# Patient Record
Sex: FEMALE | Race: WHITE | NOT HISPANIC OR LATINO | Employment: UNEMPLOYED | URBAN - METROPOLITAN AREA
[De-identification: names, ages, dates, MRNs, and addresses within clinical notes are randomized per-mention and may not be internally consistent; named-entity substitution may affect disease eponyms.]

---

## 2017-01-24 ENCOUNTER — LAB VISIT (OUTPATIENT)
Dept: LAB | Facility: HOSPITAL | Age: 62
End: 2017-01-24
Attending: FAMILY MEDICINE
Payer: COMMERCIAL

## 2017-01-24 DIAGNOSIS — E87.1 HYPONATREMIA: ICD-10-CM

## 2017-01-24 LAB
ANION GAP SERPL CALC-SCNC: 11 MMOL/L
BUN SERPL-MCNC: 10 MG/DL
CALCIUM SERPL-MCNC: 9.7 MG/DL
CHLORIDE SERPL-SCNC: 99 MMOL/L
CO2 SERPL-SCNC: 23 MMOL/L
CREAT SERPL-MCNC: 0.8 MG/DL
EST. GFR  (AFRICAN AMERICAN): >60 ML/MIN/1.73 M^2
EST. GFR  (NON AFRICAN AMERICAN): >60 ML/MIN/1.73 M^2
GLUCOSE SERPL-MCNC: 98 MG/DL
POTASSIUM SERPL-SCNC: 3.8 MMOL/L
SODIUM SERPL-SCNC: 133 MMOL/L

## 2017-01-24 PROCEDURE — 36415 COLL VENOUS BLD VENIPUNCTURE: CPT | Mod: PO

## 2017-01-24 PROCEDURE — 80048 BASIC METABOLIC PNL TOTAL CA: CPT

## 2017-01-25 ENCOUNTER — TELEPHONE (OUTPATIENT)
Dept: FAMILY MEDICINE | Facility: CLINIC | Age: 62
End: 2017-01-25

## 2017-01-25 NOTE — TELEPHONE ENCOUNTER
"Returned call. When transferred automatically to libra camacho, the message stated "a system error occurred. Try again later."  "

## 2017-01-25 NOTE — TELEPHONE ENCOUNTER
----- Message from Francisco Davis sent at 1/24/2017 10:36 AM CST -----  Contact: ramesh with [facility] [phone]  florias [facility] [phone] called stated physician's name is not on script. Please call back to advise. Thanks,

## 2017-02-13 ENCOUNTER — OFFICE VISIT (OUTPATIENT)
Dept: PULMONOLOGY | Facility: CLINIC | Age: 62
End: 2017-02-13
Payer: COMMERCIAL

## 2017-02-13 VITALS
SYSTOLIC BLOOD PRESSURE: 124 MMHG | OXYGEN SATURATION: 94 % | HEART RATE: 91 BPM | HEIGHT: 63 IN | WEIGHT: 123.69 LBS | DIASTOLIC BLOOD PRESSURE: 80 MMHG | BODY MASS INDEX: 21.91 KG/M2

## 2017-02-13 DIAGNOSIS — F41.9 ANXIETY: ICD-10-CM

## 2017-02-13 DIAGNOSIS — R93.89 ABNORMAL CT SCAN: Primary | ICD-10-CM

## 2017-02-13 DIAGNOSIS — J44.9 COPD, SEVERE: ICD-10-CM

## 2017-02-13 DIAGNOSIS — R91.1 LUNG NODULE: ICD-10-CM

## 2017-02-13 PROCEDURE — 3074F SYST BP LT 130 MM HG: CPT | Mod: S$GLB,,, | Performed by: INTERNAL MEDICINE

## 2017-02-13 PROCEDURE — 3079F DIAST BP 80-89 MM HG: CPT | Mod: S$GLB,,, | Performed by: INTERNAL MEDICINE

## 2017-02-13 PROCEDURE — 99999 PR PBB SHADOW E&M-EST. PATIENT-LVL III: CPT | Mod: PBBFAC,,, | Performed by: INTERNAL MEDICINE

## 2017-02-13 PROCEDURE — 99213 OFFICE O/P EST LOW 20 MIN: CPT | Mod: S$GLB,,, | Performed by: INTERNAL MEDICINE

## 2017-02-13 RX ORDER — ALPRAZOLAM 0.25 MG/1
0.25 TABLET ORAL 3 TIMES DAILY PRN
Qty: 90 TABLET | Refills: 1 | Status: SHIPPED | OUTPATIENT
Start: 2017-02-13 | End: 2017-09-20 | Stop reason: SDUPTHER

## 2017-02-13 RX ORDER — AZITHROMYCIN 500 MG/1
500 TABLET, FILM COATED ORAL DAILY
COMMUNITY
End: 2017-09-27

## 2017-02-13 NOTE — MR AVS SNAPSHOT
[facility]  [address]  [place] [address]  Phone: [phone]                  Fabiana Morel   2017 8:40 AM   Office Visit    Description:  Female : 1955   Provider:  Bhupendra Mackey MD   Department:  [facility]           Reason for Visit     Cough     Shortness of Breath           Diagnoses this Visit        Comments    Abnormal CT scan    -  Primary     Anxiety         Lung nodule         COPD, severe                To Do List           Future Appointments        Provider Department Dept Phone    2017 1:00 PM MD [facility] [phone]    2017 10:00 AM [facility] CT1 LIMIT [address] L[facility] [phone]    8/15/2017 9:00 AM MD [facility] [phone]      Goals (5 Years of Data)     None      Follow-Up and Disposition     Return in about 6 months (around 2017).    Follow-up and Disposition History       These Medications        Disp Refills Start End    alprazolam (XANAX) 0.25 MG tablet 90 tablet 1 2017     Take 1 tablet (0.25 mg total) by mouth 3 (three) times daily as needed. - Oral    Pharmacy: [id number] [place] - [address]  #: [phone]         [facility]     [facility] -  Assistance  Registered nurses in the [facility] provide clinical advisement, health education, appointment booking, and other advisory services.  Call for this free service at [phone].             Medications           Message regarding Medications     Verify the changes and/or additions to your medication regime listed below are the same as discussed with your clinician today.  If any of these changes or additions are incorrect, please notify your healthcare provider.        STOP taking these medications     FOLIC ACID/MULTIVIT-MIN/LUTEIN (CENTRUM SILVER ORAL) Take by mouth.    nicotine (NICODERM CQ) 14  "mg/24 hr Place 1 patch onto the skin once daily.           Verify that the below list of medications is an accurate representation of the medications you are currently taking.  If none reported, the list may be blank. If incorrect, please contact your healthcare provider. Carry this list with you in case of emergency.           Current Medications     albuterol (PROVENTIL) 4 MG Tab Take 1 tablet (4 mg total) by mouth 3 (three) times daily.    alprazolam (XANAX) 0.25 MG tablet Take 1 tablet (0.25 mg total) by mouth 3 (three) times daily as needed.    atorvastatin (LIPITOR) 40 MG tablet Take 1 tablet (40 mg total) by mouth once daily.    BREO ELLIPTA 100-25 mcg/dose diskus inhaler Inhale 1 puff into the lungs once daily.    estradiol (ESTRACE) 2 MG tablet Take 2 mg by mouth once daily.    INCRUSE ELLIPTA 62.5 mcg/actuation DsDv Inhale 1 puff into the lungs once daily.    lisinopril 10 MG tablet Take 1 tablet (10 mg total) by mouth once daily.    metoprolol succinate (TOPROL XL) 50 MG 24 hr tablet Take 1 tablet (50 mg total) by mouth once daily.    albuterol (VENTOLIN HFA) 90 mcg/actuation inhaler INHALE TWO PUFFS BY MOUTH EVERY 6 HOURS AS NEEDED FOR WHEEZING    azithromycin (ZITHROMAX) 500 MG tablet Take 500 mg by mouth once daily.    B-complex with vitamin C (Z-BEC OR EQUIV) tablet Take 1 tablet by mouth once daily.    cholecalciferol, vitamin D3, (VITAMIN D3) 1,000 unit capsule Take 1,000 Units by mouth once daily.    predniSONE (DELTASONE) 20 MG tablet One daily for 3 days and repeat for flare of lung symptoms as intructed           Clinical Reference Information           Your Vitals Were     BP Pulse Height Weight SpO2 BMI    124/80 (BP Location: Right arm, Patient Position: Sitting, BP Method: Automatic) 91 5' 3" (1.6 m) 56.1 kg (123 lb 10.9 oz) 94% 21.91 kg/m2      Blood Pressure          Most Recent Value    BP  124/80      Allergies as of 2/13/2017     Erythromycin      Immunizations Administered on Date of " Encounter - 2/13/2017     None      Orders Placed During Today's Visit     Future Labs/Procedures Expected by Expires    CT Chest Without Contrast  8/13/2017 2/13/2018      Instructions    Must stop smoking.      Ct chest follow up in august,    Take antibiotic and prednisone if needed.    Continue with activity/rehab.  Try to do regular activity.       Smoking Cessation     If you would like to quit smoking:   You may be eligible for free services if you are a [place] resident and started smoking cigarettes before September 1, 1988.  Call the Smoking Cessation [employer] ([facility]) toll free at [phone] or (188) [phone].   Call 0-750-QUIT-NOW if you do not meet the above criteria.            Language Assistance Services     ATTENTION: Language assistance services are available, free of charge. Please call [phone].      ATENCIÓN: Si habla español, tiene a lynne disposición servicios gratuitos de asistencia lingüística. Llame al [phone].     CHÚ Ý: N?u b?n nói Ti?ng Vi?t, có các d?ch v? h? tr? ngôn ng? mi?n phí dành cho b?n. G?i s? [phone].         [facility] complies with applicable Federal civil rights laws and does not discriminate on the basis of race, color, national origin, age, disability, or sex.

## 2017-02-13 NOTE — PATIENT INSTRUCTIONS
Must stop smoking.      Ct chest follow up in august,    Take antibiotic and prednisone if needed.    Continue with activity/rehab.  Try to do regular activity.

## 2017-02-13 NOTE — PROGRESS NOTES
"2/13/2017    Fabiana Morel  Office Note    Chief Complaint   Patient presents with    Cough    Shortness of Breath     Feb 13, 2017, hpi doing rehab, no action plan, still smoking.  Using all meds, nerves better with xanax and toprol.        Aug 12, 2016HPI:initial encounter in [facility], copd and bronchiectasis and lung nodule.  No prednisone use recent.  No recent azithromycin.    breo and incruse regular use, xxstcethy7h/d, not using nebulizer.  Last ct 12/2015.  fev1 27% in 2014 with dramatic bd response.  Working as filing and typing.  Feels resp comfortable.  Has had lung dz age 56.        Scant mucous with no color chr, no acapella.        The chief compliant  problem is stable  PFSH:  Past Medical History   Diagnosis Date    Fracture of left clavicle     Hypertension     Scoliosis     SOB (shortness of breath)          Past Surgical History   Procedure Laterality Date    Appendectomy      Breast biopsy Right      cyst    Hysterectomy       HASMUKH/BSO, DUB    Tubal ligation       Social History   Substance Use Topics    Smoking status: Current Every Day Smoker     Packs/day: 0.50     Years: 30.00     Types: Cigarettes    Smokeless tobacco: Never Used    Alcohol use 7.2 oz/week     12 Cans of beer per week      Comment: 2-3 beers daily     Family History   Problem Relation Age of Onset    Cancer Mother 49     "lymph nodes"    Cancer Sister      lung?     Review of patient's allergies indicates:   Allergen Reactions    Erythromycin Nausea Only       Performance Status:The patient's activity level is functions out of house.      Review of Systems:  a review of eleven systems covering constitutional, Eye, HEENT, Psych, Respiratory, Cardiac, GI, , Musculoskeletal, Endocrine, Dermatologic was negative except for pertinent findings as listed ABOVE and below: night sweats chr due to menopause,      Exam:Comprehensive exam done.   Visit Vitals    /80 (BP Location: Right arm, Patient Position: " "Sitting, BP Method: Automatic)    Pulse 91    Ht 5' 3" (1.6 m)    Wt 56.1 kg (123 lb 10.9 oz)    SpO2 (!) 94%    BMI 21.91 kg/m2     Exam included Vitals as listed, and patient's appearance and affect and alertness and mood, oral exam for yeast and hygiene and pharynx lesions and Mallapatti (M) score, neck with inspection for jvd and masses and thyroid abnormalities and lymph nodes (supraclavicular and infraclavicular nodes and axillary also examined and noted if abn), chest exam included symmetry and effort and fremitus and percussion and auscultation, cardiac exam included rhythm and gallops and murmur and rubs and jvd and edema, abdominal exam for mass and hepatosplenomegaly and tenderness and hernias and bowel sounds, Musculoskeletal exam with muscle tone and posture and mobility/gait and  strength, and skin for rashes and cyanosis and pallor and turgor, extremity for clubbing.  Findings were normal except for pertinent findings listed below:  Mild kyphosis scoliosis, M2, no wheezes no edema,       Radiographs reviewed: was not done     Labs reviewed from last 6 months on EPIC result review       PFT results [id number]    Plan:  Clinical impression is apparently straight forward and impression with management as below.    Fabiana was seen today for cough and shortness of breath.    Diagnoses and all orders for this visit:    Abnormal CT scan  -     CT Chest Without Contrast; Future    Anxiety  -     alprazolam (XANAX) 0.25 MG tablet; Take 1 tablet (0.25 mg total) by mouth 3 (three) times daily as needed.    Lung nodule  -     CT Chest Without Contrast; Future    COPD, severe        Return in about 6 months (around 8/13/2017).    Discussed with patient above for education the following:       Must stop smoking.      Ct chest follow up in august,    Take antibiotic and prednisone if needed.    Continue with activity/rehab.  Try to do regular activity.  "

## 2017-02-23 ENCOUNTER — CLINICAL SUPPORT (OUTPATIENT)
Dept: SMOKING CESSATION | Facility: CLINIC | Age: 62
End: 2017-02-23
Payer: COMMERCIAL

## 2017-02-23 ENCOUNTER — TELEPHONE (OUTPATIENT)
Dept: SMOKING CESSATION | Facility: CLINIC | Age: 62
End: 2017-02-23

## 2017-02-23 DIAGNOSIS — F17.200 NICOTINE DEPENDENCE: Primary | ICD-10-CM

## 2017-02-23 PROCEDURE — 99407 BEHAV CHNG SMOKING > 10 MIN: CPT | Mod: S$GLB,,, | Performed by: GENERAL PRACTICE

## 2017-05-12 ENCOUNTER — DOCUMENTATION ONLY (OUTPATIENT)
Dept: FAMILY MEDICINE | Facility: CLINIC | Age: 62
End: 2017-05-12

## 2017-05-12 NOTE — PROGRESS NOTES
Pre-Visit Chart Review  For Appointment Scheduled on 5/19/17.    Health Maintenance Due   Topic Date Due    TETANUS VACCINE  11/09/1974    Zoster Vaccine  11/09/2016

## 2017-05-19 ENCOUNTER — OFFICE VISIT (OUTPATIENT)
Dept: FAMILY MEDICINE | Facility: CLINIC | Age: 62
End: 2017-05-19
Payer: COMMERCIAL

## 2017-05-19 VITALS
TEMPERATURE: 98 F | SYSTOLIC BLOOD PRESSURE: 99 MMHG | HEART RATE: 91 BPM | BODY MASS INDEX: 22.15 KG/M2 | WEIGHT: 125 LBS | HEIGHT: 63 IN | OXYGEN SATURATION: 96 % | DIASTOLIC BLOOD PRESSURE: 70 MMHG

## 2017-05-19 DIAGNOSIS — E87.1 HYPONATREMIA: ICD-10-CM

## 2017-05-19 DIAGNOSIS — I10 ESSENTIAL HYPERTENSION: Primary | ICD-10-CM

## 2017-05-19 DIAGNOSIS — R63.6 UNDERWEIGHT: ICD-10-CM

## 2017-05-19 DIAGNOSIS — E78.5 HYPERLIPIDEMIA, UNSPECIFIED HYPERLIPIDEMIA TYPE: ICD-10-CM

## 2017-05-19 PROCEDURE — 99999 PR PBB SHADOW E&M-EST. PATIENT-LVL III: CPT | Mod: PBBFAC,,, | Performed by: FAMILY MEDICINE

## 2017-05-19 PROCEDURE — 99214 OFFICE O/P EST MOD 30 MIN: CPT | Mod: S$GLB,,, | Performed by: FAMILY MEDICINE

## 2017-05-19 PROCEDURE — 3078F DIAST BP <80 MM HG: CPT | Mod: S$GLB,,, | Performed by: FAMILY MEDICINE

## 2017-05-19 PROCEDURE — 3074F SYST BP LT 130 MM HG: CPT | Mod: S$GLB,,, | Performed by: FAMILY MEDICINE

## 2017-05-19 PROCEDURE — 1160F RVW MEDS BY RX/DR IN RCRD: CPT | Mod: S$GLB,,, | Performed by: FAMILY MEDICINE

## 2017-05-19 NOTE — PATIENT INSTRUCTIONS
Stop lisinopril.  Continue with metoprolol or toprol xl.          Established High Blood Pressure    High blood pressure (hypertension) is a chronic disease. Often health care providers dont know what causes it. But it can be caused by certain health conditions and medicines.  If you have high blood pressure, you may not have any symptoms. If you do have symptoms, they may include headache, dizziness, changes in your vision, chest pain, and shortness of breath. But even without symptoms, high blood pressure thats not treated raises your risk for heart attack and stroke. High blood pressure is a serious health risk and shouldnt be ignored.  A blood pressure reading is made up of two numbers: a higher number over a lower number. The top number is the systolic pressure. The bottom number is the diastolic pressure. A normal blood pressure is less than 120 over less than 80.  High blood pressure is when either the top number is 140 or higher, or the bottom number is 90 or higher. This must be the result when taking your blood pressure a number of times. The blood pressures between normal and high are called prehypertension.  Home care  If you have high blood pressure, you should do what is listed below to lower your blood pressure. If you are taking medicines for high blood pressure, these methods may reduce or end your need for medicines in the future.  · Begin a weight-loss program if you are overweight.  · Cut back on how much salt you get in your diet. Heres how to do this:  ¨ Dont eat foods that have a lot of salt. These include olives, pickles, smoked meats, and salted potato chips.  ¨ Dont add salt to your food at the table.  ¨ Use only small amounts of salt when cooking.  · Begin an exercise program. Talk with your health care provider about the type of exercise program that would be best for you. It doesn't have to be hard. Even brisk walking for 20 minutes 3 times a week is a good form of  exercise.  · Dont take medicines that have heart stimulants. This includes many cold and sinus decongestant pills and sprays, as well as diet pills. Check the warnings about hypertension on the label. Stimulants such as amphetamine or cocaine could be lethal for someone with high blood pressure. Never take these.  · Limit how much caffeine you get in your diet. Switch to caffeine-free products.  · Stop smoking. If you are a long-time smoker, this can be hard. Enroll in a stop-smoking program to make it more likely that you will quit for good.  · Learn how to handle stress. This is an important part of any program to lower blood pressure. Learn about relaxation methods like meditation, yoga, or biofeedback.  · If your provider prescribed medicines, take them exactly as directed. Missing doses may cause your blood pressure get out of control.  · Consider buying an automatic blood pressure machine. You can get one of these at most pharmacies. Use this to watch your blood pressure at home. Give the results to your provider.  Follow-up care  You will need to make regular visits to your health care provider. This is to check your blood pressure and to make changes to your medicines. Make a follow-up appointment as directed.  When to seek medical advice  Call your health care provider right away if any of these occur:  · Chest pain or shortness of breath  · Severe headache  · Throbbing or rushing sound in the ears  · Nosebleed  · Sudden severe pain in your belly (abdomen)  · Extreme drowsiness, confusion, or fainting  · Dizziness or dizziness with a spinning sensation (vertigo)  · Weakness of an arm or leg or one side of the face  · You have problems speaking or seeing   Date Last Reviewed: 11/25/2014  © 6824-3033 [employer]. [address] [place] [address], [place] [address]. All rights reserved. This information is not intended as a substitute for professional medical care. Always follow your healthcare  professional's instructions.

## 2017-05-19 NOTE — PROGRESS NOTES
"CHIEF COMPLAINT:  Follow up      HISTORY OF PRESENT ILLNESS:  Fabiana Morel is a 61 y.o. female who presents to clinic for follow up.    1. HTN: she is on lisinopril and toprol xl. She denies any CP, SOB, cough, edema. She does not monitor her blood pressures    2. Hyperlipidemia: she is on lipitor. She denies any myalgia, dark colored urine.        REVIEW OF SYSTEMS:  The patient denies any fever, chills, night sweats, headaches, vision changes, difficulty speaking or swallowing, decreased hearing, weight loss, weight gain, chest pain, palpitations, shortness of breath, cough, nausea, vomiting, abdominal pain, dysuria, diarrhea, constipation, hematuria, hematochezia, melena, changes in her hair, skin, nails, numbness or weakness in her extremities, erythema, swelling over any of her joints, myalgia, swollen glands, easy bruising, fatigue, edema, symptoms of anxiety or depression.     MEDICATIONS:   Reviewed and/or reconciled in EPIC    ALLERGIES:  Reviewed and/or reconciled in [facility]    PAST MEDICAL/SURGICAL HISTORY:   Past Medical History:   Diagnosis Date    Fracture of left clavicle     Hypertension     Scoliosis     SOB (shortness of breath)       Past Surgical History:   Procedure Laterality Date    APPENDECTOMY      BREAST BIOPSY Right     cyst    HYSTERECTOMY      HASMUKH/BSO, DUB    TUBAL LIGATION         FAMILY HISTORY:    Family History   Problem Relation Age of Onset    Cancer Mother 49     "lymph nodes"    Cancer Sister      lung?       SOCIAL HISTORY:    Social History     Social History    Marital status:      Spouse name: N/A    Number of children: N/A    Years of education: N/A     Occupational History    Not on file.     Social History Main Topics    Smoking status: Current Every Day Smoker     Packs/day: 0.50     Years: 30.00     Types: Cigarettes    Smokeless tobacco: Never Used    Alcohol use 7.2 oz/week     12 Cans of beer per week      Comment: 2-3 beers daily    Drug use: " "No    Sexual activity: Yes     Partners: Male     Other Topics Concern    Not on file     Social History Narrative       PHYSICAL EXAM:  VITAL SIGNS:   Vitals:    05/19/17 1312   BP: 99/70   BP Location: Left arm   Patient Position: Sitting   BP Method: Automatic   Pulse: 91   Temp: 98.3 °F (36.8 °C)   TempSrc: Oral   SpO2: 96%   Weight: 56.7 kg (125 lb)   Height: 5' 3" (1.6 m)     GENERAL:  Patient appears well nourished, sitting on exam table, in no acute distress.  HEENT:  Atraumatic, normocephalic, PERRLA, EOMI, no conjunctival injection, sclerae are anicteric, normal external auditory canals,TMs clear b/l, gross hearing intact to whisper, MMM, no oropharygneal erythema or exudate.  NECK:  Supple, normal ROM, trachea is midline , no supraclavicular or cervical LAD or masses palpated.  Thyroid gland not palpable.  CARDIOVASCULAR:  RRR, normal S1 and S2, no m/r/g.  RESPIRATORY:  CTA b/l, no wheezes, rhonchi, rales.  No increased work of breathing, no  use of accessory muscles.  ABDOMEN:  Soft, nontender, nondistended, normoactive bowel sounds in all four quadrants, no rebound or guarding, no HSM or masses palpated.  Normal percussion.  EXTREMITIES:  2+ DP pulses b/l, no edema.  SKIN:  Warm, no lesions on exposed skin.  NEUROMUSCULAR:  Cranial nerves II-XII grossly intact. No clubbing or cyanosis of digits/nails.  Steady gait.  PSYCH:  Patient is alert and oriented to person, time, place. They are appropriately dressed and groomed. There is normal eye contact. Rate and tone of speech is normal. Normal insight, judgement. Normal thought content and process.     LABORATORY/IMAGING STUDIES:    ASSESSMENT/PLAN: This is a 61 y.o. female who presents to clinic for evaluation of the following concerns  1. HTN: see below  2. Hyperlipidemia: obtain CMP, lipid panel in 3 months  3. Underweight: see below  4. Hyponatremia: discussed adding some salt to her diet as she does not add any salt to her cooking and does not eat " processed foods.  will recheck CMP in 3 months.     Patient readiness: acceptance and barriers:none    During the course of the visit the patient was educated and counseled about the following:     Hypertension:   BP low, will stop lisinopril. Continue with toprol xl. Will follow up next week for BP recheck   Underweight:  Follow up and re-weight in: 6  months and as needed.     Goals: Hypertension: Reduce Blood Pressure and Underweight: Increase calorie intake and BMI      Did patient meet goals/outcomes: No    The following self management tools provided: declined    Patient Instructions (the written plan) was given to the patient/family.     Time spent with patient: 30 minutes      Светлана Frazier MD

## 2017-05-19 NOTE — MR AVS SNAPSHOT
[facility]  [address]  [place] [address]  Phone: [phone]  Fax: [phone]                  Fabiana Morel   2017 1:00 PM   Office Visit    Description:  Female : 1955   Provider:  Светлана Frazier MD   Department:  [facility]           Reason for Visit     hypertension follow up           Diagnoses this Visit        Comments    Essential hypertension    -  Primary     Hyperlipidemia, unspecified hyperlipidemia type         Hyponatremia         Underweight         Body mass index (BMI) 22.0-22.9, adult                To Do List           Future Appointments        Provider Department Dept Phone    2017 11:00 AM Светлана Frazier MD [facility] [phone]    2017 10:00 AM [facility] CT1 LIMIT 400 LBS [facility] [phone]    8/15/2017 9:00 AM MD [facility] - Pulmonary [phone]    2017 8:00 AM LABSEBASTIAN SAT [facility] - Lab [phone]    2017 1:40 PM Светлана Frazier MD [facility] [phone]      Goals (5 Years of Data)     None      Follow-Up and Disposition     Return in about 6 months (around 2017) for htn.    Follow-up and Disposition History      [facility]     [facility] Nurse Care [facility] -  Assistance  Unless otherwise directed by your provider, please contact [facility], our nurse care line that is available for  assistance.     Registered nurses in the [facility] provide: appointment scheduling, clinical advisement, health education, and other advisory services.  Call: [phone] (toll free)               Medications           Message regarding Medications     Verify the changes and/or additions to your medication regime listed below are the same as discussed with your clinician today.  If any of these changes or additions are incorrect, please notify your healthcare provider.        STOP taking these  "medications     B-complex with vitamin C (Z-BEC OR EQUIV) tablet Take 1 tablet by mouth once daily.    cholecalciferol, vitamin D3, (VITAMIN D3) 1,000 unit capsule Take 1,000 Units by mouth once daily.    lisinopril 10 MG tablet Take 1 tablet (10 mg total) by mouth once daily.           Verify that the below list of medications is an accurate representation of the medications you are currently taking.  If none reported, the list may be blank. If incorrect, please contact your healthcare provider. Carry this list with you in case of emergency.           Current Medications     albuterol (PROVENTIL) 4 MG Tab Take 1 tablet (4 mg total) by mouth 3 (three) times daily.    albuterol (VENTOLIN HFA) 90 mcg/actuation inhaler INHALE TWO PUFFS BY MOUTH EVERY 6 HOURS AS NEEDED FOR WHEEZING    alprazolam (XANAX) 0.25 MG tablet Take 1 tablet (0.25 mg total) by mouth 3 (three) times daily as needed.    atorvastatin (LIPITOR) 40 MG tablet Take 1 tablet (40 mg total) by mouth once daily.    BREO ELLIPTA 100-25 mcg/dose diskus inhaler Inhale 1 puff into the lungs once daily.    estradiol (ESTRACE) 2 MG tablet Take 2 mg by mouth once daily.    INCRUSE ELLIPTA 62.5 mcg/actuation DsDv Inhale 1 puff into the lungs once daily.    metoprolol succinate (TOPROL XL) 50 MG 24 hr tablet Take 1 tablet (50 mg total) by mouth once daily.    azithromycin (ZITHROMAX) 500 MG tablet Take 500 mg by mouth once daily.    predniSONE (DELTASONE) 20 MG tablet One daily for 3 days and repeat for flare of lung symptoms as intructed           Clinical Reference Information           Your Vitals Were     BP Pulse Temp Height Weight SpO2    99/70 (BP Location: Left arm, Patient Position: Sitting, BP Method: Automatic) 91 98.3 °F (36.8 °C) (Oral) 5' 3" (1.6 m) 56.7 kg (125 lb) 96%    BMI                22.14 kg/m2          Blood Pressure          Most Recent Value    BP  99/70      Allergies as of 5/19/2017     Erythromycin      Immunizations Administered on Date " of Encounter - 5/19/2017     None      Orders Placed During Today's Visit     Future Labs/Procedures Expected by Expires    Comprehensive metabolic panel  5/19/2017 5/19/2018    Lipid panel  5/19/2017 7/18/2018      [facility] Sign-Up     Activating your [facility] account is as easy as 1-2-3!     1) Visit [email or web address], select Sign Up Now, enter this activation code and your date of birth, then select Next.  [email or web address]  Expires: 7/3/2017  1:04 PM      2) Create a username and password to use when you visit [facility] in the future and select a security question in case you lose your password and select Next.    3) Enter your e-mail address and click Sign Up!    Additional Information  If you have questions, please e-mail [email or web address] or call [phone] to talk to our [facility] staff. Remember, [facility] is NOT to be used for urgent needs. For medical emergencies, dial 911.         Instructions      Stop lisinopril.  Continue with metoprolol or toprol xl.          Established High Blood Pressure    High blood pressure (hypertension) is a chronic disease. Often health care providers dont know what causes it. But it can be caused by certain health conditions and medicines.  If you have high blood pressure, you may not have any symptoms. If you do have symptoms, they may include headache, dizziness, changes in your vision, chest pain, and shortness of breath. But even without symptoms, high blood pressure thats not treated raises your risk for heart attack and stroke. High blood pressure is a serious health risk and shouldnt be ignored.  A blood pressure reading is made up of two numbers: a higher number over a lower number. The top number is the systolic pressure. The bottom number is the diastolic pressure. A normal blood pressure is less than 120 over less than 80.  High blood pressure is when either the top number is 140 or higher, or the bottom number is 90 or higher. This must be the result  when taking your blood pressure a number of times. The blood pressures between normal and high are called prehypertension.  Home care  If you have high blood pressure, you should do what is listed below to lower your blood pressure. If you are taking medicines for high blood pressure, these methods may reduce or end your need for medicines in the future.  · Begin a weight-loss program if you are overweight.  · Cut back on how much salt you get in your diet. Heres how to do this:  ¨ Dont eat foods that have a lot of salt. These include olives, pickles, smoked meats, and salted potato chips.  ¨ Dont add salt to your food at the table.  ¨ Use only small amounts of salt when cooking.  · Begin an exercise program. Talk with your health care provider about the type of exercise program that would be best for you. It doesn't have to be hard. Even brisk walking for 20 minutes 3 times a week is a good form of exercise.  · Dont take medicines that have heart stimulants. This includes many cold and sinus decongestant pills and sprays, as well as diet pills. Check the warnings about hypertension on the label. Stimulants such as amphetamine or cocaine could be lethal for someone with high blood pressure. Never take these.  · Limit how much caffeine you get in your diet. Switch to caffeine-free products.  · Stop smoking. If you are a long-time smoker, this can be hard. Enroll in a stop-smoking program to make it more likely that you will quit for good.  · Learn how to handle stress. This is an important part of any program to lower blood pressure. Learn about relaxation methods like meditation, yoga, or biofeedback.  · If your provider prescribed medicines, take them exactly as directed. Missing doses may cause your blood pressure get out of control.  · Consider buying an automatic blood pressure machine. You can get one of these at most pharmacies. Use this to watch your blood pressure at home. Give the results to your  provider.  Follow-up care  You will need to make regular visits to your health care provider. This is to check your blood pressure and to make changes to your medicines. Make a follow-up appointment as directed.  When to seek medical advice  Call your health care provider right away if any of these occur:  · Chest pain or shortness of breath  · Severe headache  · Throbbing or rushing sound in the ears  · Nosebleed  · Sudden severe pain in your belly (abdomen)  · Extreme drowsiness, confusion, or fainting  · Dizziness or dizziness with a spinning sensation (vertigo)  · Weakness of an arm or leg or one side of the face  · You have problems speaking or seeing   Date Last Reviewed: 11/25/2014  © 9437-3905 [employer]. [address] [place] [address], [place] [address]. All rights reserved. This information is not intended as a substitute for professional medical care. Always follow your healthcare professional's instructions.             Smoking Cessation     If you would like to quit smoking:   You may be eligible for free services if you are a [place] resident and started smoking cigarettes before September 1, 1988.  Call the Smoking Cessation Trust (SCT) toll free at (421) 345-6400 or (293) 558-4626.   Call 1-800-QUIT-NOW if you do not meet the above criteria.   Contact us via email: [email or web address]   View our website for more information: [email or web address]        Language Assistance Services     ATTENTION: Language assistance services are available, free of charge. Please call [phone].      ATENCIÓN: Si habla español, tiene a lynne disposición servicios gratuitos de asistencia lingüística. Llara al [phone].     CHÚ Ý: N?u b?n nói Ti?ng Vi?t, có các d?ch v? h? tr? ngôn ng? mi?n phí dành cho b?n. G?i s? [phone].         [facility] complies with applicable Federal civil rights laws and does not discriminate on the basis of race, color, national origin, age,  disability, or sex.

## 2017-05-25 ENCOUNTER — CLINICAL SUPPORT (OUTPATIENT)
Dept: FAMILY MEDICINE | Facility: CLINIC | Age: 62
End: 2017-05-25
Payer: COMMERCIAL

## 2017-05-25 VITALS — HEART RATE: 83 BPM | DIASTOLIC BLOOD PRESSURE: 83 MMHG | SYSTOLIC BLOOD PRESSURE: 118 MMHG

## 2017-05-25 NOTE — PROGRESS NOTES
Came to clinic for bp check.no bp readings from home. Takes medication as prescribed. 118/88 pulse 83 no complaints noted

## 2017-08-12 ENCOUNTER — HOSPITAL ENCOUNTER (OUTPATIENT)
Dept: RADIOLOGY | Facility: HOSPITAL | Age: 62
Discharge: HOME | End: 2017-08-12
Attending: INTERNAL MEDICINE
Payer: COMMERCIAL

## 2017-08-12 DIAGNOSIS — R91.1 LUNG NODULE: ICD-10-CM

## 2017-08-12 DIAGNOSIS — R93.89 ABNORMAL CT SCAN: ICD-10-CM

## 2017-08-12 PROCEDURE — 71250 CT THORAX DX C-: CPT | Mod: 26,,, | Performed by: RADIOLOGY

## 2017-08-12 PROCEDURE — 71250 CT THORAX DX C-: CPT | Mod: TC

## 2017-08-14 ENCOUNTER — LAB VISIT (OUTPATIENT)
Dept: LAB | Facility: HOSPITAL | Age: 62
End: 2017-08-14
Attending: INTERNAL MEDICINE
Payer: COMMERCIAL

## 2017-08-14 ENCOUNTER — TELEPHONE (OUTPATIENT)
Dept: PULMONOLOGY | Facility: CLINIC | Age: 62
End: 2017-08-14

## 2017-08-14 DIAGNOSIS — R93.89 ABNORMAL CT OF THE CHEST: ICD-10-CM

## 2017-08-14 DIAGNOSIS — R93.89 ABNORMAL CT OF THE CHEST: Primary | ICD-10-CM

## 2017-08-14 LAB
BASOPHILS # BLD AUTO: 0 K/UL
BASOPHILS NFR BLD: 0.4 %
DIFFERENTIAL METHOD: ABNORMAL
EOSINOPHIL # BLD AUTO: 0.2 K/UL
EOSINOPHIL NFR BLD: 2 %
ERYTHROCYTE [DISTWIDTH] IN BLOOD BY AUTOMATED COUNT: 13.1 %
HCT VFR BLD AUTO: 42 %
HGB BLD-MCNC: 14.3 G/DL
LYMPHOCYTES # BLD AUTO: 2.4 K/UL
LYMPHOCYTES NFR BLD: 24.6 %
MCH RBC QN AUTO: 32.2 PG
MCHC RBC AUTO-ENTMCNC: 34.1 G/DL
MCV RBC AUTO: 94 FL
MONOCYTES # BLD AUTO: 0.7 K/UL
MONOCYTES NFR BLD: 6.7 %
NEUTROPHILS # BLD AUTO: 6.6 K/UL
NEUTROPHILS NFR BLD: 66.3 %
PLATELET # BLD AUTO: 393 K/UL
PMV BLD AUTO: 7 FL
RBC # BLD AUTO: 4.45 M/UL
WBC # BLD AUTO: 9.9 K/UL

## 2017-08-14 PROCEDURE — 36415 COLL VENOUS BLD VENIPUNCTURE: CPT

## 2017-08-14 PROCEDURE — 84145 PROCALCITONIN (PCT): CPT

## 2017-08-14 PROCEDURE — 85025 COMPLETE CBC W/AUTO DIFF WBC: CPT

## 2017-08-14 NOTE — TELEPHONE ENCOUNTER
Pt notified. Has appt scheduled for tomorrow.     ----- Message from Bhupendra Mackey MD sent at 8/14/2017  1:22 PM CDT -----  Need to take antibiotic, get sputum and blood work , office asap.

## 2017-08-15 ENCOUNTER — LAB VISIT (OUTPATIENT)
Dept: LAB | Facility: HOSPITAL | Age: 62
End: 2017-08-15
Attending: INTERNAL MEDICINE
Payer: COMMERCIAL

## 2017-08-15 ENCOUNTER — OFFICE VISIT (OUTPATIENT)
Dept: PULMONOLOGY | Facility: CLINIC | Age: 62
End: 2017-08-15
Payer: COMMERCIAL

## 2017-08-15 VITALS
SYSTOLIC BLOOD PRESSURE: 119 MMHG | DIASTOLIC BLOOD PRESSURE: 85 MMHG | WEIGHT: 122.56 LBS | HEIGHT: 63 IN | OXYGEN SATURATION: 96 % | HEART RATE: 105 BPM | BODY MASS INDEX: 21.71 KG/M2

## 2017-08-15 DIAGNOSIS — J18.9 PNEUMONIA OF RIGHT UPPER LOBE DUE TO INFECTIOUS ORGANISM: Primary | ICD-10-CM

## 2017-08-15 DIAGNOSIS — J18.9 PNEUMONIA OF RIGHT UPPER LOBE DUE TO INFECTIOUS ORGANISM: ICD-10-CM

## 2017-08-15 DIAGNOSIS — J44.9 COPD, SEVERE: ICD-10-CM

## 2017-08-15 DIAGNOSIS — F41.9 ANXIETY: ICD-10-CM

## 2017-08-15 DIAGNOSIS — R91.1 LUNG NODULE: ICD-10-CM

## 2017-08-15 DIAGNOSIS — I10 ESSENTIAL HYPERTENSION: ICD-10-CM

## 2017-08-15 LAB — PROCALCITONIN SERPL IA-MCNC: <0.02 NG/ML

## 2017-08-15 PROCEDURE — 87015 SPECIMEN INFECT AGNT CONCNTJ: CPT

## 2017-08-15 PROCEDURE — 99214 OFFICE O/P EST MOD 30 MIN: CPT | Mod: S$GLB,,, | Performed by: INTERNAL MEDICINE

## 2017-08-15 PROCEDURE — 3074F SYST BP LT 130 MM HG: CPT | Mod: S$GLB,,, | Performed by: INTERNAL MEDICINE

## 2017-08-15 PROCEDURE — 3008F BODY MASS INDEX DOCD: CPT | Mod: S$GLB,,, | Performed by: INTERNAL MEDICINE

## 2017-08-15 PROCEDURE — 3079F DIAST BP 80-89 MM HG: CPT | Mod: S$GLB,,, | Performed by: INTERNAL MEDICINE

## 2017-08-15 PROCEDURE — 99999 PR PBB SHADOW E&M-EST. PATIENT-LVL III: CPT | Mod: PBBFAC,,, | Performed by: INTERNAL MEDICINE

## 2017-08-15 PROCEDURE — 87116 MYCOBACTERIA CULTURE: CPT | Mod: 59

## 2017-08-15 RX ORDER — METOPROLOL SUCCINATE 50 MG/1
50 TABLET, EXTENDED RELEASE ORAL DAILY
Qty: 30 TABLET | Refills: 11 | Status: SHIPPED | OUTPATIENT
Start: 2017-08-15 | End: 2018-08-28 | Stop reason: SDUPTHER

## 2017-08-15 RX ORDER — AMOXICILLIN AND CLAVULANATE POTASSIUM 875; 125 MG/1; MG/1
1 TABLET, FILM COATED ORAL EVERY 12 HOURS
Qty: 42 TABLET | Refills: 1 | Status: SHIPPED | OUTPATIENT
Start: 2017-08-15 | End: 2017-11-16 | Stop reason: SDUPTHER

## 2017-08-15 RX ORDER — ALBUTEROL SULFATE 4 MG/1
4 TABLET ORAL 3 TIMES DAILY
Qty: 90 TABLET | Refills: 5 | Status: SHIPPED | OUTPATIENT
Start: 2017-08-15 | End: 2018-02-28 | Stop reason: SDUPTHER

## 2017-08-15 RX ORDER — ALBUTEROL SULFATE 4 MG/1
4 TABLET ORAL 3 TIMES DAILY
COMMUNITY
End: 2017-08-15 | Stop reason: SDUPTHER

## 2017-08-15 NOTE — PROGRESS NOTES
"8/15/2017    Fabiana Morel  Office Note    Chief Complaint   Patient presents with    Follow-up     6 month    Abnormal Ct Scan    Pulmonary Nodules    COPD       AUg 15, having right shoulder pain last 3 weeks at 10/10 intially  And subsequent 3-4/10,  No fever but night sweats chr, no n/v/d or headache.  No tb exposure.   Had tb eval past negative.  Still smokes.  Nerves ok.  Breathing seems  Better.      Feb 13, 2017, hpi doing rehab, no action plan, still smoking.  Using all meds, nerves better with xanax and toprol.        Aug 12, 2016HPI:initial encounter in sbp, copd and bronchiectasis and lung nodule.  No prednisone use recent.  No recent azithromycin.    breo and incruse regular use, [id number], not using nebulizer.  Last ct 12/2015.  fev1 27% in 2014 with dramatic bd response.  Working as filing and typing.  Feels resp comfortable.  Has had lung dz age 56.        Scant mucous with no color chr, no acapella.        The chief compliant  problem is stable  PFSH:  Past Medical History:   Diagnosis Date    Fracture of left clavicle     Hypertension     Scoliosis     SOB (shortness of breath)          Past Surgical History:   Procedure Laterality Date    APPENDECTOMY      BREAST BIOPSY Right     cyst    HYSTERECTOMY      HASMUKH/BSO, DUB    TUBAL LIGATION       Social History   Substance Use Topics    Smoking status: Current Every Day Smoker     Packs/day: 0.50     Years: 30.00     Types: Cigarettes    Smokeless tobacco: Never Used    Alcohol use 7.2 oz/week     12 Cans of beer per week      Comment: 2-3 beers daily     Family History   Problem Relation Age of Onset    Cancer Mother 49     "lymph nodes"    Cancer Sister      lung?     Review of patient's allergies indicates:   Allergen Reactions    Erythromycin Nausea Only       Performance Status:The patient's activity level is functions out of house.      Review of Systems:  a review of eleven systems covering constitutional, Eye, HEENT, " "Psych, Respiratory, Cardiac, GI, , Musculoskeletal, Endocrine, Dermatologic was negative except for pertinent findings as listed ABOVE and below: night sweats chr due to menopause,      Exam:Comprehensive exam done.   /85 (BP Location: Left arm, Patient Position: Sitting)   Pulse 105   Ht 5' 3" (1.6 m)   Wt 55.6 kg (122 lb 9.2 oz)   SpO2 96%   BMI 21.71 kg/m²   Exam included Vitals as listed, and patient's appearance and affect and alertness and mood, oral exam for yeast and hygiene and pharynx lesions and Mallapatti (M) score, neck with inspection for jvd and masses and thyroid abnormalities and lymph nodes (supraclavicular and infraclavicular nodes and axillary also examined and noted if abn), chest exam included symmetry and effort and fremitus and percussion and auscultation, cardiac exam included rhythm and gallops and murmur and rubs and jvd and edema, abdominal exam for mass and hepatosplenomegaly and tenderness and hernias and bowel sounds, Musculoskeletal exam with muscle tone and posture and mobility/gait and  strength, and skin for rashes and cyanosis and pallor and turgor, extremity for clubbing.  Findings were normal except for pertinent findings listed below:  Mild kyphosis scoliosis, M2, no wheezes no edema,       Radiographs reviewed: ct 2016  With left lung abn better now, right lung was clear and now with infiltrate with vol loss of emphysema??      Labs reviewed from last 6 months on EPIC result review       PFT results [id number]    Plan:  Clinical impression is apparently straight forward and impression with management as below.    Fabiana was seen today for follow-up, abnormal ct scan, pulmonary nodules and copd.    Diagnoses and all orders for this visit:    Pneumonia of right upper lobe due to infectious organism  -     AFB Culture & Smear; Standing  -     amoxicillin-clavulanate 875-125mg (AUGMENTIN) 875-125 mg per tablet; Take 1 tablet by mouth every 12 (twelve) hours.  - "     X-Ray Chest PA And Lateral; Future  -     X-Ray Chest PA And Lateral; Future    Lung nodule    COPD, severe  -     albuterol (PROVENTIL) 4 MG Tab; Take 1 tablet (4 mg total) by mouth 3 (three) times daily.    Anxiety  -     metoprolol succinate (TOPROL XL) 50 MG 24 hr tablet; Take 1 tablet (50 mg total) by mouth once daily.    Essential hypertension  -     metoprolol succinate (TOPROL XL) 50 MG 24 hr tablet; Take 1 tablet (50 mg total) by mouth once daily.        Return in about 8 weeks (around 10/10/2017).    Discussed with patient above for education the following:      Left lung spot better, but now area in right lung looks like pneumonia, may be lung infection of emphysema area which may improve lung.  Right shoulder pain likely from infection.    augmentin may clear?  May have more chronic lung infection (DAVIS?)- collect sputums and wait 2 months.    With augmentin follow cxr in a month and 2 months.

## 2017-08-15 NOTE — PATIENT INSTRUCTIONS
Left lung spot better, but now area in right lung looks like pneumonia, may be lung infection of emphysema area which may improve lung.  Right shoulder pain likely from infection.    augmentin may clear?  May have more chronic lung infection (DAVIS?)- collect sputums and wait 2 months.    With augmentin follow cxr in a month and 2 months.

## 2017-08-18 ENCOUNTER — LAB VISIT (OUTPATIENT)
Dept: LAB | Facility: HOSPITAL | Age: 62
End: 2017-08-18
Attending: FAMILY MEDICINE
Payer: COMMERCIAL

## 2017-08-18 DIAGNOSIS — E78.5 HYPERLIPIDEMIA, UNSPECIFIED HYPERLIPIDEMIA TYPE: ICD-10-CM

## 2017-08-18 LAB
ALBUMIN SERPL BCP-MCNC: 3.1 G/DL
ALP SERPL-CCNC: 94 U/L
ALT SERPL W/O P-5'-P-CCNC: 7 U/L
ANION GAP SERPL CALC-SCNC: 12 MMOL/L
AST SERPL-CCNC: 17 U/L
BILIRUB SERPL-MCNC: 0.3 MG/DL
BUN SERPL-MCNC: 10 MG/DL
CALCIUM SERPL-MCNC: 10 MG/DL
CHLORIDE SERPL-SCNC: 98 MMOL/L
CHOLEST/HDLC SERPL: 2.4 {RATIO}
CO2 SERPL-SCNC: 25 MMOL/L
CREAT SERPL-MCNC: 0.8 MG/DL
EST. GFR  (AFRICAN AMERICAN): >60 ML/MIN/1.73 M^2
EST. GFR  (NON AFRICAN AMERICAN): >60 ML/MIN/1.73 M^2
GLUCOSE SERPL-MCNC: 115 MG/DL
HDL/CHOLESTEROL RATIO: 41.3 %
HDLC SERPL-MCNC: 201 MG/DL
HDLC SERPL-MCNC: 83 MG/DL
LDLC SERPL CALC-MCNC: 92 MG/DL
NONHDLC SERPL-MCNC: 118 MG/DL
POTASSIUM SERPL-SCNC: 4.5 MMOL/L
PROT SERPL-MCNC: 7.8 G/DL
SODIUM SERPL-SCNC: 135 MMOL/L
TRIGL SERPL-MCNC: 130 MG/DL

## 2017-08-18 PROCEDURE — 80053 COMPREHEN METABOLIC PANEL: CPT

## 2017-08-18 PROCEDURE — 36415 COLL VENOUS BLD VENIPUNCTURE: CPT | Mod: PO

## 2017-08-18 PROCEDURE — 80061 LIPID PANEL: CPT

## 2017-08-25 ENCOUNTER — LAB VISIT (OUTPATIENT)
Dept: LAB | Facility: HOSPITAL | Age: 62
End: 2017-08-25
Attending: INTERNAL MEDICINE
Payer: COMMERCIAL

## 2017-08-25 DIAGNOSIS — R93.89 ABNORMAL CT OF THE CHEST: ICD-10-CM

## 2017-08-25 DIAGNOSIS — J18.9 PNEUMONIA OF RIGHT UPPER LOBE DUE TO INFECTIOUS ORGANISM: ICD-10-CM

## 2017-08-25 DIAGNOSIS — B37.9 YEAST INFECTION: Primary | ICD-10-CM

## 2017-08-25 PROCEDURE — 87015 SPECIMEN INFECT AGNT CONCNTJ: CPT

## 2017-08-25 PROCEDURE — 87070 CULTURE OTHR SPECIMN AEROBIC: CPT

## 2017-08-25 PROCEDURE — 87205 SMEAR GRAM STAIN: CPT

## 2017-08-25 PROCEDURE — 87116 MYCOBACTERIA CULTURE: CPT

## 2017-08-25 RX ORDER — FLUCONAZOLE 200 MG/1
200 TABLET ORAL DAILY
Qty: 1 TABLET | Refills: 2 | Status: SHIPPED | OUTPATIENT
Start: 2017-08-25 | End: 2017-08-26

## 2017-08-28 ENCOUNTER — CLINICAL SUPPORT (OUTPATIENT)
Dept: SMOKING CESSATION | Facility: CLINIC | Age: 62
End: 2017-08-28
Payer: COMMERCIAL

## 2017-08-28 ENCOUNTER — TELEPHONE (OUTPATIENT)
Dept: SMOKING CESSATION | Facility: CLINIC | Age: 62
End: 2017-08-28

## 2017-08-28 DIAGNOSIS — F17.200 NICOTINE DEPENDENCE: Primary | ICD-10-CM

## 2017-08-28 LAB
BACTERIA SPEC AEROBE CULT: NORMAL
GRAM STN SPEC: NORMAL

## 2017-08-28 PROCEDURE — 99407 BEHAV CHNG SMOKING > 10 MIN: CPT | Mod: S$GLB,,,

## 2017-08-28 NOTE — PROGRESS NOTES
Pt returned call regarding tobacco cessation quit episode #2. Pt states she's not interested in scheduling an appointment, because she's currently in another tobacco cessation program. Will resolve this episode.

## 2017-09-07 ENCOUNTER — LAB VISIT (OUTPATIENT)
Dept: LAB | Facility: HOSPITAL | Age: 62
End: 2017-09-07
Attending: INTERNAL MEDICINE
Payer: COMMERCIAL

## 2017-09-07 DIAGNOSIS — J18.9 PNEUMONIA OF RIGHT UPPER LOBE DUE TO INFECTIOUS ORGANISM: ICD-10-CM

## 2017-09-07 PROCEDURE — 87116 MYCOBACTERIA CULTURE: CPT

## 2017-09-07 PROCEDURE — 87015 SPECIMEN INFECT AGNT CONCNTJ: CPT

## 2017-09-15 ENCOUNTER — HOSPITAL ENCOUNTER (OUTPATIENT)
Dept: RADIOLOGY | Facility: HOSPITAL | Age: 62
Discharge: HOME | End: 2017-09-15
Attending: INTERNAL MEDICINE
Payer: COMMERCIAL

## 2017-09-15 DIAGNOSIS — J18.9 PNEUMONIA OF RIGHT UPPER LOBE DUE TO INFECTIOUS ORGANISM: ICD-10-CM

## 2017-09-15 PROCEDURE — 71020 XR CHEST PA AND LATERAL: CPT | Mod: TC

## 2017-09-15 PROCEDURE — 71020 XR CHEST PA AND LATERAL: CPT | Mod: 26,,, | Performed by: RADIOLOGY

## 2017-09-19 ENCOUNTER — TELEPHONE (OUTPATIENT)
Dept: PULMONOLOGY | Facility: CLINIC | Age: 62
End: 2017-09-19

## 2017-09-19 NOTE — TELEPHONE ENCOUNTER
Pt notified, made appt for tomorrow morning.       ----- Message from Bhupendra Mackey MD sent at 9/15/2017  3:38 PM CDT -----  cxr still abn, needs f/u to discuss

## 2017-09-20 ENCOUNTER — OFFICE VISIT (OUTPATIENT)
Dept: PULMONOLOGY | Facility: CLINIC | Age: 62
End: 2017-09-20
Payer: COMMERCIAL

## 2017-09-20 VITALS
WEIGHT: 123.25 LBS | HEIGHT: 63 IN | OXYGEN SATURATION: 86 % | BODY MASS INDEX: 21.84 KG/M2 | DIASTOLIC BLOOD PRESSURE: 82 MMHG | SYSTOLIC BLOOD PRESSURE: 124 MMHG | HEART RATE: 108 BPM

## 2017-09-20 DIAGNOSIS — J44.9 COPD, SEVERE: ICD-10-CM

## 2017-09-20 DIAGNOSIS — R91.1 LUNG NODULE: Primary | ICD-10-CM

## 2017-09-20 DIAGNOSIS — J18.9 PNEUMONIA OF RIGHT UPPER LOBE DUE TO INFECTIOUS ORGANISM: ICD-10-CM

## 2017-09-20 DIAGNOSIS — F41.9 ANXIETY: ICD-10-CM

## 2017-09-20 PROCEDURE — 99214 OFFICE O/P EST MOD 30 MIN: CPT | Mod: S$GLB,,, | Performed by: INTERNAL MEDICINE

## 2017-09-20 PROCEDURE — 3079F DIAST BP 80-89 MM HG: CPT | Mod: S$GLB,,, | Performed by: INTERNAL MEDICINE

## 2017-09-20 PROCEDURE — 3008F BODY MASS INDEX DOCD: CPT | Mod: S$GLB,,, | Performed by: INTERNAL MEDICINE

## 2017-09-20 PROCEDURE — 3074F SYST BP LT 130 MM HG: CPT | Mod: S$GLB,,, | Performed by: INTERNAL MEDICINE

## 2017-09-20 PROCEDURE — 99999 PR PBB SHADOW E&M-EST. PATIENT-LVL III: CPT | Mod: PBBFAC,,, | Performed by: INTERNAL MEDICINE

## 2017-09-20 RX ORDER — ALPRAZOLAM 0.25 MG/1
0.25 TABLET ORAL 3 TIMES DAILY PRN
Qty: 90 TABLET | Refills: 1 | Status: SHIPPED | OUTPATIENT
Start: 2017-09-20 | End: 2018-02-28 | Stop reason: SDUPTHER

## 2017-09-20 RX ORDER — ALBUTEROL SULFATE 90 UG/1
AEROSOL, METERED RESPIRATORY (INHALATION)
Qty: 18 G | Refills: 7 | Status: SHIPPED | OUTPATIENT
Start: 2017-09-20 | End: 2018-06-05

## 2017-09-20 RX ORDER — LEVOFLOXACIN 500 MG/1
500 TABLET, FILM COATED ORAL DAILY
Qty: 14 TABLET | Refills: 0 | Status: SHIPPED | OUTPATIENT
Start: 2017-09-20 | End: 2017-11-21

## 2017-09-20 RX ORDER — PREDNISONE 20 MG/1
TABLET ORAL
Qty: 12 TABLET | Refills: 0 | Status: SHIPPED | OUTPATIENT
Start: 2017-09-20 | End: 2017-11-16 | Stop reason: SDUPTHER

## 2017-09-20 RX ORDER — UMECLIDINIUM 62.5 UG/1
1 AEROSOL, POWDER ORAL DAILY
Qty: 1 EACH | Refills: 11 | Status: SHIPPED | OUTPATIENT
Start: 2017-09-20 | End: 2018-02-28

## 2017-09-20 RX ORDER — FLUTICASONE FUROATE AND VILANTEROL TRIFENATATE 100; 25 UG/1; UG/1
1 POWDER RESPIRATORY (INHALATION) DAILY
Qty: 1 EACH | Refills: 11 | Status: SHIPPED | OUTPATIENT
Start: 2017-09-20 | End: 2018-02-28

## 2017-09-20 NOTE — PROGRESS NOTES
"9/20/2017    Fabiana Morel  Office Note    Chief Complaint   Patient presents with    Abnormal Chest X-ray   sept 20, still smokes same, feels sl better than 6  Months ago, mucous clear, night sweats still with no improvement with abx.      AUg 15, having right shoulder pain last 3 weeks at 10/10 intially  And subsequent 3-4/10,  No fever but night sweats chr, no n/v/d or headache.  No tb exposure.   Had tb eval past negative.  Still smokes.  Nerves ok.  Breathing seems  Better.      Feb 13, 2017, hpi doing rehab, no action plan, still smoking.  Using all meds, nerves better with xanax and toprol.        Aug 12, 2016HPI:initial encounter in sbp, copd and bronchiectasis and lung nodule.  No prednisone use recent.  No recent azithromycin.    breo and incruse regular use, [id number]/d, not using nebulizer.  Last ct 12/2015.  fev1 27% in 2014 with dramatic bd response.  Working as filing and typing.  Feels resp comfortable.  Has had lung dz age 56.        Scant mucous with no color chr, no acapella.        The chief compliant  problem is stable  PFSH:  Past Medical History:   Diagnosis Date    Fracture of left clavicle     Hypertension     Scoliosis     SOB (shortness of breath)          Past Surgical History:   Procedure Laterality Date    APPENDECTOMY      BREAST BIOPSY Right     cyst    HYSTERECTOMY      HASMUKH/BSO, DUB    TUBAL LIGATION       Social History   Substance Use Topics    Smoking status: Current Every Day Smoker     Packs/day: 0.50     Years: 30.00     Types: Cigarettes    Smokeless tobacco: Never Used    Alcohol use 7.2 oz/week     12 Cans of beer per week      Comment: 2-3 beers daily     Family History   Problem Relation Age of Onset    Cancer Mother 49     "lymph nodes"    Cancer Sister      lung?     Review of patient's allergies indicates:   Allergen Reactions    Erythromycin Nausea Only       Performance Status:The patient's activity level is functions out of house.      Review of " "Systems:  a review of eleven systems covering constitutional, Eye, HEENT, Psych, Respiratory, Cardiac, GI, , Musculoskeletal, Endocrine, Dermatologic was negative except for pertinent findings as listed ABOVE and below: night sweats chr due to menopause,      Exam:Comprehensive exam done.   /82 (BP Location: Left arm, Patient Position: Sitting)   Pulse 108   Ht 5' 3" (1.6 m)   Wt 55.9 kg (123 lb 3.8 oz)   SpO2 (!) 86%   BMI 21.83 kg/m²   Exam included Vitals as listed, and patient's appearance and affect and alertness and mood, oral exam for yeast and hygiene and pharynx lesions and Mallapatti (M) score, neck with inspection for jvd and masses and thyroid abnormalities and lymph nodes (supraclavicular and infraclavicular nodes and axillary also examined and noted if abn), chest exam included symmetry and effort and fremitus and percussion and auscultation, cardiac exam included rhythm and gallops and murmur and rubs and jvd and edema, abdominal exam for mass and hepatosplenomegaly and tenderness and hernias and bowel sounds, Musculoskeletal exam with muscle tone and posture and mobility/gait and  strength, and skin for rashes and cyanosis and pallor and turgor, extremity for clubbing.  Findings were normal except for pertinent findings listed below:  Mild kyphosis scoliosis, M2, no wheezes no edema,       Radiographs reviewed: ct 2016  With left lung abn better now, right lung was clear and now with infiltrate with vol loss of emphysema??  cxr may have smaller cavity rul cavity than ct c/w aug ct with sept cxr.      Labs reviewed  No pos cultures  PFT results [id number]  fev 27%    Plan:  Clinical impression is apparently straight forward and impression with management as below.    Fabiana was seen today for abnormal chest x-ray.    Diagnoses and all orders for this visit:    Lung nodule    COPD, severe  -     levoFLOXacin (LEVAQUIN) 500 MG tablet; Take 1 tablet (500 mg total) by mouth once " daily.  -     predniSONE (DELTASONE) 20 MG tablet; One daily for 3 days and repeat for flare of lung symptoms as intructed  -     INCRUSE ELLIPTA 62.5 mcg/actuation DsDv; Inhale 1 puff into the lungs once daily.  -     BREO ELLIPTA 100-25 mcg/dose diskus inhaler; Inhale 1 puff into the lungs once daily.  -     albuterol (VENTOLIN HFA) 90 mcg/actuation inhaler; INHALE TWO PUFFS BY MOUTH EVERY 6 HOURS AS NEEDED FOR WHEEZING    Pneumonia of right upper lobe due to infectious organism  -     levoFLOXacin (LEVAQUIN) 500 MG tablet; Take 1 tablet (500 mg total) by mouth once daily.    Anxiety  -     alprazolam (XANAX) 0.25 MG tablet; Take 1 tablet (0.25 mg total) by mouth 3 (three) times daily as needed.        Return in about 4 weeks (around 10/18/2017), or if symptoms worsen or fail to improve.    Discussed with patient above for education the following:      Diagnosis not clear, would bronch and biopsy if worsens or not better.    Add levaquin daily - resume augmentin in a wk, note if any improvement on levaquin.    cxr in a month, bronch if not clearing and no dx.    Stop smoking.

## 2017-09-20 NOTE — PATIENT INSTRUCTIONS
Diagnosis not clear, would bronch and biopsy if worsens or not better.    Add levaquin daily - resume augmentin in a wk, note if any improvement on levaquin.    cxr in a month, bronch if not clearing and no dx.    Stop smoking.

## 2017-09-26 ENCOUNTER — TELEPHONE (OUTPATIENT)
Dept: PULMONOLOGY | Facility: CLINIC | Age: 62
End: 2017-09-26

## 2017-09-26 NOTE — TELEPHONE ENCOUNTER
Spoke to pt and she reported 8/10 pain in lower right back more off to side. She stated it is about one had width from her hip. From description, it sounds like flank area but cant be sure. Advised ED if pain is severe or Dr Mackey suggested if she feels it is more in her spine area we can do 2pane CXR and lumbar film to check for compression fractures. Pt stated she will call her PCP or go to urgent care, unless pain becomes worse, then will  Go to ED.

## 2017-09-27 ENCOUNTER — OFFICE VISIT (OUTPATIENT)
Dept: FAMILY MEDICINE | Facility: CLINIC | Age: 62
End: 2017-09-27
Payer: COMMERCIAL

## 2017-09-27 VITALS
TEMPERATURE: 98 F | HEIGHT: 64 IN | HEART RATE: 99 BPM | RESPIRATION RATE: 20 BRPM | SYSTOLIC BLOOD PRESSURE: 136 MMHG | WEIGHT: 121.69 LBS | BODY MASS INDEX: 20.78 KG/M2 | OXYGEN SATURATION: 97 % | DIASTOLIC BLOOD PRESSURE: 85 MMHG

## 2017-09-27 DIAGNOSIS — S39.012A LUMBAR STRAIN, INITIAL ENCOUNTER: Primary | ICD-10-CM

## 2017-09-27 DIAGNOSIS — M54.31 SCIATICA OF RIGHT SIDE: ICD-10-CM

## 2017-09-27 PROCEDURE — 3075F SYST BP GE 130 - 139MM HG: CPT | Mod: S$GLB,,, | Performed by: FAMILY MEDICINE

## 2017-09-27 PROCEDURE — 99214 OFFICE O/P EST MOD 30 MIN: CPT | Mod: 25,S$GLB,, | Performed by: FAMILY MEDICINE

## 2017-09-27 PROCEDURE — 96372 THER/PROPH/DIAG INJ SC/IM: CPT | Mod: S$GLB,,, | Performed by: FAMILY MEDICINE

## 2017-09-27 PROCEDURE — 3008F BODY MASS INDEX DOCD: CPT | Mod: S$GLB,,, | Performed by: FAMILY MEDICINE

## 2017-09-27 PROCEDURE — 3079F DIAST BP 80-89 MM HG: CPT | Mod: S$GLB,,, | Performed by: FAMILY MEDICINE

## 2017-09-27 PROCEDURE — 99999 PR PBB SHADOW E&M-EST. PATIENT-LVL III: CPT | Mod: PBBFAC,,, | Performed by: FAMILY MEDICINE

## 2017-09-27 RX ORDER — BETAMETHASONE SODIUM PHOSPHATE AND BETAMETHASONE ACETATE 3; 3 MG/ML; MG/ML
9 INJECTION, SUSPENSION INTRA-ARTICULAR; INTRALESIONAL; INTRAMUSCULAR; SOFT TISSUE
Status: COMPLETED | OUTPATIENT
Start: 2017-09-27 | End: 2017-09-27

## 2017-09-27 RX ORDER — METHYLPREDNISOLONE 4 MG/1
TABLET ORAL
Qty: 1 PACKAGE | Refills: 0 | Status: SHIPPED | OUTPATIENT
Start: 2017-09-27 | End: 2017-11-21 | Stop reason: ALTCHOICE

## 2017-09-27 RX ORDER — BACLOFEN 10 MG/1
10 TABLET ORAL 3 TIMES DAILY
Qty: 60 TABLET | Refills: 1 | Status: SHIPPED | OUTPATIENT
Start: 2017-09-27 | End: 2017-11-21

## 2017-09-27 RX ADMIN — BETAMETHASONE SODIUM PHOSPHATE AND BETAMETHASONE ACETATE 9 MG: 3; 3 INJECTION, SUSPENSION INTRA-ARTICULAR; INTRALESIONAL; INTRAMUSCULAR; SOFT TISSUE at 08:09

## 2017-09-27 NOTE — PROGRESS NOTES
Subjective:       Patient ID: Fabiana Morel is a 61 y.o. female.    Chief Complaint: Back Pain (right sided low back pain)    Back Pain   This is a new problem. Episode onset: 10 days. The problem occurs constantly. The problem has been waxing and waning since onset. Pain location: right of upper lumbar area and right SI area. The quality of the pain is described as aching and stabbing. The pain radiates to the right foot. The pain is severe. The pain is the same all the time. The symptoms are aggravated by twisting, sitting, lying down and bending. Associated symptoms include leg pain. Pertinent negatives include no abdominal pain, bladder incontinence, bowel incontinence, chest pain, dysuria, fever, paresis, pelvic pain or weakness. Risk factors include sedentary lifestyle (Kyphoscoliosis). She has tried NSAIDs for the symptoms. The treatment provided mild relief.     Review of Systems   Constitutional: Negative for fever.   Respiratory: Negative for shortness of breath.    Cardiovascular: Negative for chest pain.   Gastrointestinal: Negative for abdominal pain, bowel incontinence and nausea.   Genitourinary: Negative for bladder incontinence, dysuria and pelvic pain.   Musculoskeletal: Positive for back pain.   Skin: Negative for rash.   Neurological: Negative for weakness.   All other systems reviewed and are negative.      Objective:      Physical Exam   Constitutional: She appears well-developed and well-nourished.   Eyes: Pupils are equal, round, and reactive to light. No scleral icterus.   Neck: Neck supple.   Cardiovascular: Normal rate and regular rhythm.    No murmur heard.  Pulmonary/Chest: Effort normal. She has decreased breath sounds. She has no rales.   Musculoskeletal: She exhibits no edema or tenderness.        Arms:  EHL fully intact bl.  SLR is positive on right.   Lymphadenopathy:     She has no cervical adenopathy.   Neurological:   Reflex Scores:       Patellar reflexes are 2+ on the right  side and 2+ on the left side.  Skin: Skin is warm and dry.       Assessment:       1. Lumbar strain, initial encounter    2. Sciatica of right side        Plan:         Fabiana was seen today for back pain.    Diagnoses and all orders for this visit:    Lumbar strain, initial encounter  -     methylPREDNISolone (MEDROL DOSEPACK) 4 mg tablet; use as directed  -     baclofen (LIORESAL) 10 MG tablet; Take 1 tablet (10 mg total) by mouth 3 (three) times daily.    Sciatica of right side  -     baclofen (LIORESAL) 10 MG tablet; Take 1 tablet (10 mg total) by mouth 3 (three) times daily.  -     betamethasone acetate-betamethasone sodium phosphate injection 9 mg; Inject 1.5 mLs (9 mg total) into the muscle one time.    Heat to area TID

## 2017-09-27 NOTE — PROGRESS NOTES
2 Patent identifiers used (name and ). Administered 9mg Celestone IM. Patient tolerated well. No bleeding at insertion site noted. Pain scale 1/10. Aseptic technique maintained.

## 2017-10-16 ENCOUNTER — HOSPITAL ENCOUNTER (OUTPATIENT)
Dept: RADIOLOGY | Facility: HOSPITAL | Age: 62
Discharge: HOME | End: 2017-10-16
Attending: INTERNAL MEDICINE
Payer: COMMERCIAL

## 2017-10-16 DIAGNOSIS — J18.9 PNEUMONIA OF RIGHT UPPER LOBE DUE TO INFECTIOUS ORGANISM: ICD-10-CM

## 2017-10-16 PROCEDURE — 71020 XR CHEST PA AND LATERAL: CPT | Mod: 26,,, | Performed by: RADIOLOGY

## 2017-10-16 PROCEDURE — 71020 XR CHEST PA AND LATERAL: CPT | Mod: TC

## 2017-10-17 ENCOUNTER — OFFICE VISIT (OUTPATIENT)
Dept: PULMONOLOGY | Facility: CLINIC | Age: 62
End: 2017-10-17
Payer: COMMERCIAL

## 2017-10-17 VITALS
BODY MASS INDEX: 21.33 KG/M2 | DIASTOLIC BLOOD PRESSURE: 76 MMHG | WEIGHT: 120.38 LBS | OXYGEN SATURATION: 98 % | HEART RATE: 86 BPM | SYSTOLIC BLOOD PRESSURE: 133 MMHG | HEIGHT: 63 IN

## 2017-10-17 DIAGNOSIS — J18.9 PNEUMONIA OF RIGHT UPPER LOBE DUE TO INFECTIOUS ORGANISM: ICD-10-CM

## 2017-10-17 DIAGNOSIS — R91.1 LUNG NODULE: Primary | ICD-10-CM

## 2017-10-17 DIAGNOSIS — J44.9 COPD, SEVERE: ICD-10-CM

## 2017-10-17 LAB
ACID FAST MOD KINY STN SPEC: NORMAL
MYCOBACTERIUM SPEC QL CULT: NORMAL

## 2017-10-17 PROCEDURE — 99999 PR PBB SHADOW E&M-EST. PATIENT-LVL III: CPT | Mod: PBBFAC,,, | Performed by: INTERNAL MEDICINE

## 2017-10-17 PROCEDURE — 99214 OFFICE O/P EST MOD 30 MIN: CPT | Mod: S$GLB,,, | Performed by: INTERNAL MEDICINE

## 2017-10-17 NOTE — PROGRESS NOTES
"10/17/2017    Fabiana Morel  Office Note    Chief Complaint   Patient presents with    Follow-up   oct 17, 2017- no fever or night sweats or cough. No new c/o - still smokes but trying chantrix.      sept 20, still smokes same, feels sl better than 6  Months ago, mucous clear, night sweats still with no improvement with abx.      AUg 15, having right shoulder pain last 3 weeks at 10/10 intially  And subsequent 3-4/10,  No fever but night sweats chr, no n/v/d or headache.  No tb exposure.   Had tb eval past negative.  Still smokes.  Nerves ok.  Breathing seems  Better.      Feb 13, 2017, hpi doing rehab, no action plan, still smoking.  Using all meds, nerves better with xanax and toprol.        Aug 12, 2016HPI:initial encounter in sbp, copd and bronchiectasis and lung nodule.  No prednisone use recent.  No recent azithromycin.    breo and incruse regular use, [id number]/d, not using nebulizer.  Last ct 12/2015.  fev1 27% in 2014 with dramatic bd response.  Working as filing and typing.  Feels resp comfortable.  Has had lung dz age 56.        Scant mucous with no color chr, no acapella.        The chief compliant  problem is stable  PFSH:  Past Medical History:   Diagnosis Date    Fracture of left clavicle     Hypertension     Scoliosis     SOB (shortness of breath)          Past Surgical History:   Procedure Laterality Date    APPENDECTOMY      BREAST BIOPSY Right     cyst    HYSTERECTOMY      HASMUKH/BSO, DUB    TUBAL LIGATION       Social History   Substance Use Topics    Smoking status: Current Every Day Smoker     Packs/day: 0.50     Years: 30.00     Types: Cigarettes    Smokeless tobacco: Never Used    Alcohol use 7.2 oz/week     12 Cans of beer per week      Comment: 2-3 beers daily     Family History   Problem Relation Age of Onset    Cancer Mother 49     "lymph nodes"    Cancer Sister      lung?     Review of patient's allergies indicates:   Allergen Reactions    Erythromycin Nausea Only " "      Performance Status:The patient's activity level is functions out of house.      Review of Systems:  a review of eleven systems covering constitutional, Eye, HEENT, Psych, Respiratory, Cardiac, GI, , Musculoskeletal, Endocrine, Dermatologic was negative except for pertinent findings as listed ABOVE and below: night sweats chr due to menopause,      Exam:Comprehensive exam done.   /76 (BP Location: Right arm)   Pulse 86   Ht 5' 3" (1.6 m)   Wt 54.6 kg (120 lb 5.9 oz)   SpO2 98%   BMI 21.32 kg/m²   Exam included Vitals as listed, and patient's appearance and affect and alertness and mood, oral exam for yeast and hygiene and pharynx lesions and Mallapatti (M) score, neck with inspection for jvd and masses and thyroid abnormalities and lymph nodes (supraclavicular and infraclavicular nodes and axillary also examined and noted if abn), chest exam included symmetry and effort and fremitus and percussion and auscultation, cardiac exam included rhythm and gallops and murmur and rubs and jvd and edema, abdominal exam for mass and hepatosplenomegaly and tenderness and hernias and bowel sounds, Musculoskeletal exam with muscle tone and posture and mobility/gait and  strength, and skin for rashes and cyanosis and pallor and turgor, extremity for clubbing.  Findings were normal except for pertinent findings listed below:  Mild kyphosis scoliosis, M2, no wheezes no edema,       Radiographs reviewed: ct 2016  With left lung abn better now, right lung was clear and now with infiltrate with vol loss of emphysema??  cxr may have smaller cavity rul cavity than ct c/w aug ct with sept cxr.    cxr 10/16 same as sept 2017,       Labs reviewed  No pos cultures  PFT results [id number]  fev 27%    Plan:  Clinical impression is apparently straight forward and impression with management as below.    There are no diagnoses linked to this encounter.    Return in about 4 weeks (around 11/14/2017), or if symptoms worsen or " fail to improve.    Discussed with patient above for education the following:      Scope test to obtain wash and biopsy right upper lung posterior segment needed.     Work on stop smoking.      Picture looks more like infection than cancer to my view??  Left shadow seems better from last yr.

## 2017-10-17 NOTE — PATIENT INSTRUCTIONS
Scope test to obtain wash and biopsy right upper lung posterior segment needed.     Work on stop smoking.      Picture looks more like infection than cancer to my view??  Left shadow seems better from last yr.

## 2017-10-20 ENCOUNTER — SURGERY (OUTPATIENT)
Age: 62
End: 2017-10-20

## 2017-10-20 ENCOUNTER — HOSPITAL ENCOUNTER (OUTPATIENT)
Facility: HOSPITAL | Age: 62
Discharge: HOME | End: 2017-10-20
Attending: INTERNAL MEDICINE | Admitting: INTERNAL MEDICINE
Payer: COMMERCIAL

## 2017-10-20 ENCOUNTER — ANESTHESIA EVENT (OUTPATIENT)
Dept: ENDOSCOPY | Facility: HOSPITAL | Age: 62
End: 2017-10-20
Payer: COMMERCIAL

## 2017-10-20 ENCOUNTER — ANESTHESIA (OUTPATIENT)
Dept: ENDOSCOPY | Facility: HOSPITAL | Age: 62
End: 2017-10-20
Payer: COMMERCIAL

## 2017-10-20 DIAGNOSIS — R91.1 LUNG NODULE: ICD-10-CM

## 2017-10-20 PROCEDURE — 87102 FUNGUS ISOLATION CULTURE: CPT

## 2017-10-20 PROCEDURE — 31625 BRONCHOSCOPY W/BIOPSY(S): CPT | Performed by: INTERNAL MEDICINE

## 2017-10-20 PROCEDURE — 27200944 HC BRONCH FORCEPS DISPOSABLE: Performed by: INTERNAL MEDICINE

## 2017-10-20 PROCEDURE — 88305 TISSUE EXAM BY PATHOLOGIST: CPT | Performed by: PATHOLOGY

## 2017-10-20 PROCEDURE — D9220A PRA ANESTHESIA: Mod: CRNA,,, | Performed by: NURSE ANESTHETIST, CERTIFIED REGISTERED

## 2017-10-20 PROCEDURE — 37000009 HC ANESTHESIA EA ADD 15 MINS: Performed by: INTERNAL MEDICINE

## 2017-10-20 PROCEDURE — 31624 DX BRONCHOSCOPE/LAVAGE: CPT | Performed by: INTERNAL MEDICINE

## 2017-10-20 PROCEDURE — 87107 FUNGI IDENTIFICATION MOLD: CPT

## 2017-10-20 PROCEDURE — 87205 SMEAR GRAM STAIN: CPT

## 2017-10-20 PROCEDURE — 25000003 PHARM REV CODE 250: Performed by: INTERNAL MEDICINE

## 2017-10-20 PROCEDURE — 87116 MYCOBACTERIA CULTURE: CPT

## 2017-10-20 PROCEDURE — 31628 BRONCHOSCOPY/LUNG BX EACH: CPT | Performed by: INTERNAL MEDICINE

## 2017-10-20 PROCEDURE — 87070 CULTURE OTHR SPECIMN AEROBIC: CPT

## 2017-10-20 PROCEDURE — 87015 SPECIMEN INFECT AGNT CONCNTJ: CPT

## 2017-10-20 PROCEDURE — 94640 AIRWAY INHALATION TREATMENT: CPT

## 2017-10-20 PROCEDURE — 37000008 HC ANESTHESIA 1ST 15 MINUTES: Performed by: INTERNAL MEDICINE

## 2017-10-20 PROCEDURE — 25000003 PHARM REV CODE 250: Performed by: NURSE ANESTHETIST, CERTIFIED REGISTERED

## 2017-10-20 PROCEDURE — 31624 DX BRONCHOSCOPE/LAVAGE: CPT | Mod: 51,RT,, | Performed by: INTERNAL MEDICINE

## 2017-10-20 PROCEDURE — 63600175 PHARM REV CODE 636 W HCPCS: Performed by: NURSE ANESTHETIST, CERTIFIED REGISTERED

## 2017-10-20 PROCEDURE — 31622 DX BRONCHOSCOPE/WASH: CPT | Performed by: INTERNAL MEDICINE

## 2017-10-20 PROCEDURE — 88112 CYTOPATH CELL ENHANCE TECH: CPT | Mod: 26,,, | Performed by: PATHOLOGY

## 2017-10-20 PROCEDURE — D9220A PRA ANESTHESIA: Mod: ANES,,, | Performed by: ANESTHESIOLOGY

## 2017-10-20 PROCEDURE — 31628 BRONCHOSCOPY/LUNG BX EACH: CPT | Mod: RT,,, | Performed by: INTERNAL MEDICINE

## 2017-10-20 RX ORDER — LIDOCAINE HYDROCHLORIDE 40 MG/ML
4 INJECTION, SOLUTION RETROBULBAR ONCE
Status: COMPLETED | OUTPATIENT
Start: 2017-10-20 | End: 2017-10-20

## 2017-10-20 RX ORDER — LIDOCAINE HYDROCHLORIDE 10 MG/ML
INJECTION, SOLUTION INTRAVENOUS
Status: DISCONTINUED | OUTPATIENT
Start: 2017-10-20 | End: 2017-10-20

## 2017-10-20 RX ORDER — LIDOCAINE HYDROCHLORIDE 40 MG/ML
INJECTION, SOLUTION RETROBULBAR
Status: DISCONTINUED
Start: 2017-10-20 | End: 2017-10-20 | Stop reason: HOSPADM

## 2017-10-20 RX ORDER — SODIUM CHLORIDE 9 MG/ML
INJECTION, SOLUTION INTRAVENOUS CONTINUOUS
Status: DISCONTINUED | OUTPATIENT
Start: 2017-10-20 | End: 2017-10-20 | Stop reason: HOSPADM

## 2017-10-20 RX ORDER — PROPOFOL 10 MG/ML
VIAL (ML) INTRAVENOUS
Status: DISCONTINUED | OUTPATIENT
Start: 2017-10-20 | End: 2017-10-20

## 2017-10-20 RX ORDER — LIDOCAINE HYDROCHLORIDE 20 MG/ML
JELLY TOPICAL
Status: DISCONTINUED
Start: 2017-10-20 | End: 2017-10-20 | Stop reason: HOSPADM

## 2017-10-20 RX ORDER — LIDOCAINE HYDROCHLORIDE 10 MG/ML
INJECTION, SOLUTION EPIDURAL; INFILTRATION; INTRACAUDAL; PERINEURAL
Status: DISCONTINUED
Start: 2017-10-20 | End: 2017-10-20 | Stop reason: HOSPADM

## 2017-10-20 RX ADMIN — LIDOCAINE HYDROCHLORIDE 4 ML: 40 INJECTION, SOLUTION RETROBULBAR; TOPICAL at 07:10

## 2017-10-20 RX ADMIN — PROPOFOL 20 MG: 10 INJECTION, EMULSION INTRAVENOUS at 07:10

## 2017-10-20 RX ADMIN — SODIUM CHLORIDE: 0.9 INJECTION, SOLUTION INTRAVENOUS at 07:10

## 2017-10-20 RX ADMIN — LIDOCAINE HYDROCHLORIDE 50 MG: 10 INJECTION, SOLUTION INTRAVENOUS at 07:10

## 2017-10-20 RX ADMIN — PROPOFOL 40 MG: 10 INJECTION, EMULSION INTRAVENOUS at 07:10

## 2017-10-20 RX ADMIN — PROPOFOL 100 MG: 10 INJECTION, EMULSION INTRAVENOUS at 07:10

## 2017-10-20 NOTE — OR NURSING
Pt awake, alert, color pink, skin warm and dry, resp even/unlabored--Denies SOB, nausea or other complaints.  Dressing self with  at bedside.  No pneumo on s/p bronchscopy chest xray.  Pt to walk and recheck pulse ox and s/s of SOB then ok to discharge.

## 2017-10-20 NOTE — TRANSFER OF CARE
"Anesthesia Transfer of Care Note    Patient: Fabiana Morel    Procedure(s) Performed: Procedure(s) (LRB):  BRONCHOSCOPY- need floro to to transbronchial bx. (Right)    Patient location: PACU    Anesthesia Type: general    Transport from OR: Transported from OR on 2-3 L/min O2 by NC with adequate spontaneous ventilation    Post pain: adequate analgesia    Post assessment: no apparent anesthetic complications and tolerated procedure well    Post vital signs: stable    Level of consciousness: awake, alert and oriented    Nausea/Vomiting: no nausea/vomiting    Complications: none    Transfer of care protocol was followed      Last vitals:   Visit Vitals  BP (!) 143/90   Pulse 104   Temp 36.4 °C (97.6 °F)   Resp 18   Ht 5' 3" (1.6 m)   Wt 54.4 kg (120 lb)   SpO2 96%   BMI 21.26 kg/m²     "

## 2017-10-20 NOTE — ANESTHESIA PREPROCEDURE EVALUATION
10/20/2017  Fabiana Morel is a 61 y.o., female.    Anesthesia Evaluation    I have reviewed the Patient Summary Reports.    I have reviewed the Nursing Notes.      Review of Systems  Anesthesia Hx:  No problems with previous Anesthesia    Cardiovascular:   Hypertension, well controlled    Pulmonary:   Pneumonia COPD, mild    Hepatic/GI:   PUD,        Physical Exam  General:  Well nourished                 Anesthesia Plan  Type of Anesthesia, risks & benefits discussed:  Anesthesia Type:  general  Patient's Preference:   Intra-op Monitoring Plan:   Intra-op Monitoring Plan Comments:   Post Op Pain Control Plan:   Post Op Pain Control Plan Comments:   Induction:   IV  Beta Blocker:  Patient is not currently on a Beta-Blocker (No further documentation required).       Informed Consent: Patient understands risks and agrees with Anesthesia plan.  Questions answered. Anesthesia consent signed with patient.  ASA Score: 3     Day of Surgery Review of History & Physical:    H&P update referred to the surgeon.         Ready For Surgery From Anesthesia Perspective.

## 2017-10-20 NOTE — DISCHARGE SUMMARY
10/20/2017  Pt presents for bronch which was done- see procedure note.  Pt to f/u routine appt, and call next 7 days for results, resume meds and acitivity.

## 2017-10-20 NOTE — H&P
10/20/2017    The patient's h and p is done again today, and is the same as 10/17/2017 with no changes.

## 2017-10-20 NOTE — DISCHARGE INSTRUCTIONS
Flexible Bronchoscopy  A flexible bronchoscopy is an exam of the airways of your lungs. A thin, flexible tube called a bronchoscope is used. It has a light and small camera that allow the healthcare provider to view your airways.    Before your test  · Follow your healthcare provider's instructions carefully. If you dont, the exam may be canceled. Or you may need to take it again.  · If you are taking blood-thinning medicine, ask your healthcare provider if you should stop taking the medicine before this test.  · Have no food or drink for at least 8 hours before the test. Also, avoid smoking for 24 hours before the test.  · You will need to remove any dentures or removable devices from your mouth.  · Right before the test, you will be given sedating medicines to help you relax. The medicine may be given by an IV (intravenously) into one of your veins. In addition, your nose and throat may be numbed with a special spray to help prevent gagging and coughing.  · If you are having this test as an outpatient, make sure you have an adult friend or family member to drive you home.  During your test  Bronchoscopy takes 45 to 60 minutes and includes the following steps:  · You may be given medicine (anesthesia) so that you are unconscious or asleep during the procedure.  · The healthcare provider inserts the tube into your nose or mouth.  · If you have not been given anesthesia, you might feel a gagging sensation. To help ease this feeling, you will be told to swallow or take deep breaths. Your airway will remain open even with the tube in place. But you wont be able to talk.  · The provider checks your breathing passage. He or she may also remove tiny tissue samples for biopsy.  After your test  · You may have a mild sore throat or cough. Your voice may also be hoarse.  · Don't eat or drink until the anesthesia wears off.  · If you had a biopsy, you might see traces of blood being coughed up.  When to call your  healthcare provider  Call your healthcare provider right away if you have any of the following:  · Shortness of breath  · Chest pain  · Bleeding from your nose or throat  · Coughing up a large amount of blood  · A fever above 100.4°F (38°C) for more than 24 hours  Call 911  Call 911 if you have:  · Chest pain  · Severe shortness of breath   Date Last Reviewed: 12/1/2016  © 4691-8254 [employer]. [address] [place] [address], [place] [address]. All rights reserved. This information is not intended as a substitute for professional medical care. Always follow your healthcare professional's instructions.

## 2017-10-20 NOTE — OR NURSING
Pt ambulated approx 500 feet with no discomfort.  Denies pain or worsening SOB, no audible wheezing or other complaints.  Pulse ox rechecked and 92 then increased to 95% on room air after sitting for apporx 1-2 minutes.  States normal pulse ox 94-96%.  Verbalized understanding of discharge instructions after reviewing with pt and wife.  Given copy of AVS and post bronchoscopy discharge instructions with phone numbers to contact for any emergencies.  Discharged via wheelchair in NAD

## 2017-10-20 NOTE — ANESTHESIA POSTPROCEDURE EVALUATION
"Anesthesia Post Evaluation    Patient: Fabiana Morel    Procedure(s) Performed: Procedure(s) (LRB):  BRONCHOSCOPY- need floro to to transbronchial bx. (Right)    Final Anesthesia Type: general  Patient location during evaluation: PACU  Patient participation: Yes- Able to Participate  Level of consciousness: awake and alert  Post-procedure vital signs: reviewed and stable  Pain management: adequate  Airway patency: patent  PONV status at discharge: No PONV  Anesthetic complications: no      Cardiovascular status: blood pressure returned to baseline and hemodynamically stable  Respiratory status: unassisted  Hydration status: euvolemic  Follow-up not needed.        Visit Vitals  BP (!) 143/90   Pulse 104   Temp 36.4 °C (97.6 °F)   Resp 18   Ht 5' 3" (1.6 m)   Wt 54.4 kg (120 lb)   SpO2 96%   BMI 21.26 kg/m²       Pain/Randy Score: Pain Assessment Performed: Yes (10/20/2017  7:25 AM)  Presence of Pain: complains of pain/discomfort (10/20/2017  7:25 AM)      "

## 2017-10-20 NOTE — PROCEDURES
10/20/2017    After review of ct, informed consent, updated history and physical, time out, and anesthesia sedation- pt underwent left nares bronchoscopy (right nares couldn't be cannulated).    Thick secretions encountered and washed free.  Airways were wnl.  Lavage from rul 90 cc in and 20 cc out.  Transbronchial bx x 2 for cultures done (afb, fungal, routine).  Transbronchial bx x 6 for path.  Lavage and wash pooled and submitted for culture, cytology, afb, and fungal evals..    ebl 15 cc post transbronchial bx. Airway suctioned til bleed ceased.  Post floro with no pneumo/complication and post cxr pending.  No complications.

## 2017-10-23 VITALS
BODY MASS INDEX: 21.26 KG/M2 | WEIGHT: 120 LBS | HEIGHT: 63 IN | SYSTOLIC BLOOD PRESSURE: 143 MMHG | RESPIRATION RATE: 21 BRPM | HEART RATE: 102 BPM | TEMPERATURE: 98 F | OXYGEN SATURATION: 94 % | DIASTOLIC BLOOD PRESSURE: 91 MMHG

## 2017-10-23 LAB
BACTERIA SPEC AEROBE CULT: NO GROWTH
BACTERIA SPEC AEROBE CULT: NORMAL
GRAM STN SPEC: NORMAL

## 2017-10-25 ENCOUNTER — DOCUMENTATION ONLY (OUTPATIENT)
Dept: PULMONOLOGY | Facility: CLINIC | Age: 62
End: 2017-10-25

## 2017-10-25 ENCOUNTER — TELEPHONE (OUTPATIENT)
Dept: PULMONOLOGY | Facility: CLINIC | Age: 62
End: 2017-10-25

## 2017-10-25 NOTE — TELEPHONE ENCOUNTER
Pt notified. Verbalized understanding.     ----- Message from Bhupendra Mackey MD sent at 10/23/2017  3:46 PM CDT -----  Notify all ok so far, bx and cytology not out yet

## 2017-10-25 NOTE — TELEPHONE ENCOUNTER
Pt notified. Pt verbalized understanding.     ----- Message from Bhupendra Mackey MD sent at 10/24/2017  4:10 PM CDT -----  Notify biopsy  And cytlogy was no cancer, no clear dx.  As we discussed - likely pneumonia with slow clearing??  Discuss at routine f/u.  All looks good

## 2017-10-27 LAB
ACID FAST MOD KINY STN SPEC: NORMAL
MYCOBACTERIUM SPEC QL CULT: NORMAL

## 2017-11-10 LAB
ACID FAST MOD KINY STN SPEC: NORMAL
MYCOBACTERIUM SPEC QL CULT: NORMAL

## 2017-11-16 ENCOUNTER — OFFICE VISIT (OUTPATIENT)
Dept: PULMONOLOGY | Facility: CLINIC | Age: 62
End: 2017-11-16
Payer: COMMERCIAL

## 2017-11-16 VITALS
BODY MASS INDEX: 21.48 KG/M2 | DIASTOLIC BLOOD PRESSURE: 75 MMHG | HEART RATE: 83 BPM | HEIGHT: 63 IN | WEIGHT: 121.25 LBS | SYSTOLIC BLOOD PRESSURE: 141 MMHG | OXYGEN SATURATION: 99 %

## 2017-11-16 DIAGNOSIS — J18.9 PNEUMONIA OF RIGHT UPPER LOBE DUE TO INFECTIOUS ORGANISM: ICD-10-CM

## 2017-11-16 DIAGNOSIS — J44.9 COPD, SEVERE: ICD-10-CM

## 2017-11-16 DIAGNOSIS — Z72.0 TOBACCO ABUSE: Primary | ICD-10-CM

## 2017-11-16 PROCEDURE — 99214 OFFICE O/P EST MOD 30 MIN: CPT | Mod: S$GLB,,, | Performed by: INTERNAL MEDICINE

## 2017-11-16 PROCEDURE — 99999 PR PBB SHADOW E&M-EST. PATIENT-LVL IV: CPT | Mod: PBBFAC,,, | Performed by: INTERNAL MEDICINE

## 2017-11-16 RX ORDER — AMOXICILLIN AND CLAVULANATE POTASSIUM 875; 125 MG/1; MG/1
1 TABLET, FILM COATED ORAL EVERY 12 HOURS
Qty: 42 TABLET | Refills: 1 | Status: SHIPPED | OUTPATIENT
Start: 2017-11-16 | End: 2018-02-28 | Stop reason: SDUPTHER

## 2017-11-16 RX ORDER — PREDNISONE 20 MG/1
TABLET ORAL
Qty: 12 TABLET | Refills: 0 | Status: SHIPPED | OUTPATIENT
Start: 2017-11-16 | End: 2019-08-27 | Stop reason: SDUPTHER

## 2017-11-16 NOTE — PROGRESS NOTES
11/16/2017    Fabiana Morel  Office Note    Chief Complaint   Patient presents with    Follow-up     4 week    Pulmonary Nodules    COPD   nov 16, 2017 - off smokes transiently - on chantix with no problems.  No severe stress.        oct 17, 2017- no fever or night sweats or cough. No new c/o - still smokes but trying chantrix.      sept 20, still smokes same, feels sl better than 6  Months ago, mucous clear, night sweats still with no improvement with abx.      AUg 15, having right shoulder pain last 3 weeks at 10/10 intially  And subsequent 3-4/10,  No fever but night sweats chr, no n/v/d or headache.  No tb exposure.   Had tb eval past negative.  Still smokes.  Nerves ok.  Breathing seems  Better.      Feb 13, 2017, hpi doing rehab, no action plan, still smoking.  Using all meds, nerves better with xanax and toprol.        Aug 12, 2016HPI:initial encounter in sbp, copd and bronchiectasis and lung nodule.  No prednisone use recent.  No recent azithromycin.    breo and incruse regular use, [id number], not using nebulizer.  Last ct 12/2015.  fev1 27% in 2014 with dramatic bd response.  Working as filing and typing.  Feels resp comfortable.  Has had lung dz age 56.        Scant mucous with no color chr, no acapella.        The chief compliant  problem is stable  PFSH:  Past Medical History:   Diagnosis Date    Fracture of left clavicle     Hypertension     Scoliosis     SOB (shortness of breath)          Past Surgical History:   Procedure Laterality Date    APPENDECTOMY      BREAST BIOPSY Right     cyst    HYSTERECTOMY      HASMUKH/BSO, DUB    TUBAL LIGATION       Social History   Substance Use Topics    Smoking status: Current Every Day Smoker     Packs/day: 0.50     Years: 30.00     Types: Cigarettes    Smokeless tobacco: Never Used      Comment: Trying to quit now still cheating    Alcohol use 7.2 oz/week     12 Cans of beer per week      Comment: 2-3 beers daily     Family History   Problem  "Relation Age of Onset    Cancer Mother 49     "lymph nodes"    Cancer Sister      lung?     Review of patient's allergies indicates:   Allergen Reactions    Erythromycin Nausea Only       Performance Status:The patient's activity level is functions out of house.      Review of Systems:  a review of eleven systems covering constitutional, Eye, HEENT, Psych, Respiratory, Cardiac, GI, , Musculoskeletal, Endocrine, Dermatologic was negative except for pertinent findings as listed ABOVE and below: night sweats chr due to menopause,      Exam:Comprehensive exam done.   BP (!) 141/75 (BP Location: Right arm, Patient Position: Sitting)   Pulse 83   Ht 5' 3" (1.6 m)   Wt 55 kg (121 lb 4.1 oz)   SpO2 99%   BMI 21.48 kg/m²   Exam included Vitals as listed, and patient's appearance and affect and alertness and mood, oral exam for yeast and hygiene and pharynx lesions and Mallapatti (M) score, neck with inspection for jvd and masses and thyroid abnormalities and lymph nodes (supraclavicular and infraclavicular nodes and axillary also examined and noted if abn), chest exam included symmetry and effort and fremitus and percussion and auscultation, cardiac exam included rhythm and gallops and murmur and rubs and jvd and edema, abdominal exam for mass and hepatosplenomegaly and tenderness and hernias and bowel sounds, Musculoskeletal exam with muscle tone and posture and mobility/gait and  strength, and skin for rashes and cyanosis and pallor and turgor, extremity for clubbing.  Findings were normal except for pertinent findings listed below:  Mild kyphosis scoliosis, M2, no wheezes no edema, no clubbing    Radiographs reviewed: ct 2016  With left lung abn better now, right lung was clear and now with infiltrate with vol loss of emphysema??  cxr may have smaller cavity rul cavity than ct c/w aug ct with sept cxr.    cxr 10/16 same as sept 2017,       Labs reviewed  No pos cultures  PFT results [id number]  fev " 27%    Plan:  Clinical impression is apparently straight forward and impression with management as below.    Fabiana was seen today for follow-up, pulmonary nodules and copd.    Diagnoses and all orders for this visit:    Tobacco abuse    Pneumonia of right upper lobe due to infectious organism  -     amoxicillin-clavulanate 875-125mg (AUGMENTIN) 875-125 mg per tablet; Take 1 tablet by mouth every 12 (twelve) hours.  -     X-Ray Chest PA And Lateral; Future    COPD, severe  -     predniSONE (DELTASONE) 20 MG tablet; One daily for 3 days and repeat for flare of lung symptoms as intructed  -     X-Ray Chest PA And Lateral; Future        Return in about 3 months (around 2018), or if symptoms worsen or fail to improve.    Discussed with patient above for education the followinnd pneumonia vaccine, prevnar 13 vaccine-recommended    Bronch looked good, suspect resolving pneumonia but not certain til radiographs optimized?    Stop smoking - you can and will stop .

## 2017-11-16 NOTE — PATIENT INSTRUCTIONS
2nd pneumonia vaccine, prevnar 13 vaccine-recommended    Bronch looked good, suspect resolving pneumonia but not certain til radiographs optimized?    Stop smoking - you can and will stop .

## 2017-11-20 ENCOUNTER — DOCUMENTATION ONLY (OUTPATIENT)
Dept: FAMILY MEDICINE | Facility: CLINIC | Age: 62
End: 2017-11-20

## 2017-11-20 LAB
FUNGUS SPEC CULT: NORMAL
FUNGUS SPEC CULT: NORMAL

## 2017-11-20 NOTE — PROGRESS NOTES
Pre-Visit Chart Review  For Appointment Scheduled on 11/21/17.    Health Maintenance Due   Topic Date Due    TETANUS VACCINE  11/09/1973    Zoster Vaccine  11/09/2016    Influenza Vaccine  08/01/2017

## 2017-11-21 ENCOUNTER — OFFICE VISIT (OUTPATIENT)
Dept: FAMILY MEDICINE | Facility: CLINIC | Age: 62
End: 2017-11-21
Payer: COMMERCIAL

## 2017-11-21 VITALS
WEIGHT: 123 LBS | DIASTOLIC BLOOD PRESSURE: 88 MMHG | TEMPERATURE: 98 F | BODY MASS INDEX: 21.79 KG/M2 | HEIGHT: 63 IN | HEART RATE: 78 BPM | SYSTOLIC BLOOD PRESSURE: 134 MMHG

## 2017-11-21 DIAGNOSIS — Z23 IMMUNIZATION DUE: ICD-10-CM

## 2017-11-21 DIAGNOSIS — R73.9 HYPERGLYCEMIA: ICD-10-CM

## 2017-11-21 DIAGNOSIS — R63.6 UNDERWEIGHT: ICD-10-CM

## 2017-11-21 DIAGNOSIS — I10 ESSENTIAL HYPERTENSION: Primary | ICD-10-CM

## 2017-11-21 DIAGNOSIS — E78.5 HYPERLIPIDEMIA, UNSPECIFIED HYPERLIPIDEMIA TYPE: ICD-10-CM

## 2017-11-21 PROCEDURE — 90472 IMMUNIZATION ADMIN EACH ADD: CPT | Mod: S$GLB,,, | Performed by: FAMILY MEDICINE

## 2017-11-21 PROCEDURE — 90670 PCV13 VACCINE IM: CPT | Mod: S$GLB,,, | Performed by: FAMILY MEDICINE

## 2017-11-21 PROCEDURE — 90736 HZV VACCINE LIVE SUBQ: CPT | Mod: S$GLB,,, | Performed by: FAMILY MEDICINE

## 2017-11-21 PROCEDURE — 99214 OFFICE O/P EST MOD 30 MIN: CPT | Mod: 25,S$GLB,, | Performed by: FAMILY MEDICINE

## 2017-11-21 PROCEDURE — 90471 IMMUNIZATION ADMIN: CPT | Mod: S$GLB,,, | Performed by: FAMILY MEDICINE

## 2017-11-21 PROCEDURE — 99999 PR PBB SHADOW E&M-EST. PATIENT-LVL III: CPT | Mod: PBBFAC,,, | Performed by: FAMILY MEDICINE

## 2017-11-21 RX ORDER — ATORVASTATIN CALCIUM 40 MG/1
40 TABLET, FILM COATED ORAL DAILY
Qty: 90 TABLET | Refills: 3 | Status: SHIPPED | OUTPATIENT
Start: 2017-11-21 | End: 2018-11-20 | Stop reason: SDUPTHER

## 2017-11-21 NOTE — PROGRESS NOTES
"CHIEF COMPLAINT:  Follow up      HISTORY OF PRESENT ILLNESS:  Fabiana Morel is a 62 y.o. female who presents to clinic for follow up.    1. HTN: She is on  toprol xl. She denies any CP, SOB, cough, edema. She does not monitor her blood pressures    2. Hyperlipidemia: She is on lipitor. She denies any myalgia, dark colored urine.    3. She is due for zostavax and pulmonology requests that she receive a prevnar 13.         REVIEW OF SYSTEMS:  The patient denies any fever, chills, night sweats, headaches, vision changes, difficulty speaking or swallowing, decreased hearing, weight loss, weight gain, chest pain, palpitations, shortness of breath, cough, nausea, vomiting, abdominal pain, dysuria, diarrhea, constipation, hematuria, hematochezia, melena, changes in her hair, skin, nails, numbness or weakness in her extremities, erythema, swelling over any of her joints, myalgia, swollen glands, easy bruising, fatigue, edema, symptoms of anxiety or depression.     MEDICATIONS:   Reviewed and/or reconciled in EPIC    ALLERGIES:  Reviewed and/or reconciled in [facility]    PAST MEDICAL/SURGICAL HISTORY:   Past Medical History:   Diagnosis Date    Fracture of left clavicle     Hypertension     Scoliosis     SOB (shortness of breath)       Past Surgical History:   Procedure Laterality Date    APPENDECTOMY      BREAST BIOPSY Right     cyst    HYSTERECTOMY      HASMUKH/BSO, DUB    TUBAL LIGATION         FAMILY HISTORY:    Family History   Problem Relation Age of Onset    Cancer Mother 49     "lymph nodes"    Cancer Sister      lung?       SOCIAL HISTORY:    Social History     Social History    Marital status:      Spouse name: N/A    Number of children: N/A    Years of education: N/A     Occupational History    Not on file.     Social History Main Topics    Smoking status: Current Every Day Smoker     Packs/day: 0.50     Years: 30.00     Types: Cigarettes    Smokeless tobacco: Never Used      Comment: Trying to " "quit now still cheating    Alcohol use 7.2 oz/week     12 Cans of beer per week      Comment: 2-3 beers daily    Drug use: No    Sexual activity: Yes     Partners: Male     Other Topics Concern    Not on file     Social History Narrative    No narrative on file       PHYSICAL EXAM:  VITAL SIGNS:   Vitals:    11/21/17 1355   BP: 134/88   BP Location: Right arm   Patient Position: Sitting   BP Method: Small (Automatic)   Pulse: 78   Temp: 97.9 °F (36.6 °C)   TempSrc: Oral   Weight: 55.8 kg (123 lb 0.3 oz)   Height: 5' 3" (1.6 m)     GENERAL:  Patient appears well nourished, sitting on exam table, in no acute distress.  HEENT:  Atraumatic, normocephalic, PERRLA, EOMI, no conjunctival injection, sclerae are anicteric, normal external auditory canals,TMs clear b/l, gross hearing intact to whisper, MMM, no oropharygneal erythema or exudate.  NECK:  Supple, normal ROM, trachea is midline , no supraclavicular or cervical LAD or masses palpated.  Thyroid gland not palpable.  CARDIOVASCULAR:  RRR, normal S1 and S2, no m/r/g.  RESPIRATORY:  CTA b/l, no wheezes, rhonchi, rales.  No increased work of breathing, no  use of accessory muscles.  ABDOMEN:  Soft, nontender, nondistended, normoactive bowel sounds in all four quadrants, no rebound or guarding, no HSM or masses palpated.  Normal percussion.  EXTREMITIES:  2+ DP pulses b/l, no edema.  SKIN:  Warm, no lesions on exposed skin.  NEUROMUSCULAR:  Cranial nerves II-XII grossly intact. No clubbing or cyanosis of digits/nails.  Steady gait.  PSYCH:  Patient is alert and oriented to person, time, place. They are appropriately dressed and groomed. There is normal eye contact. Rate and tone of speech is normal. Normal insight, judgement. Normal thought content and process.         ASSESSMENT/PLAN: This is a 62 y.o. female who presents to clinic for evaluation of the following concerns  1. HTN: see below  2. Hyperlipidemia: at goal, continue with current medications  3. " Underweight: see below  4. Immunization due: will receive prevnar 13 and zostavax in clinic today    Patient readiness: acceptance and barriers:none    During the course of the visit the patient was educated and counseled about the following:     Hypertension:   Control with toprol xl.   Underweight:  Follow up and re-weight in: 6  months and as needed.     Goals: Hypertension: Reduce Blood Pressure and Underweight: Increase calorie intake and BMI      Did patient meet goals/outcomes: No    The following self management tools provided: declined    Patient Instructions (the written plan) was given to the patient/family.     Time spent with patient: 30 minutes      Светлана Frazier MD

## 2017-12-13 ENCOUNTER — TELEPHONE (OUTPATIENT)
Dept: PULMONOLOGY | Facility: CLINIC | Age: 62
End: 2017-12-13

## 2017-12-13 NOTE — TELEPHONE ENCOUNTER
Pt called in reporting was sick last week, took azithromycin and prednisone as written, but is still wheezing and coughing. Per Dr Mackey, advised pt to start prednisone, 60mg for 3days, 40mg 3days, 20mg 3days and report back to us with how she is doing. Called in rx to [facility] for more prednisone. Pt verbalized understanding.

## 2017-12-22 LAB
ACID FAST MOD KINY STN SPEC: NORMAL
ACID FAST MOD KINY STN SPEC: NORMAL
MYCOBACTERIUM SPEC QL CULT: NORMAL
MYCOBACTERIUM SPEC QL CULT: NORMAL

## 2018-02-26 ENCOUNTER — TELEPHONE (OUTPATIENT)
Dept: PULMONOLOGY | Facility: CLINIC | Age: 63
End: 2018-02-26

## 2018-02-26 ENCOUNTER — HOSPITAL ENCOUNTER (OUTPATIENT)
Dept: RADIOLOGY | Facility: HOSPITAL | Age: 63
Discharge: HOME | End: 2018-02-26
Attending: INTERNAL MEDICINE
Payer: COMMERCIAL

## 2018-02-26 DIAGNOSIS — J44.9 COPD, SEVERE: ICD-10-CM

## 2018-02-26 DIAGNOSIS — J18.9 PNEUMONIA OF RIGHT UPPER LOBE DUE TO INFECTIOUS ORGANISM: ICD-10-CM

## 2018-02-26 PROCEDURE — 71046 X-RAY EXAM CHEST 2 VIEWS: CPT | Mod: 26,,, | Performed by: RADIOLOGY

## 2018-02-26 PROCEDURE — 71046 X-RAY EXAM CHEST 2 VIEWS: CPT | Mod: TC,FY

## 2018-02-26 NOTE — TELEPHONE ENCOUNTER
Left voice mail  ----- Message from Bhupendra Mackey MD sent at 2/26/2018  3:21 PM CST -----  Notify cxr shows better right lung density,  Right lung seems to be improved with less emphysema.  Need to discuss at follow up

## 2018-02-28 ENCOUNTER — OFFICE VISIT (OUTPATIENT)
Dept: PULMONOLOGY | Facility: CLINIC | Age: 63
End: 2018-02-28
Payer: COMMERCIAL

## 2018-02-28 VITALS
HEART RATE: 90 BPM | SYSTOLIC BLOOD PRESSURE: 119 MMHG | WEIGHT: 126.31 LBS | OXYGEN SATURATION: 96 % | DIASTOLIC BLOOD PRESSURE: 66 MMHG | HEIGHT: 63 IN | BODY MASS INDEX: 22.38 KG/M2

## 2018-02-28 DIAGNOSIS — R91.1 LUNG NODULE: ICD-10-CM

## 2018-02-28 DIAGNOSIS — J18.9 PNEUMONIA OF RIGHT UPPER LOBE DUE TO INFECTIOUS ORGANISM: ICD-10-CM

## 2018-02-28 DIAGNOSIS — R93.89 ABNORMAL CT SCAN: Primary | ICD-10-CM

## 2018-02-28 DIAGNOSIS — N95.1 MENOPAUSAL SYMPTOMS: ICD-10-CM

## 2018-02-28 DIAGNOSIS — J44.9 COPD, SEVERE: ICD-10-CM

## 2018-02-28 DIAGNOSIS — F41.9 ANXIETY: ICD-10-CM

## 2018-02-28 PROCEDURE — 99214 OFFICE O/P EST MOD 30 MIN: CPT | Mod: S$GLB,,, | Performed by: INTERNAL MEDICINE

## 2018-02-28 PROCEDURE — 99999 PR PBB SHADOW E&M-EST. PATIENT-LVL III: CPT | Mod: PBBFAC,,, | Performed by: INTERNAL MEDICINE

## 2018-02-28 RX ORDER — ESTRADIOL 2 MG/1
2 TABLET ORAL DAILY
Qty: 30 TABLET | Refills: 1 | Status: SHIPPED | OUTPATIENT
Start: 2018-02-28 | End: 2018-02-28

## 2018-02-28 RX ORDER — ESTRADIOL 2 MG/1
2 TABLET ORAL DAILY
Qty: 30 TABLET | Refills: 1 | Status: SHIPPED | OUTPATIENT
Start: 2018-02-28 | End: 2018-06-05

## 2018-02-28 RX ORDER — ALBUTEROL SULFATE 4 MG/1
4 TABLET ORAL 3 TIMES DAILY
Qty: 90 TABLET | Refills: 5 | Status: SHIPPED | OUTPATIENT
Start: 2018-02-28 | End: 2018-08-28 | Stop reason: SDUPTHER

## 2018-02-28 RX ORDER — AMOXICILLIN AND CLAVULANATE POTASSIUM 875; 125 MG/1; MG/1
1 TABLET, FILM COATED ORAL EVERY 12 HOURS
Qty: 42 TABLET | Refills: 1 | Status: SHIPPED | OUTPATIENT
Start: 2018-02-28 | End: 2019-02-26 | Stop reason: SDUPTHER

## 2018-02-28 RX ORDER — ALPRAZOLAM 0.25 MG/1
0.25 TABLET ORAL 3 TIMES DAILY PRN
Qty: 90 TABLET | Refills: 1 | Status: SHIPPED | OUTPATIENT
Start: 2018-02-28 | End: 2018-08-28 | Stop reason: SDUPTHER

## 2018-02-28 RX ORDER — PREDNISONE 20 MG/1
TABLET ORAL
Qty: 36 TABLET | Refills: 0 | Status: SHIPPED | OUTPATIENT
Start: 2018-02-28 | End: 2018-06-05

## 2018-02-28 RX ORDER — OSELTAMIVIR PHOSPHATE 75 MG/1
75 CAPSULE ORAL 2 TIMES DAILY
Qty: 10 CAPSULE | Refills: 0 | Status: SHIPPED | OUTPATIENT
Start: 2018-02-28 | End: 2018-03-05

## 2018-02-28 NOTE — LETTER
[facility] [facility]  [address]  [place] [address]  Phone: [phone]  Fax: [phone]       2018      To Whom It May Concern:    Re: Fabiana Morel,  1955      Fabiana Morel has very severe lung disease with FEV1 27%, and is prone to infections/exacerbations. She is symptomatic with breathless and coughing easily.   Patient also has chronic anxiety requiring treatment which would worsen with exposure in public.  Please excuse from jury duty.      Thank You,       Bhupendra Mackey MD  Pulmonary Diseases

## 2018-02-28 NOTE — PROGRESS NOTES
2/28/2018    Fabiana Morel  Office Note    Chief Complaint   Patient presents with    Follow-up    Lung Nodule    COPD    Shortness of Breath     Feb 28, 2018 breathing better, still cough but slight.  Nearly off smokes.  Had exacerbation needing steriods.  Lung transplant discussed if pt stops smokes but pt declines.      nov 16, 2017 - off smokes transiently - on chantix with no problems.  No severe stress.        oct 17, 2017- no fever or night sweats or cough. No new c/o - still smokes but trying chantrix.      sept 20, still smokes same, feels sl better than 6  Months ago, mucous clear, night sweats still with no improvement with abx.      AUg 15, having right shoulder pain last 3 weeks at 10/10 intially  And subsequent 3-4/10,  No fever but night sweats chr, no n/v/d or headache.  No tb exposure.   Had tb eval past negative.  Still smokes.  Nerves ok.  Breathing seems  Better.      Feb 13, 2017, hpi doing rehab, no action plan, still smoking.  Using all meds, nerves better with xanax and toprol.        Aug 12, 2016HPI:initial encounter in sbp, copd and bronchiectasis and lung nodule.  No prednisone use recent.  No recent azithromycin.    breo and incruse regular use, [id number], not using nebulizer.  Last ct 12/2015.  fev1 27% in 2014 with dramatic bd response.  Working as filing and typing.  Feels resp comfortable.  Has had lung dz age 56.        Scant mucous with no color chr, no acapella.        The chief compliant  problem is stable  PFSH:  Past Medical History:   Diagnosis Date    Fracture of left clavicle     Hypertension     Scoliosis     SOB (shortness of breath)          Past Surgical History:   Procedure Laterality Date    APPENDECTOMY      BREAST BIOPSY Right     cyst    HYSTERECTOMY      HASMUKH/BSO, DUB    TUBAL LIGATION       Social History   Substance Use Topics    Smoking status: Current Every Day Smoker     Packs/day: 0.50     Years: 30.00     Types: Cigarettes    Smokeless  "tobacco: Never Used      Comment: Trying to quit now still cheating    Alcohol use 7.2 oz/week     12 Cans of beer per week      Comment: 2-3 beers daily     Family History   Problem Relation Age of Onset    Cancer Mother 49     "lymph nodes"    Cancer Sister      lung?     Review of patient's allergies indicates:   Allergen Reactions    Erythromycin Nausea Only       Performance Status:The patient's activity level is functions out of house.      Review of Systems:  a review of eleven systems covering constitutional, Eye, HEENT, Psych, Respiratory, Cardiac, GI, , Musculoskeletal, Endocrine, Dermatologic was negative except for pertinent findings as listed ABOVE and below: night sweats chr due to menopause,      Exam:Comprehensive exam done.   /66 (BP Location: Left arm, Patient Position: Sitting)   Pulse 90   Ht 5' 3" (1.6 m)   Wt 57.3 kg (126 lb 5.2 oz)   SpO2 96%   BMI 22.38 kg/m²   Exam included Vitals as listed, and patient's appearance and affect and alertness and mood, oral exam for yeast and hygiene and pharynx lesions and Mallapatti (M) score, neck with inspection for jvd and masses and thyroid abnormalities and lymph nodes (supraclavicular and infraclavicular nodes and axillary also examined and noted if abn), chest exam included symmetry and effort and fremitus and percussion and auscultation, cardiac exam included rhythm and gallops and murmur and rubs and jvd and edema, abdominal exam for mass and hepatosplenomegaly and tenderness and hernias and bowel sounds, Musculoskeletal exam with muscle tone and posture and mobility/gait and  strength, and skin for rashes and cyanosis and pallor and turgor, extremity for clubbing.  Findings were normal except for pertinent findings listed below:  Mild kyphosis scoliosis, M2, no wheezes no edema, no clubbing    Radiographs reviewed: ct 2016  With left lung abn better now, right lung was clear and now with infiltrate with vol loss of emphysema??  " cxr may have smaller cavity rul cavity than ct c/w aug ct with sept cxr.    cxr 10/16 same as sept 2017,  cxr  Feb 26, 2018 improved rul density with smaller hyper inflated lungs.      Labs reviewed  No pos cultures  PFT results [id number]  fev 27%    Plan:  Clinical impression is apparently straight forward and impression with management as below.    Fabiana was seen today for follow-up, lung nodule, copd and shortness of breath.    Diagnoses and all orders for this visit:    Abnormal CT scan  -     X-Ray Chest PA And Lateral; Future    Pneumonia of right upper lobe due to infectious organism    Anxiety  -     ALPRAZolam (XANAX) 0.25 MG tablet; Take 1 tablet (0.25 mg total) by mouth 3 (three) times daily as needed.    COPD, severe  -     fluticasone-umeclidin-vilanter (TRELEGY ELLIPTA) 100-62.5-25 mcg DsDv; Inhale 1 puff into the lungs once daily.  -     predniSONE (DELTASONE) 20 MG tablet; Take 3 daily for 3 days, then 2 daily for 3 days, then 1 daily for 3days  -     amoxicillin-clavulanate 875-125mg (AUGMENTIN) 875-125 mg per tablet; Take 1 tablet by mouth every 12 (twelve) hours.  -     albuterol (PROVENTIL) 4 MG Tab; Take 1 tablet (4 mg total) by mouth 3 (three) times daily.  -     X-Ray Chest PA And Lateral; Future  -     oseltamivir (TAMIFLU) 75 MG capsule; Take 1 capsule (75 mg total) by mouth 2 (two) times daily.    Lung nodule    Menopausal symptoms  -     Discontinue: estradiol (ESTRACE) 2 MG tablet; Take 1 tablet (2 mg total) by mouth once daily.  -     estradiol (ESTRACE) 2 MG tablet; Take 1 tablet (2 mg total) by mouth once daily.        Follow-up in about 6 months (around 8/28/2018), or if symptoms worsen or fail to improve.    Discussed with patient above for education the following:        Estrace may be needed- will give script but should have follow up GYN.    Use trelegy to replace incruse and breo    May need augmentin for yellow and prednisone for cough /wheezes.    Work on stop  smoking    cxr looks better - cxr 6 months and consider ct next yr?     Use xanax as needed.    Use tamiflu if flu - fever, headache,cough, muscle ache, sore throat- call

## 2018-02-28 NOTE — PATIENT INSTRUCTIONS
Estrace may be needed- will give script but should have follow up GYN.    Use trelegy to replace incruse and breo    May need augmentin for yellow and prednisone for cough /wheezes.    Work on stop smoking    cxr looks better - cxr 6 months and consider ct next yr?     Use xanax as needed.    Use tamiflu if flu - fever, headache,cough, muscle ache, sore throat- call

## 2018-05-30 ENCOUNTER — DOCUMENTATION ONLY (OUTPATIENT)
Dept: FAMILY MEDICINE | Facility: CLINIC | Age: 63
End: 2018-05-30

## 2018-05-30 NOTE — PROGRESS NOTES
Pre-Visit Chart Review  For Appointment Scheduled on 6/5/18.    Health Maintenance Due   Topic Date Due    TETANUS VACCINE  11/09/1974    Colonoscopy  01/08/2018

## 2018-06-05 ENCOUNTER — OFFICE VISIT (OUTPATIENT)
Dept: FAMILY MEDICINE | Facility: CLINIC | Age: 63
End: 2018-06-05
Payer: COMMERCIAL

## 2018-06-05 VITALS
HEIGHT: 63 IN | HEART RATE: 84 BPM | WEIGHT: 128.06 LBS | DIASTOLIC BLOOD PRESSURE: 82 MMHG | TEMPERATURE: 98 F | SYSTOLIC BLOOD PRESSURE: 130 MMHG | BODY MASS INDEX: 22.69 KG/M2

## 2018-06-05 DIAGNOSIS — E78.5 HYPERLIPIDEMIA, UNSPECIFIED HYPERLIPIDEMIA TYPE: ICD-10-CM

## 2018-06-05 DIAGNOSIS — R73.9 HYPERGLYCEMIA: ICD-10-CM

## 2018-06-05 DIAGNOSIS — I10 ESSENTIAL HYPERTENSION: Primary | ICD-10-CM

## 2018-06-05 PROCEDURE — 3075F SYST BP GE 130 - 139MM HG: CPT | Mod: CPTII,S$GLB,, | Performed by: FAMILY MEDICINE

## 2018-06-05 PROCEDURE — 99214 OFFICE O/P EST MOD 30 MIN: CPT | Mod: S$GLB,,, | Performed by: FAMILY MEDICINE

## 2018-06-05 PROCEDURE — 3008F BODY MASS INDEX DOCD: CPT | Mod: CPTII,S$GLB,, | Performed by: FAMILY MEDICINE

## 2018-06-05 PROCEDURE — 99999 PR PBB SHADOW E&M-EST. PATIENT-LVL III: CPT | Mod: PBBFAC,,, | Performed by: FAMILY MEDICINE

## 2018-06-05 PROCEDURE — 3079F DIAST BP 80-89 MM HG: CPT | Mod: CPTII,S$GLB,, | Performed by: FAMILY MEDICINE

## 2018-06-05 NOTE — PROGRESS NOTES
"CHIEF COMPLAINT:  Follow up HTN, hyperlipidemia      HISTORY OF PRESENT ILLNESS:  Fabiana Morel is a 62 y.o. female who presents to clinic for follow up on her chronic medical conditions.     1. HTN: She is on  toprol xl. She denies any CP, SOB, cough, edema. She does not monitor her blood pressures    2. Hyperlipidemia: She is on lipitor. She denies any myalgia, dark colored urine. She is due for lab work.     3. She has had evidence of hyperglycemia on previous labs and is due for FBG, HgA1c.       REVIEW OF SYSTEMS:  The patient denies any fever, chills, night sweats, headaches, vision changes, difficulty speaking or swallowing, decreased hearing, weight loss, weight gain, chest pain, palpitations, shortness of breath, cough, nausea, vomiting, abdominal pain, dysuria, diarrhea, constipation, hematuria, hematochezia, melena, changes in her hair, skin, nails, numbness or weakness in her extremities, erythema, swelling over any of her joints, myalgia, swollen glands, easy bruising, fatigue, edema, symptoms of anxiety or depression.     MEDICATIONS:   Reviewed and/or reconciled in EPIC    ALLERGIES:  Reviewed and/or reconciled in EPIC    PAST MEDICAL/SURGICAL HISTORY:   Past Medical History:   Diagnosis Date    Fracture of left clavicle     Hypertension     Scoliosis     SOB (shortness of breath)       Past Surgical History:   Procedure Laterality Date    APPENDECTOMY      BREAST BIOPSY Right     cyst    HYSTERECTOMY      HASMUKH/BSO, DUB    TUBAL LIGATION         FAMILY HISTORY:    Family History   Problem Relation Age of Onset    Cancer Mother 49        "lymph nodes"    Cancer Sister         lung?       SOCIAL HISTORY:    Social History     Social History    Marital status:      Spouse name: N/A    Number of children: N/A    Years of education: N/A     Occupational History    Not on file.     Social History Main Topics    Smoking status: Current Every Day Smoker     Packs/day: 0.50     Years: " 30.00     Types: Cigarettes    Smokeless tobacco: Never Used      Comment: Trying to quit now still cheating    Alcohol use 7.2 oz/week     12 Cans of beer per week      Comment: 2-3 beers daily    Drug use: No    Sexual activity: Yes     Partners: Male     Other Topics Concern    Not on file     Social History Narrative    No narrative on file       PHYSICAL EXAM:  VITAL SIGNS:   There were no vitals filed for this visit.  GENERAL:  Patient appears well nourished, sitting on exam table, in no acute distress.  HEENT:  Atraumatic, normocephalic, PERRLA, EOMI, no conjunctival injection, sclerae are anicteric, normal external auditory canals,TMs clear b/l, gross hearing intact to whisper, MMM, no oropharygneal erythema or exudate.  NECK:  Supple, normal ROM, trachea is midline , no supraclavicular or cervical LAD or masses palpated.  Thyroid gland not palpable.  CARDIOVASCULAR:  RRR, normal S1 and S2, no m/r/g.  RESPIRATORY:  CTA b/l, no wheezes, rhonchi, rales.  No increased work of breathing, no  use of accessory muscles.  ABDOMEN:  Soft, nontender, nondistended, normoactive bowel sounds in all four quadrants, no rebound or guarding, no HSM or masses palpated.  Normal percussion.  EXTREMITIES:  2+ DP pulses b/l, no edema.  SKIN:  Warm, no lesions on exposed skin.  NEUROMUSCULAR:  Cranial nerves II-XII grossly intact. No clubbing or cyanosis of digits/nails.  Steady gait.  PSYCH:  Patient is alert and oriented to person, time, place. They are appropriately dressed and groomed. There is normal eye contact. Rate and tone of speech is normal. Normal insight, judgement. Normal thought content and process.         ASSESSMENT/PLAN: This is a 62 y.o. female who presents to clinic for evaluation of the following concerns  1. HTN: see below  2. Hyperlipidemia: CMP, lipid panel  3. Hyperglycemia: FBG, HgA1c    Patient readiness: acceptance and barriers:none    During the course of the visit the patient was educated and  counseled about the following:     Hypertension:   Control with toprol xl.   Underweight:  Follow up and re-weight in: 6  months and as needed.     Goals: Hypertension: Reduce Blood Pressure and Underweight: Increase calorie intake and BMI      Did patient meet goals/outcomes: No    The following self management tools provided: declined    Patient Instructions (the written plan) was given to the patient/family.     Time spent with patient: 30 minutes      Светлана Frazier MD

## 2018-06-12 ENCOUNTER — LAB VISIT (OUTPATIENT)
Dept: LAB | Facility: HOSPITAL | Age: 63
End: 2018-06-12
Attending: FAMILY MEDICINE
Payer: COMMERCIAL

## 2018-06-12 DIAGNOSIS — I10 ESSENTIAL HYPERTENSION: ICD-10-CM

## 2018-06-12 DIAGNOSIS — R73.9 HYPERGLYCEMIA: ICD-10-CM

## 2018-06-12 DIAGNOSIS — E78.5 HYPERLIPIDEMIA, UNSPECIFIED HYPERLIPIDEMIA TYPE: ICD-10-CM

## 2018-06-12 LAB
ALBUMIN SERPL BCP-MCNC: 3.8 G/DL
ALP SERPL-CCNC: 101 U/L
ALT SERPL W/O P-5'-P-CCNC: 9 U/L
ANION GAP SERPL CALC-SCNC: 11 MMOL/L
AST SERPL-CCNC: 17 U/L
BASOPHILS # BLD AUTO: 0.03 K/UL
BASOPHILS NFR BLD: 0.5 %
BILIRUB SERPL-MCNC: 0.5 MG/DL
BUN SERPL-MCNC: 17 MG/DL
CALCIUM SERPL-MCNC: 10.2 MG/DL
CHLORIDE SERPL-SCNC: 102 MMOL/L
CHOLEST SERPL-MCNC: 250 MG/DL
CHOLEST/HDLC SERPL: 3 {RATIO}
CO2 SERPL-SCNC: 25 MMOL/L
CREAT SERPL-MCNC: 0.7 MG/DL
DIFFERENTIAL METHOD: ABNORMAL
EOSINOPHIL # BLD AUTO: 0.1 K/UL
EOSINOPHIL NFR BLD: 1.8 %
ERYTHROCYTE [DISTWIDTH] IN BLOOD BY AUTOMATED COUNT: 15.9 %
EST. GFR  (AFRICAN AMERICAN): >60 ML/MIN/1.73 M^2
EST. GFR  (NON AFRICAN AMERICAN): >60 ML/MIN/1.73 M^2
ESTIMATED AVG GLUCOSE: 103 MG/DL
GLUCOSE SERPL-MCNC: 118 MG/DL
HBA1C MFR BLD HPLC: 5.2 %
HCT VFR BLD AUTO: 48.4 %
HDLC SERPL-MCNC: 82 MG/DL
HDLC SERPL: 32.8 %
HGB BLD-MCNC: 15.9 G/DL
IMM GRANULOCYTES # BLD AUTO: 0.01 K/UL
IMM GRANULOCYTES NFR BLD AUTO: 0.2 %
LDLC SERPL CALC-MCNC: 148.4 MG/DL
LYMPHOCYTES # BLD AUTO: 1.8 K/UL
LYMPHOCYTES NFR BLD: 32.2 %
MCH RBC QN AUTO: 31.4 PG
MCHC RBC AUTO-ENTMCNC: 32.9 G/DL
MCV RBC AUTO: 96 FL
MONOCYTES # BLD AUTO: 0.5 K/UL
MONOCYTES NFR BLD: 9 %
NEUTROPHILS # BLD AUTO: 3.2 K/UL
NEUTROPHILS NFR BLD: 56.3 %
NONHDLC SERPL-MCNC: 168 MG/DL
NRBC BLD-RTO: 0 /100 WBC
PLATELET # BLD AUTO: 282 K/UL
PMV BLD AUTO: 9.4 FL
POTASSIUM SERPL-SCNC: 4.4 MMOL/L
PROT SERPL-MCNC: 7.8 G/DL
RBC # BLD AUTO: 5.06 M/UL
SODIUM SERPL-SCNC: 138 MMOL/L
TRIGL SERPL-MCNC: 98 MG/DL
TSH SERPL DL<=0.005 MIU/L-ACNC: 1.08 UIU/ML
WBC # BLD AUTO: 5.69 K/UL

## 2018-06-12 PROCEDURE — 84443 ASSAY THYROID STIM HORMONE: CPT

## 2018-06-12 PROCEDURE — 83036 HEMOGLOBIN GLYCOSYLATED A1C: CPT

## 2018-06-12 PROCEDURE — 80061 LIPID PANEL: CPT

## 2018-06-12 PROCEDURE — 80053 COMPREHEN METABOLIC PANEL: CPT

## 2018-06-12 PROCEDURE — 85025 COMPLETE CBC W/AUTO DIFF WBC: CPT

## 2018-06-12 PROCEDURE — 36415 COLL VENOUS BLD VENIPUNCTURE: CPT | Mod: PO

## 2018-08-16 ENCOUNTER — HOSPITAL ENCOUNTER (OUTPATIENT)
Dept: RADIOLOGY | Facility: HOSPITAL | Age: 63
Discharge: HOME | End: 2018-08-16
Attending: INTERNAL MEDICINE
Payer: COMMERCIAL

## 2018-08-16 DIAGNOSIS — J44.9 COPD, SEVERE: ICD-10-CM

## 2018-08-16 DIAGNOSIS — R93.89 ABNORMAL CT SCAN: ICD-10-CM

## 2018-08-16 PROCEDURE — 71046 X-RAY EXAM CHEST 2 VIEWS: CPT | Mod: TC,FY

## 2018-08-16 PROCEDURE — 71046 X-RAY EXAM CHEST 2 VIEWS: CPT | Mod: 26,,, | Performed by: RADIOLOGY

## 2018-08-28 ENCOUNTER — OFFICE VISIT (OUTPATIENT)
Dept: PULMONOLOGY | Facility: CLINIC | Age: 63
End: 2018-08-28
Payer: COMMERCIAL

## 2018-08-28 VITALS
OXYGEN SATURATION: 97 % | WEIGHT: 129.63 LBS | SYSTOLIC BLOOD PRESSURE: 146 MMHG | DIASTOLIC BLOOD PRESSURE: 94 MMHG | HEART RATE: 89 BPM | HEIGHT: 63 IN | BODY MASS INDEX: 22.97 KG/M2

## 2018-08-28 DIAGNOSIS — R93.89 ABNORMAL CT SCAN: Primary | ICD-10-CM

## 2018-08-28 DIAGNOSIS — Z72.0 TOBACCO ABUSE: ICD-10-CM

## 2018-08-28 DIAGNOSIS — J44.9 COPD, SEVERE: ICD-10-CM

## 2018-08-28 DIAGNOSIS — F41.9 ANXIETY: ICD-10-CM

## 2018-08-28 DIAGNOSIS — I10 ESSENTIAL HYPERTENSION: ICD-10-CM

## 2018-08-28 PROCEDURE — 99999 PR PBB SHADOW E&M-EST. PATIENT-LVL III: CPT | Mod: PBBFAC,,, | Performed by: INTERNAL MEDICINE

## 2018-08-28 PROCEDURE — 3008F BODY MASS INDEX DOCD: CPT | Mod: CPTII,S$GLB,, | Performed by: INTERNAL MEDICINE

## 2018-08-28 PROCEDURE — 3080F DIAST BP >= 90 MM HG: CPT | Mod: CPTII,S$GLB,, | Performed by: INTERNAL MEDICINE

## 2018-08-28 PROCEDURE — 99213 OFFICE O/P EST LOW 20 MIN: CPT | Mod: S$GLB,,, | Performed by: INTERNAL MEDICINE

## 2018-08-28 PROCEDURE — 3077F SYST BP >= 140 MM HG: CPT | Mod: CPTII,S$GLB,, | Performed by: INTERNAL MEDICINE

## 2018-08-28 RX ORDER — ALBUTEROL SULFATE 4 MG/1
4 TABLET ORAL 3 TIMES DAILY
Qty: 90 TABLET | Refills: 5 | Status: SHIPPED | OUTPATIENT
Start: 2018-08-28 | End: 2018-12-10

## 2018-08-28 RX ORDER — ALPRAZOLAM 0.25 MG/1
0.25 TABLET ORAL 3 TIMES DAILY PRN
Qty: 90 TABLET | Refills: 1 | Status: SHIPPED | OUTPATIENT
Start: 2018-08-28 | End: 2019-02-26 | Stop reason: SDUPTHER

## 2018-08-28 RX ORDER — METOPROLOL SUCCINATE 50 MG/1
50 TABLET, EXTENDED RELEASE ORAL DAILY
Qty: 30 TABLET | Refills: 11 | Status: SHIPPED | OUTPATIENT
Start: 2018-08-28 | End: 2019-09-09 | Stop reason: SDUPTHER

## 2018-08-28 NOTE — PROGRESS NOTES
8/28/2018    Fabiana Morel  Office Note    Chief Complaint   Patient presents with    Shortness of Breath    Sputum Production     occassionaly; clear color   aug 28, 2018- still smokes, some celestin, uses trelegy. cxr stable.    Feb 28, 2018 breathing better, still cough but slight.  Nearly off smokes.  Had exacerbation needing steriods.  Lung transplant discussed if pt stops smokes but pt declines.  nov 16, 2017 - off smokes transiently - on chantix with no problems.  No severe stress.    oct 17, 2017- no fever or night sweats or cough. No new c/o - still smokes but trying chantrix.  sept 20, still smokes same, feels sl better than 6  Months ago, mucous clear, night sweats still with no improvement with abx.    AUg 15, having right shoulder pain last 3 weeks at 10/10 intially  And subsequent 3-4/10,  No fever but night sweats chr, no n/v/d or headache.  No tb exposure.   Had tb eval past negative.  Still smokes.  Nerves ok.  Breathing seems  Better.  Feb 13, 2017, hpi doing rehab, no action plan, still smoking.  Using all meds, nerves better with xanax and toprol.  Aug 12, 2016HPI:initial encounter in sbp, copd and bronchiectasis and lung nodule.  No prednisone use recent.  No recent azithromycin.  breo and incruse regular use, [id number], not using nebulizer.  Last ct 12/2015.  fev1 27% in 2014 with dramatic bd response.  Working as filing and typing.  Feels resp comfortable.  Has had lung dz age 56.    Scant mucous with no color chr, no acapella.    The chief compliant  problem is stable  PFSH:  Past Medical History:   Diagnosis Date    Fracture of left clavicle     Hypertension     Scoliosis     SOB (shortness of breath)          Past Surgical History:   Procedure Laterality Date    APPENDECTOMY      BREAST BIOPSY Right     cyst    HYSTERECTOMY      HASMUKH/BSO, DUB    TUBAL LIGATION       Social History     Tobacco Use    Smoking status: Current Every Day Smoker     Packs/day: 0.50     Years: 30.00  "    Pack years: 15.00     Types: Cigarettes    Smokeless tobacco: Never Used    Tobacco comment: Trying to quit now still cheating   Substance Use Topics    Alcohol use: Yes     Alcohol/week: 7.2 oz     Types: 12 Cans of beer per week     Comment: 2-3 beers daily    Drug use: No     Family History   Problem Relation Age of Onset    Cancer Mother 49        "lymph nodes"    Cancer Sister         lung?     Review of patient's allergies indicates:   Allergen Reactions    Erythromycin Nausea Only       Performance Status:The patient's activity level is functions out of house.      Review of Systems:  a review of eleven systems covering constitutional, Eye, HEENT, Psych, Respiratory, Cardiac, GI, , Musculoskeletal, Endocrine, Dermatologic was negative except for pertinent findings as listed ABOVE and below: night sweats chr due to menopause,      Exam:Comprehensive exam done.   BP (!) 146/94 (BP Location: Left arm, Patient Position: Sitting)   Pulse 89   Ht 5' 3" (1.6 m)   Wt 58.8 kg (129 lb 10.1 oz)   SpO2 97%   BMI 22.96 kg/m²   Exam included Vitals as listed, and patient's appearance and affect and alertness and mood, oral exam for yeast and hygiene and pharynx lesions and Mallapatti (M) score, neck with inspection for jvd and masses and thyroid abnormalities and lymph nodes (supraclavicular and infraclavicular nodes and axillary also examined and noted if abn), chest exam included symmetry and effort and fremitus and percussion and auscultation, cardiac exam included rhythm and gallops and murmur and rubs and jvd and edema, abdominal exam for mass and hepatosplenomegaly and tenderness and hernias and bowel sounds, Musculoskeletal exam with muscle tone and posture and mobility/gait and  strength, and skin for rashes and cyanosis and pallor and turgor, extremity for clubbing.  Findings were normal except for pertinent findings listed below:  Mild kyphosis scoliosis, M2, no wheezes no edema, no " clubbing    Radiographs reviewed: ct 2016  With left lung abn better now, right lung was clear and now with infiltrate with vol loss of emphysema??  cxr may have smaller cavity rul cavity than ct c/w aug ct with sept cxr.    cxr 10/16 same as sept 2017,  cxr  Feb 26, 2018 improved rul density with smaller hyper inflated lungs.      Labs reviewed  No pos cultures  PFT results [id number]  fev 27%    Plan:  Clinical impression is apparently straight forward and impression with management as below.    Fabiana was seen today for shortness of breath and sputum production.    Diagnoses and all orders for this visit:    Abnormal CT scan  -     X-Ray Chest PA And Lateral; Future    Tobacco abuse    Anxiety  -     metoprolol succinate (TOPROL XL) 50 MG 24 hr tablet; Take 1 tablet (50 mg total) by mouth once daily.  -     ALPRAZolam (XANAX) 0.25 MG tablet; Take 1 tablet (0.25 mg total) by mouth 3 (three) times daily as needed.    COPD, severe  -     albuterol (PROVENTIL) 4 MG Tab; Take 1 tablet (4 mg total) by mouth 3 (three) times daily.  -     X-Ray Chest PA And Lateral; Future    Essential hypertension  -     metoprolol succinate (TOPROL XL) 50 MG 24 hr tablet; Take 1 tablet (50 mg total) by mouth once daily.  -     X-Ray Chest PA And Lateral; Future        Follow-up in about 6 months (around 2/28/2019).    Discussed with patient above for education the following:       Use prednisone or antibiotic If bronchitis    Check chest xray if ill    Chest xray 6 months.  Likely do ct in a yr?    Stop smokes.

## 2018-08-28 NOTE — PATIENT INSTRUCTIONS
Use prednisone or antibiotic If bronchitis    Check chest xray if ill    Chest xray 6 months.  Likely do ct in a yr?    Stop smokes.

## 2018-10-22 DIAGNOSIS — J44.9 COPD, SEVERE: Primary | ICD-10-CM

## 2018-10-22 RX ORDER — ALBUTEROL SULFATE 90 UG/1
2 AEROSOL, METERED RESPIRATORY (INHALATION) EVERY 4 HOURS PRN
Qty: 18 G | Refills: 11 | Status: SHIPPED | OUTPATIENT
Start: 2018-10-22 | End: 2018-12-10

## 2018-11-20 RX ORDER — ATORVASTATIN CALCIUM 40 MG/1
TABLET, FILM COATED ORAL
Qty: 90 TABLET | Refills: 2 | Status: SHIPPED | OUTPATIENT
Start: 2018-11-20 | End: 2020-02-10 | Stop reason: SDUPTHER

## 2018-11-26 ENCOUNTER — PATIENT OUTREACH (OUTPATIENT)
Dept: ADMINISTRATIVE | Facility: HOSPITAL | Age: 63
End: 2018-11-26

## 2018-11-26 DIAGNOSIS — Z12.39 SCREENING FOR BREAST CANCER: Primary | ICD-10-CM

## 2018-11-26 NOTE — LETTER
November 26, 2018    Fabiana Morel  [address]  [place] [address]             [facility]  [address]  [place] [address]  Phone: [phone] Dear [facility] is committed to your overall health and would like to ensure that you are up to date on your recommended test and/or procedures.   Светлана Frazier MD  has found that your chart shows you may be due for the following:    MAMMOGRAM  COLORECTAL CANCER SCREENING    If you have had any of the above done at another facility, please let us know so that we may obtain copies from that facility.  If you have a copy of these records, please provide a copy for us to scan into your chart.  You are welcome to request that the report be faxed to us at  ([phone]).     Otherwise, please schedule these appointments at your earliest convenience by calling [phone] or going to [email or web address].        Sincerely,  Your [facility]  MD Cassidy Roberson LPN Clinical Care Coordinator  [facility] [address] [place] [address]  Phone [phone]  Fax [phone]

## 2018-12-05 ENCOUNTER — DOCUMENTATION ONLY (OUTPATIENT)
Dept: FAMILY MEDICINE | Facility: CLINIC | Age: 63
End: 2018-12-05

## 2018-12-05 NOTE — PROGRESS NOTES
Pre-Visit Chart Review  For Appointment Scheduled on 12/10/2018    Health Maintenance Due   Topic Date Due    TETANUS VACCINE  11/09/1974    Colonoscopy  01/08/2018    Influenza Vaccine  08/01/2018    Mammogram  11/29/2018

## 2018-12-10 ENCOUNTER — OFFICE VISIT (OUTPATIENT)
Dept: FAMILY MEDICINE | Facility: CLINIC | Age: 63
End: 2018-12-10
Payer: COMMERCIAL

## 2018-12-10 VITALS
WEIGHT: 135.81 LBS | DIASTOLIC BLOOD PRESSURE: 85 MMHG | HEART RATE: 76 BPM | HEIGHT: 63 IN | BODY MASS INDEX: 24.06 KG/M2 | TEMPERATURE: 98 F | SYSTOLIC BLOOD PRESSURE: 142 MMHG

## 2018-12-10 DIAGNOSIS — E78.5 HYPERLIPIDEMIA, UNSPECIFIED HYPERLIPIDEMIA TYPE: ICD-10-CM

## 2018-12-10 DIAGNOSIS — R73.9 HYPERGLYCEMIA: ICD-10-CM

## 2018-12-10 DIAGNOSIS — I10 ESSENTIAL HYPERTENSION: Primary | ICD-10-CM

## 2018-12-10 PROBLEM — J18.9 PNEUMONIA OF RIGHT UPPER LOBE DUE TO INFECTIOUS ORGANISM: Status: RESOLVED | Noted: 2017-08-15 | Resolved: 2018-12-10

## 2018-12-10 PROBLEM — B37.9 YEAST INFECTION: Status: RESOLVED | Noted: 2017-08-25 | Resolved: 2018-12-10

## 2018-12-10 PROCEDURE — 3077F SYST BP >= 140 MM HG: CPT | Mod: CPTII,S$GLB,, | Performed by: FAMILY MEDICINE

## 2018-12-10 PROCEDURE — 99214 OFFICE O/P EST MOD 30 MIN: CPT | Mod: S$GLB,,, | Performed by: FAMILY MEDICINE

## 2018-12-10 PROCEDURE — 3008F BODY MASS INDEX DOCD: CPT | Mod: CPTII,S$GLB,, | Performed by: FAMILY MEDICINE

## 2018-12-10 PROCEDURE — 99999 PR PBB SHADOW E&M-EST. PATIENT-LVL IV: CPT | Mod: PBBFAC,,, | Performed by: FAMILY MEDICINE

## 2018-12-10 PROCEDURE — 3079F DIAST BP 80-89 MM HG: CPT | Mod: CPTII,S$GLB,, | Performed by: FAMILY MEDICINE

## 2018-12-10 RX ORDER — ESTRADIOL 2 MG/1
TABLET ORAL
COMMUNITY
Start: 2018-11-20 | End: 2019-06-25

## 2018-12-10 NOTE — PROGRESS NOTES
CHIEF COMPLAINT:  Follow up HTN, hyperlipidemia      HISTORY OF PRESENT ILLNESS:  Fabiana Morel is a 63 y.o. female who presents to clinic for follow up on her chronic medical conditions.     1. HTN: She is on  toprol xl. She denies any CP, SOB, cough, edema. She does not monitor her blood pressures    2. Hyperlipidemia: She is on lipitor. She denies any myalgia, dark colored urine. She is due for lab work.     3. She has had evidence of hyperglycemia on previous labs and is due for FBG, HgA1c.     4. She declines influenza vaccine. She will be due for a second pneumovax 23 at age 66.    5. She will be scheduling an appointment with Dr. KATE Phillips who orders her mammograms. She has these done every 2 years.    6. She declines any further colonoscopies.     REVIEW OF SYSTEMS:  The patient denies any fever, chills, night sweats, headaches, vision changes, difficulty speaking or swallowing, decreased hearing, weight loss, weight gain, chest pain, palpitations, shortness of breath, cough, nausea, vomiting, abdominal pain, dysuria, diarrhea, constipation, hematuria, hematochezia, melena, changes in her hair, skin, nails, numbness or weakness in her extremities, erythema, swelling over any of her joints, myalgia, swollen glands, easy bruising, fatigue, edema, symptoms of anxiety or depression.     MEDICATIONS:   Reviewed and/or reconciled in EPIC    ALLERGIES:  Reviewed and/or reconciled in [facility]    PAST MEDICAL/SURGICAL HISTORY:   Past Medical History:   Diagnosis Date    Abnormal CT scan 1/8/2015    Colitis     Diverticulitis of sigmoid colon 11/4/2014    Erosive gastritis 7/17/2013    Fracture of left clavicle     H pylori ulcer 7/17/2013    Hypertension     Peptic ulcer disease 7/17/2013    Pneumonia of right upper lobe due to infectious organism 8/15/2017    Rectal bleed 11/3/2014    Scoliosis     SOB (shortness of breath)     Yeast infection 8/25/2017      Past Surgical History:   Procedure  "Laterality Date    APPENDECTOMY      BREAST BIOPSY Right     cyst    BRONCHOSCOPY- need floro to to transbronchial bx. Right 10/20/2017    [facility]    COLONOSCOPY N/A 1/8/2015    [facility] at [facility]    COLONOSCOPY N/A 7/31/2013    [facility] at [facility]    EGD (ESOPHAGOGASTRODUODENOSCOPY) N/A 9/11/2013    [facility]    EGD (ESOPHAGOGASTRODUODENOSCOPY) N/A 4/24/2013    [facility] at [facility]    HYSTERECTOMY      HASMUKH/BSO, DUB    TUBAL LIGATION         FAMILY HISTORY:    Family History   Problem Relation Age of Onset    Cancer Mother 49        "lymph nodes"    Cancer Sister         lung?       SOCIAL HISTORY:    Social History     Socioeconomic History    Marital status:      Spouse name: Not on file    Number of children: Not on file    Years of education: Not on file    Highest education level: Not on file   Social Needs    Financial resource strain: Not on file    Food insecurity - worry: Not on file    Food insecurity - inability: Not on file    Transportation needs - medical: Not on file    Transportation needs - non-medical: Not on file   Occupational History    Not on file   Tobacco Use    Smoking status: Current Every Day Smoker     Packs/day: 0.50     Years: 30.00     Pack years: 15.00     Types: Cigarettes    Smokeless tobacco: Never Used    Tobacco comment: Trying to quit now still cheating   Substance and Sexual Activity    Alcohol use: Yes     Alcohol/week: 7.2 oz     Types: 12 Cans of beer per week     Comment: 2-3 beers daily    Drug use: No    Sexual activity: Yes     Partners: Male   Other Topics Concern    Not on file   Social History Narrative    Not on file       PHYSICAL EXAM:  VITAL SIGNS:   Vitals:    12/10/18 1300   BP: (!) 142/85   Pulse: 76   Temp: 97.6 °F (36.4 °C)   Weight: 61.6 kg (135 lb 12.9 oz)   Height: 5' " "3" (1.6 m)     GENERAL:  Patient appears well nourished, sitting on exam table, in no acute distress.  HEENT:  Atraumatic, normocephalic, PERRLA, EOMI, no conjunctival injection, sclerae are anicteric, normal external auditory canals,TMs clear b/l, gross hearing intact to whisper, MMM, no oropharygneal erythema or exudate.  NECK:  Supple, normal ROM, trachea is midline , no supraclavicular or cervical LAD or masses palpated.  Thyroid gland not palpable.  CARDIOVASCULAR:  RRR, normal S1 and S2, no m/r/g.  RESPIRATORY:  CTA b/l, no wheezes, rhonchi, rales.  No increased work of breathing, no  use of accessory muscles.  ABDOMEN:  Soft, nontender, nondistended, normoactive bowel sounds in all four quadrants, no rebound or guarding, no HSM or masses palpated.  Normal percussion.  EXTREMITIES:  2+ DP pulses b/l, no edema.  SKIN:  Warm, no lesions on exposed skin.  NEUROMUSCULAR:  Cranial nerves II-XII grossly intact. No clubbing or cyanosis of digits/nails.  Steady gait.  PSYCH:  Patient is alert and oriented to person, time, place. They are appropriately dressed and groomed. There is normal eye contact. Rate and tone of speech is normal. Normal insight, judgement. Normal thought content and process.         ASSESSMENT/PLAN: This is a 63 y.o. female who presents to clinic for evaluation of the following concerns  1. HTN: see below  2. Hyperlipidemia: CMP, lipid panel  3. Hyperglycemia: FBG, HgA1c    Patient readiness: acceptance and barriers:none    During the course of the visit the patient was educated and counseled about the following:     Hypertension:   Medication: no change.    Goals: Hypertension: Reduce Blood Pressure    Did patient meet goals/outcomes: Yes    The following self management tools provided: declined    Patient Instructions (the written plan) was given to the patient/family.     Time spent with patient: 30 minutes        Светлана Frazier MD  "

## 2019-01-08 ENCOUNTER — LAB VISIT (OUTPATIENT)
Dept: LAB | Facility: HOSPITAL | Age: 64
End: 2019-01-08
Attending: FAMILY MEDICINE
Payer: COMMERCIAL

## 2019-01-08 DIAGNOSIS — R73.9 HYPERGLYCEMIA: ICD-10-CM

## 2019-01-08 DIAGNOSIS — E78.5 HYPERLIPIDEMIA, UNSPECIFIED HYPERLIPIDEMIA TYPE: ICD-10-CM

## 2019-01-08 LAB
ALBUMIN SERPL BCP-MCNC: 3.4 G/DL
ALP SERPL-CCNC: 98 U/L
ALT SERPL W/O P-5'-P-CCNC: 11 U/L
ANION GAP SERPL CALC-SCNC: 10 MMOL/L
AST SERPL-CCNC: 18 U/L
BILIRUB SERPL-MCNC: 0.4 MG/DL
BUN SERPL-MCNC: 11 MG/DL
CALCIUM SERPL-MCNC: 10.1 MG/DL
CHLORIDE SERPL-SCNC: 100 MMOL/L
CHOLEST SERPL-MCNC: 241 MG/DL
CHOLEST/HDLC SERPL: 2.9 {RATIO}
CO2 SERPL-SCNC: 25 MMOL/L
CREAT SERPL-MCNC: 0.7 MG/DL
EST. GFR  (AFRICAN AMERICAN): >60 ML/MIN/1.73 M^2
EST. GFR  (NON AFRICAN AMERICAN): >60 ML/MIN/1.73 M^2
ESTIMATED AVG GLUCOSE: 105 MG/DL
GLUCOSE SERPL-MCNC: 99 MG/DL
HBA1C MFR BLD HPLC: 5.3 %
HDLC SERPL-MCNC: 83 MG/DL
HDLC SERPL: 34.4 %
LDLC SERPL CALC-MCNC: 134.6 MG/DL
NONHDLC SERPL-MCNC: 158 MG/DL
POTASSIUM SERPL-SCNC: 4.4 MMOL/L
PROT SERPL-MCNC: 7.5 G/DL
SODIUM SERPL-SCNC: 135 MMOL/L
TRIGL SERPL-MCNC: 117 MG/DL

## 2019-01-08 PROCEDURE — 36415 COLL VENOUS BLD VENIPUNCTURE: CPT | Mod: PO

## 2019-01-08 PROCEDURE — 80061 LIPID PANEL: CPT

## 2019-01-08 PROCEDURE — 80053 COMPREHEN METABOLIC PANEL: CPT

## 2019-01-08 PROCEDURE — 83036 HEMOGLOBIN GLYCOSYLATED A1C: CPT

## 2019-01-24 ENCOUNTER — TELEPHONE (OUTPATIENT)
Dept: FAMILY MEDICINE | Facility: CLINIC | Age: 64
End: 2019-01-24

## 2019-01-24 DIAGNOSIS — E78.5 HYPERLIPIDEMIA, UNSPECIFIED HYPERLIPIDEMIA TYPE: Primary | ICD-10-CM

## 2019-01-24 NOTE — TELEPHONE ENCOUNTER
----- Message from Fabiana Torres sent at 1/24/2019  1:36 PM CST -----  Contact: self  Patient [phone] is returning call from this afternoon and said if this is concerning having some more blood work - she just wants to wait as she is taking her medication every day now

## 2019-02-15 ENCOUNTER — TELEPHONE (OUTPATIENT)
Dept: PULMONOLOGY | Facility: CLINIC | Age: 64
End: 2019-02-15

## 2019-02-25 ENCOUNTER — HOSPITAL ENCOUNTER (OUTPATIENT)
Dept: RADIOLOGY | Facility: HOSPITAL | Age: 64
Discharge: HOME | End: 2019-02-25
Attending: INTERNAL MEDICINE
Payer: COMMERCIAL

## 2019-02-25 DIAGNOSIS — J44.9 COPD, SEVERE: ICD-10-CM

## 2019-02-25 DIAGNOSIS — I10 ESSENTIAL HYPERTENSION: ICD-10-CM

## 2019-02-25 DIAGNOSIS — R93.89 ABNORMAL CT SCAN: ICD-10-CM

## 2019-02-25 PROCEDURE — 71046 XR CHEST PA AND LATERAL: ICD-10-PCS | Mod: 26,,, | Performed by: RADIOLOGY

## 2019-02-25 PROCEDURE — 71046 X-RAY EXAM CHEST 2 VIEWS: CPT | Mod: 26,,, | Performed by: RADIOLOGY

## 2019-02-25 PROCEDURE — 71046 X-RAY EXAM CHEST 2 VIEWS: CPT | Mod: TC,FY

## 2019-02-26 ENCOUNTER — OFFICE VISIT (OUTPATIENT)
Dept: PULMONOLOGY | Facility: CLINIC | Age: 64
End: 2019-02-26
Payer: COMMERCIAL

## 2019-02-26 VITALS
BODY MASS INDEX: 23.79 KG/M2 | HEIGHT: 63 IN | OXYGEN SATURATION: 96 % | HEART RATE: 84 BPM | WEIGHT: 134.25 LBS | DIASTOLIC BLOOD PRESSURE: 72 MMHG | SYSTOLIC BLOOD PRESSURE: 115 MMHG

## 2019-02-26 DIAGNOSIS — F41.9 ANXIETY: ICD-10-CM

## 2019-02-26 DIAGNOSIS — Z72.0 TOBACCO ABUSE: Primary | ICD-10-CM

## 2019-02-26 DIAGNOSIS — M81.8 OTHER OSTEOPOROSIS, UNSPECIFIED PATHOLOGICAL FRACTURE PRESENCE: ICD-10-CM

## 2019-02-26 DIAGNOSIS — J44.9 COPD, SEVERE: ICD-10-CM

## 2019-02-26 DIAGNOSIS — R91.1 LUNG NODULE: ICD-10-CM

## 2019-02-26 DIAGNOSIS — I70.0 CALCIFICATION OF AORTA: ICD-10-CM

## 2019-02-26 PROCEDURE — 3078F DIAST BP <80 MM HG: CPT | Mod: CPTII,S$GLB,, | Performed by: INTERNAL MEDICINE

## 2019-02-26 PROCEDURE — 99214 OFFICE O/P EST MOD 30 MIN: CPT | Mod: S$GLB,,, | Performed by: INTERNAL MEDICINE

## 2019-02-26 PROCEDURE — 3074F PR MOST RECENT SYSTOLIC BLOOD PRESSURE < 130 MM HG: ICD-10-PCS | Mod: CPTII,S$GLB,, | Performed by: INTERNAL MEDICINE

## 2019-02-26 PROCEDURE — 99999 PR PBB SHADOW E&M-EST. PATIENT-LVL III: CPT | Mod: PBBFAC,,, | Performed by: INTERNAL MEDICINE

## 2019-02-26 PROCEDURE — 3008F PR BODY MASS INDEX (BMI) DOCUMENTED: ICD-10-PCS | Mod: CPTII,S$GLB,, | Performed by: INTERNAL MEDICINE

## 2019-02-26 PROCEDURE — 3008F BODY MASS INDEX DOCD: CPT | Mod: CPTII,S$GLB,, | Performed by: INTERNAL MEDICINE

## 2019-02-26 PROCEDURE — 99214 PR OFFICE/OUTPT VISIT, EST, LEVL IV, 30-39 MIN: ICD-10-PCS | Mod: S$GLB,,, | Performed by: INTERNAL MEDICINE

## 2019-02-26 PROCEDURE — 3074F SYST BP LT 130 MM HG: CPT | Mod: CPTII,S$GLB,, | Performed by: INTERNAL MEDICINE

## 2019-02-26 PROCEDURE — 99999 PR PBB SHADOW E&M-EST. PATIENT-LVL III: ICD-10-PCS | Mod: PBBFAC,,, | Performed by: INTERNAL MEDICINE

## 2019-02-26 PROCEDURE — 3078F PR MOST RECENT DIASTOLIC BLOOD PRESSURE < 80 MM HG: ICD-10-PCS | Mod: CPTII,S$GLB,, | Performed by: INTERNAL MEDICINE

## 2019-02-26 RX ORDER — AMOXICILLIN AND CLAVULANATE POTASSIUM 875; 125 MG/1; MG/1
1 TABLET, FILM COATED ORAL EVERY 12 HOURS
Qty: 42 TABLET | Refills: 1 | Status: SHIPPED | OUTPATIENT
Start: 2019-02-26 | End: 2019-06-25 | Stop reason: ALTCHOICE

## 2019-02-26 RX ORDER — ALBUTEROL SULFATE 4 MG/1
2 TABLET ORAL DAILY
Status: ON HOLD | COMMUNITY
End: 2019-12-20 | Stop reason: HOSPADM

## 2019-02-26 RX ORDER — ALPRAZOLAM 0.25 MG/1
0.25 TABLET ORAL 3 TIMES DAILY PRN
Qty: 90 TABLET | Refills: 1 | Status: SHIPPED | OUTPATIENT
Start: 2019-02-26 | End: 2019-08-27 | Stop reason: SDUPTHER

## 2019-02-26 RX ORDER — ALBUTEROL SULFATE 90 UG/1
AEROSOL, METERED RESPIRATORY (INHALATION)
COMMUNITY
Start: 2018-12-10 | End: 2019-11-13 | Stop reason: SDUPTHER

## 2019-02-26 NOTE — PROGRESS NOTES
2/26/2019    Fabiana Morel  Office Note    Chief Complaint   Patient presents with    Follow-up     6 month    Cough     Feb 26, 2019- took abx with  recent developing resp problems. Still smokes.  resp same.   Has old compression fx seen on  Lateral cxr.      aug 28, 2018- still smokes, some celestin, uses trelegy. cxr stable.  Follow-up in about 6 months (around 2/28/2019).  Discussed with patient above for education the following:     Use prednisone or antibiotic If bronchitis  Check chest xray if ill  Chest xray 6 months.  Likely do ct in a yr?  Stop smokes.  Feb 28, 2018 breathing better, still cough but slight.  Nearly off smokes.  Had exacerbation needing steriods.  Lung transplant discussed if pt stops smokes but pt declines.  nov 16, 2017 - off smokes transiently - on chantix with no problems.  No severe stress.    oct 17, 2017- no fever or night sweats or cough. No new c/o - still smokes but trying chantrix.  sept 20, still smokes same, feels sl better than 6  Months ago, mucous clear, night sweats still with no improvement with abx.    AUg 15, having right shoulder pain last 3 weeks at 10/10 intially  And subsequent 3-4/10,  No fever but night sweats chr, no n/v/d or headache.  No tb exposure.   Had tb eval past negative.  Still smokes.  Nerves ok.  Breathing seems  Better.  Feb 13, 2017, hpi doing rehab, no action plan, still smoking.  Using all meds, nerves better with xanax and toprol.  Aug 12, 2016HPI:initial encounter in [facility], copd and bronchiectasis and lung nodule.  No prednisone use recent.  No recent azithromycin.  breo and incruse regular use, bbolthazl7a/d, not using nebulizer.  Last ct 12/2015.  fev1 27% in 2014 with dramatic bd response.  Working as filing and typing.  Feels resp comfortable.  Has had lung dz age 56.    Scant mucous with no color chr, no acapella.    The chief compliant  problem is stable  PFSH:  Past Medical History:   Diagnosis Date    Abnormal CT scan 1/8/2015     "Colitis     Diverticulitis of sigmoid colon 11/4/2014    Erosive gastritis 7/17/2013    Fracture of left clavicle     H pylori ulcer 7/17/2013    Hypertension     Peptic ulcer disease 7/17/2013    Pneumonia of right upper lobe due to infectious organism 8/15/2017    Rectal bleed 11/3/2014    Scoliosis     SOB (shortness of breath)     Yeast infection 8/25/2017         Past Surgical History:   Procedure Laterality Date    APPENDECTOMY      BREAST BIOPSY Right     cyst    BRONCHOSCOPY- need floro to to transbronchial bx. Right 10/20/2017    Performed [facility] at [facility] [place] [facility]    COLONOSCOPY N/A 1/8/2015    Performed by Sharif [facility]un [facility] at [facility] [place] [facility]    COLONOSCOPY N/A 7/31/2013    Performed by [facility] at [facility] [place] [facility]    EGD (ESOPHAGOGASTRODUODENOSCOPY) N/A 9/11/2013    Performed by [facility] at [facility] [place] [facility]    EGD (ESOPHAGOGASTRODUODENOSCOPY) N/A 4/24/2013    Performed by [facility] at [facility] [place] [facility]    HYSTERECTOMY      HASMUKH/BSO, DUB    TUBAL LIGATION       Social History     Tobacco Use    Smoking status: Current Every Day Smoker     Packs/day: 0.50     Years: 30.00     Pack years: 15.00     Types: Cigarettes    Smokeless tobacco: Never Used    Tobacco comment: Trying to quit now still cheating   Substance Use Topics    Alcohol use: Yes     Alcohol/week: 7.2 oz     Types: 12 Cans of beer per week     Comment: 2-3 beers daily    Drug use: No     Family History   Problem Relation Age of Onset    Cancer Mother 49        "lymph nodes"    Cancer Sister         lung?     Review of patient's allergies indicates:   Allergen Reactions    Erythromycin Nausea Only       Performance Status:The patient's activity level is functions out of house.      Review of Systems:  a review of eleven systems covering constitutional, Eye, HEENT, Psych, Respiratory, Cardiac, GI, , Musculoskeletal, Endocrine, Dermatologic was negative except for pertinent findings as listed ABOVE " "and below: night sweats chr due to menopause,      Exam:Comprehensive exam done.   /72 (BP Location: Right arm, Patient Position: Sitting)   Pulse 84   Ht 5' 3" (1.6 m)   Wt 60.9 kg (134 lb 4.2 oz)   SpO2 96% Comment: on room air  BMI 23.78 kg/m²   Exam included Vitals as listed, and patient's appearance and affect and alertness and mood, oral exam for yeast and hygiene and pharynx lesions and Mallapatti (M) score, neck with inspection for jvd and masses and thyroid abnormalities and lymph nodes (supraclavicular and infraclavicular nodes and axillary also examined and noted if abn), chest exam included symmetry and effort and fremitus and percussion and auscultation, cardiac exam included rhythm and gallops and murmur and rubs and jvd and edema, abdominal exam for mass and hepatosplenomegaly and tenderness and hernias and bowel sounds, Musculoskeletal exam with muscle tone and posture and mobility/gait and  strength, and skin for rashes and cyanosis and pallor and turgor, extremity for clubbing.  Findings were normal except for pertinent findings listed below:  Mild kyphosis scoliosis, M2, no wheezes no edema, no clubbing    Radiographs reviewed:   cxr 2/25/2019 same as aug 2018.  Scars both upper lungs.  ct 2016  With left lung abn better now, right lung was clear and now with infiltrate with vol loss of emphysema??  cxr may have smaller cavity rul cavity than ct c/w aug ct with sept cxr.    cxr 10/16 same as sept 2017,  cxr  Feb 26, 2018 improved rul density with smaller hyper inflated lungs.      Labs reviewed  No pos cultures  PFT results [id number]  fev 27%    Plan:  Clinical impression is apparently straight forward and impression with management as below.    Fabiana was seen today for follow-up and cough.    Diagnoses and all orders for this visit:    Tobacco abuse    COPD, severe  -     fluticasone-umeclidin-vilanter (TRELEGY ELLIPTA) 100-62.5-25 mcg DsDv; Inhale 1 puff into the lungs once " daily.  -     amoxicillin-clavulanate 875-125mg (AUGMENTIN) 875-125 mg per tablet; Take 1 tablet by mouth every 12 (twelve) hours.  -     Six Minute Walk Test to qualify for Home Oxygen; Future  -     DXA Bone Density Spine And Hip; Future    Lung nodule  -     X-Ray Chest PA And Lateral; Future    Calcification of aorta  Comments:  defer bp and cholesterol therapy to pcp.    Anxiety  -     ALPRAZolam (XANAX) 0.25 MG tablet; Take 1 tablet (0.25 mg total) by mouth 3 (three) times daily as needed.    Other osteoporosis, unspecified pathological fracture presence  -     DXA Bone Density Spine And Hip; Future        Follow-up in about 6 months (around 8/26/2019), or if symptoms worsen or fail to improve.    Discussed with patient above for education the following:    Could do f/u ct chest - but chest xray looks stable.     Lung capacity was only 27% in 2014- hard to see lungs going much more into future.  Chronic lung disease usually causes chronic debility/weakness.  Acute illnesses will be hard to recover strength.      Prompt antibiotic and prednisone may be wise.    Chest xray for illness may be wise.     Use xanax as needed for anxiety.    Stop smokes please.

## 2019-02-26 NOTE — PATIENT INSTRUCTIONS
Could do f/u ct chest - but chest xray looks stable.     Lung capacity was only 27% in 2014- hard to see lungs going much more into future.  Chronic lung disease usually causes chronic debility/weakness.  Acute illnesses will be hard to recover strength.      Prompt antibiotic and prednisone may be wise.    Chest xray for illness may be wise.     Use xanax as needed for anxiety.    Stop smokes please.

## 2019-03-07 ENCOUNTER — HOSPITAL ENCOUNTER (OUTPATIENT)
Dept: RADIOLOGY | Facility: HOSPITAL | Age: 64
Discharge: HOME | End: 2019-03-07
Attending: INTERNAL MEDICINE
Payer: COMMERCIAL

## 2019-03-07 DIAGNOSIS — J44.9 COPD, SEVERE: ICD-10-CM

## 2019-03-07 DIAGNOSIS — M81.8 OTHER OSTEOPOROSIS, UNSPECIFIED PATHOLOGICAL FRACTURE PRESENCE: ICD-10-CM

## 2019-03-07 PROCEDURE — 77080 DXA BONE DENSITY AXIAL: CPT | Mod: TC

## 2019-03-07 PROCEDURE — 77080 DXA BONE DENSITY AXIAL: CPT | Mod: 26,,, | Performed by: RADIOLOGY

## 2019-03-07 PROCEDURE — 77080 DEXA BONE DENSITY SPINE HIP: ICD-10-PCS | Mod: 26,,, | Performed by: RADIOLOGY

## 2019-03-08 ENCOUNTER — TELEPHONE (OUTPATIENT)
Dept: PULMONOLOGY | Facility: CLINIC | Age: 64
End: 2019-03-08

## 2019-03-08 NOTE — TELEPHONE ENCOUNTER
Pt notified..  ----- Message from Bhupendra Mackey MD sent at 3/7/2019  5:39 PM CST -----  Notify a bone density showed osteopenia, therapy may be useful to consider.  Discuss with primary or at follow-up

## 2019-06-11 ENCOUNTER — PATIENT OUTREACH (OUTPATIENT)
Dept: ADMINISTRATIVE | Facility: HOSPITAL | Age: 64
End: 2019-06-11

## 2019-06-25 ENCOUNTER — LAB VISIT (OUTPATIENT)
Dept: LAB | Facility: HOSPITAL | Age: 64
End: 2019-06-25
Attending: FAMILY MEDICINE
Payer: COMMERCIAL

## 2019-06-25 ENCOUNTER — OFFICE VISIT (OUTPATIENT)
Dept: FAMILY MEDICINE | Facility: CLINIC | Age: 64
End: 2019-06-25
Payer: COMMERCIAL

## 2019-06-25 VITALS
SYSTOLIC BLOOD PRESSURE: 110 MMHG | BODY MASS INDEX: 21.99 KG/M2 | TEMPERATURE: 98 F | HEIGHT: 63 IN | RESPIRATION RATE: 14 BRPM | DIASTOLIC BLOOD PRESSURE: 65 MMHG | OXYGEN SATURATION: 95 % | HEART RATE: 100 BPM | WEIGHT: 124.13 LBS

## 2019-06-25 DIAGNOSIS — Z12.11 COLON CANCER SCREENING: ICD-10-CM

## 2019-06-25 DIAGNOSIS — Z12.31 ENCOUNTER FOR SCREENING MAMMOGRAM FOR MALIGNANT NEOPLASM OF BREAST: ICD-10-CM

## 2019-06-25 DIAGNOSIS — Z12.12 SCREENING FOR COLORECTAL CANCER: ICD-10-CM

## 2019-06-25 DIAGNOSIS — E78.5 HYPERLIPIDEMIA, UNSPECIFIED HYPERLIPIDEMIA TYPE: ICD-10-CM

## 2019-06-25 DIAGNOSIS — Z23 NEED FOR DIPHTHERIA-TETANUS-PERTUSSIS (TDAP) VACCINE: ICD-10-CM

## 2019-06-25 DIAGNOSIS — I10 ESSENTIAL HYPERTENSION: Primary | ICD-10-CM

## 2019-06-25 DIAGNOSIS — Z12.11 SCREENING FOR COLORECTAL CANCER: ICD-10-CM

## 2019-06-25 DIAGNOSIS — Z86.010 HISTORY OF COLON POLYPS: ICD-10-CM

## 2019-06-25 DIAGNOSIS — R73.9 HYPERGLYCEMIA: ICD-10-CM

## 2019-06-25 DIAGNOSIS — J44.9 COPD, SEVERE: ICD-10-CM

## 2019-06-25 DIAGNOSIS — Z23 NEED FOR SHINGLES VACCINE: ICD-10-CM

## 2019-06-25 LAB
ALBUMIN SERPL BCP-MCNC: 3.2 G/DL (ref 3.5–5.2)
ALP SERPL-CCNC: 131 U/L (ref 55–135)
ALT SERPL W/O P-5'-P-CCNC: 9 U/L (ref 10–44)
ANION GAP SERPL CALC-SCNC: 13 MMOL/L (ref 8–16)
AST SERPL-CCNC: 16 U/L (ref 10–40)
BASOPHILS # BLD AUTO: 0.04 K/UL (ref 0–0.2)
BASOPHILS NFR BLD: 0.4 % (ref 0–1.9)
BILIRUB SERPL-MCNC: 0.4 MG/DL (ref 0.1–1)
BUN SERPL-MCNC: 7 MG/DL (ref 8–23)
CALCIUM SERPL-MCNC: 10.3 MG/DL (ref 8.7–10.5)
CHLORIDE SERPL-SCNC: 98 MMOL/L (ref 95–110)
CHOLEST SERPL-MCNC: 182 MG/DL (ref 120–199)
CHOLEST/HDLC SERPL: 2.8 {RATIO} (ref 2–5)
CO2 SERPL-SCNC: 26 MMOL/L (ref 23–29)
CREAT SERPL-MCNC: 0.7 MG/DL (ref 0.5–1.4)
DIFFERENTIAL METHOD: ABNORMAL
EOSINOPHIL # BLD AUTO: 0.1 K/UL (ref 0–0.5)
EOSINOPHIL NFR BLD: 1.1 % (ref 0–8)
ERYTHROCYTE [DISTWIDTH] IN BLOOD BY AUTOMATED COUNT: 14 % (ref 11.5–14.5)
EST. GFR  (AFRICAN AMERICAN): >60 ML/MIN/1.73 M^2
EST. GFR  (NON AFRICAN AMERICAN): >60 ML/MIN/1.73 M^2
GLUCOSE SERPL-MCNC: 92 MG/DL (ref 70–110)
HCT VFR BLD AUTO: 46.5 % (ref 37–48.5)
HDLC SERPL-MCNC: 65 MG/DL (ref 40–75)
HDLC SERPL: 35.7 % (ref 20–50)
HGB BLD-MCNC: 14.9 G/DL (ref 12–16)
IMM GRANULOCYTES # BLD AUTO: 0.03 K/UL (ref 0–0.04)
IMM GRANULOCYTES NFR BLD AUTO: 0.3 % (ref 0–0.5)
LDLC SERPL CALC-MCNC: 97.6 MG/DL (ref 63–159)
LYMPHOCYTES # BLD AUTO: 1.9 K/UL (ref 1–4.8)
LYMPHOCYTES NFR BLD: 20.4 % (ref 18–48)
MCH RBC QN AUTO: 31.2 PG (ref 27–31)
MCHC RBC AUTO-ENTMCNC: 32 G/DL (ref 32–36)
MCV RBC AUTO: 97 FL (ref 82–98)
MONOCYTES # BLD AUTO: 0.7 K/UL (ref 0.3–1)
MONOCYTES NFR BLD: 7.8 % (ref 4–15)
NEUTROPHILS # BLD AUTO: 6.6 K/UL (ref 1.8–7.7)
NEUTROPHILS NFR BLD: 70 % (ref 38–73)
NONHDLC SERPL-MCNC: 117 MG/DL
NRBC BLD-RTO: 0 /100 WBC
PLATELET # BLD AUTO: 361 K/UL (ref 150–350)
PMV BLD AUTO: 8.9 FL (ref 9.2–12.9)
POTASSIUM SERPL-SCNC: 3.6 MMOL/L (ref 3.5–5.1)
PROT SERPL-MCNC: 7.8 G/DL (ref 6–8.4)
RBC # BLD AUTO: 4.78 M/UL (ref 4–5.4)
SODIUM SERPL-SCNC: 137 MMOL/L (ref 136–145)
TRIGL SERPL-MCNC: 97 MG/DL (ref 30–150)
WBC # BLD AUTO: 9.46 K/UL (ref 3.9–12.7)

## 2019-06-25 PROCEDURE — 99214 OFFICE O/P EST MOD 30 MIN: CPT | Mod: 25,S$GLB,, | Performed by: FAMILY MEDICINE

## 2019-06-25 PROCEDURE — 90471 IMMUNIZATION ADMIN: CPT | Mod: S$GLB,,, | Performed by: FAMILY MEDICINE

## 2019-06-25 PROCEDURE — 85025 COMPLETE CBC W/AUTO DIFF WBC: CPT

## 2019-06-25 PROCEDURE — 36415 COLL VENOUS BLD VENIPUNCTURE: CPT | Mod: PO

## 2019-06-25 PROCEDURE — 99214 PR OFFICE/OUTPT VISIT, EST, LEVL IV, 30-39 MIN: ICD-10-PCS | Mod: 25,S$GLB,, | Performed by: FAMILY MEDICINE

## 2019-06-25 PROCEDURE — 83036 HEMOGLOBIN GLYCOSYLATED A1C: CPT

## 2019-06-25 PROCEDURE — 3078F DIAST BP <80 MM HG: CPT | Mod: CPTII,S$GLB,, | Performed by: FAMILY MEDICINE

## 2019-06-25 PROCEDURE — 99999 PR PBB SHADOW E&M-EST. PATIENT-LVL IV: ICD-10-PCS | Mod: PBBFAC,,, | Performed by: FAMILY MEDICINE

## 2019-06-25 PROCEDURE — 3074F SYST BP LT 130 MM HG: CPT | Mod: CPTII,S$GLB,, | Performed by: FAMILY MEDICINE

## 2019-06-25 PROCEDURE — 90715 TDAP VACCINE GREATER THAN OR EQUAL TO 7YO IM: ICD-10-PCS | Mod: S$GLB,,, | Performed by: FAMILY MEDICINE

## 2019-06-25 PROCEDURE — 80061 LIPID PANEL: CPT

## 2019-06-25 PROCEDURE — 3008F BODY MASS INDEX DOCD: CPT | Mod: CPTII,S$GLB,, | Performed by: FAMILY MEDICINE

## 2019-06-25 PROCEDURE — 90471 TDAP VACCINE GREATER THAN OR EQUAL TO 7YO IM: ICD-10-PCS | Mod: S$GLB,,, | Performed by: FAMILY MEDICINE

## 2019-06-25 PROCEDURE — 3078F PR MOST RECENT DIASTOLIC BLOOD PRESSURE < 80 MM HG: ICD-10-PCS | Mod: CPTII,S$GLB,, | Performed by: FAMILY MEDICINE

## 2019-06-25 PROCEDURE — 3008F PR BODY MASS INDEX (BMI) DOCUMENTED: ICD-10-PCS | Mod: CPTII,S$GLB,, | Performed by: FAMILY MEDICINE

## 2019-06-25 PROCEDURE — 80053 COMPREHEN METABOLIC PANEL: CPT

## 2019-06-25 PROCEDURE — 3074F PR MOST RECENT SYSTOLIC BLOOD PRESSURE < 130 MM HG: ICD-10-PCS | Mod: CPTII,S$GLB,, | Performed by: FAMILY MEDICINE

## 2019-06-25 PROCEDURE — 99999 PR PBB SHADOW E&M-EST. PATIENT-LVL IV: CPT | Mod: PBBFAC,,, | Performed by: FAMILY MEDICINE

## 2019-06-25 PROCEDURE — 90715 TDAP VACCINE 7 YRS/> IM: CPT | Mod: S$GLB,,, | Performed by: FAMILY MEDICINE

## 2019-06-25 NOTE — PROGRESS NOTES
Patient verified by name and . Patient received tdap in right deltoid. Patient tolerated injection well. Patient advised to wait in clinic for 15 minutes in case of adverse reactions. Patient demonstrated understanding.

## 2019-06-25 NOTE — PROGRESS NOTES
Subjective:       Patient ID: Fabiana Morel is a 63 y.o. female.    Chief Complaint: Establish Care    HPI  Review of Systems   Constitutional: Negative for fatigue and unexpected weight change.   Respiratory: Negative for chest tightness and shortness of breath.    Cardiovascular: Negative for chest pain, palpitations and leg swelling.   Gastrointestinal: Negative for abdominal pain.   Musculoskeletal: Negative for arthralgias.   Neurological: Negative for dizziness, syncope, light-headedness and headaches.       Patient Active Problem List   Diagnosis    Tobacco abuse    Hypertension    Hyperlipidemia    Lung nodule    COPD, severe    Anxiety    Hyperglycemia    History of colon polyps     Patient is here to establish care  Pulm Dr. Mackey treating COPD    Has had hyst DUB - no h/o abnl paps or cancer .      + history colon polyps.  Last colonoscopy GI Dr. Lay wong 2/19 osteopenia-does not meet criteria for tx. H/o scoliosis.    Objective:      Physical Exam   Constitutional: She is oriented to person, place, and time. She appears well-developed and well-nourished.   Cardiovascular: Normal rate, regular rhythm and normal heart sounds.   Pulmonary/Chest: Effort normal and breath sounds normal.   Musculoskeletal: She exhibits no edema.   Neurological: She is alert and oriented to person, place, and time.   Skin: Skin is warm and dry.   Psychiatric: She has a normal mood and affect.   Nursing note and vitals reviewed.      Assessment:       1. Essential hypertension    2. Screening for colorectal cancer    3. Hyperlipidemia, unspecified hyperlipidemia type    4. Hyperglycemia    5. COPD, severe    6. Need for shingles vaccine    7. Need for diphtheria-tetanus-pertussis (Tdap) vaccine    8. Colon cancer screening    9. History of colon polyps    10. Encounter for screening mammogram for malignant neoplasm of breast        Plan:       1. Essential hypertension  Controlled on current medications.   Continue current medications.      2. Screening for colorectal cancer  Needs surveillance colonoscopy    3. Hyperlipidemia, unspecified hyperlipidemia type  Stable condition.  Continue current medications.  Will adjust based on lab findings or if condition changes.    - CBC auto differential; Future  - Comprehensive metabolic panel; Future  - Lipid panel; Future    4. Hyperglycemia  Controlled with meds.  Your blood sugar is borderline high.  This means you are at risk for developing type 2 diabetes mellitus.  To lessen your risk you should exercise regularly, avoid excess carbohydrates and work toward a body mass index of less than 25.      - Hemoglobin A1c; Future    5. COPD, severe  Cont pulm care and regimen    6. Need for shingles vaccine  declined    7. Need for diphtheria-tetanus-pertussis (Tdap) vaccine  Immunize today.  Counseled patient on risks, benefits and side effects.  Patient elected to proceed with vaccination.    - (In Office Administered) Tdap Vaccine    8. Colon cancer screening  See above    9. History of colon polyps  Due for surveillance colonoscopy  - Ambulatory referral to Gastroenterology    10. Encounter for screening mammogram for malignant neoplasm of breast  Screen and treat as indicated:    - Mammo Digital Screening Bilateral With CAD; Future        Time spent with patient: 20 minutes    Patient with be reevaluated in 6 months or sooner prn    Greater than 50% of this visit was spent counseling as described in above documentation:Yes

## 2019-06-26 LAB
ESTIMATED AVG GLUCOSE: 105 MG/DL (ref 68–131)
HBA1C MFR BLD HPLC: 5.3 % (ref 4–5.6)

## 2019-06-27 ENCOUNTER — TELEPHONE (OUTPATIENT)
Dept: GASTROENTEROLOGY | Facility: CLINIC | Age: 64
End: 2019-06-27

## 2019-06-27 ENCOUNTER — HOSPITAL ENCOUNTER (OUTPATIENT)
Dept: RADIOLOGY | Facility: CLINIC | Age: 64
Discharge: HOME | End: 2019-06-27
Attending: FAMILY MEDICINE
Payer: COMMERCIAL

## 2019-06-27 DIAGNOSIS — Z86.010 HISTORY OF COLON POLYPS: Primary | ICD-10-CM

## 2019-06-27 DIAGNOSIS — Z12.31 ENCOUNTER FOR SCREENING MAMMOGRAM FOR MALIGNANT NEOPLASM OF BREAST: ICD-10-CM

## 2019-06-27 DIAGNOSIS — Z12.11 SCREENING FOR COLON CANCER: ICD-10-CM

## 2019-06-27 PROCEDURE — 77067 SCR MAMMO BI INCL CAD: CPT | Mod: TC,PO

## 2019-06-27 PROCEDURE — 77063 MAMMO DIGITAL SCREENING BILAT WITH TOMOSYNTHESIS_CAD: ICD-10-PCS | Mod: 26,,, | Performed by: RADIOLOGY

## 2019-06-27 PROCEDURE — 77063 BREAST TOMOSYNTHESIS BI: CPT | Mod: 26,,, | Performed by: RADIOLOGY

## 2019-06-27 PROCEDURE — 77067 MAMMO DIGITAL SCREENING BILAT WITH TOMOSYNTHESIS_CAD: ICD-10-PCS | Mod: 26,,, | Performed by: RADIOLOGY

## 2019-06-27 PROCEDURE — 77067 SCR MAMMO BI INCL CAD: CPT | Mod: 26,,, | Performed by: RADIOLOGY

## 2019-06-27 NOTE — TELEPHONE ENCOUNTER
----- Message from Angie Noguera sent at 6/27/2019 12:12 PM CDT -----  Contact: Patient  Type: Needs Medical Advice    Who Called:  Patient  Symptoms (please be specific):  na  How long has patient had these symptoms:  kathy  Pharmacy name and phone #:  kathy  Best Call Back Number: [phone] (home)    Additional Information: Patient is scheduled for 7/11, colonoscopy, will need to reschedule. thank you!

## 2019-07-15 ENCOUNTER — ANESTHESIA (OUTPATIENT)
Dept: ENDOSCOPY | Facility: HOSPITAL | Age: 64
End: 2019-07-15
Payer: COMMERCIAL

## 2019-07-15 ENCOUNTER — ANESTHESIA EVENT (OUTPATIENT)
Dept: ENDOSCOPY | Facility: HOSPITAL | Age: 64
End: 2019-07-15
Payer: COMMERCIAL

## 2019-07-15 ENCOUNTER — HOSPITAL ENCOUNTER (OUTPATIENT)
Facility: HOSPITAL | Age: 64
Discharge: HOME | End: 2019-07-15
Attending: INTERNAL MEDICINE | Admitting: INTERNAL MEDICINE
Payer: COMMERCIAL

## 2019-07-15 DIAGNOSIS — Z86.010 HISTORY OF COLON POLYPS: ICD-10-CM

## 2019-07-15 DIAGNOSIS — K64.8 INTERNAL HEMORRHOIDS: ICD-10-CM

## 2019-07-15 DIAGNOSIS — K63.5 POLYP OF COLON, UNSPECIFIED PART OF COLON, UNSPECIFIED TYPE: Primary | ICD-10-CM

## 2019-07-15 PROCEDURE — 37000009 HC ANESTHESIA EA ADD 15 MINS: Performed by: INTERNAL MEDICINE

## 2019-07-15 PROCEDURE — 25000003 PHARM REV CODE 250: Performed by: INTERNAL MEDICINE

## 2019-07-15 PROCEDURE — 45380 COLONOSCOPY AND BIOPSY: CPT | Performed by: INTERNAL MEDICINE

## 2019-07-15 PROCEDURE — 25000003 PHARM REV CODE 250: Performed by: NURSE ANESTHETIST, CERTIFIED REGISTERED

## 2019-07-15 PROCEDURE — D9220A PRA ANESTHESIA: ICD-10-PCS | Mod: 33,ANES,, | Performed by: ANESTHESIOLOGY

## 2019-07-15 PROCEDURE — D9220A PRA ANESTHESIA: Mod: 33,CRNA,, | Performed by: NURSE ANESTHETIST, CERTIFIED REGISTERED

## 2019-07-15 PROCEDURE — 88305 TISSUE EXAM BY PATHOLOGIST: CPT | Performed by: PATHOLOGY

## 2019-07-15 PROCEDURE — D9220A PRA ANESTHESIA: ICD-10-PCS | Mod: 33,CRNA,, | Performed by: NURSE ANESTHETIST, CERTIFIED REGISTERED

## 2019-07-15 PROCEDURE — 88305 TISSUE SPECIMEN TO PATHOLOGY - SURGERY: ICD-10-PCS | Mod: 26,,, | Performed by: PATHOLOGY

## 2019-07-15 PROCEDURE — 63600175 PHARM REV CODE 636 W HCPCS: Performed by: NURSE ANESTHETIST, CERTIFIED REGISTERED

## 2019-07-15 PROCEDURE — D9220A PRA ANESTHESIA: Mod: 33,ANES,, | Performed by: ANESTHESIOLOGY

## 2019-07-15 PROCEDURE — 37000008 HC ANESTHESIA 1ST 15 MINUTES: Performed by: INTERNAL MEDICINE

## 2019-07-15 PROCEDURE — 45380 COLONOSCOPY AND BIOPSY: CPT | Mod: 33,,, | Performed by: INTERNAL MEDICINE

## 2019-07-15 PROCEDURE — 45380 PR COLONOSCOPY,BIOPSY: ICD-10-PCS | Mod: 33,,, | Performed by: INTERNAL MEDICINE

## 2019-07-15 PROCEDURE — 88305 TISSUE EXAM BY PATHOLOGIST: CPT | Mod: 26,,, | Performed by: PATHOLOGY

## 2019-07-15 PROCEDURE — 27201012 HC FORCEPS, HOT/COLD, DISP: Performed by: INTERNAL MEDICINE

## 2019-07-15 RX ORDER — LIDOCAINE HYDROCHLORIDE 10 MG/ML
INJECTION, SOLUTION INTRAVENOUS
Status: DISCONTINUED | OUTPATIENT
Start: 2019-07-15 | End: 2019-07-15

## 2019-07-15 RX ORDER — SODIUM CHLORIDE 9 MG/ML
INJECTION, SOLUTION INTRAVENOUS CONTINUOUS
Status: DISCONTINUED | OUTPATIENT
Start: 2019-07-15 | End: 2019-07-15 | Stop reason: HOSPADM

## 2019-07-15 RX ORDER — PROPOFOL 10 MG/ML
VIAL (ML) INTRAVENOUS
Status: DISCONTINUED | OUTPATIENT
Start: 2019-07-15 | End: 2019-07-15

## 2019-07-15 RX ADMIN — PROPOFOL 150 MG: 10 INJECTION, EMULSION INTRAVENOUS at 12:07

## 2019-07-15 RX ADMIN — PROPOFOL 40 MG: 10 INJECTION, EMULSION INTRAVENOUS at 12:07

## 2019-07-15 RX ADMIN — SODIUM CHLORIDE: 0.9 INJECTION, SOLUTION INTRAVENOUS at 11:07

## 2019-07-15 RX ADMIN — LIDOCAINE HYDROCHLORIDE 100 MG: 10 INJECTION, SOLUTION INTRAVENOUS at 12:07

## 2019-07-15 NOTE — TRANSFER OF CARE
"Anesthesia Transfer of Care Note    Patient: Fabiana Morel    Procedure(s) Performed: Procedure(s) (LRB):  COLONOSCOPY (N/A)    Patient location: PACU    Anesthesia Type: general    Transport from OR: Transported from OR on 2-3 L/min O2 by NC with adequate spontaneous ventilation    Post pain: adequate analgesia    Post assessment: no apparent anesthetic complications and tolerated procedure well    Post vital signs: stable    Level of consciousness: awake and alert    Nausea/Vomiting: no nausea/vomiting    Complications: none    Transfer of care protocol was followed      Last vitals:   Visit Vitals  /83 (BP Location: Left arm, Patient Position: Sitting)   Pulse 98   Temp 36.6 °C (97.9 °F) (Skin)   Resp 18   Ht 5' 4" (1.626 m)   Wt 57.6 kg (127 lb)   SpO2 95%   Breastfeeding? No   BMI 21.80 kg/m²     "

## 2019-07-15 NOTE — [employer] PATIENT INSTRUCTIONS
Discharge Summary/Instructions after an Endoscopic Procedure  Patient Name: Fabiana Morel  Patient MRN: [id number]  Patient YOB: 1955  Monday, July 15, 2019  Sharif Hurley MD  RESTRICTIONS:  During your procedure today, you received medications for sedation.  These   medications may affect your judgment, balance and coordination.  Therefore,   for 24 hours, you have the following restrictions:   - DO NOT drive a car, operate machinery, make legal/financial decisions,   sign important papers or drink alcohol.    ACTIVITY:  Today: no heavy lifting, straining or running due to procedural   sedation/anesthesia.  The following day: return to full activity including work.  DIET:  Eat and drink normally unless instructed otherwise.     TREATMENT FOR COMMON SIDE EFFECTS:  - Mild abdominal pain, nausea, belching, bloating or excessive gas:  rest,   eat lightly and use a heating pad.  - Sore Throat: treat with throat lozenges and/or gargle with warm salt   water.  - Because air was used during the procedure, expelling large amounts of air   from your rectum or belching is normal.  - If a bowel prep was taken, you may not have a bowel movement for 1-3 days.    This is normal.  SYMPTOMS TO WATCH FOR AND REPORT TO YOUR PHYSICIAN:  1. Abdominal pain or bloating, other than gas cramps.  2. Chest pain.  3. Back pain.  4. Signs of infection such as: chills or fever occurring within 24 hours   after the procedure.  5. Rectal bleeding, which would show as bright red, maroon, or black stools.   (A tablespoon of blood from the rectum is not serious, especially if   hemorrhoids are present.)  6. Vomiting.  7. Weakness or dizziness.  GO DIRECTLY TO THE NEAREST EMERGENCY ROOM IF YOU HAVE ANY OF THE FOLLOWING:      Difficulty breathing              Chills and/or fever over 101 F   Persistent vomiting and/or vomiting blood   Severe abdominal pain   Severe chest pain   Black, tarry stools   Bleeding- more than one  tablespoon   Any other symptom or condition that you feel may need urgent attention  Your doctor recommends these additional instructions:  If any biopsies were taken, your doctors clinic will contact you in 1 to 2   weeks with any results.  - Patient has a contact number available for emergencies.  The signs and   symptoms of potential delayed complications were discussed with the   patient.  Return to normal activities tomorrow.  Written discharge   instructions were provided to the patient.   - High fiber diet.   - Continue present medications.   - Await pathology results.   - Repeat colonoscopy in 5 years for surveillance.   - Discharge patient to home (ambulatory).   - Return to my office PRN.  For questions, problems or results please call your physician - Sharif Hurley MD at Work:  [phone].  [facility], EMERGENCY ROOM PHONE NUMBER: [phone]  IF A COMPLICATION OR EMERGENCY SITUATION ARISES AND YOU ARE UNABLE TO REACH   YOUR PHYSICIAN - GO DIRECTLY TO THE EMERGENCY ROOM.  Sharif Hurley MD  7/15/2019 1:03:23 PM  This report has been verified and signed electronically.  [employer]

## 2019-07-15 NOTE — ANESTHESIA POSTPROCEDURE EVALUATION
Anesthesia Post Evaluation    Patient: Fabiana Morel    Procedure(s) Performed: Procedure(s) (LRB):  COLONOSCOPY (N/A)    Final Anesthesia Type: general  Patient location during evaluation: PACU  Patient participation: Yes- Able to Participate  Level of consciousness: awake and alert  Post-procedure vital signs: reviewed and stable  Pain management: adequate  Airway patency: patent  PONV status at discharge: No PONV  Anesthetic complications: no      Cardiovascular status: hemodynamically stable  Respiratory status: unassisted and room air  Hydration status: euvolemic  Follow-up not needed.          Vitals Value Taken Time   /80 7/15/2019  1:32 PM   Temp 36.6 °C (97.9 °F) 7/15/2019  1:18 PM   Pulse 77 7/15/2019  1:32 PM   Resp 18 7/15/2019  1:32 PM   SpO2 98 % 7/15/2019  1:32 PM         Event Time     Out of Recovery 13:52:54          Pain/Randy Score: Randy Score: 10 (7/15/2019  1:32 PM)

## 2019-07-15 NOTE — H&P
CC: History of colon polyps - last scope 2015    63 year old female with above. States that symptoms are absent, no alleviating/exacerbating factors. No family history of CA. Positive personal history of polyps. No bleeding or weight loss.     ROS:  No headache, no fever/chills, no chest pain/SOB, no nausea/vomiting/diarrhea/constipation/GI bleeding/abdominal pain, no dysuria/hematuria.    VSSAF   Exam:   Alert and oriented x 3; no apparent distress   PERRLA, sclera anicteric  CV: Regular rate/rhythm, normal PMI   Lungs: Clear bilaterally with no wheeze/rales   Abdomen: Soft, NT/ND, normal bowel sounds   Ext: No cyanosis, clubbing     Impression:   As above    Plan:   Proceed with endoscopy. Further recs to follow.

## 2019-07-15 NOTE — ANESTHESIA PREPROCEDURE EVALUATION
07/15/2019  Fabiana Morel is a 63 y.o., female.    Anesthesia Evaluation    I have reviewed the Patient Summary Reports.    I have reviewed the Nursing Notes.   I have reviewed the Medications.     Review of Systems  Anesthesia Hx:  No problems with previous Anesthesia    Social:  Smoker    Cardiovascular:   Hypertension, well controlled    Pulmonary:   Pneumonia COPD, mild Asthma asymptomatic and mild Shortness of breath    Renal/:  Renal/ Normal     Hepatic/GI:   PUD,    Neurological:  Neurology Normal    Endocrine:  Endocrine Normal        Physical Exam  General:  Well nourished    Airway/Jaw/Neck:  Airway Findings: Mouth Opening: Normal Tongue: Normal  General Airway Assessment: Adult  Oropharynx Findings:  Mallampati: II  Jaw/Neck Findings:  Neck ROM: Normal ROM     Eyes/Ears/Nose:  Eyes/Ears/Nose Findings:    Dental:  Dental Findings:   Chest/Lungs:  Chest/Lungs Findings: Normal Respiratory Rate     Heart/Vascular:  Heart Findings: Rate: Normal  Rhythm: Regular Rhythm        Mental Status:  Mental Status Findings:  Cooperative, Alert and Oriented         Anesthesia Plan  Type of Anesthesia, risks & benefits discussed:  Anesthesia Type:  general  Patient's Preference:   Intra-op Monitoring Plan: standard ASA monitors  Intra-op Monitoring Plan Comments:   Post Op Pain Control Plan: multimodal analgesia  Post Op Pain Control Plan Comments:   Induction:   IV  Beta Blocker:  Patient is on a Beta-Blocker and has received one dose within the past 24 hours (No further documentation required).       Informed Consent: Patient understands risks and agrees with Anesthesia plan.  Questions answered. Anesthesia consent signed with patient.  ASA Score: 3     Day of Surgery Review of History & Physical:  There are no significant changes.   H&P completed by Anesthesiologist.       Ready For Surgery From  Anesthesia Perspective.

## 2019-07-15 NOTE — PLAN OF CARE
Have you had a colonoscopy LESS THAN 3 years ago?   * If YES, answer these questions*:  no  1. Did patient have a prior colonic polyp in a previous surveillance/diagnostic colonoscopy and is 18 years or older on date of encounter?    2. Documentation of < 3 year interval since the patients last colonoscopy due to medical reasons (eg., last colonoscopy incomplete, last colonoscopy had inadequate prep, piecemeal removal of adenomas, or last colonoscopy found > 10 adenomas) ?

## 2019-07-15 NOTE — DISCHARGE INSTRUCTIONS
"Discharge Instructions: After Your Surgery/Procedure  Youve just had surgery. During surgery you were given medicine called anesthesia to keep you relaxed and free of pain. After surgery you may have some pain or nausea. This is common. Here are some tips for feeling better and getting well after surgery.     Stay on schedule with your medication.   Going home  Your doctor or nurse will show you how to take care of yourself when you go home. He or she will also answer your questions. Have an adult family member or friend drive you home.      For your safety we recommend these precaution for the first 24 hours after your procedure:  · Do not drive or use heavy equipment.  · Do not make important decisions or sign legal papers.  · Do not drink alcohol.  · Have someone stay with you, if needed. He or she can watch for problems and help keep you safe.  · Your concentration, balance, coordination, and judgement may be impaired for many hours after anesthesia.  Use caution when ambulating or standing up.     · You may feel weak and "washed out" after anesthesia and surgery.      Subtle residual effects of general anesthesia or sedation with regional / local anesthesia can last more than 24 hours.  Rest for the remainder of the day or longer if your Doctor/Surgeon has advised you to do so.  Although you may feel normal within the first 24 hours, your reflexes and mental ability may be impaired without you realizing it.  You may feel dizzy, lightheaded or sleepy for 24 hours or longer.      Be sure to go to all follow-up visits with your doctor. And rest after your surgery for as long as your doctor tells you to.  Coping with pain  If you have pain after surgery, pain medicine will help you feel better. Take it as told, before pain becomes severe. Also, ask your doctor or pharmacist about other ways to control pain. This might be with heat, ice, or relaxation. And follow any other instructions your surgeon or nurse gives " you.  Tips for taking pain medicine  To get the best relief possible, remember these points:  · Pain medicines can upset your stomach. Taking them with a little food may help.  · Most pain relievers taken by mouth need at least 20 to 30 minutes to start to work.  · Taking medicine on a schedule can help you remember to take it. Try to time your medicine so that you can take it before starting an activity. This might be before you get dressed, go for a walk, or sit down for dinner.  · Constipation is a common side effect of pain medicines. Call your doctor before taking any medicines such as laxatives or stool softeners to help ease constipation. Also ask if you should skip any foods. Drinking lots of fluids and eating foods such as fruits and vegetables that are high in fiber can also help. Remember, do not take laxatives unless your surgeon has prescribed them.  · Drinking alcohol and taking pain medicine can cause dizziness and slow your breathing. It can even be deadly. Do not drink alcohol while taking pain medicine.  · Pain medicine can make you react more slowly to things. Do not drive or run machinery while taking pain medicine.  Your health care provider may tell you to take acetaminophen to help ease your pain. Ask him or her how much you are supposed to take each day. Acetaminophen or other pain relievers may interact with your prescription medicines or other over-the-counter (OTC) drugs. Some prescription medicines have acetaminophen and other ingredients. Using both prescription and OTC acetaminophen for pain can cause you to overdose. Read the labels on your OTC medicines with care. This will help you to clearly know the list of ingredients, how much to take, and any warnings. It may also help you not take too much acetaminophen. If you have questions or do not understand the information, ask your pharmacist or health care provider to explain it to you before you take the OTC medicine.  Managing  nausea  Some people have an upset stomach after surgery. This is often because of anesthesia, pain, or pain medicine, or the stress of surgery. These tips will help you handle nausea and eat healthy foods as you get better. If you were on a special food plan before surgery, ask your doctor if you should follow it while you get better. These tips may help:  · Do not push yourself to eat. Your body will tell you when to eat and how much.  · Start off with clear liquids and soup. They are easier to digest.  · Next try semi-solid foods, such as mashed potatoes, applesauce, and gelatin, as you feel ready.  · Slowly move to solid foods. Dont eat fatty, rich, or spicy foods at first.  · Do not force yourself to have 3 large meals a day. Instead eat smaller amounts more often.  · Take pain medicines with a small amount of solid food, such as crackers or toast, to avoid nausea.     Call your surgeon if  · You still have pain an hour after taking medicine. The medicine may not be strong enough.  · You feel too sleepy, dizzy, or groggy. The medicine may be too strong.  · You have side effects like nausea, vomiting, or skin changes, such as rash, itching, or hives.       If you have obstructive sleep apnea  You were given anesthesia medicine during surgery to keep you comfortable and free of pain. After surgery, you may have more apnea spells because of this medicine and other medicines you were given. The spells may last longer than usual.   At home:  · Keep using the continuous positive airway pressure (CPAP) device when you sleep. Unless your health care provider tells you not to, use it when you sleep, day or night. CPAP is a common device used to treat obstructive sleep apnea.  · Talk with your provider before taking any pain medicine, muscle relaxants, or sedatives. Your provider will tell you about the possible dangers of taking these medicines.  © 6168-5964 [employer]. [address] [place] [address]  [place] [address]. All rights reserved. This information is not intended as a substitute for professional medical care. Always follow your healthcare professional's instructions.  General Post Operative Instructions:    · Do not drink alcoholic beverages including beer for 24 hours or as long as you are on pain medicine.  · Do not drive a motor vehicle, operate machinery or power tools, or sign legal papers for 24 hours or as long as you are on pain medication.  · You may experience light-headedness, dizziness and sleepiness following surgery.  Please do not stay alone.  A responsible adult should be with you for this 24 hour period.  · Go home and rest.  · Progress slowly to a normal diet unless instructed.  Otherwise, begin with liquids, soup and crackers, advance to solid foods.  · Certain anesthetics and pain medications may produce nausea and vomiting.  If nausea becomes a problem, call your doctor.  · A nurse will be calling you sometime after surgery. Do not be alarmed. This is our way of finding out how you are doing.  · Several times every hour while you are awake, take 2-3 deep breaths and cough.  If you have had abdominal surgery, support your stomach with a pillow firmly. This will prevent pneumonia.  · Several times every hour while you are awake, pump and flex your feet 5-6 times and do foot circles. This will help prevent blood clots.  · Call your doctor for severe pain, bleeding, fever, or signs of infection (pain, swelling, redness, foul odor, drainage).    Discharge Instructions: Eating a High-Fiber Diet  Your health care provider has prescribed a high-fiber diet for you. Fiber is what gives strength and structure to plants. Most grains, beans, vegetables, and fruits contain fiber. Foods rich in fiber are often low in calories and fat, but they fill you up more. These foods may also reduce the risk of certain health problems.  There are two types of fiber:  · Insoluble fiber. This is found in whole  grains, cereals, and certain fruits and vegetables (such as apple skins, corn, and beans). Insoluble fiber is made up mainly of plant cell walls. It may prevent constipation and reduce the risk of certain types of cancer.  · Soluble fiber. This type of fiber is found in oats, beans, nuts, and certain fruits and vegetables (such as strawberries and peas). Soluble fiber turns to gel in the digestive system, slowing the movement of the digestive tract. It helps control blood sugar levels and can reduce cholesterol, which may help lower the risk of heart disease. Soluble fiber can also help control appetite.     Home care  · Know how much fiber you need a day. The recommended daily amount of fiber is 25 grams for women and 38 grams for men. After age 50, daily fiber needs drop to 21 grams for women and 30 grams for men.  · Ask your doctor about a fiber supplement. (Always take fiber supplements with a large glass of water.)  · Keep track of how much fiber you eat.  · Eat a variety of foods high in fiber.  · Learn to read and understand food labels.  · Ask your healthcare provider how much water you should be drinking.  · Look for these high-fiber foods:  ¨ Whole-grain breads and cereals  § 6 ounces a day give you about 18 grams of fiber (1 ounce is equal to 1 slice of bread, 1 cup of dry cereal, or 1/2 cup of cooked rice).  § Include wheat and oat bran cereals, whole-wheat muffins or toast, and corn tortillas in your meals.  ¨ Fruits   § 2 cups a day give you about 8 grams of fiber.  § Apples, oranges, strawberries, pears, and bananas are good sources.  § Fruit juice does not contain as much fiber as the fruit it was made from.  ¨ Vegetables  § 2½ cups a day give you about 11 grams of fiber. Add asparagus, carrots, broccoli, peas, and corn to your meals.  ¨ Legumes  § 1/4 cup a day (in place of meat) gives you about 4 grams of fiber. Try navy beans, lentils, chickpeas, and soybeans.  ¨ Seeds   § A small handful of seeds  gives you about 3 grams of fiber. Try sunflower seeds.  Follow-up  Make a follow-up appointment with a nutritionist as directed by our staff.  Date Last Reviewed: 6/1/2015 © 2000-2017 [employer]. [address] [place] [address], [place] [address]. All rights reserved. This information is not intended as a substitute for professional medical care. Always follow your healthcare professional's instructions.        Understanding Colon and Rectal Polyps    The colon (also called the large intestine) is a muscular tube that forms the last part of the digestive tract. It absorbs water and stores food waste. The colon is about 4 to 6 feet long. The rectum is the last 6 inches of the colon. The colon and rectum have a smooth lining composed of millions of cells. Changes in these cells can lead to growths in the colon that can become cancerous and should be removed. Multiple tests are available to screen for colon cancer, but the colonoscopy is the most recommended test. During colonoscopy, these polyps can be removed. How often you need this test depends on many things including your condition, your family history, symptoms, and what the findings were at the previous colonoscopy.   When the colon lining changes  Changes that happen in the cells that line the colon or rectum can lead to growths called polyps. Over a period of years, polyps can turn cancerous. Removing polyps early may prevent cancer from ever forming.  Polyps  Polyps are fleshy clumps of tissue that form on the lining of the colon or rectum. Small polyps are usually benign (not cancerous). However, over time, cells in a polyp can change and become cancerous. Certain types of polyps known as adenomatous polyps are premalignant. The risk for invasive cancer increases with the size of the polyp and certain cell and gene features. This means that they can become cancerous if they're not removed. Hyperplastic polyps are benign. They can grow quite large and  not turn cancerous.   Cancer  Almost all colorectal cancers start when polyp cells begin growing abnormally. As a cancerous tumor grows, it may involve more and more of the colon or rectum. In time, cancer can also grow beyond the colon or rectum and spread to nearby organs or to glands called lymph nodes. The cells can also travel to other parts of the body. This is known as metastasis. The earlier a cancerous tumor is removed, the better the chance of preventing its spread.    Date Last Reviewed: 8/1/2016  © 7902-3335 [employer]. [address] [place] [address], [place] [address]. All rights reserved. This information is not intended as a substitute for professional medical care. Always follow your healthcare professional's instructions.

## 2019-07-16 VITALS
OXYGEN SATURATION: 98 % | SYSTOLIC BLOOD PRESSURE: 144 MMHG | BODY MASS INDEX: 21.68 KG/M2 | TEMPERATURE: 98 F | WEIGHT: 127 LBS | RESPIRATION RATE: 18 BRPM | HEIGHT: 64 IN | DIASTOLIC BLOOD PRESSURE: 80 MMHG | HEART RATE: 77 BPM

## 2019-08-26 ENCOUNTER — HOSPITAL ENCOUNTER (OUTPATIENT)
Dept: RADIOLOGY | Facility: HOSPITAL | Age: 64
Discharge: HOME | End: 2019-08-26
Attending: INTERNAL MEDICINE
Payer: COMMERCIAL

## 2019-08-26 DIAGNOSIS — R91.1 LUNG NODULE: ICD-10-CM

## 2019-08-26 DIAGNOSIS — J44.9 COPD, SEVERE: ICD-10-CM

## 2019-08-26 PROCEDURE — 71046 XR CHEST PA AND LATERAL: ICD-10-PCS | Mod: 26,,, | Performed by: RADIOLOGY

## 2019-08-26 PROCEDURE — 71046 X-RAY EXAM CHEST 2 VIEWS: CPT | Mod: 26,,, | Performed by: RADIOLOGY

## 2019-08-26 PROCEDURE — 71046 X-RAY EXAM CHEST 2 VIEWS: CPT | Mod: TC,FY

## 2019-08-26 RX ORDER — AMOXICILLIN AND CLAVULANATE POTASSIUM 875; 125 MG/1; MG/1
1 TABLET, FILM COATED ORAL EVERY 12 HOURS
Qty: 42 TABLET | Refills: 1 | Status: ON HOLD | OUTPATIENT
Start: 2019-08-26 | End: 2019-12-20 | Stop reason: SDUPTHER

## 2019-08-27 ENCOUNTER — OFFICE VISIT (OUTPATIENT)
Dept: PULMONOLOGY | Facility: CLINIC | Age: 64
End: 2019-08-27
Payer: COMMERCIAL

## 2019-08-27 ENCOUNTER — TELEPHONE (OUTPATIENT)
Dept: PULMONOLOGY | Facility: CLINIC | Age: 64
End: 2019-08-27

## 2019-08-27 ENCOUNTER — HOSPITAL ENCOUNTER (OUTPATIENT)
Dept: RESPIRATORY THERAPY | Facility: HOSPITAL | Age: 64
Discharge: HOME | End: 2019-08-27
Attending: INTERNAL MEDICINE
Payer: COMMERCIAL

## 2019-08-27 VITALS
BODY MASS INDEX: 19.72 KG/M2 | WEIGHT: 115.5 LBS | SYSTOLIC BLOOD PRESSURE: 114 MMHG | OXYGEN SATURATION: 96 % | HEART RATE: 99 BPM | DIASTOLIC BLOOD PRESSURE: 74 MMHG | HEIGHT: 64 IN

## 2019-08-27 DIAGNOSIS — F41.9 ANXIETY: ICD-10-CM

## 2019-08-27 DIAGNOSIS — J44.9 COPD, SEVERE: Primary | ICD-10-CM

## 2019-08-27 DIAGNOSIS — J44.9 COPD, SEVERE: ICD-10-CM

## 2019-08-27 DIAGNOSIS — Z72.0 TOBACCO ABUSE: Primary | ICD-10-CM

## 2019-08-27 DIAGNOSIS — J18.9 PNEUMONIA OF LEFT UPPER LOBE DUE TO INFECTIOUS ORGANISM: ICD-10-CM

## 2019-08-27 LAB — BR6MWT: NORMAL

## 2019-08-27 PROCEDURE — 94618 PULMONARY STRESS TESTING: CPT | Mod: 26,,, | Performed by: INTERNAL MEDICINE

## 2019-08-27 PROCEDURE — 3008F BODY MASS INDEX DOCD: CPT | Mod: CPTII,S$GLB,, | Performed by: INTERNAL MEDICINE

## 2019-08-27 PROCEDURE — 99214 PR OFFICE/OUTPT VISIT, EST, LEVL IV, 30-39 MIN: ICD-10-PCS | Mod: 25,S$GLB,, | Performed by: INTERNAL MEDICINE

## 2019-08-27 PROCEDURE — 3078F PR MOST RECENT DIASTOLIC BLOOD PRESSURE < 80 MM HG: ICD-10-PCS | Mod: CPTII,S$GLB,, | Performed by: INTERNAL MEDICINE

## 2019-08-27 PROCEDURE — 3074F PR MOST RECENT SYSTOLIC BLOOD PRESSURE < 130 MM HG: ICD-10-PCS | Mod: CPTII,S$GLB,, | Performed by: INTERNAL MEDICINE

## 2019-08-27 PROCEDURE — 94618 PULMONARY STRESS TESTING: ICD-10-PCS | Mod: 26,,, | Performed by: INTERNAL MEDICINE

## 2019-08-27 PROCEDURE — 99999 PR PBB SHADOW E&M-EST. PATIENT-LVL III: CPT | Mod: PBBFAC,,, | Performed by: INTERNAL MEDICINE

## 2019-08-27 PROCEDURE — 99999 PR PBB SHADOW E&M-EST. PATIENT-LVL III: ICD-10-PCS | Mod: PBBFAC,,, | Performed by: INTERNAL MEDICINE

## 2019-08-27 PROCEDURE — 3074F SYST BP LT 130 MM HG: CPT | Mod: CPTII,S$GLB,, | Performed by: INTERNAL MEDICINE

## 2019-08-27 PROCEDURE — 94618 PULMONARY STRESS TESTING: CPT

## 2019-08-27 PROCEDURE — 3008F PR BODY MASS INDEX (BMI) DOCUMENTED: ICD-10-PCS | Mod: CPTII,S$GLB,, | Performed by: INTERNAL MEDICINE

## 2019-08-27 PROCEDURE — 3078F DIAST BP <80 MM HG: CPT | Mod: CPTII,S$GLB,, | Performed by: INTERNAL MEDICINE

## 2019-08-27 PROCEDURE — 99214 OFFICE O/P EST MOD 30 MIN: CPT | Mod: 25,S$GLB,, | Performed by: INTERNAL MEDICINE

## 2019-08-27 RX ORDER — ALPRAZOLAM 0.25 MG/1
0.25 TABLET ORAL 3 TIMES DAILY PRN
Qty: 90 TABLET | Refills: 0 | Status: ON HOLD | OUTPATIENT
Start: 2019-08-27 | End: 2020-11-29

## 2019-08-27 RX ORDER — PREDNISONE 20 MG/1
TABLET ORAL
Qty: 21 TABLET | Refills: 2 | Status: ON HOLD | OUTPATIENT
Start: 2019-08-27 | End: 2019-12-20 | Stop reason: SDUPTHER

## 2019-08-27 NOTE — PATIENT INSTRUCTIONS
"Likely has had left upper lung pnumonia last 2 months.  Bronch in 2017 did not show much.    Chest xray recheck in a month- do monthly til this pneumonia cleared-you have a standing order for cxr  Monthly - get routinely every 3 months once this pneumonia cleared.      Check blood work weekly for "pneumonia" up to next month to assure normalizes.    Sputum culture- resistant  Germ possible.    Check immune response to vaccine.      Do prednisone 20mgdaily for  3 days monthly.    Use augmentin if pain,weakness, short breath, any concern regarding pneumonia.      Use trelegy.    "

## 2019-08-27 NOTE — PROGRESS NOTES
8/27/2019    Fabiana Morel  Office Note    Chief Complaint   Patient presents with    Follow-up     6 month    Shortness of Breath    Chest Pain     when she breathes in deep   aur  28, developed left chest pain and short of breath,   occ yellow mucous,  Still smokes. noox testing.  Uses trelegy, no falls.    Feb 26, 2019- took abx with  recent developing resp problems. Still smokes.  resp same.   Has old compression fx seen on  Lateral cxr.  Discussed with patient above for education the following:    Could do f/u ct chest - but chest xray looks stable.     Lung capacity was only 27% in 2014- hard to see lungs going much more into future.  Chronic lung disease usually causes chronic debility/weakness.  Acute illnesses will be hard to recover strength.      Prompt antibiotic and prednisone may be wise.    Chest xray for illness may be wise.     Use xanax as needed for anxiety.    Stop smokes please.    aug 28, 2018- still smokes, some celestin, uses trelegy. cxr stable.  Follow-up in about 6 months (around 2/28/2019).  Discussed with patient above for education the following:     Use prednisone or antibiotic If bronchitis  Check chest xray if ill  Chest xray 6 months.  Likely do ct in a yr?  Stop smokes.  Feb 28, 2018 breathing better, still cough but slight.  Nearly off smokes.  Had exacerbation needing steriods.  Lung transplant discussed if pt stops smokes but pt declines.  nov 16, 2017 - off smokes transiently - on chantix with no problems.  No severe stress.    oct 17, 2017- no fever or night sweats or cough. No new c/o - still smokes but trying chantrix.  sept 20, still smokes same, feels sl better than 6  Months ago, mucous clear, night sweats still with no improvement with abx.    AUg 15, having right shoulder pain last 3 weeks at 10/10 intially  And subsequent 3-4/10,  No fever but night sweats chr, no n/v/d or headache.  No tb exposure.   Had tb eval past negative.  Still smokes.  Nerves ok.   Breathing seems  Better.  Feb 13, 2017, hpi doing rehab, no action plan, still smoking.  Using all meds, nerves better with xanax and toprol.  Aug 12, 2016HPI:initial encounter in [facility], copd and bronchiectasis and lung nodule.  No prednisone use recent.  No recent azithromycin.  breo and incruse regular use, [id number], not using nebulizer.  Last ct 12/2015.  fev1 27% in 2014 with dramatic bd response.  Working as filing and typing.  Feels resp comfortable.  Has had lung dz age 56.    Scant mucous with no color chr, no acapella.    The chief compliant  problem is stable  PFSH:  Past Medical History:   Diagnosis Date    Abnormal CT scan 1/8/2015    Asthma     Colitis     COPD (chronic obstructive pulmonary disease)     Diverticulitis of sigmoid colon 11/4/2014    Erosive gastritis 7/17/2013    Fracture of left clavicle     H pylori ulcer 7/17/2013    Hypertension     Peptic ulcer disease 7/17/2013    Pneumonia of right upper lobe due to infectious organism 8/15/2017    Rectal bleed 11/3/2014    Scoliosis     SOB (shortness of breath)     Yeast infection 8/25/2017         Past Surgical History:   Procedure Laterality Date    APPENDECTOMY      BREAST BIOPSY Right     cyst    BRONCHOSCOPY- need floro to to transbronchial bx. Right 10/20/2017    [facility]    COLONOSCOPY N/A 7/15/2019    [facility] Sharif [facility]un [facility] at [facility]    COLONOSCOPY N/A 1/8/2015    [facility]    COLONOSCOPY N/A 7/31/2013    [facility] at [facility]    EGD (ESOPHAGOGASTRODUODENOSCOPY) N/A 9/11/2013    [facility] Cuhn [facility] at [facility]    EGD (ESOPHAGOGASTRODUODENOSCOPY) N/A 4/24/2013    [facility] at [facility]    HYSTERECTOMY      HASMUKH/BSO, DUB    OOPHORECTOMY      TUBAL LIGATION       Social History     Tobacco Use    Smoking status: Current Every Day Smoker     Packs/day: 0.50      "Years: 30.00     Pack years: 15.00     Types: Cigarettes    Smokeless tobacco: Never Used    Tobacco comment: Trying to quit now still cheating   Substance Use Topics    Alcohol use: Yes     Alcohol/week: 7.2 oz     Types: 12 Cans of beer per week     Comment: 2-3 beers daily    Drug use: No     Family History   Problem Relation Age of Onset    Cancer Mother 49        "lymph nodes"    Cancer Sister         lung?     Review of patient's allergies indicates:   Allergen Reactions    Erythromycin Nausea Only       Performance Status:The patient's activity level is functions out of house.      Review of Systems:  a review of eleven systems covering constitutional, Eye, HEENT, Psych, Respiratory, Cardiac, GI, , Musculoskeletal, Endocrine, Dermatologic was negative except for pertinent findings as listed ABOVE and below: night sweats chr due to menopause,      Exam:Comprehensive exam done.   /74 (BP Location: Right arm, Patient Position: Sitting)   Pulse 99   Ht 5' 4" (1.626 m)   Wt 52.4 kg (115 lb 8.3 oz)   SpO2 96% Comment: on room air  BMI 19.83 kg/m²   Exam included Vitals as listed, and patient's appearance and affect and alertness and mood, oral exam for yeast and hygiene and pharynx lesions and Mallapatti (M) score, neck with inspection for jvd and masses and thyroid abnormalities and lymph nodes (supraclavicular and infraclavicular nodes and axillary also examined and noted if abn), chest exam included symmetry and effort and fremitus and percussion and auscultation, cardiac exam included rhythm and gallops and murmur and rubs and jvd and edema, abdominal exam for mass and hepatosplenomegaly and tenderness and hernias and bowel sounds, Musculoskeletal exam with muscle tone and posture and mobility/gait and  strength, and skin for rashes and cyanosis and pallor and turgor, extremity for clubbing.  Findings were normal except for pertinent findings listed below:  Mild kyphosis scoliosis, M2, " no wheezes no edema, no clubbing    Radiographs reviewed:   cxr 2/25/2019 same as aug 2018.  Scars both upper lungs.  ct 2016  With left lung abn better now, right lung was clear and now with infiltrate with vol loss of emphysema??  cxr may have smaller cavity rul cavity than ct c/w aug ct with sept cxr.    cxr 10/16 same as sept 2017,  cxr  Feb 26, 2018 improved rul density with smaller hyper inflated lungs.      Labs reviewed  No pos cultures  PFT results [id number]  fev 27%    [place] BRONCHOSCOPY,TRANSBRONCH BIOPSY [64894 (CPT®)]   BRONCHOSCOPY - BRONCHOALVEOLAR LAVAGE [PFT43 (Custom)]           []Hide copied text    []Hover for details  10/20/2017     After review of ct, informed consent, updated history and physical, time out, and anesthesia sedation- pt underwent left nares bronchoscopy (right nares couldn't be cannulated).     Thick secretions encountered and washed free.  Airways were wnl.  Lavage from rul 90 cc in and 20 cc out.  Transbronchial bx x 2 for cultures done (afb, fungal, routine).  Transbronchial bx x 6 for path.  Lavage and wash pooled and submitted for culture, cytology, afb, and fungal evals..     ebl 15 cc post transbronchial bx. Airway suctioned til bleed ceased.  Post floro with no pneumo/complication and post cxr pending.  No complications.      Electronically signed by Bhupendra Mackey MD at 10/20/2017  8:10 AM         Plan:  Clinical impression is apparently straight forward and impression with management as below.    Fabiana was seen today for follow-up, shortness of breath and chest pain.    Diagnoses and all orders for this visit:    Tobacco abuse    COPD, severe  -     Culture, Respiratory with Gram Stain; Future  -     X-Ray Chest PA And Lateral; Standing  -     predniSONE (DELTASONE) 20 MG tablet; One daily for 3 days and repeat for flare of lung symptoms as intructed  -     Six Minute Walk Test to qualify for Home Oxygen; Future  -     PULSE OXIMETRY OVERNIGHT    Anxiety  -      "ALPRAZolam (XANAX) 0.25 MG tablet; Take 1 tablet (0.25 mg total) by mouth 3 (three) times daily as needed.    Pneumonia of left upper lobe due to infectious organism  -     Culture, Respiratory with Gram Stain; Future  -     X-Ray Chest PA And Lateral; Standing  -     Procalcitonin; Standing  -     Humoral Immune Eval (Pneumo Serotypes) With H. Flu; Future        Follow up in about 6 weeks (around 10/8/2019).    Discussed with patient above for education the following:      Discussed with patient above for education the following:      Patient Instructions   Likely has had left upper lung pnumonia last 2 months.  Bronch in 2017 did not show much.    Chest xray recheck in a month- do monthly til this pneumonia cleared-you have a standing order for cxr  Monthly - get routinely every 3 months once this pneumonia cleared.      Check blood work weekly for "pneumonia" up to next month to assure normalizes.    Sputum culture- resistant  Germ possible.    Check immune response to vaccine.      Do prednisone 20mgdaily for  3 days monthly.    Use augmentin if pain,weakness, short breath, any concern regarding pneumonia.      Use trelegy.      "

## 2019-09-04 ENCOUNTER — LAB VISIT (OUTPATIENT)
Dept: LAB | Facility: HOSPITAL | Age: 64
End: 2019-09-04
Attending: INTERNAL MEDICINE
Payer: COMMERCIAL

## 2019-09-04 DIAGNOSIS — J18.9 PNEUMONIA OF LEFT UPPER LOBE DUE TO INFECTIOUS ORGANISM: ICD-10-CM

## 2019-09-04 DIAGNOSIS — J44.9 COPD, SEVERE: ICD-10-CM

## 2019-09-04 PROCEDURE — 87070 CULTURE OTHR SPECIMN AEROBIC: CPT

## 2019-09-04 PROCEDURE — 87205 SMEAR GRAM STAIN: CPT

## 2019-09-07 LAB
BACTERIA SPEC AEROBE CULT: NORMAL
BACTERIA SPEC AEROBE CULT: NORMAL
GRAM STN SPEC: NORMAL

## 2019-09-09 DIAGNOSIS — I10 ESSENTIAL HYPERTENSION: ICD-10-CM

## 2019-09-09 DIAGNOSIS — F41.9 ANXIETY: ICD-10-CM

## 2019-09-09 RX ORDER — METOPROLOL SUCCINATE 50 MG/1
50 TABLET, EXTENDED RELEASE ORAL DAILY
Qty: 30 TABLET | Refills: 11 | Status: SHIPPED | OUTPATIENT
Start: 2019-09-09 | End: 2020-01-06

## 2019-10-02 ENCOUNTER — HOSPITAL ENCOUNTER (OUTPATIENT)
Dept: RADIOLOGY | Facility: HOSPITAL | Age: 64
Discharge: HOME | End: 2019-10-02
Attending: INTERNAL MEDICINE
Payer: COMMERCIAL

## 2019-10-02 DIAGNOSIS — J18.9 PNEUMONIA OF LEFT UPPER LOBE DUE TO INFECTIOUS ORGANISM: ICD-10-CM

## 2019-10-02 DIAGNOSIS — R91.1 LUNG NODULE: ICD-10-CM

## 2019-10-02 DIAGNOSIS — J44.9 COPD, SEVERE: Primary | ICD-10-CM

## 2019-10-02 DIAGNOSIS — R91.8 ABNORMAL X-RAY OF LUNG: ICD-10-CM

## 2019-10-02 DIAGNOSIS — J44.9 COPD, SEVERE: ICD-10-CM

## 2019-10-02 PROCEDURE — 71046 X-RAY EXAM CHEST 2 VIEWS: CPT | Mod: 26,,, | Performed by: RADIOLOGY

## 2019-10-02 PROCEDURE — 71046 X-RAY EXAM CHEST 2 VIEWS: CPT | Mod: TC,FY

## 2019-10-02 PROCEDURE — 71046 XR CHEST PA AND LATERAL: ICD-10-PCS | Mod: 26,,, | Performed by: RADIOLOGY

## 2019-10-03 ENCOUNTER — OFFICE VISIT (OUTPATIENT)
Dept: PULMONOLOGY | Facility: CLINIC | Age: 64
End: 2019-10-03
Payer: COMMERCIAL

## 2019-10-03 VITALS
SYSTOLIC BLOOD PRESSURE: 134 MMHG | DIASTOLIC BLOOD PRESSURE: 89 MMHG | WEIGHT: 110 LBS | OXYGEN SATURATION: 97 % | HEART RATE: 78 BPM | BODY MASS INDEX: 18.88 KG/M2

## 2019-10-03 DIAGNOSIS — Z72.0 TOBACCO ABUSE: Primary | ICD-10-CM

## 2019-10-03 DIAGNOSIS — R64 CACHEXIA: ICD-10-CM

## 2019-10-03 DIAGNOSIS — J85.1 ABSCESS OF LEFT LUNG WITH PNEUMONIA, UNSPECIFIED PART OF LUNG: ICD-10-CM

## 2019-10-03 DIAGNOSIS — R91.8 ABNORMAL CT SCAN OF LUNG: ICD-10-CM

## 2019-10-03 DIAGNOSIS — J44.9 COPD, SEVERE: ICD-10-CM

## 2019-10-03 DIAGNOSIS — D84.9 IMMUNE DEFICIENCY DISORDER: ICD-10-CM

## 2019-10-03 PROCEDURE — 99214 OFFICE O/P EST MOD 30 MIN: CPT | Mod: S$GLB,,, | Performed by: INTERNAL MEDICINE

## 2019-10-03 PROCEDURE — 3075F SYST BP GE 130 - 139MM HG: CPT | Mod: CPTII,S$GLB,, | Performed by: INTERNAL MEDICINE

## 2019-10-03 PROCEDURE — 99214 PR OFFICE/OUTPT VISIT, EST, LEVL IV, 30-39 MIN: ICD-10-PCS | Mod: S$GLB,,, | Performed by: INTERNAL MEDICINE

## 2019-10-03 PROCEDURE — 99999 PR PBB SHADOW E&M-EST. PATIENT-LVL III: ICD-10-PCS | Mod: PBBFAC,,, | Performed by: INTERNAL MEDICINE

## 2019-10-03 PROCEDURE — 3008F PR BODY MASS INDEX (BMI) DOCUMENTED: ICD-10-PCS | Mod: CPTII,S$GLB,, | Performed by: INTERNAL MEDICINE

## 2019-10-03 PROCEDURE — 99999 PR PBB SHADOW E&M-EST. PATIENT-LVL III: CPT | Mod: PBBFAC,,, | Performed by: INTERNAL MEDICINE

## 2019-10-03 PROCEDURE — 3079F DIAST BP 80-89 MM HG: CPT | Mod: CPTII,S$GLB,, | Performed by: INTERNAL MEDICINE

## 2019-10-03 PROCEDURE — 3075F PR MOST RECENT SYSTOLIC BLOOD PRESS GE 130-139MM HG: ICD-10-PCS | Mod: CPTII,S$GLB,, | Performed by: INTERNAL MEDICINE

## 2019-10-03 PROCEDURE — 3079F PR MOST RECENT DIASTOLIC BLOOD PRESSURE 80-89 MM HG: ICD-10-PCS | Mod: CPTII,S$GLB,, | Performed by: INTERNAL MEDICINE

## 2019-10-03 PROCEDURE — 3008F BODY MASS INDEX DOCD: CPT | Mod: CPTII,S$GLB,, | Performed by: INTERNAL MEDICINE

## 2019-10-03 RX ORDER — MIRTAZAPINE 7.5 MG/1
7.5 TABLET, FILM COATED ORAL NIGHTLY
Qty: 30 TABLET | Refills: 2 | Status: SHIPPED | OUTPATIENT
Start: 2019-10-03 | End: 2020-01-06

## 2019-10-03 RX ORDER — CYPROHEPTADINE HYDROCHLORIDE 4 MG/1
4 TABLET ORAL 3 TIMES DAILY PRN
Qty: 90 TABLET | Refills: 2 | Status: SHIPPED | OUTPATIENT
Start: 2019-10-03 | End: 2020-01-17

## 2019-10-03 RX ORDER — AMOXICILLIN AND CLAVULANATE POTASSIUM 875; 125 MG/1; MG/1
1 TABLET, FILM COATED ORAL EVERY 12 HOURS
Qty: 60 TABLET | Refills: 2 | Status: SHIPPED | OUTPATIENT
Start: 2019-10-03 | End: 2020-01-17 | Stop reason: ALTCHOICE

## 2019-10-03 NOTE — PATIENT INSTRUCTIONS
Pneumonia germ was 8 of 14 protected.      Get vaccine next wk    Stay on augmentin til lung is cleared - optimal.    Do crp, cbc, cxr next week- would follow what is abnormal.  If ongoing improvement found- follow til baseline reasonable - ct and scope test if not clearing or worsens.    Try mirtazapine 7.5mg bedtime, go to 15 mg bedtime if not too sleepy. Will make sleep and should stimulate appetite.    Periactin- 4 mg - try 2nd if mirtazapine not effective, not both, start one bedtime, going to twice daily (could break 1/2), antihisatmine.    Try megace next time?

## 2019-10-03 NOTE — PROGRESS NOTES
"10/3/2019    Fabiana Morel  Office Note    Chief Complaint   Patient presents with    Cough    Shortness of Breath       Oct 3, 2019 lost 5lbs since aug, cxr with apparent bullae left lung posterior lung. No mucous production.  Still on abx - augmentin.  Has had damp night sweats for years with no change.    aug  28, developed left chest pain and short of breath,   occ yellow mucous,  Still smokes. noox testing.  Uses trelegy, no falls.     Patient Instructions   Likely has had left upper lung pnumonia last 2 months.  Bronch in 2017 did not show much.     Chest xray recheck in a month- do monthly til this pneumonia cleared-you have a standing order for cxr  Monthly - get routinely every 3 months once this pneumonia cleared.       Check blood work weekly for "pneumonia" up to next month to assure normalizes.     Sputum culture- resistant  Germ possible.     Check immune response to vaccine.       Do prednisone 20mgdaily for  3 days monthly.     Use augmentin if pain,weakness, short breath, any concern regarding pneumonia.       Use trelegy.       Feb 26, 2019- took abx with  recent developing resp problems. Still smokes.  resp same.   Has old compression fx seen on  Lateral cxr.  Discussed with patient above for education the following:    Could do f/u ct chest - but chest xray looks stable.     Lung capacity was only 27% in 2014- hard to see lungs going much more into future.  Chronic lung disease usually causes chronic debility/weakness.  Acute illnesses will be hard to recover strength.      Prompt antibiotic and prednisone may be wise.    Chest xray for illness may be wise.     Use xanax as needed for anxiety.    Stop smokes please.    aug 28, 2018- still smokes, some celestin, uses trelegy. cxr stable.  Follow-up in about 6 months (around 2/28/2019).  Discussed with patient above for education the following:     Use prednisone or antibiotic If bronchitis  Check chest xray if ill  Chest xray 6 months.  " Likely do ct in a yr?  Stop smokes.  Feb 28, 2018 breathing better, still cough but slight.  Nearly off smokes.  Had exacerbation needing steriods.  Lung transplant discussed if pt stops smokes but pt declines.  nov 16, 2017 - off smokes transiently - on chantix with no problems.  No severe stress.    oct 17, 2017- no fever or night sweats or cough. No new c/o - still smokes but trying chantrix.  sept 20, still smokes same, feels sl better than 6  Months ago, mucous clear, night sweats still with no improvement with abx.    AUg 15, having right shoulder pain last 3 weeks at 10/10 intially  And subsequent 3-4/10,  No fever but night sweats chr, no n/v/d or headache.  No tb exposure.   Had tb eval past negative.  Still smokes.  Nerves ok.  Breathing seems  Better.  Feb 13, 2017, hpi doing rehab, no action plan, still smoking.  Using all meds, nerves better with xanax and toprol.  Aug 12, 2016HPI:initial encounter in sbp, copd and bronchiectasis and lung nodule.  No prednisone use recent.  No recent azithromycin.  breo and incruse regular use, [id number]/d, not using nebulizer.  Last ct 12/2015.  fev1 27% in 2014 with dramatic bd response.  Working as filing and typing.  Feels resp comfortable.  Has had lung dz age 56.    Scant mucous with no color chr, no acapella.    The chief compliant  problem is stable  PFSH:  Past Medical History:   Diagnosis Date    Abnormal CT scan 1/8/2015    Asthma     Colitis     COPD (chronic obstructive pulmonary disease)     Diverticulitis of sigmoid colon 11/4/2014    Erosive gastritis 7/17/2013    Fracture of left clavicle     H pylori ulcer 7/17/2013    Hypertension     Peptic ulcer disease 7/17/2013    Pneumonia of right upper lobe due to infectious organism 8/15/2017    Rectal bleed 11/3/2014    Scoliosis     SOB (shortness of breath)     Yeast infection 8/25/2017         Past Surgical History:   Procedure Laterality Date    APPENDECTOMY      BREAST BIOPSY Right      "cyst    COLONOSCOPY N/A 7/15/2019    Procedure: COLONOSCOPY;  Surgeon: Sharif Chun MD;  Location: [facility];  Service: Endoscopy;  Laterality: N/A;    HYSTERECTOMY      HASMUKH/BSO, DUB    OOPHORECTOMY      TUBAL LIGATION       Social History     Tobacco Use    Smoking status: Current Every Day Smoker     Packs/day: 0.50     Years: 30.00     Pack years: 15.00     Types: Cigarettes    Smokeless tobacco: Never Used    Tobacco comment: Trying to quit now still cheating   Substance Use Topics    Alcohol use: Yes     Alcohol/week: 12.0 standard drinks     Types: 12 Cans of beer per week     Comment: 2-3 beers daily    Drug use: No     Family History   Problem Relation Age of Onset    Cancer Mother 49        "lymph nodes"    Cancer Sister         lung?     Review of patient's allergies indicates:   Allergen Reactions    Erythromycin Nausea Only       Performance Status:The patient's activity level is functions out of house.      Review of Systems:  a review of eleven systems covering constitutional, Eye, HEENT, Psych, Respiratory, Cardiac, GI, , Musculoskeletal, Endocrine, Dermatologic was negative except for pertinent findings as listed ABOVE and below: night sweats chr due to menopause,      Exam:Comprehensive exam done.   /89   Pulse 78   Wt 49.9 kg (110 lb 0.2 oz)   SpO2 97%   BMI 18.88 kg/m²   Exam included Vitals as listed, and patient's appearance and affect and alertness and mood, oral exam for yeast and hygiene and pharynx lesions and Mallapatti (M) score, neck with inspection for jvd and masses and thyroid abnormalities and lymph nodes (supraclavicular and infraclavicular nodes and axillary also examined and noted if abn), chest exam included symmetry and effort and fremitus and percussion and auscultation, cardiac exam included rhythm and gallops and murmur and rubs and jvd and edema, abdominal exam for mass and hepatosplenomegaly and tenderness and hernias and bowel sounds, " Musculoskeletal exam with muscle tone and posture and mobility/gait and  strength, and skin for rashes and cyanosis and pallor and turgor, extremity for clubbing.  Findings were normal except for pertinent findings listed below:  Mild kyphosis scoliosis, M2, no wheezes no edema, no clubbing    Radiographs reviewed:   cxr 2/25/2019 same as aug 2018.  Scars both upper lungs.  ct 2016  With left lung abn better now, right lung was clear and now with infiltrate with vol loss of emphysema??  cxr may have smaller cavity rul cavity than ct c/w aug ct with sept cxr.    cxr 10/16 same as sept 2017,  cxr  Feb 26, 2018 improved rul density with smaller hyper inflated lungs.      Labs reviewed  No pos cultures  PFT results [id number]  fev 27%    [place] BRONCHOSCOPY,TRANSBRONCH BIOPSY [92015 (CPT®)]   BRONCHOSCOPY - BRONCHOALVEOLAR LAVAGE [PFT43 (Custom)]           []Hide copied text    []Hover for details  10/20/2017     After review of ct, informed consent, updated history and physical, time out, and anesthesia sedation- pt underwent left nares bronchoscopy (right nares couldn't be cannulated).     Thick secretions encountered and washed free.  Airways were wnl.  Lavage from rul 90 cc in and 20 cc out.  Transbronchial bx x 2 for cultures done (afb, fungal, routine).  Transbronchial bx x 6 for path.  Lavage and wash pooled and submitted for culture, cytology, afb, and fungal evals..     ebl 15 cc post transbronchial bx. Airway suctioned til bleed ceased.  Post floro with no pneumo/complication and post cxr pending.  No complications.      Electronically signed by Bhupendra Mackey MD at 10/20/2017  8:10 AM         Plan:  Clinical impression is apparently straight forward and impression with management as below.    Fabiana was seen today for cough and shortness of breath.    Diagnoses and all orders for this visit:    Tobacco abuse    COPD, severe    Abscess of left lung with pneumonia, unspecified part of lung  -     CBC auto  differential; Standing  -     C-REACTIVE PROTEIN; Standing  -     X-Ray Chest PA And Lateral; Standing  -     CT Chest Without Contrast; Future  -     amoxicillin-clavulanate 875-125mg (AUGMENTIN) 875-125 mg per tablet; Take 1 tablet by mouth every 12 (twelve) hours.    Abnormal CT scan of lung  -     CT Chest Without Contrast; Future    Immune deficiency disorder    Cachexia  -     mirtazapine (REMERON) 7.5 MG Tab; Take 1 tablet (7.5 mg total) by mouth every evening.  -     cyproheptadine (PERIACTIN) 4 mg tablet; Take 1 tablet (4 mg total) by mouth 3 (three) times daily as needed.        Follow up in about 6 weeks (around 11/14/2019).    Discussed with patient above for education the following:      Discussed with patient above for education the following:      Patient Instructions   Pneumonia germ was 8 of 14 protected.      Get vaccine next wk    Stay on augmentin til lung is cleared - optimal.    Do crp, cbc, cxr next week- would follow what is abnormal.  If ongoing improvement found- follow til baseline reasonable - ct and scope test if not clearing or worsens.    Try mirtazapine 7.5mg bedtime, go to 15 mg bedtime if not too sleepy. Will make sleep and should stimulate appetite.    Periactin- 4 mg - try 2nd if mirtazapine not effective, not both, start one bedtime, going to twice daily (could break 1/2), antihisatmine.    Try megace next time?

## 2019-10-15 ENCOUNTER — LAB VISIT (OUTPATIENT)
Dept: LAB | Facility: HOSPITAL | Age: 64
End: 2019-10-15
Attending: INTERNAL MEDICINE
Payer: COMMERCIAL

## 2019-10-15 DIAGNOSIS — J85.1 ABSCESS OF LEFT LUNG WITH PNEUMONIA, UNSPECIFIED PART OF LUNG: ICD-10-CM

## 2019-10-15 LAB
BASOPHILS # BLD AUTO: 0.03 K/UL (ref 0–0.2)
BASOPHILS NFR BLD: 0.3 % (ref 0–1.9)
CRP SERPL-MCNC: 178.5 MG/L (ref 0–8.2)
DIFFERENTIAL METHOD: ABNORMAL
EOSINOPHIL # BLD AUTO: 0 K/UL (ref 0–0.5)
EOSINOPHIL NFR BLD: 0.3 % (ref 0–8)
ERYTHROCYTE [DISTWIDTH] IN BLOOD BY AUTOMATED COUNT: 17.4 % (ref 11.5–14.5)
HCT VFR BLD AUTO: 35.1 % (ref 37–48.5)
HGB BLD-MCNC: 11.1 G/DL (ref 12–16)
IMM GRANULOCYTES # BLD AUTO: 0.08 K/UL (ref 0–0.04)
LYMPHOCYTES # BLD AUTO: 2.8 K/UL (ref 1–4.8)
LYMPHOCYTES NFR BLD: 24.2 % (ref 18–48)
MCH RBC QN AUTO: 28.8 PG (ref 27–31)
MCHC RBC AUTO-ENTMCNC: 31.6 G/DL (ref 32–36)
MCV RBC AUTO: 91 FL (ref 82–98)
MONOCYTES # BLD AUTO: 0.8 K/UL (ref 0.3–1)
MONOCYTES NFR BLD: 7.3 % (ref 4–15)
NEUTROPHILS # BLD AUTO: 7.7 K/UL (ref 1.8–7.7)
NEUTROPHILS NFR BLD: 67.2 % (ref 38–73)
NRBC BLD-RTO: 0 /100 WBC
PLATELET # BLD AUTO: 419 K/UL (ref 150–350)
PMV BLD AUTO: 8.6 FL (ref 9.2–12.9)
RBC # BLD AUTO: 3.85 M/UL (ref 4–5.4)
WBC # BLD AUTO: 11.5 K/UL (ref 3.9–12.7)

## 2019-10-15 PROCEDURE — 86140 C-REACTIVE PROTEIN: CPT

## 2019-10-15 PROCEDURE — 85025 COMPLETE CBC W/AUTO DIFF WBC: CPT

## 2019-10-15 PROCEDURE — 36415 COLL VENOUS BLD VENIPUNCTURE: CPT

## 2019-10-16 DIAGNOSIS — J85.1 ABSCESS OF LEFT LUNG WITH PNEUMONIA, UNSPECIFIED PART OF LUNG: Primary | ICD-10-CM

## 2019-10-18 ENCOUNTER — HOSPITAL ENCOUNTER (OUTPATIENT)
Dept: RADIOLOGY | Facility: HOSPITAL | Age: 64
Discharge: HOME | End: 2019-10-18
Attending: INTERNAL MEDICINE
Payer: COMMERCIAL

## 2019-10-18 DIAGNOSIS — J85.1 ABSCESS OF LEFT LUNG WITH PNEUMONIA, UNSPECIFIED PART OF LUNG: ICD-10-CM

## 2019-10-18 DIAGNOSIS — R91.8 ABNORMAL CT SCAN OF LUNG: ICD-10-CM

## 2019-10-18 PROCEDURE — 71250 CT THORAX DX C-: CPT | Mod: 26,,, | Performed by: RADIOLOGY

## 2019-10-18 PROCEDURE — 71250 CT CHEST WITHOUT CONTRAST: ICD-10-PCS | Mod: 26,,, | Performed by: RADIOLOGY

## 2019-10-18 PROCEDURE — 71250 CT THORAX DX C-: CPT | Mod: TC

## 2019-11-13 DIAGNOSIS — J44.9 COPD, SEVERE: Primary | ICD-10-CM

## 2019-11-13 RX ORDER — ALBUTEROL SULFATE 90 UG/1
2 AEROSOL, METERED RESPIRATORY (INHALATION) EVERY 4 HOURS PRN
Qty: 18 G | Refills: 11 | Status: ON HOLD | OUTPATIENT
Start: 2019-11-13 | End: 2019-12-20 | Stop reason: SDUPTHER

## 2019-11-23 ENCOUNTER — HOSPITAL ENCOUNTER (OUTPATIENT)
Dept: RADIOLOGY | Facility: HOSPITAL | Age: 64
Discharge: HOME | End: 2019-11-23
Attending: INTERNAL MEDICINE
Payer: COMMERCIAL

## 2019-11-23 DIAGNOSIS — J85.1 ABSCESS OF LEFT LUNG WITH PNEUMONIA, UNSPECIFIED PART OF LUNG: ICD-10-CM

## 2019-11-23 PROCEDURE — 71046 X-RAY EXAM CHEST 2 VIEWS: CPT | Mod: 26,,, | Performed by: RADIOLOGY

## 2019-11-23 PROCEDURE — 71046 XR CHEST PA AND LATERAL: ICD-10-PCS | Mod: 26,,, | Performed by: RADIOLOGY

## 2019-11-23 PROCEDURE — 71046 X-RAY EXAM CHEST 2 VIEWS: CPT | Mod: TC,FY

## 2019-12-15 ENCOUNTER — HOSPITAL ENCOUNTER (INPATIENT)
Facility: HOSPITAL | Age: 64
LOS: 5 days | Discharge: HOME HEALTH | DRG: 469 | End: 2019-12-20
Attending: EMERGENCY MEDICINE | Admitting: INTERNAL MEDICINE
Payer: COMMERCIAL

## 2019-12-15 DIAGNOSIS — I70.0 CALCIFICATION OF AORTA: ICD-10-CM

## 2019-12-15 DIAGNOSIS — J98.4 PNEUMATOCELE OF LUNG: ICD-10-CM

## 2019-12-15 DIAGNOSIS — J96.11 CHRONIC RESPIRATORY FAILURE WITH HYPOXIA: ICD-10-CM

## 2019-12-15 DIAGNOSIS — J96.21 ACUTE ON CHRONIC RESPIRATORY FAILURE WITH HYPOXIA AND HYPERCAPNIA: ICD-10-CM

## 2019-12-15 DIAGNOSIS — S72.001A CLOSED FRACTURE OF NECK OF RIGHT FEMUR: ICD-10-CM

## 2019-12-15 DIAGNOSIS — Z72.0 TOBACCO ABUSE: ICD-10-CM

## 2019-12-15 DIAGNOSIS — S72.001A CLOSED FRACTURE OF NECK OF RIGHT FEMUR, INITIAL ENCOUNTER: Primary | ICD-10-CM

## 2019-12-15 DIAGNOSIS — M25.551 RIGHT HIP PAIN: ICD-10-CM

## 2019-12-15 DIAGNOSIS — J85.1 ABSCESS OF LEFT LUNG WITH PNEUMONIA, UNSPECIFIED PART OF LUNG: ICD-10-CM

## 2019-12-15 DIAGNOSIS — E44.0 MODERATE MALNUTRITION: ICD-10-CM

## 2019-12-15 DIAGNOSIS — D84.9 IMMUNE DEFICIENCY DISORDER: ICD-10-CM

## 2019-12-15 DIAGNOSIS — J47.0 BRONCHIECTASIS WITH ACUTE LOWER RESPIRATORY INFECTION: ICD-10-CM

## 2019-12-15 DIAGNOSIS — J44.9 COPD, SEVERE: ICD-10-CM

## 2019-12-15 DIAGNOSIS — S72.009A HIP FRACTURE: ICD-10-CM

## 2019-12-15 DIAGNOSIS — J96.22 ACUTE ON CHRONIC RESPIRATORY FAILURE WITH HYPOXIA AND HYPERCAPNIA: ICD-10-CM

## 2019-12-15 LAB
ALBUMIN SERPL BCP-MCNC: 2.4 G/DL (ref 3.5–5.2)
ALP SERPL-CCNC: 109 U/L (ref 55–135)
ALT SERPL W/O P-5'-P-CCNC: 13 U/L (ref 10–44)
ANION GAP SERPL CALC-SCNC: 11 MMOL/L (ref 8–16)
AST SERPL-CCNC: 17 U/L (ref 10–40)
BASOPHILS # BLD AUTO: 0.02 K/UL (ref 0–0.2)
BASOPHILS NFR BLD: 0.2 % (ref 0–1.9)
BILIRUB SERPL-MCNC: 0.3 MG/DL (ref 0.1–1)
BILIRUB UR QL STRIP: NEGATIVE
BUN SERPL-MCNC: 11 MG/DL (ref 8–23)
CALCIUM SERPL-MCNC: 9.9 MG/DL (ref 8.7–10.5)
CHLORIDE SERPL-SCNC: 97 MMOL/L (ref 95–110)
CLARITY UR: CLEAR
CO2 SERPL-SCNC: 24 MMOL/L (ref 23–29)
COLOR UR: YELLOW
CREAT SERPL-MCNC: 0.7 MG/DL (ref 0.5–1.4)
DIFFERENTIAL METHOD: ABNORMAL
EOSINOPHIL # BLD AUTO: 0 K/UL (ref 0–0.5)
EOSINOPHIL NFR BLD: 0.2 % (ref 0–8)
ERYTHROCYTE [DISTWIDTH] IN BLOOD BY AUTOMATED COUNT: 15.1 % (ref 11.5–14.5)
EST. GFR  (AFRICAN AMERICAN): >60 ML/MIN/1.73 M^2
EST. GFR  (NON AFRICAN AMERICAN): >60 ML/MIN/1.73 M^2
GLUCOSE SERPL-MCNC: 131 MG/DL (ref 70–110)
GLUCOSE UR QL STRIP: NEGATIVE
HCT VFR BLD AUTO: 37.6 % (ref 37–48.5)
HGB BLD-MCNC: 11.7 G/DL (ref 12–16)
HGB UR QL STRIP: NEGATIVE
IMM GRANULOCYTES # BLD AUTO: 0.09 K/UL (ref 0–0.04)
KETONES UR QL STRIP: NEGATIVE
LEUKOCYTE ESTERASE UR QL STRIP: NEGATIVE
LYMPHOCYTES # BLD AUTO: 1.2 K/UL (ref 1–4.8)
LYMPHOCYTES NFR BLD: 9.7 % (ref 18–48)
MCH RBC QN AUTO: 28 PG (ref 27–31)
MCHC RBC AUTO-ENTMCNC: 31.1 G/DL (ref 32–36)
MCV RBC AUTO: 90 FL (ref 82–98)
MONOCYTES # BLD AUTO: 0.7 K/UL (ref 0.3–1)
MONOCYTES NFR BLD: 5.5 % (ref 4–15)
NEUTROPHILS # BLD AUTO: 10.6 K/UL (ref 1.8–7.7)
NEUTROPHILS NFR BLD: 83.7 % (ref 38–73)
NITRITE UR QL STRIP: NEGATIVE
NRBC BLD-RTO: 0 /100 WBC
PH UR STRIP: 6 [PH] (ref 5–8)
PLATELET # BLD AUTO: 453 K/UL (ref 150–350)
PMV BLD AUTO: 8.6 FL (ref 9.2–12.9)
POTASSIUM SERPL-SCNC: 3.7 MMOL/L (ref 3.5–5.1)
PROT SERPL-MCNC: 7.1 G/DL (ref 6–8.4)
PROT UR QL STRIP: NEGATIVE
RBC # BLD AUTO: 4.18 M/UL (ref 4–5.4)
SODIUM SERPL-SCNC: 132 MMOL/L (ref 136–145)
SP GR UR STRIP: <=1.005 (ref 1–1.03)
URN SPEC COLLECT METH UR: ABNORMAL
UROBILINOGEN UR STRIP-ACNC: NEGATIVE EU/DL
WBC # BLD AUTO: 12.66 K/UL (ref 3.9–12.7)

## 2019-12-15 PROCEDURE — 85025 COMPLETE CBC W/AUTO DIFF WBC: CPT

## 2019-12-15 PROCEDURE — 63600175 PHARM REV CODE 636 W HCPCS: Performed by: EMERGENCY MEDICINE

## 2019-12-15 PROCEDURE — 36415 COLL VENOUS BLD VENIPUNCTURE: CPT

## 2019-12-15 PROCEDURE — 99285 EMERGENCY DEPT VISIT HI MDM: CPT | Mod: 25

## 2019-12-15 PROCEDURE — 12000002 HC ACUTE/MED SURGE SEMI-PRIVATE ROOM

## 2019-12-15 PROCEDURE — 96374 THER/PROPH/DIAG INJ IV PUSH: CPT

## 2019-12-15 PROCEDURE — 81003 URINALYSIS AUTO W/O SCOPE: CPT

## 2019-12-15 PROCEDURE — 93005 ELECTROCARDIOGRAM TRACING: CPT

## 2019-12-15 PROCEDURE — 80053 COMPREHEN METABOLIC PANEL: CPT

## 2019-12-15 RX ORDER — ALPRAZOLAM 0.25 MG/1
0.25 TABLET ORAL 3 TIMES DAILY PRN
Status: DISCONTINUED | OUTPATIENT
Start: 2019-12-15 | End: 2019-12-20 | Stop reason: HOSPADM

## 2019-12-15 RX ORDER — IBUPROFEN 200 MG
24 TABLET ORAL
Status: DISCONTINUED | OUTPATIENT
Start: 2019-12-15 | End: 2019-12-20 | Stop reason: HOSPADM

## 2019-12-15 RX ORDER — ATORVASTATIN CALCIUM 40 MG/1
40 TABLET, FILM COATED ORAL DAILY
Status: DISCONTINUED | OUTPATIENT
Start: 2019-12-16 | End: 2019-12-20 | Stop reason: HOSPADM

## 2019-12-15 RX ORDER — MORPHINE SULFATE 4 MG/ML
4 INJECTION, SOLUTION INTRAMUSCULAR; INTRAVENOUS EVERY 4 HOURS PRN
Status: DISCONTINUED | OUTPATIENT
Start: 2019-12-15 | End: 2019-12-17

## 2019-12-15 RX ORDER — ACETAMINOPHEN 500 MG
1000 TABLET ORAL EVERY 6 HOURS PRN
Status: DISCONTINUED | OUTPATIENT
Start: 2019-12-15 | End: 2019-12-20 | Stop reason: HOSPADM

## 2019-12-15 RX ORDER — LANOLIN ALCOHOL/MO/W.PET/CERES
800 CREAM (GRAM) TOPICAL
Status: DISCONTINUED | OUTPATIENT
Start: 2019-12-15 | End: 2019-12-20 | Stop reason: HOSPADM

## 2019-12-15 RX ORDER — IBUPROFEN 200 MG
16 TABLET ORAL
Status: DISCONTINUED | OUTPATIENT
Start: 2019-12-15 | End: 2019-12-20 | Stop reason: HOSPADM

## 2019-12-15 RX ORDER — POTASSIUM CHLORIDE 20 MEQ/15ML
40 SOLUTION ORAL
Status: DISCONTINUED | OUTPATIENT
Start: 2019-12-15 | End: 2019-12-20 | Stop reason: HOSPADM

## 2019-12-15 RX ORDER — ONDANSETRON 2 MG/ML
8 INJECTION INTRAMUSCULAR; INTRAVENOUS EVERY 8 HOURS PRN
Status: DISCONTINUED | OUTPATIENT
Start: 2019-12-15 | End: 2019-12-20 | Stop reason: HOSPADM

## 2019-12-15 RX ORDER — SODIUM CHLORIDE 0.9 % (FLUSH) 0.9 %
10 SYRINGE (ML) INJECTION
Status: DISCONTINUED | OUTPATIENT
Start: 2019-12-15 | End: 2019-12-20 | Stop reason: HOSPADM

## 2019-12-15 RX ORDER — AMOXICILLIN 250 MG
1 CAPSULE ORAL 2 TIMES DAILY
Status: DISCONTINUED | OUTPATIENT
Start: 2019-12-16 | End: 2019-12-20 | Stop reason: HOSPADM

## 2019-12-15 RX ORDER — ALBUTEROL 2 MG/1
2 TABLET ORAL DAILY
Status: DISCONTINUED | OUTPATIENT
Start: 2019-12-16 | End: 2019-12-16

## 2019-12-15 RX ORDER — TALC
9 POWDER (GRAM) TOPICAL NIGHTLY PRN
Status: DISCONTINUED | OUTPATIENT
Start: 2019-12-15 | End: 2019-12-20 | Stop reason: HOSPADM

## 2019-12-15 RX ORDER — GLUCAGON 1 MG
1 KIT INJECTION
Status: DISCONTINUED | OUTPATIENT
Start: 2019-12-15 | End: 2019-12-20 | Stop reason: HOSPADM

## 2019-12-15 RX ORDER — METOPROLOL SUCCINATE 25 MG/1
50 TABLET, EXTENDED RELEASE ORAL DAILY
Status: CANCELLED | OUTPATIENT
Start: 2019-12-16

## 2019-12-15 RX ORDER — MORPHINE SULFATE 2 MG/ML
6 INJECTION, SOLUTION INTRAMUSCULAR; INTRAVENOUS
Status: COMPLETED | OUTPATIENT
Start: 2019-12-15 | End: 2019-12-15

## 2019-12-15 RX ADMIN — MORPHINE SULFATE 6 MG: 2 INJECTION, SOLUTION INTRAMUSCULAR; INTRAVENOUS at 10:12

## 2019-12-16 PROBLEM — J85.1 ABSCESS OF LEFT LUNG WITH PNEUMONIA: Status: RESOLVED | Noted: 2019-10-03 | Resolved: 2019-12-16

## 2019-12-16 PROBLEM — I70.0 CALCIFICATION OF AORTA: Status: ACTIVE | Noted: 2019-12-16

## 2019-12-16 PROBLEM — J98.4 PNEUMATOCELE OF LUNG: Status: ACTIVE | Noted: 2019-12-16

## 2019-12-16 PROBLEM — E44.0 MODERATE MALNUTRITION: Status: ACTIVE | Noted: 2019-12-16

## 2019-12-16 PROBLEM — J47.9 BRONCHIECTASIS: Status: ACTIVE | Noted: 2019-12-16

## 2019-12-16 PROBLEM — J96.11 CHRONIC RESPIRATORY FAILURE WITH HYPOXIA: Status: ACTIVE | Noted: 2019-12-16

## 2019-12-16 LAB
ABO + RH BLD: NORMAL
ALBUMIN SERPL BCP-MCNC: 2.2 G/DL (ref 3.5–5.2)
ALP SERPL-CCNC: 104 U/L (ref 55–135)
ALT SERPL W/O P-5'-P-CCNC: 9 U/L (ref 10–44)
ANION GAP SERPL CALC-SCNC: 8 MMOL/L (ref 8–16)
APTT BLDCRRT: 28.9 SEC (ref 21–32)
AST SERPL-CCNC: 16 U/L (ref 10–40)
BASOPHILS # BLD AUTO: 0.01 K/UL (ref 0–0.2)
BASOPHILS NFR BLD: 0.1 % (ref 0–1.9)
BILIRUB SERPL-MCNC: 0.1 MG/DL (ref 0.1–1)
BLD GP AB SCN CELLS X3 SERPL QL: NORMAL
BUN SERPL-MCNC: 9 MG/DL (ref 8–23)
CALCIUM SERPL-MCNC: 9.7 MG/DL (ref 8.7–10.5)
CHLORIDE SERPL-SCNC: 101 MMOL/L (ref 95–110)
CO2 SERPL-SCNC: 27 MMOL/L (ref 23–29)
CREAT SERPL-MCNC: 0.6 MG/DL (ref 0.5–1.4)
DIFFERENTIAL METHOD: ABNORMAL
EOSINOPHIL # BLD AUTO: 0.1 K/UL (ref 0–0.5)
EOSINOPHIL NFR BLD: 0.8 % (ref 0–8)
ERYTHROCYTE [DISTWIDTH] IN BLOOD BY AUTOMATED COUNT: 14.9 % (ref 11.5–14.5)
EST. GFR  (AFRICAN AMERICAN): >60 ML/MIN/1.73 M^2
EST. GFR  (NON AFRICAN AMERICAN): >60 ML/MIN/1.73 M^2
GLUCOSE SERPL-MCNC: 104 MG/DL (ref 70–110)
HCT VFR BLD AUTO: 34.6 % (ref 37–48.5)
HGB BLD-MCNC: 10.6 G/DL (ref 12–16)
IMM GRANULOCYTES # BLD AUTO: 0.05 K/UL (ref 0–0.04)
INR PPP: 1 (ref 0.8–1.2)
LYMPHOCYTES # BLD AUTO: 1.4 K/UL (ref 1–4.8)
LYMPHOCYTES NFR BLD: 19.4 % (ref 18–48)
MAGNESIUM SERPL-MCNC: 2 MG/DL (ref 1.6–2.6)
MCH RBC QN AUTO: 27.6 PG (ref 27–31)
MCHC RBC AUTO-ENTMCNC: 30.6 G/DL (ref 32–36)
MCV RBC AUTO: 90 FL (ref 82–98)
MONOCYTES # BLD AUTO: 0.6 K/UL (ref 0.3–1)
MONOCYTES NFR BLD: 8.3 % (ref 4–15)
NEUTROPHILS # BLD AUTO: 5.1 K/UL (ref 1.8–7.7)
NEUTROPHILS NFR BLD: 70.7 % (ref 38–73)
NRBC BLD-RTO: 0 /100 WBC
PHOSPHATE SERPL-MCNC: 4.1 MG/DL (ref 2.7–4.5)
PLATELET # BLD AUTO: 434 K/UL (ref 150–350)
PMV BLD AUTO: 8.4 FL (ref 9.2–12.9)
POCT GLUCOSE: 101 MG/DL (ref 70–110)
POCT GLUCOSE: 91 MG/DL (ref 70–110)
POCT GLUCOSE: 92 MG/DL (ref 70–110)
POTASSIUM SERPL-SCNC: 3.6 MMOL/L (ref 3.5–5.1)
PROT SERPL-MCNC: 6.4 G/DL (ref 6–8.4)
PROTHROMBIN TIME: 10.3 SEC (ref 9–12.5)
RBC # BLD AUTO: 3.84 M/UL (ref 4–5.4)
SODIUM SERPL-SCNC: 136 MMOL/L (ref 136–145)
WBC # BLD AUTO: 7.27 K/UL (ref 3.9–12.7)

## 2019-12-16 PROCEDURE — 99254 IP/OBS CNSLTJ NEW/EST MOD 60: CPT | Mod: ,,, | Performed by: INTERNAL MEDICINE

## 2019-12-16 PROCEDURE — 85025 COMPLETE CBC W/AUTO DIFF WBC: CPT

## 2019-12-16 PROCEDURE — 99223 PR INITIAL HOSPITAL CARE,LEVL III: ICD-10-PCS | Mod: ,,, | Performed by: ORTHOPAEDIC SURGERY

## 2019-12-16 PROCEDURE — 25000003 PHARM REV CODE 250: Performed by: NURSE PRACTITIONER

## 2019-12-16 PROCEDURE — 94640 AIRWAY INHALATION TREATMENT: CPT

## 2019-12-16 PROCEDURE — 12000002 HC ACUTE/MED SURGE SEMI-PRIVATE ROOM

## 2019-12-16 PROCEDURE — 85730 THROMBOPLASTIN TIME PARTIAL: CPT

## 2019-12-16 PROCEDURE — 99254 PR INITIAL INPATIENT CONSULT,LEVL IV: ICD-10-PCS | Mod: ,,, | Performed by: INTERNAL MEDICINE

## 2019-12-16 PROCEDURE — 99222 PR INITIAL HOSPITAL CARE,LEVL II: ICD-10-PCS | Mod: ,,, | Performed by: ORTHOPAEDIC SURGERY

## 2019-12-16 PROCEDURE — 94761 N-INVAS EAR/PLS OXIMETRY MLT: CPT

## 2019-12-16 PROCEDURE — 85610 PROTHROMBIN TIME: CPT

## 2019-12-16 PROCEDURE — 80053 COMPREHEN METABOLIC PANEL: CPT

## 2019-12-16 PROCEDURE — 97802 MEDICAL NUTRITION INDIV IN: CPT

## 2019-12-16 PROCEDURE — 84100 ASSAY OF PHOSPHORUS: CPT

## 2019-12-16 PROCEDURE — 86850 RBC ANTIBODY SCREEN: CPT

## 2019-12-16 PROCEDURE — 99223 1ST HOSP IP/OBS HIGH 75: CPT | Mod: ,,, | Performed by: ORTHOPAEDIC SURGERY

## 2019-12-16 PROCEDURE — 83735 ASSAY OF MAGNESIUM: CPT

## 2019-12-16 PROCEDURE — 99222 1ST HOSP IP/OBS MODERATE 55: CPT | Mod: ,,, | Performed by: ORTHOPAEDIC SURGERY

## 2019-12-16 PROCEDURE — 63600175 PHARM REV CODE 636 W HCPCS: Performed by: NURSE PRACTITIONER

## 2019-12-16 RX ORDER — ALBUTEROL SULFATE 2 MG/5ML
2 SYRUP ORAL DAILY
Status: DISCONTINUED | OUTPATIENT
Start: 2019-12-16 | End: 2019-12-19

## 2019-12-16 RX ADMIN — MORPHINE SULFATE 4 MG: 4 INJECTION, SOLUTION INTRAMUSCULAR; INTRAVENOUS at 03:12

## 2019-12-16 RX ADMIN — CEFTRIAXONE 1 G: 1 INJECTION, SOLUTION INTRAVENOUS at 12:12

## 2019-12-16 RX ADMIN — MORPHINE SULFATE 4 MG: 4 INJECTION, SOLUTION INTRAMUSCULAR; INTRAVENOUS at 08:12

## 2019-12-16 RX ADMIN — ATORVASTATIN CALCIUM 40 MG: 40 TABLET, FILM COATED ORAL at 08:12

## 2019-12-16 RX ADMIN — SENNOSIDES AND DOCUSATE SODIUM 1 TABLET: 8.6; 5 TABLET ORAL at 12:12

## 2019-12-16 RX ADMIN — MORPHINE SULFATE 4 MG: 4 INJECTION, SOLUTION INTRAMUSCULAR; INTRAVENOUS at 05:12

## 2019-12-16 RX ADMIN — SENNOSIDES AND DOCUSATE SODIUM 1 TABLET: 8.6; 5 TABLET ORAL at 08:12

## 2019-12-16 RX ADMIN — CEFTRIAXONE 1 G: 1 INJECTION, SOLUTION INTRAVENOUS at 01:12

## 2019-12-16 RX ADMIN — MORPHINE SULFATE 4 MG: 4 INJECTION, SOLUTION INTRAMUSCULAR; INTRAVENOUS at 01:12

## 2019-12-16 NOTE — PLAN OF CARE
12/16/19 0853   Patient Assessment/Suction   Level of Consciousness (AVPU) alert   Respiratory Effort Unlabored   Expansion/Accessory Muscles/Retractions expansion symmetric   All Lung Fields Breath Sounds diminished   PRE-TX-O2   O2 Device (Oxygen Therapy) room air   SpO2 (!) 94 %   Pulse 92   Resp 18   Inhaler   $ Inhaler Charges MDI (Metered Dose Inahler) Treatment   Respiratory Treatment Status (Inhaler) given;mouth rinsed post treatment   Treatment Route (Inhaler) mouthpiece   Patient Position (Inhaler) HOB elevated   Post Treatment Assessment (Inhaler) breath sounds unchanged   Signs of Intolerance (Inhaler) none

## 2019-12-16 NOTE — ASSESSMENT & PLAN NOTE
Contributing Nutrition Diagnosis  Moderate malnutrition in chronic illness    Related to (etiology):   Increased energy needs    Signs and Symptoms (as evidenced by):   1) Moderate fat loss  2) Mild-moderate muscle loss    Nutrition Diagnosis Status:   New

## 2019-12-16 NOTE — PLAN OF CARE
12/16/19 1504   Post-Acute Status   Post-Acute Authorization Home Health/Hospice   Patient choice form signed by patient/caregiver List from [employer]  (Form not signed patient reviewing list)   CM provided patient with list of  agencies from medicare.gov website. Patient will review list and CM will return post op to find out patient preference and to get patient choice disclosure signed.CM will follow.

## 2019-12-16 NOTE — ED PROVIDER NOTES
"Encounter Date: 12/15/2019    SCRIBE #1 NOTE: I, Kendall Mcdowell am scribing for, and in the presence of, Adelso Taylor MD.       History     Chief Complaint   Patient presents with    Fall     Rt upper leg/Hip pain       Time seen by provider: 9:24 PM on 12/15/2019    Fabiana Morel is a 64 y.o. female who presents to the ED with an onset of worsening pain to the left pain following a fall occurring PTA. Pt reports falling on her left side after tripping on the side walk outside her home. She endorses hitting her head during the fall but denies any head pain or loss of consciousness. Per her , she was found propped up laying in the street and unable to ambulate. He was able to get her into a transport chair and she was given Ibuprofen without relief of sx. PMHx of COPD and HTN. No cardiopulmonary PSHx. Known adverse reaction to Erythromycin.    The history is provided by the patient.     Review of patient's allergies indicates:   Allergen Reactions    Erythromycin Nausea Only     Past Medical History:   Diagnosis Date    Abnormal CT scan 1/8/2015    Asthma     Colitis     COPD (chronic obstructive pulmonary disease)     Diverticulitis of sigmoid colon 11/4/2014    Erosive gastritis 7/17/2013    Fracture of left clavicle     H pylori ulcer 7/17/2013    Hypertension     Peptic ulcer disease 7/17/2013    Pneumonia of right upper lobe due to infectious organism 8/15/2017    Rectal bleed 11/3/2014    Scoliosis     SOB (shortness of breath)     Yeast infection 8/25/2017     Past Surgical History:   Procedure Laterality Date    APPENDECTOMY      BREAST BIOPSY Right     cyst    COLONOSCOPY N/A 7/15/2019    Procedure: COLONOSCOPY;  Surgeon: Sharif Chun MD;  Location: [facility];  Service: Endoscopy;  Laterality: N/A;    HYSTERECTOMY      HASMUKH/BSO, DUB    OOPHORECTOMY      TUBAL LIGATION       Family History   Problem Relation Age of Onset    Cancer Mother 49        "lymph nodes"    " Cancer Sister         lung?     Social History     Tobacco Use    Smoking status: Current Every Day Smoker     Packs/day: 0.50     Years: 30.00     Pack years: 15.00     Types: Cigarettes    Smokeless tobacco: Never Used    Tobacco comment: Trying to quit now still cheating   Substance Use Topics    Alcohol use: Yes     Alcohol/week: 12.0 standard drinks     Types: 12 Cans of beer per week     Comment: 2-3 beers daily    Drug use: No     Review of Systems   Constitutional: Negative for fever.   HENT: Negative for sore throat.    Respiratory: Negative for shortness of breath.    Cardiovascular: Negative for chest pain.   Gastrointestinal: Negative for nausea.   Genitourinary: Negative for dysuria.   Musculoskeletal: Positive for arthralgias. Negative for back pain.   Skin: Negative for rash.   Neurological: Negative for syncope, weakness and headaches.   Hematological: Does not bruise/bleed easily.       Physical Exam     Initial Vitals [12/15/19 2055]   BP Pulse Resp Temp SpO2   110/65 94 16 98.7 °F (37.1 °C) 96 %      MAP       --         Physical Exam    Nursing note and vitals reviewed.  Constitutional: She appears well-developed and well-nourished.  Non-toxic appearance. No distress.   HENT:   Head: Normocephalic and atraumatic.   Eyes: EOM are normal. Pupils are equal, round, and reactive to light.   Neck: Normal range of motion. Neck supple. No neck rigidity. No JVD present.   Cardiovascular: Normal rate, regular rhythm, normal heart sounds and intact distal pulses. Exam reveals no gallop and no friction rub.    No murmur heard.  Pulses:       Dorsalis pedis pulses are 2+ on the right side, and 2+ on the left side.        Posterior tibial pulses are 2+ on the right side, and 2+ on the left side.   Pulmonary/Chest: Breath sounds normal. She has no wheezes. She has no rhonchi. She has no rales.   Abdominal: Soft. Bowel sounds are normal. She exhibits no distension. There is no tenderness. There is no  rebound and no guarding.   Musculoskeletal: Normal range of motion. She exhibits tenderness.        Right hip: She exhibits tenderness.   External rotation without shortening of the right leg.  Tenderness over the posterior right hip.    Neurological: She is alert and oriented to person, place, and time. She has normal strength and normal reflexes. No cranial nerve deficit or sensory deficit. She exhibits normal muscle tone. Coordination normal. GCS eye subscore is 4. GCS verbal subscore is 5. GCS motor subscore is 6.   Skin: Skin is warm and dry.   Psychiatric: She has a normal mood and affect. Her speech is normal and behavior is normal. She is not actively hallucinating.         ED Course   Procedures  Labs Reviewed   CBC W/ AUTO DIFFERENTIAL - Abnormal; Notable for the following components:       Result Value    Hemoglobin 11.7 (*)     Mean Corpuscular Hemoglobin Conc 31.1 (*)     RDW 15.1 (*)     Platelets 453 (*)     MPV 8.6 (*)     Gran # (ANC) 10.6 (*)     Immature Grans (Abs) 0.09 (*)     Gran% 83.7 (*)     Lymph% 9.7 (*)     All other components within normal limits   COMPREHENSIVE METABOLIC PANEL - Abnormal; Notable for the following components:    Sodium 132 (*)     Glucose 131 (*)     Albumin 2.4 (*)     All other components within normal limits   URINALYSIS, REFLEX TO URINE CULTURE          Imaging Results          X-Ray Chest AP Portable (Final result)  Result time 12/15/19 22:13:12    Final result by Jay Lewis MD (12/15/19 22:13:12)                 Impression:      Evolving consolidation left upper lobe.    Stable abnormal parenchymal opacities of the paramediastinal regions.      Electronically signed by: Jay Lewis MD  Date:    12/15/2019  Time:    22:13             Narrative:    EXAMINATION:  XR CHEST AP PORTABLE    CLINICAL HISTORY:  hip fracture;    TECHNIQUE:  Single frontal view of the chest was performed.    COMPARISON:  11/23/2019.    FINDINGS:  The trachea is unremarkable.  The  cardiomediastinal silhouette is stable in appearance.  The hemidiaphragms are unremarkable.  There is no evidence of free air beneath the hemidiaphragms there are no pleural effusions.  There is no evidence of a pneumothorax.  There is no evidence of pneumomediastinum.  There are unchanged parenchymal abnormalities involving the upper paramediastinal regions.  There is an evolving consolidation left upper lobe.  The osseous structures demonstrate degenerative changes.                               X-Ray Hip 2 View Right (Final result)  Result time 12/15/19 22:04:59    Final result by Jay Lewis MD (12/15/19 22:04:59)                 Impression:      Acute displaced transcervical right femoral neck fracture.      Electronically signed by: Jay Lewis MD  Date:    12/15/2019  Time:    22:04             Narrative:    EXAMINATION:  XR HIP 2 VIEW RIGHT    CLINICAL HISTORY:  Pain in right hip    TECHNIQUE:  AP view of the pelvis and frog leg lateral view of the right hip were performed.    COMPARISON:  11/18/2016.    FINDINGS:  Pelvis:    There is demineralization of the osseous structures.  No cortical step-off is identified.  The pelvic ring is intact.  The joint spaces are maintained.  The soft tissues are unremarkable.  There is no evidence of fracture or dislocation involving the pelvic bones.    Right hip:    There is an acute displaced transcervical femoral neck fracture.  There is cephalic displacement of the distal fracture component.  There is no evidence of a dislocation.  There is joint space narrowing involving both hips.  The soft tissues are unremarkable.                                 Medical Decision Making:   History:   Old Medical Records: I decided to obtain old medical records.  Initial Assessment:   Patient is a 64-year-old woman who presents emergency department for evaluation of trip and fall complaining of right hip pain. Patient is found to have a closed right femoral neck fracture.  Patient  is neurovascularly intact. Discussed with Orthopedic surgery, Dr. Godwin.  Patient will be admitted to Hospital Medicine for Medical clearance.  Independently Interpreted Test(s):   I have ordered and independently interpreted EKG Reading(s) - see prior notes  Clinical Tests:   Lab Tests: Ordered and Reviewed  Radiological Study: Ordered and Reviewed  Medical Tests: Ordered and Reviewed            Scribe Attestation:   Scribe #1: I performed the above scribed service and the documentation accurately describes the services I performed. I attest to the accuracy of the note.    I, Brandon Dorado, personally performed the services described in this documentation. All medical record entries made by the scribe were at my direction and in my presence.  I have reviewed the chart and agree that the record reflects my personal performance and is accurate and complete. Adelso Taylor MD.  10:47 PM 12/15/2019       DISCLAIMER: This note was prepared with [employer] Direct voice recognition transcription software. Garbled syntax, mangled pronouns, and other bizarre constructions may be attributed to that software system.                        Clinical Impression:       ICD-10-CM ICD-9-CM   1. Closed fracture of neck of right femur, initial encounter S72.001A 820.8   2. Right hip pain M25.551 719.45   3. Hip fracture S72.009A 820.8         Disposition:   Disposition: Admitted                     Adelso Taylor MD  12/15/19 4911

## 2019-12-16 NOTE — SUBJECTIVE & OBJECTIVE
Interval History: patient currently states that her pain is under control with the IV morphine.  Patient denies any other symptoms at this point.  Current NPO for possible hip surgery    Review of Systems   Constitutional: Negative for activity change, appetite change, fatigue and fever.   HENT: Negative for congestion, sinus pressure, sinus pain, sneezing and sore throat.    Eyes: Negative for photophobia, pain, discharge and redness.   Respiratory: Negative for cough, choking, chest tightness, shortness of breath and wheezing.    Cardiovascular: Negative for chest pain, palpitations and leg swelling.   Gastrointestinal: Negative for abdominal distention, abdominal pain, nausea and vomiting.   Genitourinary: Negative for difficulty urinating, dysuria, flank pain, frequency and urgency.   Musculoskeletal: Positive for arthralgias and gait problem. Negative for back pain, joint swelling, myalgias and neck pain.   Skin: Negative for color change, pallor, rash and wound.   Hematological: Negative.    Psychiatric/Behavioral: Negative.      Objective:     Vital Signs (Most Recent):  Temp: 97.5 °F (36.4 °C) (12/16/19 1129)  Pulse: 79 (12/16/19 1129)  Resp: 16 (12/16/19 1129)  BP: 105/61 (12/16/19 1129)  SpO2: 95 % (12/16/19 1129) Vital Signs (24h Range):  Temp:  [97.5 °F (36.4 °C)-98.7 °F (37.1 °C)] 97.5 °F (36.4 °C)  Pulse:  [64-94] 79  Resp:  [16-20] 16  SpO2:  [93 %-96 %] 95 %  BP: ()/(54-65) 105/61     Weight: 51.8 kg (114 lb 3.2 oz)  Body mass index is 19.6 kg/m².    Intake/Output Summary (Last 24 hours) at 12/16/2019 1450  Last data filed at 12/16/2019 0500  Gross per 24 hour   Intake 290 ml   Output 1200 ml   Net -910 ml      Physical Exam   Constitutional: She appears well-developed and well-nourished. No distress.   HENT:   Head: Normocephalic.   Right Ear: External ear normal.   Left Ear: External ear normal.   Mouth/Throat: Oropharynx is clear and moist.   Eyes: Pupils are equal, round, and reactive to  light. Conjunctivae and EOM are normal. Right eye exhibits no discharge. Left eye exhibits no discharge. No scleral icterus.   Neck: Normal range of motion. Neck supple.   Cardiovascular: Normal rate, regular rhythm, normal heart sounds and intact distal pulses. Exam reveals no gallop and no friction rub.   No murmur heard.  Pulmonary/Chest: Effort normal and breath sounds normal. No stridor. No respiratory distress. She has no wheezes. She has no rales.   Abdominal: Soft. Bowel sounds are normal. She exhibits no distension and no mass. There is no tenderness. There is no guarding.   Musculoskeletal: She exhibits tenderness and deformity.        Right hip: She exhibits decreased range of motion, decreased strength, tenderness, bony tenderness and deformity.        Legs:  Lymphadenopathy:     She has no cervical adenopathy.   Neurological: She is alert.   Skin: Skin is warm and dry. Capillary refill takes less than 2 seconds. No rash noted. She is not diaphoretic. No erythema. No pallor.   Psychiatric: She has a normal mood and affect. Her behavior is normal. Judgment and thought content normal.   Nursing note and vitals reviewed.      Significant Labs:   BMP:   Recent Labs   Lab 12/16/19  0435         K 3.6      CO2 27   BUN 9   CREATININE 0.6   CALCIUM 9.7   MG 2.0     CBC:   Recent Labs   Lab 12/15/19  2200 12/16/19  0435   WBC 12.66 7.27   HGB 11.7* 10.6*   HCT 37.6 34.6*   * 434*       Significant Imaging: I have reviewed all pertinent imaging results/findings within the past 24 hours.

## 2019-12-16 NOTE — CONSULTS
12/16/2019      Admit Date: 12/15/2019  Fabiana Morel  New Patient Consult    Chief Complaint   Patient presents with    Fall     Rt upper leg/Hip pain       History of Present Illness:  Pt is a 65 yo female admitted 12/15 after a mechanical fall resulting in R hip fracture. She has a AUTUMN infiltrate and has been seen in clinic by Dr. Mackey and treated with augmentin and had undergone a bronchoscopy in the past.  Last visit was 10/3/19. Pt has been taking augmentin for about 2mos now. She endorses stable dyspnea on minimal exertion in the house and cough, mostly dry. Her symptoms are improving slightly since she stopped smoking 1 month ago. She smoked 1-1.5 PPD x 50+ years. Her functional status is limited to in house activities and uses wheelchair for going outside the house. She likes to go to music festivals in the wheelchair. Pt had been losing weight when she saw Dr. Mackey but says she has gained maybe 2-3 lb which she attributes to eating lots of sweets since she quit smoking. She denies fevers/chills. Appetite is poor. Her hip pain is controlled with morphine but hurts if she sits up or moves in the bed. Per pt she may have surgery tomorrow but not sure.  Home O2- only wears occasionally with activity 2L  Inhalers: Trelegy, albuterol- uses BID    PFSH:  Past Medical History:   Diagnosis Date    Abnormal CT scan 1/8/2015    Asthma     Colitis     COPD (chronic obstructive pulmonary disease)     Diverticulitis of sigmoid colon 11/4/2014    Erosive gastritis 7/17/2013    Fracture of left clavicle     H pylori ulcer 7/17/2013    Hypertension     Peptic ulcer disease 7/17/2013    Pneumonia of right upper lobe due to infectious organism 8/15/2017    Rectal bleed 11/3/2014    Scoliosis     SOB (shortness of breath)     Yeast infection 8/25/2017     Past Surgical History:   Procedure Laterality Date    APPENDECTOMY      BREAST BIOPSY Right     cyst    COLONOSCOPY N/A 7/15/2019    Procedure:  "COLONOSCOPY;  Surgeon: Sharif Chnu MD;  [facility]: [facility];  Service: Endoscopy;  Laterality: N/A;    HYSTERECTOMY      HASMUKH/BSO, DUB    OOPHORECTOMY      TUBAL LIGATION       Social History     Tobacco Use    Smoking status: Current Every Day Smoker     Packs/day: 0.50     Years: 30.00     Pack years: 15.00     Types: Cigarettes    Smokeless tobacco: Never Used    Tobacco comment: quit 1 month ago.   Substance Use Topics    Alcohol use: Yes     Alcohol/week: 12.0 standard drinks     Types: 12 Cans of beer per week     Comment: 2-3 beers daily    Drug use: No     Family History   Problem Relation Age of Onset    Cancer Mother 49        "lymph nodes"    Cancer Sister         lung?     Review of patient's allergies indicates:   Allergen Reactions    Erythromycin Nausea Only       Performance Status:Performance Status:The patient's activity level is housebound activities, uses wheelchair for out of house activities.    Review of Systems:  a review of eleven systems covering constitutional, Psych, Eye, HEENT, Respiratory, Cardiac, GI, , Musculoskeletal, Endocrine, Dermatologicwas negative except the above mentioned abnormalities and for any pertinent findings as listed below:  All negative with pertinent positives as above          Exam:Comprehensive exam done. /62 (BP Location: Right arm, Patient Position: Lying)   Pulse 92   Temp 98.5 °F (36.9 °C)   Resp 18   Ht 5' 4" (1.626 m)   Wt 51.8 kg (114 lb 3.2 oz)   SpO2 (!) 94%   Breastfeeding? No   BMI 19.60 kg/m²   Exam included Vitals as listed, and patient's appearance and affect and alertness and mood, oral exam for yeast and hygiene and pharynx lesions and Mallapatti (M) score, neck with inspection for jvd and masses and thyroid abnormalities and lymph nodes (supraclavicular and infraclavicular nodes also examined and noted if abn), chest exam included symmetry and effort and fremitus and percussion and auscultation, cardiac exam " included rhythm and gallops and murmur and rubs and jvd and edema, abdominal exam for mass and hepatosplenomegaly and tenderness and hernias and bowel sounds, Musculoskeletal exam with muscle tone and posture and mobility/gait and  strenght, and skin for rashes and cyanosis and pallor and turgor, extremity for clubbing.  Findings were normal except as listed below:  Thin, MP 1  Healed fracture L clavicle  Faint crackles L lung anteriorly  Bronchial breath sounds on right    Radiographs reviewed: view by direct vision   CXR 12/15- air trapping, AUTUMN infiltrate. Linear marking RUL  11/23/19- same  CT chest 10/2019- L>R bullae upper lobe predominant severe emphysematous changes, giant bullae with pleural thickening and air fluid level in L mid lung posterior bulla. Severe bronchiectasis in the AUTUMN. Aortic calcification    Results for orders placed during the hospital encounter of 10/20/17   X-Ray Chest 1 View    Narrative Single portable view of the chest is provided. The cardiac size is within normal limits. There is increased density and consolidation in the right upper lobe infiltrate and scarring. Left upper lobe lesion is unchanged. No pneumothorax is noted.    Impression  Increased density and consolidation in the right upper lobe infiltrate. Unchanged left upper lobe nodule. No pneumothorax seen      Electronically signed by: Sheldon Colmenares MD  Date:     10/20/17  Time:    08:57    ]    Labs     Recent Labs   Lab 12/16/19  0435   WBC 7.27   HGB 10.6*   HCT 34.6*   *     Recent Labs   Lab 12/16/19  0435      K 3.6      CO2 27   BUN 9   CREATININE 0.6      CALCIUM 9.7   MG 2.0   PHOS 4.1   AST 16   ALT 9*   ALKPHOS 104   BILITOT 0.1   PROT 6.4   ALBUMIN 2.2*   INR 1.0   No results for input(s): PH, PCO2, PO2, HCO3 in the last 24 hours.  Microbiology Results (last 7 days)     ** No results found for the last 168 hours. **        9/2019- sputum normal resp [place]sea  10/2017 BAL no growth.  No fungus, no AFB. Path bronchial epithelium, benign     6 min walk study was done August 27, 2019.  Baseline room air saturation was 97%.  With ambulation O2 sat fell by 4 min to 87%.  Patient needed 2 L of oxygen maintain sat in the low 90s.  Patient walked about 48% of the reference distance at 255 m.    PFTs 3/2014- severe obstruction with positive BD response FEV1 0.72->0.92    Impression:  Active Hospital Problems    Diagnosis  POA    *Closed fracture of neck of right femur [S72.001A]  Yes    Abscess of left lung with pneumonia [J85.1]  Yes    Anxiety [F41.9]  Yes    COPD, severe [J44.9]  Yes    Hyperlipidemia [E78.5]  Yes    Hypertension [I10]  Yes    Tobacco abuse [Z72.0]  Yes      Resolved Hospital Problems   No resolved problems to display.               Plan:   - will need f/u with Dr. Mackey and repeat imaging to determine progression/stability of AUTUMN abnormalities  - consider repeat bronchoscopy as outpatient to evaluate for NTM or fungal infection  - continue LABA/LAMA/ICS (home Trelegy inhaler)  - pulmonary symptoms currently at baseline without evidence of exacerbation  - given her severe COPD with ongoing pulmonary infection pt is intermediate to high risk for perioperative pulmonary complications such as exacerbation or prolonged ventilator dependence  - recommend proceeding with surgery if benefits are felt to outweigh the risks with the following precautions:   - check baseline ABG prior to surgery   - around the clock nebulizer treatments perioperatively   - frequent suctioning and airway clearance regimen with chest PT/nebulized saline if she develops thick secretions   - early ambulation post-operatively  - will discuss w/ Dr. Mackey and let him know that she is admitted; next visit was scheduled to be 12/18 but she will likely miss due to this admission       Rosa Cagle MD  Pulmonary & Critical Care Medicine

## 2019-12-16 NOTE — ASSESSMENT & PLAN NOTE
Chronic Problem  BP decreased after pain meds given in ER  Will hold Metoprolol for now.  Monitor.

## 2019-12-16 NOTE — ASSESSMENT & PLAN NOTE
Acute Problem  Mechanical Fall. Xray demonstrates right femoral neck fracture  Right leg is neurovascularly intact  Orthopedics on case. NPO after midnight for surgery pending pulmonology clearance.  IV Morphine.

## 2019-12-16 NOTE — SUBJECTIVE & OBJECTIVE
Past Medical History:   Diagnosis Date    Abnormal CT scan 1/8/2015    Asthma     Colitis     COPD (chronic obstructive pulmonary disease)     Diverticulitis of sigmoid colon 11/4/2014    Erosive gastritis 7/17/2013    Fracture of left clavicle     H pylori ulcer 7/17/2013    Hypertension     Peptic ulcer disease 7/17/2013    Pneumonia of right upper lobe due to infectious organism 8/15/2017    Rectal bleed 11/3/2014    Scoliosis     SOB (shortness of breath)     Yeast infection 8/25/2017       Past Surgical History:   Procedure Laterality Date    APPENDECTOMY      BREAST BIOPSY Right     cyst    COLONOSCOPY N/A 7/15/2019    Procedure: COLONOSCOPY;  Surgeon: Sharfi Chun MD;  Location: [facility];  Service: Endoscopy;  Laterality: N/A;    HYSTERECTOMY      HASMUKH/BSO, DUB    OOPHORECTOMY      TUBAL LIGATION         Review of patient's allergies indicates:   Allergen Reactions    Erythromycin Nausea Only       Current Facility-Administered Medications   Medication    acetaminophen tablet 1,000 mg    albuterol syrup 2 mg    ALPRAZolam tablet 0.25 mg    atorvastatin tablet 40 mg    cefTRIAXone (ROCEPHIN) 1 g in dextrose 5 % 50 mL IVPB    dextrose 50% injection 12.5 g    dextrose 50% injection 25 g    fluticasone-umeclidin-vilanter 100-62.5-25 mcg DsDv 1 puff    glucagon (human recombinant) injection 1 mg    glucose chewable tablet 16 g    glucose chewable tablet 24 g    magnesium oxide tablet 800 mg    magnesium oxide tablet 800 mg    melatonin tablet 9 mg    morphine injection 4 mg    ondansetron injection 8 mg    potassium chloride 10% oral solution 40 mEq    potassium chloride 10% oral solution 40 mEq    senna-docusate 8.6-50 mg per tablet 1 tablet    sodium chloride 0.9% flush 10 mL     Family History     Problem Relation (Age of Onset)    Cancer Mother (49), Sister        Tobacco Use    Smoking status: Current Every Day Smoker     Packs/day: 0.50     Years: 30.00      "Pack years: 15.00     Types: Cigarettes    Smokeless tobacco: Never Used    Tobacco comment: quit 1 month ago.   Substance and Sexual Activity    Alcohol use: Yes     Alcohol/week: 12.0 standard drinks     Types: 12 Cans of beer per week     Comment: 2-3 beers daily    Drug use: No    Sexual activity: Yes     Partners: Male     Review of Systems   Constitution: Negative for chills and fever.   HENT: Negative for congestion.    Eyes: Negative for blurred vision.   Cardiovascular: Positive for dyspnea on exertion. Negative for chest pain and syncope.   Respiratory: Positive for shortness of breath. Negative for cough.    Endocrine: Negative for polyuria.   Hematologic/Lymphatic: Negative for bleeding problem. Does not bruise/bleed easily.   Skin: Negative for rash.   Musculoskeletal: Positive for falls, joint pain and joint swelling. Negative for muscle cramps, muscle weakness and myalgias.   Gastrointestinal: Negative for abdominal pain, nausea and vomiting.   Genitourinary: Negative for flank pain.   Neurological: Negative for numbness and seizures.   Psychiatric/Behavioral: Negative for altered mental status.   Allergic/Immunologic: Negative for persistent infections.     Objective:     Vital Signs (Most Recent):  Temp: 97.5 °F (36.4 °C) (12/16/19 1129)  Pulse: 79 (12/16/19 1129)  Resp: 16 (12/16/19 1129)  BP: 105/61 (12/16/19 1129)  SpO2: 95 % (12/16/19 1129) Vital Signs (24h Range):  Temp:  [97.5 °F (36.4 °C)-98.7 °F (37.1 °C)] 97.5 °F (36.4 °C)  Pulse:  [64-94] 79  Resp:  [16-20] 16  SpO2:  [93 %-96 %] 95 %  BP: ()/(54-65) 105/61     Weight: 51.8 kg (114 lb 3.2 oz)  Height: 5' 4" (162.6 cm)  Body mass index is 19.6 kg/m².      Intake/Output Summary (Last 24 hours) at 12/16/2019 1228  Last data filed at 12/16/2019 0500  Gross per 24 hour   Intake 290 ml   Output 1200 ml   Net -910 ml       General    Nursing note and vitals reviewed.  Constitutional: She is oriented to person, place, and time. She " appears well-developed and well-nourished. No distress.   HENT:   Head: Atraumatic.   Eyes: EOM are normal.   Cardiovascular: Normal rate.    Pulmonary/Chest: Effort normal.   Abdominal: Soft.   Neurological: She is alert and oriented to person, place, and time.   Psychiatric: Her behavior is normal.             Right Hip Exam     Inspection   Swelling: present  Erythema: absent    Tenderness   The patient tender to palpation of the trochanteric bursa.    Range of Motion   Abduction: abnormal   Adduction: abnormal   Extension: abnormal   Flexion: abnormal   External rotation: abnormal   Internal rotation: abnormal     Tests   Log Roll: positive    Other   Sensation: normal  Left Hip Exam   Left hip exam is normal.          Vascular Exam     Right Pulses  Dorsalis Pedis:      2+              Significant Labs:   CBC:   Recent Labs   Lab 12/15/19  2200 12/16/19  0435   WBC 12.66 7.27   HGB 11.7* 10.6*   HCT 37.6 34.6*   * 434*     CMP:   Recent Labs   Lab 12/15/19  2200 12/16/19  0435   * 136   K 3.7 3.6   CL 97 101   CO2 24 27   * 104   BUN 11 9   CREATININE 0.7 0.6   CALCIUM 9.9 9.7   PROT 7.1 6.4   ALBUMIN 2.4* 2.2*   BILITOT 0.3 0.1   ALKPHOS 109 104   AST 17 16   ALT 13 9*   ANIONGAP 11 8   EGFRNONAA >60 >60     Coagulation:   Recent Labs   Lab 12/16/19  0435   LABPROT 10.3   INR 1.0   APTT 28.9     All pertinent labs within the past 24 hours have been reviewed.    Significant Imaging: X-Ray: I have reviewed all pertinent results/findings and my personal findings are:  R hip:  Displaced right femoral neck fracture

## 2019-12-16 NOTE — H&P
[facility] Hospital Medicine  History & Physical    Patient Name: Fabiana Morel  MRN: [id number]  Admission Date: 12/15/2019  Attending Physician: Dr. Matute  Primary Care Provider: Dominique Bartlett MD         Patient information was obtained from patient and ER records.     Subjective:     Principal Problem:Closed fracture of neck of right femur    Chief Complaint:   Chief Complaint   Patient presents with    Fall     Rt upper leg/Hip pain        HPI: Fabiana Morel is a 64 year old female who presented to the ER for evaluation of right hip after a mechanical fall. She states that she tripped on her slippers while walking outside to check her mail. Injury occurred several hours prior to arrival. Describes pain as intense at worst. 9/10 pain at worst. Pain is worse with any movement of right leg. She was unable to bear weight on right leg. She denies striking her head, loss of consciousness or cervical pain. Her work up in the ER demonstrated a right femoral neck fracture as well as a AUTUMN infiltrate. Labs were stable. She has been treated over the past several months by Dr. Mackey for the AUTUMN infiltrate with Augmentin. She drinks several beers daily. She quite smoking 1 month ago. PMH includes, COPD, HTN and hyperlipidemia. Surgical hx includes hysterectomy and appendectomy. [facility] for admission and management. Dr. Santos was consulted for orthopedics by ER physician. Will admit to Hospital, NPO after midnight, continue with preop work up. Dr. Mackey will be consulted for pulmonary clearance.     Past Medical History:   Diagnosis Date    Abnormal CT scan 1/8/2015    Asthma     Colitis     COPD (chronic obstructive pulmonary disease)     Diverticulitis of sigmoid colon 11/4/2014    Erosive gastritis 7/17/2013    Fracture of left clavicle     H pylori ulcer 7/17/2013    Hypertension     Peptic ulcer disease 7/17/2013    Pneumonia of right upper lobe due to  infectious organism 8/15/2017    Rectal bleed 11/3/2014    Scoliosis     SOB (shortness of breath)     Yeast infection 8/25/2017       Past Surgical History:   Procedure Laterality Date    APPENDECTOMY      BREAST BIOPSY Right     cyst    COLONOSCOPY N/A 7/15/2019    Procedure: COLONOSCOPY;  Surgeon: Sharif Chun MD;  Location: [facility];  Service: Endoscopy;  Laterality: N/A;    HYSTERECTOMY      HASMUKH/BSO, DUB    OOPHORECTOMY      TUBAL LIGATION         Review of patient's allergies indicates:   Allergen Reactions    Erythromycin Nausea Only       No current facility-administered medications on file prior to encounter.      Current Outpatient Medications on File Prior to Encounter   Medication Sig    albuterol (PROVENTIL) 4 MG Tab Take 2 mg by mouth once daily.    ALPRAZolam (XANAX) 0.25 MG tablet Take 1 tablet (0.25 mg total) by mouth 3 (three) times daily as needed.    amoxicillin-clavulanate 875-125mg (AUGMENTIN) 875-125 mg per tablet Take 1 tablet by mouth every 12 (twelve) hours.    amoxicillin-clavulanate 875-125mg (AUGMENTIN) 875-125 mg per tablet Take 1 tablet by mouth every 12 (twelve) hours.    atorvastatin (LIPITOR) 40 MG tablet TAKE ONE TABLET BY MOUTH ONCE DAILY    cyproheptadine (PERIACTIN) 4 mg tablet Take 1 tablet (4 mg total) by mouth 3 (three) times daily as needed.    fluticasone-umeclidin-vilanter (TRELEGY ELLIPTA) 100-62.5-25 mcg DsDv Inhale 1 puff into the lungs once daily.    metoprolol succinate (TOPROL XL) 50 MG 24 hr tablet Take 1 tablet (50 mg total) by mouth once daily.    mirtazapine (REMERON) 7.5 MG Tab Take 1 tablet (7.5 mg total) by mouth every evening.    predniSONE (DELTASONE) 20 MG tablet One daily for 3 days and repeat for flare of lung symptoms as intructed    VENTOLIN HFA 90 mcg/actuation inhaler Inhale 2 puffs into the lungs every 4 (four) hours as needed for Wheezing.     Family History     Problem Relation (Age of Onset)    Cancer Mother (49),  Sister        Tobacco Use    Smoking status: Current Every Day Smoker     Packs/day: 0.50     Years: 30.00     Pack years: 15.00     Types: Cigarettes    Smokeless tobacco: Never Used    Tobacco comment: quit 1 month ago.   Substance and Sexual Activity    Alcohol use: Yes     Alcohol/week: 12.0 standard drinks     Types: 12 Cans of beer per week     Comment: 2-3 beers daily    Drug use: No    Sexual activity: Yes     Partners: Male     Review of Systems   Constitutional: Negative for activity change, appetite change, fatigue and fever.   HENT: Negative for congestion, sinus pressure, sinus pain, sneezing and sore throat.    Eyes: Negative for photophobia, pain, discharge and redness.   Respiratory: Negative for cough, choking, chest tightness, shortness of breath and wheezing.    Cardiovascular: Negative for chest pain, palpitations and leg swelling.   Gastrointestinal: Negative for abdominal distention, abdominal pain, nausea and vomiting.   Genitourinary: Negative for difficulty urinating, dysuria, flank pain, frequency and urgency.   Musculoskeletal: Positive for arthralgias and gait problem. Negative for back pain, joint swelling, myalgias and neck pain.   Skin: Negative for color change, pallor, rash and wound.   Hematological: Negative.    Psychiatric/Behavioral: Negative.      Objective:     Vital Signs (Most Recent):  Temp: 98.7 °F (37.1 °C) (12/15/19 2055)  Pulse: 94 (12/15/19 2055)  Resp: 16 (12/15/19 2055)  BP: 110/65 (12/15/19 2055)  SpO2: 96 % (12/15/19 2055) Vital Signs (24h Range):  Temp:  [98.7 °F (37.1 °C)] 98.7 °F (37.1 °C)  Pulse:  [94] 94  Resp:  [16] 16  SpO2:  [96 %] 96 %  BP: (110)/(65) 110/65     Weight: 54.4 kg (120 lb)  Body mass index is 20.6 kg/m².    Physical Exam   Constitutional: She appears well-developed and well-nourished. No distress.   HENT:   Head: Normocephalic.   Right Ear: External ear normal.   Left Ear: External ear normal.   Mouth/Throat: Oropharynx is clear and  moist.   Eyes: Pupils are equal, round, and reactive to light. Conjunctivae and EOM are normal. Right eye exhibits no discharge. Left eye exhibits no discharge. No scleral icterus.   Neck: Normal range of motion. Neck supple.   Cardiovascular: Normal rate, regular rhythm, normal heart sounds and intact distal pulses. Exam reveals no gallop and no friction rub.   No murmur heard.  Pulmonary/Chest: Effort normal and breath sounds normal. No stridor. No respiratory distress. She has no wheezes. She has no rales.   Abdominal: Soft. Bowel sounds are normal. She exhibits no distension and no mass. There is no tenderness. There is no guarding.   Musculoskeletal: She exhibits tenderness and deformity.        Right hip: She exhibits decreased range of motion, decreased strength, tenderness, bony tenderness and deformity.        Legs:  Lymphadenopathy:     She has no cervical adenopathy.   Neurological: She is alert.   Skin: Skin is warm and dry. Capillary refill takes less than 2 seconds. No rash noted. She is not diaphoretic. No erythema. No pallor.   Psychiatric: She has a normal mood and affect. Her behavior is normal. Judgment and thought content normal.   Nursing note and vitals reviewed.        CRANIAL NERVES     CN III, IV, VI   Pupils are equal, round, and reactive to light.  Extraocular motions are normal.        Significant Labs:   CBC:   Recent Labs   Lab 12/15/19  2200   WBC 12.66   HGB 11.7*   HCT 37.6   *     CMP:   Recent Labs   Lab 12/15/19  2200   *   K 3.7   CL 97   CO2 24   *   BUN 11   CREATININE 0.7   CALCIUM 9.9   PROT 7.1   ALBUMIN 2.4*   BILITOT 0.3   ALKPHOS 109   AST 17   ALT 13   ANIONGAP 11   EGFRNONAA >60     Coagulation: No results for input(s): PT, INR, APTT in the last 48 hours.  Urine Studies: No results for input(s): COLORU, APPEARANCEUA, PHUR, SPECGRAV, PROTEINUA, GLUCUA, KETONESU, BILIRUBINUA, OCCULTUA, NITRITE, UROBILINOGEN, LEUKOCYTESUR, RBCUA, WBCUA, BACTERIA,  SQUAMEPITHEL, HYALINECASTS in the last 48 hours.    Invalid input(s): NUBIA    Significant Imaging: I have reviewed all pertinent imaging results/findings within the past 24 hours.    Assessment/Plan:     * Closed fracture of neck of right femur  Acute Problem  Mechanical Fall. Xray demonstrates right femoral neck fracture  Right leg is neurovascularly intact  Orthopedics on case. NPO after midnight for surgery pending pulmonology clearance.  IV Morphine.      Abscess of left lung with pneumonia  Acute on Chronic Problem  Established problem and following with Dr. Mackey  CXR demonstrates evolving AUTUMN infiltrate  No hypoxia or respiratory distress  Start on Rocephin  Dr. Mackey consulted for pulmonary clearance.      Anxiety  Acute on chronic problem  Handling situation well currently  Xanax ordered prn anxiety.      COPD, severe  Chronic Problem  Respiratory status stable   Will continue albuterol nebs, and inhalers  Will closely monitor.      Hyperlipidemia  Chronic Problem  Continue statin therapy        Hypertension  Chronic Problem  BP decreased after pain meds given in ER  Will hold Metoprolol for now.  Monitor.      Tobacco abuse  Acute on Chronic Problem  Long term smoker  Quit 1 month ago  Dangers of cigarette smoking were reviewed with patient in detail for 10 minutes and patient was encouraged to quit. Nicotine replacement options were discussed.      VTE Risk Mitigation (From admission, onward)    STEPHANIE Hose. Anticipating possible surgery tomorrow.             Omar Serrano NP  Department of Hospital Medicine   [facility]

## 2019-12-16 NOTE — HPI
Fabiana Morel is a 64 year old female who presented to the ER for evaluation of right hip after a mechanical fall. She states that she tripped on her slippers while walking outside to check her mail. Injury occurred several hours prior to arrival. Describes pain as intense at worst. 9/10 pain at worst. Pain is worse with any movement of right leg. She was unable to bear weight on right leg. She denies striking her head, loss of consciousness or cervical pain. Her work up in the ER demonstrated a right femoral neck fracture as well as a AUTUMN infiltrate. Labs were stable. She has been treated over the past several months by Dr. Mackey for the AUTUMN infiltrate with Augmentin. She drinks several beers daily. She quite smoking 1 month ago. PMH includes, COPD, HTN and hyperlipidemia. Surgical hx includes hysterectomy and appendectomy. [facility] was consulted for admission and management. Dr. Santos was consulted for orthopedics by ER physician. Will admit to Hospital, NPO after midnight, continue with preop work up. Dr. Mackey.    Dr. Santos has deferred to Dr. Wilson for evaluation for right total hip arthroplasty. Based on the patient's age and the displaced femoral neck fracture this may be the best surgery for her to give her the best long-term outcome.

## 2019-12-16 NOTE — ASSESSMENT & PLAN NOTE
Acute on Chronic Problem  Long term smoker  Quit 1 month ago  Dangers of cigarette smoking were reviewed with patient in detail for 10 minutes and patient was encouraged to quit. Nicotine replacement options were discussed.

## 2019-12-16 NOTE — PLAN OF CARE
VSs pain medication required x 2 this shift, bed low bed alarm activated call light within easy reach

## 2019-12-16 NOTE — PROGRESS NOTES
[facility] Hospital Medicine  Progress Note    Patient Name: Fabiana Morel  MRN: [id number]  Patient Class: IP- Inpatient   Admission Date: 12/15/2019  Length of Stay: 1 days  Attending Physician: Aniket Olguin MD  Primary Care Provider: Dominique Bartlett MD        Subjective:     Principal Problem:Closed fracture of neck of right femur        HPI:  Fabiana Morel is a 64 year old female who presented to the ER for evaluation of right hip after a mechanical fall. She states that she tripped on her slippers while walking outside to check her mail. Injury occurred several hours prior to arrival. Describes pain as intense at worst. 9/10 pain at worst. Pain is worse with any movement of right leg. She was unable to bear weight on right leg. She denies striking her head, loss of consciousness or cervical pain. Her work up in the ER demonstrated a right femoral neck fracture as well as a AUTUMN infiltrate. Labs were stable. She has been treated over the past several months by Dr. Mackey for the AUTUMN infiltrate with Augmentin. She drinks several beers daily. She quite smoking 1 month ago. PMH includes, COPD, HTN and hyperlipidemia. Surgical hx includes hysterectomy and appendectomy. [facility] was consulted for admission and management. Dr. Santos was consulted for orthopedics by ER physician. Will admit to Hospital, NPO after midnight, continue with preop work up. Dr. Mackey will be consulted for pulmonary clearance.     Overview/Hospital Course:  No notes on file    Interval History: patient currently states that her pain is under control with the IV morphine.  Patient denies any other symptoms at this point.  Current NPO for possible hip surgery    Review of Systems   Constitutional: Negative for activity change, appetite change, fatigue and fever.   HENT: Negative for congestion, sinus pressure, sinus pain, sneezing and sore throat.    Eyes: Negative for photophobia, pain, discharge and  redness.   Respiratory: Negative for cough, choking, chest tightness, shortness of breath and wheezing.    Cardiovascular: Negative for chest pain, palpitations and leg swelling.   Gastrointestinal: Negative for abdominal distention, abdominal pain, nausea and vomiting.   Genitourinary: Negative for difficulty urinating, dysuria, flank pain, frequency and urgency.   Musculoskeletal: Positive for arthralgias and gait problem. Negative for back pain, joint swelling, myalgias and neck pain.   Skin: Negative for color change, pallor, rash and wound.   Hematological: Negative.    Psychiatric/Behavioral: Negative.      Objective:     Vital Signs (Most Recent):  Temp: 97.5 °F (36.4 °C) (12/16/19 1129)  Pulse: 79 (12/16/19 1129)  Resp: 16 (12/16/19 1129)  BP: 105/61 (12/16/19 1129)  SpO2: 95 % (12/16/19 1129) Vital Signs (24h Range):  Temp:  [97.5 °F (36.4 °C)-98.7 °F (37.1 °C)] 97.5 °F (36.4 °C)  Pulse:  [64-94] 79  Resp:  [16-20] 16  SpO2:  [93 %-96 %] 95 %  BP: ()/(54-65) 105/61     Weight: 51.8 kg (114 lb 3.2 oz)  Body mass index is 19.6 kg/m².    Intake/Output Summary (Last 24 hours) at 12/16/2019 1450  Last data filed at 12/16/2019 0500  Gross per 24 hour   Intake 290 ml   Output 1200 ml   Net -910 ml      Physical Exam   Constitutional: She appears well-developed and well-nourished. No distress.   HENT:   Head: Normocephalic.   Right Ear: External ear normal.   Left Ear: External ear normal.   Mouth/Throat: Oropharynx is clear and moist.   Eyes: Pupils are equal, round, and reactive to light. Conjunctivae and EOM are normal. Right eye exhibits no discharge. Left eye exhibits no discharge. No scleral icterus.   Neck: Normal range of motion. Neck supple.   Cardiovascular: Normal rate, regular rhythm, normal heart sounds and intact distal pulses. Exam reveals no gallop and no friction rub.   No murmur heard.  Pulmonary/Chest: Effort normal and breath sounds normal. No stridor. No respiratory distress. She has no  wheezes. She has no rales.   Abdominal: Soft. Bowel sounds are normal. She exhibits no distension and no mass. There is no tenderness. There is no guarding.   Musculoskeletal: She exhibits tenderness and deformity.        Right hip: She exhibits decreased range of motion, decreased strength, tenderness, bony tenderness and deformity.        Legs:  Lymphadenopathy:     She has no cervical adenopathy.   Neurological: She is alert.   Skin: Skin is warm and dry. Capillary refill takes less than 2 seconds. No rash noted. She is not diaphoretic. No erythema. No pallor.   Psychiatric: She has a normal mood and affect. Her behavior is normal. Judgment and thought content normal.   Nursing note and vitals reviewed.      Significant Labs:   BMP:   Recent Labs   Lab 12/16/19  0435         K 3.6      CO2 27   BUN 9   CREATININE 0.6   CALCIUM 9.7   MG 2.0     CBC:   Recent Labs   Lab 12/15/19  2200 12/16/19  0435   WBC 12.66 7.27   HGB 11.7* 10.6*   HCT 37.6 34.6*   * 434*       Significant Imaging: I have reviewed all pertinent imaging results/findings within the past 24 hours.      Assessment/Plan:      * Closed fracture of neck of right femur  Acute Problem  Mechanical Fall. Xray demonstrates right femoral neck fracture  Right leg is neurovascularly intact  Orthopedics on case.   Has been NPO since midnight for surgery awaiting ortho evaluation and recommendations  IV Morphine.      Calcification of aorta        Abscess of left lung with pneumonia  Acute on Chronic Problem  Established problem and following with Dr. Mackey  CXR demonstrates evolving AUTUMN infiltrate  No hypoxia or respiratory distress  Start on Rocephin  Dr. Mackey consulted for pulmonary clearance.      Anxiety  Acute on chronic problem  Handling situation well currently  Xanax ordered prn anxiety.      COPD, severe  Chronic Problem  Respiratory status stable   Will continue albuterol nebs, and inhalers  Will closely  monitor.      Hyperlipidemia  Chronic Problem  Continue statin therapy        Hypertension  Chronic Problem  BP decreased after pain meds given in ER  Will hold Metoprolol for now.  Monitor.      Tobacco abuse  Acute on Chronic Problem  Long term smoker  Quit 1 month ago  Dangers of cigarette smoking were reviewed with patient in detail for 10 minutes and patient was encouraged to quit. Nicotine replacement options were discussed.        VTE Risk Mitigation (From admission, onward)         Ordered     IP VTE LOW RISK PATIENT  Once      12/15/19 2352     Place STEPHANIE hose  Until discontinued      12/15/19 2352                      Aniket Olguin MD  Department of Hospital Medicine   [facility]

## 2019-12-16 NOTE — NURSING
Called lab about order for U/A since one had been done in ER. Spoke with Kt.  Asked if they still had urine left to run U/A with reflex to Urine culture. Stated he would take care of it.

## 2019-12-16 NOTE — PLAN OF CARE
Interventions:  To be determined    Recommendations    1) Regular diet + Boost Plus, BID (chocolate)  when medically able. 2) Wt checks wkly 3) Consider MVI  Goals: 1) New 2) Pt will not lose weight x 1 week  Nutrition Goal Status: 1) new 2) new  Communication of RD Recs: (POC, sticky note)

## 2019-12-16 NOTE — ASSESSMENT & PLAN NOTE
Acute Problem  Mechanical Fall. Xray demonstrates right femoral neck fracture  Right leg is neurovascularly intact  Orthopedics on case.   Has been NPO since midnight for surgery awaiting ortho evaluation and recommendations  IV Morphine.

## 2019-12-16 NOTE — ASSESSMENT & PLAN NOTE
Acute on Chronic Problem  Established problem and following with Dr. Mackey  CXR demonstrates evolving AUTUMN infiltrate  No hypoxia or respiratory distress  Start on Rocephin  Dr. Mackey consulted for pulmonary clearance.

## 2019-12-16 NOTE — PT/OT/SLP PROGRESS
Physical Therapy      Patient Name:  Fabiana Morel   MRN:  [id number]    PT order acknowledged- pt with RH fx- awaiting ortho consult. Please re order as appropriate.    Kellie Ivey, PT

## 2019-12-16 NOTE — PROGRESS NOTES
"[facility]  Adult Nutrition  Progress Note    SUMMARY   Interventions:  To be determined    Recommendations    1) Regular diet + Boost Plus, BID (chocolate)  when medically able. 2) Wt checks wkly 3) Consider MVI  Goals: 1) New 2) Pt will not lose weight x 1 week  Nutrition Goal Status: 1) new 2) new  Communication of RD Recs: (POC, sticky note)    Reason for Assessment    Reason For Assessment: identified at risk by screening criteria  Diagnosis: pulmonary disease(Closed fracture rt femur s/p fall)  Relevant Medical History: Colitis, COPD, diverticulities, erosive gastritis, pneumonia, ETOH 12 beers/week. Just quit smoking x 1 month  Interdisciplinary Rounds: did not attend  General Information Comments: 63 yo F admitted. On NPO pending possible surgery. Has been on regular diet. Pt alert and reports moderate appetite. Wt loss noted. NFPE completed. Wasting noted.      Nutrition Discharge Planning: to be determined    Nutrition Risk Screen    Nutrition Risk Screen: no indicators present    Nutrition/Diet History    Patient Reported Diet/Restrictions/Preferences: general  Typical Food/Fluid Intake: Generally eats 2 meals daily + snacks a lot since quitting smoking. Mostly chips, cookies, cereal.  Eats out 4-5 x wkly  Spiritual, Cultural Beliefs, Adventism Practices, Values that Affect Care: no  Food Allergies: NKFA  Factors Affecting Nutritional Intake: NPO    Anthropometrics    Temp: 96.6 °F (35.9 °C)  Height Method: Stated  Height: 5' 4"  Height (inches): 64 in  Weight Method: Bed Scale  Weight: 51.8 kg (114 lb 3.2 oz)  Weight (lb): 114.2 lb  Ideal Body Weight (IBW), Female: 120 lb  % Ideal Body Weight, Female (lb): 95.17 lb  BMI (Calculated): 19.6  BMI Grade: 18.5-24.9 - normal  Weight Loss: unintentional(19 (~6 months ago))  Usual Body Weight (UBW), k.3 kg  % Usual Body Weight: 92.2  % Weight Change From Usual Weight: -7.99 %       Lab/Procedures/Meds    Pertinent Labs Reviewed: " reviewed  Pertinent Labs Comments: Albumin 2.2.   Lab Results   Component Value Date    WBC 7.27 12/16/2019       Lab Results   Component Value Date    ALT 9 (L) 12/16/2019    AST 16 12/16/2019    ALKPHOS 104 12/16/2019    BILITOT 0.1 12/16/2019       Pertinent Medications Reviewed: reviewed  Pertinent Medications Comments: statin, rocephin, morphine, senna, zofran, KCl      Physical Findings/Assessment         Estimated/Assessed Needs    Weight Used For Calorie Calculations: 51.8 kg (114 lb 3.2 oz)  Energy Calorie Requirements (kcal): 1096-5787 (1.3-1.5 AF, wasting)  Energy Need Method: Hyde-St Jeor  Protein Requirements: 62-78 (1.2-1.5 g/kg/day wasting)  Weight Used For Protein Calculations: 51.8 kg (114 lb 3.2 oz)     Estimated Fluid Requirement Method: RDA Method(or per MD)  RDA Method (mL): 1375  CHO Requirement: n/a      Nutrition Prescription Ordered    Current Diet Order: NPO    Evaluation of Received Nutrient/Fluid Intake    I/O: 290/1200  Energy Calories Required: not meeting needs  Protein Required: not meeting needs  Fluid Required: not meeting needs  % Intake of Estimated Energy Needs: 0 - 25 %  % Meal Intake: NPO    Nutrition Risk    Level of Risk/Frequency of Follow-up: (2 x wkly)     Assessment and Plan    Moderate malnutrition  Contributing Nutrition Diagnosis  Moderate malnutrition in chronic illness    Related to (etiology):   Increased energy needs    Signs and Symptoms (as evidenced by):   1) Moderate fat loss  2) Mild-moderate muscle loss    Nutrition Diagnosis Status:   New           Monitor and Evaluation    Food and Nutrient Intake: energy intake  Knowledge/Beliefs/Attitudes: food and nutrition knowledge/skill  Anthropometric Measurements: weight  Biochemical Data, Medical Tests and Procedures: inflammatory profile, electrolyte and renal panel  Nutrition-Focused Physical Findings: overall appearance     Malnutrition Assessment     Skin (Micronutrient): (Galdino score 19)  Teeth  (Micronutrient): (WDL)   Micronutrient Evaluation: suspected deficiency  Micronutrient Evaluation Comments: 2/2 high intake empty calories per diet hx       Orbital Region (Subcutaneous Fat Loss): moderate depletion  Upper Arm Region (Subcutaneous Fat Loss): severe depletion   Hindu Region (Muscle Loss): mild depletion  Clavicle Bone Region (Muscle Loss): mild depletion  Clavicle and Acromion Bone Region (Muscle Loss): mild depletion  Dorsal Hand (Muscle Loss): moderate depletion  Patellar Region (Muscle Loss): moderate depletion  Anterior Thigh Region (Muscle Loss): moderate depletion       Subcutaneous Fat Loss (Final Summary): moderate protein-calorie malnutrition  Muscle Loss Evaluation (Final Summary): mild protein-calorie malnutrition         Nutrition Follow-Up  yes

## 2019-12-16 NOTE — PLAN OF CARE
Cm completed the assessment at pt's bedside.  Pt lives with spouse and he provides assistance to patient.  PCP is Dr. Bartlett.  Insurance verified as [employer]. Pt has a Living Will on file. Pt is on Oxygen at 2L per N/C with [employer].  Pt denies diabetes, dialysis and coumadin.  Disposition:  Pt will discharge to home with spouse.  Pt could Benefit with HH on discharge after PT/OT Evaluation.       12/16/19 1410   Discharge Assessment   Assessment Type Discharge Planning Assessment   Confirmed/corrected address and phone number on facesheet? Yes   Assessment information obtained from? Patient   Prior to hospitilization cognitive status: Alert/Oriented   Prior to hospitalization functional status: Assistive Equipment;Independent   Current cognitive status: Alert/Oriented   Current Functional Status: Assistive Equipment;Independent   Facility Arrived From: home   Lives With spouse   Able to Return to Prior Arrangements yes   Is patient able to care for self after discharge? Yes  (with spouse assistance)   Who are your caregiver(s) and their phone number(s)? spouse- H[place] - [phone]   Patient's perception of discharge disposition home or selfcare   Readmission Within the Last 30 Days no previous admission in last 30 days   Patient currently being followed by outpatient case management? Yes   If yes, name of outpatient case management following: insurance company assigned oupatient case management   Patient currently receives any other outside agency services? No   Equipment Currently Used at Home oxygen;walker, rolling;nebulizer  (Oxygen at 2L per N/C with [employer]/Transport chair)   Do you have any problems affording any of your prescribed medications? No   Is the patient taking medications as prescribed? yes   Does the patient have transportation home? Yes   Transportation Anticipated family or friend will provide   Does the patient receive services at the Coumadin Clinic? No   Discharge Plan A Home Health;Home with  family   DME Needed Upon Discharge  none   Patient/Family in Agreement with Plan yes

## 2019-12-16 NOTE — HOSPITAL COURSE
Right displaced femoral neck fracture. The patient is being evaluated by the hospitalist and Pulmonary service.

## 2019-12-16 NOTE — PLAN OF CARE
Patient AAO, VSS. Duran cath patent and draining without difficulty. Cardiac monitoring on going. Denies pain above 2. Npo for possible surgical procedure. Pt verbalized understanding of POC. Purposeful hourly/q2hr rounding done during shift to promote patient safety. Patient free from falls and injury during shift.  Bed in lowest position, brakes locked, and call light within reach.  Will continue to monitor.

## 2019-12-16 NOTE — HPI
Fabiana Morel is a 64 year old female who presented to the ER for evaluation of right hip after a mechanical fall. She states that she tripped on her slippers while walking outside to check her mail. Injury occurred several hours prior to arrival. Describes pain as intense at worst. 9/10 pain at worst. Pain is worse with any movement of right leg. She was unable to bear weight on right leg. She denies striking her head, loss of consciousness or cervical pain. Her work up in the ER demonstrated a right femoral neck fracture as well as a AUTUMN infiltrate. Labs were stable. She has been treated over the past several months by Dr. Mackey for the AUTUMN infiltrate with Augmentin. She drinks several beers daily. She quite smoking 1 month ago. PMH includes, COPD, HTN and hyperlipidemia. Surgical hx includes hysterectomy and appendectomy. [facility]. Dr. Santos was consulted for orthopedics by ER physician. Will admit to Hospital, NPO after midnight, continue with preop work up. Dr. Mackey will be consulted for pulmonary clearance.

## 2019-12-16 NOTE — NURSING
Received report from Audrey. Pt arrived on floor in bed with  at bedside. Transferred to bed in room , assistance X 3.  Denies much pain at present. States pain is about a 2. Has PIV to left AC. Flushes well. Duran catheter patent and draining. Alert and oriented X 4. Bed in lowest position. Wheels on bed locked. Call light in reach. Will continue to monitor.

## 2019-12-16 NOTE — SUBJECTIVE & OBJECTIVE
Past Medical History:   Diagnosis Date    Abnormal CT scan 1/8/2015    Asthma     Colitis     COPD (chronic obstructive pulmonary disease)     Diverticulitis of sigmoid colon 11/4/2014    Erosive gastritis 7/17/2013    Fracture of left clavicle     H pylori ulcer 7/17/2013    Hypertension     Peptic ulcer disease 7/17/2013    Pneumonia of right upper lobe due to infectious organism 8/15/2017    Rectal bleed 11/3/2014    Scoliosis     SOB (shortness of breath)     Yeast infection 8/25/2017       Past Surgical History:   Procedure Laterality Date    APPENDECTOMY      BREAST BIOPSY Right     cyst    COLONOSCOPY N/A 7/15/2019    Procedure: COLONOSCOPY;  Surgeon: Sharif Chun MD;  Location: [facility];  Service: Endoscopy;  Laterality: N/A;    HYSTERECTOMY      HASMUKH/BSO, DUB    OOPHORECTOMY      TUBAL LIGATION         Review of patient's allergies indicates:   Allergen Reactions    Erythromycin Nausea Only       No current facility-administered medications on file prior to encounter.      Current Outpatient Medications on File Prior to Encounter   Medication Sig    albuterol (PROVENTIL) 4 MG Tab Take 2 mg by mouth once daily.    ALPRAZolam (XANAX) 0.25 MG tablet Take 1 tablet (0.25 mg total) by mouth 3 (three) times daily as needed.    amoxicillin-clavulanate 875-125mg (AUGMENTIN) 875-125 mg per tablet Take 1 tablet by mouth every 12 (twelve) hours.    amoxicillin-clavulanate 875-125mg (AUGMENTIN) 875-125 mg per tablet Take 1 tablet by mouth every 12 (twelve) hours.    atorvastatin (LIPITOR) 40 MG tablet TAKE ONE TABLET BY MOUTH ONCE DAILY    cyproheptadine (PERIACTIN) 4 mg tablet Take 1 tablet (4 mg total) by mouth 3 (three) times daily as needed.    fluticasone-umeclidin-vilanter (TRELEGY ELLIPTA) 100-62.5-25 mcg DsDv Inhale 1 puff into the lungs once daily.    metoprolol succinate (TOPROL XL) 50 MG 24 hr tablet Take 1 tablet (50 mg total) by mouth once daily.    mirtazapine (REMERON)  7.5 MG Tab Take 1 tablet (7.5 mg total) by mouth every evening.    predniSONE (DELTASONE) 20 MG tablet One daily for 3 days and repeat for flare of lung symptoms as intructed    VENTOLIN HFA 90 mcg/actuation inhaler Inhale 2 puffs into the lungs every 4 (four) hours as needed for Wheezing.     Family History     Problem Relation (Age of Onset)    Cancer Mother (49), Sister        Tobacco Use    Smoking status: Current Every Day Smoker     Packs/day: 0.50     Years: 30.00     Pack years: 15.00     Types: Cigarettes    Smokeless tobacco: Never Used    Tobacco comment: quit 1 month ago.   Substance and Sexual Activity    Alcohol use: Yes     Alcohol/week: 12.0 standard drinks     Types: 12 Cans of beer per week     Comment: 2-3 beers daily    Drug use: No    Sexual activity: Yes     Partners: Male     Review of Systems   Constitutional: Negative for activity change, appetite change, fatigue and fever.   HENT: Negative for congestion, sinus pressure, sinus pain, sneezing and sore throat.    Eyes: Negative for photophobia, pain, discharge and redness.   Respiratory: Negative for cough, choking, chest tightness, shortness of breath and wheezing.    Cardiovascular: Negative for chest pain, palpitations and leg swelling.   Gastrointestinal: Negative for abdominal distention, abdominal pain, nausea and vomiting.   Genitourinary: Negative for difficulty urinating, dysuria, flank pain, frequency and urgency.   Musculoskeletal: Positive for arthralgias and gait problem. Negative for back pain, joint swelling, myalgias and neck pain.   Skin: Negative for color change, pallor, rash and wound.   Hematological: Negative.    Psychiatric/Behavioral: Negative.      Objective:     Vital Signs (Most Recent):  Temp: 98.7 °F (37.1 °C) (12/15/19 2055)  Pulse: 94 (12/15/19 2055)  Resp: 16 (12/15/19 2055)  BP: 110/65 (12/15/19 2055)  SpO2: 96 % (12/15/19 2055) Vital Signs (24h Range):  Temp:  [98.7 °F (37.1 °C)] 98.7 °F (37.1  °C)  Pulse:  [94] 94  Resp:  [16] 16  SpO2:  [96 %] 96 %  BP: (110)/(65) 110/65     Weight: 54.4 kg (120 lb)  Body mass index is 20.6 kg/m².    Physical Exam   Constitutional: She appears well-developed and well-nourished. No distress.   HENT:   Head: Normocephalic.   Right Ear: External ear normal.   Left Ear: External ear normal.   Mouth/Throat: Oropharynx is clear and moist.   Eyes: Pupils are equal, round, and reactive to light. Conjunctivae and EOM are normal. Right eye exhibits no discharge. Left eye exhibits no discharge. No scleral icterus.   Neck: Normal range of motion. Neck supple.   Cardiovascular: Normal rate, regular rhythm, normal heart sounds and intact distal pulses. Exam reveals no gallop and no friction rub.   No murmur heard.  Pulmonary/Chest: Effort normal and breath sounds normal. No stridor. No respiratory distress. She has no wheezes. She has no rales.   Abdominal: Soft. Bowel sounds are normal. She exhibits no distension and no mass. There is no tenderness. There is no guarding.   Musculoskeletal: She exhibits tenderness and deformity.        Right hip: She exhibits decreased range of motion, decreased strength, tenderness, bony tenderness and deformity.        Legs:  Lymphadenopathy:     She has no cervical adenopathy.   Neurological: She is alert.   Skin: Skin is warm and dry. Capillary refill takes less than 2 seconds. No rash noted. She is not diaphoretic. No erythema. No pallor.   Psychiatric: She has a normal mood and affect. Her behavior is normal. Judgment and thought content normal.   Nursing note and vitals reviewed.        CRANIAL NERVES     CN III, IV, VI   Pupils are equal, round, and reactive to light.  Extraocular motions are normal.        Significant Labs:   CBC:   Recent Labs   Lab 12/15/19  2200   WBC 12.66   HGB 11.7*   HCT 37.6   *     CMP:   Recent Labs   Lab 12/15/19  2200   *   K 3.7   CL 97   CO2 24   *   BUN 11   CREATININE 0.7   CALCIUM 9.9    PROT 7.1   ALBUMIN 2.4*   BILITOT 0.3   ALKPHOS 109   AST 17   ALT 13   ANIONGAP 11   EGFRNONAA >60     Coagulation: No results for input(s): PT, INR, APTT in the last 48 hours.  Urine Studies: No results for input(s): COLORU, APPEARANCEUA, PHUR, SPECGRAV, PROTEINUA, GLUCUA, KETONESU, BILIRUBINUA, OCCULTUA, NITRITE, UROBILINOGEN, LEUKOCYTESUR, RBCUA, WBCUA, BACTERIA, SQUAMEPITHEL, HYALINECASTS in the last 48 hours.    Invalid input(s): NUBIA    Significant Imaging: I have reviewed all pertinent imaging results/findings within the past 24 hours.

## 2019-12-16 NOTE — H&P
[facility]  Orthopedics  H&P    Patient Name: Fabiana Morel  MRN: [id number]  Admission Date: 12/15/2019  Primary Care Provider: Dominique Bartlett MD    Patient information was obtained from patient, spouse/SO and ER records.     Subjective:     Principal Problem:Closed fracture of neck of right femur    Chief Complaint:   Chief Complaint   Patient presents with    Fall     Rt upper leg/Hip pain        HPI: Fabiana Morel is a 64 year old female who presented to the ER for evaluation of right hip after a mechanical fall. She states that she tripped on her slippers while walking outside to check her mail. Injury occurred several hours prior to arrival. Describes pain as intense at worst. 9/10 pain at worst. Pain is worse with any movement of right leg. She was unable to bear weight on right leg. She denies striking her head, loss of consciousness or cervical pain. Her work up in the ER demonstrated a right femoral neck fracture as well as a AUTUMN infiltrate. Labs were stable. She has been treated over the past several months by Dr. Mackey for the AUTUMN infiltrate with Augmentin. She drinks several beers daily. She quite smoking 1 month ago. PMH includes, COPD, HTN and hyperlipidemia. Surgical hx includes hysterectomy and appendectomy. [facility] was consulted for admission and management. Dr. Santos was consulted for orthopedics by ER physician. Will admit to Hospital, NPO after midnight, continue with preop work up. Dr. Mackey     Past Medical History:   Diagnosis Date    Abnormal CT scan 1/8/2015    Asthma     Colitis     COPD (chronic obstructive pulmonary disease)     Diverticulitis of sigmoid colon 11/4/2014    Erosive gastritis 7/17/2013    Fracture of left clavicle     H pylori ulcer 7/17/2013    Hypertension     Peptic ulcer disease 7/17/2013    Pneumonia of right upper lobe due to infectious organism 8/15/2017    Rectal bleed 11/3/2014    Scoliosis     SOB (shortness of  breath)     Yeast infection 8/25/2017       Past Surgical History:   Procedure Laterality Date    APPENDECTOMY      BREAST BIOPSY Right     cyst    COLONOSCOPY N/A 7/15/2019    Procedure: COLONOSCOPY;  Surgeon: Sharif Chun MD;  Location: [facility];  Service: Endoscopy;  Laterality: N/A;    HYSTERECTOMY      HASMUKH/BSO, DUB    OOPHORECTOMY      TUBAL LIGATION         Review of patient's allergies indicates:   Allergen Reactions    Erythromycin Nausea Only       Current Facility-Administered Medications   Medication    acetaminophen tablet 1,000 mg    albuterol syrup 2 mg    ALPRAZolam tablet 0.25 mg    atorvastatin tablet 40 mg    cefTRIAXone (ROCEPHIN) 1 g in dextrose 5 % 50 mL IVPB    dextrose 50% injection 12.5 g    dextrose 50% injection 25 g    fluticasone-umeclidin-vilanter 100-62.5-25 mcg DsDv 1 puff    glucagon (human recombinant) injection 1 mg    glucose chewable tablet 16 g    glucose chewable tablet 24 g    magnesium oxide tablet 800 mg    magnesium oxide tablet 800 mg    melatonin tablet 9 mg    morphine injection 4 mg    ondansetron injection 8 mg    potassium chloride 10% oral solution 40 mEq    potassium chloride 10% oral solution 40 mEq    senna-docusate 8.6-50 mg per tablet 1 tablet    sodium chloride 0.9% flush 10 mL     Family History     Problem Relation (Age of Onset)    Cancer Mother (49), Sister        Tobacco Use    Smoking status: Current Every Day Smoker     Packs/day: 0.50     Years: 30.00     Pack years: 15.00     Types: Cigarettes    Smokeless tobacco: Never Used    Tobacco comment: quit 1 month ago.   Substance and Sexual Activity    Alcohol use: Yes     Alcohol/week: 12.0 standard drinks     Types: 12 Cans of beer per week     Comment: 2-3 beers daily    Drug use: No    Sexual activity: Yes     Partners: Male     Review of Systems   Constitution: Negative for chills and fever.   HENT: Negative for congestion.    Eyes: Negative for blurred vision.  "  Cardiovascular: Positive for dyspnea on exertion. Negative for chest pain and syncope.   Respiratory: Positive for shortness of breath. Negative for cough.    Endocrine: Negative for polyuria.   Hematologic/Lymphatic: Negative for bleeding problem. Does not bruise/bleed easily.   Skin: Negative for rash.   Musculoskeletal: Positive for falls, joint pain and joint swelling. Negative for muscle cramps, muscle weakness and myalgias.   Gastrointestinal: Negative for abdominal pain, nausea and vomiting.   Genitourinary: Negative for flank pain.   Neurological: Negative for numbness and seizures.   Psychiatric/Behavioral: Negative for altered mental status.   Allergic/Immunologic: Negative for persistent infections.     Objective:     Vital Signs (Most Recent):  Temp: 97.5 °F (36.4 °C) (12/16/19 1129)  Pulse: 79 (12/16/19 1129)  Resp: 16 (12/16/19 1129)  BP: 105/61 (12/16/19 1129)  SpO2: 95 % (12/16/19 1129) Vital Signs (24h Range):  Temp:  [97.5 °F (36.4 °C)-98.7 °F (37.1 °C)] 97.5 °F (36.4 °C)  Pulse:  [64-94] 79  Resp:  [16-20] 16  SpO2:  [93 %-96 %] 95 %  BP: ()/(54-65) 105/61     Weight: 51.8 kg (114 lb 3.2 oz)  Height: 5' 4" (162.6 cm)  Body mass index is 19.6 kg/m².      Intake/Output Summary (Last 24 hours) at 12/16/2019 1228  Last data filed at 12/16/2019 0500  Gross per 24 hour   Intake 290 ml   Output 1200 ml   Net -910 ml       General    Nursing note and vitals reviewed.  Constitutional: She is oriented to person, place, and time. She appears well-developed and well-nourished. No distress.   HENT:   Head: Atraumatic.   Eyes: EOM are normal.   Cardiovascular: Normal rate.    Pulmonary/Chest: Effort normal.   Abdominal: Soft.   Neurological: She is alert and oriented to person, place, and time.   Psychiatric: Her behavior is normal.             Right Hip Exam     Inspection   Swelling: present  Erythema: absent    Tenderness   The patient tender to palpation of the trochanteric bursa.    Range of Motion "   Abduction: abnormal   Adduction: abnormal   Extension: abnormal   Flexion: abnormal   External rotation: abnormal   Internal rotation: abnormal     Tests   Log Roll: positive    Other   Sensation: normal  Left Hip Exam   Left hip exam is normal.          Vascular Exam     Right Pulses  Dorsalis Pedis:      2+              Significant Labs:   CBC:   Recent Labs   Lab 12/15/19  2200 12/16/19  0435   WBC 12.66 7.27   HGB 11.7* 10.6*   HCT 37.6 34.6*   * 434*     CMP:   Recent Labs   Lab 12/15/19  2200 12/16/19  0435   * 136   K 3.7 3.6   CL 97 101   CO2 24 27   * 104   BUN 11 9   CREATININE 0.7 0.6   CALCIUM 9.9 9.7   PROT 7.1 6.4   ALBUMIN 2.4* 2.2*   BILITOT 0.3 0.1   ALKPHOS 109 104   AST 17 16   ALT 13 9*   ANIONGAP 11 8   EGFRNONAA >60 >60     Coagulation:   Recent Labs   Lab 12/16/19 0435   LABPROT 10.3   INR 1.0   APTT 28.9     All pertinent labs within the past 24 hours have been reviewed.    Significant Imaging: X-Ray: I have reviewed all pertinent results/findings and my personal findings are:  R hip:  Displaced right femoral neck fracture    Assessment/Plan:     * Closed fracture of neck of right femur  To OR for right total hip arthroplasty. Plan is for Dr. Wilson to perform.        Marc Santos MD  Orthopedics  [facility]

## 2019-12-16 NOTE — H&P (VIEW-ONLY)
[facility]  Orthopedics  Consult Note    Patient Name: Fabiana Morel  MRN: [id number]  Admission Date: 12/15/2019  Hospital Length of Stay: 1 days  Attending Provider: Aniket Olguin MD  Primary Care Provider: Dominique Bartlett MD    Patient information was obtained from patient and ER records.     Consults  Subjective:     Principal Problem:Closed fracture of neck of right femur    Chief Complaint:   Chief Complaint   Patient presents with    Fall     Rt upper leg/Hip pain        HPI: Fabiana Morel is a 64 year old female who presented to the ER for evaluation of right hip after a mechanical fall. She states that she tripped on her slippers while walking outside to check her mail. Injury occurred several hours prior to arrival. Describes pain as intense at worst. 9/10 pain at worst. Pain is worse with any movement of right leg. She was unable to bear weight on right leg. She denies striking her head, loss of consciousness or cervical pain. Her work up in the ER demonstrated a right femoral neck fracture as well as a AUTUMN infiltrate. Labs were stable. She has been treated over the past several months by Dr. Mackey for the AUTUMN infiltrate with Augmentin. She drinks several beers daily. She quite smoking 1 month ago. PMH includes, COPD, HTN and hyperlipidemia. Surgical hx includes hysterectomy and appendectomy. [facility]. Dr. Santos was consulted for orthopedics by ER physician. Will admit to Hospital, NPO after midnight, continue with preop work up. Dr. Mackey.    Dr. Santos has deferred to Dr. Wilson for evaluation for right total hip arthroplasty. Based on the patient's age and the displaced femoral neck fracture this may be the best surgery for her to give her the best long-term outcome.      Past Medical History:   Diagnosis Date    Abnormal CT scan 1/8/2015    Asthma     Colitis     COPD (chronic obstructive pulmonary disease)      Diverticulitis of sigmoid colon 11/4/2014    Erosive gastritis 7/17/2013    Fracture of left clavicle     H pylori ulcer 7/17/2013    Hypertension     Peptic ulcer disease 7/17/2013    Pneumonia of right upper lobe due to infectious organism 8/15/2017    Rectal bleed 11/3/2014    Scoliosis     SOB (shortness of breath)     Yeast infection 8/25/2017       Past Surgical History:   Procedure Laterality Date    APPENDECTOMY      BREAST BIOPSY Right     cyst    COLONOSCOPY N/A 7/15/2019    Procedure: COLONOSCOPY;  Surgeon: Sharif Chun MD;  Location: [facility];  Service: Endoscopy;  Laterality: N/A;    HYSTERECTOMY      HASMUKH/BSO, DUB    OOPHORECTOMY      TUBAL LIGATION         Review of patient's allergies indicates:   Allergen Reactions    Erythromycin Nausea Only       Current Facility-Administered Medications   Medication    acetaminophen tablet 1,000 mg    albuterol syrup 2 mg    ALPRAZolam tablet 0.25 mg    atorvastatin tablet 40 mg    cefTRIAXone (ROCEPHIN) 1 g in dextrose 5 % 50 mL IVPB    dextrose 50% injection 12.5 g    dextrose 50% injection 25 g    fluticasone-umeclidin-vilanter 100-62.5-25 mcg DsDv 1 puff    glucagon (human recombinant) injection 1 mg    glucose chewable tablet 16 g    glucose chewable tablet 24 g    magnesium oxide tablet 800 mg    magnesium oxide tablet 800 mg    melatonin tablet 9 mg    morphine injection 4 mg    ondansetron injection 8 mg    potassium chloride 10% oral solution 40 mEq    potassium chloride 10% oral solution 40 mEq    senna-docusate 8.6-50 mg per tablet 1 tablet    sodium chloride 0.9% flush 10 mL     Family History     Problem Relation (Age of Onset)    Cancer Mother (49), Sister        Tobacco Use    Smoking status: Current Every Day Smoker     Packs/day: 0.50     Years: 30.00     Pack years: 15.00     Types: Cigarettes    Smokeless tobacco: Never Used    Tobacco comment: quit 1 month ago.   Substance and Sexual Activity     "Alcohol use: Yes     Alcohol/week: 12.0 standard drinks     Types: 12 Cans of beer per week     Comment: 2-3 beers daily    Drug use: No    Sexual activity: Yes     Partners: Male     Review of Systems   Constitution: Negative.   HENT: Negative.    Eyes: Negative.    Cardiovascular: Negative.    Respiratory: Positive for cough, shortness of breath and sputum production.    Endocrine: Negative.    Hematologic/Lymphatic: Negative.    Skin: Negative.    Musculoskeletal: Positive for falls, joint pain and joint swelling.   Gastrointestinal: Negative.    Genitourinary: Negative.    Neurological: Negative.    Psychiatric/Behavioral: Negative.    Allergic/Immunologic: Negative.      Objective:     Vital Signs (Most Recent):  Temp: 96.6 °F (35.9 °C) (12/16/19 1459)  Pulse: 93 (12/16/19 1459)  Resp: 16 (12/16/19 1459)  BP: 115/70 (12/16/19 1459)  SpO2: (!) 93 % (12/16/19 1459) Vital Signs (24h Range):  Temp:  [96.6 °F (35.9 °C)-98.7 °F (37.1 °C)] 96.6 °F (35.9 °C)  Pulse:  [64-94] 93  Resp:  [16-20] 16  SpO2:  [93 %-96 %] 93 %  BP: ()/(54-70) 115/70     Weight: 51.8 kg (114 lb 3.2 oz)  Height: 5' 4" (162.6 cm)  Body mass index is 19.6 kg/m².      Intake/Output Summary (Last 24 hours) at 12/16/2019 1635  Last data filed at 12/16/2019 0500  Gross per 24 hour   Intake 290 ml   Output 1200 ml   Net -910 ml       General    Constitutional: She is oriented to person, place, and time. She appears well-developed and well-nourished.   HENT:   Head: Normocephalic and atraumatic.   Eyes: EOM are normal. Pupils are equal, round, and reactive to light.   Neck: Neck supple.   Cardiovascular: Normal rate, regular rhythm and normal heart sounds.    Pulmonary/Chest: Effort normal and breath sounds normal.   Abdominal: Soft. Bowel sounds are normal.   Neurological: She is alert and oriented to person, place, and time.   Psychiatric: She has a normal mood and affect. Her behavior is normal. Thought content normal.             Right Hip " Exam     Comments:  The right lower extremity is shortened and externally rotated.    Skin is intact throughout.  There is pain with any attempted motion of the right hip that localizes to the right groin.  Distally motor functions intact EHL, FHL, TA, gastroc  Plus two dorsalis pedis and posterior tibial pulses  Normal sensation light touch dorsal, plantar, 1st webspace          Significant Labs:   CBC:   Recent Labs   Lab 12/15/19  2200 12/16/19  0435   WBC 12.66 7.27   HGB 11.7* 10.6*   HCT 37.6 34.6*   * 434*     All pertinent labs within the past 24 hours have been reviewed.    Significant Imaging: X-Ray: I have reviewed all pertinent results/findings and my personal findings are:  There is a displaced right femoral neck fracture    Assessment/Plan:     * Closed fracture of neck of right femur  To OR for right total hip arthroplasty. Plan is for Dr. Wilson to perform.      Thank you for your consult. Plan is for right total hip arthroplasty.  We will plan for this on Wednesday.  The patient needs to be NPO after midnight Tuesday night.    Bernardino Wilson II, MD  Orthopedics  [facility]

## 2019-12-16 NOTE — SUBJECTIVE & OBJECTIVE
Past Medical History:   Diagnosis Date    Abnormal CT scan 1/8/2015    Asthma     Colitis     COPD (chronic obstructive pulmonary disease)     Diverticulitis of sigmoid colon 11/4/2014    Erosive gastritis 7/17/2013    Fracture of left clavicle     H pylori ulcer 7/17/2013    Hypertension     Peptic ulcer disease 7/17/2013    Pneumonia of right upper lobe due to infectious organism 8/15/2017    Rectal bleed 11/3/2014    Scoliosis     SOB (shortness of breath)     Yeast infection 8/25/2017       Past Surgical History:   Procedure Laterality Date    APPENDECTOMY      BREAST BIOPSY Right     cyst    COLONOSCOPY N/A 7/15/2019    Procedure: COLONOSCOPY;  Surgeon: Sharif Chun MD;  Location[facility];  Service: Endoscopy;  Laterality: N/A;    HYSTERECTOMY      HASMUKH/BSO, DUB    OOPHORECTOMY      TUBAL LIGATION         Review of patient's allergies indicates:   Allergen Reactions    Erythromycin Nausea Only       Current Facility-Administered Medications   Medication    acetaminophen tablet 1,000 mg    albuterol syrup 2 mg    ALPRAZolam tablet 0.25 mg    atorvastatin tablet 40 mg    cefTRIAXone (ROCEPHIN) 1 g in dextrose 5 % 50 mL IVPB    dextrose 50% injection 12.5 g    dextrose 50% injection 25 g    fluticasone-umeclidin-vilanter 100-62.5-25 mcg DsDv 1 puff    glucagon (human recombinant) injection 1 mg    glucose chewable tablet 16 g    glucose chewable tablet 24 g    magnesium oxide tablet 800 mg    magnesium oxide tablet 800 mg    melatonin tablet 9 mg    morphine injection 4 mg    ondansetron injection 8 mg    potassium chloride 10% oral solution 40 mEq    potassium chloride 10% oral solution 40 mEq    senna-docusate 8.6-50 mg per tablet 1 tablet    sodium chloride 0.9% flush 10 mL     Family History     Problem Relation (Age of Onset)    Cancer Mother (49), Sister        Tobacco Use    Smoking status: Current Every Day Smoker     Packs/day: 0.50     Years: 30.00      "Pack years: 15.00     Types: Cigarettes    Smokeless tobacco: Never Used    Tobacco comment: quit 1 month ago.   Substance and Sexual Activity    Alcohol use: Yes     Alcohol/week: 12.0 standard drinks     Types: 12 Cans of beer per week     Comment: 2-3 beers daily    Drug use: No    Sexual activity: Yes     Partners: Male     Review of Systems   Constitution: Negative.   HENT: Negative.    Eyes: Negative.    Cardiovascular: Negative.    Respiratory: Positive for cough, shortness of breath and sputum production.    Endocrine: Negative.    Hematologic/Lymphatic: Negative.    Skin: Negative.    Musculoskeletal: Positive for falls, joint pain and joint swelling.   Gastrointestinal: Negative.    Genitourinary: Negative.    Neurological: Negative.    Psychiatric/Behavioral: Negative.    Allergic/Immunologic: Negative.      Objective:     Vital Signs (Most Recent):  Temp: 96.6 °F (35.9 °C) (12/16/19 1459)  Pulse: 93 (12/16/19 1459)  Resp: 16 (12/16/19 1459)  BP: 115/70 (12/16/19 1459)  SpO2: (!) 93 % (12/16/19 1459) Vital Signs (24h Range):  Temp:  [96.6 °F (35.9 °C)-98.7 °F (37.1 °C)] 96.6 °F (35.9 °C)  Pulse:  [64-94] 93  Resp:  [16-20] 16  SpO2:  [93 %-96 %] 93 %  BP: ()/(54-70) 115/70     Weight: 51.8 kg (114 lb 3.2 oz)  Height: 5' 4" (162.6 cm)  Body mass index is 19.6 kg/m².      Intake/Output Summary (Last 24 hours) at 12/16/2019 1635  Last data filed at 12/16/2019 0500  Gross per 24 hour   Intake 290 ml   Output 1200 ml   Net -910 ml       General    Constitutional: She is oriented to person, place, and time. She appears well-developed and well-nourished.   HENT:   Head: Normocephalic and atraumatic.   Eyes: EOM are normal. Pupils are equal, round, and reactive to light.   Neck: Neck supple.   Cardiovascular: Normal rate, regular rhythm and normal heart sounds.    Pulmonary/Chest: Effort normal and breath sounds normal.   Abdominal: Soft. Bowel sounds are normal.   Neurological: She is alert and " oriented to person, place, and time.   Psychiatric: She has a normal mood and affect. Her behavior is normal. Thought content normal.             Right Hip Exam     Comments:  The right lower extremity is shortened and externally rotated.    Skin is intact throughout.  There is pain with any attempted motion of the right hip that localizes to the right groin.  Distally motor functions intact EHL, FHL, TA, gastroc  Plus two dorsalis pedis and posterior tibial pulses  Normal sensation light touch dorsal, plantar, 1st webspace          Significant Labs:   CBC:   Recent Labs   Lab 12/15/19  2200 12/16/19  0435   WBC 12.66 7.27   HGB 11.7* 10.6*   HCT 37.6 34.6*   * 434*     All pertinent labs within the past 24 hours have been reviewed.    Significant Imaging: X-Ray: I have reviewed all pertinent results/findings and my personal findings are:  There is a displaced right femoral neck fracture

## 2019-12-16 NOTE — ASSESSMENT & PLAN NOTE
Chronic Problem  Respiratory status stable   Will continue albuterol nebs, and inhalers  Will closely monitor.

## 2019-12-16 NOTE — ED NOTES
Pt presents with fall at mailbox this evening. Pt not sure but thinks she may have tripped and fallen. Pain is to backside of right hip. Movement is painful with pain level at 10/10. At rest pain is 2 or 3. Pt alert and oriented and in no noted distress at this time.

## 2019-12-16 NOTE — CONSULTS
[facility]  Orthopedics  Consult Note    Patient Name: Fabiana Morel  MRN: [id number]  Admission Date: 12/15/2019  Hospital Length of Stay: 1 days  Attending Provider: Aniket Olguin MD  Primary Care Provider: Dominique Bartlett MD    Patient information was obtained from patient and ER records.     Consults  Subjective:     Principal Problem:Closed fracture of neck of right femur    Chief Complaint:   Chief Complaint   Patient presents with    Fall     Rt upper leg/Hip pain        HPI: Fabiana Morel is a 64 year old female who presented to the ER for evaluation of right hip after a mechanical fall. She states that she tripped on her slippers while walking outside to check her mail. Injury occurred several hours prior to arrival. Describes pain as intense at worst. 9/10 pain at worst. Pain is worse with any movement of right leg. She was unable to bear weight on right leg. She denies striking her head, loss of consciousness or cervical pain. Her work up in the ER demonstrated a right femoral neck fracture as well as a AUTUMN infiltrate. Labs were stable. She has been treated over the past several months by Dr. Mackey for the AUTUMN infiltrate with Augmentin. She drinks several beers daily. She quite smoking 1 month ago. PMH includes, COPD, HTN and hyperlipidemia. Surgical hx includes hysterectomy and appendectomy. [facility] and management. Dr. Santos was consulted for orthopedics by ER physician. Will admit to Hospital, NPO after midnight, continue with preop work up. Dr. Mackey.    Dr. Santos has deferred to Dr. Wilson for evaluation for right total hip arthroplasty. Based on the patient's age and the displaced femoral neck fracture this may be the best surgery for her to give her the best long-term outcome.      Past Medical History:   Diagnosis Date    Abnormal CT scan 1/8/2015    Asthma     Colitis     COPD (chronic obstructive pulmonary disease)      Diverticulitis of sigmoid colon 11/4/2014    Erosive gastritis 7/17/2013    Fracture of left clavicle     H pylori ulcer 7/17/2013    Hypertension     Peptic ulcer disease 7/17/2013    Pneumonia of right upper lobe due to infectious organism 8/15/2017    Rectal bleed 11/3/2014    Scoliosis     SOB (shortness of breath)     Yeast infection 8/25/2017       Past Surgical History:   Procedure Laterality Date    APPENDECTOMY      BREAST BIOPSY Right     cyst    COLONOSCOPY N/A 7/15/2019    Procedure: COLONOSCOPY;  Surgeon: Sharif Chun MD;  Location: [facility];  Service: Endoscopy;  Laterality: N/A;    HYSTERECTOMY      HASMUKH/BSO, DUB    OOPHORECTOMY      TUBAL LIGATION         Review of patient's allergies indicates:   Allergen Reactions    Erythromycin Nausea Only       Current Facility-Administered Medications   Medication    acetaminophen tablet 1,000 mg    albuterol syrup 2 mg    ALPRAZolam tablet 0.25 mg    atorvastatin tablet 40 mg    cefTRIAXone (ROCEPHIN) 1 g in dextrose 5 % 50 mL IVPB    dextrose 50% injection 12.5 g    dextrose 50% injection 25 g    fluticasone-umeclidin-vilanter 100-62.5-25 mcg DsDv 1 puff    glucagon (human recombinant) injection 1 mg    glucose chewable tablet 16 g    glucose chewable tablet 24 g    magnesium oxide tablet 800 mg    magnesium oxide tablet 800 mg    melatonin tablet 9 mg    morphine injection 4 mg    ondansetron injection 8 mg    potassium chloride 10% oral solution 40 mEq    potassium chloride 10% oral solution 40 mEq    senna-docusate 8.6-50 mg per tablet 1 tablet    sodium chloride 0.9% flush 10 mL     Family History     Problem Relation (Age of Onset)    Cancer Mother (49), Sister        Tobacco Use    Smoking status: Current Every Day Smoker     Packs/day: 0.50     Years: 30.00     Pack years: 15.00     Types: Cigarettes    Smokeless tobacco: Never Used    Tobacco comment: quit 1 month ago.   Substance and Sexual Activity     "Alcohol use: Yes     Alcohol/week: 12.0 standard drinks     Types: 12 Cans of beer per week     Comment: 2-3 beers daily    Drug use: No    Sexual activity: Yes     Partners: Male     Review of Systems   Constitution: Negative.   HENT: Negative.    Eyes: Negative.    Cardiovascular: Negative.    Respiratory: Positive for cough, shortness of breath and sputum production.    Endocrine: Negative.    Hematologic/Lymphatic: Negative.    Skin: Negative.    Musculoskeletal: Positive for falls, joint pain and joint swelling.   Gastrointestinal: Negative.    Genitourinary: Negative.    Neurological: Negative.    Psychiatric/Behavioral: Negative.    Allergic/Immunologic: Negative.      Objective:     Vital Signs (Most Recent):  Temp: 96.6 °F (35.9 °C) (12/16/19 1459)  Pulse: 93 (12/16/19 1459)  Resp: 16 (12/16/19 1459)  BP: 115/70 (12/16/19 1459)  SpO2: (!) 93 % (12/16/19 1459) Vital Signs (24h Range):  Temp:  [96.6 °F (35.9 °C)-98.7 °F (37.1 °C)] 96.6 °F (35.9 °C)  Pulse:  [64-94] 93  Resp:  [16-20] 16  SpO2:  [93 %-96 %] 93 %  BP: ()/(54-70) 115/70     Weight: 51.8 kg (114 lb 3.2 oz)  Height: 5' 4" (162.6 cm)  Body mass index is 19.6 kg/m².      Intake/Output Summary (Last 24 hours) at 12/16/2019 1635  Last data filed at 12/16/2019 0500  Gross per 24 hour   Intake 290 ml   Output 1200 ml   Net -910 ml       General    Constitutional: She is oriented to person, place, and time. She appears well-developed and well-nourished.   HENT:   Head: Normocephalic and atraumatic.   Eyes: EOM are normal. Pupils are equal, round, and reactive to light.   Neck: Neck supple.   Cardiovascular: Normal rate, regular rhythm and normal heart sounds.    Pulmonary/Chest: Effort normal and breath sounds normal.   Abdominal: Soft. Bowel sounds are normal.   Neurological: She is alert and oriented to person, place, and time.   Psychiatric: She has a normal mood and affect. Her behavior is normal. Thought content normal.             Right Hip " Exam     Comments:  The right lower extremity is shortened and externally rotated.    Skin is intact throughout.  There is pain with any attempted motion of the right hip that localizes to the right groin.  Distally motor functions intact EHL, FHL, TA, gastroc  Plus two dorsalis pedis and posterior tibial pulses  Normal sensation light touch dorsal, plantar, 1st webspace          Significant Labs:   CBC:   Recent Labs   Lab 12/15/19  2200 12/16/19  0435   WBC 12.66 7.27   HGB 11.7* 10.6*   HCT 37.6 34.6*   * 434*     All pertinent labs within the past 24 hours have been reviewed.    Significant Imaging: X-Ray: I have reviewed all pertinent results/findings and my personal findings are:  There is a displaced right femoral neck fracture    Assessment/Plan:     * Closed fracture of neck of right femur  To OR for right total hip arthroplasty. Plan is for Dr. Wilson to perform.      Thank you for your consult. Plan is for right total hip arthroplasty.  We will plan for this on Wednesday.  The patient needs to be NPO after midnight Tuesday night.    Bernardino Wilson II, MD  Orthopedics  [facility]

## 2019-12-17 ENCOUNTER — ANESTHESIA EVENT (OUTPATIENT)
Dept: SURGERY | Facility: HOSPITAL | Age: 64
DRG: 469 | End: 2019-12-17
Payer: COMMERCIAL

## 2019-12-17 LAB
ALBUMIN SERPL BCP-MCNC: 2.1 G/DL (ref 3.5–5.2)
ALP SERPL-CCNC: 115 U/L (ref 55–135)
ALT SERPL W/O P-5'-P-CCNC: 9 U/L (ref 10–44)
ANION GAP SERPL CALC-SCNC: 10 MMOL/L (ref 8–16)
AST SERPL-CCNC: 17 U/L (ref 10–40)
BASOPHILS # BLD AUTO: 0.02 K/UL (ref 0–0.2)
BASOPHILS NFR BLD: 0.3 % (ref 0–1.9)
BILIRUB SERPL-MCNC: 0.2 MG/DL (ref 0.1–1)
BUN SERPL-MCNC: 9 MG/DL (ref 8–23)
CALCIUM SERPL-MCNC: 9.5 MG/DL (ref 8.7–10.5)
CHLORIDE SERPL-SCNC: 100 MMOL/L (ref 95–110)
CO2 SERPL-SCNC: 23 MMOL/L (ref 23–29)
CREAT SERPL-MCNC: 0.7 MG/DL (ref 0.5–1.4)
DIFFERENTIAL METHOD: ABNORMAL
EOSINOPHIL # BLD AUTO: 0.1 K/UL (ref 0–0.5)
EOSINOPHIL NFR BLD: 0.7 % (ref 0–8)
ERYTHROCYTE [DISTWIDTH] IN BLOOD BY AUTOMATED COUNT: 15.2 % (ref 11.5–14.5)
EST. GFR  (AFRICAN AMERICAN): >60 ML/MIN/1.73 M^2
EST. GFR  (NON AFRICAN AMERICAN): >60 ML/MIN/1.73 M^2
GLUCOSE SERPL-MCNC: 118 MG/DL (ref 70–110)
HCT VFR BLD AUTO: 36.1 % (ref 37–48.5)
HGB BLD-MCNC: 11.2 G/DL (ref 12–16)
IMM GRANULOCYTES # BLD AUTO: 0.04 K/UL (ref 0–0.04)
LYMPHOCYTES # BLD AUTO: 1.5 K/UL (ref 1–4.8)
LYMPHOCYTES NFR BLD: 19.4 % (ref 18–48)
MAGNESIUM SERPL-MCNC: 1.9 MG/DL (ref 1.6–2.6)
MCH RBC QN AUTO: 27.8 PG (ref 27–31)
MCHC RBC AUTO-ENTMCNC: 31 G/DL (ref 32–36)
MCV RBC AUTO: 90 FL (ref 82–98)
MONOCYTES # BLD AUTO: 0.7 K/UL (ref 0.3–1)
MONOCYTES NFR BLD: 8.7 % (ref 4–15)
NEUTROPHILS # BLD AUTO: 5.3 K/UL (ref 1.8–7.7)
NEUTROPHILS NFR BLD: 70.4 % (ref 38–73)
NRBC BLD-RTO: 0 /100 WBC
PHOSPHATE SERPL-MCNC: 5 MG/DL (ref 2.7–4.5)
PLATELET # BLD AUTO: 442 K/UL (ref 150–350)
PMV BLD AUTO: 8.5 FL (ref 9.2–12.9)
POTASSIUM SERPL-SCNC: 3.8 MMOL/L (ref 3.5–5.1)
PROT SERPL-MCNC: 6.3 G/DL (ref 6–8.4)
RBC # BLD AUTO: 4.03 M/UL (ref 4–5.4)
SODIUM SERPL-SCNC: 133 MMOL/L (ref 136–145)
WBC # BLD AUTO: 7.46 K/UL (ref 3.9–12.7)

## 2019-12-17 PROCEDURE — 85025 COMPLETE CBC W/AUTO DIFF WBC: CPT

## 2019-12-17 PROCEDURE — 84100 ASSAY OF PHOSPHORUS: CPT

## 2019-12-17 PROCEDURE — 36415 COLL VENOUS BLD VENIPUNCTURE: CPT

## 2019-12-17 PROCEDURE — 12000002 HC ACUTE/MED SURGE SEMI-PRIVATE ROOM

## 2019-12-17 PROCEDURE — 25000003 PHARM REV CODE 250: Performed by: INTERNAL MEDICINE

## 2019-12-17 PROCEDURE — 63600175 PHARM REV CODE 636 W HCPCS: Performed by: NURSE PRACTITIONER

## 2019-12-17 PROCEDURE — 94761 N-INVAS EAR/PLS OXIMETRY MLT: CPT

## 2019-12-17 PROCEDURE — 63600175 PHARM REV CODE 636 W HCPCS: Performed by: INTERNAL MEDICINE

## 2019-12-17 PROCEDURE — 25000003 PHARM REV CODE 250: Performed by: NURSE PRACTITIONER

## 2019-12-17 PROCEDURE — 80053 COMPREHEN METABOLIC PANEL: CPT

## 2019-12-17 PROCEDURE — 94640 AIRWAY INHALATION TREATMENT: CPT

## 2019-12-17 PROCEDURE — 99900035 HC TECH TIME PER 15 MIN (STAT)

## 2019-12-17 PROCEDURE — 83735 ASSAY OF MAGNESIUM: CPT

## 2019-12-17 RX ORDER — MORPHINE SULFATE 2 MG/ML
2 INJECTION, SOLUTION INTRAMUSCULAR; INTRAVENOUS EVERY 4 HOURS PRN
Status: DISCONTINUED | OUTPATIENT
Start: 2019-12-17 | End: 2019-12-20 | Stop reason: HOSPADM

## 2019-12-17 RX ORDER — OXYCODONE HCL 10 MG/1
10 TABLET, FILM COATED, EXTENDED RELEASE ORAL EVERY 12 HOURS
Status: DISCONTINUED | OUTPATIENT
Start: 2019-12-17 | End: 2019-12-19

## 2019-12-17 RX ADMIN — MORPHINE SULFATE 4 MG: 4 INJECTION, SOLUTION INTRAMUSCULAR; INTRAVENOUS at 06:12

## 2019-12-17 RX ADMIN — ALBUTEROL SULFATE 2 MG: 2 SYRUP ORAL at 08:12

## 2019-12-17 RX ADMIN — CEFTRIAXONE 1 G: 1 INJECTION, SOLUTION INTRAVENOUS at 12:12

## 2019-12-17 RX ADMIN — MORPHINE SULFATE 2 MG: 2 INJECTION, SOLUTION INTRAMUSCULAR; INTRAVENOUS at 05:12

## 2019-12-17 RX ADMIN — OXYCODONE HYDROCHLORIDE 10 MG: 10 TABLET, FILM COATED, EXTENDED RELEASE ORAL at 09:12

## 2019-12-17 RX ADMIN — MORPHINE SULFATE 4 MG: 4 INJECTION, SOLUTION INTRAMUSCULAR; INTRAVENOUS at 02:12

## 2019-12-17 RX ADMIN — ATORVASTATIN CALCIUM 40 MG: 40 TABLET, FILM COATED ORAL at 08:12

## 2019-12-17 RX ADMIN — ACETAMINOPHEN 1000 MG: 500 TABLET ORAL at 01:12

## 2019-12-17 RX ADMIN — OXYCODONE HYDROCHLORIDE 10 MG: 10 TABLET, FILM COATED, EXTENDED RELEASE ORAL at 02:12

## 2019-12-17 RX ADMIN — MORPHINE SULFATE 4 MG: 4 INJECTION, SOLUTION INTRAMUSCULAR; INTRAVENOUS at 10:12

## 2019-12-17 NOTE — PLAN OF CARE
Requested something longer lasting for pain, RX Oxy 12hr. Pt. Stated she had marked relief. Will be nPO after midnight for sx in AM. Family remains at bedside. Duran draining clear, yellow urine. Remains free from injury.  Call light in reach. Bed low and licked. SRx2.

## 2019-12-17 NOTE — PLAN OF CARE
12/17/19 1140   Patient Assessment/Suction   Level of Consciousness (AVPU) alert   Respiratory Effort Unlabored   Expansion/Accessory Muscles/Retractions expansion symmetric   All Lung Fields Breath Sounds diminished   PRE-TX-O2   O2 Device (Oxygen Therapy) nasal cannula w/ humidification   Flow (L/min) 2   SpO2 (!) 94 %   Pulse Oximetry Type Intermittent   $ Pulse Oximetry - Multiple Charge Pulse Oximetry - Multiple   Pulse 101   Resp 18   Inhaler   $ Inhaler Charges MDI (Metered Dose Inahler) Treatment;Mouth rinsed post treatment   Respiratory Treatment Status (Inhaler) given   Treatment Route (Inhaler) mouthpiece   Patient Position (Inhaler) HOB elevated   Post Treatment Assessment (Inhaler) breath sounds unchanged   Signs of Intolerance (Inhaler) none   Breath Sounds Post-Respiratory Treatment   Throughout All Fields Post-Treatment All Fields   Throughout All Fields Post-Treatment no change   Post-treatment Heart Rate (beats/min) 98   Post-treatment Resp Rate (breaths/min) 18

## 2019-12-17 NOTE — NURSING
Care assumed, patient AA&O x4. No complaints of pain at this time. No distress noted. Bed alarm in use.

## 2019-12-17 NOTE — PLAN OF CARE
Plan of care reviewed with patient. Patient AA&O . ST on telemetry. Duran catheter intact & patent with clear yellow urine. Instructed patient to be NPO after MN for surgery in am, verbalized understanding. Remains free from falls/injury. Instructed patient to call for assistance as needed during night, verbalized understanding. Call light in reach, bed in low & locked position. Bed alarm in use.

## 2019-12-17 NOTE — PLAN OF CARE
12/16/19 1940   Patient Assessment/Suction   Level of Consciousness (AVPU) alert   Respiratory Effort Unlabored   PRE-TX-O2   O2 Device (Oxygen Therapy) room air   SpO2 (!) 94 %   Pulse Oximetry Type Intermittent   $ Pulse Oximetry - Multiple Charge Pulse Oximetry - Multiple

## 2019-12-18 ENCOUNTER — ANESTHESIA (OUTPATIENT)
Dept: SURGERY | Facility: HOSPITAL | Age: 64
DRG: 469 | End: 2019-12-18
Payer: COMMERCIAL

## 2019-12-18 PROBLEM — D84.9 IMMUNE DEFICIENCY DISORDER: Status: ACTIVE | Noted: 2019-12-18

## 2019-12-18 PROBLEM — J96.22 ACUTE ON CHRONIC RESPIRATORY FAILURE WITH HYPOXIA AND HYPERCAPNIA: Status: ACTIVE | Noted: 2019-12-18

## 2019-12-18 PROBLEM — J96.21 ACUTE ON CHRONIC RESPIRATORY FAILURE WITH HYPOXIA AND HYPERCAPNIA: Status: ACTIVE | Noted: 2019-12-18

## 2019-12-18 LAB
ALBUMIN SERPL BCP-MCNC: 2.1 G/DL (ref 3.5–5.2)
ALLENS TEST: ABNORMAL
ALP SERPL-CCNC: 98 U/L (ref 55–135)
ALT SERPL W/O P-5'-P-CCNC: 8 U/L (ref 10–44)
ANION GAP SERPL CALC-SCNC: 10 MMOL/L (ref 8–16)
AST SERPL-CCNC: 15 U/L (ref 10–40)
BASOPHILS # BLD AUTO: 0.02 K/UL (ref 0–0.2)
BASOPHILS NFR BLD: 0.2 % (ref 0–1.9)
BILIRUB SERPL-MCNC: 0.3 MG/DL (ref 0.1–1)
BUN SERPL-MCNC: 7 MG/DL (ref 8–23)
CALCIUM SERPL-MCNC: 9.3 MG/DL (ref 8.7–10.5)
CHLORIDE SERPL-SCNC: 96 MMOL/L (ref 95–110)
CO2 SERPL-SCNC: 25 MMOL/L (ref 23–29)
CREAT SERPL-MCNC: 0.6 MG/DL (ref 0.5–1.4)
DELSYS: ABNORMAL
DIFFERENTIAL METHOD: ABNORMAL
EOSINOPHIL # BLD AUTO: 0.1 K/UL (ref 0–0.5)
EOSINOPHIL NFR BLD: 0.8 % (ref 0–8)
ERYTHROCYTE [DISTWIDTH] IN BLOOD BY AUTOMATED COUNT: 15.1 % (ref 11.5–14.5)
EST. GFR  (AFRICAN AMERICAN): >60 ML/MIN/1.73 M^2
EST. GFR  (NON AFRICAN AMERICAN): >60 ML/MIN/1.73 M^2
GLUCOSE SERPL-MCNC: 100 MG/DL (ref 70–110)
HCO3 UR-SCNC: 26.6 MMOL/L (ref 24–28)
HCT VFR BLD AUTO: 33.9 % (ref 37–48.5)
HGB BLD-MCNC: 10.6 G/DL (ref 12–16)
IMM GRANULOCYTES # BLD AUTO: 0.04 K/UL (ref 0–0.04)
LYMPHOCYTES # BLD AUTO: 1.7 K/UL (ref 1–4.8)
LYMPHOCYTES NFR BLD: 18.6 % (ref 18–48)
MAGNESIUM SERPL-MCNC: 2 MG/DL (ref 1.6–2.6)
MCH RBC QN AUTO: 28 PG (ref 27–31)
MCHC RBC AUTO-ENTMCNC: 31.3 G/DL (ref 32–36)
MCV RBC AUTO: 90 FL (ref 82–98)
MONOCYTES # BLD AUTO: 0.8 K/UL (ref 0.3–1)
MONOCYTES NFR BLD: 8.7 % (ref 4–15)
NEUTROPHILS # BLD AUTO: 6.4 K/UL (ref 1.8–7.7)
NEUTROPHILS NFR BLD: 71.3 % (ref 38–73)
NRBC BLD-RTO: 0 /100 WBC
PCO2 BLDA: 36.8 MMHG (ref 35–45)
PH SMN: 7.47 [PH] (ref 7.35–7.45)
PHOSPHATE SERPL-MCNC: 4 MG/DL (ref 2.7–4.5)
PLATELET # BLD AUTO: 402 K/UL (ref 150–350)
PMV BLD AUTO: 8.5 FL (ref 9.2–12.9)
PO2 BLDA: 59 MMHG (ref 80–100)
POC BE: 3 MMOL/L
POC SATURATED O2: 92 % (ref 95–100)
POC TCO2: 28 MMOL/L (ref 23–27)
POTASSIUM SERPL-SCNC: 4.2 MMOL/L (ref 3.5–5.1)
PROT SERPL-MCNC: 6.2 G/DL (ref 6–8.4)
RBC # BLD AUTO: 3.78 M/UL (ref 4–5.4)
SAMPLE: ABNORMAL
SITE: ABNORMAL
SODIUM SERPL-SCNC: 131 MMOL/L (ref 136–145)
WBC # BLD AUTO: 8.99 K/UL (ref 3.9–12.7)

## 2019-12-18 PROCEDURE — 25000003 PHARM REV CODE 250: Performed by: NURSE PRACTITIONER

## 2019-12-18 PROCEDURE — 71000033 HC RECOVERY, INTIAL HOUR: Performed by: ORTHOPAEDIC SURGERY

## 2019-12-18 PROCEDURE — 99900104 DSU ONLY-NO CHARGE-EA ADD'L HR (STAT): Performed by: ORTHOPAEDIC SURGERY

## 2019-12-18 PROCEDURE — 85025 COMPLETE CBC W/AUTO DIFF WBC: CPT

## 2019-12-18 PROCEDURE — 25000003 PHARM REV CODE 250: Performed by: INTERNAL MEDICINE

## 2019-12-18 PROCEDURE — 25000003 PHARM REV CODE 250: Performed by: NURSE ANESTHETIST, CERTIFIED REGISTERED

## 2019-12-18 PROCEDURE — 36415 COLL VENOUS BLD VENIPUNCTURE: CPT

## 2019-12-18 PROCEDURE — C1713 ANCHOR/SCREW BN/BN,TIS/BN: HCPCS | Performed by: ORTHOPAEDIC SURGERY

## 2019-12-18 PROCEDURE — 99900103 DSU ONLY-NO CHARGE-INITIAL HR (STAT): Performed by: ORTHOPAEDIC SURGERY

## 2019-12-18 PROCEDURE — 25000003 PHARM REV CODE 250: Performed by: ORTHOPAEDIC SURGERY

## 2019-12-18 PROCEDURE — 63600175 PHARM REV CODE 636 W HCPCS: Performed by: INTERNAL MEDICINE

## 2019-12-18 PROCEDURE — D9220A PRA ANESTHESIA: ICD-10-PCS | Mod: ANES,,, | Performed by: ANESTHESIOLOGY

## 2019-12-18 PROCEDURE — 99900035 HC TECH TIME PER 15 MIN (STAT)

## 2019-12-18 PROCEDURE — C1776 JOINT DEVICE (IMPLANTABLE): HCPCS | Performed by: ORTHOPAEDIC SURGERY

## 2019-12-18 PROCEDURE — 27000221 HC OXYGEN, UP TO 24 HOURS

## 2019-12-18 PROCEDURE — 25000242 PHARM REV CODE 250 ALT 637 W/ HCPCS: Performed by: ANESTHESIOLOGY

## 2019-12-18 PROCEDURE — 25000003 PHARM REV CODE 250: Performed by: ANESTHESIOLOGY

## 2019-12-18 PROCEDURE — 12000002 HC ACUTE/MED SURGE SEMI-PRIVATE ROOM

## 2019-12-18 PROCEDURE — 84100 ASSAY OF PHOSPHORUS: CPT

## 2019-12-18 PROCEDURE — D9220A PRA ANESTHESIA: ICD-10-PCS | Mod: CRNA,,, | Performed by: NURSE ANESTHETIST, CERTIFIED REGISTERED

## 2019-12-18 PROCEDURE — 63600175 PHARM REV CODE 636 W HCPCS: Performed by: ORTHOPAEDIC SURGERY

## 2019-12-18 PROCEDURE — D9220A PRA ANESTHESIA: Mod: ANES,,, | Performed by: ANESTHESIOLOGY

## 2019-12-18 PROCEDURE — 37000008 HC ANESTHESIA 1ST 15 MINUTES: Performed by: ORTHOPAEDIC SURGERY

## 2019-12-18 PROCEDURE — 27130 PR TOTAL HIP ARTHROPLASTY: ICD-10-PCS | Mod: RT,,, | Performed by: ORTHOPAEDIC SURGERY

## 2019-12-18 PROCEDURE — 99233 PR SUBSEQUENT HOSPITAL CARE,LEVL III: ICD-10-PCS | Mod: ,,, | Performed by: INTERNAL MEDICINE

## 2019-12-18 PROCEDURE — 27201423 OPTIME MED/SURG SUP & DEVICES STERILE SUPPLY: Performed by: ORTHOPAEDIC SURGERY

## 2019-12-18 PROCEDURE — 25000242 PHARM REV CODE 250 ALT 637 W/ HCPCS: Performed by: ORTHOPAEDIC SURGERY

## 2019-12-18 PROCEDURE — 37000009 HC ANESTHESIA EA ADD 15 MINS: Performed by: ORTHOPAEDIC SURGERY

## 2019-12-18 PROCEDURE — 99233 SBSQ HOSP IP/OBS HIGH 50: CPT | Mod: ,,, | Performed by: INTERNAL MEDICINE

## 2019-12-18 PROCEDURE — 94761 N-INVAS EAR/PLS OXIMETRY MLT: CPT

## 2019-12-18 PROCEDURE — 94799 UNLISTED PULMONARY SVC/PX: CPT

## 2019-12-18 PROCEDURE — 80053 COMPREHEN METABOLIC PANEL: CPT

## 2019-12-18 PROCEDURE — D9220A PRA ANESTHESIA: Mod: CRNA,,, | Performed by: NURSE ANESTHETIST, CERTIFIED REGISTERED

## 2019-12-18 PROCEDURE — 36000711: Performed by: ORTHOPAEDIC SURGERY

## 2019-12-18 PROCEDURE — 36600 WITHDRAWAL OF ARTERIAL BLOOD: CPT

## 2019-12-18 PROCEDURE — 94640 AIRWAY INHALATION TREATMENT: CPT

## 2019-12-18 PROCEDURE — 71000039 HC RECOVERY, EACH ADD'L HOUR: Performed by: ORTHOPAEDIC SURGERY

## 2019-12-18 PROCEDURE — 94664 DEMO&/EVAL PT USE INHALER: CPT

## 2019-12-18 PROCEDURE — 82803 BLOOD GASES ANY COMBINATION: CPT

## 2019-12-18 PROCEDURE — 83735 ASSAY OF MAGNESIUM: CPT

## 2019-12-18 PROCEDURE — 36000710: Performed by: ORTHOPAEDIC SURGERY

## 2019-12-18 PROCEDURE — 63600175 PHARM REV CODE 636 W HCPCS: Performed by: ANESTHESIOLOGY

## 2019-12-18 PROCEDURE — 27130 TOTAL HIP ARTHROPLASTY: CPT | Mod: RT,,, | Performed by: ORTHOPAEDIC SURGERY

## 2019-12-18 PROCEDURE — 27000646 HC AEROBIKA DEVICE

## 2019-12-18 PROCEDURE — 63600175 PHARM REV CODE 636 W HCPCS: Performed by: NURSE ANESTHETIST, CERTIFIED REGISTERED

## 2019-12-18 DEVICE — HEAD FEM 12/14 TAPE +0MM 32MM: Type: IMPLANTABLE DEVICE | Site: HIP | Status: FUNCTIONAL

## 2019-12-18 DEVICE — COVER REFLECTION I APEX THRD H: Type: IMPLANTABLE DEVICE | Site: HIP | Status: FUNCTIONAL

## 2019-12-18 RX ORDER — PREGABALIN 75 MG/1
75 CAPSULE ORAL 2 TIMES DAILY
Status: DISCONTINUED | OUTPATIENT
Start: 2019-12-18 | End: 2019-12-20 | Stop reason: HOSPADM

## 2019-12-18 RX ORDER — BISACODYL 10 MG
10 SUPPOSITORY, RECTAL RECTAL DAILY PRN
Status: DISCONTINUED | OUTPATIENT
Start: 2019-12-18 | End: 2019-12-20 | Stop reason: HOSPADM

## 2019-12-18 RX ORDER — NEOSTIGMINE METHYLSULFATE 1 MG/ML
INJECTION, SOLUTION INTRAVENOUS
Status: DISCONTINUED | OUTPATIENT
Start: 2019-12-18 | End: 2019-12-18

## 2019-12-18 RX ORDER — ONDANSETRON 4 MG/1
8 TABLET, ORALLY DISINTEGRATING ORAL EVERY 8 HOURS PRN
Status: DISCONTINUED | OUTPATIENT
Start: 2019-12-18 | End: 2019-12-20 | Stop reason: HOSPADM

## 2019-12-18 RX ORDER — ASPIRIN 325 MG
325 TABLET ORAL 2 TIMES DAILY
Status: DISCONTINUED | OUTPATIENT
Start: 2019-12-18 | End: 2019-12-20 | Stop reason: HOSPADM

## 2019-12-18 RX ORDER — TRAMADOL HYDROCHLORIDE 50 MG/1
50 TABLET ORAL EVERY 4 HOURS PRN
Status: DISCONTINUED | OUTPATIENT
Start: 2019-12-18 | End: 2019-12-20 | Stop reason: HOSPADM

## 2019-12-18 RX ORDER — SODIUM CHLORIDE 0.9 % (FLUSH) 0.9 %
3 SYRINGE (ML) INJECTION EVERY 8 HOURS
Status: DISCONTINUED | OUTPATIENT
Start: 2019-12-18 | End: 2019-12-18 | Stop reason: HOSPADM

## 2019-12-18 RX ORDER — FAMOTIDINE 20 MG/1
20 TABLET, FILM COATED ORAL 2 TIMES DAILY
Status: DISCONTINUED | OUTPATIENT
Start: 2019-12-18 | End: 2019-12-20 | Stop reason: HOSPADM

## 2019-12-18 RX ORDER — LOPERAMIDE HYDROCHLORIDE 2 MG/1
2 CAPSULE ORAL CONTINUOUS PRN
Status: DISCONTINUED | OUTPATIENT
Start: 2019-12-18 | End: 2019-12-20 | Stop reason: HOSPADM

## 2019-12-18 RX ORDER — ACETAMINOPHEN 10 MG/ML
INJECTION, SOLUTION INTRAVENOUS
Status: DISCONTINUED | OUTPATIENT
Start: 2019-12-18 | End: 2019-12-18

## 2019-12-18 RX ORDER — PROMETHAZINE HYDROCHLORIDE 25 MG/1
25 TABLET ORAL EVERY 6 HOURS PRN
Status: DISCONTINUED | OUTPATIENT
Start: 2019-12-18 | End: 2019-12-20 | Stop reason: HOSPADM

## 2019-12-18 RX ORDER — LIDOCAINE HCL/PF 100 MG/5ML
SYRINGE (ML) INTRAVENOUS
Status: DISCONTINUED | OUTPATIENT
Start: 2019-12-18 | End: 2019-12-18

## 2019-12-18 RX ORDER — PHENYLEPHRINE HYDROCHLORIDE 10 MG/ML
INJECTION INTRAVENOUS
Status: DISCONTINUED | OUTPATIENT
Start: 2019-12-18 | End: 2019-12-18

## 2019-12-18 RX ORDER — METOPROLOL SUCCINATE 50 MG/1
50 TABLET, EXTENDED RELEASE ORAL DAILY
Status: DISCONTINUED | OUTPATIENT
Start: 2019-12-19 | End: 2019-12-20 | Stop reason: HOSPADM

## 2019-12-18 RX ORDER — LIDOCAINE HYDROCHLORIDE 10 MG/ML
1 INJECTION, SOLUTION EPIDURAL; INFILTRATION; INTRACAUDAL; PERINEURAL ONCE
Status: DISCONTINUED | OUTPATIENT
Start: 2019-12-18 | End: 2019-12-18 | Stop reason: HOSPADM

## 2019-12-18 RX ORDER — OXYCODONE HYDROCHLORIDE 5 MG/1
5 TABLET ORAL
Status: DISCONTINUED | OUTPATIENT
Start: 2019-12-18 | End: 2019-12-18 | Stop reason: HOSPADM

## 2019-12-18 RX ORDER — IPRATROPIUM BROMIDE AND ALBUTEROL SULFATE 2.5; .5 MG/3ML; MG/3ML
3 SOLUTION RESPIRATORY (INHALATION) EVERY 4 HOURS
Status: DISCONTINUED | OUTPATIENT
Start: 2019-12-18 | End: 2019-12-20 | Stop reason: HOSPADM

## 2019-12-18 RX ORDER — ACETAMINOPHEN 10 MG/ML
1000 INJECTION, SOLUTION INTRAVENOUS ONCE
Status: COMPLETED | OUTPATIENT
Start: 2019-12-18 | End: 2019-12-18

## 2019-12-18 RX ORDER — HYDROMORPHONE HYDROCHLORIDE 2 MG/ML
1 INJECTION, SOLUTION INTRAMUSCULAR; INTRAVENOUS; SUBCUTANEOUS EVERY 4 HOURS PRN
Status: DISCONTINUED | OUTPATIENT
Start: 2019-12-18 | End: 2019-12-19

## 2019-12-18 RX ORDER — PROPOFOL 10 MG/ML
VIAL (ML) INTRAVENOUS
Status: DISCONTINUED | OUTPATIENT
Start: 2019-12-18 | End: 2019-12-18

## 2019-12-18 RX ORDER — MUPIROCIN 20 MG/G
1 OINTMENT TOPICAL 2 TIMES DAILY
Status: DISCONTINUED | OUTPATIENT
Start: 2019-12-18 | End: 2019-12-20 | Stop reason: HOSPADM

## 2019-12-18 RX ORDER — TRANEXAMIC ACID 100 MG/ML
INJECTION, SOLUTION INTRAVENOUS
Status: DISCONTINUED | OUTPATIENT
Start: 2019-12-18 | End: 2019-12-18

## 2019-12-18 RX ORDER — FENTANYL CITRATE 50 UG/ML
INJECTION, SOLUTION INTRAMUSCULAR; INTRAVENOUS
Status: DISCONTINUED | OUTPATIENT
Start: 2019-12-18 | End: 2019-12-18

## 2019-12-18 RX ORDER — CEFAZOLIN SODIUM 2 G/50ML
2 SOLUTION INTRAVENOUS ONCE
Status: COMPLETED | OUTPATIENT
Start: 2019-12-18 | End: 2019-12-18

## 2019-12-18 RX ORDER — SUCCINYLCHOLINE CHLORIDE 20 MG/ML
INJECTION INTRAMUSCULAR; INTRAVENOUS
Status: DISCONTINUED | OUTPATIENT
Start: 2019-12-18 | End: 2019-12-18

## 2019-12-18 RX ORDER — LEVALBUTEROL 1.25 MG/.5ML
1.25 SOLUTION, CONCENTRATE RESPIRATORY (INHALATION) ONCE
Status: COMPLETED | OUTPATIENT
Start: 2019-12-18 | End: 2019-12-18

## 2019-12-18 RX ORDER — GLYCOPYRROLATE 0.2 MG/ML
INJECTION INTRAMUSCULAR; INTRAVENOUS
Status: DISCONTINUED | OUTPATIENT
Start: 2019-12-18 | End: 2019-12-18

## 2019-12-18 RX ORDER — FENTANYL CITRATE 50 UG/ML
25 INJECTION, SOLUTION INTRAMUSCULAR; INTRAVENOUS EVERY 5 MIN PRN
Status: COMPLETED | OUTPATIENT
Start: 2019-12-18 | End: 2019-12-18

## 2019-12-18 RX ORDER — SODIUM CHLORIDE, SODIUM LACTATE, POTASSIUM CHLORIDE, CALCIUM CHLORIDE 600; 310; 30; 20 MG/100ML; MG/100ML; MG/100ML; MG/100ML
10 INJECTION, SOLUTION INTRAVENOUS CONTINUOUS
Status: DISCONTINUED | OUTPATIENT
Start: 2019-12-18 | End: 2019-12-18

## 2019-12-18 RX ORDER — LACTULOSE 10 G/15ML
20 SOLUTION ORAL EVERY 6 HOURS PRN
Status: DISCONTINUED | OUTPATIENT
Start: 2019-12-18 | End: 2019-12-20 | Stop reason: HOSPADM

## 2019-12-18 RX ORDER — ROCURONIUM BROMIDE 10 MG/ML
INJECTION, SOLUTION INTRAVENOUS
Status: DISCONTINUED | OUTPATIENT
Start: 2019-12-18 | End: 2019-12-18

## 2019-12-18 RX ORDER — KETAMINE HYDROCHLORIDE 100 MG/ML
INJECTION, SOLUTION INTRAMUSCULAR; INTRAVENOUS
Status: DISCONTINUED | OUTPATIENT
Start: 2019-12-18 | End: 2019-12-18

## 2019-12-18 RX ORDER — DEXAMETHASONE SODIUM PHOSPHATE 4 MG/ML
INJECTION, SOLUTION INTRA-ARTICULAR; INTRALESIONAL; INTRAMUSCULAR; INTRAVENOUS; SOFT TISSUE
Status: DISCONTINUED | OUTPATIENT
Start: 2019-12-18 | End: 2019-12-18

## 2019-12-18 RX ORDER — ONDANSETRON 2 MG/ML
INJECTION INTRAMUSCULAR; INTRAVENOUS
Status: DISCONTINUED | OUTPATIENT
Start: 2019-12-18 | End: 2019-12-18

## 2019-12-18 RX ORDER — HYDROMORPHONE HYDROCHLORIDE 2 MG/ML
0.2 INJECTION, SOLUTION INTRAMUSCULAR; INTRAVENOUS; SUBCUTANEOUS EVERY 5 MIN PRN
Status: DISCONTINUED | OUTPATIENT
Start: 2019-12-18 | End: 2019-12-18 | Stop reason: HOSPADM

## 2019-12-18 RX ORDER — MIDAZOLAM HYDROCHLORIDE 1 MG/ML
INJECTION, SOLUTION INTRAMUSCULAR; INTRAVENOUS
Status: DISCONTINUED | OUTPATIENT
Start: 2019-12-18 | End: 2019-12-18

## 2019-12-18 RX ADMIN — MIDAZOLAM 2 MG: 1 INJECTION INTRAMUSCULAR; INTRAVENOUS at 03:12

## 2019-12-18 RX ADMIN — GLYCOPYRROLATE 0.4 MG: 0.2 INJECTION, SOLUTION INTRAMUSCULAR; INTRAVENOUS at 05:12

## 2019-12-18 RX ADMIN — SUCCINYLCHOLINE CHLORIDE 140 MG: 20 INJECTION, SOLUTION INTRAMUSCULAR; INTRAVENOUS at 04:12

## 2019-12-18 RX ADMIN — FENTANYL CITRATE 25 MCG: 0.05 INJECTION, SOLUTION INTRAMUSCULAR; INTRAVENOUS at 06:12

## 2019-12-18 RX ADMIN — SODIUM CHLORIDE, SODIUM GLUCONATE, SODIUM ACETATE, POTASSIUM CHLORIDE, MAGNESIUM CHLORIDE, SODIUM PHOSPHATE, DIBASIC, AND POTASSIUM PHOSPHATE: .53; .5; .37; .037; .03; .012; .00082 INJECTION, SOLUTION INTRAVENOUS at 03:12

## 2019-12-18 RX ADMIN — PROPOFOL 100 MG: 10 INJECTION, EMULSION INTRAVENOUS at 04:12

## 2019-12-18 RX ADMIN — LEVALBUTEROL 1.25 MG: 1.25 SOLUTION, CONCENTRATE RESPIRATORY (INHALATION) at 02:12

## 2019-12-18 RX ADMIN — IPRATROPIUM BROMIDE AND ALBUTEROL SULFATE 3 ML: .5; 3 SOLUTION RESPIRATORY (INHALATION) at 07:12

## 2019-12-18 RX ADMIN — ONDANSETRON 4 MG: 2 INJECTION, SOLUTION INTRAMUSCULAR; INTRAVENOUS at 04:12

## 2019-12-18 RX ADMIN — DEXAMETHASONE SODIUM PHOSPHATE 4 MG: 4 INJECTION, SOLUTION INTRAMUSCULAR; INTRAVENOUS at 04:12

## 2019-12-18 RX ADMIN — LIDOCAINE HYDROCHLORIDE 100 MG: 20 INJECTION, SOLUTION INTRAVENOUS at 04:12

## 2019-12-18 RX ADMIN — OXYCODONE HYDROCHLORIDE 5 MG: 5 TABLET ORAL at 06:12

## 2019-12-18 RX ADMIN — CEFTRIAXONE 2 G: 2 INJECTION, SOLUTION INTRAVENOUS at 12:12

## 2019-12-18 RX ADMIN — FAMOTIDINE 20 MG: 20 TABLET ORAL at 09:12

## 2019-12-18 RX ADMIN — FENTANYL CITRATE 50 MCG: 50 INJECTION, SOLUTION INTRAMUSCULAR; INTRAVENOUS at 04:12

## 2019-12-18 RX ADMIN — PHENYLEPHRINE HYDROCHLORIDE 200 MCG: 10 INJECTION INTRAVENOUS at 04:12

## 2019-12-18 RX ADMIN — CEFAZOLIN SODIUM 2 G: 2 SOLUTION INTRAVENOUS at 04:12

## 2019-12-18 RX ADMIN — MUPIROCIN 1 G: 20 OINTMENT TOPICAL at 10:12

## 2019-12-18 RX ADMIN — ROCURONIUM BROMIDE 5 MG: 10 INJECTION, SOLUTION INTRAVENOUS at 04:12

## 2019-12-18 RX ADMIN — OXYCODONE HYDROCHLORIDE 10 MG: 10 TABLET, FILM COATED, EXTENDED RELEASE ORAL at 08:12

## 2019-12-18 RX ADMIN — TRANEXAMIC ACID 500 MG: 100 INJECTION, SOLUTION INTRAVENOUS at 04:12

## 2019-12-18 RX ADMIN — ACETAMINOPHEN 1000 MG: 10 INJECTION, SOLUTION INTRAVENOUS at 09:12

## 2019-12-18 RX ADMIN — ALBUTEROL SULFATE 2 MG: 2 SYRUP ORAL at 08:12

## 2019-12-18 RX ADMIN — NEOSTIGMINE METHYLSULFATE 3 MG: 1 INJECTION INTRAVENOUS at 05:12

## 2019-12-18 RX ADMIN — ATORVASTATIN CALCIUM 40 MG: 40 TABLET, FILM COATED ORAL at 08:12

## 2019-12-18 RX ADMIN — SODIUM CHLORIDE, SODIUM GLUCONATE, SODIUM ACETATE, POTASSIUM CHLORIDE, MAGNESIUM CHLORIDE, SODIUM PHOSPHATE, DIBASIC, AND POTASSIUM PHOSPHATE: .53; .5; .37; .037; .03; .012; .00082 INJECTION, SOLUTION INTRAVENOUS at 05:12

## 2019-12-18 RX ADMIN — KETAMINE HYDROCHLORIDE 15 MG: 100 INJECTION, SOLUTION, CONCENTRATE INTRAMUSCULAR; INTRAVENOUS at 04:12

## 2019-12-18 RX ADMIN — SENNOSIDES AND DOCUSATE SODIUM 1 TABLET: 8.6; 5 TABLET ORAL at 09:12

## 2019-12-18 RX ADMIN — ASPIRIN 325 MG ORAL TABLET 325 MG: 325 PILL ORAL at 09:12

## 2019-12-18 RX ADMIN — SENNOSIDES AND DOCUSATE SODIUM 1 TABLET: 8.6; 5 TABLET ORAL at 08:12

## 2019-12-18 RX ADMIN — ACETAMINOPHEN 1000 MG: 10 INJECTION, SOLUTION INTRAVENOUS at 04:12

## 2019-12-18 NOTE — ASSESSMENT & PLAN NOTE
History noted current on IV antibiotics.  Has been on chronic oral antibiotics as per Dr. Mackey's instructions

## 2019-12-18 NOTE — NURSING
Notified Dr. Olguin of low sat. 89% on 2.5Liters. Pt. Not really moving much in bed due to pain. Refusing to be repositioned, as she is leaning on her right elbow, slumped to right. Daughter states pt. Had pneumonia prior to admission. Nurse ordered IS to start now. Awaiting futher orders.

## 2019-12-18 NOTE — ANESTHESIA PREPROCEDURE EVALUATION
12/18/2019  Fabiana Morel is a 64 y.o., female.    Anesthesia Evaluation    I have reviewed the Patient Summary Reports.    I have reviewed the Nursing Notes.   I have reviewed the Medications.     Review of Systems  Cardiovascular:   Hypertension    Pulmonary:   Pneumonia COPD Shortness of breath Active left upper lobe pneumonia   Hepatic/GI:   PUD,        Physical Exam  General:  Cachexia       Chest/Lungs:  Chest/Lungs Findings: Rales, Basilar, Rales, Extensive              Anesthesia Plan  Type of Anesthesia, risks & benefits discussed:  Anesthesia Type:  general  Patient's Preference:   Intra-op Monitoring Plan: standard ASA monitors  Intra-op Monitoring Plan Comments:   Post Op Pain Control Plan:   Post Op Pain Control Plan Comments:   Induction:   Inhalation and IV  Beta Blocker:  Patient is on a Beta-Blocker and has received one dose within the past 24 hours (No further documentation required).       Informed Consent:  Anesthesia consent signed with patient.  ASA Score: 4     Day of Surgery Review of History & Physical:            Ready For Surgery From Anesthesia Perspective.

## 2019-12-18 NOTE — TRANSFER OF CARE
"Anesthesia Transfer of Care Note    Patient: Fabiana Morel    Procedure(s) Performed: Procedure(s) (LRB):  ARTHROPLASTY, HIP (Right)    Patient location: PACU    Anesthesia Type: general    Transport from OR: Transported from OR on 6-10 L/min O2 by face mask with adequate spontaneous ventilation    Post pain: adequate analgesia    Post assessment: no apparent anesthetic complications and tolerated procedure well    Post vital signs: stable    Level of consciousness: sedated    Nausea/Vomiting: no nausea/vomiting    Complications: none    Transfer of care protocol was followed      Last vitals:   Visit Vitals  /63   Pulse 107   Temp 36.1 °C (96.9 °F) (Oral)   Resp 20   Ht 5' 4" (1.626 m)   Wt 51.7 kg (114 lb)   SpO2 (!) 84%   Breastfeeding? No   BMI 19.57 kg/m²     "

## 2019-12-18 NOTE — ASSESSMENT & PLAN NOTE
Has history of hypoxemia but intermittently uses home oxygen.  Patient has a history of COPD and patient needs oxygen when hypoxemic on exertion

## 2019-12-18 NOTE — INTERVAL H&P NOTE
The patient has been examined and the H&P has been reviewed:    I concur with the findings and no changes have occurred since H&P was written.    Anesthesia/Surgery risks, benefits and alternative options discussed and understood by patient/family.          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

## 2019-12-18 NOTE — NURSING
Per Dr. Olguin request this nurse contacted Dr. Mackey to see if any further orders are necessary. Dr. Mackey stated he would order ABG and would monitor and go from there. Awaiting further orders.

## 2019-12-18 NOTE — PLAN OF CARE
0930  Went to patient room to administer MDI and found patient's 02 saturation 84% on nc at 2.5lpm.  ABG performed and P02 was 59.  Dr Olguin notified and 02 increased to 5lpm.  Patient started on IS averaging 1250ml.

## 2019-12-18 NOTE — ASSESSMENT & PLAN NOTE
Acute Problem  Mechanical Fall. Xray demonstrates right femoral neck fracture  Right leg is neurovascularly intact  Orthopedics on case.    NPO after midnight  for surgery tomorrow   Will follow Ortho for further recommendation  Started on OxyContin q.12h and IV morphine for breakthrough pain

## 2019-12-18 NOTE — PROGRESS NOTES
[facility] Hospital Medicine  Progress Note    Patient Name: Fabiana Morel  MRN: [id number]  Patient Class: IP- Inpatient   Admission Date: 12/15/2019  Length of Stay: 2 days  Attending Physician: Aniket Olguin MD  Primary Care Provider: Dominique Bartlett MD        Subjective:     Principal Problem:Closed fracture of neck of right femur        HPI:  Fabiana Morel is a 64 year old female who presented to the ER for evaluation of right hip after a mechanical fall. She states that she tripped on her slippers while walking outside to check her mail. Injury occurred several hours prior to arrival. Describes pain as intense at worst. 9/10 pain at worst. Pain is worse with any movement of right leg. She was unable to bear weight on right leg. She denies striking her head, loss of consciousness or cervical pain. Her work up in the ER demonstrated a right femoral neck fracture as well as a AUTUMN infiltrate. Labs were stable. She has been treated over the past several months by Dr. Mackey for the AUTUMN infiltrate with Augmentin. She drinks several beers daily. She quite smoking 1 month ago. PMH includes, COPD, HTN and hyperlipidemia. Surgical hx includes hysterectomy and appendectomy. [facility] was consulted for admission and management. Dr. Santos was consulted for orthopedics by ER physician. Will admit to Hospital, NPO after midnight, continue with preop work up. Dr. Mackey will be consulted for pulmonary clearance.     Overview/Hospital Course:  No notes on file    Interval History:  Patient was hypoxemic last night and as per the family was breathing shallow.  Patient has been in severe pain and has been receiving morphine every 4 hr.  Patient currently denies any shortness of breath chest pain and pain is mild-to-moderate at this point    Review of Systems   Constitutional: Negative for activity change, appetite change, fatigue and fever.   HENT: Negative for congestion, sinus pressure, sinus  pain, sneezing and sore throat.    Eyes: Negative for photophobia, pain, discharge and redness.   Respiratory: Negative for cough, choking, chest tightness, shortness of breath and wheezing.    Cardiovascular: Negative for chest pain, palpitations and leg swelling.   Gastrointestinal: Negative for abdominal distention, abdominal pain, nausea and vomiting.   Genitourinary: Negative for difficulty urinating, dysuria, flank pain, frequency and urgency.   Musculoskeletal: Positive for arthralgias and gait problem. Negative for back pain, joint swelling, myalgias and neck pain.   Skin: Negative for color change, pallor, rash and wound.   Hematological: Negative.    Psychiatric/Behavioral: Negative.      Objective:     Vital Signs (Most Recent):  Temp: 96.6 °F (35.9 °C) (12/17/19 1928)  Pulse: 95 (12/17/19 1928)  Resp: 18 (12/17/19 1928)  BP: (!) 99/57 (12/17/19 1928)  SpO2: 96 % (12/17/19 1928) Vital Signs (24h Range):  Temp:  [96.6 °F (35.9 °C)-101.4 °F (38.6 °C)] 96.6 °F (35.9 °C)  Pulse:  [] 95  Resp:  [16-18] 18  SpO2:  [84 %-97 %] 96 %  BP: ()/(57-73) 99/57     Weight: 51.8 kg (114 lb 3.2 oz)  Body mass index is 19.6 kg/m².    Intake/Output Summary (Last 24 hours) at 12/17/2019 2000  Last data filed at 12/17/2019 1800  Gross per 24 hour   Intake 480 ml   Output 950 ml   Net -470 ml      Physical Exam   Constitutional: She appears well-developed and well-nourished. No distress.   HENT:   Head: Normocephalic.   Right Ear: External ear normal.   Left Ear: External ear normal.   Mouth/Throat: Oropharynx is clear and moist.   Eyes: Pupils are equal, round, and reactive to light. Conjunctivae and EOM are normal. Right eye exhibits no discharge. Left eye exhibits no discharge. No scleral icterus.   Neck: Normal range of motion. Neck supple.   Cardiovascular: Normal rate, regular rhythm, normal heart sounds and intact distal pulses. Exam reveals no gallop and no friction rub.   No murmur heard.  Pulmonary/Chest:  Effort normal and breath sounds normal. No stridor. No respiratory distress. She has no wheezes. She has no rales.   Abdominal: Soft. Bowel sounds are normal. She exhibits no distension and no mass. There is no tenderness. There is no guarding.   Musculoskeletal: She exhibits tenderness and deformity.        Right hip: She exhibits decreased range of motion, decreased strength, tenderness, bony tenderness and deformity.        Legs:  Lymphadenopathy:     She has no cervical adenopathy.   Neurological: She is alert.   Skin: Skin is warm and dry. Capillary refill takes less than 2 seconds. No rash noted. She is not diaphoretic. No erythema. No pallor.   Psychiatric: She has a normal mood and affect. Her behavior is normal. Judgment and thought content normal.   Nursing note and vitals reviewed.      Significant Labs:   BMP:   Recent Labs   Lab 12/17/19  0526   *   *   K 3.8      CO2 23   BUN 9   CREATININE 0.7   CALCIUM 9.5   MG 1.9     CBC:   Recent Labs   Lab 12/15/19  2200 12/16/19  0435 12/17/19  0526   WBC 12.66 7.27 7.46   HGB 11.7* 10.6* 11.2*   HCT 37.6 34.6* 36.1*   * 434* 442*       Significant Imaging: I have reviewed all pertinent imaging results/findings within the past 24 hours.      Assessment/Plan:      * Closed fracture of neck of right femur  Acute Problem  Mechanical Fall. Xray demonstrates right femoral neck fracture  Right leg is neurovascularly intact  Orthopedics on case.    NPO after midnight  for surgery tomorrow   Will follow Ortho for further recommendation  Started on OxyContin q.12h and IV morphine for breakthrough pain      Chronic respiratory failure with hypoxia  Has history of hypoxemia but intermittently uses home oxygen.  Patient has a history of COPD and patient needs oxygen when hypoxemic on exertion      Pneumatocele of lung  History noted current on IV antibiotics.  Has been on chronic oral antibiotics as per Dr. Mackey's  instructions      Bronchiectasis  History noted      Moderate malnutrition  History noted we get Nutrition on board      Calcification of aorta  History noted      Anxiety  Acute on chronic problem  Handling situation well currently  Xanax ordered prn anxiety.      COPD, severe  Chronic Problem  Respiratory status stable   Will continue albuterol nebs, and inhalers  Will closely monitor.      Hyperlipidemia  Chronic Problem  Continue statin therapy        Hypertension  Chronic Problem  BP decreased after pain meds given in ER  Will hold Metoprolol for now.  Monitor.      Tobacco abuse  Acute on Chronic Problem  Long term smoker  Quit 1 month ago  Dangers of cigarette smoking were reviewed with patient in detail for 10 minutes and patient was encouraged to quit. Nicotine replacement options were discussed.        VTE Risk Mitigation (From admission, onward)         Ordered     IP VTE LOW RISK PATIENT  Once      12/15/19 2352     Place STEPHANIE hose  Until discontinued      12/15/19 2352                      Aniket Olguin MD  Department of Hospital Medicine   [facility]

## 2019-12-18 NOTE — PROGRESS NOTES
Progress Note  PULMONARY    Admit Date: 12/15/2019   12/18/2019      SUBJECTIVE:     Dec 18- needs more ox than usual, cannot mobilize, spells cough np, not getting resp rx.  Fell walking to mail box. Stopped smoking a month ago.           PFSH and Allergies reviewed.    OBJECTIVE:     Vitals (Most recent):  Vitals:    12/18/19 1214   BP: (!) 82/51   Pulse: 96   Resp: 18   Temp: 96.9 °F (36.1 °C)       Vitals (24 hour range):  Temp:  [96.6 °F (35.9 °C)-98.7 °F (37.1 °C)]   Pulse:  []   Resp:  [16-18]   BP: ()/(51-68)   SpO2:  [85 %-97 %]       Intake/Output Summary (Last 24 hours) at 12/18/2019 1306  Last data filed at 12/18/2019 0500  Gross per 24 hour   Intake 480 ml   Output 2150 ml   Net -1670 ml          Physical Exam:  The patient's neuro status (alertness,orientation,cognitive function,motor skills,), pharyngeal exam (oral lesions, hygiene, abn dentition,), Neck (jvd,mass,thyroid,nodes in neck and above/below clavicle),RESPIRATORY(symmetry,effort,fremitus,percussion,auscultation),  Cor(rhythm,heart tones including gallops,perfusion,edema)ABD(distention,hepatic&splenomegaly,tenderness,masses), Skin(rash,cyanosis),Psyc(affect,judgement,).  Exam negative except for these pertinent findings:    Alert, grimmaces with coughing spells, faint wheezes, decreased bs No edema    Radiographs reviewed: results reviewed  11/23 cxr aida infiltrate, stable with recent film.   Results for orders placed during the hospital encounter of 10/20/17   X-Ray Chest 1 View    Narrative Single portable view of the chest is provided. The cardiac size is within normal limits. There is increased density and consolidation in the right upper lobe infiltrate and scarring. Left upper lobe lesion is unchanged. No pneumothorax is noted.    Impression  Increased density and consolidation in the right upper lobe infiltrate. Unchanged left upper lobe nodule. No pneumothorax seen      Electronically signed by: Sheldon Colmenares MD  Date:      10/20/17  Time:    08:57    ]    Labs     Recent Labs   Lab 12/18/19  0534   WBC 8.99   HGB 10.6*   HCT 33.9*   *     Recent Labs   Lab 12/18/19  0534   *   K 4.2   CL 96   CO2 25   BUN 7*   CREATININE 0.6      CALCIUM 9.3   MG 2.0   PHOS 4.0   AST 15   ALT 8*   ALKPHOS 98   BILITOT 0.3   PROT 6.2   ALBUMIN 2.1*     Recent Labs   Lab 12/18/19  0930   PH 7.467*   PCO2 36.8   PO2 59*   HCO3 26.6     Microbiology Results (last 7 days)     Procedure Component Value Units Date/Time    Culture, Respiratory with Gram Stain [[id number]]     Order Status:  No result Specimen:  Respiratory from Sputum, Expectorated           Impression:  Active Hospital Problems    Diagnosis  POA    *Closed fracture of neck of right femur [S72.001A]  Yes    Acute on chronic respiratory failure with hypoxia and hypercapnia [J96.21, J96.22]  Yes    Immune deficiency disorder [D84.9]  Yes    Calcification of aorta [I70.0]  Yes    Moderate malnutrition [E44.0]  Yes    Bronchiectasis [J47.9]  Yes    Pneumatocele of lung [J98.4]  Yes    Chronic respiratory failure with hypoxia [J96.11]  Yes    Anxiety [F41.9]  Yes    COPD, severe [J44.9]  Yes    Hyperlipidemia [E78.5]  Yes    Hypertension [I10]  Yes    Tobacco abuse [Z72.0]  Yes      Resolved Hospital Problems    Diagnosis Date Resolved POA    Abscess of left lung with pneumonia [J85.1] 12/16/2019 Yes               Plan:   Dec 18- pt needs surg to improve mobility.  Lungs will worsen with immobilization.  Pt is in stable and acutely optimal shape for needed surg- she has very severe lung dz but would do better with hip repair.  Pt cleared.  Needs reg resp rx.  May need niv later?                                    .

## 2019-12-18 NOTE — PLAN OF CARE
Plan of care reviewed with patient & family. Patient instructed to be NPO after MN for surgery in am, verbalized understanding. Duran catheter intact & patent. Medicated for pain once this shift, full relief obtained. Remains free from falls/injury. Instructed to call for assistance as needed during night. Verbalized understanding. Call light in reach, bed alarm on .

## 2019-12-18 NOTE — SUBJECTIVE & OBJECTIVE
Interval History:  Patient was hypoxemic last night and as per the family was breathing shallow.  Patient has been in severe pain and has been receiving morphine every 4 hr.  Patient currently denies any shortness of breath chest pain and pain is mild-to-moderate at this point    Review of Systems   Constitutional: Negative for activity change, appetite change, fatigue and fever.   HENT: Negative for congestion, sinus pressure, sinus pain, sneezing and sore throat.    Eyes: Negative for photophobia, pain, discharge and redness.   Respiratory: Negative for cough, choking, chest tightness, shortness of breath and wheezing.    Cardiovascular: Negative for chest pain, palpitations and leg swelling.   Gastrointestinal: Negative for abdominal distention, abdominal pain, nausea and vomiting.   Genitourinary: Negative for difficulty urinating, dysuria, flank pain, frequency and urgency.   Musculoskeletal: Positive for arthralgias and gait problem. Negative for back pain, joint swelling, myalgias and neck pain.   Skin: Negative for color change, pallor, rash and wound.   Hematological: Negative.    Psychiatric/Behavioral: Negative.      Objective:     Vital Signs (Most Recent):  Temp: 96.6 °F (35.9 °C) (12/17/19 1928)  Pulse: 95 (12/17/19 1928)  Resp: 18 (12/17/19 1928)  BP: (!) 99/57 (12/17/19 1928)  SpO2: 96 % (12/17/19 1928) Vital Signs (24h Range):  Temp:  [96.6 °F (35.9 °C)-101.4 °F (38.6 °C)] 96.6 °F (35.9 °C)  Pulse:  [] 95  Resp:  [16-18] 18  SpO2:  [84 %-97 %] 96 %  BP: ()/(57-73) 99/57     Weight: 51.8 kg (114 lb 3.2 oz)  Body mass index is 19.6 kg/m².    Intake/Output Summary (Last 24 hours) at 12/17/2019 2000  Last data filed at 12/17/2019 1800  Gross per 24 hour   Intake 480 ml   Output 950 ml   Net -470 ml      Physical Exam   Constitutional: She appears well-developed and well-nourished. No distress.   HENT:   Head: Normocephalic.   Right Ear: External ear normal.   Left Ear: External ear normal.    Mouth/Throat: Oropharynx is clear and moist.   Eyes: Pupils are equal, round, and reactive to light. Conjunctivae and EOM are normal. Right eye exhibits no discharge. Left eye exhibits no discharge. No scleral icterus.   Neck: Normal range of motion. Neck supple.   Cardiovascular: Normal rate, regular rhythm, normal heart sounds and intact distal pulses. Exam reveals no gallop and no friction rub.   No murmur heard.  Pulmonary/Chest: Effort normal and breath sounds normal. No stridor. No respiratory distress. She has no wheezes. She has no rales.   Abdominal: Soft. Bowel sounds are normal. She exhibits no distension and no mass. There is no tenderness. There is no guarding.   Musculoskeletal: She exhibits tenderness and deformity.        Right hip: She exhibits decreased range of motion, decreased strength, tenderness, bony tenderness and deformity.        Legs:  Lymphadenopathy:     She has no cervical adenopathy.   Neurological: She is alert.   Skin: Skin is warm and dry. Capillary refill takes less than 2 seconds. No rash noted. She is not diaphoretic. No erythema. No pallor.   Psychiatric: She has a normal mood and affect. Her behavior is normal. Judgment and thought content normal.   Nursing note and vitals reviewed.      Significant Labs:   BMP:   Recent Labs   Lab 12/17/19  0526   *   *   K 3.8      CO2 23   BUN 9   CREATININE 0.7   CALCIUM 9.5   MG 1.9     CBC:   Recent Labs   Lab 12/15/19  2200 12/16/19  0435 12/17/19  0526   WBC 12.66 7.27 7.46   HGB 11.7* 10.6* 11.2*   HCT 37.6 34.6* 36.1*   * 434* 442*       Significant Imaging: I have reviewed all pertinent imaging results/findings within the past 24 hours.

## 2019-12-18 NOTE — PLAN OF CARE
Pt transferred to [facility] op from room 321A via bed. in pre op assessment complete. Pt sitting up in bed refuses to be moved. Pt has history of COPD and recent pnewmonia  O2 sats in the mid 80's, even though pt is on 5 liters O2.  dr branham reviewed and ordered xopenex treatment in pre op  Pt has teds on has bandage on right arm where she broke the skin in the fall on Sunday nighjt  Both pedal pulses                                                                              +1 weak, marked with X  Family at bedside  Has iv fluids infusing wihtout diff  1445- resp teck in room giving breathing treatment  During breathing treatment sats up to 95    Will contimue to monitor

## 2019-12-18 NOTE — OP NOTE
[facility]  Orthopedic Surgery Department  Operative Note    SUMMARY     Date of Procedure: 12/18/2019     Procedure: Procedure(s) (LRB):  ARTHROPLASTY, HIP (Right)     Surgeon(s) and Role:     * Bernardino Wilson II, MD - Primary    Assisting Surgeon: None    First Assist:  CHADWICK Vasquez    Pre-Operative Diagnosis: Hip fracture [S72.009A]    Post-Operative Diagnosis: Post-Op Diagnosis Codes:     * Hip fracture [S72.009A]    Anesthesia: General    Technical Procedures Used:  Right total hip arthroplasty    Description of the Findings of the Procedure:  Dictated    Significant Surgical Tasks Conducted by the Assistant(s), if Applicable:  Retraction during surgery, superficial closure and bandage application    Complications: No    Estimated Blood Loss (EBL): 100 mL           Implants:   Implant Name Type Inv. Item Serial No.  Lot No. LRB No. Used   COVER REFLECTION I APEX THRD H - [id number]  COVER REFLECTION I APEX THRD H  [employer] [id number] Right 1   r3 three hold hemispherical stiktite coated shell      [id number] Right 1   size 12 standard offset femoral component     [id number] Right 1   HEAD FEM 12/14 TAPE +0MM 32MM - [id number]  HEAD FEM 12/14 TAPE +0MM 32MM  [employer] [id number] Right 1   acetabular liner     [id number] Right 1       Specimens:   Specimen (12h ago, onward)    None                  Condition: Good    Disposition: PACU - hemodynamically stable.    Attestation: I was present for the entire procedure.    Procedure In Detail:  The patient was brought to the operating room and intubated on the transport gurney.  She was then transferred to the operative table rolled in the left lateral decubitus position with the right hip up and secured on the table with 2 post.  The right lower extremity was prepped and draped in the normal sterile fashion. A posterior approach to the hip was created.  Dissection was taken through skin and subcutaneous tissue  down to the tensor fascia payam and fascia over the gluteus shaun.  The fascia was sharply incised.  Blunt finger dissection was taken through the shaun musculature.  The bursa was swept posteriorly in the sciatic nerve was palpated and protected throughout the remainder of the case.  A Charnley retractor was placed.  Piriformis was identified and the interval between the piriformis and gluteus minimus was developed and the piriformis and other short external rotators were taken as 1 cuff of tissue along with the hip capsule and tagged with Ethibond suture for later repair. This led to excellent exposure of the fracture. An acetabular knife was used to remove the fractured femoral head.  A fresh neck cut was made.  Hohmann retractors were placed anteriorly and posterior to the acetabulum and the femur was ladder translated anteriorly.  A capsulotomy was performed, the labrum was resected, and the cotyloid notch was cleared of soft tissue.  Sequential reaming was then undertaken to 48 mm.  A 48 mm trial noted to fit and fill the acetabulum appropriately.  A 48 mm R3 cup was impacted in the acetabulum and when fully seated the entire pelvis with rock when trying to move the cup.  A hole cover was placed along with a trial liner and then attention was turned to the femur.    A box osteotome was used open the proximal femur followed by canal finding Reamer and then a lateralizing Reamer.  Sequential reaming was then undertaken to size 12.  Sequential broaching was undertaken to size 12.  With a size 12 broach in place we began trialing head neck combinations and a standard neck with a +0 32 mm head gave us clinically equal leg lengths.  There was no excessive Shuck.  The hip could be flexed to 90° and internally rotated to about 80° before subluxation.  We felt this to be a good stable construct.  The trials were removed. A 0 degree liner was impacted into the acetabular shell.  A size 12 synergy stem was impacted  into the femur.  A 32 mm +0 Oxinium head was impacted onto the neck of the stem.  The hip was again reduced with the final components in place the hip had the same stability and range of motion is did with the trials.  Hip was then placed in a neutral position irrigated with 3 L of normal saline in a pulsatile lavage and then closure was begun.    The short external rotators and hip capsule repaired as 1 cuff of tissue to the medius tendon. The fascia was closed with a strata fix suture. The subdermal layer was also closed with the strata fix suture and a 3 0 Monocryl was used to close the subcuticular layer.  A Dermabond device was placed over the skin.  A sterile Bioclusive intraoperative dressing was placed.  The patient was then rolled supine on the transport gurney.  X-rays taken in the operative room showed a well-fixed implant in good alignment.  The patient was then taken to recovery where she was noted be stable postoperatively.  Needle and lap counts were correct at the end the case.

## 2019-12-18 NOTE — ANESTHESIA PROCEDURE NOTES
Intubation  Performed by: Carol Salmon CRNA  Authorized by: Luigi Winkler MD     Intubation:     Induction:  Intravenous    Intubated:  Postinduction    Mask Ventilation:  Easy mask    Attempts:  1    Attempted By:  CRNA    Method of Intubation:  Direct    Blade:  [place] 2    Laryngeal View Grade: Grade I - full view of chords      Difficult Airway Encountered?: No      Complications:  None    Airway Device:  Oral endotracheal tube    Airway Device Size:  7.0    Style/Cuff Inflation:  Cuffed (inflated to minimal occlusive pressure)    Tube secured:  21    Secured at:  The lips    Placement Verified By:  Capnometry    Complicating Factors:  None    Findings Post-Intubation:  BS equal bilateral and atraumatic/condition of teeth unchanged

## 2019-12-18 NOTE — ANESTHESIA POSTPROCEDURE EVALUATION
Anesthesia Post Evaluation    Patient: Fabiana Morel    Procedure(s) Performed: Procedure(s) (LRB):  ARTHROPLASTY, HIP (Right)    Final Anesthesia Type: general    Patient location during evaluation: PACU  Patient participation: Yes- Able to Participate  Level of consciousness: sedated and awake  Post-procedure vital signs: reviewed and stable  Pain management: adequate  Airway patency: patent    PONV status at discharge: No PONV  Anesthetic complications: no      Cardiovascular status: blood pressure returned to baseline  Respiratory status: spontaneous ventilation  Hydration status: euvolemic  Follow-up not needed.          Vitals Value Taken Time   BP 93/50 12/18/2019  5:40 PM   Temp 36.5 °C (97.7 °F) 12/18/2019  5:30 PM   Pulse 88 12/18/2019  5:43 PM   Resp 17 12/18/2019  5:43 PM   SpO2 92 % 12/18/2019  5:43 PM   Vitals shown include unvalidated device data.      No case tracking events are documented in the log.      Pain/Randy Score: Pain Rating Prior to Med Admin: 0 (12/18/2019  5:30 PM)  Pain Rating Post Med Admin: 0 (12/18/2019  5:30 PM)  Randy Score: 8 (12/18/2019  5:30 PM)

## 2019-12-19 DIAGNOSIS — J96.11 CHRONIC RESPIRATORY FAILURE WITH HYPOXIA: Primary | ICD-10-CM

## 2019-12-19 DIAGNOSIS — S72.001A CLOSED FRACTURE OF NECK OF RIGHT FEMUR, INITIAL ENCOUNTER: Primary | ICD-10-CM

## 2019-12-19 DIAGNOSIS — J44.9 COPD, SEVERE: ICD-10-CM

## 2019-12-19 LAB
ALBUMIN SERPL BCP-MCNC: 2.1 G/DL (ref 3.5–5.2)
ALP SERPL-CCNC: 98 U/L (ref 55–135)
ALT SERPL W/O P-5'-P-CCNC: 12 U/L (ref 10–44)
ANION GAP SERPL CALC-SCNC: 13 MMOL/L (ref 8–16)
AST SERPL-CCNC: 17 U/L (ref 10–40)
BASOPHILS # BLD AUTO: 0.02 K/UL (ref 0–0.2)
BASOPHILS NFR BLD: 0.2 % (ref 0–1.9)
BILIRUB SERPL-MCNC: 0.2 MG/DL (ref 0.1–1)
BUN SERPL-MCNC: 6 MG/DL (ref 8–23)
CALCIUM SERPL-MCNC: 9.6 MG/DL (ref 8.7–10.5)
CHLORIDE SERPL-SCNC: 98 MMOL/L (ref 95–110)
CO2 SERPL-SCNC: 25 MMOL/L (ref 23–29)
CREAT SERPL-MCNC: 0.6 MG/DL (ref 0.5–1.4)
DIFFERENTIAL METHOD: ABNORMAL
EOSINOPHIL # BLD AUTO: 0 K/UL (ref 0–0.5)
EOSINOPHIL NFR BLD: 0 % (ref 0–8)
ERYTHROCYTE [DISTWIDTH] IN BLOOD BY AUTOMATED COUNT: 14.8 % (ref 11.5–14.5)
EST. GFR  (AFRICAN AMERICAN): >60 ML/MIN/1.73 M^2
EST. GFR  (NON AFRICAN AMERICAN): >60 ML/MIN/1.73 M^2
GLUCOSE SERPL-MCNC: 178 MG/DL (ref 70–110)
HCT VFR BLD AUTO: 34.8 % (ref 37–48.5)
HGB BLD-MCNC: 10.6 G/DL (ref 12–16)
IMM GRANULOCYTES # BLD AUTO: 0.05 K/UL (ref 0–0.04)
LYMPHOCYTES # BLD AUTO: 0.7 K/UL (ref 1–4.8)
LYMPHOCYTES NFR BLD: 7.6 % (ref 18–48)
MAGNESIUM SERPL-MCNC: 2.2 MG/DL (ref 1.6–2.6)
MCH RBC QN AUTO: 27.7 PG (ref 27–31)
MCHC RBC AUTO-ENTMCNC: 30.5 G/DL (ref 32–36)
MCV RBC AUTO: 91 FL (ref 82–98)
MONOCYTES # BLD AUTO: 0.2 K/UL (ref 0.3–1)
MONOCYTES NFR BLD: 1.9 % (ref 4–15)
NEUTROPHILS # BLD AUTO: 8.1 K/UL (ref 1.8–7.7)
NEUTROPHILS NFR BLD: 89.7 % (ref 38–73)
NRBC BLD-RTO: 0 /100 WBC
PHOSPHATE SERPL-MCNC: 3.7 MG/DL (ref 2.7–4.5)
PLATELET # BLD AUTO: 382 K/UL (ref 150–350)
PMV BLD AUTO: 8.6 FL (ref 9.2–12.9)
POTASSIUM SERPL-SCNC: 4.4 MMOL/L (ref 3.5–5.1)
PROT SERPL-MCNC: 6.6 G/DL (ref 6–8.4)
RBC # BLD AUTO: 3.83 M/UL (ref 4–5.4)
SODIUM SERPL-SCNC: 136 MMOL/L (ref 136–145)
WBC # BLD AUTO: 9 K/UL (ref 3.9–12.7)

## 2019-12-19 PROCEDURE — 12000002 HC ACUTE/MED SURGE SEMI-PRIVATE ROOM

## 2019-12-19 PROCEDURE — 97162 PT EVAL MOD COMPLEX 30 MIN: CPT

## 2019-12-19 PROCEDURE — 63600175 PHARM REV CODE 636 W HCPCS: Performed by: ORTHOPAEDIC SURGERY

## 2019-12-19 PROCEDURE — 80053 COMPREHEN METABOLIC PANEL: CPT

## 2019-12-19 PROCEDURE — 25000003 PHARM REV CODE 250: Performed by: ORTHOPAEDIC SURGERY

## 2019-12-19 PROCEDURE — 85025 COMPLETE CBC W/AUTO DIFF WBC: CPT

## 2019-12-19 PROCEDURE — 97116 GAIT TRAINING THERAPY: CPT

## 2019-12-19 PROCEDURE — 97530 THERAPEUTIC ACTIVITIES: CPT

## 2019-12-19 PROCEDURE — 99232 PR SUBSEQUENT HOSPITAL CARE,LEVL II: ICD-10-PCS | Mod: ,,, | Performed by: INTERNAL MEDICINE

## 2019-12-19 PROCEDURE — 94664 DEMO&/EVAL PT USE INHALER: CPT

## 2019-12-19 PROCEDURE — 36415 COLL VENOUS BLD VENIPUNCTURE: CPT

## 2019-12-19 PROCEDURE — 97535 SELF CARE MNGMENT TRAINING: CPT

## 2019-12-19 PROCEDURE — 94640 AIRWAY INHALATION TREATMENT: CPT

## 2019-12-19 PROCEDURE — 94761 N-INVAS EAR/PLS OXIMETRY MLT: CPT

## 2019-12-19 PROCEDURE — 97803 MED NUTRITION INDIV SUBSEQ: CPT

## 2019-12-19 PROCEDURE — 25000242 PHARM REV CODE 250 ALT 637 W/ HCPCS: Performed by: ORTHOPAEDIC SURGERY

## 2019-12-19 PROCEDURE — 84100 ASSAY OF PHOSPHORUS: CPT

## 2019-12-19 PROCEDURE — 97165 OT EVAL LOW COMPLEX 30 MIN: CPT

## 2019-12-19 PROCEDURE — 99232 SBSQ HOSP IP/OBS MODERATE 35: CPT | Mod: ,,, | Performed by: INTERNAL MEDICINE

## 2019-12-19 PROCEDURE — 83735 ASSAY OF MAGNESIUM: CPT

## 2019-12-19 PROCEDURE — 94799 UNLISTED PULMONARY SVC/PX: CPT

## 2019-12-19 PROCEDURE — 25000003 PHARM REV CODE 250: Performed by: INTERNAL MEDICINE

## 2019-12-19 PROCEDURE — 27000221 HC OXYGEN, UP TO 24 HOURS

## 2019-12-19 RX ORDER — HYDROCODONE BITARTRATE AND ACETAMINOPHEN 7.5; 325 MG/1; MG/1
1 TABLET ORAL EVERY 6 HOURS PRN
Qty: 28 TABLET | Refills: 0 | Status: SHIPPED | OUTPATIENT
Start: 2019-12-19 | End: 2019-12-26 | Stop reason: SDUPTHER

## 2019-12-19 RX ORDER — HYDROCODONE BITARTRATE AND ACETAMINOPHEN 5; 325 MG/1; MG/1
1 TABLET ORAL EVERY 4 HOURS PRN
Status: DISCONTINUED | OUTPATIENT
Start: 2019-12-19 | End: 2019-12-20 | Stop reason: HOSPADM

## 2019-12-19 RX ADMIN — ALBUTEROL SULFATE 2 MG: 2 SYRUP ORAL at 01:12

## 2019-12-19 RX ADMIN — PREGABALIN 75 MG: 75 CAPSULE ORAL at 10:12

## 2019-12-19 RX ADMIN — MUPIROCIN 1 G: 20 OINTMENT TOPICAL at 08:12

## 2019-12-19 RX ADMIN — IPRATROPIUM BROMIDE AND ALBUTEROL SULFATE 3 ML: .5; 3 SOLUTION RESPIRATORY (INHALATION) at 03:12

## 2019-12-19 RX ADMIN — ASPIRIN 325 MG ORAL TABLET 325 MG: 325 PILL ORAL at 10:12

## 2019-12-19 RX ADMIN — IPRATROPIUM BROMIDE AND ALBUTEROL SULFATE 3 ML: .5; 3 SOLUTION RESPIRATORY (INHALATION) at 12:12

## 2019-12-19 RX ADMIN — ASPIRIN 325 MG ORAL TABLET 325 MG: 325 PILL ORAL at 08:12

## 2019-12-19 RX ADMIN — HYDROCODONE BITARTRATE AND ACETAMINOPHEN 1 TABLET: 5; 325 TABLET ORAL at 05:12

## 2019-12-19 RX ADMIN — SENNOSIDES AND DOCUSATE SODIUM 1 TABLET: 8.6; 5 TABLET ORAL at 08:12

## 2019-12-19 RX ADMIN — SENNOSIDES AND DOCUSATE SODIUM 1 TABLET: 8.6; 5 TABLET ORAL at 10:12

## 2019-12-19 RX ADMIN — OXYCODONE HYDROCHLORIDE 10 MG: 10 TABLET, FILM COATED, EXTENDED RELEASE ORAL at 10:12

## 2019-12-19 RX ADMIN — CEFTRIAXONE 2 G: 2 INJECTION, SOLUTION INTRAVENOUS at 01:12

## 2019-12-19 RX ADMIN — FAMOTIDINE 20 MG: 20 TABLET ORAL at 08:12

## 2019-12-19 RX ADMIN — PREGABALIN 75 MG: 75 CAPSULE ORAL at 08:12

## 2019-12-19 RX ADMIN — IPRATROPIUM BROMIDE AND ALBUTEROL SULFATE 3 ML: .5; 3 SOLUTION RESPIRATORY (INHALATION) at 06:12

## 2019-12-19 RX ADMIN — METOPROLOL SUCCINATE 50 MG: 50 TABLET, EXTENDED RELEASE ORAL at 10:12

## 2019-12-19 RX ADMIN — IPRATROPIUM BROMIDE AND ALBUTEROL SULFATE 3 ML: .5; 3 SOLUTION RESPIRATORY (INHALATION) at 11:12

## 2019-12-19 RX ADMIN — ATORVASTATIN CALCIUM 40 MG: 40 TABLET, FILM COATED ORAL at 10:12

## 2019-12-19 RX ADMIN — MUPIROCIN 1 G: 20 OINTMENT TOPICAL at 10:12

## 2019-12-19 RX ADMIN — TRAMADOL HYDROCHLORIDE 50 MG: 50 TABLET ORAL at 05:12

## 2019-12-19 RX ADMIN — FAMOTIDINE 20 MG: 20 TABLET ORAL at 10:12

## 2019-12-19 RX ADMIN — IPRATROPIUM BROMIDE AND ALBUTEROL SULFATE 3 ML: .5; 3 SOLUTION RESPIRATORY (INHALATION) at 07:12

## 2019-12-19 NOTE — PLAN OF CARE
Jared spoke with Alejandra about talking to pt about Rehab at the request by the family.  Alejandra will be by later to speak with pt and family.  Cm notified pt, daughter and spouse was gone.  Spouse will return later.  Cm frances continue to follow-up.       12/19/19 1310   Discharge Reassessment   Assessment Type Discharge Planning Reassessment   Provided patient/caregiver education on the expected discharge date and the discharge plan Yes

## 2019-12-19 NOTE — PT/OT/SLP EVAL
Physical Therapy Evaluation    Patient Name:  Fabiana Morel   MRN:  [id number]    Recommendations:     Discharge Recommendations:  rehabilitation facility   Discharge Equipment Recommendations: none   Barriers to discharge: Decreased caregiver support    Assessment:     Fabiana Morel is a 64 y.o. female admitted with a medical diagnosis of Closed fracture of neck of right femur.  She presents with the following impairments/functional limitations:  weakness, impaired endurance, impaired self care skills, impaired functional mobilty, gait instability, impaired balance, pain, orthopedic precautions, impaired cardiopulmonary response to activity, decreased lower extremity function, decreased ROM, decreased safety awareness . Pt with giggles this am- daughter stated had Oxycodone earlier. Pt laughing at small things. Pt tolerated thera ex, brief gait training and OOB to chair with 2 people assist for safety. Pt to benefit from inpatient rehab consult.    Rehab Prognosis: Good; patient would benefit from acute skilled PT services to address these deficits and reach maximum level of function.    Recent Surgery: Procedure(s) (LRB):  ARTHROPLASTY, HIP (Right) 1 Day Post-Op    Plan:     During this hospitalization, patient to be seen BID to address the identified rehab impairments via gait training, therapeutic activities, therapeutic exercises and progress toward the following goals:    · Plan of Care Expires:  01/10/20    Subjective   Spouse and daughter at bedside and supportive- stated pt is O2 dependent and ambulates household distance- has wheelchair for longer distances  Pt reviewed on posterior hip- precautions but requiring re education  Abductor pillow on and PT explained reasoning and purpose  Chief Complaint: none  Patient/Family Comments/goals: get home for [place] as daughter from [place] is coming home  Pain/Comfort:  · Pain Rating 1: (did not rate)  · Location - Side 1: Right  · Location 1: hip  · Pain  Addressed 1: Pre-medicate for activity, Reposition, Distraction    Patients cultural, spiritual, [place] conflicts given the current situation:      Living Environment:  Home with spouse with one threshold step  Prior to admission, patients level of function was ambulatory limited distance.  Equipment used at home: oxygen.  DME owned (not currently used): rolling walker.  Upon discharge, patient will have assistance from spouse.    Objective:     Communicated with nurse Fox prior to session.  Patient found HOB elevated with telemetry, oxygen, hip abduction pillow, SCD, peripheral IV  upon PT entry to room.    General Precautions: Standard, fall   Orthopedic Precautions:RLE weight bearing as tolerated, RLE posterior precautions   Braces: N/A     Exams:  · RLE ROM: Deficits: within hip precautions  · RLE Strength: Deficits: 3-/5  · LLE ROM: WFL  · LLE Strength: WFL    Functional Mobility:  · Bed Mobility:     · Scooting: moderate assistance  · Supine to Sit: moderate assistance and of 2 persons  · Transfers:     · Sit to Stand:  moderate assistance and of 2 persons with rolling walker  · Bed to Chair: minimum assistance and of 2 persons with  rolling walker  using  Stand Pivot  · Gait: 30ft with RW WBAT RLE with VC for walker and step sequencing. O2 at 3 liters and chair following      Therapeutic Activities and Exercises:   pt reviewed on posterior hip precautions   completed Ap,QS/GS and gentle HS x 10-20 reps  EOB sitting and standing with RW assist x2 for safety  Gait to hallways with chair following assist x2  OOB to chair, reclined with pillow in between thighs    AM-PAC 6 CLICK MOBILITY  Total Score:11     Patient left up in chair with all lines intact, call button in reach, chair alarm on, nurse Fox notified and spouse and daughter present.    GOALS:   Multidisciplinary Problems     Physical Therapy Goals        Problem: Physical Therapy Goal    Goal Priority Disciplines Outcome Goal Variances  Interventions   Physical Therapy Goal     PT, PT/OT Ongoing, Progressing     Description:  Goals to be met by: 01-     Patient will increase functional independence with mobility by performin. Supine to sit with MInimal Assistance  2. Sit to stand transfer with Minimal Assistance  3. Bed to chair transfer with Minimal Assistance using Rolling Walker  4. Gait  x 100 feet with Minimal Assistance using Rolling Walker.   5. Lower extremity exercise program x20 reps                    History:     Past Medical History:   Diagnosis Date    Abnormal CT scan 2015    Asthma     Colitis     COPD (chronic obstructive pulmonary disease)     Diverticulitis of sigmoid colon 2014    Erosive gastritis 2013    Fracture of left clavicle     H pylori ulcer 2013    Hypertension     Peptic ulcer disease 2013    Pneumonia of right upper lobe due to infectious organism 8/15/2017    Rectal bleed 11/3/2014    Scoliosis     SOB (shortness of breath)     Yeast infection 2017       Past Surgical History:   Procedure Laterality Date    APPENDECTOMY      BREAST BIOPSY Right     cyst    COLONOSCOPY N/A 7/15/2019    Procedure: COLONOSCOPY;  Surgeon: Sharif Chun MD;  Location: [facility] ENDO;  Service: Endoscopy;  Laterality: N/A;    HIP ARTHROPLASTY Right 2019    Procedure: ARTHROPLASTY, HIP;  Surgeon: Bernardino Wilson II, MD;  Location: [facility] OR;  Service: Orthopedics;  Laterality: Right;  Jose - Morel and Nephew    HYSTERECTOMY      HASMUKH/BSO, DUB    OOPHORECTOMY      TUBAL LIGATION         Time Tracking:     PT Received On: 19  PT Start Time: 1027     PT Stop Time: 1111  PT Total Time (min): 44 min     Billable Minutes: Evaluation 10, Gait Training 20 and Therapeutic Activity 14      Kellie Ivey, PT  2019

## 2019-12-19 NOTE — PLAN OF CARE
Report to Jelly. Patient states pain level 3, no nausea present, dressing to right hip dry, intact, no drainage, malone to gravity, abduction pillow,familyl at bedside.

## 2019-12-19 NOTE — PROGRESS NOTES
Vital signs stable, afebrile  Patient resting comfortably in bed. Her pain is drastically improved since surgery.    Right lower extremity:  Polar Care is in place and working  Bandage is clean, dry, intact with minimal spotting  Motor is intact distally EHL, FHL, TA, gastroc  Plus two dorsalis pedis and posterior tibial pulses  Normal sensation light touch dorsal, plantar, 1st webspace    Patient is postop day 1.  Status post right total hip arthroplasty for a displaced right femoral neck fracture. Stable and doing well  Up with PT this morning  If patient does well with physical therapy she is okay to DC home from an orthopedic standpoint later today  Pain medications sent to patient's pharmacy

## 2019-12-19 NOTE — PLAN OF CARE
Patient aerosol Q4 given via msk tolerated well sats 94% on 5lpm/ I S done 750ml x 10bpm. Aerobika Q4 done 10bpm

## 2019-12-19 NOTE — PLAN OF CARE
Problem: Occupational Therapy Goal  Goal: Occupational Therapy Goal  Description  Goals to be met by: 12/29/19     Patient will increase functional independence with ADLs by performing:    LE Dressing with Set-up Assistance, Minimal Assistance and Assistive Devices as needed.  Grooming while standing at sink with Contact Guard Assistance and Assistive Devices as needed.  Toileting from bedside commode with Set-up Assistance, Minimal Assistance and Assistive Devices as needed for hygiene and clothing management.   Bathing from  shower chair/bench with Set-up Assistance, Minimal Assistance and Assistive Devices as needed.  Toilet transfer to bedside commode with Contact Guard Assistance.  Pt to utilize energy conservation techniques during ADL task performance with occasional cues.     Outcome: Ongoing, Progressing   OT evaluation completed today. Goals & care plan established.  CHAZ Bose  12/19/2019

## 2019-12-19 NOTE — PROGRESS NOTES
[facility] Hospital Medicine  Progress Note    Patient Name: Fabiana Morel  MRN: [id number]  Patient Class: IP- Inpatient   Admission Date: 12/15/2019  Length of Stay: 3 days  Attending Physician: Aniket Olguin MD  Primary Care Provider: Dominique Bartlett MD        Subjective:     Principal Problem:Closed fracture of neck of right femur        HPI:  Fabiana Morel is a 64 year old female who presented to the ER for evaluation of right hip after a mechanical fall. She states that she tripped on her slippers while walking outside to check her mail. Injury occurred several hours prior to arrival. Describes pain as intense at worst. 9/10 pain at worst. Pain is worse with any movement of right leg. She was unable to bear weight on right leg. She denies striking her head, loss of consciousness or cervical pain. Her work up in the ER demonstrated a right femoral neck fracture as well as a AUTUMN infiltrate. Labs were stable. She has been treated over the past several months by Dr. Mackey for the AUTUMN infiltrate with Augmentin. She drinks several beers daily. She quite smoking 1 month ago. PMH includes, COPD, HTN and hyperlipidemia. Surgical hx includes hysterectomy and appendectomy. [facility] was consulted for admission and management. Dr. Santos was consulted for orthopedics by ER physician. Will admit to Hospital, NPO after midnight, continue with preop work up. Dr. Mackey will be consulted for pulmonary clearance.     Overview/Hospital Course:  No notes on file    Interval History:  Patient had episode of hypoxemia earlier today and oxygen demand was increased to 5 L. patient has had difficulty moving because of the pain of the right hip.  Review of Systems   Constitutional: Negative for activity change, appetite change, fatigue and fever.   HENT: Negative for congestion, sinus pressure, sinus pain, sneezing and sore throat.    Eyes: Negative for photophobia, pain, discharge and redness.    Respiratory: Positive for shortness of breath. Negative for cough, choking, chest tightness and wheezing.    Cardiovascular: Negative for chest pain, palpitations and leg swelling.   Gastrointestinal: Negative for abdominal distention, abdominal pain, nausea and vomiting.   Genitourinary: Negative for difficulty urinating, dysuria, flank pain, frequency and urgency.   Musculoskeletal: Positive for arthralgias and gait problem. Negative for back pain, joint swelling, myalgias and neck pain.   Skin: Negative for color change, pallor, rash and wound.   Hematological: Negative.    Psychiatric/Behavioral: Negative.      Objective:     Vital Signs (Most Recent):  Temp: 96.2 °F (35.7 °C) (12/18/19 1905)  Pulse: 83 (12/18/19 1939)  Resp: 18 (12/18/19 1939)  BP: (!) 97/59 (12/18/19 1905)  SpO2: (!) 94 % (12/18/19 1939) Vital Signs (24h Range):  Temp:  [96.2 °F (35.7 °C)-98.7 °F (37.1 °C)] 96.2 °F (35.7 °C)  Pulse:  [] 83  Resp:  [16-20] 18  SpO2:  [84 %-95 %] 94 %  BP: ()/(51-63) 97/59     Weight: 51.7 kg (114 lb)  Body mass index is 19.57 kg/m².    Intake/Output Summary (Last 24 hours) at 12/18/2019 1953  Last data filed at 12/18/2019 1714  Gross per 24 hour   Intake 1000 ml   Output 1600 ml   Net -600 ml      Physical Exam   Constitutional: She appears well-developed and well-nourished. No distress.   HENT:   Head: Normocephalic.   Right Ear: External ear normal.   Left Ear: External ear normal.   Mouth/Throat: Oropharynx is clear and moist.   Eyes: Pupils are equal, round, and reactive to light. Conjunctivae and EOM are normal. Right eye exhibits no discharge. Left eye exhibits no discharge. No scleral icterus.   Neck: Normal range of motion. Neck supple.   Cardiovascular: Normal rate, regular rhythm, normal heart sounds and intact distal pulses. Exam reveals no gallop and no friction rub.   No murmur heard.  Pulmonary/Chest: Breath sounds normal. No stridor. She is in respiratory distress. She has no  wheezes. She has no rales.   Abdominal: Soft. Bowel sounds are normal. She exhibits no distension and no mass. There is no tenderness. There is no guarding.   Musculoskeletal: She exhibits tenderness and deformity.        Right hip: She exhibits decreased range of motion, decreased strength, tenderness, bony tenderness and deformity.        Legs:  Lymphadenopathy:     She has no cervical adenopathy.   Neurological: She is alert.   Skin: Skin is warm and dry. Capillary refill takes less than 2 seconds. No rash noted. She is not diaphoretic. No erythema. No pallor.   Psychiatric: She has a normal mood and affect. Her behavior is normal. Judgment and thought content normal.   Nursing note and vitals reviewed.      Significant Labs:   BMP:   Recent Labs   Lab 12/18/19  0534      *   K 4.2   CL 96   CO2 25   BUN 7*   CREATININE 0.6   CALCIUM 9.3   MG 2.0     CBC:   Recent Labs   Lab 12/17/19  0526 12/18/19  0534   WBC 7.46 8.99   HGB 11.2* 10.6*   HCT 36.1* 33.9*   * 402*       Significant Imaging: I have reviewed all pertinent imaging results/findings within the past 24 hours.      Assessment/Plan:      * Closed fracture of neck of right femur  Acute Problem  Scheduled for surgery this afternoon.  Cleared by pulmonology for the procedure  Mechanical Fall. Xray demonstrates right femoral neck fracture  Right leg is neurovascularly intact  Orthopedics on case.    NPO after midnight  for surgery tomorrow   Will follow Ortho for further recommendation  Started on OxyContin q.12h and IV morphine for breakthrough pain      Pneumatocele of lung  History noted current on IV antibiotics.  Has been on chronic oral antibiotics as per Dr. Mackey's instructions      Immune deficiency disorder  History noted      Chronic respiratory failure with hypoxia  Has history of hypoxemia but intermittently uses home oxygen.  Patient has a history of COPD and patient needs oxygen when hypoxemic on exertion  Dr. Mackey  onboard  Will continue on 5 L oxygen nasal cannula      Bronchiectasis  History noted      Moderate malnutrition  History noted we get Nutrition on board      Calcification of aorta  History noted      Anxiety  Acute on chronic problem  Handling situation well currently  Xanax ordered prn anxiety.      COPD, severe  Chronic Problem  5 L oxygen on nasal cannula saturating at 94%  Respiratory status stable currently not wheezing  Will continue albuterol nebs, and inhalers  Appreciate Respiratory therapist input  Will closely monitor.      Hyperlipidemia  Chronic Problem  Continue statin therapy        Hypertension  Chronic Problem  BP decreased after pain meds  earlier today  Will continue Monitor.      Tobacco abuse  Acute on Chronic Problem  Long term smoker  Quit 1 month ago  Dangers of cigarette smoking were reviewed with patient in detail for 10 minutes and patient was encouraged to quit. Nicotine replacement options were discussed.        VTE Risk Mitigation (From admission, onward)         Ordered     IP VTE HIGH RISK PATIENT  Once      12/18/19 1859     Place STEPHANIE hose  Until discontinued      12/18/19 1859     Place sequential compression device  Until discontinued      12/18/19 1859     Place STEPHANIE hose  Until discontinued      12/15/19 7776                      Aniket Olguin MD  Department of Hospital Medicine   [facility]

## 2019-12-19 NOTE — PT/OT/SLP PROGRESS
Physical Therapy Treatment    Patient Name:  Fabiana Morel   MRN:  [id number]    Recommendations:     Discharge Recommendations:  rehabilitation facility   Discharge Equipment Recommendations: none   Barriers to discharge: Decreased caregiver support    Assessment:     Fabiana Morel is a 64 y.o. female admitted with a medical diagnosis of Closed fracture of neck of right femur.  She presents with the following impairments/functional limitations:  weakness, impaired endurance, impaired self care skills, impaired functional mobilty, gait instability, impaired balance, pain, orthopedic precautions, impaired cardiopulmonary response to activity, decreased lower extremity function, decreased ROM, decreased safety awareness . Pt seen BID, tolerated OOB x 2.5 hrs in recliner. Ambulated at hallways with RW min assist x2 with IV/O2, back to bed transfers min assist 2. Rehab appropriate.    Rehab Prognosis: Fair; patient would benefit from acute skilled PT services to address these deficits and reach maximum level of function.    Recent Surgery: Procedure(s) (LRB):  ARTHROPLASTY, HIP (Right) 1 Day Post-Op    Plan:     During this hospitalization, patient to be seen BID to address the identified rehab impairments via gait training, therapeutic activities, therapeutic exercises and progress toward the following goals:    · Plan of Care Expires:  01/10/20    Subjective     Chief Complaint: wanting to go home for [place]  Patient/Family Comments/goals: get home  Pain/Comfort:  · Pain Rating 1: (did not rate)  · Location - Side 1: Right  · Location 1: hip  · Pain Addressed 1: Pre-medicate for activity, Reposition, Distraction      Objective:     Communicated with nurse Fox prior to session.  Patient found up in chair with telemetry, oxygen, hip abduction pillow, SCD, peripheral IV upon PT entry to room.     General Precautions: Standard, fall   Orthopedic Precautions:RLE weight bearing as tolerated, RLE posterior  precautions   Braces: N/A     Functional Mobility:  · Bed Mobility:     · Scooting: moderate assistance  · Sit to Supine: moderate assistance and of 2 persons  · Transfers:     · Sit to Stand:  moderate assistance with rolling walker  · Toilet Transfer: moderate assistance with  rolling walker  using  Stand Pivot  · Gait: 25ft x2 with RW with chair following and seated rest with O2 at 3 liters      AM-PAC 6 CLICK MOBILITY  Turning over in bed (including adjusting bedclothes, sheets and blankets)?: 2  Sitting down on and standing up from a chair with arms (e.g., wheelchair, bedside commode, etc.): 2  Moving from lying on back to sitting on the side of the bed?: 2  Moving to and from a bed to a chair (including a wheelchair)?: 2  Need to walk in hospital room?: 2  Climbing 3-5 steps with a railing?: 1  Basic Mobility Total Score: 11       Therapeutic Activities and Exercises:   commode use to void/max assist for hygiene care and total assist for diaper change    Patient left HOB elevated with all lines intact, call button in reach and spouse and Rehab navigator Alejandra present..    GOALS:   Multidisciplinary Problems     Physical Therapy Goals        Problem: Physical Therapy Goal    Goal Priority Disciplines Outcome Goal Variances Interventions   Physical Therapy Goal     PT, PT/OT Ongoing, Progressing     Description:  Goals to be met by: 01-     Patient will increase functional independence with mobility by performin. Supine to sit with MInimal Assistance  2. Sit to stand transfer with Minimal Assistance  3. Bed to chair transfer with Minimal Assistance using Rolling Walker  4. Gait  x 100 feet with Minimal Assistance using Rolling Walker.   5. Lower extremity exercise program x20 reps                    Time Tracking:     PT Received On: 19  PT Start Time: 1432     PT Stop Time: 1507  PT Total Time (min): 35    Billable Minutes: Gait Training 25 and Therapeutic Activity 10       PT/PTA: PT            Kellie Ivey, PT  12/19/2019

## 2019-12-19 NOTE — CARE UPDATE
Interventions:  general healthful diet and nutrition supplement therapy-commercial beverage     Recommendations     1) Regular diet Add Boost plus if PO intakes < 50% of meals   2) Wt checks wkly   3) Consider MVI    Goals: 1) Pt will not lose weight x 1 week 2) PO intakes > 75% of meals at f/u  Nutrition Goal Status: 1) Met  Communication of RD Recs: (POC, sticky note)

## 2019-12-19 NOTE — PLAN OF CARE
Problem: Physical Therapy Goal  Goal: Physical Therapy Goal  Description  Goals to be met by: 01-     Patient will increase functional independence with mobility by performin. Supine to sit with MInimal Assistance  2. Sit to stand transfer with Minimal Assistance  3. Bed to chair transfer with Minimal Assistance using Rolling Walker  4. Gait  x 100 feet with Minimal Assistance using Rolling Walker.   5. Lower extremity exercise program x20 reps   Outcome: Ongoing, Progressing   PT eval and treat. Gait with RW 30ft with min assist x2 with seated rest. O2 at 3 liters. OOB to chair. Pt reviewed on posterior hip precautions. Pt to benefit from in patient rehab

## 2019-12-19 NOTE — PROGRESS NOTES
"[facility]  Adult Nutrition  Progress Note    SUMMARY   Interventions:  general healthful diet and nutrition supplement therapy-commercial beverage     Recommendations     1) Regular diet Add Boost plus if PO intakes < 50% of meals   2) Wt checks wkly   3) Consider MVI    Goals: 1) Pt will not lose weight x 1 week 2) PO intakes > 75% of meals at f/u  Nutrition Goal Status: 1) Met  Communication of RD Recs: (POC, sticky note)     Reason for Assessment     Reason For Assessment: Follow up  Diagnosis: pulmonary disease(Closed fracture rt femur s/p fall)  Relevant Medical History: Colitis, COPD, diverticulities, erosive gastritis, pneumonia, ETOH 12 beers/week. Just quit smoking x 1 month  Interdisciplinary Rounds: Attended  General Information Comments: 63 yo F admitted. On NPO pending possible surgery. Has been on regular diet. Pt alert and reports moderate appetite. Wt loss noted. NFPE completed. Wasting noted.      12/19/19 Patient reports good appetite, eating most of her plates. Boost not ordered, no longer recommend at this time. Wt stable. No nutrition related questions.    Nutrition Discharge Planning: to be determined     Nutrition Risk Screen     Nutrition Risk Screen: no indicators present     Nutrition/Diet History     Patient Reported Diet/Restrictions/Preferences: general  Typical Food/Fluid Intake: Generally eats 2 meals daily + snacks a lot since quitting smoking. Mostly chips, cookies, cereal.  Eats out 4-5 x wkly  Spiritual, Cultural Beliefs, Shinto Practices, Values that Affect Care: no  Food Allergies: NKFA  Factors Affecting Nutritional Intake: None at this time     Anthropometrics    Height Method: Stated  Height: 5' 4"  Height (inches): 64 in  Weight Method: Bed Scale  Weight: 51.7 kg (12/18/19)  Weight (lb): 114.2 lb  Ideal Body Weight (IBW), Female: 120 lb  % Ideal Body Weight, Female (lb): 95.17 lb  BMI (Calculated): 19.6 Admission  BMI Grade: 18.5-24.9 - normal  Weight " Loss: unintentional(19 (~6 months ago))  Usual Body Weight (UBW), k.3 kg     Lab/Procedures/Meds     Pertinent Labs Reviewed: reviewed  BMP  Lab Results   Component Value Date     2019    K 4.4 2019    CL 98 2019    CO2 25 2019    BUN 6 (L) 2019    CREATININE 0.6 2019    CALCIUM 9.6 2019    ANIONGAP 13 2019    ESTGFRAFRICA >60 2019    EGFRNONAA >60 2019     Lab Results   Component Value Date    ALBUMIN 2.1 (L) 2019        Pertinent Medications Reviewed: reviewed  Pertinent Medications Comments: statin, senna, zofran, KCl, promethazine     Estimated/Assessed Needs   Admission  Weight Used For Calorie Calculations: 51.8 kg (114 lb 3.2 oz)  Energy Calorie Requirements (kcal): 3591-4512 (1.3-1.5 AF, wasting)  Energy Need Method: [place]  Protein Requirements: 62-78 (1.2-1.5 g/kg/day wasting)  Weight Used For Protein Calculations: 51.8 kg (114 lb 3.2 oz)  Estimated Fluid Requirement Method: RDA Method(or per MD)  RDA Method (mL): 1375  CHO Requirement: n/a        Nutrition Prescription Ordered     Current Diet Order: Regular     Evaluation of Received Nutrient/Fluid Intake     Energy Calories Required: meeting needs  Protein Required: meeting needs  Fluid Required: meeting needs  % Intake of Estimated Energy Needs: 100%  % Meal Intake: most 75% per pt   Nutrition Risk     Level of Risk/Frequency of Follow-up: moderate (2 x wkly)      Assessment and Plan   Moderate malnutrition  Contributing Nutrition Diagnosis  Moderate malnutrition in chronic illness    Related to (etiology):   Increased energy needs    Signs and Symptoms (as evidenced by):   1) Moderate fat loss  2) Mild-moderate muscle loss    Nutrition Diagnosis Status:   Improving    Monitor and Evaluation     Food and Nutrient Intake: energy intake  Knowledge/Beliefs/Attitudes: food and nutrition knowledge/skill  Anthropometric Measurements: weight  Biochemical Data, Medical  Tests and Procedures: inflammatory profile, electrolyte and renal panel  Nutrition-Focused Physical Findings: overall appearance      Malnutrition Assessment  Skin (Micronutrient): (Galdino score 16)  Teeth (Micronutrient): (WDL)   Micronutrient Evaluation: suspected deficiency  Micronutrient Evaluation Comments: 2/2 high intake empty calories per diet hx       Orbital Region (Subcutaneous Fat Loss): moderate depletion  Upper Arm Region (Subcutaneous Fat Loss): severe depletion   [place] Region (Muscle Loss): mild depletion  Clavicle Bone Region (Muscle Loss): mild depletion  Clavicle and Acromion Bone Region (Muscle Loss): mild depletion  Dorsal Hand (Muscle Loss): moderate depletion  Patellar Region (Muscle Loss): moderate depletion  Anterior Thigh Region (Muscle Loss): moderate depletion       Subcutaneous Fat Loss (Final Summary): moderate protein-calorie malnutrition  Muscle Loss Evaluation (Final Summary): mild protein-calorie malnutrition     Edema: 2+  Nutrition Follow-Up  yes

## 2019-12-19 NOTE — ASSESSMENT & PLAN NOTE
Chronic Problem  5 L oxygen on nasal cannula saturating at 94%  Respiratory status stable currently not wheezing  Will continue albuterol nebs, and inhalers  Appreciate Respiratory therapist input  Will closely monitor.

## 2019-12-19 NOTE — PLAN OF CARE
Pt up in chair and states pain is minimal at 3. Pt ambulated in flores two different times today with physical therapy.Dr Mackey wants to observe patient one more day and possible discharge tomorrow. VS stable , call light in reach, will continue to monitor.

## 2019-12-19 NOTE — PLAN OF CARE
Plan of care reviewed with patient. SR on telemetry. O2-3L per  nc in use. R hip with surgical dressing intact. Foot pump & scds in use. Cryotherapy in use. Pedal pulses WNL. . Medicated for pain once this shift, moderate relief obtained. Remains free from falls/injury. Instructed to call for assistance as needed during night, verbalized understanding. Call light in reach, bed alarm on.

## 2019-12-19 NOTE — ASSESSMENT & PLAN NOTE
Has history of hypoxemia but intermittently uses home oxygen.  Patient has a history of COPD and patient needs oxygen when hypoxemic on exertion  Dr. Mackey onboard  Will continue on 5 L oxygen nasal cannula

## 2019-12-19 NOTE — PLAN OF CARE
12/19/19 0700   Patient Assessment/Suction   Level of Consciousness (AVPU) alert   Respiratory Effort Normal;Unlabored   All Lung Fields Breath Sounds diminished   Cough Type good;loose;congested;nonproductive   PRE-TX-O2   O2 Device (Oxygen Therapy) nasal cannula w/ humidification   $ Is the patient on Low Flow Oxygen? Yes   Flow (L/min) 3   SpO2 (!) 92 %   Pulse Oximetry Type Intermittent   $ Pulse Oximetry - Multiple Charge Pulse Oximetry - Multiple   Inhaler   $ Inhaler Charges MDI (Metered Dose Inahler) Treatment;Mouth rinsed post treatment   Treatment Route (Inhaler) mouthpiece   Patient Position (Inhaler) HOB elevated   Signs of Intolerance (Inhaler) none   Breath Sounds Post-Respiratory Treatment   Throughout All Fields Post-Treatment All Fields   Throughout All Fields Post-Treatment no change   Post-treatment Heart Rate (beats/min) 71   Post-treatment Resp Rate (breaths/min) 12   Incentive Spirometer   $ Incentive Spirometer Charges done with encouragement   Incentive Spirometer Predicted Level (mL) 2050   Administration (IS) mouthpiece;proper technique demonstrated   Number of Repetitions (IS) 10   Level Incentive Spirometer (mL) 750   Patient Tolerance (IS) good   Vibratory PEP Therapy   $ Vibratory PEP Charges Aerobika Therapy   Type (PEP Therapy) vibratory/oscillatory   Device (PEP Therapy) flutter   Route (PEP Therapy) mouthpiece   Breaths per Cycle (PEP Therapy) 12   Patient Position (PEP Therapy) HOB elevated   Signs of Intolerance (PEP Therapy) none   Pt rec Duoneb treatment with Trelegy MDI. Pt achieved 750cc with IS and tolerates aerobika therapy well.

## 2019-12-19 NOTE — ASSESSMENT & PLAN NOTE
Contributing Nutrition Diagnosis  Moderate malnutrition in chronic illness    Related to (etiology):   Increased energy needs    Signs and Symptoms (as evidenced by):   1) Moderate fat loss  2) Mild-moderate muscle loss    Nutrition Diagnosis Status:   Improving

## 2019-12-19 NOTE — PROGRESS NOTES
Progress Note  PULMONARY    Admit Date: 12/15/2019   12/19/2019      SUBJECTIVE:     Dec 18- needs more ox than usual, cannot mobilize, spells cough np, not getting resp rx.  Fell walking to mail box. Stopped smoking a month ago.     Dec 19,pain better as is breathing, at 2.5 lpm.  No c/o.      PFSH and Allergies reviewed.    OBJECTIVE:     Vitals (Most recent):  Vitals:    12/19/19 0716   BP: 122/62   Pulse: 85   Resp: 16   Temp: 98.6 °F (37 °C)       Vitals (24 hour range):  Temp:  [96.2 °F (35.7 °C)-98.7 °F (37.1 °C)]   Pulse:  []   Resp:  [12-20]   BP: ()/(51-63)   SpO2:  [81 %-100 %]       Intake/Output Summary (Last 24 hours) at 12/19/2019 0818  Last data filed at 12/19/2019 0537  Gross per 24 hour   Intake 1000 ml   Output 1575 ml   Net -575 ml          Physical Exam:  The patient's neuro status (alertness,orientation,cognitive function,motor skills,), pharyngeal exam (oral lesions, hygiene, abn dentition,), Neck (jvd,mass,thyroid,nodes in neck and above/below clavicle),RESPIRATORY(symmetry,effort,fremitus,percussion,auscultation),  Cor(rhythm,heart tones including gallops,perfusion,edema)ABD(distention,hepatic&splenomegaly,tenderness,masses), Skin(rash,cyanosis),Psyc(affect,judgement,).  Exam negative except for these pertinent findings:    Alert, no grimmaces with coughing spells, no wheezes, decreased bs, no distress, nl fremitus/percussion No edema    Radiographs reviewed: results reviewed  11/23 cxr aida infiltrate, stable with recent film.   Results for orders placed during the hospital encounter of 10/20/17   X-Ray Chest 1 View    Narrative Single portable view of the chest is provided. The cardiac size is within normal limits. There is increased density and consolidation in the right upper lobe infiltrate and scarring. Left upper lobe lesion is unchanged. No pneumothorax is noted.    Impression  Increased density and consolidation in the right upper lobe infiltrate. Unchanged left upper  lobe nodule. No pneumothorax seen      Electronically signed by: Sheldon Colmenares MD  Date:     10/20/17  Time:    08:57    ]    Labs     Recent Labs   Lab 12/19/19  0506   WBC 9.00   HGB 10.6*   HCT 34.8*   *     Recent Labs   Lab 12/19/19  0506      K 4.4   CL 98   CO2 25   BUN 6*   CREATININE 0.6   *   CALCIUM 9.6   MG 2.2   PHOS 3.7   AST 17   ALT 12   ALKPHOS 98   BILITOT 0.2   PROT 6.6   ALBUMIN 2.1*     Recent Labs   Lab 12/18/19  0930   PH 7.467*   PCO2 36.8   PO2 59*   HCO3 26.6     Microbiology Results (last 7 days)     Procedure Component Value Units Date/Time    Culture, Respiratory with Gram Stain [[id number]]     Order Status:  Canceled Specimen:  Respiratory from Sputum, Expectorated           Impression:  Active Hospital Problems    Diagnosis  POA    *Closed fracture of neck of right femur [S72.001A]  Yes    Immune deficiency disorder [D84.9]  Yes    Calcification of aorta [I70.0]  Yes    Moderate malnutrition [E44.0]  Yes    Bronchiectasis [J47.9]  Yes    Pneumatocele of lung [J98.4]  Yes    Chronic respiratory failure with hypoxia [J96.11]  Yes    Anxiety [F41.9]  Yes    COPD, severe [J44.9]  Yes    Hyperlipidemia [E78.5]  Yes    Hypertension [I10]  Yes    Tobacco abuse [Z72.0]  Yes      Resolved Hospital Problems    Diagnosis Date Resolved POA    Abscess of left lung with pneumonia [J85.1] 12/16/2019 Yes               Plan:   Dec 18- pt needs surg to improve mobility.  Lungs will worsen with immobilization.  Pt is in stable and acutely optimal shape for needed surg- she has very severe lung dz but would do better with hip repair.  Pt cleared.  Needs reg resp rx.  May need niv later?    Dec 19- much better- dc on 20th would be most reasonable due to lung dz.  High risk pt.                                  .

## 2019-12-19 NOTE — PLAN OF CARE
SW sent patient information to [facility] through [facility] system for review.         12/19/19 2146   Post-Acute Status   Post-Acute Authorization Placement   Post-Acute Placement Status Referrals Sent   Patient choice form signed by patient/caregiver List with quality metrics by geographic area provided

## 2019-12-19 NOTE — NURSING
Duran catheter discontinued, tolerated well. Instructed patient to notify nurse when she has to void , verbalized understanding. Call light in reach.

## 2019-12-19 NOTE — SUBJECTIVE & OBJECTIVE
Interval History:  Patient had episode of hypoxemia earlier today and oxygen demand was increased to 5 L. patient has had difficulty moving because of the pain of the right hip.  Review of Systems   Constitutional: Negative for activity change, appetite change, fatigue and fever.   HENT: Negative for congestion, sinus pressure, sinus pain, sneezing and sore throat.    Eyes: Negative for photophobia, pain, discharge and redness.   Respiratory: Positive for shortness of breath. Negative for cough, choking, chest tightness and wheezing.    Cardiovascular: Negative for chest pain, palpitations and leg swelling.   Gastrointestinal: Negative for abdominal distention, abdominal pain, nausea and vomiting.   Genitourinary: Negative for difficulty urinating, dysuria, flank pain, frequency and urgency.   Musculoskeletal: Positive for arthralgias and gait problem. Negative for back pain, joint swelling, myalgias and neck pain.   Skin: Negative for color change, pallor, rash and wound.   Hematological: Negative.    Psychiatric/Behavioral: Negative.      Objective:     Vital Signs (Most Recent):  Temp: 96.2 °F (35.7 °C) (12/18/19 1905)  Pulse: 83 (12/18/19 1939)  Resp: 18 (12/18/19 1939)  BP: (!) 97/59 (12/18/19 1905)  SpO2: (!) 94 % (12/18/19 1939) Vital Signs (24h Range):  Temp:  [96.2 °F (35.7 °C)-98.7 °F (37.1 °C)] 96.2 °F (35.7 °C)  Pulse:  [] 83  Resp:  [16-20] 18  SpO2:  [84 %-95 %] 94 %  BP: ()/(51-63) 97/59     Weight: 51.7 kg (114 lb)  Body mass index is 19.57 kg/m².    Intake/Output Summary (Last 24 hours) at 12/18/2019 1953  Last data filed at 12/18/2019 1714  Gross per 24 hour   Intake 1000 ml   Output 1600 ml   Net -600 ml      Physical Exam   Constitutional: She appears well-developed and well-nourished. No distress.   HENT:   Head: Normocephalic.   Right Ear: External ear normal.   Left Ear: External ear normal.   Mouth/Throat: Oropharynx is clear and moist.   Eyes: Pupils are equal, round, and  reactive to light. Conjunctivae and EOM are normal. Right eye exhibits no discharge. Left eye exhibits no discharge. No scleral icterus.   Neck: Normal range of motion. Neck supple.   Cardiovascular: Normal rate, regular rhythm, normal heart sounds and intact distal pulses. Exam reveals no gallop and no friction rub.   No murmur heard.  Pulmonary/Chest: Breath sounds normal. No stridor. She is in respiratory distress. She has no wheezes. She has no rales.   Abdominal: Soft. Bowel sounds are normal. She exhibits no distension and no mass. There is no tenderness. There is no guarding.   Musculoskeletal: She exhibits tenderness and deformity.        Right hip: She exhibits decreased range of motion, decreased strength, tenderness, bony tenderness and deformity.        Legs:  Lymphadenopathy:     She has no cervical adenopathy.   Neurological: She is alert.   Skin: Skin is warm and dry. Capillary refill takes less than 2 seconds. No rash noted. She is not diaphoretic. No erythema. No pallor.   Psychiatric: She has a normal mood and affect. Her behavior is normal. Judgment and thought content normal.   Nursing note and vitals reviewed.      Significant Labs:   BMP:   Recent Labs   Lab 12/18/19  0534      *   K 4.2   CL 96   CO2 25   BUN 7*   CREATININE 0.6   CALCIUM 9.3   MG 2.0     CBC:   Recent Labs   Lab 12/17/19  0526 12/18/19  0534   WBC 7.46 8.99   HGB 11.2* 10.6*   HCT 36.1* 33.9*   * 402*       Significant Imaging: I have reviewed all pertinent imaging results/findings within the past 24 hours.

## 2019-12-19 NOTE — ASSESSMENT & PLAN NOTE
Acute Problem  Scheduled for surgery this afternoon.  Cleared by pulmonology for the procedure  Mechanical Fall. Xray demonstrates right femoral neck fracture  Right leg is neurovascularly intact  Orthopedics on case.    NPO after midnight  for surgery tomorrow   Will follow Ortho for further recommendation  Started on OxyContin q.12h and IV morphine for breakthrough pain

## 2019-12-19 NOTE — PLAN OF CARE
Per Alejandra with [facility], she spoke with patient and family.  Patient and family decline inpatient rehab.  SW informed MD and ALMA Goncalves.         12/19/19 3151   Post-Acute Status   Post-Acute Authorization Placement   Post-Acute Placement Status Patient/Family Changed Mind

## 2019-12-19 NOTE — PT/OT/SLP EVAL
Occupational Therapy   Evaluation    Name: Fabiana Morel  MRN: [id number]  Admitting Diagnosis:  Closed fracture of neck of right femur 1 Day Post-Op    Recommendations:     Discharge Recommendations: rehabilitation facility  Discharge Equipment Recommendations:  walker, rolling, hip kit, 3-in-1 commode  Barriers to discharge:  Decreased caregiver support    Assessment:     Fabiana Morel is a 64 y.o. female with a medical diagnosis of Closed fracture of neck of right femur.  She presents with a decline in functional status due to the listed impairments, impacting ADLs and functional mobility.  Pt was alert and cooperative and able to follow commands but needed cues to remain focused on task and was laughing and joking, stating her pain meds were really working.  and daughter present, with daughter stating concerns about pt's skin integrity and ability to consistently follow her posterior hip precautions.   Pt was able to recall 1 of 3 precautions but was receptive to all education provided. She displayed decreased B UE strength and impaired activity tolerance and safety awareness. She currently needs extensive assistance for LB ADLs and has only her  to help her at home.   Recommend OT treatment to maximize endurance, safety & independence with ADL's & functional mobility.  Performance deficits affecting function: weakness, impaired self care skills, impaired endurance, impaired balance, impaired functional mobilty, decreased coordination, decreased safety awareness, orthopedic precautions, decreased lower extremity function, gait instability, impaired skin.    Pt will benefit from inpatient Rehab due to high prior level of functioning in order to facilitate return to prior level of function before returning home with family.    Rehab Prognosis: Good; patient would benefit from acute skilled OT services to address these deficits and reach maximum level of function.       Plan:     Patient to be  "seen 3 x/week to address the above listed problems via self-care/home management, therapeutic activities, therapeutic exercises  · Plan of Care Expires: 12/29/19  · Plan of Care Reviewed with: patient, spouse, daughter    Subjective     Chief Complaint: " I get winded easily."  Patient/Family Comments/goals: To be home for Drew.    Occupational Profile:  Living Environment: Pt lives with her  in a [facility] with 1 LINDA and has a walk-in shower with a seat and grab bars.  Previous level of function: Patient was Independent/Mod I with  I/ADL's at home and in the community and driving. She used a transport wheelchair for long distances due to COPD.  Roles and Routines: Pt enjoys going out to lunch.  Equipment Used at Home:  oxygen, grab bar, shower chair, nebulizer(transport wheelchair)  Assistance upon Discharge:  stated will assist pt at home. Daughter lives out of town. Patient will need 24 hour assistance and supervision for safety.    Pain/Comfort:  · Pain Rating 1: 0/10  · Pain Rating Post-Intervention 1: 0/10    Patients cultural, spiritual, Ep[employer] conflicts given the current situation:      Objective:     Communicated with: Dominique, nurse and Ana PT prior to session.  Patient found up in chair with telemetry, oxygen(chair alarm) upon OT entry to room.  and daughter present.    General Precautions: Standard, fall(skin)   Orthopedic Precautions:RLE weight bearing as tolerated, RLE posterior precautions   Braces: N/A     Occupational Performance:    Activities of Daily Living:  · Feeding:  independence    · Grooming: set-up seated in chair to brush hair and wash face    · Lower Body Dressing: dependence to don socks seated in chair    Cognitive/Visual Perceptual:  Cognitive/Psychosocial Skills:     -       Oriented to: Person, Place, Time and Situation   -       Follows Commands/attention:Follows two-step commands  -       Communication: clear/fluent  -       Safety awareness/insight to " disability: impaired   -       Mood/Affect/Coping skills/emotional control: Cooperative and Pleasant  Visual/Perceptual:      -Intact      Physical Exam:  Postural examination/scapula alignment:    -       Rounded shoulders  -       Forward head  Skin integrity: Large bandage on R elbow  Edema:  None noted  Dominant hand:    -       right  Upper Extremity Range of Motion:     -       Right Upper Extremity: WFL  -       Left Upper Extremity: WFL  Upper Extremity Strength:    -       Right Upper Extremity: 4/5  -       Left Upper Extremity: 4/5   Strength:    -       Right Upper Extremity: 4/5  -       Left Upper Extremity: 4/5  Fine Motor Coordination:    -       Intact    AMPAC 6 Click ADL:  AMPAC Total Score: 16    Treatment & Education:  OT ed pt on OT role & POC as well as discharge recommendations for inpatient rehabilitation and medical equipment. Family had many questions regarding intensity of therapy with home health vs inpatient rehabilitation which OT answered. Family voiced understanding.  OT educated pt on posterior hip precautions with instruction sheet and demonstration provided.  OT initiated education with pt on use of adaptive equipment for LB dressing, bathing & safe item retrieval with reacher.  OT ed pt on fall risk and strongly advised pt to call for help for all OOB mobility.  Education:    Patient left up in chair with all lines intact, call button in reach, chair alarm on and friends and family present    GOALS:   Multidisciplinary Problems     Occupational Therapy Goals        Problem: Occupational Therapy Goal    Goal Priority Disciplines Outcome Interventions   Occupational Therapy Goal     OT, PT/OT Ongoing, Progressing    Description:  Goals to be met by: 12/29/19     Patient will increase functional independence with ADLs by performing:    LE Dressing with Set-up Assistance, Minimal Assistance and Assistive Devices as needed.  Grooming while standing at sink with Contact Guard  Assistance and Assistive Devices as needed.  Toileting from bedside commode with Set-up Assistance, Minimal Assistance and Assistive Devices as needed for hygiene and clothing management.   Bathing from  shower chair/bench with Set-up Assistance, Minimal Assistance and Assistive Devices as needed.  Toilet transfer to bedside commode with Contact Guard Assistance.  Pt to utilize energy conservation techniques during ADL task performance with occasional cues.                      History:     Past Medical History:   Diagnosis Date    Abnormal CT scan 1/8/2015    Asthma     Colitis     COPD (chronic obstructive pulmonary disease)     Diverticulitis of sigmoid colon 11/4/2014    Erosive gastritis 7/17/2013    Fracture of left clavicle     H pylori ulcer 7/17/2013    Hypertension     Peptic ulcer disease 7/17/2013    Pneumonia of right upper lobe due to infectious organism 8/15/2017    Rectal bleed 11/3/2014    Scoliosis     SOB (shortness of breath)     Yeast infection 8/25/2017       Past Surgical History:   Procedure Laterality Date    APPENDECTOMY      BREAST BIOPSY Right     cyst    COLONOSCOPY N/A 7/15/2019    Procedure: COLONOSCOPY;  Surgeon: Sharif Chun MD;  Location: [facility];  Service: Endoscopy;  Laterality: N/A;    HYSTERECTOMY      HASMUKH/BSO, DUB    OOPHORECTOMY      TUBAL LIGATION         Time Tracking:     OT Date of Treatment: 12/19/19  OT Start Time: 1112  OT Stop Time: 1144  OT Total Time (min): 32 min    Billable Minutes:Evaluation 8  Self Care/Home Management 16  Therapeutic Activity 8    CHAZ King  12/19/2019

## 2019-12-20 VITALS
RESPIRATION RATE: 19 BRPM | HEART RATE: 86 BPM | BODY MASS INDEX: 21.64 KG/M2 | OXYGEN SATURATION: 97 % | SYSTOLIC BLOOD PRESSURE: 131 MMHG | DIASTOLIC BLOOD PRESSURE: 59 MMHG | TEMPERATURE: 97 F | WEIGHT: 126.75 LBS | HEIGHT: 64 IN

## 2019-12-20 PROBLEM — Z96.649 S/P TOTAL HIP ARTHROPLASTY: Status: ACTIVE | Noted: 2019-12-20

## 2019-12-20 LAB
ALBUMIN SERPL BCP-MCNC: 2.1 G/DL (ref 3.5–5.2)
ALP SERPL-CCNC: 90 U/L (ref 55–135)
ALT SERPL W/O P-5'-P-CCNC: 12 U/L (ref 10–44)
ANION GAP SERPL CALC-SCNC: 11 MMOL/L (ref 8–16)
AST SERPL-CCNC: 20 U/L (ref 10–40)
BASOPHILS # BLD AUTO: 0.01 K/UL (ref 0–0.2)
BASOPHILS NFR BLD: 0.1 % (ref 0–1.9)
BILIRUB SERPL-MCNC: 0.1 MG/DL (ref 0.1–1)
BUN SERPL-MCNC: 11 MG/DL (ref 8–23)
CALCIUM SERPL-MCNC: 9.2 MG/DL (ref 8.7–10.5)
CHLORIDE SERPL-SCNC: 99 MMOL/L (ref 95–110)
CO2 SERPL-SCNC: 29 MMOL/L (ref 23–29)
CREAT SERPL-MCNC: 0.5 MG/DL (ref 0.5–1.4)
DIFFERENTIAL METHOD: ABNORMAL
EOSINOPHIL # BLD AUTO: 0 K/UL (ref 0–0.5)
EOSINOPHIL NFR BLD: 0.3 % (ref 0–8)
ERYTHROCYTE [DISTWIDTH] IN BLOOD BY AUTOMATED COUNT: 15.1 % (ref 11.5–14.5)
EST. GFR  (AFRICAN AMERICAN): >60 ML/MIN/1.73 M^2
EST. GFR  (NON AFRICAN AMERICAN): >60 ML/MIN/1.73 M^2
GLUCOSE SERPL-MCNC: 101 MG/DL (ref 70–110)
HCT VFR BLD AUTO: 32.2 % (ref 37–48.5)
HGB BLD-MCNC: 9.8 G/DL (ref 12–16)
IMM GRANULOCYTES # BLD AUTO: 0.07 K/UL (ref 0–0.04)
LYMPHOCYTES # BLD AUTO: 1.6 K/UL (ref 1–4.8)
LYMPHOCYTES NFR BLD: 14.8 % (ref 18–48)
MAGNESIUM SERPL-MCNC: 2 MG/DL (ref 1.6–2.6)
MCH RBC QN AUTO: 27.9 PG (ref 27–31)
MCHC RBC AUTO-ENTMCNC: 30.4 G/DL (ref 32–36)
MCV RBC AUTO: 92 FL (ref 82–98)
MONOCYTES # BLD AUTO: 0.6 K/UL (ref 0.3–1)
MONOCYTES NFR BLD: 5.7 % (ref 4–15)
NEUTROPHILS # BLD AUTO: 8.3 K/UL (ref 1.8–7.7)
NEUTROPHILS NFR BLD: 78.4 % (ref 38–73)
NRBC BLD-RTO: 0 /100 WBC
PHOSPHATE SERPL-MCNC: 2.6 MG/DL (ref 2.7–4.5)
PLATELET # BLD AUTO: 404 K/UL (ref 150–350)
PMV BLD AUTO: 8.8 FL (ref 9.2–12.9)
POTASSIUM SERPL-SCNC: 4 MMOL/L (ref 3.5–5.1)
PROT SERPL-MCNC: 6.4 G/DL (ref 6–8.4)
RBC # BLD AUTO: 3.51 M/UL (ref 4–5.4)
SODIUM SERPL-SCNC: 139 MMOL/L (ref 136–145)
WBC # BLD AUTO: 10.6 K/UL (ref 3.9–12.7)

## 2019-12-20 PROCEDURE — 97530 THERAPEUTIC ACTIVITIES: CPT

## 2019-12-20 PROCEDURE — 94799 UNLISTED PULMONARY SVC/PX: CPT

## 2019-12-20 PROCEDURE — 36415 COLL VENOUS BLD VENIPUNCTURE: CPT

## 2019-12-20 PROCEDURE — 80053 COMPREHEN METABOLIC PANEL: CPT

## 2019-12-20 PROCEDURE — 94664 DEMO&/EVAL PT USE INHALER: CPT

## 2019-12-20 PROCEDURE — 99232 SBSQ HOSP IP/OBS MODERATE 35: CPT | Mod: ,,, | Performed by: INTERNAL MEDICINE

## 2019-12-20 PROCEDURE — 83735 ASSAY OF MAGNESIUM: CPT

## 2019-12-20 PROCEDURE — 85025 COMPLETE CBC W/AUTO DIFF WBC: CPT

## 2019-12-20 PROCEDURE — 25000003 PHARM REV CODE 250: Performed by: ORTHOPAEDIC SURGERY

## 2019-12-20 PROCEDURE — 94761 N-INVAS EAR/PLS OXIMETRY MLT: CPT

## 2019-12-20 PROCEDURE — 27000221 HC OXYGEN, UP TO 24 HOURS

## 2019-12-20 PROCEDURE — 97116 GAIT TRAINING THERAPY: CPT

## 2019-12-20 PROCEDURE — 99232 PR SUBSEQUENT HOSPITAL CARE,LEVL II: ICD-10-PCS | Mod: ,,, | Performed by: INTERNAL MEDICINE

## 2019-12-20 PROCEDURE — 94640 AIRWAY INHALATION TREATMENT: CPT

## 2019-12-20 PROCEDURE — 25000003 PHARM REV CODE 250: Performed by: INTERNAL MEDICINE

## 2019-12-20 PROCEDURE — 99900035 HC TECH TIME PER 15 MIN (STAT)

## 2019-12-20 PROCEDURE — 25000242 PHARM REV CODE 250 ALT 637 W/ HCPCS: Performed by: ORTHOPAEDIC SURGERY

## 2019-12-20 PROCEDURE — 63600175 PHARM REV CODE 636 W HCPCS: Performed by: ORTHOPAEDIC SURGERY

## 2019-12-20 PROCEDURE — 84100 ASSAY OF PHOSPHORUS: CPT

## 2019-12-20 RX ORDER — ALBUTEROL SULFATE 90 UG/1
2 AEROSOL, METERED RESPIRATORY (INHALATION) EVERY 4 HOURS PRN
Qty: 18 G | Refills: 11 | Status: SHIPPED | OUTPATIENT
Start: 2019-12-20 | End: 2020-01-27 | Stop reason: SDUPTHER

## 2019-12-20 RX ORDER — PREDNISONE 20 MG/1
TABLET ORAL
Qty: 21 TABLET | Refills: 2 | OUTPATIENT
Start: 2019-12-20 | End: 2020-11-01

## 2019-12-20 RX ORDER — AMOXICILLIN AND CLAVULANATE POTASSIUM 875; 125 MG/1; MG/1
1 TABLET, FILM COATED ORAL EVERY 12 HOURS
Qty: 42 TABLET | Refills: 1 | Status: SHIPPED | OUTPATIENT
Start: 2019-12-20 | End: 2020-05-28 | Stop reason: ALTCHOICE

## 2019-12-20 RX ADMIN — PREGABALIN 75 MG: 75 CAPSULE ORAL at 09:12

## 2019-12-20 RX ADMIN — IPRATROPIUM BROMIDE AND ALBUTEROL SULFATE 3 ML: .5; 3 SOLUTION RESPIRATORY (INHALATION) at 07:12

## 2019-12-20 RX ADMIN — IPRATROPIUM BROMIDE AND ALBUTEROL SULFATE 3 ML: .5; 3 SOLUTION RESPIRATORY (INHALATION) at 03:12

## 2019-12-20 RX ADMIN — ASPIRIN 325 MG ORAL TABLET 325 MG: 325 PILL ORAL at 09:12

## 2019-12-20 RX ADMIN — CEFTRIAXONE 2 G: 2 INJECTION, SOLUTION INTRAVENOUS at 11:12

## 2019-12-20 RX ADMIN — HYDROCODONE BITARTRATE AND ACETAMINOPHEN 1 TABLET: 5; 325 TABLET ORAL at 11:12

## 2019-12-20 RX ADMIN — IPRATROPIUM BROMIDE AND ALBUTEROL SULFATE 3 ML: .5; 3 SOLUTION RESPIRATORY (INHALATION) at 11:12

## 2019-12-20 RX ADMIN — ATORVASTATIN CALCIUM 40 MG: 40 TABLET, FILM COATED ORAL at 09:12

## 2019-12-20 RX ADMIN — METOPROLOL SUCCINATE 50 MG: 50 TABLET, EXTENDED RELEASE ORAL at 09:12

## 2019-12-20 RX ADMIN — IPRATROPIUM BROMIDE AND ALBUTEROL SULFATE 3 ML: .5; 3 SOLUTION RESPIRATORY (INHALATION) at 12:12

## 2019-12-20 RX ADMIN — SENNOSIDES AND DOCUSATE SODIUM 1 TABLET: 8.6; 5 TABLET ORAL at 09:12

## 2019-12-20 RX ADMIN — FAMOTIDINE 20 MG: 20 TABLET ORAL at 09:12

## 2019-12-20 RX ADMIN — HYDROCODONE BITARTRATE AND ACETAMINOPHEN 1 TABLET: 5; 325 TABLET ORAL at 06:12

## 2019-12-20 NOTE — PLAN OF CARE
Jemal Issa with [facility] [phone], ok to pull bedside commode from [facility] closet.  SKY delivery commode to patient's hospital room.  Patient signed delivery ticket.  SKY informed patient's nurse.       12/20/19 1441   Post-Acute Status   Post-Acute Authorization E   E Status Referrals Sent

## 2019-12-20 NOTE — NURSING
Spouse in room at bedside discharge instructions explained both patient and spouse verbalize understanding of all instructions.Afebrile no signs of drainage noted to right hip incision

## 2019-12-20 NOTE — DISCHARGE INSTRUCTIONS
No Tub bathing, no deep waist bending, Resume previous home medications and medication routine.Follow up with Dr Mackey,Dr Bernardino Wilson and primary physician.Monitor incision and report to Dr Wilson any redness,swelling,drainage unrelieved pain or fever.

## 2019-12-20 NOTE — ASSESSMENT & PLAN NOTE
Chronic Problem  2 L oxygen on nasal cannula saturating at 94%  Respiratory status stable currently not wheezing  Will continue albuterol nebs, and inhalers  Appreciate Respiratory therapist input  Will closely monitor.

## 2019-12-20 NOTE — PLAN OF CARE
Plan of care reviewed with patient. SR on telemetry. R. Hip with dressing intact, cryotherapy on. O2- 3L per NC in use. Neurovascular checks WNL. Scd/ foot pumps on . Remains free from falls/injury. Instructed to call for assistance as needed during night, verbalized understanding. Call light in reach, bed in low & locked position. Bed alarm on .

## 2019-12-20 NOTE — ASSESSMENT & PLAN NOTE
S/p Right hip arthroplasty day 1.    Right leg is neurovascularly intact  Cleared by ortho for discharge  Pulmonology recommended discharge tomorrow monitoring resp status  Will dc OxyContin q.12h and IV morphine and switch to Norco q6H

## 2019-12-20 NOTE — PLAN OF CARE
12/20/19 1442   Post-Acute Status   Post-Acute Authorization [facility] Status Set-up Complete

## 2019-12-20 NOTE — ASSESSMENT & PLAN NOTE
Has history of hypoxemia but intermittently uses home oxygen.  Patient has a history of COPD and patient needs oxygen when hypoxemic on exertion  Dr. Mackey onboard  Will continue on 2 L oxygen nasal cannula

## 2019-12-20 NOTE — PROGRESS NOTES
Progress Note  PULMONARY    Admit Date: 12/15/2019   12/20/2019      SUBJECTIVE:     Dec 18- needs more ox than usual, cannot mobilize, spells cough np, not getting resp rx.  Fell walking to mail box. Stopped smoking a month ago.     Dec 19,pain better as is breathing, at 2.5 lpm.  No c/o.  Dec 20- needs prednisone and augmentin togo home, no new c/o, better    PFSH and Allergies reviewed.    OBJECTIVE:     Vitals (Most recent):  Vitals:    12/20/19 0700   BP:    Pulse: 78   Resp: 18   Temp:        Vitals (24 hour range):  Temp:  [96 °F (35.6 °C)-97.6 °F (36.4 °C)]   Pulse:  [78-96]   Resp:  [16-18]   BP: (100-117)/(61-82)   SpO2:  [95 %-100 %]       Intake/Output Summary (Last 24 hours) at 12/20/2019 0745  Last data filed at 12/20/2019 0224  Gross per 24 hour   Intake 480 ml   Output 300 ml   Net 180 ml          Physical Exam:  The patient's neuro status (alertness,orientation,cognitive function,motor skills,), pharyngeal exam (oral lesions, hygiene, abn dentition,), Neck (jvd,mass,thyroid,nodes in neck and above/below clavicle),RESPIRATORY(symmetry,effort,fremitus,percussion,auscultation),  Cor(rhythm,heart tones including gallops,perfusion,edema)ABD(distention,hepatic&splenomegaly,tenderness,masses), Skin(rash,cyanosis),Psyc(affect,judgement,).  Exam negative except for these pertinent findings:    Alert, no grimmaces and no coughing spells, no wheezes, decreased bs, no distress, nl fremitus/percussion No edema    Radiographs reviewed: results reviewed  11/23 cxr aida infiltrate, stable with recent film.   Results for orders placed during the hospital encounter of 10/20/17   X-Ray Chest 1 View    Narrative Single portable view of the chest is provided. The cardiac size is within normal limits. There is increased density and consolidation in the right upper lobe infiltrate and scarring. Left upper lobe lesion is unchanged. No pneumothorax is noted.    Impression  Increased density and consolidation in the right  upper lobe infiltrate. Unchanged left upper lobe nodule. No pneumothorax seen      Electronically signed by: Sheldon Colmenares MD  Date:     10/20/17  Time:    08:57    ]    Labs     Recent Labs   Lab 12/20/19  0453   WBC 10.60   HGB 9.8*   HCT 32.2*   *     Recent Labs   Lab 12/20/19  0453      K 4.0   CL 99   CO2 29   BUN 11   CREATININE 0.5      CALCIUM 9.2   MG 2.0   PHOS 2.6*   AST 20   ALT 12   ALKPHOS 90   BILITOT 0.1   PROT 6.4   ALBUMIN 2.1*     No results for input(s): PH, PCO2, PO2, HCO3 in the last 24 hours.  Microbiology Results (last 7 days)     Procedure Component Value Units Date/Time    Culture, Respiratory with Gram Stain [[id number]]     Order Status:  Canceled Specimen:  Respiratory from Sputum, Expectorated           Impression:  Active Hospital Problems    Diagnosis  POA    *Closed fracture of neck of right femur [S72.001A]  Yes    Immune deficiency disorder [D84.9]  Yes    Calcification of aorta [I70.0]  Yes    Moderate malnutrition [E44.0]  Yes    Bronchiectasis [J47.9]  Yes    Pneumatocele of lung [J98.4]  Yes    Chronic respiratory failure with hypoxia [J96.11]  Yes    Anxiety [F41.9]  Yes    COPD, severe [J44.9]  Yes    Hyperlipidemia [E78.5]  Yes    Hypertension [I10]  Yes    Tobacco abuse [Z72.0]  Yes      Resolved Hospital Problems    Diagnosis Date Resolved POA    Abscess of left lung with pneumonia [J85.1] 12/16/2019 Yes               Plan:   Dec 18- pt needs surg to improve mobility.  Lungs will worsen with immobilization.  Pt is in stable and acutely optimal shape for needed surg- she has very severe lung dz but would do better with hip repair.  Pt cleared.  Needs reg resp rx.  May need niv later?    Dec 19- much better- dc on 20th would be most reasonable due to lung dz.  High risk pt.      Dec 20- down to 2lpm, pain controlled and breathing well - outpt today.  Dr Wilson sent in a wk of norco 7.5, will continue augmentin and check pt in a month with  cxr.   I did dc meds                             .

## 2019-12-20 NOTE — PROGRESS NOTES
[facility] Hospital Medicine  Progress Note    Patient Name: Fabiana Morel  MRN: [id number]  Patient Class: IP- Inpatient   Admission Date: 12/15/2019  Length of Stay: 4 days  Attending Physician: Aniket Olguin MD  Primary Care Provider: Dominique Bartlett MD        Subjective:     Principal Problem:Closed fracture of neck of right femur        HPI:  Fabiana Morel is a 64 year old female who presented to the ER for evaluation of right hip after a mechanical fall. She states that she tripped on her slippers while walking outside to check her mail. Injury occurred several hours prior to arrival. Describes pain as intense at worst. 9/10 pain at worst. Pain is worse with any movement of right leg. She was unable to bear weight on right leg. She denies striking her head, loss of consciousness or cervical pain. Her work up in the ER demonstrated a right femoral neck fracture as well as a AUTUMN infiltrate. Labs were stable. She has been treated over the past several months by Dr. Mackey for the AUTUMN infiltrate with Augmentin. She drinks several beers daily. She quite smoking 1 month ago. PMH includes, COPD, HTN and hyperlipidemia. Surgical hx includes hysterectomy and appendectomy. [facility] was consulted for admission and management. Dr. Santos was consulted for orthopedics by ER physician. Will admit to Hospital, NPO after midnight, continue with preop work up. Dr. Mackey will be consulted for pulmonary clearance.     Overview/Hospital Course:  No notes on file    Interval History: S/p right total arthroplasty. Patient saturating at 95 % on 2 L NC oxygen. Denies any chest pain. States that pain in the right hip is better    Review of Systems   Constitutional: Negative for activity change, appetite change, fatigue and fever.   HENT: Negative for congestion, sinus pressure, sinus pain, sneezing and sore throat.    Eyes: Negative for photophobia, pain, discharge and redness.   Respiratory:  Negative for cough, choking, chest tightness, shortness of breath and wheezing.    Cardiovascular: Negative for chest pain, palpitations and leg swelling.   Gastrointestinal: Negative for abdominal distention, abdominal pain, nausea and vomiting.   Genitourinary: Negative for difficulty urinating, dysuria, flank pain, frequency and urgency.   Musculoskeletal: Positive for arthralgias and gait problem. Negative for back pain, joint swelling, myalgias and neck pain.   Skin: Negative for color change, pallor, rash and wound.   Hematological: Negative.    Psychiatric/Behavioral: Negative.      Objective:     Vital Signs (Most Recent):  Temp: 96 °F (35.6 °C) (12/19/19 2028)  Pulse: 93 (12/19/19 2028)  Resp: 18 (12/19/19 2028)  BP: 116/61 (12/19/19 2028)  SpO2: 97 % (12/19/19 2028) Vital Signs (24h Range):  Temp:  [96 °F (35.6 °C)-98.6 °F (37 °C)] 96 °F (35.6 °C)  Pulse:  [68-96] 93  Resp:  [12-18] 18  SpO2:  [81 %-100 %] 97 %  BP: (102-122)/(60-82) 116/61     Weight: 51.7 kg (114 lb)  Body mass index is 19.57 kg/m².    Intake/Output Summary (Last 24 hours) at 12/19/2019 2235  Last data filed at 12/19/2019 1200  Gross per 24 hour   Intake 480 ml   Output 1175 ml   Net -695 ml      Physical Exam   Constitutional: She appears well-developed and well-nourished. No distress.   HENT:   Head: Normocephalic.   Right Ear: External ear normal.   Left Ear: External ear normal.   Mouth/Throat: Oropharynx is clear and moist.   Eyes: Pupils are equal, round, and reactive to light. Conjunctivae and EOM are normal. Right eye exhibits no discharge. Left eye exhibits no discharge. No scleral icterus.   Neck: Normal range of motion. Neck supple.   Cardiovascular: Normal rate, regular rhythm, normal heart sounds and intact distal pulses. Exam reveals no gallop and no friction rub.   No murmur heard.  Pulmonary/Chest: Breath sounds normal. No stridor. No respiratory distress. She has no wheezes. She has no rales.   Abdominal: Soft. Bowel  sounds are normal. She exhibits no distension and no mass. There is no tenderness. There is no guarding.   Musculoskeletal: She exhibits tenderness and deformity.        Right hip: She exhibits decreased range of motion, decreased strength, tenderness, bony tenderness and deformity.        Legs:  Lymphadenopathy:     She has no cervical adenopathy.   Neurological: She is alert.   Skin: Skin is warm and dry. Capillary refill takes less than 2 seconds. No rash noted. She is not diaphoretic. No erythema. No pallor.   Psychiatric: She has a normal mood and affect. Her behavior is normal. Judgment and thought content normal.   Nursing note and vitals reviewed.      Significant Labs:   BMP:   Recent Labs   Lab 12/19/19  0506   *      K 4.4   CL 98   CO2 25   BUN 6*   CREATININE 0.6   CALCIUM 9.6   MG 2.2     CBC:   Recent Labs   Lab 12/18/19  0534 12/19/19  0506   WBC 8.99 9.00   HGB 10.6* 10.6*   HCT 33.9* 34.8*   * 382*       Significant Imaging: I have reviewed all pertinent imaging results/findings within the past 24 hours.      Assessment/Plan:      * Closed fracture of neck of right femur  S/p Right hip arthroplasty day 1.    Right leg is neurovascularly intact  Cleared by ortho for discharge  Pulmonology recommended discharge tomorrow monitoring resp status  Will dc OxyContin q.12h and IV morphine and switch to Norco q6H      Pneumatocele of lung  History noted current on IV antibiotics.  Has been on chronic oral antibiotics as per Dr. Mackey's instructions      Immune deficiency disorder  History noted      Chronic respiratory failure with hypoxia  Has history of hypoxemia but intermittently uses home oxygen.  Patient has a history of COPD and patient needs oxygen when hypoxemic on exertion  Dr. Mackey onboard  Will continue on 2 L oxygen nasal cannula      Bronchiectasis  History noted      Moderate malnutrition  History noted we get Nutrition on board      Calcification of aorta  History  noted      Anxiety  Acute on chronic problem  Handling situation well currently  Xanax ordered prn anxiety.      COPD, severe  Chronic Problem  2 L oxygen on nasal cannula saturating at 94%  Respiratory status stable currently not wheezing  Will continue albuterol nebs, and inhalers  Appreciate Respiratory therapist input  Will closely monitor.      Hyperlipidemia  Chronic Problem  Continue statin therapy        Hypertension  Chronic Problem  BP decreased after pain meds  earlier today  Will continue Monitor.      Tobacco abuse  Acute on Chronic Problem  Long term smoker  Quit 1 month ago  Dangers of cigarette smoking were reviewed with patient in detail for 10 minutes and patient was encouraged to quit. Nicotine replacement options were discussed.        VTE Risk Mitigation (From admission, onward)         Ordered     IP VTE HIGH RISK PATIENT  Once      12/18/19 1859     Place STEPHANIE hose  Until discontinued      12/18/19 1859     Place sequential compression device  Until discontinued      12/18/19 1859     Place STEPHANIE hose  Until discontinued      12/15/19 8056                      Aniket Olguin MD  Department of Hospital Medicine   [facility]

## 2019-12-20 NOTE — PLAN OF CARE
Receiving aerosol tx Q4, mdi Qday. Acapella and IS therapy Q4.       12/20/19 0700 12/20/19 0702   Patient Assessment/Suction   Level of Consciousness (AVPU) alert  --    Respiratory Effort Unlabored  --    Expansion/Accessory Muscles/Retractions expansion symmetric  --    All Lung Fields Breath Sounds diminished  --    PRE-TX-O2   O2 Device (Oxygen Therapy) nasal cannula  --    $ Is the patient on Low Flow Oxygen? Yes  --    Flow (L/min) 3  --    Oxygen Concentration (%) 32  --    SpO2 99 %  --    Pulse Oximetry Type Intermittent  --    $ Pulse Oximetry - Multiple Charge Pulse Oximetry - Multiple  --    Pulse 78  --    Resp 18  --    Aerosol Therapy   $ Aerosol Therapy Charges Aerosol Treatment  --    Daily Review of Necessity (SVN) completed  --    Respiratory Treatment Status (SVN) given  --    Treatment Route (SVN) mask  --    Patient Position (SVN) HOB elevated  --    Post Treatment Assessment (SVN) increased aeration  --    Signs of Intolerance (SVN) none  --    Inhaler   $ Inhaler Charges  --  MDI (Metered Dose Inahler) Treatment;Mouth rinsed post treatment   Daily Review of Necessity (Inhaler)  --  completed   Respiratory Treatment Status (Inhaler)  --  given   Treatment Route (Inhaler)  --  mouthpiece   Patient Position (Inhaler)  --  HOB elevated   Post Treatment Assessment (Inhaler)  --  breath sounds unchanged   Signs of Intolerance (Inhaler)  --  none   Breath Sounds Post-Respiratory Treatment   Throughout All Fields Post-Treatment All Fields  --    Throughout All Fields Post-Treatment aeration increased  --    Post-treatment Heart Rate (beats/min) 79  --    Post-treatment Resp Rate (breaths/min) 16  --    Incentive Spirometer   $ Incentive Spirometer Charges  --  done with encouragement   Administration (IS)  --  mouthpiece   Number of Repetitions (IS)  --  10   Level Incentive Spirometer (mL)  --  750   Patient Tolerance (IS)  --  good   Vibratory PEP Therapy   $ Vibratory PEP Charges  --  Aerobika  Therapy   $ Vibratory PEP Tech Time Charges  --  15 min   Daily Review of Necessity (PEP Therapy)  --  completed   Type (PEP Therapy)  --  vibratory/oscillatory   Device (PEP Therapy)  --  flutter   Route (PEP Therapy)  --  mouthpiece   Breaths per Cycle (PEP Therapy)  --  10   Cycles (PEP Therapy)  --  1   Settings (PEP Therapy)  --  PEP 3   Patient Position (PEP Therapy)  --  HOB elevated   Post Treatment Assessment (PEP)  --  breath sounds unchanged   Signs of Intolerance (PEP Therapy)  --  none   Ready to Wean/Extubation Screen   FIO2<=50 (chart decimal) 0.32  --

## 2019-12-20 NOTE — PLAN OF CARE
12/19/19 1942   Patient Assessment/Suction   Level of Consciousness (AVPU) alert   Respiratory Effort Normal;Unlabored   Expansion/Accessory Muscles/Retractions expansion symmetric   All Lung Fields Breath Sounds diminished   Rhythm/Pattern, Respiratory pattern regular   Cough Frequency frequent   Cough Type loose;nonproductive   PRE-TX-O2   O2 Device (Oxygen Therapy) nasal cannula   Flow (L/min) 3   SpO2 100 %   Pulse Oximetry Type Intermittent   Pulse 90   Resp 16   Aerosol Therapy   $ Aerosol Therapy Charges Aerosol Treatment   Respiratory Treatment Status (SVN) given   Treatment Route (SVN) mask   Patient Position (SVN) HOB elevated   Post Treatment Assessment (SVN) breath sounds unchanged   Signs of Intolerance (SVN) none   Breath Sounds Post-Respiratory Treatment   Post-treatment Heart Rate (beats/min) 88   Post-treatment Resp Rate (breaths/min) 16   Incentive Spirometer   $ Incentive Spirometer Charges done with encouragement   Administration (IS) mouthpiece   Number of Repetitions (IS) 10   Level Incentive Spirometer (mL) 750   Patient Tolerance (IS) good   Vibratory PEP Therapy   $ Vibratory PEP Charges Acapella Therapy   Type (PEP Therapy) vibratory/oscillatory   Device (PEP Therapy) flutter   Route (PEP Therapy) mouthpiece   Breaths per Cycle (PEP Therapy) 10   Patient Position (PEP Therapy) HOB elevated   Post Treatment Assessment (PEP) breath sounds unchanged   Signs of Intolerance (PEP Therapy) none

## 2019-12-20 NOTE — PT/OT/SLP PROGRESS
Physical Therapy Treatment    Patient Name:  Fabiana Morel   MRN:  [id number]    Recommendations:     Discharge Recommendations:  rehabilitation facility   Discharge Equipment Recommendations: none   Barriers to discharge: None    Assessment:     Fabiana Morel is a 64 y.o. female admitted with a medical diagnosis of Closed fracture of neck of right femur.  She presents with the following impairments/functional limitations:  weakness, impaired endurance, impaired self care skills, impaired functional mobilty, gait instability, impaired balance, decreased lower extremity function, pain, impaired cardiopulmonary response to activity, orthopedic precautions . Tolerated treatment fair. Requires A for safety with mobility. Reviewed hip precautions with patient and spouse throughout session.     Rehab Prognosis: Fair; patient would benefit from acute skilled PT services to address these deficits and reach maximum level of function.    Recent Surgery: Procedure(s) (LRB):  ARTHROPLASTY, HIP (Right) 2 Days Post-Op    Plan:     During this hospitalization, patient to be seen BID to address the identified rehab impairments via gait training, therapeutic activities, therapeutic exercises and progress toward the following goals:    · Plan of Care Expires:  01/10/20    Subjective     Chief Complaint: pain with movement RLE  Patient/Family Comments/goals: to return home with spouse  Pain/Comfort:  · Pain Rating 1: 10/10  · Location - Side 1: Right  · Location 1: hip  · Pain Addressed 1: Reposition, Nurse notified(requested pain medicine following session.)      Objective:     Communicated with nurse Ortega prior to session.  Patient found supine with telemetry, oxygen, hip abduction pillow, SCD, bed alarm upon PT entry to room.     General Precautions: Standard, fall   Orthopedic Precautions:RLE weight bearing as tolerated, RLE posterior precautions   Braces: N/A     Functional Mobility:  · Bed Mobility:     · Rolling Left:   minimum assistance  · Supine to Sit: minimum assistance  · Transfers:     · Sit to Stand:  minimum assistance with rolling walker  · Gait: 80' with rw and CGA with O2 attached, WBAT RLE.      AM-PAC 6 CLICK MOBILITY          Therapeutic Activities and Exercises:   Transferred to sitting EOB with Min A.   Stood with Min A. SPT to BSC with rw and verbal cues for technique. Required assistance for pull down / up diaper. Performed hygiene task with set up.   Ambulated in hallway with rw and CGA, WBAT RLE.      Patient left up in chair with all lines intact, call button in reach, chair alarm on, nurse Jordan notified and spouse present..    GOALS:   Multidisciplinary Problems     Physical Therapy Goals        Problem: Physical Therapy Goal    Goal Priority Disciplines Outcome Goal Variances Interventions   Physical Therapy Goal     PT, PT/OT Ongoing, Progressing     Description:  Goals to be met by: 01-     Patient will increase functional independence with mobility by performin. Supine to sit with MInimal Assistance  2. Sit to stand transfer with Minimal Assistance  3. Bed to chair transfer with Minimal Assistance using Rolling Walker  4. Gait  x 100 feet with Minimal Assistance using Rolling Walker.   5. Lower extremity exercise program x20 reps                    Time Tracking:     PT Received On: 19  PT Start Time: 1125     PT Stop Time: 1145  PT Total Time (min): 20 min     Billable Minutes: Gait Training 12min and Therapeutic Activity 8min    Treatment Type: Treatment  PT/PTA: PTA     PTA Visit Number: 1     Gia Sutton, PTA  2019

## 2019-12-20 NOTE — PLAN OF CARE
Right hip incision dressing clean dry intact tolerated ambulation in flores way with PT well. AAOX3 VSS denies respiratory distress AAOx3 safety maintained this shift.

## 2019-12-20 NOTE — PLAN OF CARE
Cm informed Jordan, RN, pt is cleared for discharge.       12/20/19 1207   Final Note   Assessment Type Final Discharge Note   Anticipated Discharge Disposition Home-Health   Hospital Follow Up  Appt(s) scheduled? Yes

## 2019-12-20 NOTE — SUBJECTIVE & OBJECTIVE
Interval History: S/p right total arthroplasty. Patient saturating at 95 % on 2 L NC oxygen. Denies any chest pain. States that pain in the right hip is better    Review of Systems   Constitutional: Negative for activity change, appetite change, fatigue and fever.   HENT: Negative for congestion, sinus pressure, sinus pain, sneezing and sore throat.    Eyes: Negative for photophobia, pain, discharge and redness.   Respiratory: Negative for cough, choking, chest tightness, shortness of breath and wheezing.    Cardiovascular: Negative for chest pain, palpitations and leg swelling.   Gastrointestinal: Negative for abdominal distention, abdominal pain, nausea and vomiting.   Genitourinary: Negative for difficulty urinating, dysuria, flank pain, frequency and urgency.   Musculoskeletal: Positive for arthralgias and gait problem. Negative for back pain, joint swelling, myalgias and neck pain.   Skin: Negative for color change, pallor, rash and wound.   Hematological: Negative.    Psychiatric/Behavioral: Negative.      Objective:     Vital Signs (Most Recent):  Temp: 96 °F (35.6 °C) (12/19/19 2028)  Pulse: 93 (12/19/19 2028)  Resp: 18 (12/19/19 2028)  BP: 116/61 (12/19/19 2028)  SpO2: 97 % (12/19/19 2028) Vital Signs (24h Range):  Temp:  [96 °F (35.6 °C)-98.6 °F (37 °C)] 96 °F (35.6 °C)  Pulse:  [68-96] 93  Resp:  [12-18] 18  SpO2:  [81 %-100 %] 97 %  BP: (102-122)/(60-82) 116/61     Weight: 51.7 kg (114 lb)  Body mass index is 19.57 kg/m².    Intake/Output Summary (Last 24 hours) at 12/19/2019 7137  Last data filed at 12/19/2019 1200  Gross per 24 hour   Intake 480 ml   Output 1175 ml   Net -695 ml      Physical Exam   Constitutional: She appears well-developed and well-nourished. No distress.   HENT:   Head: Normocephalic.   Right Ear: External ear normal.   Left Ear: External ear normal.   Mouth/Throat: Oropharynx is clear and moist.   Eyes: Pupils are equal, round, and reactive to light. Conjunctivae and EOM are  normal. Right eye exhibits no discharge. Left eye exhibits no discharge. No scleral icterus.   Neck: Normal range of motion. Neck supple.   Cardiovascular: Normal rate, regular rhythm, normal heart sounds and intact distal pulses. Exam reveals no gallop and no friction rub.   No murmur heard.  Pulmonary/Chest: Breath sounds normal. No stridor. No respiratory distress. She has no wheezes. She has no rales.   Abdominal: Soft. Bowel sounds are normal. She exhibits no distension and no mass. There is no tenderness. There is no guarding.   Musculoskeletal: She exhibits tenderness and deformity.        Right hip: She exhibits decreased range of motion, decreased strength, tenderness, bony tenderness and deformity.        Legs:  Lymphadenopathy:     She has no cervical adenopathy.   Neurological: She is alert.   Skin: Skin is warm and dry. Capillary refill takes less than 2 seconds. No rash noted. She is not diaphoretic. No erythema. No pallor.   Psychiatric: She has a normal mood and affect. Her behavior is normal. Judgment and thought content normal.   Nursing note and vitals reviewed.      Significant Labs:   BMP:   Recent Labs   Lab 12/19/19  0506   *      K 4.4   CL 98   CO2 25   BUN 6*   CREATININE 0.6   CALCIUM 9.6   MG 2.2     CBC:   Recent Labs   Lab 12/18/19  0534 12/19/19  0506   WBC 8.99 9.00   HGB 10.6* 10.6*   HCT 33.9* 34.8*   * 382*       Significant Imaging: I have reviewed all pertinent imaging results/findings within the past 24 hours.

## 2019-12-20 NOTE — PLAN OF CARE
Problem: Physical Therapy Goal  Goal: Physical Therapy Goal  Description  Goals to be met by: 01-     Patient will increase functional independence with mobility by performin. Supine to sit with MInimal Assistance  2. Sit to stand transfer with Minimal Assistance  3. Bed to chair transfer with Minimal Assistance using Rolling Walker  4. Gait  x 100 feet with Minimal Assistance using Rolling Walker.   5. Lower extremity exercise program x20 reps   Outcome: Ongoing, Progressing   Therapeutic activity : bed mobility, transfers, gait with rw and CGA, WBAT RLE.

## 2019-12-20 NOTE — PLAN OF CARE
Per response from [facility] through [facility]    · 12/20/2019 10:21:48 AM Accepted  [email or web address]       12/20/19 1117   Post-Acute Status   Post-Acute Authorization Home Health/Hospice   Post-Acute Placement Status Set-up Complete

## 2019-12-20 NOTE — PLAN OF CARE
SW met with patient at bedside regarding home health services.  Patient signed patient choice disclosure choosing [facility].  [facility].  SKY sent patient information to [facility] through [facility] for authorization and acceptance.       12/20/19 1019   Post-Acute Status   Post-Acute Authorization Home Health/Hospice   Post-Acute Placement Status Referrals Sent

## 2019-12-21 PROCEDURE — G0180 MD CERTIFICATION HHA PATIENT: HCPCS | Mod: ,,, | Performed by: ORTHOPAEDIC SURGERY

## 2019-12-21 PROCEDURE — G0180 PR HOME HEALTH MD CERTIFICATION: ICD-10-PCS | Mod: ,,, | Performed by: ORTHOPAEDIC SURGERY

## 2019-12-21 NOTE — HOSPITAL COURSE
Patient was admitted to hospital medicine. Patient was hypoxemic on day 2. Pulmonary was consulted and as per their evaluation, pulmonary symptoms were currently at baseline without evidence of exacerbation  given her severe COPD with ongoing pulmonary infection pt is intermediate to high risk for perioperative pulmonary complications such as exacerbation or prolonged ventilator dependence  Pulm recommend proceeding with surgery .  baseline ABG was checked prior to surgery. Pulmonary cleared the patient for surgery. Patient underwent right total hip arthroplasty. Patient was closely monitored post-operatively. Blood sugar and blood pressure closely followed. Patient was given supportive care. Patient worked with Physical Therapist. Nausea and pain controlled. Patient tolerated liquid diet. Symptoms improved and patient was discharged with home health

## 2019-12-21 NOTE — DISCHARGE SUMMARY
[facility] Hospital Medicine  Discharge Summary      Patient Name: Fabiana Morel  MRN: [id number]  Admission Date: 12/15/2019  Hospital Length of Stay: 5 days  Discharge Date and Time:  12/21/2019 7:28 AM  Attending Physician: Annabel att. providers found   Discharging Provider: Aniket Olguin MD  Primary Care Provider: Dominique Bartlett MD      HPI:   Fabiana Morel is a 64 year old female who presented to the ER for evaluation of right hip after a mechanical fall. She states that she tripped on her slippers while walking outside to check her mail. Injury occurred several hours prior to arrival. Describes pain as intense at worst. 9/10 pain at worst. Pain is worse with any movement of right leg. She was unable to bear weight on right leg. She denies striking her head, loss of consciousness or cervical pain. Her work up in the ER demonstrated a right femoral neck fracture as well as a AUTUMN infiltrate. Labs were stable. She has been treated over the past several months by Dr. Mackey for the AUTUMN infiltrate with Augmentin. She drinks several beers daily. She quite smoking 1 month ago. PMH includes, COPD, HTN and hyperlipidemia. Surgical hx includes hysterectomy and appendectomy. [facility] was consulted for admission and management. Dr. Santos was consulted for orthopedics by ER physician. Will admit to Hospital, NPO after midnight, continue with preop work up. Dr. Mackey will be consulted for pulmonary clearance.     Procedure(s) (LRB):  ARTHROPLASTY, HIP (Right)      Hospital Course:   Patient was admitted to hospital medicine. Patient was hypoxemic on day 2. Pulmonary was consulted and as per their evaluation, pulmonary symptoms were currently at baseline without evidence of exacerbation  given her severe COPD with ongoing pulmonary infection pt is intermediate to high risk for perioperative pulmonary complications such as exacerbation or prolonged ventilator dependence  Pulm recommend  proceeding with surgery .  baseline ABG was checked prior to surgery. Pulmonary cleared the patient for surgery. Patient underwent right total hip arthroplasty. Patient was closely monitored post-operatively. Blood sugar and blood pressure closely followed. Patient was given supportive care. Patient worked with Physical Therapist. Nausea and pain controlled. Patient tolerated liquid diet. Symptoms improved and patient was discharged with home health     Consults:   Consults (From admission, onward)        Status Ordering Provider     Inpatient consult to Pulmonology  Once     Provider:  Rosa Cagle MD    Completed LOC WHITMORE     Inpatient consult to Social Work  Once     Provider:  (Not yet assigned)    Completed PATRICA COE          No new Assessment & Plan notes have been filed under this hospital service since the last note was generated.  Service: Hospital Medicine    Final Active Diagnoses:    Diagnosis Date Noted POA    PRINCIPAL PROBLEM:  Closed fracture of neck of right femur [S72.001A] 12/15/2019 Yes    Pneumatocele of lung [J98.4] 12/16/2019 Yes    S/P total hip arthroplasty [Z96.649] 12/20/2019 Not Applicable    Immune deficiency disorder [D84.9] 12/18/2019 Yes    Calcification of aorta [I70.0] 12/16/2019 Yes    Moderate malnutrition [E44.0] 12/16/2019 Yes    Bronchiectasis [J47.9] 12/16/2019 Yes    Chronic respiratory failure with hypoxia [J96.11] 12/16/2019 Yes    Anxiety [F41.9] 08/12/2016 Yes    COPD, severe [J44.9] 08/12/2016 Yes    Hyperlipidemia [E78.5] 04/29/2016 Yes    Hypertension [I10] 02/25/2014 Yes    Tobacco abuse [Z72.0] 04/16/2013 Yes      Problems Resolved During this Admission:    Diagnosis Date Noted Date Resolved POA    Abscess of left lung with pneumonia [J85.1] 10/03/2019 12/16/2019 Yes       Discharged Condition: stable    Disposition: Home or Self Care    Follow Up:  Follow-up Information     Bhupendra Mackey MD On 1/27/2020.    Specialty:   "Pulmonary Disease  Why:  hospital f/u in [facility]  Contact information:  [address] [facility]  [address]  [facility] [address]  [phone]             LAYA Woo On 1/6/2020.    Specialty:  Internal Medicine  Why:  APT IN [facility]  Contact information:  [address]  [place] [address]  [phone]             [facility].    Contact information:  [address] [facility], [address]  [facility] [id number] [phone]           Bernardino Wilson II, MD. Schedule an appointment as soon as possible for a visit in 1 week.    Specialty:  Orthopedic Surgery  Contact information:  [address] [place] [address] [place] [address]  [phone]             [facility]    Specialty:  DME Provider  Why:  DME-bedside commode  Contact information:  [address]  [address]  [facility] [address]  [phone]                 Patient Instructions:      COMMODE FOR HOME USE     Order Specific Question Answer Comments   Type: Standard    Height: 5' 4" (1.626 m)    Weight: 57.5 kg (126 lb 12.2 oz)    Does patient have medical equipment at home? oxygen    Length of need (1-99 months): 33      X-Ray Chest PA And Lateral   Standing Status: Future Standing Exp. Date: 12/20/20     Order Specific Question Answer Comments   May the Radiologist modify the order per protocol to meet the clinical needs of the patient? Yes      Referral to Home health   Referral Priority: Routine Referral Type: Home Health   Referral Reason: Specialty Services Required   Requested Specialty: Home Health Services   Number of Visits Requested: 1     Notify your health care provider if you experience any of the following:  temperature >100.4     Notify your health care provider if you experience any of the following:  severe uncontrolled pain     Activity as tolerated       Significant Diagnostic Studies: Labs:   BMP:   Recent Labs   Lab 12/20/19  0453         K 4.0   CL 99   CO2 29   BUN 11   CREATININE 0.5   CALCIUM 9.2   MG 2.0    and " CBC   Recent Labs   Lab 12/20/19  0453   WBC 10.60   HGB 9.8*   HCT 32.2*   *       Pending Diagnostic Studies:     None         Medications:  Reconciled Home Medications:      Medication List      START taking these medications    HYDROcodone-acetaminophen 7.5-325 mg per tablet  Commonly known as:  NORCO  Take 1 tablet by mouth every 6 (six) hours as needed.        CHANGE how you take these medications    Ventolin HFA 90 mcg/actuation inhaler  Generic drug:  albuterol  Inhale 2 puffs into the lungs every 4 (four) hours as needed for Wheezing.  What changed:  Another medication with the same name was removed. Continue taking this medication, and follow the directions you see here.        CONTINUE taking these medications    ALPRAZolam 0.25 MG tablet  Commonly known as:  XANAX  Take 1 tablet (0.25 mg total) by mouth 3 (three) times daily as needed.     * amoxicillin-clavulanate 875-125mg 875-125 mg per tablet  Commonly known as:  AUGMENTIN  Take 1 tablet by mouth every 12 (twelve) hours.     * amoxicillin-clavulanate 875-125mg 875-125 mg per tablet  Commonly known as:  AUGMENTIN  Take 1 tablet by mouth every 12 (twelve) hours.     atorvastatin 40 MG tablet  Commonly known as:  LIPITOR  TAKE ONE TABLET BY MOUTH ONCE DAILY     cyproheptadine 4 mg tablet  Commonly known as:  PERIACTIN  Take 1 tablet (4 mg total) by mouth 3 (three) times daily as needed.     fluticasone-umeclidin-vilanter 100-62.5-25 mcg Dsdv  Commonly known as:  Trelegy Ellipta  Inhale 1 puff into the lungs once daily.     metoprolol succinate 50 MG 24 hr tablet  Commonly known as:  Toprol XL  Take 1 tablet (50 mg total) by mouth once daily.     mirtazapine 7.5 MG Tab  Commonly known as:  REMERON  Take 1 tablet (7.5 mg total) by mouth every evening.     predniSONE 20 MG tablet  Commonly known as:  DELTASONE  One daily for 3 days and repeat for flare of lung symptoms as intructed         * This list has 2 medication(s) that are the same as other  medications prescribed for you. Read the directions carefully, and ask your doctor or other care provider to review them with you.                Indwelling Lines/Drains at time of discharge:   Lines/Drains/Airways     Drain            Female External Urinary Catheter 12/20/19 0130 1 day          Pressure Ulcer                 Pressure Injury 12/12/19 0100 Left Buttocks #1 Stage 1 9 days         Pressure Injury 12/19/19 0100 Right Buttocks #1 2 days                Time spent on the discharge of patient: 60 minutes  Patient was seen and examined on the date of discharge and determined to be suitable for discharge.         Aniket Olguin MD  Department of Hospital Medicine  [facility]

## 2019-12-23 ENCOUNTER — TELEPHONE (OUTPATIENT)
Dept: MEDSURG UNIT | Facility: HOSPITAL | Age: 64
End: 2019-12-23

## 2019-12-23 DIAGNOSIS — S72.001A CLOSED FRACTURE OF NECK OF RIGHT FEMUR, INITIAL ENCOUNTER: Primary | ICD-10-CM

## 2019-12-24 ENCOUNTER — PATIENT OUTREACH (OUTPATIENT)
Dept: ADMINISTRATIVE | Facility: CLINIC | Age: 64
End: 2019-12-24

## 2019-12-24 ENCOUNTER — TELEPHONE (OUTPATIENT)
Dept: FAMILY MEDICINE | Facility: CLINIC | Age: 64
End: 2019-12-24

## 2019-12-24 NOTE — PATIENT INSTRUCTIONS
After Hip Replacement: Continuing with Hospital Recovery  Once you have been shown how to protect your hip, you will learn the skills needed to return to normal life. Youll be taught how to walk, sit, and dress. To make moving easier, ask for pain medicines before each training session.  Sitting and dressing    To protect your new hip, an occupational therapist or physical therapist will teach you safer ways of doing daily tasks. Use the following tips when sitting, dressing, or using stairs.  · To sit, back up until the edge of the chair touches your leg. Then, using the armrests to support your weight, lower yourself into the seat. Always keep your operated leg out in front.  · To pull on socks and shoes, use a long-handled device, such as a grasper or hook. Try this with slip-on shoes first.  · To wash your feet and legs, use a long-handled sponge and a shower hose.  · To use stairs, step up first with your good leg. Then bring your operated leg up to meet it. When going down, step down first with your operated leg.  Going home  You can leave the hospital when your medical condition is stable and youre able to walk safely, including up and down stairs. Once home, its normal to have good and bad days. But if you continue exercising, there will be more good days and your general condition is likely to improve.     Your familys role  Your familys support is especially important while you recover and readjust. By reminding you of what you learned in the hospital, and assisting you with equipment until you can use it on your own, they can help you make the transition to your home environment. Ask your family to encourage you to do things for yourself. They can also cheer you on and celebrate when you walk a little farther, or accomplish a new task.   Planning your discharge  A discharge planner or other healthcare worker meets with you before youre discharged to arrange for care in another setting, or for  special equipment you may need at home. A visit with your surgeon in a few weeks will be arranged, as well as home therapy if its needed.     When to call your healthcare provider   Contact your healthcare provider right away if you have:   · Excessive pain  · Infection, excessive swelling, redness, warmth, or drainage from your incision  · A fever of 100.4°F (38°C) or higher, or as directed by your healthcare provider  · Calf pain or swelling  · Sudden onset of chest pain or shortness of breath   Date Last Reviewed: 9/29/2015 © 2000-2017 [employer]. [address] [place] [address]. All rights reserved. This information is not intended as a substitute for professional medical care. Always follow your healthcare professional's instructions.

## 2019-12-26 DIAGNOSIS — S72.001A CLOSED FRACTURE OF NECK OF RIGHT FEMUR, INITIAL ENCOUNTER: ICD-10-CM

## 2019-12-26 RX ORDER — HYDROCODONE BITARTRATE AND ACETAMINOPHEN 7.5; 325 MG/1; MG/1
1 TABLET ORAL EVERY 6 HOURS PRN
Qty: 28 TABLET | Refills: 0 | Status: SHIPPED | OUTPATIENT
Start: 2019-12-26 | End: 2020-01-02

## 2019-12-26 NOTE — TELEPHONE ENCOUNTER
----- Message from Chance Steele MA sent at 12/26/2019 11:20 AM CST -----  Contact: Patient   Patient needs a refill on pain medication, only have enough for 2 days and next appointment isn't until Monday  Call back

## 2019-12-30 ENCOUNTER — OFFICE VISIT (OUTPATIENT)
Dept: ORTHOPEDICS | Facility: CLINIC | Age: 64
End: 2019-12-30
Payer: COMMERCIAL

## 2019-12-30 ENCOUNTER — HOSPITAL ENCOUNTER (OUTPATIENT)
Dept: RADIOLOGY | Facility: HOSPITAL | Age: 64
Discharge: HOME | End: 2019-12-30
Attending: ORTHOPAEDIC SURGERY
Payer: COMMERCIAL

## 2019-12-30 VITALS
HEART RATE: 87 BPM | SYSTOLIC BLOOD PRESSURE: 104 MMHG | BODY MASS INDEX: 21.64 KG/M2 | DIASTOLIC BLOOD PRESSURE: 60 MMHG | HEIGHT: 64 IN | WEIGHT: 126.75 LBS

## 2019-12-30 DIAGNOSIS — S72.001D CLOSED FRACTURE OF NECK OF RIGHT FEMUR WITH ROUTINE HEALING, SUBSEQUENT ENCOUNTER: Primary | ICD-10-CM

## 2019-12-30 DIAGNOSIS — S72.001A CLOSED FRACTURE OF NECK OF RIGHT FEMUR, INITIAL ENCOUNTER: ICD-10-CM

## 2019-12-30 PROCEDURE — 99999 PR PBB SHADOW E&M-EST. PATIENT-LVL III: CPT | Mod: PBBFAC,,, | Performed by: ORTHOPAEDIC SURGERY

## 2019-12-30 PROCEDURE — 99024 POSTOP FOLLOW-UP VISIT: CPT | Mod: S$GLB,,, | Performed by: ORTHOPAEDIC SURGERY

## 2019-12-30 PROCEDURE — 99024 PR POST-OP FOLLOW-UP VISIT: ICD-10-PCS | Mod: S$GLB,,, | Performed by: ORTHOPAEDIC SURGERY

## 2019-12-30 PROCEDURE — 73502 X-RAY EXAM HIP UNI 2-3 VIEWS: CPT | Mod: TC,PN,RT

## 2019-12-30 PROCEDURE — 73502 XR HIP 2 VIEW RIGHT: ICD-10-PCS | Mod: 26,RT,, | Performed by: RADIOLOGY

## 2019-12-30 PROCEDURE — 99999 PR PBB SHADOW E&M-EST. PATIENT-LVL III: ICD-10-PCS | Mod: PBBFAC,,, | Performed by: ORTHOPAEDIC SURGERY

## 2019-12-30 PROCEDURE — 73502 X-RAY EXAM HIP UNI 2-3 VIEWS: CPT | Mod: 26,RT,, | Performed by: RADIOLOGY

## 2019-12-30 NOTE — PROGRESS NOTES
CC:  64-year-old female follows up status post right total hip arthroplasty. Date of surgery was 12/18/2019.  This is her 2 week follow-up.  She has no complaints today.  She is progressing well with therapy.    Examination of the Right Lower Extremity    Skin is intact throughout, incision is healing well with no signs of infection.  Motor in intact EHL,FHL,TA,jacinto  +2 DP/PT  Sensation LT intact D/P/1st    Examination of the Right Hip    C-Sign negative  Logroll negative  Stenchfield negative    Pain with ROM negative    ROM:    Flexion   110  Extension   10  Abduction   50  Adduction   10  External Rotation 60  Internal Rotation 10    Flexion contracture negative    FADIR negative  FADER negative    X-rays were examined and personally reviewed by me.  Two views of the right hip dated 12/30/2019 are available for review.  There is a right total hip arthroplasty that is well fixed and in good alignment.    Dx:  Status post right total hip arthroplasty, stable    Plan:  The patient can progress activity as tolerated.  We reviewed posterior hip precautions.  Follow-up in 4 weeks.

## 2019-12-31 ENCOUNTER — EXTERNAL HOME HEALTH (OUTPATIENT)
Dept: HOME HEALTH SERVICES | Facility: HOSPITAL | Age: 64
End: 2019-12-31
Payer: COMMERCIAL

## 2020-01-03 ENCOUNTER — DOCUMENTATION ONLY (OUTPATIENT)
Dept: FAMILY MEDICINE | Facility: CLINIC | Age: 65
End: 2020-01-03

## 2020-01-03 NOTE — PROGRESS NOTES
Pre-Visit Chart Review  For Appointment Scheduled on 01/06/20    Health Maintenance Due   Topic Date Due    Pneumococcal Vaccine (Highest Risk) (3 of 3 - PPSV23) 12/10/2019

## 2020-01-06 ENCOUNTER — LAB VISIT (OUTPATIENT)
Dept: LAB | Facility: HOSPITAL | Age: 65
End: 2020-01-06
Attending: PHYSICIAN ASSISTANT
Payer: COMMERCIAL

## 2020-01-06 ENCOUNTER — OFFICE VISIT (OUTPATIENT)
Dept: FAMILY MEDICINE | Facility: CLINIC | Age: 65
End: 2020-01-06
Payer: COMMERCIAL

## 2020-01-06 VITALS
HEIGHT: 64 IN | SYSTOLIC BLOOD PRESSURE: 92 MMHG | TEMPERATURE: 98 F | BODY MASS INDEX: 17.84 KG/M2 | OXYGEN SATURATION: 97 % | DIASTOLIC BLOOD PRESSURE: 60 MMHG | HEART RATE: 83 BPM | WEIGHT: 104.5 LBS

## 2020-01-06 DIAGNOSIS — E44.0 MODERATE MALNUTRITION: ICD-10-CM

## 2020-01-06 DIAGNOSIS — M85.80 OSTEOPENIA, UNSPECIFIED LOCATION: ICD-10-CM

## 2020-01-06 DIAGNOSIS — I70.0 CALCIFICATION OF AORTA: ICD-10-CM

## 2020-01-06 DIAGNOSIS — I10 ESSENTIAL HYPERTENSION: ICD-10-CM

## 2020-01-06 DIAGNOSIS — Z96.649 STATUS POST TOTAL REPLACEMENT OF HIP, UNSPECIFIED LATERALITY: ICD-10-CM

## 2020-01-06 DIAGNOSIS — J47.9 BRONCHIECTASIS WITHOUT COMPLICATION: ICD-10-CM

## 2020-01-06 DIAGNOSIS — L89.322 PRESSURE INJURY OF LEFT BUTTOCK, STAGE 2: ICD-10-CM

## 2020-01-06 DIAGNOSIS — J96.11 CHRONIC RESPIRATORY FAILURE WITH HYPOXIA: ICD-10-CM

## 2020-01-06 DIAGNOSIS — J44.9 COPD, SEVERE: ICD-10-CM

## 2020-01-06 DIAGNOSIS — Z09 HOSPITAL DISCHARGE FOLLOW-UP: ICD-10-CM

## 2020-01-06 DIAGNOSIS — Z09 HOSPITAL DISCHARGE FOLLOW-UP: Primary | ICD-10-CM

## 2020-01-06 DIAGNOSIS — D84.9 IMMUNE DEFICIENCY DISORDER: ICD-10-CM

## 2020-01-06 PROBLEM — S72.001A CLOSED FRACTURE OF NECK OF RIGHT FEMUR: Status: RESOLVED | Noted: 2019-12-15 | Resolved: 2020-01-06

## 2020-01-06 LAB
ALBUMIN SERPL BCP-MCNC: 2.4 G/DL (ref 3.5–5.2)
ALP SERPL-CCNC: 116 U/L (ref 55–135)
ALT SERPL W/O P-5'-P-CCNC: 12 U/L (ref 10–44)
ANION GAP SERPL CALC-SCNC: 12 MMOL/L (ref 8–16)
AST SERPL-CCNC: 22 U/L (ref 10–40)
BASOPHILS # BLD AUTO: 0.04 K/UL (ref 0–0.2)
BASOPHILS NFR BLD: 0.4 % (ref 0–1.9)
BILIRUB SERPL-MCNC: 0.3 MG/DL (ref 0.1–1)
BUN SERPL-MCNC: 9 MG/DL (ref 8–23)
CALCIUM SERPL-MCNC: 10.2 MG/DL (ref 8.7–10.5)
CHLORIDE SERPL-SCNC: 100 MMOL/L (ref 95–110)
CO2 SERPL-SCNC: 23 MMOL/L (ref 23–29)
CREAT SERPL-MCNC: 0.6 MG/DL (ref 0.5–1.4)
DIFFERENTIAL METHOD: ABNORMAL
EOSINOPHIL # BLD AUTO: 0.2 K/UL (ref 0–0.5)
EOSINOPHIL NFR BLD: 1.5 % (ref 0–8)
ERYTHROCYTE [DISTWIDTH] IN BLOOD BY AUTOMATED COUNT: 15.7 % (ref 11.5–14.5)
EST. GFR  (AFRICAN AMERICAN): >60 ML/MIN/1.73 M^2
EST. GFR  (NON AFRICAN AMERICAN): >60 ML/MIN/1.73 M^2
GLUCOSE SERPL-MCNC: 83 MG/DL (ref 70–110)
HCT VFR BLD AUTO: 38.4 % (ref 37–48.5)
HGB BLD-MCNC: 10.9 G/DL (ref 12–16)
IMM GRANULOCYTES # BLD AUTO: 0.05 K/UL (ref 0–0.04)
IMM GRANULOCYTES NFR BLD AUTO: 0.5 % (ref 0–0.5)
LYMPHOCYTES # BLD AUTO: 2.1 K/UL (ref 1–4.8)
LYMPHOCYTES NFR BLD: 19.5 % (ref 18–48)
MCH RBC QN AUTO: 26.2 PG (ref 27–31)
MCHC RBC AUTO-ENTMCNC: 28.4 G/DL (ref 32–36)
MCV RBC AUTO: 92 FL (ref 82–98)
MONOCYTES # BLD AUTO: 0.7 K/UL (ref 0.3–1)
MONOCYTES NFR BLD: 6 % (ref 4–15)
NEUTROPHILS # BLD AUTO: 7.9 K/UL (ref 1.8–7.7)
NEUTROPHILS NFR BLD: 72.1 % (ref 38–73)
NRBC BLD-RTO: 0 /100 WBC
PLATELET # BLD AUTO: 720 K/UL (ref 150–350)
PMV BLD AUTO: 8.7 FL (ref 9.2–12.9)
POTASSIUM SERPL-SCNC: 4.4 MMOL/L (ref 3.5–5.1)
PROT SERPL-MCNC: 7.4 G/DL (ref 6–8.4)
RBC # BLD AUTO: 4.16 M/UL (ref 4–5.4)
SODIUM SERPL-SCNC: 135 MMOL/L (ref 136–145)
WBC # BLD AUTO: 10.95 K/UL (ref 3.9–12.7)

## 2020-01-06 PROCEDURE — 99999 PR PBB SHADOW E&M-EST. PATIENT-LVL IV: CPT | Mod: PBBFAC,,, | Performed by: PHYSICIAN ASSISTANT

## 2020-01-06 PROCEDURE — 85025 COMPLETE CBC W/AUTO DIFF WBC: CPT

## 2020-01-06 PROCEDURE — 99214 OFFICE O/P EST MOD 30 MIN: CPT | Mod: S$GLB,,, | Performed by: PHYSICIAN ASSISTANT

## 2020-01-06 PROCEDURE — 99999 PR PBB SHADOW E&M-EST. PATIENT-LVL IV: ICD-10-PCS | Mod: PBBFAC,,, | Performed by: PHYSICIAN ASSISTANT

## 2020-01-06 PROCEDURE — 36415 COLL VENOUS BLD VENIPUNCTURE: CPT | Mod: PO

## 2020-01-06 PROCEDURE — 99214 PR OFFICE/OUTPT VISIT, EST, LEVL IV, 30-39 MIN: ICD-10-PCS | Mod: S$GLB,,, | Performed by: PHYSICIAN ASSISTANT

## 2020-01-06 PROCEDURE — 80053 COMPREHEN METABOLIC PANEL: CPT

## 2020-01-06 NOTE — PROGRESS NOTES
Subjective:       Patient ID: Fabiana Morel is a 64 y.o. female.    Chief Complaint: Hospital Follow Up    Transitional Care Note  Admit date: 12/15/2019  Discharge date: 12/21/2019  Date of interactive contact (2 business days post D/C): 12/24/2019(includes weekend)   Hospitalized at: [facility]   Discharge diagnoses: hip fracture    Family and/or Caretaker present at visit?  Yes.  Diagnostic tests reviewed/disposition: I have reviewed all completed as well as pending diagnostic tests at the time of discharge.  Disease/illness education: reviewed   Medication changes: reconciled   Home health/community services discussion/referrals: Patient has home health established at [facility].   Establishment or re-establishment of referral orders for community resources: No other necessary community resources.   Discussion with other health care providers: No discussion with other health care providers necessary  Patient is new to me.  She presents for hospital follow-up.  She was hospitalized after sustaining a right hip fracture from mechanical fall.  Patient was hypoxemic on day 2.  Pulmonology was consulted she was found to be at baseline for her pulmonary comorbidities.  She underwent right total hip arthroplasty.  She tolerated the surgery well and had an uneventful postoperative course.  Since hospital discharge she has been home with home health nursing and therapies.  She Is progressing as expected..  Her pulmonary symptoms have been at baseline.  Her blood pressures have been running lower than expected.  She has had further weight loss since her hip fracture.  She does complain of very poor appetite.  Patient has tried Remeron and Periactin in the past.  These medications did not help to stimulate her appetite.  Patient endorses drinking 2-3 beers daily.  She has quit smoking.  Since hospitalization she has had pressure ulcers on the buttocks.  Her  has been tending to the ulcers.  He is using  Reid Butt paste and gauze dressing.  He feels that the wound on the right buttock has healed but the wound on the left buttock is not healing.  Patients patient medical/surgical, social and family histories have been reviewed           Review of Systems   Constitutional: Positive for appetite change and unexpected weight change.   Respiratory: Positive for cough and shortness of breath. Negative for chest tightness and wheezing.    Cardiovascular: Negative for chest pain, palpitations and leg swelling.   Musculoskeletal: Positive for arthralgias and gait problem.   Skin: Positive for wound.       Objective:      Physical Exam   Constitutional: She appears cachectic. She is cooperative. No distress.   HENT:   Head: Normocephalic and atraumatic.   Cardiovascular: Normal rate, regular rhythm and normal heart sounds.   Pulmonary/Chest: Effort normal and breath sounds normal.   Musculoskeletal:        Right lower leg: She exhibits no edema.        Left lower leg: She exhibits no edema.   Neurological: She is alert.   Skin: Skin is warm and dry.            Assessment:       1. Hospital discharge follow-up    2. Status post total replacement of hip, unspecified laterality    3. Essential hypertension    4. COPD, severe    5. Chronic respiratory failure with hypoxia    6. Immune deficiency disorder    7. Bronchiectasis without complication    8. Calcification of aorta    9. Moderate malnutrition    10. Osteopenia, unspecified location    11. Pressure injury of left buttock, stage 2        Plan:       Fabiana was seen today for hospital follow up.    Diagnoses and all orders for this visit:    Hospital discharge follow-up    Status post total replacement of hip, unspecified laterality  Continue per ortho   Essential hypertension  BP running low since hospitalization  Hold metoprolol for now   Monitor BP   Check CBC.  Needs nutrition consult, Vs. Start appetite stimulant (already had remeron and periactin which did not  help)   COPD, severe  Chronic respiratory failure with hypoxia  Immune deficiency disorder  Bronchiectasis without complication  Continue per pulmonary   Calcification of aorta  Risk factor modification with statin, BP control, smoking cessation   Moderate malnutrition   orders to consult nutrition       Osteopenia      with recent hip fracture   Follow up with PCP to discuss options   Pressure injury of left buttock, stage 2  -     SUBSEQUENT HOME HEALTH ORDERS  Wound cleaned and mepilix dressing applied  applied         Follow up for follow up as scheduled .

## 2020-01-07 DIAGNOSIS — D75.839 THROMBOCYTOSIS: Primary | ICD-10-CM

## 2020-01-09 ENCOUNTER — EXTERNAL HOME HEALTH (OUTPATIENT)
Dept: HOME HEALTH SERVICES | Facility: HOSPITAL | Age: 65
End: 2020-01-09
Payer: COMMERCIAL

## 2020-01-15 ENCOUNTER — TELEPHONE (OUTPATIENT)
Dept: FAMILY MEDICINE | Facility: CLINIC | Age: 65
End: 2020-01-15

## 2020-01-15 ENCOUNTER — TELEPHONE (OUTPATIENT)
Dept: HEMATOLOGY/ONCOLOGY | Facility: CLINIC | Age: 65
End: 2020-01-15

## 2020-01-15 NOTE — TELEPHONE ENCOUNTER
Patient called to schedule referral from Dr Espinosa and patient stated that she would like to wait to make appointment until after she has another lab test. Dr Espinosa office notified.

## 2020-01-15 NOTE — TELEPHONE ENCOUNTER
----- Message from Sheryl Hackett RN sent at 1/15/2020  8:19 AM CST -----  Patient declined to schedule with Hematology until she has another blood test. Referral will be removed from work que. Thanks for the consult and let us know if it is needed again.

## 2020-01-17 ENCOUNTER — OFFICE VISIT (OUTPATIENT)
Dept: FAMILY MEDICINE | Facility: CLINIC | Age: 65
End: 2020-01-17
Payer: COMMERCIAL

## 2020-01-17 ENCOUNTER — LAB VISIT (OUTPATIENT)
Dept: LAB | Facility: HOSPITAL | Age: 65
End: 2020-01-17
Attending: PHYSICIAN ASSISTANT
Payer: COMMERCIAL

## 2020-01-17 VITALS
WEIGHT: 102.94 LBS | OXYGEN SATURATION: 97 % | TEMPERATURE: 98 F | SYSTOLIC BLOOD PRESSURE: 117 MMHG | HEART RATE: 90 BPM | BODY MASS INDEX: 17.57 KG/M2 | DIASTOLIC BLOOD PRESSURE: 60 MMHG | HEIGHT: 64 IN | RESPIRATION RATE: 12 BRPM

## 2020-01-17 DIAGNOSIS — D75.839 THROMBOCYTOSIS: ICD-10-CM

## 2020-01-17 DIAGNOSIS — Z23 NEED FOR PNEUMOCOCCAL VACCINATION: ICD-10-CM

## 2020-01-17 DIAGNOSIS — J96.11 CHRONIC RESPIRATORY FAILURE WITH HYPOXIA: ICD-10-CM

## 2020-01-17 DIAGNOSIS — E44.0 MODERATE MALNUTRITION: ICD-10-CM

## 2020-01-17 DIAGNOSIS — Z23 FLU VACCINE NEED: ICD-10-CM

## 2020-01-17 DIAGNOSIS — E78.5 HYPERLIPIDEMIA, UNSPECIFIED HYPERLIPIDEMIA TYPE: ICD-10-CM

## 2020-01-17 DIAGNOSIS — M80.80XA LOCALIZED OSTEOPOROSIS WITH CURRENT PATHOLOGICAL FRACTURE, INITIAL ENCOUNTER: ICD-10-CM

## 2020-01-17 DIAGNOSIS — I10 ESSENTIAL HYPERTENSION: Primary | ICD-10-CM

## 2020-01-17 DIAGNOSIS — L89.322 PRESSURE INJURY OF LEFT BUTTOCK, STAGE 2: ICD-10-CM

## 2020-01-17 DIAGNOSIS — R73.9 HYPERGLYCEMIA: ICD-10-CM

## 2020-01-17 DIAGNOSIS — D84.9 IMMUNE DEFICIENCY DISORDER: ICD-10-CM

## 2020-01-17 DIAGNOSIS — J44.9 COPD, SEVERE: ICD-10-CM

## 2020-01-17 DIAGNOSIS — Z96.649 STATUS POST TOTAL REPLACEMENT OF HIP, UNSPECIFIED LATERALITY: ICD-10-CM

## 2020-01-17 LAB
BASOPHILS # BLD AUTO: 0.03 K/UL (ref 0–0.2)
BASOPHILS NFR BLD: 0.4 % (ref 0–1.9)
DIFFERENTIAL METHOD: ABNORMAL
EOSINOPHIL # BLD AUTO: 0.2 K/UL (ref 0–0.5)
EOSINOPHIL NFR BLD: 2.5 % (ref 0–8)
ERYTHROCYTE [DISTWIDTH] IN BLOOD BY AUTOMATED COUNT: 15.9 % (ref 11.5–14.5)
HCT VFR BLD AUTO: 40.8 % (ref 37–48.5)
HGB BLD-MCNC: 12.3 G/DL (ref 12–16)
LYMPHOCYTES # BLD AUTO: 2 K/UL (ref 1–4.8)
LYMPHOCYTES NFR BLD: 28.3 % (ref 18–48)
MCH RBC QN AUTO: 26.4 PG (ref 27–31)
MCHC RBC AUTO-ENTMCNC: 30.1 G/DL (ref 32–36)
MCV RBC AUTO: 88 FL (ref 82–98)
MONOCYTES # BLD AUTO: 0.7 K/UL (ref 0.3–1)
MONOCYTES NFR BLD: 9.2 % (ref 4–15)
NEUTROPHILS # BLD AUTO: 4.2 K/UL (ref 1.8–7.7)
NEUTROPHILS NFR BLD: 59.6 % (ref 38–73)
PATH REV BLD -IMP: NORMAL
PATH REV BLD -IMP: NORMAL
PLATELET # BLD AUTO: 486 K/UL (ref 150–350)
PMV BLD AUTO: 8.5 FL (ref 9.2–12.9)
RBC # BLD AUTO: 4.66 M/UL (ref 4–5.4)
WBC # BLD AUTO: 7.09 K/UL (ref 3.9–12.7)

## 2020-01-17 PROCEDURE — 99999 PR PBB SHADOW E&M-EST. PATIENT-LVL III: ICD-10-PCS | Mod: PBBFAC,,, | Performed by: FAMILY MEDICINE

## 2020-01-17 PROCEDURE — 3074F SYST BP LT 130 MM HG: CPT | Mod: CPTII,S$GLB,, | Performed by: FAMILY MEDICINE

## 2020-01-17 PROCEDURE — 99214 PR OFFICE/OUTPT VISIT, EST, LEVL IV, 30-39 MIN: ICD-10-PCS | Mod: S$GLB,,, | Performed by: FAMILY MEDICINE

## 2020-01-17 PROCEDURE — 85060 BLOOD SMEAR INTERPRETATION: CPT | Mod: ,,, | Performed by: PATHOLOGY

## 2020-01-17 PROCEDURE — 83540 ASSAY OF IRON: CPT

## 2020-01-17 PROCEDURE — 82728 ASSAY OF FERRITIN: CPT

## 2020-01-17 PROCEDURE — 36415 COLL VENOUS BLD VENIPUNCTURE: CPT | Mod: PO

## 2020-01-17 PROCEDURE — 85060 PATHOLOGIST REVIEW: ICD-10-PCS | Mod: ,,, | Performed by: PATHOLOGY

## 2020-01-17 PROCEDURE — 3074F PR MOST RECENT SYSTOLIC BLOOD PRESSURE < 130 MM HG: ICD-10-PCS | Mod: CPTII,S$GLB,, | Performed by: FAMILY MEDICINE

## 2020-01-17 PROCEDURE — 3078F PR MOST RECENT DIASTOLIC BLOOD PRESSURE < 80 MM HG: ICD-10-PCS | Mod: CPTII,S$GLB,, | Performed by: FAMILY MEDICINE

## 2020-01-17 PROCEDURE — 3008F BODY MASS INDEX DOCD: CPT | Mod: CPTII,S$GLB,, | Performed by: FAMILY MEDICINE

## 2020-01-17 PROCEDURE — 80053 COMPREHEN METABOLIC PANEL: CPT

## 2020-01-17 PROCEDURE — 99214 OFFICE O/P EST MOD 30 MIN: CPT | Mod: S$GLB,,, | Performed by: FAMILY MEDICINE

## 2020-01-17 PROCEDURE — 85025 COMPLETE CBC W/AUTO DIFF WBC: CPT | Mod: PO

## 2020-01-17 PROCEDURE — 3078F DIAST BP <80 MM HG: CPT | Mod: CPTII,S$GLB,, | Performed by: FAMILY MEDICINE

## 2020-01-17 PROCEDURE — 99999 PR PBB SHADOW E&M-EST. PATIENT-LVL III: CPT | Mod: PBBFAC,,, | Performed by: FAMILY MEDICINE

## 2020-01-17 PROCEDURE — 3008F PR BODY MASS INDEX (BMI) DOCUMENTED: ICD-10-PCS | Mod: CPTII,S$GLB,, | Performed by: FAMILY MEDICINE

## 2020-01-17 RX ORDER — ALENDRONATE SODIUM 70 MG/1
70 TABLET ORAL
Qty: 12 TABLET | Refills: 3 | Status: SHIPPED | OUTPATIENT
Start: 2020-01-17 | End: 2020-08-28

## 2020-01-17 NOTE — PROGRESS NOTES
Subjective:       Patient ID: Fabiana Morel is a 64 y.o. female.    Chief Complaint: Follow-up (6mth f/u hypertension)    HPI  Review of Systems   Constitutional: Negative for fatigue and unexpected weight change.   Respiratory: Negative for chest tightness and shortness of breath.    Cardiovascular: Negative for chest pain, palpitations and leg swelling.   Gastrointestinal: Negative for abdominal pain.   Musculoskeletal: Negative for arthralgias.   Neurological: Negative for dizziness, syncope, light-headedness and headaches.       Patient Active Problem List   Diagnosis    Tobacco abuse    Hypertension    Hyperlipidemia    Lung nodule    COPD, severe    Anxiety    Hyperglycemia    History of colon polyps    Calcification of aorta    Moderate malnutrition    Bronchiectasis    Pneumatocele of lung    Chronic respiratory failure with hypoxia    Immune deficiency disorder    S/P total hip arthroplasty     Patient is here for a chronic conditions follow up.    Right femur fracture s/p arthroplasty 12/19 . Is healing slowly. Did not meet criteria for osteoporosis with dexa but does not due to fracture. Has had decubitus ulcer left buttock  and  nurse treating with boudreauxs butt paste and bandage.  Using cane for support    Pulm Dr. Mackey treating COPD with chronic hypoxia. CAP on augmentin for 2 months under care of Dr. Mackey. Quit smoking 2 months ago.  Oxygen at home as needed-using it less and less      Has had hyst DUB - no h/o abnl paps or cancer .       + history colon polyps.  Last colonoscopy GI Dr. Chun       Objective:      Physical Exam   Constitutional: She is oriented to person, place, and time. She appears well-developed and well-nourished.   Cardiovascular: Normal rate, regular rhythm and normal heart sounds.   Pulmonary/Chest: Effort normal and breath sounds normal.   Musculoskeletal: She exhibits no edema.   Neurological: She is alert and oriented to person, place, and  time.   Skin: Skin is warm and dry. Lesion noted.        Psychiatric: She has a normal mood and affect.   Nursing note and vitals reviewed.      Assessment:       1. Essential hypertension    2. Need for pneumococcal vaccination    3. Flu vaccine need    4. Chronic respiratory failure with hypoxia    5. COPD, severe    6. Hyperlipidemia, unspecified hyperlipidemia type    7. Immune deficiency disorder    8. Hyperglycemia    9. Moderate malnutrition    10. Status post total replacement of hip, unspecified laterality    11. Localized osteoporosis with current pathological fracture, initial encounter    12. Thrombocytosis    13. Pressure injury of left buttock, stage 2        Plan:         1. Essential hypertension  Controlled on current medications.  Continue current medications.      2. Need for pneumococcal vaccination  UTD    3. Flu vaccine need  declined    4. Chronic respiratory failure with hypoxia  Cont pulm consult and current care    5. COPD, severe  Cont current mgmt    6. Hyperlipidemia, unspecified hyperlipidemia type  Controlled on current medications.  Continue current medications.      7. Immune deficiency disorder  Cont monitoring and support    8. Hyperglycemia  Controlled.  Your blood sugar is borderline high.  This means you are at risk for developing type 2 diabetes mellitus.  To lessen your risk you should exercise regularly, avoid excess carbohydrates and work toward a body mass index of less than 25.        9. Moderate malnutrition  Counseled patient on increasing high protein high calorie shakes with each meal.      10. Status post total replacement of hip, unspecified laterality  Cont home exercises and Hh care    11. Localized osteoporosis with current pathological fracture, initial encounter  I counseled the patient on risks versus benefits of bisphosphonate therapy including the risk of erosive esophagitis, and jaw osteonecrosis.  We discussed the proper method of taking the drug with a  glass of water weekly with instructions to sit or stand for a minimum of an hour after ingestion.  I discussed the length of therapy to be 5 years and then stopping due to increased risk of femoral microfractures if extended.  I also recommended informing his/her dentist of the addition of this medication and monitoring for symptoms of jaw pain while taking.  All questions were answered and patient elects to proceed with therapy.  Plan to repeat BMD 1 year.    - alendronate (FOSAMAX) 70 MG tablet; Take 1 tablet (70 mg total) by mouth every 7 days.  Dispense: 12 tablet; Refill: 3    12. Thrombocytosis  Repeat labs and work up. Refer to heme if abnl persists  - CBC auto differential; Future  - Comprehensive metabolic panel; Future  - Iron and TIBC; Future  - Ferritin; Future  - Pathologist Interpretation Differential; Future    13. Pressure injury of left buttock, stage 2  Cleanse wound twice daily.  Apply antibiotic ointment and sterile bandage twice daily and as needed.  Monitor for increased pain, pus, redness and swelling.  If occurs then return to clinic or go to the emergency room if clinic is closed for a recheck.    Use zinc oxide 40% to affected area. Avoid pressure        Time spent with patient: 20 minutes    Patient with be reevaluated in 3 and 6 months or sooner prn    Greater than 50% of this visit was spent counseling as described in above documentation:Yes

## 2020-01-18 LAB
ALBUMIN SERPL BCP-MCNC: 3.1 G/DL (ref 3.5–5.2)
ALP SERPL-CCNC: 128 U/L (ref 55–135)
ALT SERPL W/O P-5'-P-CCNC: 10 U/L (ref 10–44)
ANION GAP SERPL CALC-SCNC: 16 MMOL/L (ref 8–16)
AST SERPL-CCNC: 17 U/L (ref 10–40)
BILIRUB SERPL-MCNC: 0.3 MG/DL (ref 0.1–1)
BUN SERPL-MCNC: 9 MG/DL (ref 8–23)
CALCIUM SERPL-MCNC: 10.4 MG/DL (ref 8.7–10.5)
CHLORIDE SERPL-SCNC: 103 MMOL/L (ref 95–110)
CO2 SERPL-SCNC: 20 MMOL/L (ref 23–29)
CREAT SERPL-MCNC: 0.6 MG/DL (ref 0.5–1.4)
EST. GFR  (AFRICAN AMERICAN): >60 ML/MIN/1.73 M^2
EST. GFR  (NON AFRICAN AMERICAN): >60 ML/MIN/1.73 M^2
FERRITIN SERPL-MCNC: 343 NG/ML (ref 20–300)
GLUCOSE SERPL-MCNC: 83 MG/DL (ref 70–110)
IRON SERPL-MCNC: 40 UG/DL (ref 30–160)
POTASSIUM SERPL-SCNC: 4.3 MMOL/L (ref 3.5–5.1)
PROT SERPL-MCNC: 7.9 G/DL (ref 6–8.4)
SATURATED IRON: 12 % (ref 20–50)
SODIUM SERPL-SCNC: 139 MMOL/L (ref 136–145)
TOTAL IRON BINDING CAPACITY: 327 UG/DL (ref 250–450)
TRANSFERRIN SERPL-MCNC: 221 MG/DL (ref 200–375)

## 2020-01-20 ENCOUNTER — TELEPHONE (OUTPATIENT)
Dept: HOME HEALTH SERVICES | Facility: HOSPITAL | Age: 65
End: 2020-01-20

## 2020-01-21 LAB — PATH REV BLD -IMP: NORMAL

## 2020-01-21 NOTE — TELEPHONE ENCOUNTER
----- Message from Lucille Bermudez sent at 1/21/2020 10:21 AM CST -----  Contact: patient  Type: Needs Medical Advice    Who Called:  patient  Best Call Back Number:   Additional Information: Per patient has another appointment at 1:45p and would like to schedule this appointment earlier-please advise-thank you

## 2020-01-21 NOTE — TELEPHONE ENCOUNTER
Returned patients call and rescheduled her appointment on 1/27/2020 for 11:15. Patient verbalized that day/time would work.

## 2020-01-23 DIAGNOSIS — S72.001D CLOSED FRACTURE OF NECK OF RIGHT FEMUR WITH ROUTINE HEALING, SUBSEQUENT ENCOUNTER: Primary | ICD-10-CM

## 2020-01-24 ENCOUNTER — TELEPHONE (OUTPATIENT)
Dept: HEMATOLOGY/ONCOLOGY | Facility: CLINIC | Age: 65
End: 2020-01-24

## 2020-01-24 ENCOUNTER — PATIENT OUTREACH (OUTPATIENT)
Dept: ADMINISTRATIVE | Facility: OTHER | Age: 65
End: 2020-01-24

## 2020-01-26 DIAGNOSIS — D75.839 THROMBOCYTOSIS: Primary | ICD-10-CM

## 2020-01-27 ENCOUNTER — OFFICE VISIT (OUTPATIENT)
Dept: PULMONOLOGY | Facility: CLINIC | Age: 65
End: 2020-01-27
Payer: COMMERCIAL

## 2020-01-27 ENCOUNTER — OFFICE VISIT (OUTPATIENT)
Dept: ORTHOPEDICS | Facility: CLINIC | Age: 65
End: 2020-01-27
Payer: COMMERCIAL

## 2020-01-27 ENCOUNTER — HOSPITAL ENCOUNTER (OUTPATIENT)
Dept: RADIOLOGY | Facility: HOSPITAL | Age: 65
Discharge: HOME | End: 2020-01-27
Attending: INTERNAL MEDICINE
Payer: COMMERCIAL

## 2020-01-27 ENCOUNTER — HOSPITAL ENCOUNTER (OUTPATIENT)
Dept: RADIOLOGY | Facility: HOSPITAL | Age: 65
Discharge: HOME | End: 2020-01-27
Attending: ORTHOPAEDIC SURGERY
Payer: COMMERCIAL

## 2020-01-27 VITALS
SYSTOLIC BLOOD PRESSURE: 113 MMHG | HEART RATE: 118 BPM | DIASTOLIC BLOOD PRESSURE: 73 MMHG | WEIGHT: 103.19 LBS | BODY MASS INDEX: 17.62 KG/M2 | HEIGHT: 64 IN | OXYGEN SATURATION: 98 %

## 2020-01-27 VITALS
HEIGHT: 64 IN | BODY MASS INDEX: 17.42 KG/M2 | DIASTOLIC BLOOD PRESSURE: 68 MMHG | SYSTOLIC BLOOD PRESSURE: 100 MMHG | WEIGHT: 102 LBS

## 2020-01-27 DIAGNOSIS — J98.4 PNEUMATOCELE OF LUNG: ICD-10-CM

## 2020-01-27 DIAGNOSIS — R91.1 LUNG NODULE: ICD-10-CM

## 2020-01-27 DIAGNOSIS — J47.9 BRONCHIECTASIS WITHOUT COMPLICATION: ICD-10-CM

## 2020-01-27 DIAGNOSIS — R93.89 ABNORMAL CXR: ICD-10-CM

## 2020-01-27 DIAGNOSIS — S72.001D CLOSED FRACTURE OF NECK OF RIGHT FEMUR WITH ROUTINE HEALING, SUBSEQUENT ENCOUNTER: ICD-10-CM

## 2020-01-27 DIAGNOSIS — J44.9 COPD, SEVERE: ICD-10-CM

## 2020-01-27 DIAGNOSIS — E61.1 IRON DEFICIENCY: ICD-10-CM

## 2020-01-27 DIAGNOSIS — Z96.641 HISTORY OF RIGHT HIP REPLACEMENT: Primary | ICD-10-CM

## 2020-01-27 DIAGNOSIS — D84.9 IMMUNE DEFICIENCY DISORDER: ICD-10-CM

## 2020-01-27 DIAGNOSIS — R93.89 ABNORMAL CXR: Primary | ICD-10-CM

## 2020-01-27 PROCEDURE — 99214 OFFICE O/P EST MOD 30 MIN: CPT | Mod: S$GLB,,, | Performed by: INTERNAL MEDICINE

## 2020-01-27 PROCEDURE — 99999 PR PBB SHADOW E&M-EST. PATIENT-LVL III: CPT | Mod: PBBFAC,,, | Performed by: INTERNAL MEDICINE

## 2020-01-27 PROCEDURE — 99999 PR PBB SHADOW E&M-EST. PATIENT-LVL III: ICD-10-PCS | Mod: PBBFAC,,, | Performed by: INTERNAL MEDICINE

## 2020-01-27 PROCEDURE — 99214 PR OFFICE/OUTPT VISIT, EST, LEVL IV, 30-39 MIN: ICD-10-PCS | Mod: S$GLB,,, | Performed by: INTERNAL MEDICINE

## 2020-01-27 PROCEDURE — 73502 XR HIP 2 VIEW RIGHT: ICD-10-PCS | Mod: 26,RT,, | Performed by: RADIOLOGY

## 2020-01-27 PROCEDURE — 3074F SYST BP LT 130 MM HG: CPT | Mod: CPTII,S$GLB,, | Performed by: INTERNAL MEDICINE

## 2020-01-27 PROCEDURE — 71046 X-RAY EXAM CHEST 2 VIEWS: CPT | Mod: 26,,, | Performed by: RADIOLOGY

## 2020-01-27 PROCEDURE — 3074F PR MOST RECENT SYSTOLIC BLOOD PRESSURE < 130 MM HG: ICD-10-PCS | Mod: CPTII,S$GLB,, | Performed by: INTERNAL MEDICINE

## 2020-01-27 PROCEDURE — 99024 PR POST-OP FOLLOW-UP VISIT: ICD-10-PCS | Mod: S$GLB,,, | Performed by: ORTHOPAEDIC SURGERY

## 2020-01-27 PROCEDURE — 99024 POSTOP FOLLOW-UP VISIT: CPT | Mod: S$GLB,,, | Performed by: ORTHOPAEDIC SURGERY

## 2020-01-27 PROCEDURE — 73502 X-RAY EXAM HIP UNI 2-3 VIEWS: CPT | Mod: 26,RT,, | Performed by: RADIOLOGY

## 2020-01-27 PROCEDURE — 3008F BODY MASS INDEX DOCD: CPT | Mod: CPTII,S$GLB,, | Performed by: INTERNAL MEDICINE

## 2020-01-27 PROCEDURE — 71046 XR CHEST PA AND LATERAL: ICD-10-PCS | Mod: 26,,, | Performed by: RADIOLOGY

## 2020-01-27 PROCEDURE — 73502 X-RAY EXAM HIP UNI 2-3 VIEWS: CPT | Mod: TC,PN,RT

## 2020-01-27 PROCEDURE — 99999 PR PBB SHADOW E&M-EST. PATIENT-LVL III: ICD-10-PCS | Mod: PBBFAC,,, | Performed by: ORTHOPAEDIC SURGERY

## 2020-01-27 PROCEDURE — 71046 X-RAY EXAM CHEST 2 VIEWS: CPT | Mod: TC,FY

## 2020-01-27 PROCEDURE — 3078F PR MOST RECENT DIASTOLIC BLOOD PRESSURE < 80 MM HG: ICD-10-PCS | Mod: CPTII,S$GLB,, | Performed by: INTERNAL MEDICINE

## 2020-01-27 PROCEDURE — 3078F DIAST BP <80 MM HG: CPT | Mod: CPTII,S$GLB,, | Performed by: INTERNAL MEDICINE

## 2020-01-27 PROCEDURE — 3008F PR BODY MASS INDEX (BMI) DOCUMENTED: ICD-10-PCS | Mod: CPTII,S$GLB,, | Performed by: INTERNAL MEDICINE

## 2020-01-27 PROCEDURE — 99999 PR PBB SHADOW E&M-EST. PATIENT-LVL III: CPT | Mod: PBBFAC,,, | Performed by: ORTHOPAEDIC SURGERY

## 2020-01-27 RX ORDER — ALBUTEROL SULFATE 90 UG/1
2 AEROSOL, METERED RESPIRATORY (INHALATION) EVERY 4 HOURS PRN
Qty: 18 G | Refills: 11 | Status: SHIPPED | OUTPATIENT
Start: 2020-01-27 | End: 2021-03-18 | Stop reason: SDUPTHER

## 2020-01-27 NOTE — PATIENT INSTRUCTIONS
trelegy has 3 24/7 breathing medications- daily good    Use prednisone if cough or wheezes or more short breath.    Use augmentin if cough, short breath.    Chest xray now and repeat in 3-4 months - sooner if any problems.    Could check immune in 3 months, you had vaccine in October 2019    Ferrous sulfate/gluconate once daily may be reasonable - iv iron infusion?

## 2020-01-27 NOTE — PROGRESS NOTES
CC:  64-year-old female follows up status post right total hip arthroplasty.  Date of surgery was 12/18/2019.  Overall she is doing well.  She reports no pain    Examination of the Right Lower Extremity    Skin is intact throughout  Motor in intact EHL,FHL,TA,jacinto  +2 DP/PT  Sensation LT intact D/P/1st    Examination of the Right Hip    C-Sign negative  Logroll negative  Stenchfield negative    Pain with ROM negative    ROM:    Flexion   120  Extension   10  Abduction   50  Adduction   10  External Rotation 50  Internal Rotation 10    Flexion contracture negative    FADIR negative  FADER negative    X-rays were examined and personally reviewed by me.  There is a right total hip arthroplasty that is well fixed and in good alignment    Dx:  Right total hip arthroplasty, stable    Plan:  We reviewed posterior hip precautions.  Activity as tolerated.  Follow-up in 6 weeks with an x-ray.

## 2020-01-27 NOTE — PROGRESS NOTES
"1/27/2020    Fabiana Morel  Office Note    Chief Complaint   Patient presents with    Hospital Follow Up       Jan 27, 2020- had hip fx in dec, had repair hip with good recovery.  Still on abx, had prevnar on 10/15/19-  Off smokes with better oxygen, doing well.  occ hip pain. No prednisone,     Oct 3, 2019 lost 5lbs since aug, cxr with apparent bullae left lung posterior lung. No mucous production.  Still on abx - augmentin.  Has had damp night sweats for years with no change.  Patient Instructions   Pneumonia germ was 8 of 14 protected.      Get vaccine next wk    Stay on augmentin til lung is cleared - optimal.    Do crp, cbc, cxr next week- would follow what is abnormal.  If ongoing improvement found- follow til baseline reasonable - ct and scope test if not clearing or worsens.    Try mirtazapine 7.5mg bedtime, go to 15 mg bedtime if not too sleepy. Will make sleep and should stimulate appetite.    Periactin- 4 mg - try 2nd if mirtazapine not effective, not both, start one bedtime, going to twice daily (could break 1/2), antihisatmine.    Try megace next time?  aug  27, developed left chest pain and short of breath,   occ yellow mucous,  Still smokes. noox testing.  Uses trelegy, no falls.     Patient Instructions   Likely has had left upper lung pnumonia last 2 months.  Bronch in 2017 did not show much.     Chest xray recheck in a month- do monthly til this pneumonia cleared-you have a standing order for cxr  Monthly - get routinely every 3 months once this pneumonia cleared.       Check blood work weekly for "pneumonia" up to next month to assure normalizes.     Sputum culture- resistant  Germ possible.     Check immune response to vaccine.       Do prednisone 20mgdaily for  3 days monthly.     Use augmentin if pain,weakness, short breath, any concern regarding pneumonia.       Use trelegy.       Feb 26, 2019- took abx with  recent developing resp problems. Still smokes.  resp same.   Has old " compression fx seen on  Lateral cxr.  Discussed with patient above for education the following:    Could do f/u ct chest - but chest xray looks stable.     Lung capacity was only 27% in 2014- hard to see lungs going much more into future.  Chronic lung disease usually causes chronic debility/weakness.  Acute illnesses will be hard to recover strength.      Prompt antibiotic and prednisone may be wise.    Chest xray for illness may be wise.     Use xanax as needed for anxiety.    Stop smokes please.    aug 28, 2018- still smokes, some celestin, uses trelegy. cxr stable.  Follow-up in about 6 months (around 2/28/2019).  Discussed with patient above for education the following:     Use prednisone or antibiotic If bronchitis  Check chest xray if ill  Chest xray 6 months.  Likely do ct in a yr?  Stop smokes.  Feb 28, 2018 breathing better, still cough but slight.  Nearly off smokes.  Had exacerbation needing steriods.  Lung transplant discussed if pt stops smokes but pt declines.  nov 16, 2017 - off smokes transiently - on chantix with no problems.  No severe stress.    oct 17, 2017- no fever or night sweats or cough. No new c/o - still smokes but trying chantrix.  sept 20, still smokes same, feels sl better than 6  Months ago, mucous clear, night sweats still with no improvement with abx.    AUg 15, having right shoulder pain last 3 weeks at 10/10 intially  And subsequent 3-4/10,  No fever but night sweats chr, no n/v/d or headache.  No tb exposure.   Had tb eval past negative.  Still smokes.  Nerves ok.  Breathing seems  Better.  Feb 13, 2017, hpi doing rehab, no action plan, still smoking.  Using all meds, nerves better with xanax and toprol.  Aug 12, 2016HPI:initial encounter in [facility], copd and bronchiectasis and lung nodule.  No prednisone use recent.  No recent azithromycin.  breo and incruse regular use, [id number], not using nebulizer.  Last ct 12/2015.  fev1 27% in 2014 with dramatic bd response.  Working as filing  "and typing.  Feels resp comfortable.  Has had lung dz age 56.    Scant mucous with no color chr, no acapella.    The chief compliant  problem is stable  PFSH:  Past Medical History:   Diagnosis Date    Abnormal CT scan 2015    Asthma     Colitis     COPD (chronic obstructive pulmonary disease)     Diverticulitis of sigmoid colon 2014    Erosive gastritis 2013    Fracture of left clavicle     H pylori ulcer 2013    Hypertension     Peptic ulcer disease 2013    Pneumonia of right upper lobe due to infectious organism 8/15/2017    Rectal bleed 11/3/2014    Scoliosis     SOB (shortness of breath)     Yeast infection 2017         Past Surgical History:   Procedure Laterality Date    APPENDECTOMY      BREAST BIOPSY Right     cyst    COLONOSCOPY N/A 7/15/2019    Procedure: COLONOSCOPY;  Surgeon: Sharif Chun MD;  Location: [facility] ENDO;  Service: Endoscopy;  Laterality: N/A;    HIP ARTHROPLASTY Right 2019    Procedure: ARTHROPLASTY, HIP;  Surgeon: Bernardino Wilson II, MD;  Location: [facility] OR;  Service: Orthopedics;  Laterality: Right;  Jose - Morel and Nephew    HYSTERECTOMY      HASMUKH/BSO, DUB    OOPHORECTOMY      TUBAL LIGATION       Social History     Tobacco Use    Smoking status: Former Smoker     Packs/day: 0.50     Years: 30.00     Pack years: 15.00     Types: Cigarettes     Last attempt to quit: 2019     Years since quittin.2    Smokeless tobacco: Never Used    Tobacco comment: quit 1 month ago.   Substance Use Topics    Alcohol use: Yes     Alcohol/week: 12.0 standard drinks     Types: 12 Cans of beer per week     Comment: 2-3 beers daily    Drug use: No     Family History   Problem Relation Age of Onset    Cancer Mother 49        "lymph nodes"    Cancer Sister         lung?     Review of patient's allergies indicates:   Allergen Reactions    Erythromycin Nausea Only       Performance Status:The patient's activity level is functions out of " "house.      Review of Systems:  a review of eleven systems covering constitutional, Eye, HEENT, Psych, Respiratory, Cardiac, GI, , Musculoskeletal, Endocrine, Dermatologic was negative except for pertinent findings as listed ABOVE and below: night sweats chr due to menopause,      Exam:Comprehensive exam done.   /73 (BP Location: Left arm, Patient Position: Sitting)   Pulse (!) 118   Ht 5' 4" (1.626 m)   Wt 46.8 kg (103 lb 2.8 oz)   SpO2 98% Comment: on room air  BMI 17.71 kg/m²   Exam included Vitals as listed, and patient's appearance and affect and alertness and mood, oral exam for yeast and hygiene and pharynx lesions and Mallapatti (M) score, neck with inspection for jvd and masses and thyroid abnormalities and lymph nodes (supraclavicular and infraclavicular nodes and axillary also examined and noted if abn), chest exam included symmetry and effort and fremitus and percussion and auscultation, cardiac exam included rhythm and gallops and murmur and rubs and jvd and edema, abdominal exam for mass and hepatosplenomegaly and tenderness and hernias and bowel sounds, Musculoskeletal exam with muscle tone and posture and mobility/gait and  strength, and skin for rashes and cyanosis and pallor and turgor, extremity for clubbing.  Findings were normal except for pertinent findings listed below:  Mild kyphosis scoliosis, M2, no wheezes no edema, no clubbing    Radiographs reviewed:     Ct chest 10/2019- infected emphysema left upper suggested - right lung just severe copd    cxr 2/25/2019 same as aug 2018.  Scars both upper lungs.  ct 2016  With left lung abn better now, right lung was clear and now with infiltrate with vol loss of emphysema??  cxr may have smaller cavity rul cavity than ct c/w aug ct with sept cxr.    cxr 10/16 same as sept 2017,  cxr  Feb 26, 2018 improved rul density with smaller hyper inflated lungs.      Labs reviewed  No pos cultures  PFT results [id number]  fev 27%    MI " BRONCHOSCOPY,TRANSBRONCH BIOPSY [82380 (CPT®)]   BRONCHOSCOPY - BRONCHOALVEOLAR LAVAGE [PFT43 (Custom)]           []Hide copied text    []Anup for details  10/20/2017     After review of ct, informed consent, updated history and physical, time out, and anesthesia sedation- pt underwent left nares bronchoscopy (right nares couldn't be cannulated).     Thick secretions encountered and washed free.  Airways were wnl.  Lavage from rul 90 cc in and 20 cc out.  Transbronchial bx x 2 for cultures done (afb, fungal, routine).  Transbronchial bx x 6 for path.  Lavage and wash pooled and submitted for culture, cytology, afb, and fungal evals..     ebl 15 cc post transbronchial bx. Airway suctioned til bleed ceased.  Post floro with no pneumo/complication and post cxr pending.  No complications.      Electronically signed by Bhupendra Mackey MD at 10/20/2017  8:10 AM         Plan:  Clinical impression is apparently straight forward and impression with management as below.    Fabiana was seen today for hospital follow up.    Diagnoses and all orders for this visit:    Abnormal CXR  -     X-Ray Chest PA And Lateral; Future  -     X-Ray Chest PA And Lateral; Future    COPD, severe  -     fluticasone-umeclidin-vilanter (TRELEGY ELLIPTA) 100-62.5-25 mcg DsDv; Inhale 1 puff into the lungs once daily.  -     VENTOLIN HFA 90 mcg/actuation inhaler; Inhale 2 puffs into the lungs every 4 (four) hours as needed for Wheezing.    Bronchiectasis without complication  -     X-Ray Chest PA And Lateral; Future  -     X-Ray Chest PA And Lateral; Future    Immune deficiency disorder  -     Humoral Immune Eval (Pneumo Serotypes) With H. Flu; Future    Pneumatocele of lung    Lung nodule    Iron deficiency        Follow up in about 4 months (around 5/27/2020).    Discussed with patient above for education the following:      Discussed with patient above for education the following:      Patient Instructions   jeremy has 3 24/7 breathing  medications- daily good    Use prednisone if cough or wheezes or more short breath.    Use augmentin if cough, short breath.    Chest xray now and repeat in 3-4 months - sooner if any problems.    Could check immune in 3 months, you had vaccine in October 2019    Ferrous sulfate/gluconate once daily may be reasonable - iv iron infusion?

## 2020-01-28 ENCOUNTER — TELEPHONE (OUTPATIENT)
Dept: PULMONOLOGY | Facility: CLINIC | Age: 65
End: 2020-01-28

## 2020-01-28 NOTE — TELEPHONE ENCOUNTER
Spoke with pt concerning labs. Resident aware of results from [facility]. No questions noted from pt.    ----- Message from Bhupendra Mackey MD sent at 1/27/2020  3:59 PM CST -----  Both mid lung regions look a little bit worse, CT scan at follow-up in May would be reasonable.  Will order CT scan, may follow-up

## 2020-01-28 NOTE — TELEPHONE ENCOUNTER
Spoke with pt concerning test results. She was not sure why we called to talk about them when she saw the results on [facility]    ----- Message from Yanet Whitley sent at 1/28/2020  9:31 AM CST -----  Type:  Patient Returning Call    Who Called:  Patient   Who Left Message for Patient:  Sugar Ngo  Does the patient know what this is regarding?:  ??  Best Call Back Number:  [phone]  Additional Information:

## 2020-01-29 NOTE — PROGRESS NOTES
Spoke with patient regarding her results and Dr. Bartlett suggestion, patient said she has been through this before and she thinks the abnormalities is coming from a surgery she had (didn't state what SX), plus Dr. Mackey looked at her results labs and she said he told her levels were stable. Patient said she was going to  hold out on seeing Hematology because she has so many doctors, she don't want to add anymore right now.

## 2020-02-10 ENCOUNTER — TELEPHONE (OUTPATIENT)
Dept: FAMILY MEDICINE | Facility: CLINIC | Age: 65
End: 2020-02-10

## 2020-02-10 NOTE — TELEPHONE ENCOUNTER
----- Message from Rene Morel sent at 2/10/2020  3:13 PM CST -----  Contact: pt   Type: Needs Medical Advice    Who Called:  Pt   Symptoms (please be specific):    How long has patient had these symptoms:    Pharmacy name and phone #:    [id number] - [place] - [address]  [address] [place] [address]  Phone: [phone] Fax: [phone]      Best Call Back Number: [phone]   Additional Information: pt called saying that the pharmacy cannot give pt refills, and they said they tried to get in touch but no answer

## 2020-02-10 NOTE — TELEPHONE ENCOUNTER
----- Message from Luigi Ponce sent at 2/10/2020  3:51 PM CST -----  Contact: pt  Type:  RX Refill Request    Who Called:  pt  Refill or New Rx:  refill  RX Name and Strength:  atorvastatin (LIPITOR) 40 MG tablet  How is the patient currently taking it? (ex. 1XDay):  Sig: TAKE ONE TABLET BY MOUTH ONCE DAILY   Is this a 30 day or 90 day RX:  90  Preferred Pharmacy with phone number:    [id number] - [place] - [address]  [address] [place] [address]  Phone: [phone] Fax: [phone]    Local or Mail Order:  local  Ordering Provider:  fahad Vieira Call Back Number:  [phone]  Additional Information:  Pt is out of meds at this time.

## 2020-02-12 RX ORDER — ATORVASTATIN CALCIUM 40 MG/1
40 TABLET, FILM COATED ORAL DAILY
Qty: 90 TABLET | Refills: 3 | Status: SHIPPED | OUTPATIENT
Start: 2020-02-12 | End: 2020-02-14

## 2020-02-14 RX ORDER — ATORVASTATIN CALCIUM 40 MG/1
40 TABLET, FILM COATED ORAL DAILY
Qty: 90 TABLET | Refills: 3 | Status: SHIPPED | OUTPATIENT
Start: 2020-02-14 | End: 2021-08-03

## 2020-02-28 ENCOUNTER — PATIENT MESSAGE (OUTPATIENT)
Dept: FAMILY MEDICINE | Facility: CLINIC | Age: 65
End: 2020-02-28

## 2020-02-28 ENCOUNTER — TELEPHONE (OUTPATIENT)
Dept: FAMILY MEDICINE | Facility: CLINIC | Age: 65
End: 2020-02-28

## 2020-02-28 NOTE — TELEPHONE ENCOUNTER
Try calling patient back regarding appointment on today 2/28/2020 with Dr. Bartlett, no answer left detail voice message for patient to call back to rescheduled appointment. Message also sent to patient portal.

## 2020-03-08 NOTE — PROGRESS NOTES
CC:  64-year-old female follows up status post right total hip arthroplasty.  Date of surgery was 12/18/2019.  Overall she is doing very well.  She only occasionally has a pain in the right hip and when she does have pain she rates as a 1/10.    Examination of the Right Lower Extremity    Skin is intact throughout  Motor in intact EHL,FHL,TA,jacinto  +2 DP/PT  Sensation LT intact D/P/1st    Examination of the Right Hip    C-Sign negative  Logroll negative  Stenchfield negative    Pain with ROM negative    ROM:    Flexion   130  Extension   10  Abduction   50  Adduction   10  External Rotation 60  Internal Rotation 10    Flexion contracture negative    FADIR negative  FADER negative    Tenderness to palpation over lateral and posterolateral greater tochanter negative    X-ray images were examined and personally interpreted by me.  Three views the right hip dated 03/10/2020 are available for interpretation.  There is a right total hip arthroplasty is well fixed and in good alignment with no evidence of loosening.    Dx:  Status post right total hip arthroplasty, stable    Plan:  Reviewed posterior hip precautions.  The patient verbalized understanding.  Activity as tolerated.  Follow-up in 3 months.

## 2020-03-09 DIAGNOSIS — Z96.641 HISTORY OF RIGHT HIP REPLACEMENT: Primary | ICD-10-CM

## 2020-03-10 ENCOUNTER — HOSPITAL ENCOUNTER (OUTPATIENT)
Dept: RADIOLOGY | Facility: HOSPITAL | Age: 65
Discharge: HOME | End: 2020-03-10
Attending: ORTHOPAEDIC SURGERY
Payer: COMMERCIAL

## 2020-03-10 ENCOUNTER — OFFICE VISIT (OUTPATIENT)
Dept: ORTHOPEDICS | Facility: CLINIC | Age: 65
End: 2020-03-10
Payer: COMMERCIAL

## 2020-03-10 VITALS
HEART RATE: 95 BPM | WEIGHT: 103.19 LBS | HEIGHT: 64 IN | DIASTOLIC BLOOD PRESSURE: 71 MMHG | BODY MASS INDEX: 17.62 KG/M2 | RESPIRATION RATE: 13 BRPM | SYSTOLIC BLOOD PRESSURE: 125 MMHG

## 2020-03-10 DIAGNOSIS — Z96.641 HISTORY OF RIGHT HIP REPLACEMENT: ICD-10-CM

## 2020-03-10 DIAGNOSIS — Z96.641 HISTORY OF RIGHT HIP REPLACEMENT: Primary | ICD-10-CM

## 2020-03-10 PROCEDURE — 99024 PR POST-OP FOLLOW-UP VISIT: ICD-10-PCS | Mod: S$GLB,,, | Performed by: ORTHOPAEDIC SURGERY

## 2020-03-10 PROCEDURE — 73502 X-RAY EXAM HIP UNI 2-3 VIEWS: CPT | Mod: 26,RT,, | Performed by: RADIOLOGY

## 2020-03-10 PROCEDURE — 99999 PR PBB SHADOW E&M-EST. PATIENT-LVL III: CPT | Mod: PBBFAC,,, | Performed by: ORTHOPAEDIC SURGERY

## 2020-03-10 PROCEDURE — 99024 POSTOP FOLLOW-UP VISIT: CPT | Mod: S$GLB,,, | Performed by: ORTHOPAEDIC SURGERY

## 2020-03-10 PROCEDURE — 99999 PR PBB SHADOW E&M-EST. PATIENT-LVL III: ICD-10-PCS | Mod: PBBFAC,,, | Performed by: ORTHOPAEDIC SURGERY

## 2020-03-10 PROCEDURE — 73502 XR HIP 2 VIEW RIGHT: ICD-10-PCS | Mod: 26,RT,, | Performed by: RADIOLOGY

## 2020-03-10 PROCEDURE — 73502 X-RAY EXAM HIP UNI 2-3 VIEWS: CPT | Mod: TC,PN,RT

## 2020-03-28 DIAGNOSIS — D75.839 THROMBOCYTOSIS: ICD-10-CM

## 2020-03-28 DIAGNOSIS — E78.5 HYPERLIPIDEMIA, UNSPECIFIED HYPERLIPIDEMIA TYPE: ICD-10-CM

## 2020-03-28 DIAGNOSIS — R73.9 HYPERGLYCEMIA: Primary | ICD-10-CM

## 2020-04-02 ENCOUNTER — TELEPHONE (OUTPATIENT)
Dept: HEMATOLOGY/ONCOLOGY | Facility: CLINIC | Age: 65
End: 2020-04-02

## 2020-04-02 NOTE — TELEPHONE ENCOUNTER
"Spoke with patient regarding setting up 3rd consult ordered by Dr. Bartlett to evaluate her thrombycytosis.  She reports "charlie had several office visits now and my numbers have come down.  I do not want to schedule at this time."  Instructed patient to call our office should she change her mind.  Patient verbalized understanding.  No further questions at this time.    "

## 2020-05-05 ENCOUNTER — PATIENT MESSAGE (OUTPATIENT)
Dept: ADMINISTRATIVE | Facility: HOSPITAL | Age: 65
End: 2020-05-05

## 2020-05-22 ENCOUNTER — HOSPITAL ENCOUNTER (OUTPATIENT)
Dept: RADIOLOGY | Facility: CLINIC | Age: 65
Discharge: HOME | End: 2020-05-22
Attending: INTERNAL MEDICINE
Payer: COMMERCIAL

## 2020-05-22 ENCOUNTER — LAB VISIT (OUTPATIENT)
Dept: LAB | Facility: HOSPITAL | Age: 65
End: 2020-05-22
Attending: FAMILY MEDICINE
Payer: COMMERCIAL

## 2020-05-22 ENCOUNTER — TELEPHONE (OUTPATIENT)
Dept: PULMONOLOGY | Facility: CLINIC | Age: 65
End: 2020-05-22

## 2020-05-22 DIAGNOSIS — J47.9 BRONCHIECTASIS WITHOUT COMPLICATION: ICD-10-CM

## 2020-05-22 DIAGNOSIS — R93.89 ABNORMAL CXR: ICD-10-CM

## 2020-05-22 DIAGNOSIS — D75.839 THROMBOCYTOSIS: ICD-10-CM

## 2020-05-22 DIAGNOSIS — R73.9 HYPERGLYCEMIA: ICD-10-CM

## 2020-05-22 DIAGNOSIS — E78.5 HYPERLIPIDEMIA, UNSPECIFIED HYPERLIPIDEMIA TYPE: ICD-10-CM

## 2020-05-22 LAB
BASOPHILS # BLD AUTO: 0.03 K/UL (ref 0–0.2)
BASOPHILS NFR BLD: 0.3 % (ref 0–1.9)
DIFFERENTIAL METHOD: ABNORMAL
EOSINOPHIL # BLD AUTO: 0.1 K/UL (ref 0–0.5)
EOSINOPHIL NFR BLD: 0.7 % (ref 0–8)
ERYTHROCYTE [DISTWIDTH] IN BLOOD BY AUTOMATED COUNT: 18.1 % (ref 11.5–14.5)
ESTIMATED AVG GLUCOSE: 117 MG/DL (ref 68–131)
HBA1C MFR BLD HPLC: 5.7 % (ref 4–5.6)
HCT VFR BLD AUTO: 46 % (ref 37–48.5)
HGB BLD-MCNC: 13.7 G/DL (ref 12–16)
IMM GRANULOCYTES # BLD AUTO: 0.04 K/UL (ref 0–0.04)
IMM GRANULOCYTES NFR BLD AUTO: 0.4 % (ref 0–0.5)
LYMPHOCYTES # BLD AUTO: 2.4 K/UL (ref 1–4.8)
LYMPHOCYTES NFR BLD: 23.6 % (ref 18–48)
MCH RBC QN AUTO: 27.6 PG (ref 27–31)
MCHC RBC AUTO-ENTMCNC: 29.8 G/DL (ref 32–36)
MCV RBC AUTO: 93 FL (ref 82–98)
MONOCYTES # BLD AUTO: 0.5 K/UL (ref 0.3–1)
MONOCYTES NFR BLD: 4.7 % (ref 4–15)
NEUTROPHILS # BLD AUTO: 7.2 K/UL (ref 1.8–7.7)
NEUTROPHILS NFR BLD: 70.3 % (ref 38–73)
NRBC BLD-RTO: 0 /100 WBC
PATH REV BLD -IMP: NORMAL
PLATELET # BLD AUTO: 452 K/UL (ref 150–350)
PMV BLD AUTO: 8.8 FL (ref 9.2–12.9)
RBC # BLD AUTO: 4.96 M/UL (ref 4–5.4)
WBC # BLD AUTO: 10.19 K/UL (ref 3.9–12.7)

## 2020-05-22 PROCEDURE — 85060 BLOOD SMEAR INTERPRETATION: CPT | Mod: ,,, | Performed by: PATHOLOGY

## 2020-05-22 PROCEDURE — 83036 HEMOGLOBIN GLYCOSYLATED A1C: CPT

## 2020-05-22 PROCEDURE — 71046 X-RAY EXAM CHEST 2 VIEWS: CPT | Mod: TC,FY,PO

## 2020-05-22 PROCEDURE — 80053 COMPREHEN METABOLIC PANEL: CPT

## 2020-05-22 PROCEDURE — 80061 LIPID PANEL: CPT

## 2020-05-22 PROCEDURE — 71046 XR CHEST PA AND LATERAL: ICD-10-PCS | Mod: 26,,, | Performed by: RADIOLOGY

## 2020-05-22 PROCEDURE — 85060 PATHOLOGIST REVIEW: ICD-10-PCS | Mod: ,,, | Performed by: PATHOLOGY

## 2020-05-22 PROCEDURE — 85025 COMPLETE CBC W/AUTO DIFF WBC: CPT

## 2020-05-22 PROCEDURE — 71046 X-RAY EXAM CHEST 2 VIEWS: CPT | Mod: 26,,, | Performed by: RADIOLOGY

## 2020-05-22 NOTE — TELEPHONE ENCOUNTER
Spoke to pt, pt verbalized understanding of results. No further action is needed  ----- Message from Bhupendra Mackey MD sent at 5/22/2020  3:28 PM CDT -----  Notify cxr was good. No change in plans

## 2020-05-23 LAB
ALBUMIN SERPL BCP-MCNC: 3.5 G/DL (ref 3.5–5.2)
ALP SERPL-CCNC: 106 U/L (ref 55–135)
ALT SERPL W/O P-5'-P-CCNC: 11 U/L (ref 10–44)
ANION GAP SERPL CALC-SCNC: 10 MMOL/L (ref 8–16)
AST SERPL-CCNC: 15 U/L (ref 10–40)
BILIRUB SERPL-MCNC: 0.2 MG/DL (ref 0.1–1)
BUN SERPL-MCNC: 17 MG/DL (ref 8–23)
CALCIUM SERPL-MCNC: 10.2 MG/DL (ref 8.7–10.5)
CHLORIDE SERPL-SCNC: 102 MMOL/L (ref 95–110)
CHOLEST SERPL-MCNC: 182 MG/DL (ref 120–199)
CHOLEST/HDLC SERPL: 3.2 {RATIO} (ref 2–5)
CO2 SERPL-SCNC: 25 MMOL/L (ref 23–29)
CREAT SERPL-MCNC: 0.8 MG/DL (ref 0.5–1.4)
EST. GFR  (AFRICAN AMERICAN): >60 ML/MIN/1.73 M^2
EST. GFR  (NON AFRICAN AMERICAN): >60 ML/MIN/1.73 M^2
GLUCOSE SERPL-MCNC: 115 MG/DL (ref 70–110)
HDLC SERPL-MCNC: 57 MG/DL (ref 40–75)
HDLC SERPL: 31.3 % (ref 20–50)
LDLC SERPL CALC-MCNC: 102.2 MG/DL (ref 63–159)
NONHDLC SERPL-MCNC: 125 MG/DL
POTASSIUM SERPL-SCNC: 4.2 MMOL/L (ref 3.5–5.1)
PROT SERPL-MCNC: 8.6 G/DL (ref 6–8.4)
SODIUM SERPL-SCNC: 137 MMOL/L (ref 136–145)
TRIGL SERPL-MCNC: 114 MG/DL (ref 30–150)

## 2020-05-25 LAB — PATH REV BLD -IMP: NORMAL

## 2020-05-26 ENCOUNTER — PATIENT OUTREACH (OUTPATIENT)
Dept: ADMINISTRATIVE | Facility: OTHER | Age: 65
End: 2020-05-26

## 2020-05-27 ENCOUNTER — OFFICE VISIT (OUTPATIENT)
Dept: PULMONOLOGY | Facility: CLINIC | Age: 65
End: 2020-05-27
Payer: COMMERCIAL

## 2020-05-27 VITALS
SYSTOLIC BLOOD PRESSURE: 130 MMHG | BODY MASS INDEX: 17.54 KG/M2 | HEART RATE: 90 BPM | HEIGHT: 64 IN | WEIGHT: 102.75 LBS | OXYGEN SATURATION: 99 % | DIASTOLIC BLOOD PRESSURE: 81 MMHG

## 2020-05-27 DIAGNOSIS — R93.89 ABNORMAL CXR: ICD-10-CM

## 2020-05-27 DIAGNOSIS — E61.1 IRON DEFICIENCY: ICD-10-CM

## 2020-05-27 DIAGNOSIS — D84.9 IMMUNE DEFICIENCY DISORDER: Primary | ICD-10-CM

## 2020-05-27 DIAGNOSIS — R91.1 LUNG NODULE: ICD-10-CM

## 2020-05-27 DIAGNOSIS — J44.9 COPD, SEVERE: ICD-10-CM

## 2020-05-27 DIAGNOSIS — J47.9 BRONCHIECTASIS WITHOUT COMPLICATION: ICD-10-CM

## 2020-05-27 PROCEDURE — 3079F PR MOST RECENT DIASTOLIC BLOOD PRESSURE 80-89 MM HG: ICD-10-PCS | Mod: CPTII,S$GLB,, | Performed by: INTERNAL MEDICINE

## 2020-05-27 PROCEDURE — 3075F SYST BP GE 130 - 139MM HG: CPT | Mod: CPTII,S$GLB,, | Performed by: INTERNAL MEDICINE

## 2020-05-27 PROCEDURE — 99213 PR OFFICE/OUTPT VISIT, EST, LEVL III, 20-29 MIN: ICD-10-PCS | Mod: S$GLB,,, | Performed by: INTERNAL MEDICINE

## 2020-05-27 PROCEDURE — 3008F BODY MASS INDEX DOCD: CPT | Mod: CPTII,S$GLB,, | Performed by: INTERNAL MEDICINE

## 2020-05-27 PROCEDURE — 3008F PR BODY MASS INDEX (BMI) DOCUMENTED: ICD-10-PCS | Mod: CPTII,S$GLB,, | Performed by: INTERNAL MEDICINE

## 2020-05-27 PROCEDURE — 3079F DIAST BP 80-89 MM HG: CPT | Mod: CPTII,S$GLB,, | Performed by: INTERNAL MEDICINE

## 2020-05-27 PROCEDURE — 99999 PR PBB SHADOW E&M-EST. PATIENT-LVL III: CPT | Mod: PBBFAC,,, | Performed by: INTERNAL MEDICINE

## 2020-05-27 PROCEDURE — 99999 PR PBB SHADOW E&M-EST. PATIENT-LVL III: ICD-10-PCS | Mod: PBBFAC,,, | Performed by: INTERNAL MEDICINE

## 2020-05-27 PROCEDURE — 99213 OFFICE O/P EST LOW 20 MIN: CPT | Mod: S$GLB,,, | Performed by: INTERNAL MEDICINE

## 2020-05-27 PROCEDURE — 3075F PR MOST RECENT SYSTOLIC BLOOD PRESS GE 130-139MM HG: ICD-10-PCS | Mod: CPTII,S$GLB,, | Performed by: INTERNAL MEDICINE

## 2020-05-27 RX ORDER — FERROUS SULFATE 325(65) MG
325 TABLET ORAL 2 TIMES DAILY
COMMUNITY

## 2020-05-27 NOTE — PATIENT INSTRUCTIONS
I added iron level to blood test for future- not urgent.  Would stay on iron til later in year.    Do chest xray if any problems.

## 2020-05-27 NOTE — PROGRESS NOTES
5/27/2020    Fabiana Morel  Office Note    Chief Complaint   Patient presents with    Follow-up     4 month    Results     cxr and labs   5/27/2020- 1/2ppd, no problem,  Hip recovered.  No falls/cane.  No abx nor prednisone  \    Jan 27, 2020- had hip fx in dec, had repair hip with good recovery.  Still on abx, had prevnar on 10/15/19-  Off smokes with better oxygen, doing well.  occ hip pain. No prednisone,   Patient Instructions   trelegy has 3 24/7 breathing medications- daily good    Use prednisone if cough or wheezes or more short breath.    Use augmentin if cough, short breath.    Chest xray now and repeat in 3-4 months - sooner if any problems.    Could check immune in 3 months, you had vaccine in October 2019    Ferrous sulfate/gluconate once daily may be reasonable - iv iron infusion?    Oct 3, 2019 lost 5lbs since aug, cxr with apparent bullae left lung posterior lung. No mucous production.  Still on abx - augmentin.  Has had damp night sweats for years with no change.  Patient Instructions   Pneumonia germ was 8 of 14 protected.      Get vaccine next wk    Stay on augmentin til lung is cleared - optimal.    Do crp, cbc, cxr next week- would follow what is abnormal.  If ongoing improvement found- follow til baseline reasonable - ct and scope test if not clearing or worsens.    Try mirtazapine 7.5mg bedtime, go to 15 mg bedtime if not too sleepy. Will make sleep and should stimulate appetite.    Periactin- 4 mg - try 2nd if mirtazapine not effective, not both, start one bedtime, going to twice daily (could break 1/2), antihisatmine.    Try megace next time?  aug  27, developed left chest pain and short of breath,   occ yellow mucous,  Still smokes. noox testing.  Uses trelegy, no falls.     Patient Instructions   Likely has had left upper lung pnumonia last 2 months.  Bronch in 2017 did not show much.     Chest xray recheck in a month- do monthly til this pneumonia cleared-you have a standing order  "for cxr  Monthly - get routinely every 3 months once this pneumonia cleared.       Check blood work weekly for "pneumonia" up to next month to assure normalizes.     Sputum culture- resistant  Germ possible.     Check immune response to vaccine.       Do prednisone 20mgdaily for  3 days monthly.     Use augmentin if pain,weakness, short breath, any concern regarding pneumonia.       Use trelegy.       Feb 26, 2019- took abx with  recent developing resp problems. Still smokes.  resp same.   Has old compression fx seen on  Lateral cxr.  Discussed with patient above for education the following:    Could do f/u ct chest - but chest xray looks stable.     Lung capacity was only 27% in 2014- hard to see lungs going much more into future.  Chronic lung disease usually causes chronic debility/weakness.  Acute illnesses will be hard to recover strength.      Prompt antibiotic and prednisone may be wise.    Chest xray for illness may be wise.     Use xanax as needed for anxiety.    Stop smokes please.    aug 28, 2018- still smokes, some celestin, uses trelegy. cxr stable.  Follow-up in about 6 months (around 2/28/2019).  Discussed with patient above for education the following:     Use prednisone or antibiotic If bronchitis  Check chest xray if ill  Chest xray 6 months.  Likely do ct in a yr?  Stop smokes.  Feb 28, 2018 breathing better, still cough but slight.  Nearly off smokes.  Had exacerbation needing steriods.  Lung transplant discussed if pt stops smokes but pt declines.  nov 16, 2017 - off smokes transiently - on chantix with no problems.  No severe stress.    oct 17, 2017- no fever or night sweats or cough. No new c/o - still smokes but trying chantrix.  sept 20, still smokes same, feels sl better than 6  Months ago, mucous clear, night sweats still with no improvement with abx.    AUg 15, having right shoulder pain last 3 weeks at 10/10 intially  And subsequent 3-4/10,  No fever but night sweats chr, no n/v/d or " headache.  No tb exposure.   Had tb eval past negative.  Still smokes.  Nerves ok.  Breathing seems  Better.  Feb 13, 2017, hpi doing rehab, no action plan, still smoking.  Using all meds, nerves better with xanax and toprol.  Aug 12, 2016HPI:initial encounter in [facility], copd and bronchiectasis and lung nodule.  No prednisone use recent.  No recent azithromycin.  breo and incruse regular use, [id number], not using nebulizer.  Last ct 12/2015.  fev1 27% in 2014 with dramatic bd response.  Working as filing and typing.  Feels resp comfortable.  Has had lung dz age 56.    Scant mucous with no color chr, no acapella.    The chief compliant  problem is stable  PFSH:  Past Medical History:   Diagnosis Date    Abnormal CT scan 1/8/2015    Asthma     Colitis     COPD (chronic obstructive pulmonary disease)     Diverticulitis of sigmoid colon 11/4/2014    Erosive gastritis 7/17/2013    Fracture of left clavicle     H pylori ulcer 7/17/2013    Hypertension     Peptic ulcer disease 7/17/2013    Pneumonia of right upper lobe due to infectious organism 8/15/2017    Rectal bleed 11/3/2014    Scoliosis     SOB (shortness of breath)     Yeast infection 8/25/2017         Past Surgical History:   Procedure Laterality Date    APPENDECTOMY      BREAST BIOPSY Right     cyst    COLONOSCOPY N/A 7/15/2019    Procedure: COLONOSCOPY;  Surgeon: Sharif Chun MD;  Location: [facility] ENDO;  Service: Endoscopy;  Laterality: N/A;    HIP ARTHROPLASTY Right 12/18/2019    Procedure: ARTHROPLASTY, HIP;  Surgeon: Bernardino Wilson II, MD;  Location: [facility] OR;  Service: Orthopedics;  Laterality: Right;  Jose - [employer]l [employer]    HYSTERECTOMY      HASMUKH/BSO, DUB    OOPHORECTOMY      TUBAL LIGATION       Social History     Tobacco Use    Smoking status: Light Tobacco Smoker     Packs/day: 0.50     Years: 30.00     Pack years: 15.00     Types: Cigarettes    Smokeless tobacco: Never Used    Tobacco comment: started back 2/2020  "  Substance Use Topics    Alcohol use: Yes     Alcohol/week: 12.0 standard drinks     Types: 12 Cans of beer per week     Comment: 2-3 beers daily    Drug use: No     Family History   Problem Relation Age of Onset    Cancer Mother 49        "lymph nodes"    Cancer Sister         lung?     Review of patient's allergies indicates:   Allergen Reactions    Erythromycin Nausea Only       Performance Status:The patient's activity level is functions out of house.      Review of Systems:  a review of eleven systems covering constitutional, Eye, HEENT, Psych, Respiratory, Cardiac, GI, , Musculoskeletal, Endocrine, Dermatologic was negative except for pertinent findings as listed ABOVE and below: night sweats chr due to menopause,      Exam:Comprehensive exam done.   /81 (BP Location: Left arm, Patient Position: Sitting)   Pulse 90   Ht 5' 4" (1.626 m)   Wt 46.6 kg (102 lb 11.8 oz)   SpO2 99% Comment: on room air  BMI 17.63 kg/m²   Exam included Vitals as listed, and patient's appearance and affect and alertness and mood, oral exam for yeast and hygiene and pharynx lesions and Mallapatti (M) score, neck with inspection for jvd and masses and thyroid abnormalities and lymph nodes (supraclavicular and infraclavicular nodes and axillary also examined and noted if abn), chest exam included symmetry and effort and fremitus and percussion and auscultation, cardiac exam included rhythm and gallops and murmur and rubs and jvd and edema, abdominal exam for mass and hepatosplenomegaly and tenderness and hernias and bowel sounds, Musculoskeletal exam with muscle tone and posture and mobility/gait and  strength, and skin for rashes and cyanosis and pallor and turgor, extremity for clubbing.  Findings were normal except for pertinent findings listed below:  Mild kyphosis scoliosis, M2, no wheezes no edema, no clubbing    Radiographs reviewed:     Ct chest 10/2019- infected emphysema left upper suggested - right lung " just severe copd    cxr 2/25/2019 same as aug 2018.  Scars both upper lungs.  ct 2016  With left lung abn better now, right lung was clear and now with infiltrate with vol loss of emphysema??  cxr may have smaller cavity rul cavity than ct c/w aug ct with sept cxr.    cxr 10/16 same as sept 2017,  cxr  Feb 26, 2018 improved rul density with smaller hyper inflated lungs.      Labs reviewed  No pos cultures  PFT results [id number]  fev 27%    DC BRONCHOSCOPY,TRANSBRONCH BIOPSY [76052 (CPT®)]   BRONCHOSCOPY - BRONCHOALVEOLAR LAVAGE [PFT43 (Custom)]           []Hide copied text    []Hover for details  10/20/2017     After review of ct, informed consent, updated history and physical, time out, and anesthesia sedation- pt underwent left nares bronchoscopy (right nares couldn't be cannulated).     Thick secretions encountered and washed free.  Airways were wnl.  Lavage from rul 90 cc in and 20 cc out.  Transbronchial bx x 2 for cultures done (afb, fungal, routine).  Transbronchial bx x 6 for path.  Lavage and wash pooled and submitted for culture, cytology, afb, and fungal evals..     ebl 15 cc post transbronchial bx. Airway suctioned til bleed ceased.  Post floro with no pneumo/complication and post cxr pending.  No complications.      Electronically signed by Bhupendra Mackey MD at 10/20/2017  8:10 AM         Plan:  Clinical impression is apparently straight forward and impression with management as below.    Fabiana was seen today for follow-up and results.    Diagnoses and all orders for this visit:    Immune deficiency disorder    COPD, severe    Bronchiectasis without complication    Abnormal CXR    Iron deficiency  -     IRON AND TIBC; Future    Lung nodule        Follow up in about 6 months (around 11/27/2020), or if symptoms worsen or fail to improve.    Discussed with patient above for education the following:      Discussed with patient above for education the following:      Patient Instructions   I added iron  level to blood test for future- not urgent.  Would stay on iron til later in year.    Do chest xray if any problems.

## 2020-05-28 ENCOUNTER — OFFICE VISIT (OUTPATIENT)
Dept: FAMILY MEDICINE | Facility: CLINIC | Age: 65
End: 2020-05-28
Payer: COMMERCIAL

## 2020-05-28 VITALS
BODY MASS INDEX: 17.57 KG/M2 | OXYGEN SATURATION: 97 % | WEIGHT: 102.94 LBS | DIASTOLIC BLOOD PRESSURE: 68 MMHG | SYSTOLIC BLOOD PRESSURE: 118 MMHG | TEMPERATURE: 99 F | HEIGHT: 64 IN | HEART RATE: 102 BPM

## 2020-05-28 DIAGNOSIS — Z96.649 STATUS POST TOTAL REPLACEMENT OF HIP, UNSPECIFIED LATERALITY: ICD-10-CM

## 2020-05-28 DIAGNOSIS — E78.5 HYPERLIPIDEMIA, UNSPECIFIED HYPERLIPIDEMIA TYPE: ICD-10-CM

## 2020-05-28 DIAGNOSIS — I10 ESSENTIAL HYPERTENSION: ICD-10-CM

## 2020-05-28 DIAGNOSIS — R73.9 HYPERGLYCEMIA: Primary | ICD-10-CM

## 2020-05-28 DIAGNOSIS — J44.9 COPD, SEVERE: ICD-10-CM

## 2020-05-28 PROCEDURE — 3074F SYST BP LT 130 MM HG: CPT | Mod: CPTII,S$GLB,, | Performed by: NURSE PRACTITIONER

## 2020-05-28 PROCEDURE — 99999 PR PBB SHADOW E&M-EST. PATIENT-LVL III: ICD-10-PCS | Mod: PBBFAC,,, | Performed by: NURSE PRACTITIONER

## 2020-05-28 PROCEDURE — 3008F PR BODY MASS INDEX (BMI) DOCUMENTED: ICD-10-PCS | Mod: CPTII,S$GLB,, | Performed by: NURSE PRACTITIONER

## 2020-05-28 PROCEDURE — 3078F PR MOST RECENT DIASTOLIC BLOOD PRESSURE < 80 MM HG: ICD-10-PCS | Mod: CPTII,S$GLB,, | Performed by: NURSE PRACTITIONER

## 2020-05-28 PROCEDURE — 3074F PR MOST RECENT SYSTOLIC BLOOD PRESSURE < 130 MM HG: ICD-10-PCS | Mod: CPTII,S$GLB,, | Performed by: NURSE PRACTITIONER

## 2020-05-28 PROCEDURE — 99214 OFFICE O/P EST MOD 30 MIN: CPT | Mod: S$GLB,,, | Performed by: NURSE PRACTITIONER

## 2020-05-28 PROCEDURE — 99999 PR PBB SHADOW E&M-EST. PATIENT-LVL III: CPT | Mod: PBBFAC,,, | Performed by: NURSE PRACTITIONER

## 2020-05-28 PROCEDURE — 3078F DIAST BP <80 MM HG: CPT | Mod: CPTII,S$GLB,, | Performed by: NURSE PRACTITIONER

## 2020-05-28 PROCEDURE — 99214 PR OFFICE/OUTPT VISIT, EST, LEVL IV, 30-39 MIN: ICD-10-PCS | Mod: S$GLB,,, | Performed by: NURSE PRACTITIONER

## 2020-05-28 PROCEDURE — 3008F BODY MASS INDEX DOCD: CPT | Mod: CPTII,S$GLB,, | Performed by: NURSE PRACTITIONER

## 2020-05-29 NOTE — PROGRESS NOTES
Subjective:       Patient ID: Fabiana Morel is a 64 y.o. female.    Chief Complaint: Hypertension    HPI   Ms. Morel is a 65 yo female who presents today for routine follow up.  She reports that she is doing well overall. She is without acute complaint.  She states that she has completely discontinued use of xanax and is doing well without it.  She is regularly followed by Dr. Mackey for COPD- most recently seen 5/27/2020.  Right femur fracture s/p arthroplasty 12/19 - still has complaint of occasional soreness, and is ambulating with cane.  Previously had decubitus ulcer, patient reports that this has completely healed.    Vitals:    05/28/20 1603   BP: 118/68   Pulse: 102   Temp: 98.8 °F (37.1 °C)     Past Medical History:   Diagnosis Date    Abnormal CT scan 1/8/2015    Asthma     Colitis     COPD (chronic obstructive pulmonary disease)     Diverticulitis of sigmoid colon 11/4/2014    Erosive gastritis 7/17/2013    Fracture of left clavicle     H pylori ulcer 7/17/2013    Hypertension     Peptic ulcer disease 7/17/2013    Pneumonia of right upper lobe due to infectious organism 8/15/2017    Rectal bleed 11/3/2014    Scoliosis     SOB (shortness of breath)     Yeast infection 8/25/2017     Review of Systems   Constitutional: Negative for chills, fatigue, fever and unexpected weight change.   HENT: Negative for congestion, hearing loss, nosebleeds, postnasal drip, sinus pressure, sinus pain and sore throat.    Eyes: Negative for pain, discharge, redness and visual disturbance.   Respiratory: Negative for cough, chest tightness and shortness of breath.    Cardiovascular: Negative for chest pain and palpitations.   Gastrointestinal: Negative for abdominal pain, constipation, diarrhea, nausea and vomiting.   Endocrine: Negative for cold intolerance and heat intolerance.   Musculoskeletal: Positive for gait problem (assistance with cane). Negative for back pain.   Neurological: Negative for dizziness,  syncope, light-headedness and headaches.   Psychiatric/Behavioral: Negative for agitation and dysphoric mood. The patient is not nervous/anxious.        Objective:      Physical Exam   Constitutional: She is oriented to person, place, and time. She appears well-developed and well-nourished.   HENT:   Head: Normocephalic and atraumatic.   Eyes: Pupils are equal, round, and reactive to light. Conjunctivae are normal. Right eye exhibits no discharge. Left eye exhibits no discharge.   Neck: Normal range of motion. Neck supple. No thyromegaly present.   Cardiovascular: Normal rate, regular rhythm, normal heart sounds and intact distal pulses.   Pulmonary/Chest: Breath sounds normal. No respiratory distress. She has no wheezes.   Abdominal: Soft. Bowel sounds are normal. She exhibits no distension. There is no tenderness. There is no rebound.   Musculoskeletal: Normal range of motion. She exhibits no edema or deformity.   Lymphadenopathy:     She has no cervical adenopathy.   Neurological: She is alert and oriented to person, place, and time. No cranial nerve deficit or sensory deficit.   Skin: Skin is warm and dry. No rash noted.   Psychiatric: She has a normal mood and affect.       Assessment & Plan:       Hyperglycemia  -     Hemoglobin A1C; Future; Expected date: 05/28/2020        -     Screen and treat as indicated   Hyperlipidemia, unspecified hyperlipidemia type  -     Lipid Panel; Future; Expected date: 05/28/2020        -     Controlled, continue current medication regimen   Essential hypertension  -     CBC auto differential; Future; Expected date: 05/28/2020  -     Comprehensive metabolic panel; Future; Expected date: 05/28/2020        -     Controlled, continue current medication regimen   Status post total replacement of hip, unspecified laterality  -Continue use of cane  -Continue care and regular follow up with orthopedics   COPD, severe  -Stable, continue care and regular follow up with pulmonology          Follow up in about 3 months (around 8/28/2020) for with Dr. Bartlett, labs prior.

## 2020-06-08 DIAGNOSIS — Z96.641 HISTORY OF RIGHT HIP REPLACEMENT: Primary | ICD-10-CM

## 2020-06-10 ENCOUNTER — PATIENT OUTREACH (OUTPATIENT)
Dept: ADMINISTRATIVE | Facility: OTHER | Age: 65
End: 2020-06-10

## 2020-06-10 NOTE — PROGRESS NOTES
CC:  64-year-old female follows up status post right total hip arthroplasty.  Date of surgery was 12/18/2019.  This is her 6 month follow-up.  She reports that she is doing well and is not really having any pain in the right hip.    ROS:    Constitution: Denies chills, fever, and sweats.  HENT: Denies headaches or blurry vision.  Cardiovascular: Denies chest pain or irregular heart beat.  Respiratory: Denies cough or shortness of breath.  Gastrointestinal: Denies abdominal pain, nausea, or vomiting.  Genitourinary:  Denies urinary incontinence, bladder and kidney issues  Musculoskeletal:  Denies muscle cramps.  Neurological: Denies dizziness or focal weakness.  Psychiatric/Behavioral: Normal mental status.  Hematologic/Lymphatic: Denies bleeding problem or easy bruising/bleeding.  Skin: Denies rash or suspicious lesions.    Physical examination     Gen - No acute distress, well nourished, well groomed   Eyes - Extraoccular motions intact, pupils equally round and reactive to light and accommodation   ENT - normocephalic, atruamtic, oropharynx clear   Neck - Supple, no abnormal masses   Cardiovascular - regular rate and rhythm   Pulmonary - clear to auscultation bilaterally, no wheezes, ronchi, or rales   Abdomen - soft, non-tender, non-distended, positive bowel sounds   Psych - The patient is alert and oriented x3 with normal mood and affect    Examination of the Right Lower Extremity    Skin is intact throughout  Motor in intact EHL,FHL,TA,jacinto  +2 DP/PT  Sensation LT intact D/P/1st    Examination of the Right Hip    C-Sign negative  Logroll negative  Stenchfield negative    Pain with ROM negative    ROM:    Flexion   130  Extension   10  Abduction   50  Adduction   10  External Rotation 60  Internal Rotation 10    Flexion contracture negative    FADIR negative  FADER negative    Tenderness to palpation over lateral and posterolateral greater tochanter negative    X-ray images were examined and personally  interpreted by me.  Three views the right hip dated 06/11/2020 show a right total hip arthroplasty that is well fixed and in good alignment.    Dx:  Status post right total hip arthroplasty, stable    Plan:  We reviewed posterior hip precautions.  Follow-up in 6 months with an x-ray.

## 2020-06-11 ENCOUNTER — OFFICE VISIT (OUTPATIENT)
Dept: ORTHOPEDICS | Facility: CLINIC | Age: 65
End: 2020-06-11
Payer: COMMERCIAL

## 2020-06-11 ENCOUNTER — HOSPITAL ENCOUNTER (OUTPATIENT)
Dept: RADIOLOGY | Facility: HOSPITAL | Age: 65
Discharge: HOME | End: 2020-06-11
Attending: ORTHOPAEDIC SURGERY
Payer: COMMERCIAL

## 2020-06-11 VITALS — RESPIRATION RATE: 16 BRPM | TEMPERATURE: 99 F | WEIGHT: 102 LBS | BODY MASS INDEX: 17.42 KG/M2 | HEIGHT: 64 IN

## 2020-06-11 DIAGNOSIS — Z96.641 HISTORY OF RIGHT HIP REPLACEMENT: Primary | ICD-10-CM

## 2020-06-11 DIAGNOSIS — Z96.641 HISTORY OF RIGHT HIP REPLACEMENT: ICD-10-CM

## 2020-06-11 PROCEDURE — 73502 X-RAY EXAM HIP UNI 2-3 VIEWS: CPT | Mod: TC,PN,RT

## 2020-06-11 PROCEDURE — 73502 X-RAY EXAM HIP UNI 2-3 VIEWS: CPT | Mod: 26,RT,, | Performed by: RADIOLOGY

## 2020-06-11 PROCEDURE — 99212 PR OFFICE/OUTPT VISIT, EST, LEVL II, 10-19 MIN: ICD-10-PCS | Mod: S$GLB,,, | Performed by: ORTHOPAEDIC SURGERY

## 2020-06-11 PROCEDURE — 99212 OFFICE O/P EST SF 10 MIN: CPT | Mod: S$GLB,,, | Performed by: ORTHOPAEDIC SURGERY

## 2020-06-11 PROCEDURE — 99999 PR PBB SHADOW E&M-EST. PATIENT-LVL III: ICD-10-PCS | Mod: PBBFAC,,, | Performed by: ORTHOPAEDIC SURGERY

## 2020-06-11 PROCEDURE — 73502 XR HIP 2 VIEW RIGHT: ICD-10-PCS | Mod: 26,RT,, | Performed by: RADIOLOGY

## 2020-06-11 PROCEDURE — 3008F PR BODY MASS INDEX (BMI) DOCUMENTED: ICD-10-PCS | Mod: CPTII,S$GLB,, | Performed by: ORTHOPAEDIC SURGERY

## 2020-06-11 PROCEDURE — 3008F BODY MASS INDEX DOCD: CPT | Mod: CPTII,S$GLB,, | Performed by: ORTHOPAEDIC SURGERY

## 2020-06-11 PROCEDURE — 99999 PR PBB SHADOW E&M-EST. PATIENT-LVL III: CPT | Mod: PBBFAC,,, | Performed by: ORTHOPAEDIC SURGERY

## 2020-08-21 ENCOUNTER — LAB VISIT (OUTPATIENT)
Dept: LAB | Facility: HOSPITAL | Age: 65
End: 2020-08-21
Attending: INTERNAL MEDICINE
Payer: COMMERCIAL

## 2020-08-21 DIAGNOSIS — I10 ESSENTIAL HYPERTENSION: ICD-10-CM

## 2020-08-21 DIAGNOSIS — E78.5 HYPERLIPIDEMIA, UNSPECIFIED HYPERLIPIDEMIA TYPE: ICD-10-CM

## 2020-08-21 DIAGNOSIS — E61.1 IRON DEFICIENCY: ICD-10-CM

## 2020-08-21 DIAGNOSIS — R73.9 HYPERGLYCEMIA: ICD-10-CM

## 2020-08-21 LAB
ALBUMIN SERPL BCP-MCNC: 3.2 G/DL (ref 3.5–5.2)
ALP SERPL-CCNC: 103 U/L (ref 55–135)
ALT SERPL W/O P-5'-P-CCNC: 7 U/L (ref 10–44)
ANION GAP SERPL CALC-SCNC: 11 MMOL/L (ref 8–16)
AST SERPL-CCNC: 16 U/L (ref 10–40)
BASOPHILS # BLD AUTO: 0.05 K/UL (ref 0–0.2)
BASOPHILS NFR BLD: 0.5 % (ref 0–1.9)
BILIRUB SERPL-MCNC: 0.2 MG/DL (ref 0.1–1)
BUN SERPL-MCNC: 12 MG/DL (ref 8–23)
CALCIUM SERPL-MCNC: 10.3 MG/DL (ref 8.7–10.5)
CHLORIDE SERPL-SCNC: 101 MMOL/L (ref 95–110)
CHOLEST SERPL-MCNC: 187 MG/DL (ref 120–199)
CHOLEST/HDLC SERPL: 3.4 {RATIO} (ref 2–5)
CO2 SERPL-SCNC: 25 MMOL/L (ref 23–29)
CREAT SERPL-MCNC: 0.7 MG/DL (ref 0.5–1.4)
DIFFERENTIAL METHOD: ABNORMAL
EOSINOPHIL # BLD AUTO: 0.2 K/UL (ref 0–0.5)
EOSINOPHIL NFR BLD: 2.2 % (ref 0–8)
ERYTHROCYTE [DISTWIDTH] IN BLOOD BY AUTOMATED COUNT: 15.6 % (ref 11.5–14.5)
EST. GFR  (AFRICAN AMERICAN): >60 ML/MIN/1.73 M^2
EST. GFR  (NON AFRICAN AMERICAN): >60 ML/MIN/1.73 M^2
ESTIMATED AVG GLUCOSE: 103 MG/DL (ref 68–131)
GLUCOSE SERPL-MCNC: 120 MG/DL (ref 70–110)
HBA1C MFR BLD HPLC: 5.2 % (ref 4–5.6)
HCT VFR BLD AUTO: 47 % (ref 37–48.5)
HDLC SERPL-MCNC: 55 MG/DL (ref 40–75)
HDLC SERPL: 29.4 % (ref 20–50)
HGB BLD-MCNC: 14 G/DL (ref 12–16)
IMM GRANULOCYTES # BLD AUTO: 0.04 K/UL (ref 0–0.04)
IMM GRANULOCYTES NFR BLD AUTO: 0.4 % (ref 0–0.5)
IRON SERPL-MCNC: 29 UG/DL (ref 30–160)
LDLC SERPL CALC-MCNC: 105.4 MG/DL (ref 63–159)
LYMPHOCYTES # BLD AUTO: 2.9 K/UL (ref 1–4.8)
LYMPHOCYTES NFR BLD: 26.4 % (ref 18–48)
MCH RBC QN AUTO: 29.3 PG (ref 27–31)
MCHC RBC AUTO-ENTMCNC: 29.8 G/DL (ref 32–36)
MCV RBC AUTO: 98 FL (ref 82–98)
MONOCYTES # BLD AUTO: 0.7 K/UL (ref 0.3–1)
MONOCYTES NFR BLD: 6 % (ref 4–15)
NEUTROPHILS # BLD AUTO: 7.1 K/UL (ref 1.8–7.7)
NEUTROPHILS NFR BLD: 64.5 % (ref 38–73)
NONHDLC SERPL-MCNC: 132 MG/DL
NRBC BLD-RTO: 0 /100 WBC
PLATELET # BLD AUTO: 474 K/UL (ref 150–350)
PMV BLD AUTO: 8.9 FL (ref 9.2–12.9)
POTASSIUM SERPL-SCNC: 4.4 MMOL/L (ref 3.5–5.1)
PROT SERPL-MCNC: 8.3 G/DL (ref 6–8.4)
RBC # BLD AUTO: 4.78 M/UL (ref 4–5.4)
SATURATED IRON: 9 % (ref 20–50)
SODIUM SERPL-SCNC: 137 MMOL/L (ref 136–145)
TOTAL IRON BINDING CAPACITY: 337 UG/DL (ref 250–450)
TRANSFERRIN SERPL-MCNC: 228 MG/DL (ref 200–375)
TRIGL SERPL-MCNC: 133 MG/DL (ref 30–150)
WBC # BLD AUTO: 11 K/UL (ref 3.9–12.7)

## 2020-08-21 PROCEDURE — 36415 COLL VENOUS BLD VENIPUNCTURE: CPT | Mod: PO

## 2020-08-21 PROCEDURE — 80053 COMPREHEN METABOLIC PANEL: CPT

## 2020-08-21 PROCEDURE — 83036 HEMOGLOBIN GLYCOSYLATED A1C: CPT

## 2020-08-21 PROCEDURE — 80061 LIPID PANEL: CPT

## 2020-08-21 PROCEDURE — 83540 ASSAY OF IRON: CPT

## 2020-08-21 PROCEDURE — 85025 COMPLETE CBC W/AUTO DIFF WBC: CPT

## 2020-08-24 ENCOUNTER — TELEPHONE (OUTPATIENT)
Dept: PULMONOLOGY | Facility: CLINIC | Age: 65
End: 2020-08-24

## 2020-08-24 NOTE — TELEPHONE ENCOUNTER
LVM   ----- Message from Bhupendra Mackey MD sent at 8/24/2020 11:28 AM CDT -----  Iron not building up, may need infusion?  Defer to pcp/discuss at follow up if needed.

## 2020-08-28 ENCOUNTER — OFFICE VISIT (OUTPATIENT)
Dept: FAMILY MEDICINE | Facility: CLINIC | Age: 65
End: 2020-08-28
Payer: COMMERCIAL

## 2020-08-28 VITALS
RESPIRATION RATE: 12 BRPM | WEIGHT: 101.19 LBS | HEIGHT: 64 IN | TEMPERATURE: 98 F | BODY MASS INDEX: 17.28 KG/M2 | HEART RATE: 90 BPM | SYSTOLIC BLOOD PRESSURE: 110 MMHG | DIASTOLIC BLOOD PRESSURE: 78 MMHG | OXYGEN SATURATION: 97 %

## 2020-08-28 DIAGNOSIS — E61.1 IRON DEFICIENCY: ICD-10-CM

## 2020-08-28 DIAGNOSIS — J44.9 COPD, SEVERE: ICD-10-CM

## 2020-08-28 DIAGNOSIS — E78.5 HYPERLIPIDEMIA, UNSPECIFIED HYPERLIPIDEMIA TYPE: ICD-10-CM

## 2020-08-28 DIAGNOSIS — R73.9 HYPERGLYCEMIA: ICD-10-CM

## 2020-08-28 DIAGNOSIS — Z96.649 STATUS POST TOTAL REPLACEMENT OF HIP, UNSPECIFIED LATERALITY: ICD-10-CM

## 2020-08-28 DIAGNOSIS — I10 ESSENTIAL HYPERTENSION: Primary | ICD-10-CM

## 2020-08-28 DIAGNOSIS — J96.11 CHRONIC RESPIRATORY FAILURE WITH HYPOXIA: ICD-10-CM

## 2020-08-28 DIAGNOSIS — E44.0 MODERATE MALNUTRITION: ICD-10-CM

## 2020-08-28 PROCEDURE — 3008F BODY MASS INDEX DOCD: CPT | Mod: CPTII,S$GLB,, | Performed by: FAMILY MEDICINE

## 2020-08-28 PROCEDURE — 3074F PR MOST RECENT SYSTOLIC BLOOD PRESSURE < 130 MM HG: ICD-10-PCS | Mod: CPTII,S$GLB,, | Performed by: FAMILY MEDICINE

## 2020-08-28 PROCEDURE — 99214 PR OFFICE/OUTPT VISIT, EST, LEVL IV, 30-39 MIN: ICD-10-PCS | Mod: S$GLB,,, | Performed by: FAMILY MEDICINE

## 2020-08-28 PROCEDURE — 3074F SYST BP LT 130 MM HG: CPT | Mod: CPTII,S$GLB,, | Performed by: FAMILY MEDICINE

## 2020-08-28 PROCEDURE — 3078F PR MOST RECENT DIASTOLIC BLOOD PRESSURE < 80 MM HG: ICD-10-PCS | Mod: CPTII,S$GLB,, | Performed by: FAMILY MEDICINE

## 2020-08-28 PROCEDURE — 99999 PR PBB SHADOW E&M-EST. PATIENT-LVL III: ICD-10-PCS | Mod: PBBFAC,,, | Performed by: FAMILY MEDICINE

## 2020-08-28 PROCEDURE — 3008F PR BODY MASS INDEX (BMI) DOCUMENTED: ICD-10-PCS | Mod: CPTII,S$GLB,, | Performed by: FAMILY MEDICINE

## 2020-08-28 PROCEDURE — 99999 PR PBB SHADOW E&M-EST. PATIENT-LVL III: CPT | Mod: PBBFAC,,, | Performed by: FAMILY MEDICINE

## 2020-08-28 PROCEDURE — 3078F DIAST BP <80 MM HG: CPT | Mod: CPTII,S$GLB,, | Performed by: FAMILY MEDICINE

## 2020-08-28 PROCEDURE — 99214 OFFICE O/P EST MOD 30 MIN: CPT | Mod: S$GLB,,, | Performed by: FAMILY MEDICINE

## 2020-08-28 RX ORDER — MEGESTROL ACETATE 40 MG/ML
200 SUSPENSION ORAL DAILY
Qty: 480 ML | Refills: 1 | Status: SHIPPED | OUTPATIENT
Start: 2020-08-28 | End: 2021-06-08

## 2020-08-28 RX ORDER — ALENDRONATE SODIUM 10 MG/1
10 TABLET ORAL DAILY
Qty: 90 TABLET | Refills: 3 | Status: SHIPPED | OUTPATIENT
Start: 2020-08-28 | End: 2021-04-08

## 2020-08-28 NOTE — PROGRESS NOTES
Subjective:       Patient ID: Fabiana Moerl is a 64 y.o. female.    Chief Complaint: Follow-up (3mth f/u hypertension)    HPI  Review of Systems   Constitutional: Negative for fatigue and unexpected weight change.   Respiratory: Negative for chest tightness and shortness of breath.    Cardiovascular: Negative for chest pain, palpitations and leg swelling.   Gastrointestinal: Negative for abdominal pain.   Musculoskeletal: Negative for arthralgias.   Neurological: Negative for dizziness, syncope, light-headedness and headaches.       Patient Active Problem List   Diagnosis    Hypertension    Hyperlipidemia    Lung nodule    COPD, severe    Anxiety    Hyperglycemia    History of colon polyps    Calcification of aorta    Moderate malnutrition    Bronchiectasis    Pneumatocele of lung    Chronic respiratory failure with hypoxia    Immune deficiency disorder    S/P total hip arthroplasty    Iron deficiency    Abnormal CXR     Patient is here for a chronic conditions   follow up.    Reviewed labs 8/2020    Iron def but not anemic on oral iron. Just increased bid    Weight dropped after surgery to 102 baseline which is fairly stable since 1/20, BMI 17. Dr. Mackey tried mirtazipine, periactin and is considering megace    Right femur fracture s/p arthroplasty 12/19 . Had complex course delayed healing. Did not meet criteria for osteoporosis with dexa but does not due to fracture. Now on fosomax.  Had decubitus ulcer left buttock. Using cane for support     Pulm Dr. Mackey treating COPD with chronic hypoxia. Uses augmentin and prednisone  prn flare. Has supplemental home oxygen. On trelegy     Has had hyst DUB - no h/o abnl paps or cancer .       + history colon polyps.  Last colonoscopy GI Dr. Chun  Objective:      Physical Exam  Vitals signs and nursing note reviewed.   Constitutional:       Appearance: She is well-developed.   Cardiovascular:      Rate and Rhythm: Normal rate and regular rhythm.       Heart sounds: Normal heart sounds.   Pulmonary:      Effort: Pulmonary effort is normal.      Breath sounds: Normal breath sounds.   Skin:     General: Skin is warm and dry.   Neurological:      Mental Status: She is alert and oriented to person, place, and time.         Assessment:       1. Essential hypertension    2. Hyperlipidemia, unspecified hyperlipidemia type    3. COPD, severe    4. Hyperglycemia    5. Moderate malnutrition    6. Chronic respiratory failure with hypoxia    7. Status post total replacement of hip, unspecified laterality    8. Iron deficiency        Plan:         1. Essential hypertension  Controlled on current medications.  Continue current medications.      2. Hyperlipidemia, unspecified hyperlipidemia type  Controlled on current medications.  Continue current medications.    - Comprehensive metabolic panel; Future  - Lipid Panel; Future    3. COPD, severe  Cont pulm consult and current regimen    4. Hyperglycemia   Your blood sugar is borderline high.  This means you are at risk for developing type 2 diabetes mellitus.  To lessen your risk you should exercise regularly, avoid excess carbohydrates and work toward a body mass index of less than 25.      - Hemoglobin A1C; Future    5. Moderate malnutrition  Add  - megestroL (MEGACE) 400 mg/10 mL (40 mg/mL) Susp; Take 5 mLs (200 mg total) by mouth once daily.  Dispense: 480 mL; Refill: 1    6. Chronic respiratory failure with hypoxia  Cont oxygen supplement prn    7. Status post total replacement of hip, unspecified laterality  Cont ortho care    8. Iron deficiency  Screen and treat as indicated:    - CBC auto differential; Future  - Ferritin; Future  - Iron and TIBC; Future        Time spent with patient: 20 minutes    Patient with be reevaluated in 3 months or sooner prn    Greater than 50% of this visit was spent counseling as described in above documentation:Yes

## 2020-10-14 DIAGNOSIS — J44.9 COPD, SEVERE: ICD-10-CM

## 2020-10-14 RX ORDER — AMOXICILLIN AND CLAVULANATE POTASSIUM 875; 125 MG/1; MG/1
1 TABLET, FILM COATED ORAL EVERY 12 HOURS
Qty: 42 TABLET | Refills: 1 | Status: ON HOLD | OUTPATIENT
Start: 2020-10-14 | End: 2020-12-09 | Stop reason: HOSPADM

## 2020-11-01 ENCOUNTER — HOSPITAL ENCOUNTER (EMERGENCY)
Facility: HOSPITAL | Age: 65
Discharge: LEFT BEFORE DISCHARGE | End: 2020-11-01
Attending: EMERGENCY MEDICINE
Payer: COMMERCIAL

## 2020-11-01 VITALS
BODY MASS INDEX: 17.42 KG/M2 | TEMPERATURE: 98 F | HEART RATE: 126 BPM | SYSTOLIC BLOOD PRESSURE: 162 MMHG | HEIGHT: 64 IN | DIASTOLIC BLOOD PRESSURE: 78 MMHG | OXYGEN SATURATION: 95 % | WEIGHT: 102 LBS | RESPIRATION RATE: 20 BRPM

## 2020-11-01 DIAGNOSIS — J85.1 ABSCESS OF RIGHT LUNG WITH PNEUMONIA, UNSPECIFIED PART OF LUNG: ICD-10-CM

## 2020-11-01 DIAGNOSIS — R07.9 CHEST PAIN: ICD-10-CM

## 2020-11-01 DIAGNOSIS — J44.1 COPD WITH EXACERBATION: Primary | ICD-10-CM

## 2020-11-01 DIAGNOSIS — J44.9 COPD, SEVERE: ICD-10-CM

## 2020-11-01 LAB
ALBUMIN SERPL BCP-MCNC: 3 G/DL (ref 3.5–5.2)
ALP SERPL-CCNC: 91 U/L (ref 55–135)
ALT SERPL W/O P-5'-P-CCNC: 7 U/L (ref 10–44)
ANION GAP SERPL CALC-SCNC: 12 MMOL/L (ref 8–16)
AST SERPL-CCNC: 13 U/L (ref 10–40)
BASOPHILS # BLD AUTO: 0.04 K/UL (ref 0–0.2)
BASOPHILS NFR BLD: 0.2 % (ref 0–1.9)
BILIRUB SERPL-MCNC: 0.2 MG/DL (ref 0.1–1)
BUN SERPL-MCNC: 12 MG/DL (ref 8–23)
CALCIUM SERPL-MCNC: 10.3 MG/DL (ref 8.7–10.5)
CHLORIDE SERPL-SCNC: 97 MMOL/L (ref 95–110)
CO2 SERPL-SCNC: 26 MMOL/L (ref 23–29)
CREAT SERPL-MCNC: 0.7 MG/DL (ref 0.5–1.4)
D DIMER PPP IA.FEU-MCNC: 0.83 MG/L FEU
DIFFERENTIAL METHOD: ABNORMAL
EOSINOPHIL # BLD AUTO: 0.1 K/UL (ref 0–0.5)
EOSINOPHIL NFR BLD: 0.5 % (ref 0–8)
ERYTHROCYTE [DISTWIDTH] IN BLOOD BY AUTOMATED COUNT: 15.4 % (ref 11.5–14.5)
EST. GFR  (AFRICAN AMERICAN): >60 ML/MIN/1.73 M^2
EST. GFR  (NON AFRICAN AMERICAN): >60 ML/MIN/1.73 M^2
GLUCOSE SERPL-MCNC: 128 MG/DL (ref 70–110)
HCT VFR BLD AUTO: 42.3 % (ref 37–48.5)
HGB BLD-MCNC: 13.5 G/DL (ref 12–16)
IMM GRANULOCYTES # BLD AUTO: 0.06 K/UL (ref 0–0.04)
IMM GRANULOCYTES NFR BLD AUTO: 0.4 % (ref 0–0.5)
LYMPHOCYTES # BLD AUTO: 1.8 K/UL (ref 1–4.8)
LYMPHOCYTES NFR BLD: 11 % (ref 18–48)
MCH RBC QN AUTO: 29.3 PG (ref 27–31)
MCHC RBC AUTO-ENTMCNC: 31.9 G/DL (ref 32–36)
MCV RBC AUTO: 92 FL (ref 82–98)
MONOCYTES # BLD AUTO: 0.8 K/UL (ref 0.3–1)
MONOCYTES NFR BLD: 5.1 % (ref 4–15)
NEUTROPHILS # BLD AUTO: 13.6 K/UL (ref 1.8–7.7)
NEUTROPHILS NFR BLD: 82.8 % (ref 38–73)
NRBC BLD-RTO: 0 /100 WBC
PLATELET # BLD AUTO: 447 K/UL (ref 150–350)
PMV BLD AUTO: 8.4 FL (ref 9.2–12.9)
POTASSIUM SERPL-SCNC: 3.9 MMOL/L (ref 3.5–5.1)
PROT SERPL-MCNC: 8 G/DL (ref 6–8.4)
RBC # BLD AUTO: 4.6 M/UL (ref 4–5.4)
SODIUM SERPL-SCNC: 135 MMOL/L (ref 136–145)
TROPONIN I SERPL DL<=0.01 NG/ML-MCNC: 0.01 NG/ML (ref 0–0.03)
WBC # BLD AUTO: 16.41 K/UL (ref 3.9–12.7)

## 2020-11-01 PROCEDURE — 99285 EMERGENCY DEPT VISIT HI MDM: CPT | Mod: 25

## 2020-11-01 PROCEDURE — 85379 FIBRIN DEGRADATION QUANT: CPT

## 2020-11-01 PROCEDURE — 94761 N-INVAS EAR/PLS OXIMETRY MLT: CPT

## 2020-11-01 PROCEDURE — 36415 COLL VENOUS BLD VENIPUNCTURE: CPT

## 2020-11-01 PROCEDURE — 93010 EKG 12-LEAD: ICD-10-PCS | Mod: ,,, | Performed by: SPECIALIST

## 2020-11-01 PROCEDURE — 25500020 PHARM REV CODE 255

## 2020-11-01 PROCEDURE — 85025 COMPLETE CBC W/AUTO DIFF WBC: CPT

## 2020-11-01 PROCEDURE — 93005 ELECTROCARDIOGRAM TRACING: CPT

## 2020-11-01 PROCEDURE — 25000003 PHARM REV CODE 250: Performed by: EMERGENCY MEDICINE

## 2020-11-01 PROCEDURE — 94644 CONT INHLJ TX 1ST HOUR: CPT

## 2020-11-01 PROCEDURE — 84484 ASSAY OF TROPONIN QUANT: CPT

## 2020-11-01 PROCEDURE — 25000242 PHARM REV CODE 250 ALT 637 W/ HCPCS: Performed by: EMERGENCY MEDICINE

## 2020-11-01 PROCEDURE — 63600175 PHARM REV CODE 636 W HCPCS: Performed by: EMERGENCY MEDICINE

## 2020-11-01 PROCEDURE — 80053 COMPREHEN METABOLIC PANEL: CPT

## 2020-11-01 PROCEDURE — 93010 ELECTROCARDIOGRAM REPORT: CPT | Mod: ,,, | Performed by: SPECIALIST

## 2020-11-01 RX ORDER — LEVOFLOXACIN 750 MG/1
750 TABLET ORAL DAILY
Qty: 7 TABLET | Refills: 0 | Status: SHIPPED | OUTPATIENT
Start: 2020-11-01 | End: 2020-11-08

## 2020-11-01 RX ORDER — PREDNISONE 20 MG/1
20 TABLET ORAL 2 TIMES DAILY
Qty: 8 TABLET | Refills: 0 | Status: SHIPPED | OUTPATIENT
Start: 2020-11-01 | End: 2020-11-05

## 2020-11-01 RX ORDER — PREDNISONE 20 MG/1
40 TABLET ORAL
Status: COMPLETED | OUTPATIENT
Start: 2020-11-01 | End: 2020-11-01

## 2020-11-01 RX ORDER — PREDNISONE 20 MG/1
20 TABLET ORAL 2 TIMES DAILY
Qty: 10 TABLET | Refills: 0 | Status: SHIPPED | OUTPATIENT
Start: 2020-11-01 | End: 2020-11-06

## 2020-11-01 RX ORDER — IPRATROPIUM BROMIDE AND ALBUTEROL SULFATE 2.5; .5 MG/3ML; MG/3ML
3 SOLUTION RESPIRATORY (INHALATION) EVERY 6 HOURS PRN
Qty: 25 VIAL | Refills: 2 | Status: SHIPPED | OUTPATIENT
Start: 2020-11-01 | End: 2022-01-01

## 2020-11-01 RX ORDER — IPRATROPIUM BROMIDE AND ALBUTEROL SULFATE 2.5; .5 MG/3ML; MG/3ML
9 SOLUTION RESPIRATORY (INHALATION) CONTINUOUS
Status: DISCONTINUED | OUTPATIENT
Start: 2020-11-01 | End: 2020-11-02 | Stop reason: HOSPADM

## 2020-11-01 RX ORDER — NAPROXEN SODIUM 220 MG/1
81 TABLET, FILM COATED ORAL
Status: COMPLETED | OUTPATIENT
Start: 2020-11-01 | End: 2020-11-01

## 2020-11-01 RX ADMIN — LEVOFLOXACIN 750 MG: 500 TABLET, FILM COATED ORAL at 11:11

## 2020-11-01 RX ADMIN — PREDNISONE 40 MG: 20 TABLET ORAL at 11:11

## 2020-11-01 RX ADMIN — IPRATROPIUM BROMIDE AND ALBUTEROL SULFATE 9 ML: .5; 2.5 SOLUTION RESPIRATORY (INHALATION) at 07:11

## 2020-11-01 RX ADMIN — ASPIRIN 81 MG 81 MG: 81 TABLET ORAL at 07:11

## 2020-11-01 RX ADMIN — IOHEXOL 100 ML: 350 INJECTION, SOLUTION INTRAVENOUS at 09:11

## 2020-11-01 NOTE — LETTER
Patient: Debbie Morel  YOB: 1955  Date: 11/1/2020 Time: 11:05 PM  Location: [facility]    Leaving the [facility] Against Medical Advice    Chart #:[id number]    This will certify that I, the undersigned,    ______________________________________________________________________    A patient in the above named [facility], having requested discharge and removal from the medical [facility] against the advice of my attending physician(s), hereby release [facility], its physicians, officers and employees, severally and individually, from any and all liability of any nature whatsoever for any injury or harm or complication of any kind that may result directly or indirectly, by reason of my terminating my stay as a patient at [facility] and my departure from [facility], and hereby waive any and all rights of action I may now have or later acquire as a result of my voluntary departure from [facility] and the termination of my stay as a patient therein.    This release is made with the full knowledge of the danger that may result from the action which I am taking.      Date:_______________________                         ___________________________                                                                                    Patient/Legal Representative    Witness:        ____________________________                          ___________________________  Nurse                                                                        Physician

## 2020-11-02 DIAGNOSIS — J47.9 BRONCHIECTASIS WITHOUT COMPLICATION: Primary | ICD-10-CM

## 2020-11-02 DIAGNOSIS — J98.4 CAVITARY LUNG DISEASE: ICD-10-CM

## 2020-11-02 NOTE — ED PROVIDER NOTES
"Encounter Date: 11/1/2020    SCRIBE #1 NOTE: I, Huang Lopez, am scribing for, and in the presence of, Basilio Cagle MD.       History     Chief Complaint   Patient presents with    Chest Pain     midsternal chest pain beginning today        Time seen by provider: 7:38 PM on 11/01/2020    Fabiana Morel is a 64 y.o. female with PMHx of HLD, COPD, and erosive gastritis who presents to the ED with an onset of consistent sharp chest pain that radiates up her neck beginning x8 hours ago. Associated symptoms includes light sweating. SHx includes smoking. The patient denies nausea, vomiting, or any other symptoms at this time. No pertinent PSHx.    The history is provided by the patient.     Review of patient's allergies indicates:   Allergen Reactions    Erythromycin Nausea Only     Past Medical History:   Diagnosis Date    Abnormal CT scan 1/8/2015    Asthma     Colitis     COPD (chronic obstructive pulmonary disease)     Diverticulitis of sigmoid colon 11/4/2014    Erosive gastritis 7/17/2013    Fracture of left clavicle     H pylori ulcer 7/17/2013    Hypertension     Peptic ulcer disease 7/17/2013    Pneumonia of right upper lobe due to infectious organism 8/15/2017    Rectal bleed 11/3/2014    Scoliosis     SOB (shortness of breath)     Yeast infection 8/25/2017     Past Surgical History:   Procedure Laterality Date    APPENDECTOMY      BREAST BIOPSY Right     cyst    COLONOSCOPY N/A 7/15/2019    Procedure: COLONOSCOPY;  Surgeon: Sharif Chun MD;  Location: [facility] ENDO;  Service: Endoscopy;  Laterality: N/A;    HIP ARTHROPLASTY Right 12/18/2019    Procedure: ARTHROPLASTY, HIP;  Surgeon: Bernardino Wilson II, MD;  Location: [facility] OR;  Service: Orthopedics;  Laterality: Right;  Jose Hodgson Morel and Nephew    HYSTERECTOMY      HASMUKH/BSO, DUB    OOPHORECTOMY      TUBAL LIGATION       Family History   Problem Relation Age of Onset    Cancer Mother 49        "lymph nodes"    Cancer Sister         " lung?     Social History     Tobacco Use    Smoking status: Light Tobacco Smoker     Packs/day: 0.50     Years: 30.00     Pack years: 15.00     Types: Cigarettes    Smokeless tobacco: Never Used    Tobacco comment: started back 2/2020   Substance Use Topics    Alcohol use: Yes     Alcohol/week: 12.0 standard drinks     Types: 12 Cans of beer per week     Comment: 2-3 beers daily    Drug use: No     Review of Systems   Constitutional: Positive for diaphoresis. Negative for activity change, appetite change, chills, fatigue and fever.   Eyes: Negative for visual disturbance.   Respiratory: Negative for apnea and shortness of breath.    Cardiovascular: Positive for chest pain. Negative for palpitations.   Gastrointestinal: Negative for abdominal distention and abdominal pain.   Genitourinary: Negative for difficulty urinating.   Musculoskeletal: Positive for neck pain.   Skin: Negative for pallor and rash.   Neurological: Negative for headaches.   Hematological: Does not bruise/bleed easily.   Psychiatric/Behavioral: Negative for agitation.       Physical Exam     Initial Vitals [11/01/20 1930]   BP Pulse Resp Temp SpO2   (!) 191/110 (!) 120 17 98 °F (36.7 °C) 95 %      MAP       --         Physical Exam    Nursing note and vitals reviewed.  Constitutional: She appears well-developed and well-nourished.   HENT:   Head: Normocephalic and atraumatic.   Eyes: Conjunctivae are normal.   Neck: Normal range of motion. Neck supple.   Cardiovascular: Regular rhythm and normal heart sounds. Tachycardia present.  Exam reveals no gallop and no friction rub.    No murmur heard.  Pulmonary/Chest: Effort normal and breath sounds normal. No respiratory distress. She has no wheezes. She has no rhonchi. She has no rales.   Chest non-tender.   Abdominal: Soft. She exhibits no distension. There is no abdominal tenderness.   Abdomen non-tender.    Musculoskeletal: Normal range of motion.   Neurological: She is alert and oriented to  person, place, and time.   Skin: Skin is warm and dry. No erythema.   Psychiatric: She has a normal mood and affect.         ED Course   Procedures  Labs Reviewed - No data to display  EKG Readings: (Independently Interpreted)   Rhythm: Sinus Tachycardia. Heart Rate: 116. Ectopy: No Ectopy. Conduction: Normal. ST Segments: Normal ST Segments. T Waves: Normal. Axis: Normal. Clinical Impression: Normal Sinus Rhythm with PVCs       Imaging Results    None          Medical Decision Making:   Independently Interpreted Test(s):   I have ordered and independently interpreted X-rays - see prior notes.  I have ordered and independently interpreted EKG Reading(s) - see prior notes  Clinical Tests:   Lab Tests: Ordered and Reviewed  Radiological Study: Reviewed and Ordered  Medical Tests: Ordered and Reviewed  ED Management:  64-year-old female with a history of COPD presents with chest pain.  CT angiogram of the chest is incomplete the time shift change; therefore, patient care was turned over to Dr. Chavez.       APC / Resident Notes:   I, Dr. Basilio Cagle III, personally performed the services described in this documentation. All medical record entries made by the scribe were at my direction and in my presence.  I have reviewed the chart and agree that the record reflects my personal performance and is accurate and complete       Scribe Attestation:   Scribe #1: I performed the above scribed service and the documentation accurately describes the services I performed. I attest to the accuracy of the note.            ED Course as of Nov 04 0610   Sun Nov 01, 2020 2230 I discussed the results of the CT with the patient including abscess.  She is still tachycardic to a heart rate of 125.  She does not want to stay.  She wants to go home and follow-up with her pulmonologist.  I explained her that her symptoms may decompensate and she could become worse.  She would still like to go home.  She understands that she could have  worsening respiratory failure in respiratory distress which ultimately could lead to hospitalization intubation and even death.  She appears to be medically competent to make decisions for herself.  Her significant other is in the room and agrees with her going home.  I will start her on antibiotics steroids and sent an e-mail to her pulmonologist letting him know what is going on.  I explained her that she needs to call office in the morning for follow-up appointment    [JS]      ED Course User Index  [JS] Ben Chavez MD            Clinical Impression:       ICD-10-CM ICD-9-CM   1. Chest pain  R07.9 786.50                                               Basilio Cagle III, MD  11/04/20 0611

## 2020-11-02 NOTE — DISCHARGE INSTRUCTIONS
The CT scan appears to show a new cavitation in your lung that is infected and may be a lung abscess.  You need to follow-up with your pulmonologist tomorrow.  Return to the ER for worsening shortness of breath pain fevers or any other concerns.  We recommend that you are admitted to the hospital, but since you want to leave you have to follow-up closely with your pulmonologist.  However, you are welcome to return to the emergency room at any time.  We would be happy to see you.

## 2020-11-03 ENCOUNTER — LAB VISIT (OUTPATIENT)
Dept: LAB | Facility: HOSPITAL | Age: 65
End: 2020-11-03
Attending: INTERNAL MEDICINE
Payer: COMMERCIAL

## 2020-11-03 DIAGNOSIS — J47.9 BRONCHIECTASIS WITHOUT COMPLICATION: ICD-10-CM

## 2020-11-03 DIAGNOSIS — J98.4 CAVITARY LUNG DISEASE: ICD-10-CM

## 2020-11-03 PROCEDURE — 87118 MYCOBACTERIC IDENTIFICATION: CPT

## 2020-11-03 PROCEDURE — 87186 SC STD MICRODIL/AGAR DIL: CPT

## 2020-11-03 PROCEDURE — 87149 DNA/RNA DIRECT PROBE: CPT

## 2020-11-03 PROCEDURE — 87070 CULTURE OTHR SPECIMN AEROBIC: CPT

## 2020-11-03 PROCEDURE — 87206 SMEAR FLUORESCENT/ACID STAI: CPT

## 2020-11-03 PROCEDURE — 87015 SPECIMEN INFECT AGNT CONCNTJ: CPT

## 2020-11-03 PROCEDURE — 87116 MYCOBACTERIA CULTURE: CPT

## 2020-11-03 PROCEDURE — 87205 SMEAR GRAM STAIN: CPT

## 2020-11-04 ENCOUNTER — LAB VISIT (OUTPATIENT)
Dept: LAB | Facility: HOSPITAL | Age: 65
End: 2020-11-04
Attending: INTERNAL MEDICINE
Payer: COMMERCIAL

## 2020-11-04 DIAGNOSIS — J98.4 CAVITARY LUNG DISEASE: ICD-10-CM

## 2020-11-04 DIAGNOSIS — J47.9 BRONCHIECTASIS WITHOUT COMPLICATION: ICD-10-CM

## 2020-11-04 PROCEDURE — 87118 MYCOBACTERIC IDENTIFICATION: CPT | Mod: 59

## 2020-11-04 PROCEDURE — 87118 MYCOBACTERIC IDENTIFICATION: CPT | Mod: 91

## 2020-11-04 PROCEDURE — 87186 SC STD MICRODIL/AGAR DIL: CPT

## 2020-11-04 PROCEDURE — 87015 SPECIMEN INFECT AGNT CONCNTJ: CPT

## 2020-11-04 PROCEDURE — 87118 MYCOBACTERIC IDENTIFICATION: CPT

## 2020-11-04 PROCEDURE — 87206 SMEAR FLUORESCENT/ACID STAI: CPT

## 2020-11-04 PROCEDURE — 87116 MYCOBACTERIA CULTURE: CPT

## 2020-11-05 ENCOUNTER — LAB VISIT (OUTPATIENT)
Dept: LAB | Facility: HOSPITAL | Age: 65
End: 2020-11-05
Attending: INTERNAL MEDICINE
Payer: COMMERCIAL

## 2020-11-05 DIAGNOSIS — J47.9 BRONCHIECTASIS WITHOUT COMPLICATION: ICD-10-CM

## 2020-11-05 DIAGNOSIS — J98.4 CAVITARY LUNG DISEASE: ICD-10-CM

## 2020-11-05 DIAGNOSIS — A31.0 MYCOBACTERIAL DISEASE, PULMONARY: Primary | ICD-10-CM

## 2020-11-05 PROCEDURE — 87015 SPECIMEN INFECT AGNT CONCNTJ: CPT

## 2020-11-05 PROCEDURE — 87118 MYCOBACTERIC IDENTIFICATION: CPT

## 2020-11-05 PROCEDURE — 87206 SMEAR FLUORESCENT/ACID STAI: CPT

## 2020-11-05 PROCEDURE — 87116 MYCOBACTERIA CULTURE: CPT

## 2020-11-05 PROCEDURE — 87149 DNA/RNA DIRECT PROBE: CPT

## 2020-11-06 ENCOUNTER — LAB VISIT (OUTPATIENT)
Dept: LAB | Facility: HOSPITAL | Age: 65
End: 2020-11-06
Attending: INTERNAL MEDICINE
Payer: COMMERCIAL

## 2020-11-06 DIAGNOSIS — J98.4 CAVITARY LUNG DISEASE: ICD-10-CM

## 2020-11-06 DIAGNOSIS — J47.9 BRONCHIECTASIS WITHOUT COMPLICATION: ICD-10-CM

## 2020-11-06 LAB
BACTERIA SPEC AEROBE CULT: NORMAL
BACTERIA SPEC AEROBE CULT: NORMAL
CRP SERPL-MCNC: 19.4 MG/L (ref 0–8.2)
ERYTHROCYTE [SEDIMENTATION RATE] IN BLOOD BY WESTERGREN METHOD: 50 MM/HR (ref 0–20)
GRAM STN SPEC: NORMAL
PROCALCITONIN SERPL IA-MCNC: 0.03 NG/ML

## 2020-11-06 PROCEDURE — 85651 RBC SED RATE NONAUTOMATED: CPT

## 2020-11-06 PROCEDURE — 86140 C-REACTIVE PROTEIN: CPT

## 2020-11-06 PROCEDURE — 84145 PROCALCITONIN (PCT): CPT

## 2020-11-06 PROCEDURE — 36415 COLL VENOUS BLD VENIPUNCTURE: CPT

## 2020-11-08 ENCOUNTER — PATIENT OUTREACH (OUTPATIENT)
Dept: ADMINISTRATIVE | Facility: OTHER | Age: 65
End: 2020-11-08

## 2020-11-08 NOTE — PROGRESS NOTES
Chart was reviewed for overdue [facility] Encounters ([facility])  topics  Updates were unable to be requested from care everywhere  Health Maintenance has been updated  [place] immunization registry triggered

## 2020-11-09 ENCOUNTER — OFFICE VISIT (OUTPATIENT)
Dept: PULMONOLOGY | Facility: CLINIC | Age: 65
End: 2020-11-09
Payer: COMMERCIAL

## 2020-11-09 ENCOUNTER — LAB VISIT (OUTPATIENT)
Dept: LAB | Facility: HOSPITAL | Age: 65
End: 2020-11-09
Attending: INTERNAL MEDICINE
Payer: COMMERCIAL

## 2020-11-09 VITALS
DIASTOLIC BLOOD PRESSURE: 89 MMHG | HEART RATE: 95 BPM | OXYGEN SATURATION: 99 % | BODY MASS INDEX: 16.83 KG/M2 | HEIGHT: 64 IN | SYSTOLIC BLOOD PRESSURE: 138 MMHG | WEIGHT: 98.56 LBS

## 2020-11-09 DIAGNOSIS — D84.9 IMMUNE DEFICIENCY DISORDER: ICD-10-CM

## 2020-11-09 DIAGNOSIS — A31.0 MYCOBACTERIAL DISEASE, PULMONARY: ICD-10-CM

## 2020-11-09 DIAGNOSIS — J98.4 CAVITARY LUNG DISEASE: ICD-10-CM

## 2020-11-09 DIAGNOSIS — J47.9 BRONCHIECTASIS WITHOUT COMPLICATION: Primary | ICD-10-CM

## 2020-11-09 DIAGNOSIS — J47.9 BRONCHIECTASIS WITHOUT COMPLICATION: ICD-10-CM

## 2020-11-09 LAB
ALBUMIN SERPL BCP-MCNC: 3.2 G/DL (ref 3.5–5.2)
ALP SERPL-CCNC: 71 U/L (ref 55–135)
ALT SERPL W/O P-5'-P-CCNC: 11 U/L (ref 10–44)
ANION GAP SERPL CALC-SCNC: 13 MMOL/L (ref 8–16)
AST SERPL-CCNC: 14 U/L (ref 10–40)
BILIRUB SERPL-MCNC: 0.2 MG/DL (ref 0.1–1)
BUN SERPL-MCNC: 15 MG/DL (ref 8–23)
CALCIUM SERPL-MCNC: 9.6 MG/DL (ref 8.7–10.5)
CHLORIDE SERPL-SCNC: 97 MMOL/L (ref 95–110)
CO2 SERPL-SCNC: 26 MMOL/L (ref 23–29)
CREAT SERPL-MCNC: 0.7 MG/DL (ref 0.5–1.4)
EST. GFR  (AFRICAN AMERICAN): >60 ML/MIN/1.73 M^2
EST. GFR  (NON AFRICAN AMERICAN): >60 ML/MIN/1.73 M^2
GLUCOSE SERPL-MCNC: 92 MG/DL (ref 70–110)
POTASSIUM SERPL-SCNC: 4.1 MMOL/L (ref 3.5–5.1)
PROT SERPL-MCNC: 7.6 G/DL (ref 6–8.4)
SODIUM SERPL-SCNC: 136 MMOL/L (ref 136–145)

## 2020-11-09 PROCEDURE — 99999 PR PBB SHADOW E&M-EST. PATIENT-LVL III: CPT | Mod: PBBFAC,,, | Performed by: INTERNAL MEDICINE

## 2020-11-09 PROCEDURE — 80053 COMPREHEN METABOLIC PANEL: CPT

## 2020-11-09 PROCEDURE — 3075F PR MOST RECENT SYSTOLIC BLOOD PRESS GE 130-139MM HG: ICD-10-PCS | Mod: CPTII,S$GLB,, | Performed by: INTERNAL MEDICINE

## 2020-11-09 PROCEDURE — 3075F SYST BP GE 130 - 139MM HG: CPT | Mod: CPTII,S$GLB,, | Performed by: INTERNAL MEDICINE

## 2020-11-09 PROCEDURE — 3008F PR BODY MASS INDEX (BMI) DOCUMENTED: ICD-10-PCS | Mod: CPTII,S$GLB,, | Performed by: INTERNAL MEDICINE

## 2020-11-09 PROCEDURE — 99214 PR OFFICE/OUTPT VISIT, EST, LEVL IV, 30-39 MIN: ICD-10-PCS | Mod: S$GLB,,, | Performed by: INTERNAL MEDICINE

## 2020-11-09 PROCEDURE — 3008F BODY MASS INDEX DOCD: CPT | Mod: CPTII,S$GLB,, | Performed by: INTERNAL MEDICINE

## 2020-11-09 PROCEDURE — 36415 COLL VENOUS BLD VENIPUNCTURE: CPT

## 2020-11-09 PROCEDURE — 99214 OFFICE O/P EST MOD 30 MIN: CPT | Mod: S$GLB,,, | Performed by: INTERNAL MEDICINE

## 2020-11-09 PROCEDURE — 99999 PR PBB SHADOW E&M-EST. PATIENT-LVL III: ICD-10-PCS | Mod: PBBFAC,,, | Performed by: INTERNAL MEDICINE

## 2020-11-09 PROCEDURE — 3079F PR MOST RECENT DIASTOLIC BLOOD PRESSURE 80-89 MM HG: ICD-10-PCS | Mod: CPTII,S$GLB,, | Performed by: INTERNAL MEDICINE

## 2020-11-09 PROCEDURE — 3079F DIAST BP 80-89 MM HG: CPT | Mod: CPTII,S$GLB,, | Performed by: INTERNAL MEDICINE

## 2020-11-09 RX ORDER — RIFAMPIN 150 MG/1
450 CAPSULE ORAL DAILY
Qty: 90 CAPSULE | Refills: 1 | Status: ON HOLD | OUTPATIENT
Start: 2020-11-09 | End: 2020-12-09 | Stop reason: HOSPADM

## 2020-11-09 RX ORDER — AZITHROMYCIN 500 MG/1
TABLET, FILM COATED ORAL
Qty: 30 TABLET | Refills: 1 | Status: ON HOLD | OUTPATIENT
Start: 2020-11-09 | End: 2020-12-09 | Stop reason: HOSPADM

## 2020-11-09 RX ORDER — ISONIAZID 300 MG/1
300 TABLET ORAL DAILY
Qty: 30 TABLET | Refills: 1 | Status: ON HOLD | OUTPATIENT
Start: 2020-11-09 | End: 2020-12-09 | Stop reason: HOSPADM

## 2020-11-09 RX ORDER — PREDNISONE 20 MG/1
20 TABLET ORAL DAILY
Status: ON HOLD | COMMUNITY
End: 2020-12-09 | Stop reason: HOSPADM

## 2020-11-09 NOTE — PATIENT INSTRUCTIONS
Diagnosis not clear. Looks likely to be TB or MAC (mycobacterium avium complex)    Tb less likely if tb test by blood is negative for exposure.    Id best by culture, may get DNA probe test indicating which germ likely-     Would treat now as had severe pain that improved.      Isoniazid once daily good for tb - take daily  Rifampin will treat tb and MAC- orange urine - take 3 a day  Azithromycin 500 mg once daily good for MAC.    Will adjust treatment per DNA Probe/cultures    May need eye check if MAC diagnosed as treatment may affect eyes.    If tb- may need to have contacts evaluated.    Liver test checks needed monthly for 6 with medications.    Review of prior cxr/ct suggest large upper lung cavities were not present in 2/2019 by cxr.  Bronchoscope in 10/2017 had no afb found.  Suspect current diease developed during 2019?

## 2020-11-09 NOTE — PROGRESS NOTES
11/9/2020    Fabiana Ann Morel  Office Note    Chief Complaint   Patient presents with    Abnormal Ct Scan    abnormal sputum results    Shortness of Breath    Sputum Production     light yellow     11/9/2020 no tb exposure, had chest pain, cxr with upper lung cavitary, had afb checks with positive x 2. No id.  Night sweats forever- slight dampness with no need to change clothes.  Pain was 9/10 ppt er visit, no prior pain, no ppt factors- pain better with steroids/abx.  Started levaquin 750 in er- off prednisone and abx..  Was on prednisone/augmentin prior to er visit.      5/27/2020- 1/2ppd, no problem,  Hip recovered.  No falls/cane.  No abx nor prednisone  \  Patient Instructions   I added iron level to blood test for future- not urgent.  Would stay on iron til later in year.    Do chest xray if any problems.      Jan 27, 2020- had hip fx in dec, had repair hip with good recovery.  Still on abx, had prevnar on 10/15/19-  Off smokes with better oxygen, doing well.  occ hip pain. No prednisone,   Patient Instructions   trelegy has 3 24/7 breathing medications- daily good    Use prednisone if cough or wheezes or more short breath.    Use augmentin if cough, short breath.    Chest xray now and repeat in 3-4 months - sooner if any problems.    Could check immune in 3 months, you had vaccine in October 2019    Ferrous sulfate/gluconate once daily may be reasonable - iv iron infusion?    Oct 3, 2019 lost 5lbs since aug, cxr with apparent bullae left lung posterior lung. No mucous production.  Still on abx - augmentin.  Has had damp night sweats for years with no change.  Patient Instructions   Pneumonia germ was 8 of 14 protected.      Get vaccine next wk    Stay on augmentin til lung is cleared - optimal.    Do crp, cbc, cxr next week- would follow what is abnormal.  If ongoing improvement found- follow til baseline reasonable - ct and scope test if not clearing or worsens.    Try mirtazapine 7.5mg bedtime, go to  "15 mg bedtime if not too sleepy. Will make sleep and should stimulate appetite.    Periactin- 4 mg - try 2nd if mirtazapine not effective, not both, start one bedtime, going to twice daily (could break 1/2), antihisatmine.    Try megace next time?  aug  27, developed left chest pain and short of breath,   occ yellow mucous,  Still smokes. noox testing.  Uses trelegy, no falls.     Patient Instructions   Likely has had left upper lung pnumonia last 2 months.  Bronch in 2017 did not show much.     Chest xray recheck in a month- do monthly til this pneumonia cleared-you have a standing order for cxr  Monthly - get routinely every 3 months once this pneumonia cleared.       Check blood work weekly for "pneumonia" up to next month to assure normalizes.     Sputum culture- resistant  Germ possible.     Check immune response to vaccine.       Do prednisone 20mgdaily for  3 days monthly.     Use augmentin if pain,weakness, short breath, any concern regarding pneumonia.       Use trelegy.       Feb 26, 2019- took abx with  recent developing resp problems. Still smokes.  resp same.   Has old compression fx seen on  Lateral cxr.  Discussed with patient above for education the following:    Could do f/u ct chest - but chest xray looks stable.     Lung capacity was only 27% in 2014- hard to see lungs going much more into future.  Chronic lung disease usually causes chronic debility/weakness.  Acute illnesses will be hard to recover strength.      Prompt antibiotic and prednisone may be wise.    Chest xray for illness may be wise.     Use xanax as needed for anxiety.    Stop smokes please.    aug 28, 2018- still smokes, some celestin, uses trelegy. cxr stable.  Follow-up in about 6 months (around 2/28/2019).  Discussed with patient above for education the following:     Use prednisone or antibiotic If bronchitis  Check chest xray if ill  Chest xray 6 months.  Likely do ct in a yr?  Stop smokes.  Feb 28, 2018 breathing better, " still cough but slight.  Nearly off smokes.  Had exacerbation needing steriods.  Lung transplant discussed if pt stops smokes but pt declines.  nov 16, 2017 - off smokes transiently - on chantix with no problems.  No severe stress.    oct 17, 2017- no fever or night sweats or cough. No new c/o - still smokes but trying chantrix.  sept 20, still smokes same, feels sl better than 6  Months ago, mucous clear, night sweats still with no improvement with abx.    AUg 15, having right shoulder pain last 3 weeks at 10/10 intially  And subsequent 3-4/10,  No fever but night sweats chr, no n/v/d or headache.  No tb exposure.   Had tb eval past negative.  Still smokes.  Nerves ok.  Breathing seems  Better.  Feb 13, 2017, hpi doing rehab, no action plan, still smoking.  Using all meds, nerves better with xanax and toprol.  Aug 12, 2016HPI:initial encounter in [facility], copd and bronchiectasis and lung nodule.  No prednisone use recent.  No recent azithromycin.  breo and incruse regular use, oeoibeber2b/d, not using nebulizer.  Last ct 12/2015.  fev1 27% in 2014 with dramatic bd response.  Working as filing and typing.  Feels resp comfortable.  Has had lung dz age 56.    Scant mucous with no color chr, no acapella.    The chief compliant  problem is stable  PFSH:  Past Medical History:   Diagnosis Date    Abnormal CT scan 1/8/2015    Asthma     Colitis     COPD (chronic obstructive pulmonary disease)     Diverticulitis of sigmoid colon 11/4/2014    Erosive gastritis 7/17/2013    Fracture of left clavicle     H pylori ulcer 7/17/2013    Hypertension     Peptic ulcer disease 7/17/2013    Pneumonia of right upper lobe due to infectious organism 8/15/2017    Rectal bleed 11/3/2014    Scoliosis     SOB (shortness of breath)     Yeast infection 8/25/2017         Past Surgical History:   Procedure Laterality Date    APPENDECTOMY      BREAST BIOPSY Right     cyst    COLONOSCOPY N/A 7/15/2019    Procedure: COLONOSCOPY;   "Surgeon: Sharif Chun MD;  Location: [facility] ENDO;  Service: Endoscopy;  Laterality: N/A;    HIP ARTHROPLASTY Right 12/18/2019    Procedure: ARTHROPLASTY, HIP;  Surgeon: Bernardino Wilson II, MD;  Location: [facility] OR;  Service: Orthopedics;  Laterality: Right;  [facility] - [facility]l [employer]    HYSTERECTOMY      HASMUKH/BSO, DUB    OOPHORECTOMY      TUBAL LIGATION       Social History     Tobacco Use    Smoking status: Light Tobacco Smoker     Packs/day: 0.50     Years: 30.00     Pack years: 15.00     Types: Cigarettes    Smokeless tobacco: Never Used    Tobacco comment: started back 2/2020   Substance Use Topics    Alcohol use: Yes     Alcohol/week: 12.0 standard drinks     Types: 12 Cans of beer per week     Comment: 2-3 beers daily    Drug use: No     Family History   Problem Relation Age of Onset    Cancer Mother 49        "lymph nodes"    Cancer Sister         lung?     Review of patient's allergies indicates:   Allergen Reactions    Erythromycin Nausea Only       Performance Status:The patient's activity level is functions out of house.      Review of Systems:  a review of eleven systems covering constitutional, Eye, HEENT, Psych, Respiratory, Cardiac, GI, , Musculoskeletal, Endocrine, Dermatologic was negative except for pertinent findings as listed ABOVE and below: night sweats chr due to menopause,      Exam:Comprehensive exam done.   /89 (BP Location: Right arm, Patient Position: Sitting)   Pulse 95   Ht 5' 4" (1.626 m)   Wt 44.7 kg (98 lb 8.7 oz)   SpO2 99% Comment: on room air at rest  BMI 16.92 kg/m²   Exam included Vitals as listed, and patient's appearance and affect and alertness and mood, oral exam for yeast and hygiene and pharynx lesions and Mallapatti (M) score, neck with inspection for jvd and masses and thyroid abnormalities and lymph nodes (supraclavicular and infraclavicular nodes and axillary also examined and noted if abn), chest exam included symmetry and effort and fremitus " and percussion and auscultation, cardiac exam included rhythm and gallops and murmur and rubs and jvd and edema, abdominal exam for mass and hepatosplenomegaly and tenderness and hernias and bowel sounds, Musculoskeletal exam with muscle tone and posture and mobility/gait and  strength, and skin for rashes and cyanosis and pallor and turgor, extremity for clubbing.  Findings were normal except for pertinent findings listed below:  Mild kyphosis scoliosis, M2, no wheezes no edema, no clubbing    Radiographs reviewed:   Ct chest 11/1/2020 cavities sl worse than 10/2019.    Ct chest 10/2019- infected emphysema left upper suggested - right lung just severe copd    cxr 2/25/2019 same as aug 2018.  Scars both upper lungs.  ct 2016  With left lung abn better now, right lung was clear and now with infiltrate with vol loss of emphysema??  cxr may have smaller cavity rul cavity than ct c/w aug ct with sept cxr.    cxr 10/16 same as sept 2017,  cxr  Feb 26, 2018 improved rul density with smaller hyper inflated lungs.      Labs reviewed  No pos cultures  PFT results [id number]  fev 27%    VT BRONCHOSCOPY,TRANSBRONCH BIOPSY [19638 (CPT®)]   BRONCHOSCOPY - BRONCHOALVEOLAR LAVAGE [PFT43 (Custom)]           []Hide copied text    []Hover for details  10/20/2017     After review of ct, informed consent, updated history and physical, time out, and anesthesia sedation- pt underwent left nares bronchoscopy (right nares couldn't be cannulated).     Thick secretions encountered and washed free.  Airways were wnl.  Lavage from rul 90 cc in and 20 cc out.  Transbronchial bx x 2 for cultures done (afb, fungal, routine).  Transbronchial bx x 6 for path.  Lavage and wash pooled and submitted for culture, cytology, afb, and fungal evals..     ebl 15 cc post transbronchial bx. Airway suctioned til bleed ceased.  Post floro with no pneumo/complication and post cxr pending.  No complications.      Electronically signed by Bhupendra Mackey MD  at 10/20/2017  8:10 AM         Plan:  Clinical impression is apparently straight forward and impression with management as below.    Fabiana was seen today for abnormal ct scan, abnormal sputum results, shortness of breath and sputum production.    Diagnoses and all orders for this visit:    Bronchiectasis without complication  -     isoniazid (NYDRAZID) 300 MG Tab; Take 1 tablet (300 mg total) by mouth once daily.  -     rifAMpin (RIFADIN) 150 MG capsule; Take 3 capsules (450 mg total) by mouth once daily.  -     azithromycin (ZITHROMAX) 500 MG tablet; One daily for yellow mucous, repeat if needed  -     Comprehensive Metabolic Panel; Standing    Immune deficiency disorder  -     isoniazid (NYDRAZID) 300 MG Tab; Take 1 tablet (300 mg total) by mouth once daily.  -     rifAMpin (RIFADIN) 150 MG capsule; Take 3 capsules (450 mg total) by mouth once daily.  -     azithromycin (ZITHROMAX) 500 MG tablet; One daily for yellow mucous, repeat if needed  -     Comprehensive Metabolic Panel; Standing    Mycobacterial disease, pulmonary  -     isoniazid (NYDRAZID) 300 MG Tab; Take 1 tablet (300 mg total) by mouth once daily.  -     rifAMpin (RIFADIN) 150 MG capsule; Take 3 capsules (450 mg total) by mouth once daily.  -     azithromycin (ZITHROMAX) 500 MG tablet; One daily for yellow mucous, repeat if needed  -     Comprehensive Metabolic Panel; Standing    Cavitary lung disease  -     isoniazid (NYDRAZID) 300 MG Tab; Take 1 tablet (300 mg total) by mouth once daily.  -     rifAMpin (RIFADIN) 150 MG capsule; Take 3 capsules (450 mg total) by mouth once daily.  -     azithromycin (ZITHROMAX) 500 MG tablet; One daily for yellow mucous, repeat if needed  -     Comprehensive Metabolic Panel; Standing        Follow up in about 4 weeks (around 12/7/2020), or if symptoms worsen or fail to improve.    Discussed with patient above for education the following:      Discussed with patient above for education the following:      Patient  Instructions   Diagnosis not clear. Looks likely to be TB or MAC (mycobacterium avium complex)    Tb less likely if tb test by blood is negative for exposure.    Id best by culture, may get DNA probe test indicating which germ likely-     Would treat now as had severe pain that improved.      Isoniazid once daily good for tb - take daily  Rifampin will treat tb and MAC- orange urine - take 3 a day  Azithromycin 500 mg once daily good for MAC.    Will adjust treatment per DNA Probe/cultures    May need eye check if MAC diagnosed as treatment may affect eyes.    If tb- may need to have contacts evaluated.    Liver test checks needed monthly for 6 with medications.    Review of prior cxr/ct suggest large upper lung cavities were not present in 2/2019 by cxr.  Bronchoscope in 10/2017 had no afb found.  Suspect current diease developed during 2019?

## 2020-11-11 ENCOUNTER — TELEPHONE (OUTPATIENT)
Dept: PULMONOLOGY | Facility: CLINIC | Age: 65
End: 2020-11-11

## 2020-11-11 NOTE — TELEPHONE ENCOUNTER
Per Dr. Mackey patient informed.      ----- Message from Bhupendra Mackey MD sent at 11/11/2020  3:33 PM CST -----  Notify TB blood test suggests no evidence for TB exposure.  This may imply disease is not tuberculosis.  This may suggest there is no contagious disease (?).  Awaiting final identification of acid-fast.  No change in plan

## 2020-11-18 ENCOUNTER — TELEPHONE (OUTPATIENT)
Dept: PULMONOLOGY | Facility: CLINIC | Age: 65
End: 2020-11-18

## 2020-11-18 NOTE — TELEPHONE ENCOUNTER
All techs leave at 3:00.    ----- Message from Bertha Cheatham MA sent at 11/18/2020  4:19 PM CST -----  [facility] called.

## 2020-11-29 ENCOUNTER — HOSPITAL ENCOUNTER (INPATIENT)
Facility: HOSPITAL | Age: 65
LOS: 9 days | Discharge: HOME HEALTH | DRG: 199 | End: 2020-12-09
Attending: EMERGENCY MEDICINE | Admitting: STUDENT IN AN ORGANIZED HEALTH CARE EDUCATION/TRAINING PROGRAM
Payer: COMMERCIAL

## 2020-11-29 DIAGNOSIS — I49.9 ARRHYTHMIA: ICD-10-CM

## 2020-11-29 DIAGNOSIS — A31.0 MYCOBACTERIUM AVIUM COMPLEX: Primary | ICD-10-CM

## 2020-11-29 DIAGNOSIS — R07.9 CHEST PAIN: ICD-10-CM

## 2020-11-29 DIAGNOSIS — J93.9 PNEUMOTHORAX ON RIGHT: ICD-10-CM

## 2020-11-29 PROBLEM — E46 HYPOALBUMINEMIA DUE TO PROTEIN-CALORIE MALNUTRITION: Status: ACTIVE | Noted: 2020-11-29

## 2020-11-29 PROBLEM — E88.09 HYPOALBUMINEMIA DUE TO PROTEIN-CALORIE MALNUTRITION: Status: ACTIVE | Noted: 2020-11-29

## 2020-11-29 LAB
ALBUMIN SERPL BCP-MCNC: 2.7 G/DL (ref 3.5–5.2)
ALP SERPL-CCNC: 78 U/L (ref 55–135)
ALT SERPL W/O P-5'-P-CCNC: 24 U/L (ref 10–44)
ANION GAP SERPL CALC-SCNC: 12 MMOL/L (ref 8–16)
AST SERPL-CCNC: 33 U/L (ref 10–40)
BASOPHILS # BLD AUTO: 0.03 K/UL (ref 0–0.2)
BASOPHILS NFR BLD: 0.3 % (ref 0–1.9)
BILIRUB SERPL-MCNC: 0.2 MG/DL (ref 0.1–1)
BUN SERPL-MCNC: 11 MG/DL (ref 8–23)
CALCIUM SERPL-MCNC: 9.8 MG/DL (ref 8.7–10.5)
CHLORIDE SERPL-SCNC: 96 MMOL/L (ref 95–110)
CO2 SERPL-SCNC: 31 MMOL/L (ref 23–29)
CREAT SERPL-MCNC: 0.6 MG/DL (ref 0.5–1.4)
DIFFERENTIAL METHOD: ABNORMAL
EOSINOPHIL # BLD AUTO: 0.1 K/UL (ref 0–0.5)
EOSINOPHIL NFR BLD: 0.9 % (ref 0–8)
ERYTHROCYTE [DISTWIDTH] IN BLOOD BY AUTOMATED COUNT: 16.1 % (ref 11.5–14.5)
EST. GFR  (AFRICAN AMERICAN): >60 ML/MIN/1.73 M^2
EST. GFR  (NON AFRICAN AMERICAN): >60 ML/MIN/1.73 M^2
GLUCOSE SERPL-MCNC: 153 MG/DL (ref 70–110)
HCT VFR BLD AUTO: 46.4 % (ref 37–48.5)
HGB BLD-MCNC: 14.5 G/DL (ref 12–16)
IMM GRANULOCYTES # BLD AUTO: 0.03 K/UL (ref 0–0.04)
IMM GRANULOCYTES NFR BLD AUTO: 0.3 % (ref 0–0.5)
LYMPHOCYTES # BLD AUTO: 2 K/UL (ref 1–4.8)
LYMPHOCYTES NFR BLD: 19.6 % (ref 18–48)
MCH RBC QN AUTO: 29.1 PG (ref 27–31)
MCHC RBC AUTO-ENTMCNC: 31.3 G/DL (ref 32–36)
MCV RBC AUTO: 93 FL (ref 82–98)
MONOCYTES # BLD AUTO: 0.7 K/UL (ref 0.3–1)
MONOCYTES NFR BLD: 6.4 % (ref 4–15)
NEUTROPHILS # BLD AUTO: 7.4 K/UL (ref 1.8–7.7)
NEUTROPHILS NFR BLD: 72.5 % (ref 38–73)
NRBC BLD-RTO: 0 /100 WBC
PLATELET # BLD AUTO: 173 K/UL (ref 150–350)
PMV BLD AUTO: 10.5 FL (ref 9.2–12.9)
POTASSIUM SERPL-SCNC: 4.2 MMOL/L (ref 3.5–5.1)
PROT SERPL-MCNC: 7.5 G/DL (ref 6–8.4)
RBC # BLD AUTO: 4.98 M/UL (ref 4–5.4)
SARS-COV-2 RDRP RESP QL NAA+PROBE: NEGATIVE
SODIUM SERPL-SCNC: 139 MMOL/L (ref 136–145)
WBC # BLD AUTO: 10.14 K/UL (ref 3.9–12.7)

## 2020-11-29 PROCEDURE — 63700000 PHARM REV CODE 250 ALT 637 W/O HCPCS: Performed by: EMERGENCY MEDICINE

## 2020-11-29 PROCEDURE — 63600175 PHARM REV CODE 636 W HCPCS: Performed by: NURSE PRACTITIONER

## 2020-11-29 PROCEDURE — 25000003 PHARM REV CODE 250: Performed by: EMERGENCY MEDICINE

## 2020-11-29 PROCEDURE — 36415 COLL VENOUS BLD VENIPUNCTURE: CPT

## 2020-11-29 PROCEDURE — U0002 COVID-19 LAB TEST NON-CDC: HCPCS

## 2020-11-29 PROCEDURE — 63600175 PHARM REV CODE 636 W HCPCS: Performed by: EMERGENCY MEDICINE

## 2020-11-29 PROCEDURE — 96372 THER/PROPH/DIAG INJ SC/IM: CPT | Mod: 59

## 2020-11-29 PROCEDURE — 32551 INSERTION OF CHEST TUBE: CPT

## 2020-11-29 PROCEDURE — 96365 THER/PROPH/DIAG IV INF INIT: CPT | Mod: 59

## 2020-11-29 PROCEDURE — G0378 HOSPITAL OBSERVATION PER HR: HCPCS

## 2020-11-29 PROCEDURE — 80053 COMPREHEN METABOLIC PANEL: CPT

## 2020-11-29 PROCEDURE — 96375 TX/PRO/DX INJ NEW DRUG ADDON: CPT

## 2020-11-29 PROCEDURE — 99292 CRITICAL CARE ADDL 30 MIN: CPT

## 2020-11-29 PROCEDURE — 85025 COMPLETE CBC W/AUTO DIFF WBC: CPT

## 2020-11-29 PROCEDURE — 99291 CRITICAL CARE FIRST HOUR: CPT | Mod: 25

## 2020-11-29 RX ORDER — ONDANSETRON 2 MG/ML
4 INJECTION INTRAMUSCULAR; INTRAVENOUS EVERY 6 HOURS PRN
Status: DISCONTINUED | OUTPATIENT
Start: 2020-11-29 | End: 2020-12-09 | Stop reason: HOSPADM

## 2020-11-29 RX ORDER — GLUCAGON 1 MG
1 KIT INJECTION
Status: DISCONTINUED | OUTPATIENT
Start: 2020-11-29 | End: 2020-12-09 | Stop reason: HOSPADM

## 2020-11-29 RX ORDER — IBUPROFEN 200 MG
16 TABLET ORAL
Status: DISCONTINUED | OUTPATIENT
Start: 2020-11-29 | End: 2020-12-09 | Stop reason: HOSPADM

## 2020-11-29 RX ORDER — RIFAMPIN 300 MG/1
300 CAPSULE ORAL
Status: COMPLETED | OUTPATIENT
Start: 2020-11-29 | End: 2020-11-29

## 2020-11-29 RX ORDER — POTASSIUM CHLORIDE 1.5 G/1.58G
40 POWDER, FOR SOLUTION ORAL
Status: DISCONTINUED | OUTPATIENT
Start: 2020-11-29 | End: 2020-12-09 | Stop reason: HOSPADM

## 2020-11-29 RX ORDER — IBUPROFEN 400 MG/1
400 TABLET ORAL EVERY 6 HOURS PRN
Status: ON HOLD | COMMUNITY
End: 2021-01-05 | Stop reason: HOSPADM

## 2020-11-29 RX ORDER — FERROUS SULFATE 325(65) MG
325 TABLET, DELAYED RELEASE (ENTERIC COATED) ORAL DAILY
Status: DISCONTINUED | OUTPATIENT
Start: 2020-11-30 | End: 2020-12-09 | Stop reason: HOSPADM

## 2020-11-29 RX ORDER — AZITHROMYCIN 250 MG/1
500 TABLET, FILM COATED ORAL
Status: COMPLETED | OUTPATIENT
Start: 2020-11-29 | End: 2020-11-29

## 2020-11-29 RX ORDER — LANOLIN ALCOHOL/MO/W.PET/CERES
800 CREAM (GRAM) TOPICAL
Status: DISCONTINUED | OUTPATIENT
Start: 2020-11-29 | End: 2020-12-09 | Stop reason: HOSPADM

## 2020-11-29 RX ORDER — ACETAMINOPHEN 325 MG/1
650 TABLET ORAL EVERY 4 HOURS PRN
Status: DISCONTINUED | OUTPATIENT
Start: 2020-11-29 | End: 2020-12-09 | Stop reason: HOSPADM

## 2020-11-29 RX ORDER — SODIUM CHLORIDE 0.9 % (FLUSH) 0.9 %
10 SYRINGE (ML) INJECTION
Status: DISCONTINUED | OUTPATIENT
Start: 2020-11-29 | End: 2020-12-09 | Stop reason: HOSPADM

## 2020-11-29 RX ORDER — ISONIAZID 100 MG/1
300 TABLET ORAL ONCE
Status: COMPLETED | OUTPATIENT
Start: 2020-11-29 | End: 2020-11-29

## 2020-11-29 RX ORDER — LIDOCAINE HYDROCHLORIDE AND EPINEPHRINE 10; 10 MG/ML; UG/ML
10 INJECTION, SOLUTION INFILTRATION; PERINEURAL ONCE
Status: COMPLETED | OUTPATIENT
Start: 2020-11-29 | End: 2020-11-29

## 2020-11-29 RX ORDER — IBUPROFEN 200 MG
24 TABLET ORAL
Status: DISCONTINUED | OUTPATIENT
Start: 2020-11-29 | End: 2020-12-09 | Stop reason: HOSPADM

## 2020-11-29 RX ORDER — ACETAMINOPHEN 325 MG/1
650 TABLET ORAL EVERY 6 HOURS PRN
COMMUNITY

## 2020-11-29 RX ORDER — PREDNISONE 20 MG/1
20 TABLET ORAL DAILY
Status: DISCONTINUED | OUTPATIENT
Start: 2020-11-30 | End: 2020-12-02

## 2020-11-29 RX ORDER — ENOXAPARIN SODIUM 100 MG/ML
40 INJECTION SUBCUTANEOUS EVERY 24 HOURS
Status: DISCONTINUED | OUTPATIENT
Start: 2020-11-29 | End: 2020-12-09 | Stop reason: HOSPADM

## 2020-11-29 RX ORDER — ALBUTEROL SULFATE 90 UG/1
2 AEROSOL, METERED RESPIRATORY (INHALATION) EVERY 4 HOURS PRN
Status: DISCONTINUED | OUTPATIENT
Start: 2020-11-29 | End: 2020-12-09 | Stop reason: HOSPADM

## 2020-11-29 RX ORDER — IPRATROPIUM BROMIDE AND ALBUTEROL SULFATE 2.5; .5 MG/3ML; MG/3ML
3 SOLUTION RESPIRATORY (INHALATION) EVERY 6 HOURS PRN
Status: DISCONTINUED | OUTPATIENT
Start: 2020-11-29 | End: 2020-12-07

## 2020-11-29 RX ORDER — ISONIAZID 100 MG/1
300 TABLET ORAL DAILY
Status: DISCONTINUED | OUTPATIENT
Start: 2020-11-30 | End: 2020-12-01

## 2020-11-29 RX ORDER — ATORVASTATIN CALCIUM 40 MG/1
40 TABLET, FILM COATED ORAL DAILY
Status: DISCONTINUED | OUTPATIENT
Start: 2020-11-30 | End: 2020-12-08

## 2020-11-29 RX ADMIN — RIFAMPIN 300 MG: 300 CAPSULE ORAL at 07:11

## 2020-11-29 RX ADMIN — LIDOCAINE HYDROCHLORIDE,EPINEPHRINE BITARTRATE 10 ML: 10; .01 INJECTION, SOLUTION INFILTRATION; PERINEURAL at 06:11

## 2020-11-29 RX ADMIN — ISONIAZID 300 MG: 100 TABLET ORAL at 07:11

## 2020-11-29 RX ADMIN — ENOXAPARIN SODIUM 40 MG: 100 INJECTION SUBCUTANEOUS at 11:11

## 2020-11-29 RX ADMIN — VANCOMYCIN HYDROCHLORIDE 750 MG: 750 INJECTION, POWDER, LYOPHILIZED, FOR SOLUTION INTRAVENOUS at 07:11

## 2020-11-29 RX ADMIN — PIPERACILLIN AND TAZOBACTAM 4.5 G: 4; .5 INJECTION, POWDER, LYOPHILIZED, FOR SOLUTION INTRAVENOUS; PARENTERAL at 07:11

## 2020-11-29 RX ADMIN — AZITHROMYCIN 500 MG: 250 TABLET, FILM COATED ORAL at 07:11

## 2020-11-30 ENCOUNTER — OUTSIDE PLACE OF SERVICE (OUTPATIENT)
Dept: INFECTIOUS DISEASES | Facility: CLINIC | Age: 65
End: 2020-11-30
Payer: COMMERCIAL

## 2020-11-30 DIAGNOSIS — J93.9 PNEUMOTHORAX ON RIGHT: ICD-10-CM

## 2020-11-30 PROBLEM — J96.21 ACUTE ON CHRONIC RESPIRATORY FAILURE WITH HYPOXIA: Status: ACTIVE | Noted: 2019-12-16

## 2020-11-30 PROBLEM — R64 CACHEXIA: Status: ACTIVE | Noted: 2020-11-30

## 2020-11-30 LAB
ALBUMIN SERPL BCP-MCNC: 2.3 G/DL (ref 3.5–5.2)
ALP SERPL-CCNC: 77 U/L (ref 55–135)
ALT SERPL W/O P-5'-P-CCNC: 19 U/L (ref 10–44)
ANION GAP SERPL CALC-SCNC: 13 MMOL/L (ref 8–16)
AST SERPL-CCNC: 27 U/L (ref 10–40)
BASOPHILS # BLD AUTO: 0.04 K/UL (ref 0–0.2)
BASOPHILS NFR BLD: 0.4 % (ref 0–1.9)
BILIRUB SERPL-MCNC: 0.3 MG/DL (ref 0.1–1)
BUN SERPL-MCNC: 9 MG/DL (ref 8–23)
CALCIUM SERPL-MCNC: 9.3 MG/DL (ref 8.7–10.5)
CHLORIDE SERPL-SCNC: 96 MMOL/L (ref 95–110)
CO2 SERPL-SCNC: 28 MMOL/L (ref 23–29)
CREAT SERPL-MCNC: 0.6 MG/DL (ref 0.5–1.4)
DIFFERENTIAL METHOD: ABNORMAL
EOSINOPHIL # BLD AUTO: 0.1 K/UL (ref 0–0.5)
EOSINOPHIL NFR BLD: 1.1 % (ref 0–8)
ERYTHROCYTE [DISTWIDTH] IN BLOOD BY AUTOMATED COUNT: 15.9 % (ref 11.5–14.5)
EST. GFR  (AFRICAN AMERICAN): >60 ML/MIN/1.73 M^2
EST. GFR  (NON AFRICAN AMERICAN): >60 ML/MIN/1.73 M^2
GLUCOSE SERPL-MCNC: 114 MG/DL (ref 70–110)
HCT VFR BLD AUTO: 44.2 % (ref 37–48.5)
HGB BLD-MCNC: 13.8 G/DL (ref 12–16)
IMM GRANULOCYTES # BLD AUTO: 0.03 K/UL (ref 0–0.04)
IMM GRANULOCYTES NFR BLD AUTO: 0.3 % (ref 0–0.5)
LYMPHOCYTES # BLD AUTO: 1.8 K/UL (ref 1–4.8)
LYMPHOCYTES NFR BLD: 19.3 % (ref 18–48)
MAGNESIUM SERPL-MCNC: 2 MG/DL (ref 1.6–2.6)
MCH RBC QN AUTO: 28.9 PG (ref 27–31)
MCHC RBC AUTO-ENTMCNC: 31.2 G/DL (ref 32–36)
MCV RBC AUTO: 93 FL (ref 82–98)
MONOCYTES # BLD AUTO: 0.5 K/UL (ref 0.3–1)
MONOCYTES NFR BLD: 5.5 % (ref 4–15)
NEUTROPHILS # BLD AUTO: 6.7 K/UL (ref 1.8–7.7)
NEUTROPHILS NFR BLD: 73.4 % (ref 38–73)
NRBC BLD-RTO: 0 /100 WBC
PLATELET # BLD AUTO: 353 K/UL (ref 150–350)
PLATELET BLD QL SMEAR: ABNORMAL
PMV BLD AUTO: 9.7 FL (ref 9.2–12.9)
POTASSIUM SERPL-SCNC: 3.5 MMOL/L (ref 3.5–5.1)
PROT SERPL-MCNC: 6.9 G/DL (ref 6–8.4)
RBC # BLD AUTO: 4.77 M/UL (ref 4–5.4)
SODIUM SERPL-SCNC: 137 MMOL/L (ref 136–145)
WBC # BLD AUTO: 9.08 K/UL (ref 3.9–12.7)

## 2020-11-30 PROCEDURE — 25000242 PHARM REV CODE 250 ALT 637 W/ HCPCS: Performed by: NURSE PRACTITIONER

## 2020-11-30 PROCEDURE — 25000003 PHARM REV CODE 250: Performed by: INTERNAL MEDICINE

## 2020-11-30 PROCEDURE — 99215 PR OFFICE/OUTPT VISIT, EST, LEVL V, 40-54 MIN: ICD-10-PCS | Mod: ,,, | Performed by: INTERNAL MEDICINE

## 2020-11-30 PROCEDURE — 80053 COMPREHEN METABOLIC PANEL: CPT

## 2020-11-30 PROCEDURE — 83735 ASSAY OF MAGNESIUM: CPT

## 2020-11-30 PROCEDURE — 25000003 PHARM REV CODE 250: Performed by: STUDENT IN AN ORGANIZED HEALTH CARE EDUCATION/TRAINING PROGRAM

## 2020-11-30 PROCEDURE — 85025 COMPLETE CBC W/AUTO DIFF WBC: CPT

## 2020-11-30 PROCEDURE — 99223 PR INITIAL HOSPITAL CARE,LEVL III: ICD-10-PCS | Mod: S$GLB,,, | Performed by: INTERNAL MEDICINE

## 2020-11-30 PROCEDURE — 27000221 HC OXYGEN, UP TO 24 HOURS

## 2020-11-30 PROCEDURE — 63600175 PHARM REV CODE 636 W HCPCS: Performed by: NURSE PRACTITIONER

## 2020-11-30 PROCEDURE — 99215 OFFICE O/P EST HI 40 MIN: CPT | Mod: ,,, | Performed by: INTERNAL MEDICINE

## 2020-11-30 PROCEDURE — 99223 1ST HOSP IP/OBS HIGH 75: CPT | Mod: S$GLB,,, | Performed by: INTERNAL MEDICINE

## 2020-11-30 PROCEDURE — 96376 TX/PRO/DX INJ SAME DRUG ADON: CPT

## 2020-11-30 PROCEDURE — 99900035 HC TECH TIME PER 15 MIN (STAT)

## 2020-11-30 PROCEDURE — 12000002 HC ACUTE/MED SURGE SEMI-PRIVATE ROOM

## 2020-11-30 PROCEDURE — 63600175 PHARM REV CODE 636 W HCPCS: Performed by: STUDENT IN AN ORGANIZED HEALTH CARE EDUCATION/TRAINING PROGRAM

## 2020-11-30 PROCEDURE — 94640 AIRWAY INHALATION TREATMENT: CPT

## 2020-11-30 PROCEDURE — 25000003 PHARM REV CODE 250: Performed by: NURSE PRACTITIONER

## 2020-11-30 PROCEDURE — 94761 N-INVAS EAR/PLS OXIMETRY MLT: CPT

## 2020-11-30 PROCEDURE — 94760 N-INVAS EAR/PLS OXIMETRY 1: CPT

## 2020-11-30 PROCEDURE — 63700000 PHARM REV CODE 250 ALT 637 W/O HCPCS: Performed by: INTERNAL MEDICINE

## 2020-11-30 PROCEDURE — 36415 COLL VENOUS BLD VENIPUNCTURE: CPT

## 2020-11-30 PROCEDURE — 25000242 PHARM REV CODE 250 ALT 637 W/ HCPCS: Performed by: STUDENT IN AN ORGANIZED HEALTH CARE EDUCATION/TRAINING PROGRAM

## 2020-11-30 RX ORDER — AZITHROMYCIN 250 MG/1
500 TABLET, FILM COATED ORAL DAILY
Status: DISCONTINUED | OUTPATIENT
Start: 2020-12-01 | End: 2020-11-30

## 2020-11-30 RX ORDER — AZITHROMYCIN 250 MG/1
500 TABLET, FILM COATED ORAL DAILY
Status: DISCONTINUED | OUTPATIENT
Start: 2020-11-30 | End: 2020-12-01

## 2020-11-30 RX ORDER — FLUTICASONE FUROATE AND VILANTEROL 200; 25 UG/1; UG/1
1 POWDER RESPIRATORY (INHALATION) DAILY
Status: DISCONTINUED | OUTPATIENT
Start: 2020-11-30 | End: 2020-12-09 | Stop reason: HOSPADM

## 2020-11-30 RX ADMIN — IPRATROPIUM BROMIDE AND ALBUTEROL SULFATE 3 ML: .5; 2.5 SOLUTION RESPIRATORY (INHALATION) at 03:11

## 2020-11-30 RX ADMIN — ISONIAZID 300 MG: 100 TABLET ORAL at 09:11

## 2020-11-30 RX ADMIN — VANCOMYCIN HYDROCHLORIDE 1250 MG: 1.25 INJECTION, POWDER, LYOPHILIZED, FOR SOLUTION INTRAVENOUS at 11:11

## 2020-11-30 RX ADMIN — IPRATROPIUM BROMIDE AND ALBUTEROL SULFATE 3 ML: .5; 2.5 SOLUTION RESPIRATORY (INHALATION) at 07:11

## 2020-11-30 RX ADMIN — FLUTICASONE FUROATE AND VILANTEROL TRIFENATATE 1 PUFF: 200; 25 POWDER RESPIRATORY (INHALATION) at 11:11

## 2020-11-30 RX ADMIN — PIPERACILLIN AND TAZOBACTAM 4.5 G: 4; .5 INJECTION, POWDER, LYOPHILIZED, FOR SOLUTION INTRAVENOUS; PARENTERAL at 12:11

## 2020-11-30 RX ADMIN — LEUCINE, PHENYLALANINE, LYSINE, METHIONINE, ISOLEUCINE, VALINE, HISTIDINE, THREONINE, TRYPTOPHAN, ALANINE, GLYCINE, ARGININE, PROLINE, SERINE, TYROSINE, SODIUM ACETATE, DIBASIC POTASSIUM PHOSPHATE, MAGNESIUM CHLORIDE, SODIUM CHLORIDE, CALCIUM CHLORIDE, DEXTROSE
311; 238; 247; 170; 255; 247; 204; 179; 77; 880; 438; 489; 289; 213; 17; 297; 261; 51; 77; 33; 5 INJECTION INTRAVENOUS at 12:11

## 2020-11-30 RX ADMIN — PIPERACILLIN AND TAZOBACTAM 4.5 G: 4; .5 INJECTION, POWDER, LYOPHILIZED, FOR SOLUTION INTRAVENOUS; PARENTERAL at 08:11

## 2020-11-30 RX ADMIN — AZITHROMYCIN MONOHYDRATE 500 MG: 250 TABLET ORAL at 07:11

## 2020-11-30 RX ADMIN — FERROUS SULFATE TAB EC 325 MG (65 MG FE EQUIVALENT) 325 MG: 325 (65 FE) TABLET DELAYED RESPONSE at 09:11

## 2020-11-30 RX ADMIN — ATORVASTATIN CALCIUM 40 MG: 40 TABLET, FILM COATED ORAL at 09:11

## 2020-11-30 RX ADMIN — PREDNISONE 20 MG: 20 TABLET ORAL at 09:11

## 2020-11-30 RX ADMIN — ENOXAPARIN SODIUM 40 MG: 100 INJECTION SUBCUTANEOUS at 05:11

## 2020-11-30 RX ADMIN — RIFAMPIN 450 MG: 300 CAPSULE ORAL at 09:11

## 2020-11-30 RX ADMIN — PIPERACILLIN AND TAZOBACTAM 4.5 G: 4; .5 INJECTION, POWDER, LYOPHILIZED, FOR SOLUTION INTRAVENOUS; PARENTERAL at 03:11

## 2020-11-30 RX ADMIN — ACETAMINOPHEN 650 MG: 325 TABLET ORAL at 03:11

## 2020-11-30 RX ADMIN — IPRATROPIUM BROMIDE AND ALBUTEROL SULFATE 3 ML: .5; 2.5 SOLUTION RESPIRATORY (INHALATION) at 11:11

## 2020-11-30 NOTE — PLAN OF CARE
"Cm completed the assessment with pt and spouse at bedside. Pt uses dme to get around, spouse assist pt with adl.  PCP is Dr. Bartlett. Insurance verified as [employer].  Pt and spouse denies diabetes, dialysis and coumadin. Pt is on Oxygen at 2 1/2 L per n/c from [employer]. And uses Nebulizee. Other dmes' are listed below in graft.  Disposition;  Pt will discharge to home with spouse.  Cm recommended hh on discharge.  Spouse verbalized, " we do not need hh at this time. If things changes drastically, maybe we will consider it." Cm will follow-up with pt and family.  No other needs verbalized at this time. [facility] choice [facility] on [address].       11/30/20 1035   Discharge Assessment   Assessment Type Discharge Planning Assessment   Confirmed/corrected address and phone number on facesheet? Yes   Assessment information obtained from? Patient;Caregiver   Expected Length of Stay (days) 3   Prior to hospitilization cognitive status: Alert/Oriented   Prior to hospitalization functional status: Independent;Assistive Equipment   Current cognitive status: Alert/Oriented   Current Functional Status: Independent;Assistive Equipment   Facility Arrived From: home   Lives With spouse   Able to Return to Prior Arrangements yes   Is patient able to care for self after discharge? Yes   Who are your caregiver(s) and their phone number(s)? [place]- [phone]   Patient's perception of discharge disposition home or selfcare   Readmission Within the Last 30 Days no previous admission in last 30 days   Patient currently being followed by outpatient case management? Yes   If yes, name of outpatient case management following: insurance company assigned oupatient case management   Patient currently receives any other outside agency services? No   Equipment Currently Used at Home bedside commode;walker, rolling;cane, straight;shower chair;oxygen;respiratory supplies  (transport chair)   Do you have any problems affording any of your " prescribed medications? No   Is the patient taking medications as prescribed? yes   Does the patient have transportation home? Yes   Transportation Anticipated family or friend will provide   Dialysis Name and Scheduled days na   Does the patient receive services at the Coumadin Clinic? No   Discharge Plan A Home with family   Discharge Plan B Home with family   DME Needed Upon Discharge  none   Patient/Family in Agreement with Plan yes

## 2020-11-30 NOTE — HPI
Fabiana Morel is a 65-year-old female with a past medical history of COPD, hyperlipidemia, iron deficiency anemia, and tobacco use who presents to the emergency room tonight with reports of shortness of breath.  Patient reports shortness of breath x3 weeks, has increased in severity since onset.  Patient requiring supplemental oxygen therapy for the past three weeks at 2-3 L, however has required increase in normal O2 dose over the past three days. Patient currently under the care of Dr. Mackey, pulmonology, has a history of COPD. Patient accompanied by her  during my initial interview and physical exam. a history of COPD. Patient was reportedly seen in the emergency room on 11/06 for chest pain, a CT chest was reportedly obtained revealing right lung abscess.  Patient followed with Dr. Mackey in clinic on 11/09 in which sputum samples were obtained and treatment was initiated for tuberculosis versus MAC.  Patient also reports decreased p.o. intake, utilizes boost supplementation daily.  Patient endorses productive cough x3 days, with brown sputum production.  Patient endorses dyspnea on exertion, stating she can not walk the length of the room without becoming SOB.  Patient states she quit tobacco use on 11/05/2020.  In the ER, patient noted to have right pneumothorax on chest x-ray.  ER physician placed Heimlich valve chest tube with reported immediate relief in shortness of breath.  Repeat chest x-ray revealing improvement of pneumothorax.  ER MD reportedly spoke with Dr. Mackey, pulmonology who recommended admission with IV vancomycin and Zosyn and isolation precautions.  Patient placed in observation on 11/29/2020 at approximately 9:30 p.m..  Full code status verified upon admission to the hospital.

## 2020-11-30 NOTE — ASSESSMENT & PLAN NOTE
Chronic - continue home medication, pulmonology consult. Continuous supplemental oxygen, telemetry, and pulse oximetry monitoring.

## 2020-11-30 NOTE — PROGRESS NOTES
[facility] Hospital Medicine  Progress Note    Patient Name: Fabiana Morel  MRN: [id number]  Patient Class: IP- Inpatient   Admission Date: 11/29/2020  Length of Stay: 0 days  Attending Physician: Davie Pierre MD  Primary Care Provider: Dominique Bartlett MD        Subjective:     Principal Problem:Pneumothorax on right        HPI:  Fabiana Morel is a 65-year-old female with a past medical history of COPD, hyperlipidemia, iron deficiency anemia, and tobacco use who presents to the emergency room tonight with reports of shortness of breath.  Patient reports shortness of breath x3 weeks, has increased in severity since onset.  Patient requiring supplemental oxygen therapy for the past three weeks at 2-3 L, however has required increase in normal O2 dose over the past three days. Patient currently under the care of Dr. Mackey, pulmonology, has a history of COPD. Patient accompanied by her  during my initial interview and physical exam. a history of COPD. Patient was reportedly seen in the emergency room on 11/06 for chest pain, a CT chest was reportedly obtained revealing right lung abscess.  Patient followed with Dr. Mackey in clinic on 11/09 in which sputum samples were obtained and treatment was initiated for tuberculosis versus MAC.  Patient also reports decreased p.o. intake, utilizes boost supplementation daily.  Patient endorses productive cough x3 days, with brown sputum production.  Patient endorses dyspnea on exertion, stating she can not walk the length of the room without becoming SOB.  Patient states she quit tobacco use on 11/05/2020.  In the ER, patient noted to have right pneumothorax on chest x-ray.  ER physician placed Heimlich valve chest tube with reported immediate relief in shortness of breath.  Repeat chest x-ray revealing improvement of pneumothorax.  ER MD reportedly spoke with Dr. Mackey, pulmonology who recommended admission with IV vancomycin and Zosyn and isolation  precautions.  Patient placed in observation on 11/29/2020 at approximately 9:30 p.m..  Full code status verified upon admission to the hospital.    Overview/Hospital Course:  No notes on file    Interval History:  Notes reviewed, no acute events overnight.  Chest tube functioning properly.  Patient states shortness of breath improved today.  She denies any acute pain at this time.    Review of Systems   Constitutional: Negative for chills, fatigue and fever.   HENT: Negative for congestion, sore throat and trouble swallowing.    Eyes: Negative for photophobia and visual disturbance.   Respiratory: Positive for shortness of breath. Negative for cough and chest tightness.    Cardiovascular: Negative for chest pain, palpitations and leg swelling.   Gastrointestinal: Negative for abdominal pain, diarrhea, nausea and vomiting.   Endocrine: Negative for polyphagia and polyuria.   Genitourinary: Negative for difficulty urinating, dysuria and urgency.   Musculoskeletal: Negative for arthralgias, back pain and gait problem.   Skin: Negative for color change, pallor, rash and wound.   Neurological: Negative for dizziness, weakness and light-headedness.   Hematological: Negative for adenopathy.   Psychiatric/Behavioral: Negative for agitation, behavioral problems and confusion.   All other systems reviewed and are negative.    Objective:     Vital Signs (Most Recent):  Temp: 97.2 °F (36.2 °C) (11/30/20 0759)  Pulse: (!) 112 (11/30/20 1135)  Resp: 18 (11/30/20 1135)  BP: 114/69 (11/30/20 0759)  SpO2: 99 % (11/30/20 1133) Vital Signs (24h Range):  Temp:  [97.2 °F (36.2 °C)-98.8 °F (37.1 °C)] 97.2 °F (36.2 °C)  Pulse:  [] 112  Resp:  [15-93] 18  SpO2:  [93 %-99 %] 99 %  BP: (114-149)/(58-90) 114/69     Weight: 42.5 kg (93 lb 11.1 oz)  Body mass index is 16.08 kg/m².    Intake/Output Summary (Last 24 hours) at 11/30/2020 1422  Last data filed at 11/30/2020 0339  Gross per 24 hour   Intake 100 ml   Output --   Net 100 ml       Physical Exam  Vitals signs and nursing note reviewed.   Constitutional:       General: She is not in acute distress.     Appearance: Normal appearance. She is well-developed. She is not ill-appearing.   HENT:      Head: Normocephalic and atraumatic.      Right Ear: External ear normal.      Left Ear: External ear normal.      Nose: Nose normal. No congestion or rhinorrhea.      Mouth/Throat:      Mouth: Mucous membranes are moist.      Pharynx: Oropharynx is clear. No oropharyngeal exudate or posterior oropharyngeal erythema.   Eyes:      General: No scleral icterus.     Conjunctiva/sclera: Conjunctivae normal.      Pupils: Pupils are equal, round, and reactive to light.   Neck:      Musculoskeletal: Normal range of motion and neck supple.      Vascular: No JVD.   Cardiovascular:      Rate and Rhythm: Normal rate and regular rhythm.      Pulses: Normal pulses.      Heart sounds: Normal heart sounds. No murmur.   Pulmonary:      Effort: Pulmonary effort is normal. No respiratory distress.      Breath sounds: Rhonchi and rales present.      Comments: Chest tube intact to Pleur-Evac.  Scattered rhonchi and rales to bilateral lobes  Chest:      Chest wall: No tenderness.   Abdominal:      General: Bowel sounds are normal.      Palpations: Abdomen is soft.      Tenderness: There is no abdominal tenderness.   Musculoskeletal: Normal range of motion.         General: No swelling or tenderness.   Skin:     General: Skin is warm and dry.      Capillary Refill: Capillary refill takes 2 to 3 seconds.      Coloration: Skin is not jaundiced or pale.   Neurological:      General: No focal deficit present.      Mental Status: She is alert and oriented to person, place, and time.      Cranial Nerves: No cranial nerve deficit.      Sensory: No sensory deficit.   Psychiatric:         Mood and Affect: Mood normal.         Behavior: Behavior normal.         Thought Content: Thought content normal.         Significant Labs:   CBC:    Recent Labs   Lab 11/29/20  1920 11/30/20  0810   WBC 10.14 9.08   HGB 14.5 13.8   HCT 46.4 44.2    353*     CMP:   Recent Labs   Lab 11/29/20  1858 11/30/20  0810    137   K 4.2 3.5   CL 96 96   CO2 31* 28   * 114*   BUN 11 9   CREATININE 0.6 0.6   CALCIUM 9.8 9.3   PROT 7.5 6.9   ALBUMIN 2.7* 2.3*   BILITOT 0.2 0.3   ALKPHOS 78 77   AST 33 27   ALT 24 19   ANIONGAP 12 13   EGFRNONAA >60 >60     All pertinent labs within the past 24 hours have been reviewed.    Significant Imaging: I have reviewed all pertinent imaging results/findings within the past 24 hours.      Assessment/Plan:      * Pneumothorax on right  Heimlich valve placed per ER MD, repeat chest xray reviewed, noted to have improvement of pneumothorax. Utilize continuous supplemental oxygen, maintain chest tube. Pulmonology consulted. Continuous tele and pulse oximetry monitoring. Repeat chest xray in the AM reviewed        Cachexia  Secondary to protein malnutrition and poor oral intake  Supplement with boost      Mycobacterium avium complex  Patient states at last office visit with Dr. Mackey she was initiated on treatment for TB vs MAC. States since, Dr. Mackey notified her that she does not have TB. Will continue home rifampin, isoniazid, and prednisone. Was initiated on IV Vanc and Zosyn in ER - will continue per Pulmonary recommendations.  Isolation.   Infectious disease consult      Hypoalbuminemia due to protein-calorie malnutrition  Noted, encourage PO intake - boost supplementation.        Iron deficiency  Chronic, continue home iron supplementation.        Acute on chronic respiratory failure with hypoxia  Noted, continue the use of supplemental O2.        COPD (chronic obstructive pulmonary disease)  Chronic - continue home medication, pulmonology consult. Continuous supplemental oxygen, telemetry, and pulse oximetry monitoring.        Lung nodule  Managed by Dr. Mackey      Hyperlipidemia    Chronic, controlled. Will  continue home medication.    Lab Results   Component Value Date    CHOL 187 08/21/2020    CHOL 182 05/22/2020    CHOL 182 06/25/2019     Lab Results   Component Value Date    HDL 55 08/21/2020    HDL 57 05/22/2020    HDL 65 06/25/2019     Lab Results   Component Value Date    LDLCALC 105.4 08/21/2020    LDLCALC 102.2 05/22/2020    LDLCALC 97.6 06/25/2019     Lab Results   Component Value Date    TRIG 133 08/21/2020    TRIG 114 05/22/2020    TRIG 97 06/25/2019     Lab Results   Component Value Date    CHOLHDL 29.4 08/21/2020    CHOLHDL 31.3 05/22/2020    CHOLHDL 35.7 06/25/2019               VTE Risk Mitigation (From admission, onward)         Ordered     enoxaparin injection 40 mg  Every 24 hours      11/29/20 2224     IP VTE HIGH RISK PATIENT  Once      11/29/20 2224     Place sequential compression device  Until discontinued      11/29/20 2224     Place STEPHANIE hose  Until discontinued      11/29/20 2224                Discharge Planning   DION:      Code Status: Full Code   Is the patient medically ready for discharge?:     Reason for patient still in hospital (select all that apply): Treatment and Consult recommendations  Discharge Plan A: Home with family                  Rosa Rogers NP  Department of Hospital Medicine   [facility]

## 2020-11-30 NOTE — ASSESSMENT & PLAN NOTE
Patient states at last office visit with Dr. Mackey she was initiated on treatment for TB vs MAC. States since, Dr. Mackey notified her that she does not have TB. Will continue home rifampin, isoniazid, and prednisone. Was initiated on IV Vanc and Zosyn in ER - will continue upon admission. Isolation.

## 2020-11-30 NOTE — ASSESSMENT & PLAN NOTE
Chronic, controlled. Will continue home medication.    Lab Results   Component Value Date    CHOL 187 08/21/2020    CHOL 182 05/22/2020    CHOL 182 06/25/2019     Lab Results   Component Value Date    HDL 55 08/21/2020    HDL 57 05/22/2020    HDL 65 06/25/2019     Lab Results   Component Value Date    LDLCALC 105.4 08/21/2020    LDLCALC 102.2 05/22/2020    LDLCALC 97.6 06/25/2019     Lab Results   Component Value Date    TRIG 133 08/21/2020    TRIG 114 05/22/2020    TRIG 97 06/25/2019     Lab Results   Component Value Date    CHOLHDL 29.4 08/21/2020    CHOLHDL 31.3 05/22/2020    CHOLHDL 35.7 06/25/2019

## 2020-11-30 NOTE — PROGRESS NOTES
Pharmacokinetic Initial Assessment: IV Vancomycin    Assessment/Plan:    Initiated intravenous vancomycin with ED dose of 750 mg once followed by a maintenance dose of vancomycin 1250mg IV every 24 hours  Desired empiric serum trough concentration is 15 to 20 mcg/mL  Draw vancomycin trough level 60 min prior to third dose on 12-1 at approximately 0930  Pharmacy will continue to follow and monitor vancomycin.      Please contact pharmacy at extension [phone] with any questions regarding this assessment.     Thank you for the consult,   Mehreen Denny       Patient brief summary:  Fabiana Morel is a 65 y.o. female initiated on antimicrobial therapy with IV Vancomycin for treatment of suspected lower respiratory infection    Drug Allergies:   Review of patient's allergies indicates:   Allergen Reactions    Erythromycin Nausea Only       Actual Body Weight:   44.5 kg    Renal Function:   Estimated Creatinine Clearance: 63.6 mL/min (based on SCr of 0.6 mg/dL).,         CBC (last 72 hours):  Recent Labs   Lab Result Units 11/29/20  1920   WBC K/uL 10.14   Hemoglobin g/dL 14.5   Hematocrit % 46.4   Platelets K/uL 173   Gran % % 72.5   Lymph % % 19.6   Mono % % 6.4   Eosinophil % % 0.9   Basophil % % 0.3   Differential Method  Automated       Metabolic Panel (last 72 hours):  Recent Labs   Lab Result Units 11/29/20  1858   Sodium mmol/L 139   Potassium mmol/L 4.2   Chloride mmol/L 96   CO2 mmol/L 31*   Glucose mg/dL 153*   BUN mg/dL 11   Creatinine mg/dL 0.6   Albumin g/dL 2.7*   Total Bilirubin mg/dL 0.2   Alkaline Phosphatase U/L 78   AST U/L 33   ALT U/L 24       Drug levels (last 3 results):  No results for input(s): VANCOMYCINRA, VANCOMYCINPE, VANCOMYCINTR in the last 72 hours.    Microbiologic Results:  Microbiology Results (last 7 days)       ** No results found for the last 168 hours. **

## 2020-11-30 NOTE — ASSESSMENT & PLAN NOTE
Heimlich valve placed per ER MD, repeat chest xray reviewed, noted to have improvement of pneumothorax. Utilize continuous supplemental oxygen, maintain chest tube. Pulmonology consulted. Continuous tele and pulse oximetry monitoring. Repeat chest xray in the AM.

## 2020-11-30 NOTE — CONSULTS
11/30/2020      Admit Date: 11/29/2020  Fabiana Morel  New Patient Consult    Chief Complaint   Patient presents with    Shortness of Breath       History of Present Illness:    smoker, severe copd , h/o recurrent pneumonias with neg bronch in 2017.   Pt has some vague immune def.   Had recent ct chest with non  Resolving cavitary disease ppt eval with pos afb early November.  Tb gold neg, and pt rx on  Inh/rif/azithr pending id. dna probe requested.  Pt continued with night sweats - chronic? And had  Brown mucous increase with sob 3 wks ago.  Pt had decreased appetite taking in boost only, and became bed bound.  Breathing worsened - 02 needs increased from2.5to 3.5-subjective sob.      Pt was not acutely symptomatic when mycobacterial rx started but worsened.  She was to call if worsened.  She did stop smoking.    Er eval  Pm showed right pneumo- very large. Thoracic vent to heimlich valve done with some re inflation.     Pt still sob.         PFSH:  Past Medical History:   Diagnosis Date    Abnormal CT scan 1/8/2015    Asthma     Colitis     COPD (chronic obstructive pulmonary disease)     Diverticulitis of sigmoid colon 11/4/2014    Erosive gastritis 7/17/2013    Fracture of left clavicle     H pylori ulcer 7/17/2013    Hypertension     Peptic ulcer disease 7/17/2013    Pneumonia of right upper lobe due to infectious organism 8/15/2017    Rectal bleed 11/3/2014    Scoliosis     SOB (shortness of breath)     Yeast infection 8/25/2017     Past Surgical History:   Procedure Laterality Date    APPENDECTOMY      BREAST BIOPSY Right     cyst    COLONOSCOPY N/A 7/15/2019    Procedure: COLONOSCOPY;  Surgeon: Sharif Chun MD;  Location: [facility] ENDO;  Service: Endoscopy;  Laterality: N/A;    HIP ARTHROPLASTY Right 12/18/2019    Procedure: ARTHROPLASTY, HIP;  Surgeon: Bernardino Wilson II, MD;  Location: [facility] OR;  Service: Orthopedics;  Laterality: Right;  Jose Rodriguez     "HYSTERECTOMY      HASMUKH/BSO, DUB    OOPHORECTOMY      TUBAL LIGATION       Social History     Tobacco Use    Smoking status: Former Smoker     Packs/day: 0.50     Years: 30.00     Pack years: 15.00     Types: Cigarettes    Smokeless tobacco: Never Used    Tobacco comment: Quit on 11-5-2020   Substance Use Topics    Alcohol use: Not Currently     Alcohol/week: 12.0 standard drinks     Types: 12 Cans of beer per week    Drug use: No     Family History   Problem Relation Age of Onset    Cancer Mother 49        "lymph nodes"    Cancer Sister         lung?     Review of patient's allergies indicates:   Allergen Reactions    Erythromycin Nausea Only       Performance Status:Performance Status:The patient's activity level is bedbound.    Review of Systems:  a review of eleven systems covering constitutional, Psych, Eye, HEENT, Respiratory, Cardiac, GI, , Musculoskeletal, Endocrine, Dermatologicwas negative except the above mentioned abnormalities and for any pertinent findings as listed below:  pertinent positive as above, rest is good         Exam:Comprehensive exam done. /69 (BP Location: Right arm, Patient Position: Sitting)   Pulse (!) 115   Temp 97.2 °F (36.2 °C) (Axillary)   Resp (!) 22   Ht 5' 4" (1.626 m)   Wt 42.5 kg (93 lb 11.1 oz)   SpO2 (!) 94%   BMI 16.08 kg/m²   Exam included Vitals as listed, and patient's appearance and affect and alertness and mood, oral exam for yeast and hygiene and pharynx lesions and Mallapatti (M) score, neck with inspection for jvd and masses and thyroid abnormalities and lymph nodes (supraclavicular and infraclavicular nodes also examined and noted if abn), chest exam included symmetry and effort and fremitus and percussion and auscultation, cardiac exam included rhythm and gallops and murmur and rubs and jvd and edema, abdominal exam for mass and hepatosplenomegaly and tenderness and hernias and bowel sounds, Musculoskeletal exam with muscle tone and posture " and mobility/gait and  strenght, and skin for rashes and cyanosis and pallor and turgor, extremity for clubbing.  Findings were normal except as listed below:  M1, frail,cachexia, chest is symmetric, no distress, normal percussion, normal fremitus and  breath sounds- vent with air leak,    Radiographs reviewed: view by direct vision    Results for orders placed during the hospital encounter of 10/20/17   X-Ray Chest 1 View    Narrative Single portable view of the chest is provided. The cardiac size is within normal limits. There is increased density and consolidation in the right upper lobe infiltrate and scarring. Left upper lobe lesion is unchanged. No pneumothorax is noted.    Impression  Increased density and consolidation in the right upper lobe infiltrate. Unchanged left upper lobe nodule. No pneumothorax seen      Electronically signed by: Sheldon Colmenares MD  Date:     10/20/17  Time:    08:57    ]    Labs     Recent Labs   Lab 11/29/20  1920   WBC 10.14   HGB 14.5   HCT 46.4        Recent Labs   Lab 11/29/20  1858      K 4.2   CL 96   CO2 31*   BUN 11   CREATININE 0.6   *   CALCIUM 9.8   AST 33   ALT 24   ALKPHOS 78   BILITOT 0.2   PROT 7.5   ALBUMIN 2.7*   No results for input(s): PH, PCO2, PO2, HCO3 in the last 24 hours.  Microbiology Results (last 7 days)     Procedure Component Value Units Date/Time    Culture, Respiratory with Gram Stain [[id number]]     Order Status: No result Specimen: Respiratory from Sputum, Expectorated           Impression:  Active Hospital Problems    Diagnosis  POA    *Pneumothorax on right [J93.9]  Yes    Cachexia [R64]  Yes    Hypoalbuminemia due to protein-calorie malnutrition [E88.09, E46]  Yes    Mycobacterium avium complex [A31.0]  Yes    Iron deficiency [E61.1]  Yes    Immune deficiency disorder [D84.9]  Yes    Acute on chronic respiratory failure with hypoxia [J96.21]  Yes    Bronchiectasis [J47.9]  Yes    COPD (chronic obstructive  pulmonary disease) [J44.9]  Yes    Hyperlipidemia [E78.5]  Yes    Lung nodule [R91.1]  Yes     Established with Dr. Mackey, had CT in 12/15.  Demonstrated multiple lung nodules, several spiculated masses        Resolved Hospital Problems   No resolved problems to display.               Plan: will need to get id mycobacteria, will ask id to see, needs better pneumo treatment. Ppn,  Will need therpay.  Could have pseudomonas/mras complicating.   Needs sputum culture.

## 2020-11-30 NOTE — CONSULTS
[facility]  Department of Cardiothoracic Surgery  Consult Note      PATIENT NAME: Fabiana Morel  MRN: [id number]  TODAY'S DATE: 11/30/2020  ADMIT DATE: 11/29/2020                                                                 CONSULT REQUESTED BY: Davie Pierre MD      PRINCIPAL PROBLEM: Pneumothorax on right      REASON FOR CONSULT:Persistent R pneumothorax post vent placement        HPI: Chart reviewed. Admitted through ER with SOB and large R pneumothorax. Vent placed and lung partially re expanded. Placed to suction by Dr Mackey this morning. Discussed with patient and  at bedside. She is much more comfortable now than earlier today.         Review of patient's allergies indicates:   Allergen Reactions    Erythromycin Nausea Only       REVIEW OF SYSTEMS  CARDIOVASCULAR: No recent chest pain, palpitations, arm, neck, or jaw pain  RESPIRATORY: No recent fever, cough chills, SOB or congestion  : No blood in the urine  GI: No Nausea, vomiting, constipation, diarrhea, blood, or reflux  MUSCULOSKELETAL: No myalgias  NEURO: No lightheadedness or dizziness  EYES: No Double vision, blurry, vision or headache       VITAL SIGNS (Most Recent)  Temp: 97.1 °F (36.2 °C) (11/30/20 1601)  Pulse: 88 (11/30/20 1601)  Resp: 18 (11/30/20 1601)  BP: 126/78 (11/30/20 1601)  SpO2: 98 % (11/30/20 1601)    VENTILATION STATUS  Resp: 18 (11/30/20 1601)  SpO2: 98 % (11/30/20 1601)       I & O (Last 24H):    Intake/Output Summary (Last 24 hours) at 11/30/2020 1746  Last data filed at 11/30/2020 0339  Gross per 24 hour   Intake 100 ml   Output --   Net 100 ml       WEIGHTS  Wt Readings from Last 1 Encounters:   11/29/20 2223 42.5 kg (93 lb 11.1 oz)   11/29/20 1737 44.5 kg (98 lb)       PHYSICAL EXAM  CONSTITUTIONAL: Well built, well nourished in no apparent distress  NECK: supple  LUNGS: CTA  CHEST WALL: No tenderness  HEART: regular rate and rhythm, S1, S2 normal, no murmur, click, rub or gallop   ABDOMEN: soft,  non-tender; bowel sounds normal; no masses, no organomegaly  EXTREMITIES: Extremities no edema  NEURO: AAO X 3    MEDICATIONS   SCHEDULED MEDS:   atorvastatin  40 mg Oral Daily    azithromycin  500 mg Oral Daily    enoxaparin  40 mg Subcutaneous Q24H    ferrous sulfate  325 mg Oral Daily    fluticasone furoate-vilanteroL  1 puff Inhalation Daily    isoniazid  300 mg Oral Daily    piperacillin-tazobactam (ZOSYN) IVPB  4.5 g Intravenous Q8H    predniSONE  20 mg Oral Daily    rifAMpin  450 mg Oral Daily    vancomycin (VANCOCIN) IVPB  1,250 mg Intravenous Q24H       CONTINUOUS INFUSIONS:   Amino acid 4.25% - dextrose 5% (CLINIMIX-E) solution with additives (1L provides 42.5 gm AA, 50 gm CHO (170 kcal/L dextrose), Na 35, K 30, Mg 5, Ca 4.5, Acetate 70, Cl 39, Phos 15) 75 mL/hr at 11/30/20 1201       PRN MEDS:acetaminophen, albuterol, albuterol-ipratropium, dextrose 50%, dextrose 50%, glucagon (human recombinant), glucose, glucose, magnesium oxide, magnesium oxide, ondansetron, potassium chloride, potassium chloride, sodium chloride 0.9%, Pharmacy to dose Vancomycin consult **AND** vancomycin - pharmacy to dose    LABS AND DIAGNOSTICS   CBC LAST 3 DAYS  Recent Labs   Lab 11/29/20  1920 11/30/20  0810   WBC 10.14 9.08   RBC 4.98 4.77   HGB 14.5 13.8   HCT 46.4 44.2   MCV 93 93   MCH 29.1 28.9   MCHC 31.3* 31.2*   RDW 16.1* 15.9*    353*   MPV 10.5 9.7   GRAN 72.5  7.4 73.4*  6.7   LYMPH 19.6  2.0 19.3  1.8   MONO 6.4  0.7 5.5  0.5   BASO 0.03 0.04   NRBC 0 0       COAGULATION LAST 3 DAYS  No results for input(s): LABPT, INR, APTT in the last 168 hours.    CHEMISTRY LAST 3 DAYS  Recent Labs   Lab 11/29/20  1858 11/30/20  0810    137   K 4.2 3.5   CL 96 96   CO2 31* 28   ANIONGAP 12 13   BUN 11 9   CREATININE 0.6 0.6   * 114*   CALCIUM 9.8 9.3   MG  --  2.0   ALBUMIN 2.7* 2.3*   PROT 7.5 6.9   ALKPHOS 78 77   ALT 24 19   AST 33 27   BILITOT 0.2 0.3       CARDIAC PROFILE LAST 3 DAYS  No  results for input(s): BNP, CPK, CPKMB, LDH, TROPONINI in the last 168 hours.    ENDOCRINE LAST 3 DAYS  No results for input(s): TSH, PROCAL in the last 168 hours.    LAST ARTERIAL BLOOD GAS  ABG  No results for input(s): PH, PO2, PCO2, HCO3, BE in the last 168 hours.    LAST 7 DAYS MICROBIOLOGY   Microbiology Results (last 7 days)     Procedure Component Value Units Date/Time    Culture, Respiratory with Gram Stain [[id number]]     Order Status: No result Specimen: Respiratory from Sputum, Expectorated     Culture, Respiratory with Gram Stain [[id number]]     Order Status: No result Specimen: Respiratory from Sputum, Expectorated           MOST RECENT IMAGING  X-Ray Chest 1 View  Narrative: EXAMINATION:  XR CHEST 1 VIEW    CLINICAL HISTORY:  Chest tube placement, Pneumo;    TECHNIQUE:  Single frontal view of the chest was performed.    COMPARISON:  Radiograph 1 day prior    FINDINGS:  Patient rotated slightly to the left.    There is biapical scarring.  No new airspace disease in the left lung.  There is airspace disease in the right base and slight right lung volume loss.  Normal sized heart.  Right pneumothorax with decompression tube in place.  Impression: Unchanged small volume right pneumothorax with chest tube in place.  New airspace disease at the right base suspicious for atelectasis or edema.  COPD.    Electronically signed by: Blaine Navarro  Date:    11/30/2020  Time:    08:39  X-Ray Chest AP Portable  Narrative: EXAMINATION:  XR CHEST AP PORTABLE    CLINICAL HISTORY:  Pneumothorax, unspecified    TECHNIQUE:  Single frontal view of the chest was performed.    COMPARISON:  Radiograph earlier same date    FINDINGS:  Pleuroparenchymal scar at the lung apices.  Partial re-expansion of the right lung.  No new airspace disease on the left.  There is less midline shift.  There is a right pneumothorax with interval placement of a decompression device.  Impression: Partial correction of a right-sided tension  pneumothorax.    Electronically signed by: Blaine Navarro  Date:    11/30/2020  Time:    08:28  X-Ray Chest AP Portable  Narrative: EXAMINATION:  XR CHEST AP PORTABLE    CLINICAL HISTORY:  Chest Pain;    TECHNIQUE:  Single frontal view of the chest was performed.    COMPARISON:  11/01/2020    FINDINGS:  Pleuroparenchymal scar at both apices.  Partial collapse of the right lung.  No new airspace disease on the right.  Right to left mediastinal shift.  Normal sized heart.  There is a right-sided pneumothorax.  No left pneumothorax or pleural fluid.  Impression: Right-sided tension pneumothorax, including partial collapse of the right lung.    Per chart review, ordering physician was aware of the finding at the time of image acquisition.    Electronically signed by: Blaine Navarro  Date:    11/30/2020  Time:    08:24      LASTECHO  No results found for this or any previous visit.    CURRENT/PREVIOUS VISIT EKG  Results for orders placed or performed during the hospital encounter of 11/01/20   EKG 12-lead    Collection Time: 11/01/20  7:44 PM    Narrative    Test Reason : R07.9,    Vent. Rate : 116 BPM     Atrial Rate : 116 BPM     P-R Int : 176 ms          QRS Dur : 072 ms      QT Int : 320 ms       P-R-T Axes : 085 012 078 degrees     QTc Int : 444 ms    Sinus tachycardia with Premature atrial complexes with Aberrant conduction  Anteroseptal infarct ,age undetermined  Abnormal ECG  When compared with ECG of 15-DEC-2019 22:05,  Aberrant conduction is now Present  Anteroseptal infarct is now Present  Confirmed by Joel ZAPATA, Rigo FLOWERS ([id number]) on 11/20/2020 5:31:25 PM    Referred By: [email or web address]   SELF           Confirmed By:Rigo Maldonado MD         HOSPITAL PROBLEMS WITH ASSESSMENT & PLAN:     Active Hospital Problems    Diagnosis    *Pneumothorax on right    Cachexia    Hypoalbuminemia due to protein-calorie malnutrition    Mycobacterium avium complex    Iron deficiency    Immune deficiency disorder    Acute on  chronic respiratory failure with hypoxia    Bronchiectasis    COPD (chronic obstructive pulmonary disease)    Hyperlipidemia    Lung nodule     Established with Dr. Mackey, had CT in 12/15.  Demonstrated multiple lung nodules, several spiculated masses         ASSESSMENT & PLAN:  Spontaneous R pneumothorax      RECOMMENDATIONS:  Repeated CXR this afternoon and pneumo is now significantly less than earlier with now only a small basal component left.   Will continue present management and observe  Repeat CXR in AM        Thank you for the consult.     Reilly Bucio MD  [facility]  Department of Cardiothoracic Surgery  Date of Service: 11/30/2020  5:46 PM

## 2020-11-30 NOTE — RESPIRATORY THERAPY
11/30/20 0358   Patient Assessment/Suction   Level of Consciousness (AVPU) alert   Respiratory Effort Severe;Labored   Expansion/Accessory Muscles/Retractions abdominal muscle use;accessory muscle use   AUTUMN Breath Sounds diminished   LLL Breath Sounds diminished   RUL Breath Sounds crackles, fine   RML Breath Sounds crackles, fine   RLL Breath Sounds diminished   Rhythm/Pattern, Respiratory shortness of breath   PRE-TX-O2   O2 Device (Oxygen Therapy) nasal cannula   $ Is the patient on Low Flow Oxygen? Yes   Flow (L/min) 4   SpO2 95 %   Pulse Oximetry Type Continuous   $ Pulse Oximetry - Multiple Charge Pulse Oximetry - Multiple   Pulse 109   Resp (!) 24   Aerosol Therapy   $ Aerosol Therapy Charges Aerosol Treatment   Respiratory Treatment Status (SVN) given   Treatment Route (SVN) mask   Patient Position (SVN) HOB elevated   Post Treatment Assessment (SVN) breath sounds unchanged   Signs of Intolerance (SVN) none   Breath Sounds Post-Respiratory Treatment   Throughout All Fields Post-Treatment All Fields   Throughout All Fields Post-Treatment no change   Post-treatment Heart Rate (beats/min) 109   Post-treatment Resp Rate (breaths/min) 24

## 2020-11-30 NOTE — SUBJECTIVE & OBJECTIVE
Past Medical History:   Diagnosis Date    Abnormal CT scan 1/8/2015    Asthma     Colitis     COPD (chronic obstructive pulmonary disease)     Diverticulitis of sigmoid colon 11/4/2014    Erosive gastritis 7/17/2013    Fracture of left clavicle     H pylori ulcer 7/17/2013    Hypertension     Peptic ulcer disease 7/17/2013    Pneumonia of right upper lobe due to infectious organism 8/15/2017    Rectal bleed 11/3/2014    Scoliosis     SOB (shortness of breath)     Yeast infection 8/25/2017       Past Surgical History:   Procedure Laterality Date    APPENDECTOMY      BREAST BIOPSY Right     cyst    COLONOSCOPY N/A 7/15/2019    Procedure: COLONOSCOPY;  Surgeon: Sharif Chun MD;  Location: [facility] ENDO;  Service: Endoscopy;  Laterality: N/A;    HIP ARTHROPLASTY Right 12/18/2019    Procedure: ARTHROPLASTY, HIP;  Surgeon: Bernardino Wilson II, MD;  Location: [facility] OR;  Service: Orthopedics;  Laterality: Right;  Jose - Morel and Nephew    HYSTERECTOMY      HASMUKH/BSO, DUB    OOPHORECTOMY      TUBAL LIGATION         Review of patient's allergies indicates:   Allergen Reactions    Erythromycin Nausea Only       No current facility-administered medications on file prior to encounter.      Current Outpatient Medications on File Prior to Encounter   Medication Sig    albuterol-ipratropium (DUO-NEB) 2.5 mg-0.5 mg/3 mL nebulizer solution Take 3 mLs by nebulization every 6 (six) hours as needed for Wheezing.    alendronate (FOSAMAX) 10 MG Tab Take 1 tablet (10 mg total) by mouth once daily.    ALPRAZolam (XANAX) 0.25 MG tablet Take 1 tablet (0.25 mg total) by mouth 3 (three) times daily as needed. (Patient not taking: Reported on 8/28/2020)    amoxicillin-clavulanate 875-125mg (AUGMENTIN) 875-125 mg per tablet Take 1 tablet by mouth every 12 (twelve) hours. (Patient not taking: Reported on 11/9/2020)    atorvastatin (LIPITOR) 40 MG tablet Take 1 tablet (40 mg total) by mouth once daily.    azithromycin  (ZITHROMAX) 500 MG tablet One daily for yellow mucous, repeat if needed    ferrous sulfate (FEOSOL) 325 mg (65 mg iron) Tab tablet Take 325 mg by mouth 2 (two) times a day.    fluticasone-umeclidin-vilanter (TRELEGY ELLIPTA) 100-62.5-25 mcg DsDv Inhale 1 puff into the lungs once daily.    isoniazid (NYDRAZID) 300 MG Tab Take 1 tablet (300 mg total) by mouth once daily.    megestroL (MEGACE) 400 mg/10 mL (40 mg/mL) Susp Take 5 mLs (200 mg total) by mouth once daily.    predniSONE (DELTASONE) 20 MG tablet Take 20 mg by mouth once daily.    rifAMpin (RIFADIN) 150 MG capsule Take 3 capsules (450 mg total) by mouth once daily.    VENTOLIN HFA 90 mcg/actuation inhaler Inhale 2 puffs into the lungs every 4 (four) hours as needed for Wheezing.     Family History     Problem Relation (Age of Onset)    Cancer Mother (49), Sister        Tobacco Use    Smoking status: Former Smoker     Packs/day: 0.50     Years: 30.00     Pack years: 15.00     Types: Cigarettes    Smokeless tobacco: Never Used    Tobacco comment: Quit on 11-5-2020   Substance and Sexual Activity    Alcohol use: Not Currently     Alcohol/week: 12.0 standard drinks     Types: 12 Cans of beer per week    Drug use: No    Sexual activity: Yes     Partners: Male     Review of Systems   Constitutional: Positive for activity change, appetite change and fatigue. Negative for chills and fever.   HENT: Negative for congestion, sinus pressure, sinus pain and sore throat.    Eyes: Negative for photophobia and visual disturbance.   Respiratory: Positive for cough, shortness of breath and wheezing. Negative for choking and chest tightness.         + CAMPOS     Cardiovascular: Negative for chest pain, palpitations and leg swelling.   Gastrointestinal: Negative for abdominal distention, abdominal pain, blood in stool, constipation, diarrhea, nausea and vomiting.   Genitourinary: Negative for difficulty urinating, dysuria, flank pain and hematuria.   Musculoskeletal:  Negative for back pain and joint swelling.   Skin: Negative for rash and wound.   Neurological: Positive for weakness. Negative for dizziness, syncope, light-headedness, numbness and headaches.   Psychiatric/Behavioral: Negative for confusion. The patient is not nervous/anxious.      Objective:     Vital Signs (Most Recent):  Temp: 98.6 °F (37 °C) (11/29/20 1737)  Pulse: 105 (11/29/20 2031)  Resp: (!) 93 (11/29/20 1737)  BP: (!) 128/90 (11/29/20 2031)  SpO2: 98 % (11/29/20 2031) Vital Signs (24h Range):  Temp:  [98.6 °F (37 °C)] 98.6 °F (37 °C)  Pulse:  [] 105  Resp:  [93] 93  SpO2:  [93 %-98 %] 98 %  BP: (115-144)/(58-90) 128/90     Weight: 44.5 kg (98 lb)  Body mass index is 16.82 kg/m².    Physical Exam  Vitals signs and nursing note reviewed.   Constitutional:       General: She is not in acute distress.     Appearance: She is well-developed. She is not diaphoretic.      Comments: Chronically ill-appearing   HENT:      Head: Normocephalic and atraumatic.   Eyes:      General:         Right eye: No discharge.         Left eye: No discharge.      Pupils: Pupils are equal, round, and reactive to light.   Neck:      Musculoskeletal: Normal range of motion.      Vascular: No JVD.   Cardiovascular:      Rate and Rhythm: Normal rate and regular rhythm.      Heart sounds: Normal heart sounds.   Pulmonary:      Effort: Pulmonary effort is normal. No respiratory distress.      Comments: Patient on 5 L supplemental oxygen via nasal cannula, in no acute respiratory distress.  Patient with no significant increased work of breathing noted.  Chest tube in place to right anterior chest wall.  Diminished breath sounds noted to right upper lung field, mild expiratory wheezing noted to left upper lung field.  Chest:      Chest wall: No tenderness.   Abdominal:      General: Bowel sounds are normal. There is no distension.      Palpations: Abdomen is soft.      Tenderness: There is no abdominal tenderness. There is no  guarding or rebound.   Genitourinary:     Comments: Exam Deferred   Incontinence brief in place.  Musculoskeletal: Normal range of motion.         General: No tenderness or deformity.   Skin:     General: Skin is warm and dry.      Capillary Refill: Capillary refill takes 2 to 3 seconds.      Findings: No erythema or rash.      Comments: Tattoos present.   Neurological:      Mental Status: She is alert and oriented to person, place, and time.      Cranial Nerves: No cranial nerve deficit.      Sensory: No sensory deficit.   Psychiatric:         Mood and Affect: Mood normal.         Behavior: Behavior normal.           CRANIAL NERVES     CN III, IV, VI   Pupils are equal, round, and reactive to light.       Significant Labs:   CBC:   Recent Labs   Lab 11/29/20  1920   WBC 10.14   HGB 14.5   HCT 46.4        CMP:   Recent Labs   Lab 11/29/20  1858      K 4.2   CL 96   CO2 31*   *   BUN 11   CREATININE 0.6   CALCIUM 9.8   PROT 7.5   ALBUMIN 2.7*   BILITOT 0.2   ALKPHOS 78   AST 33   ALT 24   ANIONGAP 12   EGFRNONAA >60     COVID - Negative    All pertinent labs within the past 24 hours have been reviewed.    Significant Imaging: I have reviewed all pertinent imaging results/findings within the past 24 hours.     Chest Xrays performed on 11/29/2020 independently reviewed.

## 2020-11-30 NOTE — ASSESSMENT & PLAN NOTE
Patient states at last office visit with Dr. Mackey she was initiated on treatment for TB vs MAC. States since, Dr. Mackey notified her that she does not have TB. Will continue home rifampin, isoniazid, and prednisone. Was initiated on IV Vanc and Zosyn in ER - will continue per Pulmonary recommendations.  Isolation.   Infectious disease consult

## 2020-11-30 NOTE — H&P
[facility] Medicine  History & Physical    Patient Name: Fabiana Morel  MRN: [id number]  Admission Date: 11/29/2020  Attending Physician: Davie Pierre MD   Primary Care Provider: Dominique Bartlett MD         Patient information was obtained from patient, spouse/SO, past medical records and ER records.     Subjective:     Principal Problem:Pneumothorax on right    Chief Complaint:   Chief Complaint   Patient presents with    Shortness of Breath        HPI: Fabiana Morel is a 65-year-old female with a past medical history of COPD, hyperlipidemia, iron deficiency anemia, and tobacco use who presents to the emergency room tonight with reports of shortness of breath.  Patient reports shortness of breath x3 weeks, has increased in severity since onset.  Patient requiring supplemental oxygen therapy for the past three weeks at 2-3 L, however has required increase in normal O2 dose over the past three days. Patient currently under the care of Dr. Mackey, pulmonology, has a history of COPD. Patient accompanied by her  during my initial interview and physical exam. a history of COPD. Patient was reportedly seen in the emergency room on 11/06 for chest pain, a CT chest was reportedly obtained revealing right lung abscess.  Patient followed with Dr. Mackey in clinic on 11/09 in which sputum samples were obtained and treatment was initiated for tuberculosis versus MAC.  Patient also reports decreased p.o. intake, utilizes boost supplementation daily.  Patient endorses productive cough x3 days, with brown sputum production.  Patient endorses dyspnea on exertion, stating she can not walk the length of the room without becoming SOB.  Patient states she quit tobacco use on 11/05/2020.  In the ER, patient noted to have right pneumothorax on chest x-ray.  ER physician placed Heimlich valve chest tube with reported immediate relief in shortness of breath.  Repeat chest x-ray revealing improvement  of pneumothorax.  ER MD reportedly spoke with Dr. Mackey, pulmonology who recommended admission with IV vancomycin and Zosyn and isolation precautions.  Patient placed in observation on 11/29/2020 at approximately 9:30 p.m..  Full code status verified upon admission to the hospital.    Past Medical History:   Diagnosis Date    Abnormal CT scan 1/8/2015    Asthma     Colitis     COPD (chronic obstructive pulmonary disease)     Diverticulitis of sigmoid colon 11/4/2014    Erosive gastritis 7/17/2013    Fracture of left clavicle     H pylori ulcer 7/17/2013    Hypertension     Peptic ulcer disease 7/17/2013    Pneumonia of right upper lobe due to infectious organism 8/15/2017    Rectal bleed 11/3/2014    Scoliosis     SOB (shortness of breath)     Yeast infection 8/25/2017       Past Surgical History:   Procedure Laterality Date    APPENDECTOMY      BREAST BIOPSY Right     cyst    COLONOSCOPY N/A 7/15/2019    Procedure: COLONOSCOPY;  Surgeon: Sharif Chun MD;  Location: [facility] ENDO;  Service: Endoscopy;  Laterality: N/A;    HIP ARTHROPLASTY Right 12/18/2019    Procedure: ARTHROPLASTY, HIP;  Surgeon: Bernardino Wilson II, MD;  Location: [facility] OR;  Service: Orthopedics;  Laterality: Right;  Jose - Morel and Nephew    HYSTERECTOMY      HASMUKH/BSO, DUB    OOPHORECTOMY      TUBAL LIGATION         Review of patient's allergies indicates:   Allergen Reactions    Erythromycin Nausea Only       No current facility-administered medications on file prior to encounter.      Current Outpatient Medications on File Prior to Encounter   Medication Sig    albuterol-ipratropium (DUO-NEB) 2.5 mg-0.5 mg/3 mL nebulizer solution Take 3 mLs by nebulization every 6 (six) hours as needed for Wheezing.    alendronate (FOSAMAX) 10 MG Tab Take 1 tablet (10 mg total) by mouth once daily.    ALPRAZolam (XANAX) 0.25 MG tablet Take 1 tablet (0.25 mg total) by mouth 3 (three) times daily as needed. (Patient not taking: Reported  on 8/28/2020)    amoxicillin-clavulanate 875-125mg (AUGMENTIN) 875-125 mg per tablet Take 1 tablet by mouth every 12 (twelve) hours. (Patient not taking: Reported on 11/9/2020)    atorvastatin (LIPITOR) 40 MG tablet Take 1 tablet (40 mg total) by mouth once daily.    azithromycin (ZITHROMAX) 500 MG tablet One daily for yellow mucous, repeat if needed    ferrous sulfate (FEOSOL) 325 mg (65 mg iron) Tab tablet Take 325 mg by mouth 2 (two) times a day.    fluticasone-umeclidin-vilanter (TRELEGY ELLIPTA) 100-62.5-25 mcg DsDv Inhale 1 puff into the lungs once daily.    isoniazid (NYDRAZID) 300 MG Tab Take 1 tablet (300 mg total) by mouth once daily.    megestroL (MEGACE) 400 mg/10 mL (40 mg/mL) Susp Take 5 mLs (200 mg total) by mouth once daily.    predniSONE (DELTASONE) 20 MG tablet Take 20 mg by mouth once daily.    rifAMpin (RIFADIN) 150 MG capsule Take 3 capsules (450 mg total) by mouth once daily.    VENTOLIN HFA 90 mcg/actuation inhaler Inhale 2 puffs into the lungs every 4 (four) hours as needed for Wheezing.     Family History     Problem Relation (Age of Onset)    Cancer Mother (49), Sister        Tobacco Use    Smoking status: Former Smoker     Packs/day: 0.50     Years: 30.00     Pack years: 15.00     Types: Cigarettes    Smokeless tobacco: Never Used    Tobacco comment: Quit on 11-5-2020   Substance and Sexual Activity    Alcohol use: Not Currently     Alcohol/week: 12.0 standard drinks     Types: 12 Cans of beer per week    Drug use: No    Sexual activity: Yes     Partners: Male     Review of Systems   Constitutional: Positive for activity change, appetite change and fatigue. Negative for chills and fever.   HENT: Negative for congestion, sinus pressure, sinus pain and sore throat.    Eyes: Negative for photophobia and visual disturbance.   Respiratory: Positive for cough, shortness of breath and wheezing. Negative for choking and chest tightness.         + CAMPOS     Cardiovascular: Negative  for chest pain, palpitations and leg swelling.   Gastrointestinal: Negative for abdominal distention, abdominal pain, blood in stool, constipation, diarrhea, nausea and vomiting.   Genitourinary: Negative for difficulty urinating, dysuria, flank pain and hematuria.   Musculoskeletal: Negative for back pain and joint swelling.   Skin: Negative for rash and wound.   Neurological: Positive for weakness. Negative for dizziness, syncope, light-headedness, numbness and headaches.   Psychiatric/Behavioral: Negative for confusion. The patient is not nervous/anxious.      Objective:     Vital Signs (Most Recent):  Temp: 98.6 °F (37 °C) (11/29/20 1737)  Pulse: 105 (11/29/20 2031)  Resp: (!) 93 (11/29/20 1737)  BP: (!) 128/90 (11/29/20 2031)  SpO2: 98 % (11/29/20 2031) Vital Signs (24h Range):  Temp:  [98.6 °F (37 °C)] 98.6 °F (37 °C)  Pulse:  [] 105  Resp:  [93] 93  SpO2:  [93 %-98 %] 98 %  BP: (115-144)/(58-90) 128/90     Weight: 44.5 kg (98 lb)  Body mass index is 16.82 kg/m².    Physical Exam  Vitals signs and nursing note reviewed.   Constitutional:       General: She is not in acute distress.     Appearance: She is well-developed. She is not diaphoretic.      Comments: Chronically ill-appearing   HENT:      Head: Normocephalic and atraumatic.   Eyes:      General:         Right eye: No discharge.         Left eye: No discharge.      Pupils: Pupils are equal, round, and reactive to light.   Neck:      Musculoskeletal: Normal range of motion.      Vascular: No JVD.   Cardiovascular:      Rate and Rhythm: Normal rate and regular rhythm.      Heart sounds: Normal heart sounds.   Pulmonary:      Effort: Pulmonary effort is normal. No respiratory distress.      Comments: Patient on 5 L supplemental oxygen via nasal cannula, in no acute respiratory distress.  Patient with no significant increased work of breathing noted.  Chest tube in place to right anterior chest wall.  Diminished breath sounds noted to right upper lung  field, mild expiratory wheezing noted to left upper lung field.  Chest:      Chest wall: No tenderness.   Abdominal:      General: Bowel sounds are normal. There is no distension.      Palpations: Abdomen is soft.      Tenderness: There is no abdominal tenderness. There is no guarding or rebound.   Genitourinary:     Comments: Exam Deferred   Incontinence brief in place.  Musculoskeletal: Normal range of motion.         General: No tenderness or deformity.   Skin:     General: Skin is warm and dry.      Capillary Refill: Capillary refill takes 2 to 3 seconds.      Findings: No erythema or rash.      Comments: Tattoos present.   Neurological:      Mental Status: She is alert and oriented to person, place, and time.      Cranial Nerves: No cranial nerve deficit.      Sensory: No sensory deficit.   Psychiatric:         Mood and Affect: Mood normal.         Behavior: Behavior normal.           CRANIAL NERVES     CN III, IV, VI   Pupils are equal, round, and reactive to light.       Significant Labs:   CBC:   Recent Labs   Lab 11/29/20  1920   WBC 10.14   HGB 14.5   HCT 46.4        CMP:   Recent Labs   Lab 11/29/20  1858      K 4.2   CL 96   CO2 31*   *   BUN 11   CREATININE 0.6   CALCIUM 9.8   PROT 7.5   ALBUMIN 2.7*   BILITOT 0.2   ALKPHOS 78   AST 33   ALT 24   ANIONGAP 12   EGFRNONAA >60     COVID - Negative    All pertinent labs within the past 24 hours have been reviewed.    Significant Imaging: I have reviewed all pertinent imaging results/findings within the past 24 hours.     Chest Xrays performed on 11/29/2020 independently reviewed.        Assessment/Plan:     * Pneumothorax on right  Heimlich valve placed per ER MD, repeat chest xray reviewed, noted to have improvement of pneumothorax. Utilize continuous supplemental oxygen, maintain chest tube. Pulmonology consulted. Continuous tele and pulse oximetry monitoring. Repeat chest xray in the AM.        Mycobacterium avium complex  Patient  states at last office visit with Dr. Mackey she was initiated on treatment for TB vs MAC. States since, Dr. Mackey notified her that she does not have TB. Will continue home rifampin, isoniazid, and prednisone. Was initiated on IV Vanc and Zosyn in ER - will continue upon admission. Isolation.       COPD (chronic obstructive pulmonary disease)  Chronic - continue home medication, pulmonology consult. Continuous supplemental oxygen, telemetry, and pulse oximetry monitoring.        Chronic respiratory failure with hypoxia  Noted, continue the use of supplemental O2.        Hypoalbuminemia due to protein-calorie malnutrition  Noted, encourage PO intake - boost supplementation.        Iron deficiency  Chronic, continue home iron supplementation.        Hyperlipidemia    Chronic, controlled. Will continue home medication.    Lab Results   Component Value Date    CHOL 187 08/21/2020    CHOL 182 05/22/2020    CHOL 182 06/25/2019     Lab Results   Component Value Date    HDL 55 08/21/2020    HDL 57 05/22/2020    HDL 65 06/25/2019     Lab Results   Component Value Date    LDLCALC 105.4 08/21/2020    LDLCALC 102.2 05/22/2020    LDLCALC 97.6 06/25/2019     Lab Results   Component Value Date    TRIG 133 08/21/2020    TRIG 114 05/22/2020    TRIG 97 06/25/2019     Lab Results   Component Value Date    CHOLHDL 29.4 08/21/2020    CHOLHDL 31.3 05/22/2020    CHOLHDL 35.7 06/25/2019               VTE Risk Mitigation (From admission, onward)         Ordered     enoxaparin injection 40 mg  Every 24 hours      11/29/20 2224     IP VTE HIGH RISK PATIENT  Once      11/29/20 2224     Place sequential compression device  Until discontinued      11/29/20 2224     Place STEPHANIE hose  Until discontinued      11/29/20 2224               Time spent seeing patient( greater than 1/2 spent in direct contact) : 65 minutes    Bertha Del Angel NP  Department of Hospital Medicine   [facility]

## 2020-11-30 NOTE — ED PROVIDER NOTES
"Encounter Date: 11/29/2020    SCRIBE #1 NOTE: I, Tameka Moreno, am scribing for, and in the presence of, Luis Doss MD.       History     Chief Complaint   Patient presents with    Shortness of Breath       Time seen by provider: 6:03 PM on 11/29/2020    Fabiana Morel is a 65 y.o. female with PMHx of SOB, HTN, asthma, COPD and pneumonia who presents to the ED with an onset of worsening SOB for 3 weeks.  Patient's  reports that she was recently seen by Dr. Mackey on 11/09/2020 and Dx with bronchiectasis without complication, immune deficiency, mycobacterial disease and pulmonary cavitary lung disease.  He states that the patient has been bed-ridden and on oxygen "all of the time, when she normally rarely used oxygen."  Patient has an at home pulse oximeter and notes being hypoxic on 2.5 L of oxygen.  Patient was on 3 L of oxygen in the past but has since lowered it secondary to nasal complaints.  Patient has a decreased appetite (2-3 Boost per day), unexpected weight loss and uses an albuterol nebulizer Tx about 5 times a day with Sx continuing.  Her last nebulizer Tx was 5 pm.  No daily medications were taken today (Zithromax, Rifadin and Nydrazid).  The patient denies any other symptoms at this time.  No pertinent PMHx.      The history is provided by the spouse and the patient.     Review of patient's allergies indicates:   Allergen Reactions    Erythromycin Nausea Only     Past Medical History:   Diagnosis Date    Abnormal CT scan 1/8/2015    Asthma     Colitis     COPD (chronic obstructive pulmonary disease)     Diverticulitis of sigmoid colon 11/4/2014    Erosive gastritis 7/17/2013    Fracture of left clavicle     H pylori ulcer 7/17/2013    Hypertension     Peptic ulcer disease 7/17/2013    Pneumonia of right upper lobe due to infectious organism 8/15/2017    Rectal bleed 11/3/2014    Scoliosis     SOB (shortness of breath)     Yeast infection 8/25/2017     Past Surgical History: " "  Procedure Laterality Date    APPENDECTOMY      BREAST BIOPSY Right     cyst    COLONOSCOPY N/A 7/15/2019    Procedure: COLONOSCOPY;  Surgeon: Sharif Chun MD;  Location: [facility] ENDO;  Service: Endoscopy;  Laterality: N/A;    HIP ARTHROPLASTY Right 12/18/2019    Procedure: ARTHROPLASTY, HIP;  Surgeon: Bernardino Wilson II, MD;  Location: [facility] OR;  Service: Orthopedics;  Laterality: Right;  Jose - Morel and Nephew    HYSTERECTOMY      HASMUKH/BSO, DUB    OOPHORECTOMY      TUBAL LIGATION       Family History   Problem Relation Age of Onset    Cancer Mother 49        "lymph nodes"    Cancer Sister         lung?     Social History     Tobacco Use    Smoking status: Light Tobacco Smoker     Packs/day: 0.50     Years: 30.00     Pack years: 15.00     Types: Cigarettes    Smokeless tobacco: Never Used    Tobacco comment: started back 2/2020   Substance Use Topics    Alcohol use: Yes     Alcohol/week: 12.0 standard drinks     Types: 12 Cans of beer per week     Comment: 2-3 beers daily    Drug use: No     Review of Systems   Constitutional: Positive for activity change, appetite change and unexpected weight change.   Respiratory: Positive for shortness of breath.    Cardiovascular: Negative.    Gastrointestinal: Negative.    Endocrine: Negative for polyphagia.   All other systems reviewed and are negative.      Physical Exam     Initial Vitals [11/29/20 1737]   BP Pulse Resp Temp SpO2   (!) 144/87 109 (!) 93 98.6 °F (37 °C) (!) 93 %      MAP       --         Physical Exam    Nursing note and vitals reviewed.  Constitutional: She appears well-developed. She appears cachectic. No distress.   Frail and thin appearance.  Chronically ill-appearing   HENT:   Head: Normocephalic and atraumatic.   Eyes: EOM are normal.   Neck: Normal range of motion. Neck supple.   Cardiovascular: Regular rhythm and normal heart sounds. Tachycardia present.  Exam reveals no gallop and no friction rub.    No murmur " heard.  Pulmonary/Chest: Breath sounds normal. Accessory muscle usage present. No respiratory distress. She has no wheezes. She has no rhonchi. She has no rales.   Musculoskeletal: Normal range of motion.   Neurological: She is alert.   Skin: Skin is warm and dry.   Psychiatric: She has a normal mood and affect. Her behavior is normal. Judgment and thought content normal.         ED Course   Chest Tube    Date/Time: 11/29/2020 9:20 PM  Location procedure was performed: [facility] EMERGENCY DEPARTMENT  Performed by: Luis Doss MD  Authorized by: Luis Doss MD   Consent Done: Emergent Situation  Indications: pneumothorax  Patient sedated: no  Anesthesia: local infiltration    Anesthesia:  Local Anesthetic: lidocaine 1% with epinephrine  Anesthetic total: 5 mL  Preparation: skin prepped with ChloraPrep  Placement location: right anterior  Scalpel size: 11  Tube size: minicatheter  Dissection instrument: Sonja clamp  Ultrasound guidance: no  Tension pneumothorax heard: yes  Tube connected to: Heimlich valve  Post-insertion x-ray findings: tube in good position  Patient tolerance: Patient tolerated the procedure well with no immediate complications  Complications: No  Specimens: No  Implants: No    Critical Care    Date/Time: 11/29/2020 9:21 PM  Performed by: Luis Doss MD  Authorized by: Luis Doss MD   Direct patient critical care time: 35 minutes  Additional history critical care time: 10 minutes  Ordering / reviewing critical care time: 10 minutes  Documentation critical care time: 8 minutes  Consulting other physicians critical care time: 10 (Pulmonary, Hospital Medicine) minutes  Consult with family critical care time: 6 minutes  Total critical care time (exclusive of procedural time) : 79 minutes  Critical care was necessary to treat or prevent imminent or life-threatening deterioration of the following conditions: Tension pneumothorax.  Critical care was time spent personally by me on the  following activities: development of treatment plan with patient or surrogate, discussions with consultants, evaluation of patient's response to treatment, examination of patient, obtaining history from patient or surrogate, ordering and performing treatments and interventions, ordering and review of laboratory studies, ordering and review of radiographic studies, pulse oximetry, re-evaluation of patient's condition and review of old charts.        Labs Reviewed   CBC W/ AUTO DIFFERENTIAL - Abnormal; Notable for the following components:       Result Value    MCHC 31.3 (*)     RDW 16.1 (*)     All other components within normal limits   COMPREHENSIVE METABOLIC PANEL - Abnormal; Notable for the following components:    CO2 31 (*)     Glucose 153 (*)     Albumin 2.7 (*)     All other components within normal limits   SARS-COV-2 RNA AMPLIFICATION, QUAL          Imaging Results          X-Ray Chest AP Portable (In process)                X-Ray Chest AP Portable (In process)                  Medical Decision Making:   History:   Old Medical Records: I decided to obtain old medical records.  Independently Interpreted Test(s):   I have ordered and independently interpreted EKG Reading(s) - see prior notes  Clinical Tests:   Lab Tests: Ordered and Reviewed  Radiological Study: Ordered and Reviewed            Scribe Attestation:   Scribe #1: I performed the above scribed service and the documentation accurately describes the services I performed. I attest to the accuracy of the note.    Attending Attestation:         Attending Critical Care:   Critical care was necessary to treat or prevent imminent or life-threatening deterioration of the following conditions: respiratory failure.       I, Dr. Doss, personally performed the services described in this documentation. All medical record entries made by the scribe were at my direction and in my presence.  I have reviewed the chart and agree that the record reflects my  personal performance and is accurate and complete.9:22 PM 11/29/2020            ED Course as of Nov 29 2119   Sun Nov 29, 2020 1802 SpO2(!): 93 % [EF]   1802 Resp(!): 93 [EF]   1802 Pulse: 109 [EF]   1802 Temp src: Oral [EF]   1802 Temp: 98.6 °F (37 °C) [EF]   1802 BP(!): 144/87 [EF]   1828 Large right-sided pneumothorax.  Setting up for thoracic vent.    [EF]   1843 Thoracic vent placed without difficulty    [EF]   1900 Postprocedural x-ray shows immediate improvement and right-sided pneumothorax    [EF]   1921 Clinically greatly improved after placement thoracic vent    [EF]   2000 SARS-CoV-2 RNA, Amplification, Qual: Negative [EF]   2015 [place] to admit    [EF]      ED Course User Index  [EF] Luis Doss MD            Clinical Impression:     ICD-10-CM ICD-9-CM   1. Pneumothorax on right  J93.9 512.89                                    65-year-old female recent diagnosis of likely mycobacterium avium with chronic lung disease presents with worsening shortness of breath.  On arrival mild distress with tachypnea.  Chest x-ray demonstrates a large right-sided tension pneumothorax.  Thoracic vent was placed immediately with instantaneous relief of her shortness of breath.  At this time she is comfortable, well appearing.  Case was discussed with Dr. Mackey her pulmonologist who recommends IV antibiotics and admission to the hospital.  [facility] to admit the patient.           Luis Doss MD  11/29/20 2123

## 2020-11-30 NOTE — PLAN OF CARE
Plan of care reviewed with patient and patient verbalized understanding.  Safety maintained, call light within reach, bed locked and in low position, side rails up x2, bed alarm on.  Patient remains free from injury.

## 2020-11-30 NOTE — ASSESSMENT & PLAN NOTE
Heimlich valve placed per ER MD, repeat chest xray reviewed, noted to have improvement of pneumothorax. Utilize continuous supplemental oxygen, maintain chest tube. Pulmonology consulted. Continuous tele and pulse oximetry monitoring. Repeat chest xray in the AM reviewed

## 2020-11-30 NOTE — CONSULTS
Pharmacokinetic Initial Assessment: IV Vancomycin    Assessment/Plan:    Initiated intravenous vancomycin with ED dose of 750 mg once followed by a maintenance dose of vancomycin 1250mg IV every 24 hours  Desired empiric serum trough concentration is 15 to 20 mcg/mL  Draw vancomycin trough level 60 min prior to third dose on 12-1 at approximately 0930  Pharmacy will continue to follow and monitor vancomycin.      Please contact pharmacy at extension [phone] with any questions regarding this assessment.     Thank you for the consult,   Mehreen Denny       Patient brief summary:  Fabiana Morel is a 65 y.o. female initiated on antimicrobial therapy with IV Vancomycin for treatment of suspected lower respiratory infection    Drug Allergies:   Review of patient's allergies indicates:   Allergen Reactions    Erythromycin Nausea Only       Actual Body Weight:   44.5 kg    Renal Function:   Estimated Creatinine Clearance: 63.6 mL/min (based on SCr of 0.6 mg/dL).,         CBC (last 72 hours):  Recent Labs   Lab Result Units 11/29/20  1920   WBC K/uL 10.14   Hemoglobin g/dL 14.5   Hematocrit % 46.4   Platelets K/uL 173   Gran % % 72.5   Lymph % % 19.6   Mono % % 6.4   Eosinophil % % 0.9   Basophil % % 0.3   Differential Method  Automated       Metabolic Panel (last 72 hours):  Recent Labs   Lab Result Units 11/29/20  1858   Sodium mmol/L 139   Potassium mmol/L 4.2   Chloride mmol/L 96   CO2 mmol/L 31*   Glucose mg/dL 153*   BUN mg/dL 11   Creatinine mg/dL 0.6   Albumin g/dL 2.7*   Total Bilirubin mg/dL 0.2   Alkaline Phosphatase U/L 78   AST U/L 33   ALT U/L 24       Drug levels (last 3 results):  No results for input(s): VANCOMYCINRA, VANCOMYCINPE, VANCOMYCINTR in the last 72 hours.    Microbiologic Results:  Microbiology Results (last 7 days)       ** No results found for the last 168 hours. **

## 2020-11-30 NOTE — PLAN OF CARE
Pt aaox4 pt is on airborne isolation to rule out TB. Pt had new iv restart today due to infiltration to left arm restart on left arm PT is on o2 at 4 liters and has a heimlich value chest tube that was placed to continuous suction today due to increased shortness of breath. Pt is breathing better now and she had chest xray this pm and md to review xray to plan if larger chest tube is needed. Pt has poor appetite and was started on clindmix peripheral tpn today to left arm.  On telemetry showing sinus tach at times. Pt is incontinent of urine and requires assist to change due to sever shortness of breath .isolation maintained bed in low position, call bell with in reach and  at bedside all day. All question answered as needed for pt.

## 2020-11-30 NOTE — SUBJECTIVE & OBJECTIVE
Interval History:  Notes reviewed, no acute events overnight.  Chest tube functioning properly.  Patient states shortness of breath improved today.  She denies any acute pain at this time.    Review of Systems   Constitutional: Negative for chills, fatigue and fever.   HENT: Negative for congestion, sore throat and trouble swallowing.    Eyes: Negative for photophobia and visual disturbance.   Respiratory: Positive for shortness of breath. Negative for cough and chest tightness.    Cardiovascular: Negative for chest pain, palpitations and leg swelling.   Gastrointestinal: Negative for abdominal pain, diarrhea, nausea and vomiting.   Endocrine: Negative for polyphagia and polyuria.   Genitourinary: Negative for difficulty urinating, dysuria and urgency.   Musculoskeletal: Negative for arthralgias, back pain and gait problem.   Skin: Negative for color change, pallor, rash and wound.   Neurological: Negative for dizziness, weakness and light-headedness.   Hematological: Negative for adenopathy.   Psychiatric/Behavioral: Negative for agitation, behavioral problems and confusion.   All other systems reviewed and are negative.    Objective:     Vital Signs (Most Recent):  Temp: 97.2 °F (36.2 °C) (11/30/20 0759)  Pulse: (!) 112 (11/30/20 1135)  Resp: 18 (11/30/20 1135)  BP: 114/69 (11/30/20 0759)  SpO2: 99 % (11/30/20 1133) Vital Signs (24h Range):  Temp:  [97.2 °F (36.2 °C)-98.8 °F (37.1 °C)] 97.2 °F (36.2 °C)  Pulse:  [] 112  Resp:  [15-93] 18  SpO2:  [93 %-99 %] 99 %  BP: (114-149)/(58-90) 114/69     Weight: 42.5 kg (93 lb 11.1 oz)  Body mass index is 16.08 kg/m².    Intake/Output Summary (Last 24 hours) at 11/30/2020 1422  Last data filed at 11/30/2020 0339  Gross per 24 hour   Intake 100 ml   Output --   Net 100 ml      Physical Exam  Vitals signs and nursing note reviewed.   Constitutional:       General: She is not in acute distress.     Appearance: Normal appearance. She is well-developed. She is not  ill-appearing.   HENT:      Head: Normocephalic and atraumatic.      Right Ear: External ear normal.      Left Ear: External ear normal.      Nose: Nose normal. No congestion or rhinorrhea.      Mouth/Throat:      Mouth: Mucous membranes are moist.      Pharynx: Oropharynx is clear. No oropharyngeal exudate or posterior oropharyngeal erythema.   Eyes:      General: No scleral icterus.     Conjunctiva/sclera: Conjunctivae normal.      Pupils: Pupils are equal, round, and reactive to light.   Neck:      Musculoskeletal: Normal range of motion and neck supple.      Vascular: No JVD.   Cardiovascular:      Rate and Rhythm: Normal rate and regular rhythm.      Pulses: Normal pulses.      Heart sounds: Normal heart sounds. No murmur.   Pulmonary:      Effort: Pulmonary effort is normal. No respiratory distress.      Breath sounds: Rhonchi and rales present.      Comments: Chest tube intact to Pleur-Evac.  Scattered rhonchi and rales to bilateral lobes  Chest:      Chest wall: No tenderness.   Abdominal:      General: Bowel sounds are normal.      Palpations: Abdomen is soft.      Tenderness: There is no abdominal tenderness.   Musculoskeletal: Normal range of motion.         General: No swelling or tenderness.   Skin:     General: Skin is warm and dry.      Capillary Refill: Capillary refill takes 2 to 3 seconds.      Coloration: Skin is not jaundiced or pale.   Neurological:      General: No focal deficit present.      Mental Status: She is alert and oriented to person, place, and time.      Cranial Nerves: No cranial nerve deficit.      Sensory: No sensory deficit.   Psychiatric:         Mood and Affect: Mood normal.         Behavior: Behavior normal.         Thought Content: Thought content normal.         Significant Labs:   CBC:   Recent Labs   Lab 11/29/20  1920 11/30/20  0810   WBC 10.14 9.08   HGB 14.5 13.8   HCT 46.4 44.2    353*     CMP:   Recent Labs   Lab 11/29/20  1858 11/30/20  0810    137   K  4.2 3.5   CL 96 96   CO2 31* 28   * 114*   BUN 11 9   CREATININE 0.6 0.6   CALCIUM 9.8 9.3   PROT 7.5 6.9   ALBUMIN 2.7* 2.3*   BILITOT 0.2 0.3   ALKPHOS 78 77   AST 33 27   ALT 24 19   ANIONGAP 12 13   EGFRNONAA >60 >60     All pertinent labs within the past 24 hours have been reviewed.    Significant Imaging: I have reviewed all pertinent imaging results/findings within the past 24 hours.

## 2020-11-30 NOTE — ED NOTES
Fabiana Morel with MRN#:  [id number] going to room 315 A placed on monitor box #[id number] and called to monitor room.

## 2020-11-30 NOTE — CONSULTS
Consult Note  Infectious Disease    Reason for Consult:       HPI: Fabiana Morel is a   65 y.o. female with  PMHx of HTN, D LP,  anemia, malnutrition, cachexia, BMI of 15.9, right femur fracture status post surgery in December 2019, deconditioning, uses cane, asthma, immunodeficiency, bronchiectasis, pulmonary cavitary lung disease, COPD, pneumonia, chronic hypoxemic respiratory failure on 2-3 L. who presents to the ED with an onset of worsening SOB for 3 weeks, worse over the past 3 days associated with right-sided chest pain..       Patient and  states that she gets either pneumonia or bronchitis every year around October November.  This year was about the same.  She developed progressive shortness of breath.  Dr. Mackey had sent sputum cultures which are growing AFB, within 1 week.(11/05/--11/12)    She has received prednisone and antibiotics(Augmentin) as outpatient without much improvement.   Even if she had home oxygen for multiple years and not using it much, patient needed to use quite a lot of it and actually going over 3 L per minute   She developed progressive shortness of breath, not able to catch her breath, right-sided chest pain.       She came to ED  She was diagnosed with pneumothorax and underwent right chest tube placement.   Patient denies hemoptysis.     Decreased appetite despite multiple appetite stimulants.     She may have lost about 5-7 lb of to over the past 6 months.   No night sweats.  Does not change her clothes at night.  She gets hot flashes since she had hysterectomy.      No daily medications were taken today (Zithromax, Rifadin and Nydrazid). The patient denies any other symptoms at this time. No pertinent PMHx.     She continues to smoke.  No TB exposure.  She was never in an institution.  She has a dog.  No cats.  No birds.  No travel.    Antibiotics (From admission, onward)    Start     Stop Route Frequency Ordered    11/30/20 1030  vancomycin 1.25 g in dextrose 5% 250  mL IVPB (ready to mix)      -- IV Every 24 hours (non-standard times) 11/29/20 2247 11/30/20 0900  isoniazid tablet 300 mg      -- Oral Daily 11/29/20 2224 11/30/20 0900  rifAMpin capsule 450 mg      -- Oral Daily 11/29/20 2224 11/30/20 0330  piperacillin-tazobactam 4.5 g in dextrose 5 % 100 mL IVPB (ready to mix system)      -- IV Every 8 hours (non-standard times) 11/29/20 2224 11/29/20 2245  vancomycin - pharmacy to dose  (vancomycin IVPB)      -- IV pharmacy to manage frequency 11/29/20 2224        Antifungals (From admission, onward)    None        Antivirals (From admission, onward)    None        Review of patient's allergies indicates:   Allergen Reactions    Erythromycin Nausea Only     Past Medical History:   Diagnosis Date    Abnormal CT scan 1/8/2015    Asthma     Colitis     COPD (chronic obstructive pulmonary disease)     Diverticulitis of sigmoid colon 11/4/2014    Erosive gastritis 7/17/2013    Fracture of left clavicle     H pylori ulcer 7/17/2013    Hypertension     Peptic ulcer disease 7/17/2013    Pneumonia of right upper lobe due to infectious organism 8/15/2017    Rectal bleed 11/3/2014    Scoliosis     SOB (shortness of breath)     Yeast infection 8/25/2017     Past Surgical History:   Procedure Laterality Date    APPENDECTOMY      BREAST BIOPSY Right     cyst    COLONOSCOPY N/A 7/15/2019    Procedure: COLONOSCOPY;  Surgeon: Sharif Chun MD;  Location: [facility] ENDO;  Service: Endoscopy;  Laterality: N/A;    HIP ARTHROPLASTY Right 12/18/2019    Procedure: ARTHROPLASTY, HIP;  Surgeon: Bernardino Wilson II, MD;  Location: [facility] OR;  Service: Orthopedics;  Laterality: Right;  Jose - Morel and Nephew    HYSTERECTOMY      HASMUKH/BSO, DUB    OOPHORECTOMY      TUBAL LIGATION       Social History     Socioeconomic History    Marital status:      Spouse name: Not on file    Number of children: Not on file    Years of education: Not on file    Highest education  "level: Not on file   Occupational History    Not on file   Social Needs    Financial resource strain: Not on file    Food insecurity     Worry: Not on file     Inability: Not on file    Transportation needs     Medical: Not on file     Non-medical: Not on file   Tobacco Use    Smoking status: Former Smoker     Packs/day: 0.50     Years: 30.00     Pack years: 15.00     Types: Cigarettes    Smokeless tobacco: Never Used    Tobacco comment: Quit on 11-5-2020   Substance and Sexual Activity    Alcohol use: Not Currently     Alcohol/week: 12.0 standard drinks     Types: 12 Cans of beer per week    Drug use: No    Sexual activity: Yes     Partners: Male   Lifestyle    Physical activity     Days per week: Not on file     Minutes per session: Not on file    Stress: Not on file   Relationships    Social connections     Talks on phone: Not on file     Gets together: Not on file     Attends Tenriism service: Not on file     Active member of club or organization: Not on file     Attends meetings of clubs or organizations: Not on file     Relationship status: Not on file   Other Topics Concern    Not on file   Social History Narrative    Not on file     Family History   Problem Relation Age of Onset    Cancer Mother 49        "lymph nodes"    Cancer Sister         lung?       Pertinent medications noted:     Review of Systems:   Twelve point review of systems negative other than the above    EXAM & DIAGNOSTICS REVIEWED:   Vitals:     Temp:  [97.2 °F (36.2 °C)-98.8 °F (37.1 °C)]   Temp: 97.2 °F (36.2 °C) (11/30/20 0759)  Pulse: (!) 115 (11/30/20 0759)  Resp: (!) 22 (11/30/20 0759)  BP: 114/69 (11/30/20 0759)  SpO2: (!) 94 % (11/30/20 0759)    Intake/Output Summary (Last 24 hours) at 11/30/2020 0947  Last data filed at 11/30/2020 0339  Gross per 24 hour   Intake 100 ml   Output --   Net 100 ml       General:  In NAD. Alert and attentive, cooperative, comfortable for   thin cachectic female  Eyes: Anicteric, " PERRL, EOMI  ENT: No ulcers, exudates, thrush, nares patent, dentition is fair  Neck: supple, no masses or adenopathy appreciated  Lungs: Squeaking sound in bilateral lungs  Heart: RRR, no gallop/murmur/rub noted  Abd: Soft, NT, ND, normal BS, no masses or organomegaly appreciated.  :  Voids/Duran, urine clear, no flank tenderness  Musc: Joints without effusion, swelling, erythema, synovitis, muscle wasting.   Skin: No rashes. No palmar or plantar lesions. No subungual petechiae  Wound:   Neuro: Alert, attentive, speech fluent, face symmetric, moves all extremities, no focal weakness  Psych: Calm, cooperative  Lymphatic:   No cervical, supraclavicular, axillary, or inguinal nodes  Extrem: No edema, erythema, phlebitis, cellulitis, warm and well perfused  VAD:       Isolation:      Lines/Tubes/Drains:    General Labs reviewed:  Recent Labs   Lab 11/29/20  1920   WBC 10.14   HGB 14.5   HCT 46.4          Recent Labs   Lab 11/29/20  1858      K 4.2   CL 96   CO2 31*   BUN 11   CREATININE 0.6   CALCIUM 9.8   PROT 7.5   BILITOT 0.2   ALKPHOS 78   ALT 24   AST 33           Micro:  Microbiology Results (last 7 days)     Procedure Component Value Units Date/Time    Culture, Respiratory with Gram Stain [[id number]]     Order Status: No result Specimen: Respiratory from Sputum, Expectorated         Procedure Component Value Units Date/Time   Culture, Respiratory with Gram Stain [[id number]]    Order Status: No result Specimen: Respiratory from Sputum, Expectorated    AFB Culture & Smear [[id number]] (Abnormal) Collected: 11/05/20 1304   Order Status: Completed Specimen: Respiratory from Sputum, Expectorated Updated: 11/12/20 1765    AFB Culture & Smear Culture positive, identification pending     Verbal report given for previous positive smear    AFB CULTURE STAIN Positive for acid fast bacilli, 4 orgs/fieldAbnormal     AFB CULTURE STAIN Verbal report given for previous positive smearAbnormal    AFB Culture & Smear  [[id number]] (Abnormal) Collected: 11/04/20 1529   Order Status: Completed Specimen: Respiratory from Sputum, Expectorated Updated: 11/18/20 1630    AFB Culture & Smear Culture positive, identification pending     Verbal report given for previous positive smear  11/18/2020  16:29    AFB CULTURE STAIN Positive for acid fast bacilli, 4 orgs/fieldAbnormal     AFB CULTURE STAIN Results called to and read back by: Brittney Brown  11/05/2020  14:14Abnormal    AFB Culture & Smear [[id number]] Collected: 11/03/20 1400   Order Status: Completed Specimen: Respiratory from Sputum, Expectorated Updated: 11/10/20 1359    AFB Culture & Smear Culture positive, identification pending     Verbal report given for previous positive smear    AFB CULTURE STAIN No acid fast bacilli seen.   Culture, Respiratory with Gram Stain [[id number]] Collected: 11/03/20 1400   Order Status: Completed Specimen: Respiratory from Sputum Updated: 11/06/20 1003    Respiratory Culture Normal respiratory chelsea     No S aureus or Pseudomonas isolated.    Gram Stain (Respiratory) <10 epithelial cells per low power field.    Gram Stain (Respiratory) Rare WBC's    Gram Stain (Respiratory) Rare Gram positive cocci    Gram Stain (Respiratory) Rare Gram negative rods       Imaging Reviewed:   CXRUnchanged small volume right pneumothorax with chest tube in place.  New airspace disease at the right base suspicious for atelectasis or edema.  COPD.    CT11/01/2020    No evidence of pulmonary thromboemboli particularly in the large central arteries and lobar branches as imaged.  Peripheral branches are difficult to evaluate particularly in the upper lobes as described above.  New focus of nodular consolidation with cavitary of formation in the right upper lobe laterally and progression/increased in number of bilateral apical large cavities.  There is a small air-fluid level in 1 of the cavities on the left.  Findings are consistent with at infectious etiology.  Follow-up  is suggested to exclude pulmonary nodular lesions/neoplasm.  Mediastinal adenopathy and possible hilar adenopathy which is likely reactive.  No new pleural or pericardial effusion or other acute process.  Additional stable incidental findings as above.      Cardiology:    IMPRESSION & PLAN     1. Pneumonia due to AFB.  As per pulmonologist, Dr. Mackey   Azithromycn+ INH+Rif   SARs neg   Tb Gold Neg  2. Right pneumothorax.  Due to above    3.  PMHx of HTN, D LP,  anemia, malnutrition, cachexia, BMI of 15.9, right femur fracture status post surgery in December 2019, deconditioning, uses cane, asthma, immunodeficiency, bronchiectasis, pulmonary cavitary lung disease, COPD, pneumonia, chronic hypoxemic respiratory failure on 2-3 L.     Recommendations:  This could be either MAC(more likely) or fast grow mycobacterial like fortuitum, abscesses, chelonae.  I do not think it is TB.  Follow DNA probe sent by Dr. Mackey  Will follow with you and make further rec        Medical Decision Making during this encounter was  [_] Low Complexity  [_] Moderate Complexity  [  ] High Complexity

## 2020-12-01 ENCOUNTER — OUTSIDE PLACE OF SERVICE (OUTPATIENT)
Dept: INFECTIOUS DISEASES | Facility: CLINIC | Age: 65
End: 2020-12-01
Payer: COMMERCIAL

## 2020-12-01 DIAGNOSIS — J93.9 PNEUMOTHORAX ON RIGHT: ICD-10-CM

## 2020-12-01 LAB
ALBUMIN SERPL BCP-MCNC: 2 G/DL (ref 3.5–5.2)
ALP SERPL-CCNC: 67 U/L (ref 55–135)
ALT SERPL W/O P-5'-P-CCNC: 17 U/L (ref 10–44)
ANION GAP SERPL CALC-SCNC: 10 MMOL/L (ref 8–16)
AST SERPL-CCNC: 25 U/L (ref 10–40)
BASOPHILS # BLD AUTO: 0.02 K/UL (ref 0–0.2)
BASOPHILS NFR BLD: 0.3 % (ref 0–1.9)
BILIRUB SERPL-MCNC: 0.2 MG/DL (ref 0.1–1)
BUN SERPL-MCNC: 15 MG/DL (ref 8–23)
CALCIUM SERPL-MCNC: 8.7 MG/DL (ref 8.7–10.5)
CHLORIDE SERPL-SCNC: 96 MMOL/L (ref 95–110)
CO2 SERPL-SCNC: 28 MMOL/L (ref 23–29)
CREAT SERPL-MCNC: 0.5 MG/DL (ref 0.5–1.4)
DIFFERENTIAL METHOD: ABNORMAL
EOSINOPHIL # BLD AUTO: 0.2 K/UL (ref 0–0.5)
EOSINOPHIL NFR BLD: 3.2 % (ref 0–8)
ERYTHROCYTE [DISTWIDTH] IN BLOOD BY AUTOMATED COUNT: 15.8 % (ref 11.5–14.5)
EST. GFR  (AFRICAN AMERICAN): >60 ML/MIN/1.73 M^2
EST. GFR  (NON AFRICAN AMERICAN): >60 ML/MIN/1.73 M^2
GLUCOSE SERPL-MCNC: 141 MG/DL (ref 70–110)
HCT VFR BLD AUTO: 37.9 % (ref 37–48.5)
HGB BLD-MCNC: 12 G/DL (ref 12–16)
IMM GRANULOCYTES # BLD AUTO: 0.02 K/UL (ref 0–0.04)
IMM GRANULOCYTES NFR BLD AUTO: 0.3 % (ref 0–0.5)
LYMPHOCYTES # BLD AUTO: 2 K/UL (ref 1–4.8)
LYMPHOCYTES NFR BLD: 32.1 % (ref 18–48)
MAGNESIUM SERPL-MCNC: 2.1 MG/DL (ref 1.6–2.6)
MCH RBC QN AUTO: 29.3 PG (ref 27–31)
MCHC RBC AUTO-ENTMCNC: 31.7 G/DL (ref 32–36)
MCV RBC AUTO: 93 FL (ref 82–98)
MONOCYTES # BLD AUTO: 0.6 K/UL (ref 0.3–1)
MONOCYTES NFR BLD: 8.9 % (ref 4–15)
NEUTROPHILS # BLD AUTO: 3.5 K/UL (ref 1.8–7.7)
NEUTROPHILS NFR BLD: 55.2 % (ref 38–73)
NRBC BLD-RTO: 0 /100 WBC
PLATELET # BLD AUTO: 302 K/UL (ref 150–350)
PMV BLD AUTO: 9.3 FL (ref 9.2–12.9)
POTASSIUM SERPL-SCNC: 3.7 MMOL/L (ref 3.5–5.1)
PROT SERPL-MCNC: 6 G/DL (ref 6–8.4)
RBC # BLD AUTO: 4.09 M/UL (ref 4–5.4)
SODIUM SERPL-SCNC: 134 MMOL/L (ref 136–145)
VANCOMYCIN TROUGH SERPL-MCNC: 6 UG/ML (ref 10–22)
WBC # BLD AUTO: 6.26 K/UL (ref 3.9–12.7)

## 2020-12-01 PROCEDURE — 25000242 PHARM REV CODE 250 ALT 637 W/ HCPCS: Performed by: NURSE PRACTITIONER

## 2020-12-01 PROCEDURE — 85025 COMPLETE CBC W/AUTO DIFF WBC: CPT

## 2020-12-01 PROCEDURE — 93005 ELECTROCARDIOGRAM TRACING: CPT

## 2020-12-01 PROCEDURE — 93010 ELECTROCARDIOGRAM REPORT: CPT | Mod: ,,, | Performed by: SPECIALIST

## 2020-12-01 PROCEDURE — 25000003 PHARM REV CODE 250: Performed by: NURSE PRACTITIONER

## 2020-12-01 PROCEDURE — 63600175 PHARM REV CODE 636 W HCPCS: Performed by: STUDENT IN AN ORGANIZED HEALTH CARE EDUCATION/TRAINING PROGRAM

## 2020-12-01 PROCEDURE — 87070 CULTURE OTHR SPECIMN AEROBIC: CPT

## 2020-12-01 PROCEDURE — 87186 SC STD MICRODIL/AGAR DIL: CPT

## 2020-12-01 PROCEDURE — 25000003 PHARM REV CODE 250: Performed by: INTERNAL MEDICINE

## 2020-12-01 PROCEDURE — 63600175 PHARM REV CODE 636 W HCPCS: Performed by: NURSE PRACTITIONER

## 2020-12-01 PROCEDURE — 99233 SBSQ HOSP IP/OBS HIGH 50: CPT | Mod: ,,, | Performed by: INTERNAL MEDICINE

## 2020-12-01 PROCEDURE — 99233 SBSQ HOSP IP/OBS HIGH 50: CPT | Mod: S$GLB,,, | Performed by: INTERNAL MEDICINE

## 2020-12-01 PROCEDURE — 87106 FUNGI IDENTIFICATION YEAST: CPT

## 2020-12-01 PROCEDURE — 63700000 PHARM REV CODE 250 ALT 637 W/O HCPCS: Performed by: INTERNAL MEDICINE

## 2020-12-01 PROCEDURE — 94761 N-INVAS EAR/PLS OXIMETRY MLT: CPT

## 2020-12-01 PROCEDURE — 25000003 PHARM REV CODE 250: Performed by: STUDENT IN AN ORGANIZED HEALTH CARE EDUCATION/TRAINING PROGRAM

## 2020-12-01 PROCEDURE — 27000221 HC OXYGEN, UP TO 24 HOURS

## 2020-12-01 PROCEDURE — 99233 PR SUBSEQUENT HOSPITAL CARE,LEVL III: ICD-10-PCS | Mod: S$GLB,,, | Performed by: INTERNAL MEDICINE

## 2020-12-01 PROCEDURE — 36415 COLL VENOUS BLD VENIPUNCTURE: CPT

## 2020-12-01 PROCEDURE — 83735 ASSAY OF MAGNESIUM: CPT

## 2020-12-01 PROCEDURE — 87205 SMEAR GRAM STAIN: CPT

## 2020-12-01 PROCEDURE — 63600175 PHARM REV CODE 636 W HCPCS: Performed by: INTERNAL MEDICINE

## 2020-12-01 PROCEDURE — 97802 MEDICAL NUTRITION INDIV IN: CPT

## 2020-12-01 PROCEDURE — 93010 EKG 12-LEAD: ICD-10-PCS | Mod: ,,, | Performed by: SPECIALIST

## 2020-12-01 PROCEDURE — 99233 PR SUBSEQUENT HOSPITAL CARE,LEVL III: ICD-10-PCS | Mod: ,,, | Performed by: INTERNAL MEDICINE

## 2020-12-01 PROCEDURE — 80202 ASSAY OF VANCOMYCIN: CPT

## 2020-12-01 PROCEDURE — 80053 COMPREHEN METABOLIC PANEL: CPT

## 2020-12-01 PROCEDURE — 87077 CULTURE AEROBIC IDENTIFY: CPT

## 2020-12-01 PROCEDURE — 94640 AIRWAY INHALATION TREATMENT: CPT

## 2020-12-01 PROCEDURE — 12000002 HC ACUTE/MED SURGE SEMI-PRIVATE ROOM

## 2020-12-01 RX ORDER — MEROPENEM AND SODIUM CHLORIDE 1 G/50ML
1 INJECTION, SOLUTION INTRAVENOUS
Status: DISCONTINUED | OUTPATIENT
Start: 2020-12-01 | End: 2020-12-09 | Stop reason: HOSPADM

## 2020-12-01 RX ORDER — CLARITHROMYCIN 500 MG/1
500 TABLET, FILM COATED ORAL EVERY 12 HOURS
Status: DISCONTINUED | OUTPATIENT
Start: 2020-12-01 | End: 2020-12-09 | Stop reason: HOSPADM

## 2020-12-01 RX ORDER — LINEZOLID 600 MG/1
600 TABLET, FILM COATED ORAL EVERY 12 HOURS
Status: DISCONTINUED | OUTPATIENT
Start: 2020-12-01 | End: 2020-12-09 | Stop reason: HOSPADM

## 2020-12-01 RX ORDER — HYDROCODONE BITARTRATE AND ACETAMINOPHEN 5; 325 MG/1; MG/1
1 TABLET ORAL EVERY 6 HOURS PRN
Status: DISCONTINUED | OUTPATIENT
Start: 2020-12-01 | End: 2020-12-02

## 2020-12-01 RX ORDER — RIFAMPIN 300 MG/1
300 CAPSULE ORAL DAILY
Status: DISCONTINUED | OUTPATIENT
Start: 2020-12-02 | End: 2020-12-08

## 2020-12-01 RX ADMIN — AZITHROMYCIN MONOHYDRATE 500 MG: 250 TABLET ORAL at 09:12

## 2020-12-01 RX ADMIN — PIPERACILLIN AND TAZOBACTAM 4.5 G: 4; .5 INJECTION, POWDER, LYOPHILIZED, FOR SOLUTION INTRAVENOUS; PARENTERAL at 11:12

## 2020-12-01 RX ADMIN — HYDROCODONE BITARTRATE AND ACETAMINOPHEN 1 TABLET: 5; 325 TABLET ORAL at 05:12

## 2020-12-01 RX ADMIN — LINEZOLID 600 MG: 600 TABLET, FILM COATED ORAL at 08:12

## 2020-12-01 RX ADMIN — ISONIAZID 300 MG: 100 TABLET ORAL at 09:12

## 2020-12-01 RX ADMIN — IPRATROPIUM BROMIDE AND ALBUTEROL SULFATE 3 ML: .5; 2.5 SOLUTION RESPIRATORY (INHALATION) at 06:12

## 2020-12-01 RX ADMIN — HYDROCODONE BITARTRATE AND ACETAMINOPHEN 1 TABLET: 5; 325 TABLET ORAL at 11:12

## 2020-12-01 RX ADMIN — IPRATROPIUM BROMIDE AND ALBUTEROL SULFATE 3 ML: .5; 2.5 SOLUTION RESPIRATORY (INHALATION) at 12:12

## 2020-12-01 RX ADMIN — PIPERACILLIN AND TAZOBACTAM 4.5 G: 4; .5 INJECTION, POWDER, LYOPHILIZED, FOR SOLUTION INTRAVENOUS; PARENTERAL at 03:12

## 2020-12-01 RX ADMIN — CLARITHROMYCIN 500 MG: 500 TABLET, FILM COATED ORAL at 08:12

## 2020-12-01 RX ADMIN — RIFAMPIN 450 MG: 300 CAPSULE ORAL at 09:12

## 2020-12-01 RX ADMIN — ACETAMINOPHEN 650 MG: 325 TABLET ORAL at 12:12

## 2020-12-01 RX ADMIN — PREDNISONE 20 MG: 20 TABLET ORAL at 09:12

## 2020-12-01 RX ADMIN — IPRATROPIUM BROMIDE AND ALBUTEROL SULFATE 3 ML: .5; 2.5 SOLUTION RESPIRATORY (INHALATION) at 07:12

## 2020-12-01 RX ADMIN — FLUTICASONE FUROATE AND VILANTEROL TRIFENATATE 1 PUFF: 200; 25 POWDER RESPIRATORY (INHALATION) at 06:12

## 2020-12-01 RX ADMIN — FERROUS SULFATE TAB EC 325 MG (65 MG FE EQUIVALENT) 325 MG: 325 (65 FE) TABLET DELAYED RESPONSE at 09:12

## 2020-12-01 RX ADMIN — ENOXAPARIN SODIUM 40 MG: 100 INJECTION SUBCUTANEOUS at 05:12

## 2020-12-01 RX ADMIN — VANCOMYCIN HYDROCHLORIDE 750 MG: 750 INJECTION, POWDER, LYOPHILIZED, FOR SOLUTION INTRAVENOUS at 01:12

## 2020-12-01 RX ADMIN — MEROPENEM AND SODIUM CHLORIDE 1 G: 1 INJECTION, SOLUTION INTRAVENOUS at 08:12

## 2020-12-01 RX ADMIN — IPRATROPIUM BROMIDE AND ALBUTEROL SULFATE 3 ML: .5; 2.5 SOLUTION RESPIRATORY (INHALATION) at 03:12

## 2020-12-01 RX ADMIN — ETHAMBUTOL HYDROCHLORIDE 600 MG: 400 TABLET, FILM COATED ORAL at 08:12

## 2020-12-01 RX ADMIN — ATORVASTATIN CALCIUM 40 MG: 40 TABLET, FILM COATED ORAL at 09:12

## 2020-12-01 NOTE — SUBJECTIVE & OBJECTIVE
Interval History:  Notes reviewed, no acute events overnight.  Chest tube functioning properly.  Patient states shortness of breath is markedly improved.  She is still having wet cough but states is nonproductive.  Appetite still remains poor and patient on IV supplemental nutrition.    Review of Systems   Constitutional: Negative for chills, fatigue and fever.   HENT: Negative for congestion, sore throat and trouble swallowing.    Eyes: Negative for photophobia and visual disturbance.   Respiratory: Positive for cough and shortness of breath. Negative for chest tightness.    Cardiovascular: Negative for chest pain, palpitations and leg swelling.   Gastrointestinal: Negative for abdominal pain, diarrhea, nausea and vomiting.   Endocrine: Negative for polyphagia and polyuria.   Genitourinary: Negative for difficulty urinating, dysuria and urgency.   Musculoskeletal: Negative for arthralgias, back pain and gait problem.   Skin: Negative for color change, pallor, rash and wound.   Neurological: Negative for dizziness, weakness and light-headedness.   Hematological: Negative for adenopathy.   Psychiatric/Behavioral: Negative for agitation, behavioral problems and confusion.   All other systems reviewed and are negative.    Objective:     Vital Signs (Most Recent):  Temp: 97.2 °F (36.2 °C) (12/01/20 0736)  Pulse: 91 (12/01/20 0736)  Resp: 16 (12/01/20 1104)  BP: 100/65 (12/01/20 0736)  SpO2: 98 % (12/01/20 0736) Vital Signs (24h Range):  Temp:  [96.1 °F (35.6 °C)-98.1 °F (36.7 °C)] 97.2 °F (36.2 °C)  Pulse:  [] 91  Resp:  [16-18] 16  SpO2:  [93 %-99 %] 98 %  BP: (100-128)/(58-78) 100/65     Weight: 42.2 kg (93 lb 0.6 oz)  Body mass index is 15.97 kg/m².    Intake/Output Summary (Last 24 hours) at 12/1/2020 1120  Last data filed at 12/1/2020 0503  Gross per 24 hour   Intake 998.75 ml   Output 0 ml   Net 998.75 ml      Physical Exam  Vitals signs and nursing note reviewed.   Constitutional:       General: She is not  in acute distress.     Appearance: Normal appearance. She is well-developed. She is not ill-appearing.   HENT:      Head: Normocephalic and atraumatic.      Right Ear: External ear normal.      Left Ear: External ear normal.      Nose: Nose normal. No congestion or rhinorrhea.      Mouth/Throat:      Mouth: Mucous membranes are moist.      Pharynx: Oropharynx is clear. No oropharyngeal exudate or posterior oropharyngeal erythema.   Eyes:      General: No scleral icterus.     Conjunctiva/sclera: Conjunctivae normal.      Pupils: Pupils are equal, round, and reactive to light.   Neck:      Musculoskeletal: Normal range of motion and neck supple.      Vascular: No JVD.   Cardiovascular:      Rate and Rhythm: Normal rate and regular rhythm.      Pulses: Normal pulses.      Heart sounds: Normal heart sounds. No murmur.   Pulmonary:      Effort: Pulmonary effort is normal. No respiratory distress.      Breath sounds: No wheezing, rhonchi or rales.      Comments: Chest tube intact to Pleur-Evac.  Diminished breath sounds bilaterally to bases  Chest:      Chest wall: No tenderness.   Abdominal:      General: Bowel sounds are normal. There is no distension.      Palpations: Abdomen is soft.      Tenderness: There is no abdominal tenderness.   Musculoskeletal: Normal range of motion.         General: No swelling or tenderness.      Comments: Muscle wasting to bilateral upper and lower extremities   Skin:     General: Skin is warm and dry.      Capillary Refill: Capillary refill takes 2 to 3 seconds.      Coloration: Skin is not jaundiced or pale.   Neurological:      General: No focal deficit present.      Mental Status: She is alert and oriented to person, place, and time.      Cranial Nerves: No cranial nerve deficit.      Sensory: No sensory deficit.   Psychiatric:         Mood and Affect: Mood normal.         Behavior: Behavior normal.         Thought Content: Thought content normal.         Significant Labs:   CBC:    Recent Labs   Lab 11/29/20  1920 11/30/20  0810 12/01/20  0141   WBC 10.14 9.08 6.26   HGB 14.5 13.8 12.0   HCT 46.4 44.2 37.9    353* 302     CMP:   Recent Labs   Lab 11/29/20  1858 11/30/20  0810 12/01/20  0141    137 134*   K 4.2 3.5 3.7   CL 96 96 96   CO2 31* 28 28   * 114* 141*   BUN 11 9 15   CREATININE 0.6 0.6 0.5   CALCIUM 9.8 9.3 8.7   PROT 7.5 6.9 6.0   ALBUMIN 2.7* 2.3* 2.0*   BILITOT 0.2 0.3 0.2   ALKPHOS 78 77 67   AST 33 27 25   ALT 24 19 17   ANIONGAP 12 13 10   EGFRNONAA >60 >60 >60     All pertinent labs within the past 24 hours have been reviewed.    Significant Imaging: I have reviewed all pertinent imaging results/findings within the past 24 hours.

## 2020-12-01 NOTE — ASSESSMENT & PLAN NOTE
Chest tube intact and functioning properly  Appreciate Cardiothoracic surgery consult  Appreciate pulmonary evaluation and recommendations  Continue current treatment regimen  Chest x-ray reviewed and pneumothorax resolved

## 2020-12-01 NOTE — RESPIRATORY THERAPY
12/01/20 0652   Patient Assessment/Suction   Level of Consciousness (AVPU) alert   Respiratory Effort Normal;Unlabored   Expansion/Accessory Muscles/Retractions no use of accessory muscles   All Lung Fields Breath Sounds diminished   Rhythm/Pattern, Respiratory unlabored;pattern regular;depth regular   Cough Frequency infrequent   Cough Type fair;congested;nonproductive   PRE-TX-O2   O2 Device (Oxygen Therapy) nasal cannula   $ Is the patient on Low Flow Oxygen? Yes   Flow (L/min) 4   SpO2 98 %   Pulse Oximetry Type Intermittent   $ Pulse Oximetry - Multiple Charge Pulse Oximetry - Multiple   Pulse 86   Resp 18   Aerosol Therapy   $ Aerosol Therapy Charges Aerosol Treatment   Respiratory Treatment Status (SVN) given   Treatment Route (SVN) mask   Patient Position (SVN) HOB elevated   Signs of Intolerance (SVN) none   Inhaler   $ Inhaler Charges MDI (Metered Dose Inahler) Treatment   Respiratory Treatment Status (Inhaler) given   Treatment Route (Inhaler) mouthpiece   Patient Position (Inhaler) HOB elevated   Post Treatment Assessment (Inhaler) breath sounds unchanged   Signs of Intolerance (Inhaler) none   Breath Sounds Post-Respiratory Treatment   Throughout All Fields Post-Treatment All Fields   Throughout All Fields Post-Treatment no change   Post-treatment Heart Rate (beats/min) 93   Post-treatment Resp Rate (breaths/min) 18

## 2020-12-01 NOTE — PLAN OF CARE
11/1955   Patient Assessment/Suction   Level of Consciousness (AVPU) alert   Respiratory Effort Normal;Unlabored   Expansion/Accessory Muscles/Retractions no retractions;no use of accessory muscles   All Lung Fields Breath Sounds diminished;Anterior:;Lateral:   Rhythm/Pattern, Respiratory no shortness of breath reported   Cough Frequency infrequent   Cough Type nonproductive   PRE-TX-O2   O2 Device (Oxygen Therapy) nasal cannula   $ Is the patient on Low Flow Oxygen? Yes   Flow (L/min) 4   SpO2 96 %   Pulse Oximetry Type Intermittent   $ Pulse Oximetry - Multiple Charge Pulse Oximetry - Multiple   Pulse 95   Resp 18   Aerosol Therapy   $ Aerosol Therapy Charges Aerosol Treatment   Daily Review of Necessity (SVN) completed   Respiratory Treatment Status (SVN) given   Treatment Route (SVN) mask   Patient Position (SVN) HOB elevated   Post Treatment Assessment (SVN) breath sounds unchanged   Signs of Intolerance (SVN) none   Breath Sounds Post-Respiratory Treatment   Throughout All Fields Post-Treatment All Fields   Throughout All Fields Post-Treatment no change   Post-treatment Heart Rate (beats/min) 96   Post-treatment Resp Rate (breaths/min) 16

## 2020-12-01 NOTE — PLAN OF CARE
POC reviewed with patient at start of shift. Patient aaox4. Remains on Airborne and Droplet precautions. Brief changed as needed. Remains on oxygen at 4 liters nc. Continuous pulse ox and tele (box [id number]). Monitor tech notified nurse of 12 beat run of vtach. Patient stable and voiced no chest pain at that time. EKG and stat labs ordered. Will follow up. Pain managed with PRN tylenol.  IV patent and intact. Bed in low and locked position. Call light Iin reach. Bed alarm set. SR up x3. Will continue to monitor.

## 2020-12-01 NOTE — PROGRESS NOTES
"[facility]  Adult Nutrition  Progress Note    SUMMARY    Intervention: parenteral nutrition therapy, general healthful diet, and nutrition supplement therapy  Current intake: PPN @ 75 ml/hr: 612 kcal (40% EEN), 76 g protein ( > 100% EPN))    Recommendation:   1) Continue regular diet + boost plus with meals   2) weigh pt weekly, replete iron and lytes if needed  3) Can d/c PPN   4) nutrition education verbally given    Goals: 1) PO intakes > 50% of meals/supplements at f/u  Nutrition Goal Status: new  Communication of RD Recs: reviewed with physician, POC, alejandra note    Reason for Assessment    Reason For Assessment: identified at risk by screening criteria(MST)  Diagnosis: (pneumothorax)  Relevant Medical History: erosive gastritis, colitis, ETOH abuse, COPD, diverticulitis, smoking  Interdisciplinary Rounds: attended    General Information Comments: 66 y/o female admitted with pneumothorax, ruling out TB. + nasal cannula, pt breathing a bit better today and ate 1/2 cup eggs, 1/3 cup oatmeal, 100% OJ, 100% boost plus. PTA pt was eating with poor appetite x at least 3 weeks, drinking 1-2 Boost regular daily. NFPE done 12/1/20, moderate-severe wasting seen. insignificant wt loss x 7 months ( overall 25% x 1.5 years).    Nutrition Discharge Planning: to be determined- Regular + boost plus 2-3 x daily    Nutrition Risk Screen    Nutrition Risk Screen: no indicators present    Nutrition/Diet History    Patient Reported Diet/Restrictions/Preferences: general  Spiritual, Cultural Beliefs, Worship Practices, Values that Affect Care: no  Food Allergies: NKFA  Factors Affecting Nutritional Intake: other (see comments)(SOB)    Anthropometrics    Temp: 96.7 °F (35.9 °C)  Height Method: Measured(office 11/9/20)  Height: 5' 4"  Height (inches): 64 in  Weight Method: Bed Scale  Weight: 42.2 kg (93 lb 0.6 oz)  Weight (lb): 93.04 lb  Ideal Body Weight (IBW), Female: 120 lb  % Ideal Body Weight, Female (lb): " 77.53 %  BMI (Calculated): 16  BMI Grade: 16 - 16.9 protein-energy malnutrition grade II  Weight Loss: unintentional  Usual Body Weight (UBW), k.3 kg(19)  Weight Change Amount: (46.5 kg 20)  % Usual Body Weight: 75.11  % Weight Change From Usual Weight: -25.05 %       Lab/Procedures/Meds    Pertinent Labs Reviewed: reviewed  BMP  Lab Results   Component Value Date     (L) 2020    K 3.7 2020    CL 96 2020    CO2 28 2020    BUN 15 2020    CREATININE 0.5 2020    CALCIUM 8.7 2020    ANIONGAP 10 2020    ESTGFRAFRICA >60 2020    EGFRNONAA >60 2020     Lab Results   Component Value Date    ALBUMIN 2.0 (L) 2020       Pertinent Medications Reviewed: reviewed  Pertinent Medications Comments: statin, iron, prednisone, KCl, zofran    Estimated/Assessed Needs    Weight Used For Calorie Calculations: 42.2 kg (93 lb 0.6 oz)  Energy Calorie Requirements (kcal): MSJ ( x 1.5) = 1435 kcal  Energy Need Method: [place]  Protein Requirements: 1.2-1.5 g protein/kg ( wasting) = 50-63 g protein  Weight Used For Protein Calculations: 42.2 kg (93 lb 0.6 oz)  Fluid Requirements (mL): 1500 ml or per MD  Estimated Fluid Requirement Method: RDA Method  CHO Requirement: N/A      Nutrition Prescription Ordered    Current Diet Order: Regular + PPN above    Evaluation of Received Nutrient/Fluid Intake    Energy Calories Required:  meeting needs  Protein Required: meeting needs  Fluid Required: meeting needs  Total Fluid Intake (mL/kg): PPN @ 75 ml/hr + PO  Tolerance: tolerating  % Intake of Estimated Energy Needs: 100% with PPN  % Meal Intake: 50% today + 1 Boost  25% x the last 2-3 days    Nutrition Risk    Level of Risk/Frequency of Follow-up: moderate 2 x weekly    Assessment and Plan    Hypoalbuminemia due to protein-calorie malnutrition  Contributing Nutrition Diagnosis  Severe acute illness related malnutrition    Related to (etiology):   Shortness of  breath, decreased appetite    Signs and Symptoms (as evidenced by):   1) PO intakes < 50% x 3 weeks  2) Moderate-severe wasting seen    Interventions/Recommendations (treatment strategy):  above    Nutrition Diagnosis Status:   new           Monitor and Evaluation    Food and Nutrient Intake: energy intake, food and beverage intake  Food and Nutrient Adminstration: diet order, enteral and parenteral nutrition administration  Anthropometric Measurements: weight  Biochemical Data, Medical Tests and Procedures: electrolyte and renal panel, gastrointestinal profile  Nutrition-Focused Physical Findings: overall appearance     Malnutrition Assessment  Malnutrition Type: acute illness or injury  Skin (Micronutrient): (Galdino = 14)  Teeth (Micronutrient): (missing some)   Micronutrient Evaluation: suspected deficiency  Micronutrient Evaluation Comments: Na   Energy Intake (Malnutrition): less than 50% for greater than 5 days   Orbital Region (Subcutaneous Fat Loss): moderate depletion  Upper Arm Region (Subcutaneous Fat Loss): severe depletion  Thoracic and Lumbar Region: moderate depletion   Restorationist Region (Muscle Loss): moderate depletion  Clavicle Bone Region (Muscle Loss): moderate depletion  Clavicle and Acromion Bone Region (Muscle Loss): moderate depletion  Scapular Bone Region (Muscle Loss): moderate depletion  Dorsal Hand (Muscle Loss): mild depletion  Patellar Region (Muscle Loss): severe depletion  Anterior Thigh Region (Muscle Loss): moderate depletion  Posterior Calf Region (Muscle Loss): severe depletion   Edema (Fluid Accumulation): 0-->no edema present   Subcutaneous Fat Loss (Final Summary): severe protein-calorie malnutrition  Muscle Loss Evaluation (Final Summary): severe protein-calorie malnutrition         Nutrition Follow-Up    RD Follow-up?: Yes

## 2020-12-01 NOTE — PROGRESS NOTES
Pharmacokinetic Assessment Follow Up: IV Vancomycin    Vancomycin serum concentration assessment(s):    The trough level was drawn correctly and can be used to guide therapy at this time. The measurement is below the desired definitive target range of 15 to 20 mcg/mL.    Vancomycin Regimen Plan:    The patient's renal function has improved since initiation of the consult. Therefore, change regimen to Vancomycin 750 mg IV every 12 hours with next serum trough concentration measured at ~ 1045 prior to 3rd dose on 12/2.    Drug levels (last 3 results):  Recent Labs   Lab Result Units 12/01/20  0958   Vancomycin-Trough ug/mL 6.0*     Pharmacy will continue to follow and monitor vancomycin.    Please contact pharmacy at extension [phone] for questions regarding this assessment.    Thank you for the consult,   Sonja Rogel       Patient brief summary:  Fabiana Morel is a 65 y.o. female initiated on antimicrobial therapy with IV Vancomycin for treatment of lower respiratory infection.    The patient's current regimen is vancomycin 1250 mg every 24 hours.    Drug Allergies:   Review of patient's allergies indicates:   Allergen Reactions    Erythromycin Nausea Only       Actual Body Weight:   42.2 kg    Renal Function:   Estimated Creatinine Clearance: 75.7 mL/min (based on SCr of 0.5 mg/dL).,     Dialysis Method (if applicable):  N/A    CBC (last 72 hours):  Recent Labs   Lab Result Units 11/29/20  1920 11/30/20  0810 12/01/20  0141   WBC K/uL 10.14 9.08 6.26   Hemoglobin g/dL 14.5 13.8 12.0   Hematocrit % 46.4 44.2 37.9   Platelets K/uL 173 353* 302   Gran % % 72.5 73.4* 55.2   Lymph % % 19.6 19.3 32.1   Mono % % 6.4 5.5 8.9   Eosinophil % % 0.9 1.1 3.2   Basophil % % 0.3 0.4 0.3   Differential Method  Automated Automated Automated       Metabolic Panel (last 72 hours):  Recent Labs   Lab Result Units 11/29/20  1858 11/30/20  0810 12/01/20  0141   Sodium mmol/L 139 137 134*   Potassium mmol/L 4.2 3.5 3.7   Chloride  mmol/L 96 96 96   CO2 mmol/L 31* 28 28   Glucose mg/dL 153* 114* 141*   BUN mg/dL 11 9 15   Creatinine mg/dL 0.6 0.6 0.5   Albumin g/dL 2.7* 2.3* 2.0*   Total Bilirubin mg/dL 0.2 0.3 0.2   Alkaline Phosphatase U/L 78 77 67   AST U/L 33 27 25   ALT U/L 24 19 17   Magnesium mg/dL  --  2.0 2.1       Vancomycin Administrations:  vancomycin given in the last 96 hours                     vancomycin 1.25 g in dextrose 5% 250 mL IVPB (ready to mix) (mg) 1,250 mg New Bag 11/30/20 1130    vancomycin (VANCOCIN) 750 mg in dextrose 5 % 250 mL IVPB (mg) 750 mg New Bag 11/29/20 1925                    Microbiologic Results:  Microbiology Results (last 7 days)       Procedure Component Value Units Date/Time    Culture, Respiratory with Gram Stain [[id number]]     Order Status: No result Specimen: Respiratory from Sputum, Expectorated     Culture, Respiratory with Gram Stain [[id number]]     Order Status: No result Specimen: Respiratory from Sputum, Expectorated

## 2020-12-01 NOTE — PROGRESS NOTES
Progress Note  PULMONARY    Admit Date: 11/29/2020 12/01/2020  History of Present Illness:    smoker, severe copd , h/o recurrent pneumonias with neg bronch in 2017.   Pt has some vague immune def.   Had recent ct chest with non  Resolving cavitary disease ppt eval with pos afb early November.  Tb gold neg, and pt rx on  Inh/rif/azithr pending id. dna probe requested.  Pt continued with night sweats - chronic? And had  Brown mucous increase with sob 3 wks ago.  Pt had decreased appetite taking in boost only, and became bed bound.  Breathing worsened - 02 needs increased from2.5to 3.5-subjective sob.       Pt was not acutely symptomatic when mycobacterial rx started but worsened.  She was to call if worsened.  She did stop smoking.     Er eval  Pm showed right pneumo- very large. Thoracic vent to heimlich valve done with some re inflation.      Pt still sob.   Plan: will need to get id mycobacteria, will ask id to see, needs better pneumo treatment. Ppn,  Will need therpay.  Could have pseudomonas/mras complicating.   Needs sputum culture.  SUBJECTIVE:     12/1 no new c/o, feels much better.        PFSH and Allergies reviewed.    OBJECTIVE:     Vitals (Most recent):  Vitals:    12/01/20 0736   BP: 100/65   Pulse: 91   Resp: 16   Temp: 97.2 °F (36.2 °C)       Vitals (24 hour range):  Temp:  [96.1 °F (35.6 °C)-98.1 °F (36.7 °C)]   Pulse:  []   Resp:  [16-18]   BP: (100-128)/(58-78)   SpO2:  [93 %-99 %]       Intake/Output Summary (Last 24 hours) at 12/1/2020 0909  Last data filed at 12/1/2020 0503  Gross per 24 hour   Intake 998.75 ml   Output 0 ml   Net 998.75 ml          Physical Exam:  The patient's neuro status (alertness,orientation,cognitive function,motor skills,), pharyngeal exam (oral lesions, hygiene, abn dentition,), Neck (jvd,mass,thyroid,nodes in neck and above/below clavicle),RESPIRATORY(symmetry,effort,fremitus,percussion,auscultation),  Cor(rhythm,heart tones including  gallops,perfusion,edema)ABD(distention,hepatic&splenomegaly,tenderness,masses), Skin(rash,cyanosis),Psyc(affect,judgement,).  Exam negative except for these pertinent findings:    Alert, chest is symmetric, no distress, normal percussion, normal fremitus and  decreaed breath sounds- brisk air leak.      Radiographs reviewed: view by direct vision  No pneumo seen 12/1  Results for orders placed during the hospital encounter of 11/29/20   X-Ray Chest 1 View    Narrative EXAMINATION:  XR CHEST 1 VIEW    CLINICAL HISTORY:  pneumothorax; Pneumothorax, unspecified    TECHNIQUE:  Single frontal view of the chest was performed.    COMPARISON:  11/30/2020    FINDINGS:  Chest tube remains in place in the right upper lung field and other tube or line projects over the right lung base.  Right pneumothorax is not seen on the current film.  Subcutaneous emphysema appears mildly decreased.  Right basilar infiltrate with probable also small right pleural effusion do not appear significantly changed.  Stranding left suprahilar not significantly changed.  Heart not enlarged.      Impression Right-sided chest tube remains in place with the appearance of the chest not significantly changed compared to the prior exam.  No visible pneumothorax      Electronically signed by: Kristin Chávez MD  Date:    12/01/2020  Time:    08:29   ]    Labs     Recent Labs   Lab 12/01/20  0141   WBC 6.26   HGB 12.0   HCT 37.9        Recent Labs   Lab 12/01/20  0141   *   K 3.7   CL 96   CO2 28   BUN 15   CREATININE 0.5   *   CALCIUM 8.7   MG 2.1   AST 25   ALT 17   ALKPHOS 67   BILITOT 0.2   PROT 6.0   ALBUMIN 2.0*   No results for input(s): PH, PCO2, PO2, HCO3 in the last 24 hours.  Microbiology Results (last 7 days)     Procedure Component Value Units Date/Time    Culture, Respiratory with Gram Stain [[id number]]     Order Status: No result Specimen: Respiratory from Sputum, Expectorated     Culture, Respiratory with Gram Stain  [[id number]]     Order Status: No result Specimen: Respiratory from Sputum, Expectorated           Impression:  Active Hospital Problems    Diagnosis  POA    *Pneumothorax on right [J93.9]  Yes    Cachexia [R64]  Yes    Hypoalbuminemia due to protein-calorie malnutrition [E88.09, E46]  Yes    Mycobacterium avium complex [A31.0]  Yes    Iron deficiency [E61.1]  Yes    Immune deficiency disorder [D84.9]  Yes    Acute on chronic respiratory failure with hypoxia [J96.21]  Yes    Bronchiectasis [J47.9]  Yes    COPD (chronic obstructive pulmonary disease) [J44.9]  Yes    Hyperlipidemia [E78.5]  Yes    Lung nodule [R91.1]  Yes     Established with Dr. Mackey, had CT in 12/15.  Demonstrated multiple lung nodules, several spiculated masses        Resolved Hospital Problems   No resolved problems to display.               Plan:     12/1- cxr with resolved pneumo, pt feeling much better, no night sweats, eating very well.  Suspect she developed pneumo shortly after starting mycobacterial therapy.  Need to mobilize.  Called main [place] - hoping id of mycobacterium out on 12/2.  Air leak brisk - pt not good operative risk for chest surg.  May need prolonged suction?      If no tb would stop isolation and start physical therapy.    Sputum submitted today - taper abx per results.                                     .

## 2020-12-01 NOTE — PROGRESS NOTES
[facility]  Department of Cardiothoracic Surgery  Progress Note      PATIENT NAME: Fabiana Morel  MRN: [id number]  TODAY'S DATE: 12/01/2020  ADMIT DATE: 11/29/2020      PRINCIPLE PROBLEM: Pneumothorax on right        INTERVAL HISTORY:  Patient comfortable, no specific complaint      Review of patient's allergies indicates:   Allergen Reactions    Erythromycin Nausea Only       PHYSICAL EXAM  CONSTITUTIONAL: Well built, well nourished in no apparent distress  NECK: no carotid bruit, no JVD  LUNGS: CTA  CHEST WALL: No tenderness  HEART: regular rate and rhythm, S1, S2 normal, no murmur, click, rub or gallop   ABDOMEN: soft, non-tender; bowel sounds normal; no masses, no organomegaly  EXTREMITIES: Extremities normal, no edema  NEURO: AAO X 3    VITAL SIGNS (Most Recent)  Temp: 96.7 °F (35.9 °C) (12/01/20 1104)  Pulse: 89 (12/01/20 1104)  Resp: 18 (12/01/20 1104)  BP: 107/63 (12/01/20 1104)  SpO2: 98 % (12/01/20 1104)    VENTILATION STATUS  Resp: 18 (12/01/20 1104)  SpO2: 98 % (12/01/20 1104)       I & O (Last 24H):    Intake/Output Summary (Last 24 hours) at 12/1/2020 1505  Last data filed at 12/1/2020 0503  Gross per 24 hour   Intake 998.75 ml   Output 0 ml   Net 998.75 ml       WEIGHTS  Wt Readings from Last 1 Encounters:   12/01/20 1051 42.2 kg (93 lb 0.6 oz)   12/01/20 0328 42.2 kg (93 lb 0.6 oz)   11/29/20 2223 42.5 kg (93 lb 11.1 oz)   11/29/20 1737 44.5 kg (98 lb)         MEDICATIONS   SCHEDULED MEDS:   atorvastatin  40 mg Oral Daily    azithromycin  500 mg Oral Daily    enoxaparin  40 mg Subcutaneous Q24H    ferrous sulfate  325 mg Oral Daily    fluticasone furoate-vilanteroL  1 puff Inhalation Daily    isoniazid  300 mg Oral Daily    piperacillin-tazobactam (ZOSYN) IVPB  4.5 g Intravenous Q8H    predniSONE  20 mg Oral Daily    rifAMpin  450 mg Oral Daily    vancomycin (VANCOCIN) IVPB  750 mg Intravenous Q12H       CONTINUOUS INFUSIONS:    PRN MEDS:acetaminophen, albuterol,  albuterol-ipratropium, dextrose 50%, dextrose 50%, glucagon (human recombinant), glucose, glucose, HYDROcodone-acetaminophen, magnesium oxide, magnesium oxide, ondansetron, potassium chloride, potassium chloride, sodium chloride 0.9%, Pharmacy to dose Vancomycin consult **AND** vancomycin - pharmacy to dose    LABS AND DIAGNOSTICS   CBC LAST 3 DAYS  Recent Labs   Lab 11/29/20  1920 11/30/20  0810 12/01/20  0141   WBC 10.14 9.08 6.26   RBC 4.98 4.77 4.09   HGB 14.5 13.8 12.0   HCT 46.4 44.2 37.9   MCV 93 93 93   MCH 29.1 28.9 29.3   MCHC 31.3* 31.2* 31.7*   RDW 16.1* 15.9* 15.8*    353* 302   MPV 10.5 9.7 9.3   GRAN 72.5  7.4 73.4*  6.7 55.2  3.5   LYMPH 19.6  2.0 19.3  1.8 32.1  2.0   MONO 6.4  0.7 5.5  0.5 8.9  0.6   BASO 0.03 0.04 0.02   NRBC 0 0 0       COAGULATION LAST 3 DAYS  No results for input(s): LABPT, INR, APTT in the last 168 hours.    CHEMISTRY LAST 3 DAYS  Recent Labs   Lab 11/29/20  1858 11/30/20  0810 12/01/20  0141    137 134*   K 4.2 3.5 3.7   CL 96 96 96   CO2 31* 28 28   ANIONGAP 12 13 10   BUN 11 9 15   CREATININE 0.6 0.6 0.5   * 114* 141*   CALCIUM 9.8 9.3 8.7   MG  --  2.0 2.1   ALBUMIN 2.7* 2.3* 2.0*   PROT 7.5 6.9 6.0   ALKPHOS 78 77 67   ALT 24 19 17   AST 33 27 25   BILITOT 0.2 0.3 0.2       CARDIAC PROFILE LAST 3 DAYS  No results for input(s): BNP, CPK, CPKMB, LDH, TROPONINI in the last 168 hours.    ENDOCRINE LAST 3 DAYS  No results for input(s): TSH, PROCAL in the last 168 hours.    LAST ARTERIAL BLOOD GAS  ABG  No results for input(s): PH, PO2, PCO2, HCO3, BE in the last 168 hours.    LAST 7 DAYS MICROBIOLOGY   Microbiology Results (last 7 days)     Procedure Component Value Units Date/Time    Culture, Respiratory with Gram Stain [[id number]] Collected: 12/01/20 1108    Order Status: Sent Specimen: Respiratory from Sputum, Expectorated Updated: 12/01/20 1119    Culture, Respiratory with Gram Stain [[id number]] Collected: 12/01/20 1108    Order Status: Sent  Specimen: Respiratory from Sputum, Expectorated Updated: 12/01/20 1109          MOST RECENT IMAGING  X-Ray Chest 1 View  Narrative: EXAMINATION:  XR CHEST 1 VIEW    CLINICAL HISTORY:  pneumothorax; Pneumothorax, unspecified    TECHNIQUE:  Single frontal view of the chest was performed.    COMPARISON:  11/30/2020    FINDINGS:  Chest tube remains in place in the right upper lung field and other tube or line projects over the right lung base.  Right pneumothorax is not seen on the current film.  Subcutaneous emphysema appears mildly decreased.  Right basilar infiltrate with probable also small right pleural effusion do not appear significantly changed.  Stranding left suprahilar not significantly changed.  Heart not enlarged.  Impression: Right-sided chest tube remains in place with the appearance of the chest not significantly changed compared to the prior exam.  No visible pneumothorax    Electronically signed by: Kristin Chávez MD  Date:    12/01/2020  Time:    08:29      [facility]  No results found for this or any previous visit.    CURRENT/PREVIOUS VISIT EKG  Results for orders placed or performed during the hospital encounter of 11/29/20   EKG 12-lead    Collection Time: 12/01/20  1:48 AM    Narrative    Test Reason : I49.9,    Vent. Rate : 101 BPM     Atrial Rate : 101 BPM     P-R Int : 174 ms          QRS Dur : 070 ms      QT Int : 344 ms       P-R-T Axes : 084 -19 069 degrees     QTc Int : 446 ms    Sinus tachycardia with occasional Premature ventricular complexes  Anterior infarct (cited on or before 01-NOV-2020)  Abnormal ECG  When compared with ECG of 01-NOV-2020 19:44,  Premature ventricular complexes are now Present  Aberrant conduction is no longer Present    Referred By: [email or web address]   SELF           Confirmed By:          HOSPITAL PROBLEMS WITH ASSESSMENT & PLAN:     Active Hospital Problems    Diagnosis    *Pneumothorax on right    Cachexia    Hypoalbuminemia due to protein-calorie malnutrition     Mycobacterium avium complex    Iron deficiency    Immune deficiency disorder    Acute on chronic respiratory failure with hypoxia    Bronchiectasis    COPD (chronic obstructive pulmonary disease)    Hyperlipidemia    Lung nodule     Established with Dr. Mackey, had CT in 12/15.  Demonstrated multiple lung nodules, several spiculated masses         ASSESSMENT & PLAN:  R pneumothorax - persistent air leak      RECOMMENDATIONS:  Continue present management  Will discuss further with Dr Narendra Bucio MD  [facility]  Department of Cardiothoracic Surgery  Date of Service: 12/01/2020 3:05 PM

## 2020-12-01 NOTE — PLAN OF CARE
Intervention: parenteral nutrition therapy, general healthful diet, and nutrition supplement therapy  Current intake: PPN @ 75 ml/hr: 612 kcal (40% EEN), 76 g protein ( > 100% EPN))    Recommendation:   1) Continue regular diet + boost plus with meals   2) weigh pt weekly, replete iron and lytes if needed  3) Can d/c PPN   4) nutrition education verbally given    Goals: 1) PO intakes > 50% of meals/supplements at f/u  Nutrition Goal Status: new  Communication of RD Recs: reviewed with physician, POC, sticky note

## 2020-12-01 NOTE — ASSESSMENT & PLAN NOTE
Contributing Nutrition Diagnosis  Severe acute illness related malnutrition    Related to (etiology):   Shortness of breath, decreased appetite    Signs and Symptoms (as evidenced by):   1) PO intakes < 50% x 3 weeks  2) Moderate-severe wasting seen    Interventions/Recommendations (treatment strategy):  above    Nutrition Diagnosis Status:   new

## 2020-12-02 ENCOUNTER — OUTSIDE PLACE OF SERVICE (OUTPATIENT)
Dept: INFECTIOUS DISEASES | Facility: CLINIC | Age: 65
End: 2020-12-02
Payer: COMMERCIAL

## 2020-12-02 DIAGNOSIS — J93.9 PNEUMOTHORAX ON RIGHT: Primary | ICD-10-CM

## 2020-12-02 LAB
ALBUMIN SERPL BCP-MCNC: 2 G/DL (ref 3.5–5.2)
ALP SERPL-CCNC: 61 U/L (ref 55–135)
ALT SERPL W/O P-5'-P-CCNC: 14 U/L (ref 10–44)
ANION GAP SERPL CALC-SCNC: 7 MMOL/L (ref 8–16)
AST SERPL-CCNC: 24 U/L (ref 10–40)
BASOPHILS # BLD AUTO: 0.03 K/UL (ref 0–0.2)
BASOPHILS NFR BLD: 0.5 % (ref 0–1.9)
BILIRUB SERPL-MCNC: 0.1 MG/DL (ref 0.1–1)
BUN SERPL-MCNC: 9 MG/DL (ref 8–23)
CALCIUM SERPL-MCNC: 8.9 MG/DL (ref 8.7–10.5)
CHLORIDE SERPL-SCNC: 98 MMOL/L (ref 95–110)
CO2 SERPL-SCNC: 31 MMOL/L (ref 23–29)
CREAT SERPL-MCNC: 0.5 MG/DL (ref 0.5–1.4)
DIFFERENTIAL METHOD: ABNORMAL
EOSINOPHIL # BLD AUTO: 0.3 K/UL (ref 0–0.5)
EOSINOPHIL NFR BLD: 4.8 % (ref 0–8)
ERYTHROCYTE [DISTWIDTH] IN BLOOD BY AUTOMATED COUNT: 15.9 % (ref 11.5–14.5)
EST. GFR  (AFRICAN AMERICAN): >60 ML/MIN/1.73 M^2
EST. GFR  (NON AFRICAN AMERICAN): >60 ML/MIN/1.73 M^2
GLUCOSE SERPL-MCNC: 86 MG/DL (ref 70–110)
HCT VFR BLD AUTO: 35.8 % (ref 37–48.5)
HGB BLD-MCNC: 11 G/DL (ref 12–16)
IMM GRANULOCYTES # BLD AUTO: 0.03 K/UL (ref 0–0.04)
IMM GRANULOCYTES NFR BLD AUTO: 0.5 % (ref 0–0.5)
LYMPHOCYTES # BLD AUTO: 2.1 K/UL (ref 1–4.8)
LYMPHOCYTES NFR BLD: 31.3 % (ref 18–48)
MAGNESIUM SERPL-MCNC: 2 MG/DL (ref 1.6–2.6)
MCH RBC QN AUTO: 28.7 PG (ref 27–31)
MCHC RBC AUTO-ENTMCNC: 30.7 G/DL (ref 32–36)
MCV RBC AUTO: 94 FL (ref 82–98)
MONOCYTES # BLD AUTO: 0.6 K/UL (ref 0.3–1)
MONOCYTES NFR BLD: 9.4 % (ref 4–15)
NEUTROPHILS # BLD AUTO: 3.5 K/UL (ref 1.8–7.7)
NEUTROPHILS NFR BLD: 53.5 % (ref 38–73)
NRBC BLD-RTO: 0 /100 WBC
PLATELET # BLD AUTO: 304 K/UL (ref 150–350)
PMV BLD AUTO: 9.3 FL (ref 9.2–12.9)
POTASSIUM SERPL-SCNC: 3.6 MMOL/L (ref 3.5–5.1)
PROT SERPL-MCNC: 6 G/DL (ref 6–8.4)
RBC # BLD AUTO: 3.83 M/UL (ref 4–5.4)
SODIUM SERPL-SCNC: 136 MMOL/L (ref 136–145)
WBC # BLD AUTO: 6.61 K/UL (ref 3.9–12.7)

## 2020-12-02 PROCEDURE — 12000002 HC ACUTE/MED SURGE SEMI-PRIVATE ROOM

## 2020-12-02 PROCEDURE — 99232 PR SUBSEQUENT HOSPITAL CARE,LEVL II: ICD-10-PCS | Mod: S$GLB,,, | Performed by: INTERNAL MEDICINE

## 2020-12-02 PROCEDURE — 25000003 PHARM REV CODE 250: Performed by: INTERNAL MEDICINE

## 2020-12-02 PROCEDURE — 36415 COLL VENOUS BLD VENIPUNCTURE: CPT

## 2020-12-02 PROCEDURE — 99233 PR SUBSEQUENT HOSPITAL CARE,LEVL III: ICD-10-PCS | Mod: ,,, | Performed by: INTERNAL MEDICINE

## 2020-12-02 PROCEDURE — 25000003 PHARM REV CODE 250: Performed by: NURSE PRACTITIONER

## 2020-12-02 PROCEDURE — 80053 COMPREHEN METABOLIC PANEL: CPT

## 2020-12-02 PROCEDURE — 99233 SBSQ HOSP IP/OBS HIGH 50: CPT | Mod: ,,, | Performed by: INTERNAL MEDICINE

## 2020-12-02 PROCEDURE — 63600175 PHARM REV CODE 636 W HCPCS: Performed by: NURSE PRACTITIONER

## 2020-12-02 PROCEDURE — 63600175 PHARM REV CODE 636 W HCPCS: Performed by: INTERNAL MEDICINE

## 2020-12-02 PROCEDURE — 83735 ASSAY OF MAGNESIUM: CPT

## 2020-12-02 PROCEDURE — 94664 DEMO&/EVAL PT USE INHALER: CPT

## 2020-12-02 PROCEDURE — 94640 AIRWAY INHALATION TREATMENT: CPT

## 2020-12-02 PROCEDURE — 27000646 HC AEROBIKA DEVICE

## 2020-12-02 PROCEDURE — 99232 SBSQ HOSP IP/OBS MODERATE 35: CPT | Mod: S$GLB,,, | Performed by: INTERNAL MEDICINE

## 2020-12-02 PROCEDURE — 25000242 PHARM REV CODE 250 ALT 637 W/ HCPCS: Performed by: NURSE PRACTITIONER

## 2020-12-02 PROCEDURE — 94761 N-INVAS EAR/PLS OXIMETRY MLT: CPT

## 2020-12-02 PROCEDURE — 27000221 HC OXYGEN, UP TO 24 HOURS

## 2020-12-02 PROCEDURE — 99900035 HC TECH TIME PER 15 MIN (STAT)

## 2020-12-02 PROCEDURE — 85025 COMPLETE CBC W/AUTO DIFF WBC: CPT

## 2020-12-02 RX ORDER — HYDROCODONE BITARTRATE AND ACETAMINOPHEN 5; 325 MG/1; MG/1
1 TABLET ORAL EVERY 4 HOURS
Status: DISCONTINUED | OUTPATIENT
Start: 2020-12-02 | End: 2020-12-09 | Stop reason: HOSPADM

## 2020-12-02 RX ORDER — PREDNISONE 5 MG/1
5 TABLET ORAL 2 TIMES DAILY
Status: DISCONTINUED | OUTPATIENT
Start: 2020-12-03 | End: 2020-12-09

## 2020-12-02 RX ADMIN — CLARITHROMYCIN 500 MG: 500 TABLET, FILM COATED ORAL at 08:12

## 2020-12-02 RX ADMIN — MEROPENEM AND SODIUM CHLORIDE 1 G: 1 INJECTION, SOLUTION INTRAVENOUS at 04:12

## 2020-12-02 RX ADMIN — FERROUS SULFATE TAB EC 325 MG (65 MG FE EQUIVALENT) 325 MG: 325 (65 FE) TABLET DELAYED RESPONSE at 10:12

## 2020-12-02 RX ADMIN — HYDROCODONE BITARTRATE AND ACETAMINOPHEN 1 TABLET: 5; 325 TABLET ORAL at 05:12

## 2020-12-02 RX ADMIN — ATORVASTATIN CALCIUM 40 MG: 40 TABLET, FILM COATED ORAL at 10:12

## 2020-12-02 RX ADMIN — MEROPENEM AND SODIUM CHLORIDE 1 G: 1 INJECTION, SOLUTION INTRAVENOUS at 10:12

## 2020-12-02 RX ADMIN — ENOXAPARIN SODIUM 40 MG: 100 INJECTION SUBCUTANEOUS at 05:12

## 2020-12-02 RX ADMIN — RIFAMPIN 300 MG: 300 CAPSULE ORAL at 10:12

## 2020-12-02 RX ADMIN — IPRATROPIUM BROMIDE AND ALBUTEROL SULFATE 3 ML: .5; 2.5 SOLUTION RESPIRATORY (INHALATION) at 12:12

## 2020-12-02 RX ADMIN — FLUTICASONE FUROATE AND VILANTEROL TRIFENATATE 1 PUFF: 200; 25 POWDER RESPIRATORY (INHALATION) at 07:12

## 2020-12-02 RX ADMIN — LINEZOLID 600 MG: 600 TABLET, FILM COATED ORAL at 08:12

## 2020-12-02 RX ADMIN — HYDROCODONE BITARTRATE AND ACETAMINOPHEN 1 TABLET: 5; 325 TABLET ORAL at 10:12

## 2020-12-02 RX ADMIN — ETHAMBUTOL HYDROCHLORIDE 600 MG: 400 TABLET, FILM COATED ORAL at 10:12

## 2020-12-02 RX ADMIN — HYDROCODONE BITARTRATE AND ACETAMINOPHEN 1 TABLET: 5; 325 TABLET ORAL at 07:12

## 2020-12-02 RX ADMIN — CLARITHROMYCIN 500 MG: 500 TABLET, FILM COATED ORAL at 10:12

## 2020-12-02 RX ADMIN — IPRATROPIUM BROMIDE AND ALBUTEROL SULFATE 3 ML: .5; 2.5 SOLUTION RESPIRATORY (INHALATION) at 07:12

## 2020-12-02 RX ADMIN — MEROPENEM AND SODIUM CHLORIDE 1 G: 1 INJECTION, SOLUTION INTRAVENOUS at 08:12

## 2020-12-02 RX ADMIN — HYDROCODONE BITARTRATE AND ACETAMINOPHEN 1 TABLET: 5; 325 TABLET ORAL at 12:12

## 2020-12-02 RX ADMIN — LINEZOLID 600 MG: 600 TABLET, FILM COATED ORAL at 10:12

## 2020-12-02 RX ADMIN — PREDNISONE 20 MG: 20 TABLET ORAL at 10:12

## 2020-12-02 NOTE — PLAN OF CARE
Pt alert and oriented. Aware of POC. Calm and cooperative. Afebrile. Oxygen maintained @ 4L nasal cannula. Chest tube maintained. No drainage. Leak present. IV/ oral antibiotics administered as ordered. Productive cough noted. Telemetry monitoring continued. Bed in lowest position. Call light/ needed items placed within patient reach. Safety maintained. Contact and airborne precautions maintained.

## 2020-12-02 NOTE — PLAN OF CARE
12/01/20 1920   Patient Assessment/Suction   Level of Consciousness (AVPU) alert   Respiratory Effort Normal;Unlabored   Expansion/Accessory Muscles/Retractions no retractions;no use of accessory muscles   All Lung Fields Breath Sounds diminished;Anterior:;Lateral:   Rhythm/Pattern, Respiratory depth regular;pattern regular;unlabored   Cough Frequency infrequent   Cough Type congested   PRE-TX-O2   O2 Device (Oxygen Therapy) nasal cannula   $ Is the patient on Low Flow Oxygen? Yes   Flow (L/min) 4   SpO2 95 %   Pulse Oximetry Type Intermittent   $ Pulse Oximetry - Multiple Charge Pulse Oximetry - Multiple   Pulse 95   Resp 18   Aerosol Therapy   $ Aerosol Therapy Charges Aerosol Treatment   Daily Review of Necessity (SVN) completed   Respiratory Treatment Status (SVN) given   Treatment Route (SVN) mask   Patient Position (SVN) HOB elevated   Post Treatment Assessment (SVN) breath sounds unchanged   Signs of Intolerance (SVN) none   Breath Sounds Post-Respiratory Treatment   Throughout All Fields Post-Treatment All Fields   Throughout All Fields Post-Treatment no change   Post-treatment Heart Rate (beats/min) 95   Post-treatment Resp Rate (breaths/min) 18

## 2020-12-02 NOTE — ASSESSMENT & PLAN NOTE
Secondary to protein malnutrition and poor oral intake  Supplement with boost  Appetite improving

## 2020-12-02 NOTE — PROGRESS NOTES
Progress Note  PULMONARY    Admit Date: 11/29/2020 12/02/2020  History of Present Illness:    smoker, severe copd , h/o recurrent pneumonias with neg bronch in 2017.   Pt has some vague immune def.   Had recent ct chest with non  Resolving cavitary disease ppt eval with pos afb early November.  Tb gold neg, and pt rx on  Inh/rif/azithr pending id. dna probe requested.  Pt continued with night sweats - chronic? And had  Brown mucous increase with sob 3 wks ago.  Pt had decreased appetite taking in boost only, and became bed bound.  Breathing worsened - 02 needs increased from2.5to 3.5-subjective sob.       Pt was not acutely symptomatic when mycobacterial rx started but worsened.  She was to call if worsened.  She did stop smoking.     Er eval  Pm showed right pneumo- very large. Thoracic vent to heimlich valve done with some re inflation.      Pt still sob.   Plan: will need to get id mycobacteria, will ask id to see, needs better pneumo treatment. Ppn,  Will need therpay.  Could have pseudomonas/mras complicating.   Needs sputum culture.  SUBJECTIVE:     12/1 no new c/o, feels much better.    12/2 having increased sob last few min.   Thick brown green mucous    PFSH and Allergies reviewed.    OBJECTIVE:     Vitals (Most recent):  Vitals:    12/02/20 0752   BP:    Pulse: 87   Resp: 18   Temp:        Vitals (24 hour range):  Temp:  [96 °F (35.6 °C)-98.4 °F (36.9 °C)]   Pulse:  []   Resp:  [16-19]   BP: ()/(57-63)   SpO2:  [95 %-100 %]       Intake/Output Summary (Last 24 hours) at 12/2/2020 1211  Last data filed at 12/1/2020 2304  Gross per 24 hour   Intake 550 ml   Output 0 ml   Net 550 ml          Physical Exam:  The patient's neuro status (alertness,orientation,cognitive function,motor skills,), pharyngeal exam (oral lesions, hygiene, abn dentition,), Neck (jvd,mass,thyroid,nodes in neck and above/below clavicle),RESPIRATORY(symmetry,effort,fremitus,percussion,auscultation),  Cor(rhythm,heart  tones including gallops,perfusion,edema)ABD(distention,hepatic&splenomegaly,tenderness,masses), Skin(rash,cyanosis),Psyc(affect,judgement,).  Exam negative except for these pertinent findings:    Alert, chest is symmetric, no distress, normal percussion, normal fremitus and  decreaed breath sounds- brisk air leak.      Radiographs reviewed: view by direct vision  No pneumo seen 12/1  Results for orders placed during the hospital encounter of 11/29/20   X-Ray Chest 1 View    Narrative EXAMINATION:  XR CHEST 1 VIEW    CLINICAL HISTORY:  pneumothorax; Pneumothorax, unspecified    TECHNIQUE:  Single frontal view of the chest was performed.    COMPARISON:  11/30/2020    FINDINGS:  Chest tube remains in place in the right upper lung field and other tube or line projects over the right lung base.  Right pneumothorax is not seen on the current film.  Subcutaneous emphysema appears mildly decreased.  Right basilar infiltrate with probable also small right pleural effusion do not appear significantly changed.  Stranding left suprahilar not significantly changed.  Heart not enlarged.      Impression Right-sided chest tube remains in place with the appearance of the chest not significantly changed compared to the prior exam.  No visible pneumothorax      Electronically signed by: Kristin Chávez MD  Date:    12/01/2020  Time:    08:29   ]    Labs     Recent Labs   Lab 12/02/20  0718   WBC 6.61   HGB 11.0*   HCT 35.8*        Recent Labs   Lab 12/02/20  0718      K 3.6   CL 98   CO2 31*   BUN 9   CREATININE 0.5   GLU 86   CALCIUM 8.9   MG 2.0   AST 24   ALT 14   ALKPHOS 61   BILITOT 0.1   PROT 6.0   ALBUMIN 2.0*   No results for input(s): PH, PCO2, PO2, HCO3 in the last 24 hours.  Microbiology Results (last 7 days)     Procedure Component Value Units Date/Time    Culture, Respiratory with Gram Stain [[id number]] Collected: 12/01/20 1108    Order Status: Completed Specimen: Respiratory from Sputum, Expectorated Updated:  12/01/20 2217     Gram Stain (Respiratory) <10 epithelial cells per low power field.     Gram Stain (Respiratory) Rare WBC's     Gram Stain (Respiratory) No organisms seen    Culture, Respiratory with Gram Stain [[id number]] Collected: 12/01/20 1108    Order Status: Sent Specimen: Respiratory from Sputum, Expectorated Updated: 12/01/20 1109          Impression:  Active Hospital Problems    Diagnosis  POA    *Pneumothorax on right [J93.9]  Yes    Cachexia [R64]  Yes    Hypoalbuminemia due to protein-calorie malnutrition [E88.09, E46]  Yes    Mycobacterium avium complex [A31.0]  Yes    Iron deficiency [E61.1]  Yes    Immune deficiency disorder [D84.9]  Yes    Acute on chronic respiratory failure with hypoxia [J96.21]  Yes    Bronchiectasis [J47.9]  Yes    COPD (chronic obstructive pulmonary disease) [J44.9]  Yes    Hyperlipidemia [E78.5]  Yes    Lung nodule [R91.1]  Yes     Established with Dr. Mackey, had CT in 12/15.  Demonstrated multiple lung nodules, several spiculated masses        Resolved Hospital Problems   No resolved problems to display.               Plan:     12/1- cxr with resolved pneumo, pt feeling much better, no night sweats, eating very well.  Suspect she developed pneumo shortly after starting mycobacterial therapy.  Need to mobilize.  Called main [facility] - hoping id of mycobacterium out on 12/2.  Air leak brisk - pt not good operative risk for chest surg.  May need prolonged suction?      If no tb would stop isolation and start physical therapy.    Sputum submitted today - taper abx per results.       12/2 cxr ok, sm sub q air, no id for afb.    Will recheck cxr - air leak brisk.  Having chest pain that may ppt atelectasis/sob- place norco q4 hold if sedate.     Lab says looks like mac, may have 2nd organism?, probe should be out next wk.                            .

## 2020-12-02 NOTE — PROGRESS NOTES
Consult Note  Infectious Disease    Reason for Consult:       HPI: Fabiana Mroel is a   65 y.o. female with  PMHx of HTN, D LP,  anemia, malnutrition, cachexia, BMI of 15.9, right femur fracture status post surgery in December 2019, deconditioning, uses cane, asthma, immunodeficiency, bronchiectasis, pulmonary cavitary lung disease, COPD, pneumonia, chronic hypoxemic respiratory failure on 2-3 L. who presents to the ED with an onset of worsening SOB for 3 weeks, worse over the past 3 days associated with right-sided chest pain..       Patient and  states that she gets either pneumonia or bronchitis every year around October November.  This year was about the same.  She developed progressive shortness of breath.  Dr. Mackey had sent sputum cultures which are growing AFB, within 1 week.(11/05/--11/12)    She has received prednisone and antibiotics(Augmentin) as outpatient without much improvement.   Even if she had home oxygen for multiple years and not using it much, patient needed to use quite a lot of it and actually going over 3 L per minute   She developed progressive shortness of breath, not able to catch her breath, right-sided chest pain.       She came to ED  She was diagnosed with pneumothorax and underwent right chest tube placement.   Patient denies hemoptysis.     Decreased appetite despite multiple appetite stimulants.     She may have lost about 5-7 lb of to over the past 6 months.   No night sweats.  Does not change her clothes at night.  She gets hot flashes since she had hysterectomy.      No daily medications were taken today (Zithromax, Rifadin and Nydrazid). The patient denies any other symptoms at this time. No pertinent PMHx.     She continues to smoke.  No TB exposure.  She was never in an institution.  She has a dog.  No cats.  No birds.  No travel.      12/01/2020 right chest discomfort, understandable.  Ct  On Rt upper chest.  On 4 L o2    12/2:  Consult/notes reviewed, imaging and  cultures reviewed. D/w Dr. Mackey. Per Dr. 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 TB Gold neg. She is having more difficulty expectorating and her sputum is green pea soup in appearance. She has not been out of bed except to restroom. She is tolerating current meds very well. She is only eating about 2 meals per day but takes the boost. She refuses flu vaccine.       Antibiotics (From admission, onward)    Start     Stop Route Frequency Ordered    12/02/20 0900  rifAMpin capsule 300 mg      -- Oral Daily 12/01/20 1829 12/01/20 2100  clarithromycin tablet 500 mg      -- Oral Every 12 hours 12/01/20 1829 12/01/20 1945  meropenem-0.9% sodium chloride 1 g/50 mL IVPB      -- IV Every 8 hours (non-standard times) 12/01/20 1837 12/01/20 1830  linezolid tablet 600 mg      -- Oral Every 12 hours 12/01/20 1829 12/01/20 1830  ethambutoL tablet 600 mg      -- Oral Daily 12/01/20 1829             EXAM & DIAGNOSTICS REVIEWED:   Vitals:     Temp:  [96 °F (35.6 °C)-98.4 °F (36.9 °C)]   Temp: 96.9 °F (36.1 °C) (12/02/20 1500)  Pulse: 108 (12/02/20 1601)  Resp: 18 (12/02/20 1601)  BP: 131/73 (12/02/20 1500)  SpO2: 96 % (12/02/20 1601)    Intake/Output Summary (Last 24 hours) at 12/2/2020 1734  Last data filed at 12/2/2020 1200  Gross per 24 hour   Intake 630 ml   Output 0 ml   Net 630 ml       General:  In NAD. Alert and attentive, cooperative, comfortable for   thin cachectic female  Eyes: Anicteric,   EOMI  ENT: No ulcers, exudates, thrush, nares patent, dentition is fair  Neck: supple,    Lungs: Wet cough, decreased BS,  Right CTube/heimlich 11/29  Heart: RRR, no gallop/murmur/rub noted  Abd: Soft, NT, ND, normal BS, no masses or organomegaly appreciated.  :  Voids  no flank tenderness  Musc: Joints without effusion, swelling, erythema, synovitis, muscle wasting.   Skin: No rashes. No palmar or plantar lesions.   Wound:   Neuro: Alert, attentive, speech fluent, face symmetric, moves all  extremities, no focal weakness  Psych: Calm, cooperative  Lymphatic:   No cervical, supraclavicular nodes  Extrem: No edema, erythema, phlebitis, cellulitis, warm and well perfused  VAD:   peripheral   Isolation:  airborne    Lines/Tubes/Drains:    General Labs reviewed:  Recent Labs   Lab 11/30/20  0810 12/01/20  0141 12/02/20  0718   WBC 9.08 6.26 6.61   HGB 13.8 12.0 11.0*   HCT 44.2 37.9 35.8*   * 302 304       Recent Labs   Lab 11/30/20  0810 12/01/20  0141 12/02/20  0718    134* 136   K 3.5 3.7 3.6   CL 96 96 98   CO2 28 28 31*   BUN 9 15 9   CREATININE 0.6 0.5 0.5   CALCIUM 9.3 8.7 8.9   PROT 6.9 6.0 6.0   BILITOT 0.3 0.2 0.1   ALKPHOS 77 67 61   ALT 19 17 14   AST 27 25 24           Micro:  Microbiology Results (last 7 days)     Procedure Component Value Units Date/Time    Culture, Respiratory with Gram Stain [[id number]] Collected: 12/01/20 1108    Order Status: Completed Specimen: Respiratory from Sputum, Expectorated Updated: 12/01/20 2217     Gram Stain (Respiratory) <10 epithelial cells per low power field.     Gram Stain (Respiratory) Rare WBC's     Gram Stain (Respiratory) No organisms seen    Culture, Respiratory with Gram Stain [[id number]] Collected: 12/01/20 1108    Order Status: Sent Specimen: Respiratory from Sputum, Expectorated Updated: 12/01/20 1109        Procedure Component Value Units Date/Time   Culture, Respiratory with Gram Stain [[id number]]    Order Status: No result Specimen: Respiratory from Sputum, Expectorated    AFB Culture & Smear [[id number]] (Abnormal) Collected: 11/05/20 1304   Order Status: Completed Specimen: Respiratory from Sputum, Expectorated Updated: 11/12/20 0740    AFB Culture & Smear Culture positive, identification pending     Verbal report given for previous positive smear    AFB CULTURE STAIN Positive for acid fast bacilli, 4 orgs/fieldAbnormal     AFB CULTURE STAIN Verbal report given for previous positive smearAbnormal    AFB Culture & Smear  [[id number]] (Abnormal) Collected: 11/04/20 1529   Order Status: Completed Specimen: Respiratory from Sputum, Expectorated Updated: 11/18/20 1630    AFB Culture & Smear Culture positive, identification pending     Verbal report given for previous positive smear  11/18/2020  16:29    AFB CULTURE STAIN Positive for acid fast bacilli, 4 orgs/fieldAbnormal     AFB CULTURE STAIN Results called to and read back by: Brittney Brown  11/05/2020  14:14Abnormal    AFB Culture & Smear [[id number]] Collected: 11/03/20 1400   Order Status: Completed Specimen: Respiratory from Sputum, Expectorated Updated: 11/10/20 1359    AFB Culture & Smear Culture positive, identification pending     Verbal report given for previous positive smear    AFB CULTURE STAIN No acid fast bacilli seen.   Culture, Respiratory with Gram Stain [[id number]] Collected: 11/03/20 1400   Order Status: Completed Specimen: Respiratory from Sputum Updated: 11/06/20 1003    Respiratory Culture Normal respiratory chelsea     No S aureus or Pseudomonas isolated.    Gram Stain (Respiratory) <10 epithelial cells per low power field.    Gram Stain (Respiratory) Rare WBC's    Gram Stain (Respiratory) Rare Gram positive cocci    Gram Stain (Respiratory) Rare Gram negative rods       Imaging Reviewed:   CXRUnchanged small volume right pneumothorax with chest tube in place.  New airspace disease at the right base suspicious for atelectasis or edema.  COPD.    CT11/01/2020    No evidence of pulmonary thromboemboli particularly in the large central arteries and lobar branches as imaged.  Peripheral branches are difficult to evaluate particularly in the upper lobes as described above.  New focus of nodular consolidation with cavitary of formation in the right upper lobe laterally and progression/increased in number of bilateral apical large cavities.  There is a small air-fluid level in 1 of the cavities on the left.  Findings are consistent with at infectious etiology.  Follow-up  is suggested to exclude pulmonary nodular lesions/neoplasm.  Mediastinal adenopathy and possible hilar adenopathy which is likely reactive.  No new pleural or pericardial effusion or other acute process.  Additional stable incidental findings as above.      Cardiology:    IMPRESSION & PLAN     1. Pneumonia due to AFB in a cachectic patient.    Tb Gold Neg   AFB grew from sputum (11/4---11/18==14 days;  11/5--11/12==7 days)  This could be either MAC (more likely) or fast growing mycobacterium like M. fortuitum, M. abscesses, M Chelonae.   Less likely to be TB.  Discontinue isolation at discharge or if DNA probe is negative  2. Right pneumothorax.   Chest tube in place   Severe bullous emphysema    3.  PMHx of HTN, DLP,  anemia, malnutrition, cachexia, BMI of 15.9, right femur fracture status post surgery in December 2019, deconditioning, uses cane, asthma, immunodeficiency, bronchiectasis, pulmonary cavitary lung disease, COPD, pneumonia, chronic hypoxemic respiratory failure on 2-3 L.     Recommendations:   continue broad spectrum coverage (she is on half dose rifampin because her weight is so low)  Would given probiotic if she can tolerate  Follow DNA probe of AFB sent by Dr. Mackey  Limit steroids as able  For possible MAC: Clarithromycin or azithro + Rifampin +  Ethambutol  For possible Fast growers: Clarithro+ Zyvox + Meropenem (Imipenem is not available)  Follow final cultures  Baseline eye exam because of ethambutol in the near future  Will taper therapy further depending on recults      Medical Decision Making during this encounter was  [_] Low Complexity  [_] Moderate Complexity  [x  ] High Complexity

## 2020-12-02 NOTE — PROGRESS NOTES
Consult Note  Infectious Disease    Reason for Consult:       HPI: Fabiana Morel is a   65 y.o. female with  PMHx of HTN, D LP,  anemia, malnutrition, cachexia, BMI of 15.9, right femur fracture status post surgery in December 2019, deconditioning, uses cane, asthma, immunodeficiency, bronchiectasis, pulmonary cavitary lung disease, COPD, pneumonia, chronic hypoxemic respiratory failure on 2-3 L. who presents to the ED with an onset of worsening SOB for 3 weeks, worse over the past 3 days associated with right-sided chest pain..       Patient and  states that she gets either pneumonia or bronchitis every year around October November.  This year was about the same.  She developed progressive shortness of breath.  Dr. Mackey had sent sputum cultures which are growing AFB, within 1 week.(11/05/--11/12)    She has received prednisone and antibiotics(Augmentin) as outpatient without much improvement.   Even if she had home oxygen for multiple years and not using it much, patient needed to use quite a lot of it and actually going over 3 L per minute   She developed progressive shortness of breath, not able to catch her breath, right-sided chest pain.       She came to ED  She was diagnosed with pneumothorax and underwent right chest tube placement.   Patient denies hemoptysis.     Decreased appetite despite multiple appetite stimulants.     She may have lost about 5-7 lb of to over the past 6 months.   No night sweats.  Does not change her clothes at night.  She gets hot flashes since she had hysterectomy.      No daily medications were taken today (Zithromax, Rifadin and Nydrazid). The patient denies any other symptoms at this time. No pertinent PMHx.     She continues to smoke.  No TB exposure.  She was never in an institution.  She has a dog.  No cats.  No birds.  No travel.      12/01/2020 right chest discomfort, understandable.  Ct  On Rt upper chest.  On 4 L o2          Antibiotics (From admission, onward)     Start     Stop Route Frequency Ordered    12/02/20 0900  rifAMpin capsule 300 mg      -- Oral Daily 12/01/20 1829 12/01/20 2100  clarithromycin tablet 500 mg      -- Oral Every 12 hours 12/01/20 1829 12/01/20 1945  meropenem-0.9% sodium chloride 1 g/50 mL IVPB      -- IV Every 8 hours (non-standard times) 12/01/20 1837 12/01/20 1830  linezolid tablet 600 mg      -- Oral Every 12 hours 12/01/20 1829 12/01/20 1830  ethambutoL tablet 600 mg      -- Oral Daily 12/01/20 1829        Antifungals (From admission, onward)    None        Antivirals (From admission, onward)    None        Review of patient's allergies indicates:   Allergen Reactions    Erythromycin Nausea Only     Past Medical History:   Diagnosis Date    Abnormal CT scan 1/8/2015    Asthma     Colitis     COPD (chronic obstructive pulmonary disease)     Diverticulitis of sigmoid colon 11/4/2014    Erosive gastritis 7/17/2013    Fracture of left clavicle     H pylori ulcer 7/17/2013    Hypertension     Peptic ulcer disease 7/17/2013    Pneumonia of right upper lobe due to infectious organism 8/15/2017    Rectal bleed 11/3/2014    Scoliosis     SOB (shortness of breath)     Yeast infection 8/25/2017         EXAM & DIAGNOSTICS REVIEWED:   Vitals:     Temp:  [96.2 °F (35.7 °C)-98.4 °F (36.9 °C)]   Temp: 98.4 °F (36.9 °C) (12/01/20 2038)  Pulse: 96 (12/01/20 2038)  Resp: 19 (12/01/20 2038)  BP: 100/61 (12/01/20 2038)  SpO2: 96 % (12/01/20 2038)    Intake/Output Summary (Last 24 hours) at 12/1/2020 2221  Last data filed at 12/1/2020 1700  Gross per 24 hour   Intake 1210 ml   Output 0 ml   Net 1210 ml       General:  In NAD. Alert and attentive, cooperative, comfortable for   thin cachectic female  Eyes: Anicteric, PERRL, EOMI  ENT: No ulcers, exudates, thrush, nares patent, dentition is fair  Neck: supple, no masses or adenopathy appreciated  Lungs: Squeaking sound in bilateral lungs. Right CTube 11/29  Heart: RRR, no  gallop/murmur/rub noted  Abd: Soft, NT, ND, normal BS, no masses or organomegaly appreciated.  :  Voids/Duran, urine clear, no flank tenderness  Musc: Joints without effusion, swelling, erythema, synovitis, muscle wasting.   Skin: No rashes. No palmar or plantar lesions. No subungual petechiae  Wound:   Neuro: Alert, attentive, speech fluent, face symmetric, moves all extremities, no focal weakness  Psych: Calm, cooperative  Lymphatic:   No cervical, supraclavicular, axillary, or inguinal nodes  Extrem: No edema, erythema, phlebitis, cellulitis, warm and well perfused  VAD:       Isolation:      Lines/Tubes/Drains:    General Labs reviewed:  Recent Labs   Lab 11/29/20  1920 11/30/20  0810 12/01/20  0141   WBC 10.14 9.08 6.26   HGB 14.5 13.8 12.0   HCT 46.4 44.2 37.9    353* 302       Recent Labs   Lab 11/29/20  1858 11/30/20  0810 12/01/20  0141    137 134*   K 4.2 3.5 3.7   CL 96 96 96   CO2 31* 28 28   BUN 11 9 15   CREATININE 0.6 0.6 0.5   CALCIUM 9.8 9.3 8.7   PROT 7.5 6.9 6.0   BILITOT 0.2 0.3 0.2   ALKPHOS 78 77 67   ALT 24 19 17   AST 33 27 25           Micro:  Microbiology Results (last 7 days)     Procedure Component Value Units Date/Time    Culture, Respiratory with Gram Stain [[id number]] Collected: 12/01/20 1108    Order Status: Completed Specimen: Respiratory from Sputum, Expectorated Updated: 12/01/20 2217     Gram Stain (Respiratory) <10 epithelial cells per low power field.     Gram Stain (Respiratory) Rare WBC's     Gram Stain (Respiratory) No organisms seen    Culture, Respiratory with Gram Stain [[id number]] Collected: 12/01/20 1108    Order Status: Sent Specimen: Respiratory from Sputum, Expectorated Updated: 12/01/20 1109        Procedure Component Value Units Date/Time   Culture, Respiratory with Gram Stain [[id number]]    Order Status: No result Specimen: Respiratory from Sputum, Expectorated    AFB Culture & Smear [[id number]] (Abnormal) Collected: 11/05/20 1304   Order Status:  Completed Specimen: Respiratory from Sputum, Expectorated Updated: 11/12/20 0740    AFB Culture & Smear Culture positive, identification pending     Verbal report given for previous positive smear    AFB CULTURE STAIN Positive for acid fast bacilli, 4 orgs/fieldAbnormal     AFB CULTURE STAIN Verbal report given for previous positive smearAbnormal    AFB Culture & Smear [[id number]] (Abnormal) Collected: 11/04/20 1529   Order Status: Completed Specimen: Respiratory from Sputum, Expectorated Updated: 11/18/20 1630    AFB Culture & Smear Culture positive, identification pending     Verbal report given for previous positive smear  11/18/2020  16:29    AFB CULTURE STAIN Positive for acid fast bacilli, 4 orgs/fieldAbnormal     AFB CULTURE STAIN Results called to and read back by: Brittney Brown  11/05/2020  14:14Abnormal    AFB Culture & Smear [[id number]] Collected: 11/03/20 1400   Order Status: Completed Specimen: Respiratory from Sputum, Expectorated Updated: 11/10/20 1359    AFB Culture & Smear Culture positive, identification pending     Verbal report given for previous positive smear    AFB CULTURE STAIN No acid fast bacilli seen.   Culture, Respiratory with Gram Stain [[id number]] Collected: 11/03/20 1400   Order Status: Completed Specimen: Respiratory from Sputum Updated: 11/06/20 1003    Respiratory Culture Normal respiratory chelsea     No S aureus or Pseudomonas isolated.    Gram Stain (Respiratory) <10 epithelial cells per low power field.    Gram Stain (Respiratory) Rare WBC's    Gram Stain (Respiratory) Rare Gram positive cocci    Gram Stain (Respiratory) Rare Gram negative rods       Imaging Reviewed:   CXRUnchanged small volume right pneumothorax with chest tube in place.  New airspace disease at the right base suspicious for atelectasis or edema.  COPD.    CT11/01/2020    No evidence of pulmonary thromboemboli particularly in the large central arteries and lobar branches as imaged.  Peripheral branches are  difficult to evaluate particularly in the upper lobes as described above.  New focus of nodular consolidation with cavitary of formation in the right upper lobe laterally and progression/increased in number of bilateral apical large cavities.  There is a small air-fluid level in 1 of the cavities on the left.  Findings are consistent with at infectious etiology.  Follow-up is suggested to exclude pulmonary nodular lesions/neoplasm.  Mediastinal adenopathy and possible hilar adenopathy which is likely reactive.  No new pleural or pericardial effusion or other acute process.  Additional stable incidental findings as above.      Cardiology:    IMPRESSION & PLAN     1. Pneumonia due to AFB in a cachectic patient. Lady [place]?   Tb Gold Neg   AFB grew from sputum (11/4---11/18==14 days;  11/5--11/12==7 days)    2. Right pneumothorax.  Due to above.  Chest tube in place    3.  PMHx of HTN, DLP,  anemia, malnutrition, cachexia, BMI of 15.9, right femur fracture status post surgery in December 2019, deconditioning, uses cane, asthma, immunodeficiency, bronchiectasis, pulmonary cavitary lung disease, COPD, pneumonia, chronic hypoxemic respiratory failure on 2-3 L.     Recommendations:  AFB grew from sputum in 7-14 days  This could be either MAC (more likely) or fast growing mycobacterium like M. fortuitum, M. abscesses, M Chelonae.    I do not think it is TB.  Discontinue isolation at discharge  Follow DNA probe of AFB sent by Dr. Mackey    For possible MAC: Clarithromycin + Rifampin +  Ethambutol  For possible Fast growers: Clarithro+ Zyvox + Meropenem (Imipenem is not available)  Follow final cultures  Baseline eye exam because of ethambutol in the near future  Will wilmer therapy further depending on recults      Medical Decision Making during this encounter was  [_] Low Complexity  [_] Moderate Complexity  [x  ] High Complexity

## 2020-12-02 NOTE — PROGRESS NOTES
[facility] Hospital Medicine  Progress Note    Patient Name: Fabiana Morel  MRN: [id number]  Patient Class: IP- Inpatient   Admission Date: 11/29/2020  Length of Stay: 2 days  Attending Physician: Davie Pierre MD  Primary Care Provider: Dominique Bartlett MD        Subjective:     Principal Problem:Pneumothorax on right        HPI:  Fabiana Morel is a 65-year-old female with a past medical history of COPD, hyperlipidemia, iron deficiency anemia, and tobacco use who presents to the emergency room tonight with reports of shortness of breath.  Patient reports shortness of breath x3 weeks, has increased in severity since onset.  Patient requiring supplemental oxygen therapy for the past three weeks at 2-3 L, however has required increase in normal O2 dose over the past three days. Patient currently under the care of Dr. Mackey, pulmonology, has a history of COPD. Patient accompanied by her  during my initial interview and physical exam. a history of COPD. Patient was reportedly seen in the emergency room on 11/06 for chest pain, a CT chest was reportedly obtained revealing right lung abscess.  Patient followed with Dr. Mackey in clinic on 11/09 in which sputum samples were obtained and treatment was initiated for tuberculosis versus MAC.  Patient also reports decreased p.o. intake, utilizes boost supplementation daily.  Patient endorses productive cough x3 days, with brown sputum production.  Patient endorses dyspnea on exertion, stating she can not walk the length of the room without becoming SOB.  Patient states she quit tobacco use on 11/05/2020.  In the ER, patient noted to have right pneumothorax on chest x-ray.  ER physician placed Heimlich valve chest tube with reported immediate relief in shortness of breath.  Repeat chest x-ray revealing improvement of pneumothorax.  ER MD reportedly spoke with Dr. Mackey, pulmonology who recommended admission with IV vancomycin and Zosyn and isolation  precautions.  Patient placed in observation on 11/29/2020 at approximately 9:30 p.m..  Full code status verified upon admission to the hospital.    Overview/Hospital Course:  No notes on file    Interval History:  Notes reviewed, no acute events overnight. CT to right ant upper CW intact. Pt denies sob. Cough is more productive today. Pt states cw pain improved with norco. Awaiting further recs from pulmonary.     Review of Systems   Constitutional: Negative for chills, fatigue and fever.   HENT: Negative for congestion, sinus pressure and sore throat.    Eyes: Negative for photophobia and visual disturbance.   Respiratory: Negative for cough, chest tightness and shortness of breath.         Pleuritic cw pain   Cardiovascular: Negative for chest pain, palpitations and leg swelling.   Gastrointestinal: Negative for abdominal pain, diarrhea, nausea and vomiting.   Endocrine: Negative for polyphagia and polyuria.   Genitourinary: Negative for difficulty urinating, dysuria and urgency.   Musculoskeletal: Negative for arthralgias, back pain and gait problem.   Skin: Negative for color change, pallor, rash and wound.   Neurological: Negative for dizziness, weakness and light-headedness.   Psychiatric/Behavioral: Negative for agitation, behavioral problems and confusion.   All other systems reviewed and are negative.    Objective:     Vital Signs (Most Recent):  Temp: 96 °F (35.6 °C) (12/02/20 0741)  Pulse: 87 (12/02/20 0752)  Resp: 18 (12/02/20 0752)  BP: 110/63 (12/02/20 0741)  SpO2: 98 % (12/02/20 0752) Vital Signs (24h Range):  Temp:  [96 °F (35.6 °C)-98.4 °F (36.9 °C)] 96 °F (35.6 °C)  Pulse:  [] 87  Resp:  [16-19] 18  SpO2:  [95 %-100 %] 98 %  BP: ()/(57-63) 110/63     Weight: 44 kg (97 lb)  Body mass index is 16.65 kg/m².    Intake/Output Summary (Last 24 hours) at 12/2/2020 1113  Last data filed at 12/1/2020 2304  Gross per 24 hour   Intake 630 ml   Output 0 ml   Net 630 ml      Physical  Exam    Significant Labs:   CBC:   Recent Labs   Lab 12/01/20  0141 12/02/20  0718   WBC 6.26 6.61   HGB 12.0 11.0*   HCT 37.9 35.8*    304     CMP:   Recent Labs   Lab 12/01/20  0141 12/02/20  0718   * 136   K 3.7 3.6   CL 96 98   CO2 28 31*   * 86   BUN 15 9   CREATININE 0.5 0.5   CALCIUM 8.7 8.9   PROT 6.0 6.0   ALBUMIN 2.0* 2.0*   BILITOT 0.2 0.1   ALKPHOS 67 61   AST 25 24   ALT 17 14   ANIONGAP 10 7*   EGFRNONAA >60 >60     All pertinent labs within the past 24 hours have been reviewed.    Significant Imaging: I have reviewed all pertinent imaging results/findings within the past 24 hours.      Assessment/Plan:      * Pneumothorax on right  Chest tube intact and functioning properly  Appreciate Cardiothoracic surgery consult  Appreciate pulmonary evaluation and recommendations  Continue current treatment regimen  Chest x-ray reviewed - remains stable        Cachexia  Secondary to protein malnutrition and poor oral intake  Supplement with boost  Appetite improving      Mycobacterium avium complex  Appreciate ID consult  Cont abx per ID      Hypoalbuminemia due to protein-calorie malnutrition  Noted, encourage PO intake - boost supplementation.        Iron deficiency  Chronic, continue home iron supplementation.        Acute on chronic respiratory failure with hypoxia  Noted, continue the use of supplemental O2.        COPD (chronic obstructive pulmonary disease)  Chronic - continue home medication, pulmonology consult. Continuous supplemental oxygen, telemetry, and pulse oximetry monitoring.        Lung nodule  Managed by Dr. Mackey      Hyperlipidemia    Chronic, controlled. Will continue home medication.    Lab Results   Component Value Date    CHOL 187 08/21/2020    CHOL 182 05/22/2020    CHOL 182 06/25/2019     Lab Results   Component Value Date    HDL 55 08/21/2020    HDL 57 05/22/2020    HDL 65 06/25/2019     Lab Results   Component Value Date    LDLCALC 105.4 08/21/2020    LDLCALC 102.2  05/22/2020    LDLCALC 97.6 06/25/2019     Lab Results   Component Value Date    TRIG 133 08/21/2020    TRIG 114 05/22/2020    TRIG 97 06/25/2019     Lab Results   Component Value Date    CHOLHDL 29.4 08/21/2020    CHOLHDL 31.3 05/22/2020    CHOLHDL 35.7 06/25/2019               VTE Risk Mitigation (From admission, onward)         Ordered     enoxaparin injection 40 mg  Every 24 hours      11/29/20 2224     IP VTE HIGH RISK PATIENT  Once      11/29/20 2224     Place sequential compression device  Until discontinued      11/29/20 2224     Place STEPHANEI hose  Until discontinued      11/29/20 2224                Discharge Planning   DION: 12/3/2020     Code Status: Full Code   Is the patient medically ready for discharge?:     Reason for patient still in hospital (select all that apply): Treatment and Consult recommendations  Discharge Plan A: Home with family                  Rosa Rogers NP  Department of Hospital Medicine   [facility]

## 2020-12-02 NOTE — ASSESSMENT & PLAN NOTE
Chest tube intact and functioning properly  Appreciate Cardiothoracic surgery consult  Appreciate pulmonary evaluation and recommendations  Continue current treatment regimen  Chest x-ray reviewed - remains stable

## 2020-12-02 NOTE — PROGRESS NOTES
[facility]  Department of Cardiothoracic Surgery  Progress Note      PATIENT NAME: Fabiana Morel  MRN: [id number]  TODAY'S DATE: 12/02/2020  ADMIT DATE: 11/29/2020      PRINCIPLE PROBLEM: Pneumothorax on right    INTERVAL HISTORY:  Comfortable, no complaint                                          Air leak persists      Review of patient's allergies indicates:   Allergen Reactions    Erythromycin Nausea Only       PHYSICAL EXAM  CONSTITUTIONAL: Well built, well nourished in no apparent distress  NECK: no carotid bruit, no JVD  LUNGS: CTA  CHEST WALL: No tenderness  HEART: regular rate and rhythm, S1, S2 normal, no murmur, click, rub or gallop   ABDOMEN: soft, non-tender; bowel sounds normal; no masses, no organomegaly  EXTREMITIES: Extremities normal, no edema  NEURO: AAO X 3    VITAL SIGNS (Most Recent)  Temp: 96 °F (35.6 °C) (12/02/20 0741)  Pulse: 96 (12/02/20 1237)  Resp: 18 (12/02/20 1237)  BP: 110/63 (12/02/20 0741)  SpO2: 96 % (12/02/20 1237)    VENTILATION STATUS  Resp: 18 (12/02/20 1237)  SpO2: 96 % (12/02/20 1237)       I & O (Last 24H):    Intake/Output Summary (Last 24 hours) at 12/2/2020 1433  Last data filed at 12/1/2020 2304  Gross per 24 hour   Intake 150 ml   Output 0 ml   Net 150 ml       WEIGHTS  Wt Readings from Last 1 Encounters:   12/02/20 0600 44 kg (97 lb)   12/01/20 1051 42.2 kg (93 lb 0.6 oz)   12/01/20 0328 42.2 kg (93 lb 0.6 oz)   11/29/20 2223 42.5 kg (93 lb 11.1 oz)   11/29/20 1737 44.5 kg (98 lb)         MEDICATIONS   SCHEDULED MEDS:   atorvastatin  40 mg Oral Daily    clarithromycin  500 mg Oral Q12H    enoxaparin  40 mg Subcutaneous Q24H    ethambutoL  600 mg Oral Daily    ferrous sulfate  325 mg Oral Daily    fluticasone furoate-vilanteroL  1 puff Inhalation Daily    linezolid  600 mg Oral Q12H    meropenem (MERREM) IVPB  1 g Intravenous Q8H    predniSONE  20 mg Oral Daily    rifAMpin  300 mg Oral Daily       CONTINUOUS INFUSIONS:    PRN  MEDS:acetaminophen, albuterol, albuterol-ipratropium, dextrose 50%, dextrose 50%, glucagon (human recombinant), glucose, glucose, HYDROcodone-acetaminophen, magnesium oxide, magnesium oxide, ondansetron, potassium chloride, potassium chloride, sodium chloride 0.9%    LABS AND DIAGNOSTICS   CBC LAST 3 DAYS  Recent Labs   Lab 11/30/20  0810 12/01/20  0141 12/02/20  0718   WBC 9.08 6.26 6.61   RBC 4.77 4.09 3.83*   HGB 13.8 12.0 11.0*   HCT 44.2 37.9 35.8*   MCV 93 93 94   MCH 28.9 29.3 28.7   MCHC 31.2* 31.7* 30.7*   RDW 15.9* 15.8* 15.9*   * 302 304   MPV 9.7 9.3 9.3   GRAN 73.4*  6.7 55.2  3.5 53.5  3.5   LYMPH 19.3  1.8 32.1  2.0 31.3  2.1   MONO 5.5  0.5 8.9  0.6 9.4  0.6   BASO 0.04 0.02 0.03   NRBC 0 0 0       COAGULATION LAST 3 DAYS  No results for input(s): LABPT, INR, APTT in the last 168 hours.    CHEMISTRY LAST 3 DAYS  Recent Labs   Lab 11/30/20  0810 12/01/20  0141 12/02/20  0718    134* 136   K 3.5 3.7 3.6   CL 96 96 98   CO2 28 28 31*   ANIONGAP 13 10 7*   BUN 9 15 9   CREATININE 0.6 0.5 0.5   * 141* 86   CALCIUM 9.3 8.7 8.9   MG 2.0 2.1 2.0   ALBUMIN 2.3* 2.0* 2.0*   PROT 6.9 6.0 6.0   ALKPHOS 77 67 61   ALT 19 17 14   AST 27 25 24   BILITOT 0.3 0.2 0.1       CARDIAC PROFILE LAST 3 DAYS  No results for input(s): BNP, CPK, CPKMB, LDH, TROPONINI in the last 168 hours.    ENDOCRINE LAST 3 DAYS  No results for input(s): TSH, PROCAL in the last 168 hours.    LAST ARTERIAL BLOOD GAS  ABG  No results for input(s): PH, PO2, PCO2, HCO3, BE in the last 168 hours.    LAST 7 DAYS MICROBIOLOGY   Microbiology Results (last 7 days)     Procedure Component Value Units Date/Time    Culture, Respiratory with Gram Stain [[id number]] Collected: 12/01/20 1108    Order Status: Completed Specimen: Respiratory from Sputum, Expectorated Updated: 12/01/20 6502     Gram Stain (Respiratory) <10 epithelial cells per low power field.     Gram Stain (Respiratory) Rare WBC's     Gram Stain (Respiratory) No  organisms seen    Culture, Respiratory with Gram Stain [[id number]] Collected: 12/01/20 1108    Order Status: Sent Specimen: Respiratory from Sputum, Expectorated Updated: 12/01/20 1109          MOST RECENT IMAGING  X-Ray Chest 1 View  Narrative: EXAMINATION:  XR CHEST 1 VIEW    CLINICAL HISTORY:  pneumothorax; Pneumothorax, unspecified    TECHNIQUE:  Single frontal view of the chest was performed.    COMPARISON:  12/01/2020    FINDINGS:  A right chest tube is unchanged in position.  A 2nd tube overlies the right lung base.  There is likely a small persistent pneumothorax at the right costophrenic angle.  The cardiomediastinal silhouette is unchanged.  There is mild subcutaneous emphysema along the right lateral chest wall which has mildly increased.  Impression: Right chest tube in place, with 2nd tube overlying the right lung base as before.  Probable small recurrent or persistent pneumothorax at the right costophrenic angle.    Unchanged chronic parenchymal changes, corresponding to cavitary lung disease and severe emphysema seen on CT chest 11/01/2020.    Electronically signed by: Omar Perry MD  Date:    12/02/2020  Time:    08:02      [facility]  No results found for this or any previous visit.    CURRENT/PREVIOUS VISIT EKG  Results for orders placed or performed during the hospital encounter of 11/29/20   EKG 12-lead    Collection Time: 12/01/20  1:48 AM    Narrative    Test Reason : I49.9,    Vent. Rate : 101 BPM     Atrial Rate : 101 BPM     P-R Int : 174 ms          QRS Dur : 070 ms      QT Int : 344 ms       P-R-T Axes : 084 -19 069 degrees     QTc Int : 446 ms    Sinus tachycardia with occasional Premature ventricular complexes  Anterior infarct (cited on or before 01-NOV-2020)  Abnormal ECG  When compared with ECG of 01-NOV-2020 19:44,  Premature ventricular complexes are now Present  Aberrant conduction is no longer Present    Referred By: [email or web address]   SELF           Confirmed By:          HOSPITAL  PROBLEMS WITH ASSESSMENT & PLAN:     Active Hospital Problems    Diagnosis    *Pneumothorax on right    Cachexia    Hypoalbuminemia due to protein-calorie malnutrition    Mycobacterium avium complex    Iron deficiency    Immune deficiency disorder    Acute on chronic respiratory failure with hypoxia    Bronchiectasis    COPD (chronic obstructive pulmonary disease)    Hyperlipidemia    Lung nodule     Established with Dr. Mackey, had CT in 12/15.  Demonstrated multiple lung nodules, several spiculated masses         ASSESSMENT & PLAN:  R pneumothorax     RECOMMENDATIONS:  Discussed at length with patient and her  at bedside  Optimal management would involve replacing the vent with a pliable small diameter chest tube. This will give us better assessment of the air leak and allow patient to possibly go home with Heimlich valve if leak resistant to closing  All questions answered. Will plan to proceed in AM   Discussed with Dr Narendra Bucio MD  [facility]  Department of Cardiothoracic Surgery  Date of Service: 12/02/2020 2:33 PM

## 2020-12-02 NOTE — RESPIRATORY THERAPY
12/02/20 0752   Patient Assessment/Suction   Level of Consciousness (AVPU) alert   Respiratory Effort Normal;Unlabored   Expansion/Accessory Muscles/Retractions no use of accessory muscles   All Lung Fields Breath Sounds diminished   Rhythm/Pattern, Respiratory unlabored;pattern regular;depth regular   Cough Frequency infrequent   Cough Type fair;congested;nonproductive   PRE-TX-O2   O2 Device (Oxygen Therapy) nasal cannula   $ Is the patient on Low Flow Oxygen? Yes   Flow (L/min) 3   SpO2 98 %   Pulse Oximetry Type Intermittent   $ Pulse Oximetry - Multiple Charge Pulse Oximetry - Multiple   Pulse 87   Resp 18   Aerosol Therapy   $ Aerosol Therapy Charges Aerosol Treatment   Respiratory Treatment Status (SVN) given   Treatment Route (SVN) mask   Patient Position (SVN) HOB elevated   Signs of Intolerance (SVN) none   Inhaler   $ Inhaler Charges MDI (Metered Dose Inahler) Treatment   Respiratory Treatment Status (Inhaler) given   Treatment Route (Inhaler) mouthpiece   Patient Position (Inhaler) HOB elevated   Signs of Intolerance (Inhaler) none   Breath Sounds Post-Respiratory Treatment   Throughout All Fields Post-Treatment All Fields   Throughout All Fields Post-Treatment no change   Post-treatment Heart Rate (beats/min) 93   Post-treatment Resp Rate (breaths/min) 18

## 2020-12-02 NOTE — PLAN OF CARE
POC discussed with patient. Pt verbalized understanding. VSS. Afebrile. AAO. Tele [id number] SR. Isolation precautions maintained. PIV cdi. Chest tube maintained. No drainage. O2 @ 2Liters. Pt c/o SOB during shift and was given aerosol mask treatment. Up to commode. Bed low. Call light in place. Safety maintained. Will continue to monitor.

## 2020-12-02 NOTE — SUBJECTIVE & OBJECTIVE
Interval History:  Notes reviewed, no acute events overnight. CT to right ant upper CW intact. Pt denies sob. Cough is more productive today. Pt states cw pain improved with norco. Awaiting further recs from pulmonary.     Review of Systems   Constitutional: Negative for chills, fatigue and fever.   HENT: Negative for congestion, sinus pressure and sore throat.    Eyes: Negative for photophobia and visual disturbance.   Respiratory: Negative for cough, chest tightness and shortness of breath.         Pleuritic cw pain   Cardiovascular: Negative for chest pain, palpitations and leg swelling.   Gastrointestinal: Negative for abdominal pain, diarrhea, nausea and vomiting.   Endocrine: Negative for polyphagia and polyuria.   Genitourinary: Negative for difficulty urinating, dysuria and urgency.   Musculoskeletal: Negative for arthralgias, back pain and gait problem.   Skin: Negative for color change, pallor, rash and wound.   Neurological: Negative for dizziness, weakness and light-headedness.   Psychiatric/Behavioral: Negative for agitation, behavioral problems and confusion.   All other systems reviewed and are negative.    Objective:     Vital Signs (Most Recent):  Temp: 96 °F (35.6 °C) (12/02/20 0741)  Pulse: 87 (12/02/20 0752)  Resp: 18 (12/02/20 0752)  BP: 110/63 (12/02/20 0741)  SpO2: 98 % (12/02/20 0752) Vital Signs (24h Range):  Temp:  [96 °F (35.6 °C)-98.4 °F (36.9 °C)] 96 °F (35.6 °C)  Pulse:  [] 87  Resp:  [16-19] 18  SpO2:  [95 %-100 %] 98 %  BP: ()/(57-63) 110/63     Weight: 44 kg (97 lb)  Body mass index is 16.65 kg/m².    Intake/Output Summary (Last 24 hours) at 12/2/2020 1113  Last data filed at 12/1/2020 2304  Gross per 24 hour   Intake 630 ml   Output 0 ml   Net 630 ml      Physical Exam    Significant Labs:   CBC:   Recent Labs   Lab 12/01/20  0141 12/02/20  0718   WBC 6.26 6.61   HGB 12.0 11.0*   HCT 37.9 35.8*    304     CMP:   Recent Labs   Lab 12/01/20  0141 12/02/20  0718   NA  134* 136   K 3.7 3.6   CL 96 98   CO2 28 31*   * 86   BUN 15 9   CREATININE 0.5 0.5   CALCIUM 8.7 8.9   PROT 6.0 6.0   ALBUMIN 2.0* 2.0*   BILITOT 0.2 0.1   ALKPHOS 67 61   AST 25 24   ALT 17 14   ANIONGAP 10 7*   EGFRNONAA >60 >60     All pertinent labs within the past 24 hours have been reviewed.    Significant Imaging: I have reviewed all pertinent imaging results/findings within the past 24 hours.

## 2020-12-02 NOTE — PROGRESS NOTES
Fabiana Morel [id number] is a 65 y.o. female who had been consulted for vancomycin dosing.    Vancomycin has been discontinued.  Pharmacy consult for vancomycin dosing in no longer required.      Thank you for allowing us to participate in this patient's care.     Machelle Snow

## 2020-12-03 LAB
ALBUMIN SERPL BCP-MCNC: 2.2 G/DL (ref 3.5–5.2)
ALP SERPL-CCNC: 58 U/L (ref 55–135)
ALT SERPL W/O P-5'-P-CCNC: 15 U/L (ref 10–44)
ANION GAP SERPL CALC-SCNC: 9 MMOL/L (ref 8–16)
AST SERPL-CCNC: 29 U/L (ref 10–40)
BASOPHILS # BLD AUTO: 0.03 K/UL (ref 0–0.2)
BASOPHILS NFR BLD: 0.4 % (ref 0–1.9)
BILIRUB SERPL-MCNC: 0.1 MG/DL (ref 0.1–1)
BUN SERPL-MCNC: 8 MG/DL (ref 8–23)
CALCIUM SERPL-MCNC: 9.1 MG/DL (ref 8.7–10.5)
CHLORIDE SERPL-SCNC: 99 MMOL/L (ref 95–110)
CO2 SERPL-SCNC: 30 MMOL/L (ref 23–29)
CREAT SERPL-MCNC: 0.6 MG/DL (ref 0.5–1.4)
DIFFERENTIAL METHOD: ABNORMAL
EOSINOPHIL # BLD AUTO: 0.3 K/UL (ref 0–0.5)
EOSINOPHIL NFR BLD: 3.7 % (ref 0–8)
ERYTHROCYTE [DISTWIDTH] IN BLOOD BY AUTOMATED COUNT: 15.9 % (ref 11.5–14.5)
EST. GFR  (AFRICAN AMERICAN): >60 ML/MIN/1.73 M^2
EST. GFR  (NON AFRICAN AMERICAN): >60 ML/MIN/1.73 M^2
GLUCOSE SERPL-MCNC: 84 MG/DL (ref 70–110)
HCT VFR BLD AUTO: 37 % (ref 37–48.5)
HGB BLD-MCNC: 11.4 G/DL (ref 12–16)
IMM GRANULOCYTES # BLD AUTO: 0.08 K/UL (ref 0–0.04)
IMM GRANULOCYTES NFR BLD AUTO: 1.1 % (ref 0–0.5)
LYMPHOCYTES # BLD AUTO: 3.1 K/UL (ref 1–4.8)
LYMPHOCYTES NFR BLD: 44 % (ref 18–48)
MAGNESIUM SERPL-MCNC: 2.1 MG/DL (ref 1.6–2.6)
MCH RBC QN AUTO: 28.9 PG (ref 27–31)
MCHC RBC AUTO-ENTMCNC: 30.8 G/DL (ref 32–36)
MCV RBC AUTO: 94 FL (ref 82–98)
MONOCYTES # BLD AUTO: 0.5 K/UL (ref 0.3–1)
MONOCYTES NFR BLD: 7 % (ref 4–15)
NEUTROPHILS # BLD AUTO: 3.1 K/UL (ref 1.8–7.7)
NEUTROPHILS NFR BLD: 43.8 % (ref 38–73)
NRBC BLD-RTO: 0 /100 WBC
PLATELET # BLD AUTO: 332 K/UL (ref 150–350)
PMV BLD AUTO: 9.1 FL (ref 9.2–12.9)
POTASSIUM SERPL-SCNC: 4.1 MMOL/L (ref 3.5–5.1)
PROT SERPL-MCNC: 6.3 G/DL (ref 6–8.4)
RBC # BLD AUTO: 3.94 M/UL (ref 4–5.4)
SODIUM SERPL-SCNC: 138 MMOL/L (ref 136–145)
WBC # BLD AUTO: 7.05 K/UL (ref 3.9–12.7)

## 2020-12-03 PROCEDURE — 25000003 PHARM REV CODE 250: Performed by: THORACIC SURGERY (CARDIOTHORACIC VASCULAR SURGERY)

## 2020-12-03 PROCEDURE — 25000003 PHARM REV CODE 250: Performed by: PHYSICIAN ASSISTANT

## 2020-12-03 PROCEDURE — 99232 SBSQ HOSP IP/OBS MODERATE 35: CPT | Mod: ,,, | Performed by: INTERNAL MEDICINE

## 2020-12-03 PROCEDURE — 97803 MED NUTRITION INDIV SUBSEQ: CPT

## 2020-12-03 PROCEDURE — 94640 AIRWAY INHALATION TREATMENT: CPT

## 2020-12-03 PROCEDURE — 27000221 HC OXYGEN, UP TO 24 HOURS

## 2020-12-03 PROCEDURE — 25000242 PHARM REV CODE 250 ALT 637 W/ HCPCS: Performed by: NURSE PRACTITIONER

## 2020-12-03 PROCEDURE — 99232 PR SUBSEQUENT HOSPITAL CARE,LEVL II: ICD-10-PCS | Mod: ,,, | Performed by: INTERNAL MEDICINE

## 2020-12-03 PROCEDURE — 63600175 PHARM REV CODE 636 W HCPCS: Performed by: NURSE PRACTITIONER

## 2020-12-03 PROCEDURE — 36415 COLL VENOUS BLD VENIPUNCTURE: CPT

## 2020-12-03 PROCEDURE — 99232 SBSQ HOSP IP/OBS MODERATE 35: CPT | Mod: S$GLB,,, | Performed by: INTERNAL MEDICINE

## 2020-12-03 PROCEDURE — 27000646 HC AEROBIKA DEVICE

## 2020-12-03 PROCEDURE — 12000002 HC ACUTE/MED SURGE SEMI-PRIVATE ROOM

## 2020-12-03 PROCEDURE — 25000003 PHARM REV CODE 250: Performed by: INTERNAL MEDICINE

## 2020-12-03 PROCEDURE — 94664 DEMO&/EVAL PT USE INHALER: CPT

## 2020-12-03 PROCEDURE — 63600175 PHARM REV CODE 636 W HCPCS: Performed by: INTERNAL MEDICINE

## 2020-12-03 PROCEDURE — 25000003 PHARM REV CODE 250: Performed by: NURSE PRACTITIONER

## 2020-12-03 PROCEDURE — 80053 COMPREHEN METABOLIC PANEL: CPT

## 2020-12-03 PROCEDURE — 94761 N-INVAS EAR/PLS OXIMETRY MLT: CPT

## 2020-12-03 PROCEDURE — 99232 PR SUBSEQUENT HOSPITAL CARE,LEVL II: ICD-10-PCS | Mod: S$GLB,,, | Performed by: INTERNAL MEDICINE

## 2020-12-03 PROCEDURE — 99900035 HC TECH TIME PER 15 MIN (STAT)

## 2020-12-03 PROCEDURE — 85025 COMPLETE CBC W/AUTO DIFF WBC: CPT

## 2020-12-03 PROCEDURE — 83735 ASSAY OF MAGNESIUM: CPT

## 2020-12-03 RX ORDER — LIDOCAINE HYDROCHLORIDE 10 MG/ML
10 INJECTION, SOLUTION EPIDURAL; INFILTRATION; INTRACAUDAL; PERINEURAL ONCE
Status: COMPLETED | OUTPATIENT
Start: 2020-12-03 | End: 2020-12-03

## 2020-12-03 RX ORDER — OXYCODONE HYDROCHLORIDE 5 MG/1
5 TABLET ORAL EVERY 6 HOURS PRN
Status: DISCONTINUED | OUTPATIENT
Start: 2020-12-03 | End: 2020-12-05

## 2020-12-03 RX ADMIN — HYDROCODONE BITARTRATE AND ACETAMINOPHEN 1 TABLET: 5; 325 TABLET ORAL at 05:12

## 2020-12-03 RX ADMIN — LIDOCAINE HYDROCHLORIDE 100 MG: 10 INJECTION, SOLUTION EPIDURAL; INFILTRATION; INTRACAUDAL at 02:12

## 2020-12-03 RX ADMIN — LINEZOLID 600 MG: 600 TABLET, FILM COATED ORAL at 08:12

## 2020-12-03 RX ADMIN — ENOXAPARIN SODIUM 40 MG: 100 INJECTION SUBCUTANEOUS at 05:12

## 2020-12-03 RX ADMIN — HYDROCODONE BITARTRATE AND ACETAMINOPHEN 1 TABLET: 5; 325 TABLET ORAL at 10:12

## 2020-12-03 RX ADMIN — HYDROCODONE BITARTRATE AND ACETAMINOPHEN 1 TABLET: 5; 325 TABLET ORAL at 02:12

## 2020-12-03 RX ADMIN — ATORVASTATIN CALCIUM 40 MG: 40 TABLET, FILM COATED ORAL at 09:12

## 2020-12-03 RX ADMIN — OXYCODONE HYDROCHLORIDE 5 MG: 5 TABLET ORAL at 08:12

## 2020-12-03 RX ADMIN — FERROUS SULFATE TAB EC 325 MG (65 MG FE EQUIVALENT) 325 MG: 325 (65 FE) TABLET DELAYED RESPONSE at 09:12

## 2020-12-03 RX ADMIN — FLUTICASONE FUROATE AND VILANTEROL TRIFENATATE 1 PUFF: 200; 25 POWDER RESPIRATORY (INHALATION) at 08:12

## 2020-12-03 RX ADMIN — MEROPENEM AND SODIUM CHLORIDE 1 G: 1 INJECTION, SOLUTION INTRAVENOUS at 03:12

## 2020-12-03 RX ADMIN — PREDNISONE 5 MG: 5 TABLET ORAL at 09:12

## 2020-12-03 RX ADMIN — MEROPENEM AND SODIUM CHLORIDE 1 G: 1 INJECTION, SOLUTION INTRAVENOUS at 11:12

## 2020-12-03 RX ADMIN — RIFAMPIN 300 MG: 300 CAPSULE ORAL at 09:12

## 2020-12-03 RX ADMIN — IPRATROPIUM BROMIDE AND ALBUTEROL SULFATE 3 ML: .5; 2.5 SOLUTION RESPIRATORY (INHALATION) at 07:12

## 2020-12-03 RX ADMIN — MEROPENEM AND SODIUM CHLORIDE 1 G: 1 INJECTION, SOLUTION INTRAVENOUS at 08:12

## 2020-12-03 RX ADMIN — LINEZOLID 600 MG: 600 TABLET, FILM COATED ORAL at 09:12

## 2020-12-03 RX ADMIN — PREDNISONE 5 MG: 5 TABLET ORAL at 08:12

## 2020-12-03 RX ADMIN — ETHAMBUTOL HYDROCHLORIDE 600 MG: 400 TABLET, FILM COATED ORAL at 09:12

## 2020-12-03 RX ADMIN — CLARITHROMYCIN 500 MG: 500 TABLET, FILM COATED ORAL at 08:12

## 2020-12-03 RX ADMIN — CLARITHROMYCIN 500 MG: 500 TABLET, FILM COATED ORAL at 09:12

## 2020-12-03 NOTE — SUBJECTIVE & OBJECTIVE
Interval History:  Notes reviewed, no acute events overnight. CT to right ant upper CW intact. Pt denies sob. Cough is still wet.  Pt states cw pain controlled with norco. Awaiting further recs from pulmonary.     Review of Systems   Constitutional: Negative for chills, fatigue and fever.   HENT: Negative for congestion, sinus pressure and sore throat.    Eyes: Negative for photophobia and visual disturbance.   Respiratory: Negative for cough, chest tightness and shortness of breath.         Pleuritic cw pain   Cardiovascular: Negative for chest pain, palpitations and leg swelling.   Gastrointestinal: Negative for abdominal pain, diarrhea, nausea and vomiting.   Endocrine: Negative for polyphagia and polyuria.   Genitourinary: Negative for difficulty urinating, dysuria and urgency.   Musculoskeletal: Negative for arthralgias, back pain and gait problem.   Skin: Negative for color change, pallor, rash and wound.   Neurological: Negative for dizziness, weakness and light-headedness.   Psychiatric/Behavioral: Negative for agitation, behavioral problems and confusion.   All other systems reviewed and are negative.    Objective:     Vital Signs (Most Recent):  Temp: 96.5 °F (35.8 °C) (12/03/20 1129)  Pulse: 85 (12/03/20 1129)  Resp: 18 (12/03/20 1129)  BP: 122/66 (12/03/20 1129)  SpO2: 97 % (12/03/20 1129) Vital Signs (24h Range):  Temp:  [96.5 °F (35.8 °C)-97.4 °F (36.3 °C)] 96.5 °F (35.8 °C)  Pulse:  [] 85  Resp:  [16-20] 18  SpO2:  [95 %-97 %] 97 %  BP: (118-139)/(65-77) 122/66     Weight: 44 kg (97 lb)  Body mass index is 16.65 kg/m².    Intake/Output Summary (Last 24 hours) at 12/3/2020 1216  Last data filed at 12/3/2020 0334  Gross per 24 hour   Intake 250 ml   Output 0 ml   Net 250 ml      Physical Exam  Vitals signs and nursing note reviewed.   Constitutional:       General: She is not in acute distress.     Appearance: Normal appearance. She is well-developed. She is ill-appearing.      Comments:  Chronically ill-appearing   HENT:      Head: Normocephalic and atraumatic.      Right Ear: External ear normal.      Left Ear: External ear normal.      Nose: Nose normal. No congestion or rhinorrhea.      Mouth/Throat:      Mouth: Mucous membranes are moist.      Pharynx: Oropharynx is clear. No oropharyngeal exudate or posterior oropharyngeal erythema.   Eyes:      General: No scleral icterus.     Conjunctiva/sclera: Conjunctivae normal.      Pupils: Pupils are equal, round, and reactive to light.   Neck:      Musculoskeletal: Normal range of motion and neck supple.      Vascular: No JVD.   Cardiovascular:      Rate and Rhythm: Normal rate and regular rhythm.      Pulses: Normal pulses.      Heart sounds: Normal heart sounds. No murmur.   Pulmonary:      Effort: Pulmonary effort is normal. No respiratory distress.      Breath sounds: No stridor. Rhonchi and rales present. No wheezing.      Comments: Scattered rales and rhonchi throughout bilateral lung fields.  Chest tube intact to right upper anterior chest wall.  Abdominal:      General: Bowel sounds are normal. There is no distension.      Palpations: Abdomen is soft.      Tenderness: There is no abdominal tenderness.   Musculoskeletal: Normal range of motion.         General: No swelling or tenderness.   Skin:     General: Skin is warm and dry.      Capillary Refill: Capillary refill takes 2 to 3 seconds.      Coloration: Skin is not jaundiced or pale.      Findings: No bruising or erythema.   Neurological:      General: No focal deficit present.      Mental Status: She is alert and oriented to person, place, and time.      Cranial Nerves: No cranial nerve deficit.      Sensory: No sensory deficit.      Motor: No weakness.      Coordination: Coordination normal.   Psychiatric:         Mood and Affect: Mood normal.         Behavior: Behavior normal.         Thought Content: Thought content normal.         Significant Labs:   CBC:   Recent Labs   Lab  12/02/20  0718 12/03/20  0609   WBC 6.61 7.05   HGB 11.0* 11.4*   HCT 35.8* 37.0    332     CMP:   Recent Labs   Lab 12/02/20  0718 12/03/20  0609    138   K 3.6 4.1   CL 98 99   CO2 31* 30*   GLU 86 84   BUN 9 8   CREATININE 0.5 0.6   CALCIUM 8.9 9.1   PROT 6.0 6.3   ALBUMIN 2.0* 2.2*   BILITOT 0.1 0.1   ALKPHOS 61 58   AST 24 29   ALT 14 15   ANIONGAP 7* 9   EGFRNONAA >60 >60     All pertinent labs within the past 24 hours have been reviewed.    Significant Imaging: I have reviewed all pertinent imaging results/findings within the past 24 hours.

## 2020-12-03 NOTE — PLAN OF CARE
POC reviewed with patient at the start of shift. NPO after midnight for procedure to put in small diameter chest tube. Heimlich in place, no output. Remains on IV ABX. Wear briefs,incontinent. NO bm this shift. O2 at 4 liters per nc. Remains on airborne, droplet and contact precautions.Tele in progress.IVS patent and intact to right and left forearms.  Bed in low and locked position, bed alarm set, call light in reach. Will continue to monitor. Pain managed with scheduled Norco.

## 2020-12-03 NOTE — PLAN OF CARE
Jared spoke with Job in [facility] about the pending sputum culture identification and susceptibility. The results should be ready tomorrow at approximately 9am.  Cm will update GENE Urena.       12/03/20 0712   Discharge Reassessment   Assessment Type Discharge Planning Reassessment

## 2020-12-03 NOTE — PLAN OF CARE
Patient sats 97% on 3lpm/Aerobika Q6 done 10bpm. Patient prefer to have VM at bedside if need set up@ VM 28% 4lpm, inform nurse

## 2020-12-03 NOTE — PROGRESS NOTES
Progress Note  PULMONARY    Admit Date: 11/29/2020 12/03/2020  History of Present Illness:    smoker, severe copd , h/o recurrent pneumonias with neg bronch in 2017.   Pt has some vague immune def.   Had recent ct chest with non  Resolving cavitary disease ppt eval with pos afb early November.  Tb gold neg, and pt rx on  Inh/rif/azithr pending id. dna probe requested.  Pt continued with night sweats - chronic? And had  Brown mucous increase with sob 3 wks ago.  Pt had decreased appetite taking in boost only, and became bed bound.  Breathing worsened - 02 needs increased from2.5to 3.5-subjective sob.       Pt was not acutely symptomatic when mycobacterial rx started but worsened.  She was to call if worsened.  She did stop smoking.     Er eval  Pm showed right pneumo- very large. Thoracic vent to heimlich valve done with some re inflation.      Pt still sob.   Plan: will need to get id mycobacteria, will ask id to see, needs better pneumo treatment. Ppn,  Will need therpay.  Could have pseudomonas/mras complicating.   Needs sputum culture.  SUBJECTIVE:     12/1 no new c/o, feels much better.    12/2 having increased sob last few min.   Thick brown green mucous  12/3 no mucous today. Post  Chest tube pain.        PFSH and Allergies reviewed.    OBJECTIVE:     Vitals (Most recent):  Vitals:    12/03/20 1129   BP: 122/66   Pulse: 85   Resp: 18   Temp: 96.5 °F (35.8 °C)       Vitals (24 hour range):  Temp:  [96.5 °F (35.8 °C)-97.4 °F (36.3 °C)]   Pulse:  []   Resp:  [16-20]   BP: (118-139)/(65-77)   SpO2:  [95 %-97 %]       Intake/Output Summary (Last 24 hours) at 12/3/2020 1248  Last data filed at 12/3/2020 0334  Gross per 24 hour   Intake 250 ml   Output 0 ml   Net 250 ml          Physical Exam:  The patient's neuro status (alertness,orientation,cognitive function,motor skills,), pharyngeal exam (oral lesions, hygiene, abn dentition,), Neck (jvd,mass,thyroid,nodes in neck and above/below  clavicle),RESPIRATORY(symmetry,effort,fremitus,percussion,auscultation),  Cor(rhythm,heart tones including gallops,perfusion,edema)ABD(distention,hepatic&splenomegaly,tenderness,masses), Skin(rash,cyanosis),Psyc(affect,judgement,).  Exam negative except for these pertinent findings:    Alert, chest is symmetric, no distress, normal percussion, normal fremitus and  decreaed breath sounds- less air leak.      Radiographs reviewed: view by direct vision  No pneumo seen 12/1  Results for orders placed during the hospital encounter of 11/29/20   X-Ray Chest 1 View    Narrative EXAMINATION:  XR CHEST 1 VIEW    CLINICAL HISTORY:  pneumothorax; Pneumothorax, unspecified    TECHNIQUE:  Single frontal view of the chest was performed.    COMPARISON:  11/30/2020    FINDINGS:  Chest tube remains in place in the right upper lung field and other tube or line projects over the right lung base.  Right pneumothorax is not seen on the current film.  Subcutaneous emphysema appears mildly decreased.  Right basilar infiltrate with probable also small right pleural effusion do not appear significantly changed.  Stranding left suprahilar not significantly changed.  Heart not enlarged.      Impression Right-sided chest tube remains in place with the appearance of the chest not significantly changed compared to the prior exam.  No visible pneumothorax      Electronically signed by: Kristin Chávez MD  Date:    12/01/2020  Time:    08:29   ]    Labs     Recent Labs   Lab 12/03/20  0609   WBC 7.05   HGB 11.4*   HCT 37.0        Recent Labs   Lab 12/03/20  0609      K 4.1   CL 99   CO2 30*   BUN 8   CREATININE 0.6   GLU 84   CALCIUM 9.1   MG 2.1   AST 29   ALT 15   ALKPHOS 58   BILITOT 0.1   PROT 6.3   ALBUMIN 2.2*   No results for input(s): PH, PCO2, PO2, HCO3 in the last 24 hours.  Microbiology Results (last 7 days)     Procedure Component Value Units Date/Time    Culture, Respiratory with Gram Stain [[id number]]  (Abnormal)  Collected: 12/01/20 1108    Order Status: Completed Specimen: Respiratory from Sputum, Expectorated Updated: 12/03/20 0843     Respiratory Culture PRESUMPTIVE PSEUDOMONAS SPECIES  Few  Identification and susceptibility pending  Normal respiratory chelsea also present       Gram Stain (Respiratory) <10 epithelial cells per low power field.     Gram Stain (Respiratory) Rare WBC's     Gram Stain (Respiratory) No organisms seen    Culture, Respiratory with Gram Stain [[id number]] Collected: 12/01/20 1108    Order Status: Sent Specimen: Respiratory from Sputum, Expectorated Updated: 12/01/20 1109          Impression:  Active Hospital Problems    Diagnosis  POA    *Pneumothorax on right [J93.9]  Yes    Cachexia [R64]  Yes    Hypoalbuminemia due to protein-calorie malnutrition [E88.09, E46]  Yes    Mycobacterium avium complex [A31.0]  Yes    Iron deficiency [E61.1]  Yes    Immune deficiency disorder [D84.9]  Yes    Acute on chronic respiratory failure with hypoxia [J96.21]  Yes    Bronchiectasis [J47.9]  Yes    COPD (chronic obstructive pulmonary disease) [J44.9]  Yes    Hyperlipidemia [E78.5]  Yes    Lung nodule [R91.1]  Yes     Established with Dr. Mackey, had CT in 12/15.  Demonstrated multiple lung nodules, several spiculated masses        Resolved Hospital Problems   No resolved problems to display.               Plan:     12/1- cxr with resolved pneumo, pt feeling much better, no night sweats, eating very well.  Suspect she developed pneumo shortly after starting mycobacterial therapy.  Need to mobilize.  Called main [facility] - hoping id of mycobacterium out on 12/2.  Air leak brisk - pt not good operative risk for chest surg.  May need prolonged suction?      If no tb would stop isolation and start physical therapy.    Sputum submitted today - taper abx per results.       12/2 cxr ok, sm sub q air, no id for afb.    Will recheck cxr - air leak brisk.  Having chest pain that may ppt atelectasis/sob- place norco  q4 hold if sedate.     Lab says looks like mac, may have 2nd organism?, probe should be out next wk.    12/3 cxr today post chest tube is better, control pain, mobilize to chair am                      .

## 2020-12-03 NOTE — ASSESSMENT & PLAN NOTE
Contributing Nutrition Diagnosis  Severe acute illness related malnutrition    Related to (etiology):   Shortness of breath, decreased appetite    Signs and Symptoms (as evidenced by):   1) PO intakes < 50% x 3 weeks  2) Moderate-severe wasting seen    Interventions/Recommendations (treatment strategy):  above    Nutrition Diagnosis Status:   continues

## 2020-12-03 NOTE — PLAN OF CARE
Intervention: general healthful diet, and nutrition supplement therapy     Recommendation:   1) s/p procedure continue regular diet + boost plus with meals   2) weigh pt weekly, replete iron and lytes if needed   3) nutrition education verbally given     Goals: 1) PO intakes > 50% of meals/supplements at f/u  Nutrition Goal Status: met-continues  Communication of RD Recs:  POC, sticky note

## 2020-12-03 NOTE — ASSESSMENT & PLAN NOTE
Chest tube intact  Plan is for replacement of chest tube today with Dr. Bucio today  Continue current treatment regimen  Chest x-ray reviewed - remains stable

## 2020-12-03 NOTE — RESPIRATORY THERAPY
12/03/20 0846   Patient Assessment/Suction   Level of Consciousness (AVPU) alert   Respiratory Effort Unlabored   All Lung Fields Breath Sounds diminished   Rhythm/Pattern, Respiratory unlabored;pattern regular   Cough Frequency frequent   Cough Type productive   PRE-TX-O2   O2 Device (Oxygen Therapy) nasal cannula   $ Is the patient on Low Flow Oxygen? Yes   Flow (L/min) 4   SpO2 96 %   Pulse Oximetry Type Continuous   $ Pulse Oximetry - Multiple Charge Pulse Oximetry - Multiple   Pulse 85   Resp 16   Inhaler   $ Inhaler Charges MDI (Metered Dose Inahler) Treatment;Mouth rinsed post treatment   Respiratory Treatment Status (Inhaler) given;mouth rinsed post treatment   Treatment Route (Inhaler) mouthpiece   Patient Position (Inhaler) HOB elevated   Post Treatment Assessment (Inhaler) breath sounds unchanged   Signs of Intolerance (Inhaler) none   Vibratory PEP Therapy   $ Vibratory PEP Charges Aerobika Therapy   $ Vibratory PEP Equipment Aerobika Equipment   $ Vibratory PEP Tech Time Charges 15 min   Type (PEP Therapy) vibratory/oscillatory   Route (PEP Therapy) mouthpiece   Breaths per Cycle (PEP Therapy) 10   Patient Position (PEP Therapy) HOB elevated   Post Treatment Assessment (PEP) breath sounds improved   Signs of Intolerance (PEP Therapy) none

## 2020-12-03 NOTE — PROGRESS NOTES
After informed consent obtained the R chest was prepped and drape placed. A percutaneous chest tube was placed in usual sterile fashion without complication. Thoracic vent was then removed. Tolerated well  Follow up CXR : Chest tube in good position toward apex and lung appears fully expanded    Continue Pleurovac suction. Air leak still present but now intermittent and significantly smaller    Will follow

## 2020-12-03 NOTE — PROGRESS NOTES
[facility] Hospital Medicine  Progress Note    Patient Name: Fabiana Morel  MRN: [id number]  Patient Class: IP- Inpatient   Admission Date: 11/29/2020  Length of Stay: 3 days  Attending Physician: Davie Pierre MD  Primary Care Provider: Dominique Bartlett MD        Subjective:     Principal Problem:Pneumothorax on right        HPI:  Fabiana Morel is a 65-year-old female with a past medical history of COPD, hyperlipidemia, iron deficiency anemia, and tobacco use who presents to the emergency room tonight with reports of shortness of breath.  Patient reports shortness of breath x3 weeks, has increased in severity since onset.  Patient requiring supplemental oxygen therapy for the past three weeks at 2-3 L, however has required increase in normal O2 dose over the past three days. Patient currently under the care of Dr. Mackey, pulmonology, has a history of COPD. Patient accompanied by her  during my initial interview and physical exam. a history of COPD. Patient was reportedly seen in the emergency room on 11/06 for chest pain, a CT chest was reportedly obtained revealing right lung abscess.  Patient followed with Dr. Mackey in clinic on 11/09 in which sputum samples were obtained and treatment was initiated for tuberculosis versus MAC.  Patient also reports decreased p.o. intake, utilizes boost supplementation daily.  Patient endorses productive cough x3 days, with brown sputum production.  Patient endorses dyspnea on exertion, stating she can not walk the length of the room without becoming SOB.  Patient states she quit tobacco use on 11/05/2020.  In the ER, patient noted to have right pneumothorax on chest x-ray.  ER physician placed Heimlich valve chest tube with reported immediate relief in shortness of breath.  Repeat chest x-ray revealing improvement of pneumothorax.  ER MD reportedly spoke with Dr. Mackey, pulmonology who recommended admission with IV vancomycin and Zosyn and isolation  precautions.  Patient placed in observation on 11/29/2020 at approximately 9:30 p.m..  Full code status verified upon admission to the hospital.    Overview/Hospital Course:  No notes on file    Interval History:  Notes reviewed, no acute events overnight. CT to right ant upper CW intact. Pt denies sob. Cough is still wet.  Pt states cw pain controlled with norco. Awaiting further recs from pulmonary.     Review of Systems   Constitutional: Negative for chills, fatigue and fever.   HENT: Negative for congestion, sinus pressure and sore throat.    Eyes: Negative for photophobia and visual disturbance.   Respiratory: Negative for cough, chest tightness and shortness of breath.         Pleuritic cw pain   Cardiovascular: Negative for chest pain, palpitations and leg swelling.   Gastrointestinal: Negative for abdominal pain, diarrhea, nausea and vomiting.   Endocrine: Negative for polyphagia and polyuria.   Genitourinary: Negative for difficulty urinating, dysuria and urgency.   Musculoskeletal: Negative for arthralgias, back pain and gait problem.   Skin: Negative for color change, pallor, rash and wound.   Neurological: Negative for dizziness, weakness and light-headedness.   Psychiatric/Behavioral: Negative for agitation, behavioral problems and confusion.   All other systems reviewed and are negative.    Objective:     Vital Signs (Most Recent):  Temp: 96.5 °F (35.8 °C) (12/03/20 1129)  Pulse: 85 (12/03/20 1129)  Resp: 18 (12/03/20 1129)  BP: 122/66 (12/03/20 1129)  SpO2: 97 % (12/03/20 1129) Vital Signs (24h Range):  Temp:  [96.5 °F (35.8 °C)-97.4 °F (36.3 °C)] 96.5 °F (35.8 °C)  Pulse:  [] 85  Resp:  [16-20] 18  SpO2:  [95 %-97 %] 97 %  BP: (118-139)/(65-77) 122/66     Weight: 44 kg (97 lb)  Body mass index is 16.65 kg/m².    Intake/Output Summary (Last 24 hours) at 12/3/2020 1216  Last data filed at 12/3/2020 0334  Gross per 24 hour   Intake 250 ml   Output 0 ml   Net 250 ml      Physical Exam  Vitals signs  and nursing note reviewed.   Constitutional:       General: She is not in acute distress.     Appearance: Normal appearance. She is well-developed. She is ill-appearing.      Comments: Chronically ill-appearing   HENT:      Head: Normocephalic and atraumatic.      Right Ear: External ear normal.      Left Ear: External ear normal.      Nose: Nose normal. No congestion or rhinorrhea.      Mouth/Throat:      Mouth: Mucous membranes are moist.      Pharynx: Oropharynx is clear. No oropharyngeal exudate or posterior oropharyngeal erythema.   Eyes:      General: No scleral icterus.     Conjunctiva/sclera: Conjunctivae normal.      Pupils: Pupils are equal, round, and reactive to light.   Neck:      Musculoskeletal: Normal range of motion and neck supple.      Vascular: No JVD.   Cardiovascular:      Rate and Rhythm: Normal rate and regular rhythm.      Pulses: Normal pulses.      Heart sounds: Normal heart sounds. No murmur.   Pulmonary:      Effort: Pulmonary effort is normal. No respiratory distress.      Breath sounds: No stridor. Rhonchi and rales present. No wheezing.      Comments: Scattered rales and rhonchi throughout bilateral lung fields.  Chest tube intact to right upper anterior chest wall.  Abdominal:      General: Bowel sounds are normal. There is no distension.      Palpations: Abdomen is soft.      Tenderness: There is no abdominal tenderness.   Musculoskeletal: Normal range of motion.         General: No swelling or tenderness.   Skin:     General: Skin is warm and dry.      Capillary Refill: Capillary refill takes 2 to 3 seconds.      Coloration: Skin is not jaundiced or pale.      Findings: No bruising or erythema.   Neurological:      General: No focal deficit present.      Mental Status: She is alert and oriented to person, place, and time.      Cranial Nerves: No cranial nerve deficit.      Sensory: No sensory deficit.      Motor: No weakness.      Coordination: Coordination normal.   Psychiatric:          Mood and Affect: Mood normal.         Behavior: Behavior normal.         Thought Content: Thought content normal.         Significant Labs:   CBC:   Recent Labs   Lab 12/02/20  0718 12/03/20  0609   WBC 6.61 7.05   HGB 11.0* 11.4*   HCT 35.8* 37.0    332     CMP:   Recent Labs   Lab 12/02/20  0718 12/03/20  0609    138   K 3.6 4.1   CL 98 99   CO2 31* 30*   GLU 86 84   BUN 9 8   CREATININE 0.5 0.6   CALCIUM 8.9 9.1   PROT 6.0 6.3   ALBUMIN 2.0* 2.2*   BILITOT 0.1 0.1   ALKPHOS 61 58   AST 24 29   ALT 14 15   ANIONGAP 7* 9   EGFRNONAA >60 >60     All pertinent labs within the past 24 hours have been reviewed.    Significant Imaging: I have reviewed all pertinent imaging results/findings within the past 24 hours.      Assessment/Plan:      * Pneumothorax on right  Chest tube intact  Plan is for replacement of chest tube today with Dr. Bucio today  Continue current treatment regimen  Chest x-ray reviewed - remains stable        Cachexia  Secondary to protein malnutrition and poor oral intake  Supplement with boost  Appetite improving      Mycobacterium avium complex  Appreciate ID consult  Cont abx per ID  Awaiting cultures and sensitivities        Hypoalbuminemia due to protein-calorie malnutrition  Noted, encourage PO intake - boost supplementation.        Iron deficiency  Chronic, continue home iron supplementation.        Acute on chronic respiratory failure with hypoxia  Noted, continue the use of supplemental O2.        COPD (chronic obstructive pulmonary disease)  Chronic - continue home medication, pulmonology consult. Continuous supplemental oxygen, telemetry, and pulse oximetry monitoring.        Lung nodule  Managed by Dr. Mackey      Hyperlipidemia    Chronic, controlled. Will continue home medication.    Lab Results   Component Value Date    CHOL 187 08/21/2020    CHOL 182 05/22/2020    CHOL 182 06/25/2019     Lab Results   Component Value Date    HDL 55 08/21/2020    HDL 57 05/22/2020     HDL 65 06/25/2019     Lab Results   Component Value Date    LDLCALC 105.4 08/21/2020    LDLCALC 102.2 05/22/2020    LDLCALC 97.6 06/25/2019     Lab Results   Component Value Date    TRIG 133 08/21/2020    TRIG 114 05/22/2020    TRIG 97 06/25/2019     Lab Results   Component Value Date    CHOLHDL 29.4 08/21/2020    CHOLHDL 31.3 05/22/2020    CHOLHDL 35.7 06/25/2019               VTE Risk Mitigation (From admission, onward)         Ordered     enoxaparin injection 40 mg  Every 24 hours      11/29/20 2224     IP VTE HIGH RISK PATIENT  Once      11/29/20 2224     Place sequential compression device  Until discontinued      11/29/20 2224     Place STEPHANIE hose  Until discontinued      11/29/20 2224                Discharge Planning   DION: 12/4/2020     Code Status: Full Code   Is the patient medically ready for discharge?:     Reason for patient still in hospital (select all that apply): Treatment and Consult recommendations  Discharge Plan A: Home with family                  Rosa Rogers NP  Department of Hospital Medicine   [facility]

## 2020-12-03 NOTE — PROGRESS NOTES
"[facility]  Adult Nutrition  Progress Note    SUMMARY      Intervention: general healthful diet, and nutrition supplement therapy     Recommendation:   1) s/p procedure continue regular diet + boost plus with meals   2) weigh pt weekly, replete iron and lytes if needed   3) nutrition education verbally given     Goals: 1) PO intakes > 50% of meals/supplements at f/u  Nutrition Goal Status: met-continues  Communication of RD Recs:  POC, sticky note     Reason for Assessment     Reason For Assessment: follow up  Diagnosis: (pneumothorax)  Relevant Medical History: erosive gastritis, colitis, ETOH abuse, COPD, diverticulitis, smoking  Interdisciplinary Rounds: attended     General Information Comments: 66 y/o female admitted with pneumothorax, ruling out TB. + nasal cannula, pt breathing a bit better today and ate 1/2 cup eggs, 1/3 cup oatmeal, 100% OJ, 100% boost plus. PTA pt was eating with poor appetite x at least 3 weeks, drinking 1-2 Boost regular daily. NFPE done 12/1/20, moderate-severe wasting seen. insignificant wt loss x 7 months ( overall 25% x 1.5 years).  12/3/20 Pt NPO for procedure this morning. Yesterday pt state she ate 100% of her breakfast, 3 Boost plus, 0% of lunch, and 75% of dinner.      Nutrition Discharge Planning: to be determined- Regular + boost plus 2-3 x daily     Nutrition Risk Screen     Nutrition Risk Screen: no indicators present     Nutrition/Diet History     Patient Reported Diet/Restrictions/Preferences: general  Spiritual, Cultural Beliefs, Hindu Practices, Values that Affect Care: no  Food Allergies: NKFA  Factors Affecting Nutritional Intake: NPO     Anthropometrics    Height Method: Measured(office 11/9/20)  Height: 5' 4"  Height (inches): 64 in  Weight Method: Bed Scale  Weight: 44 kg 12/2/20, 42.2 kg (admit)  Weight (lb): 97 kg  Ideal Body Weight (IBW), Female: 120 lb  BMI (Calculated): 16  BMI Grade: 16 - 16.9 protein-energy malnutrition grade " II  Weight Loss: unintentional  Usual Body Weight (UBW), k.3 kg(19)  Weight Change Amount: (46.5 kg 20)     Lab/Procedures/Meds     Pertinent Labs Reviewed: reviewed  BMP  Lab Results   Component Value Date     2020    K 4.1 2020    CL 99 2020    CO2 30 (H) 2020    BUN 8 2020    CREATININE 0.6 2020    CALCIUM 9.1 2020    ANIONGAP 9 2020    ESTGFRAFRICA >60 2020    EGFRNONAA >60 2020     Lab Results   Component Value Date    ALBUMIN 2.2 (L) 2020     Pertinent Medications Reviewed: reviewed  Statin, iron, prednisone, zofran, KCl     Estimated/Assessed Needs   admission  Weight Used For Calorie Calculations: 42.2 kg (93 lb 0.6 oz)  Energy Calorie Requirements (kcal): MSJ ( x 1.5) = 1435 kcal  Energy Need Method: [place]  Protein Requirements: 1.2-1.5 g protein/kg ( wasting) = 50-63 g protein  Weight Used For Protein Calculations: 42.2 kg (93 lb 0.6 oz)  Fluid Requirements (mL): 1500 ml or per MD  Estimated Fluid Requirement Method: RDA Method  CHO Requirement: N/A        Nutrition Prescription Ordered     Current Diet Order: Regular + Boost plus TID     Evaluation of Received Nutrient/Fluid Intake     Energy Calories Required: was meeting needs  Protein Required: was meeting needs  Fluid Required:was  meeting needs  Tolerance: tolerating  % Intake of Estimated Energy Needs: % yesterday. 0% today  % Meal Intake: 0-100% + 3 boost plus     Nutrition Risk     Level of Risk/Frequency of Follow-up: moderate 2 x weekly     Assessment and Plan     Hypoalbuminemia due to protein-calorie malnutrition  Contributing Nutrition Diagnosis  Severe acute illness related malnutrition     Related to (etiology):   Shortness of breath, decreased appetite     Signs and Symptoms (as evidenced by):   1) PO intakes < 50% x 3 weeks  2) Moderate-severe wasting seen     Interventions/Recommendations (treatment strategy):  above     Nutrition  Diagnosis Status:   continues    Monitor and Evaluation     Food and Nutrient Intake: energy intake, food and beverage intake  Food and Nutrient Adminstration: diet order, enteral and parenteral nutrition administration  Anthropometric Measurements: weight  Biochemical Data, Medical Tests and Procedures: electrolyte and renal panel, gastrointestinal profile  Nutrition-Focused Physical Findings: overall appearance      Malnutrition Assessment  Malnutrition Type: acute illness or injury  Skin (Micronutrient): (Galdino = 16)  Teeth (Micronutrient): (missing some)   Micronutrient Evaluation: suspected deficiency  Micronutrient Evaluation Comments: Na   Energy Intake (Malnutrition): less than 50% for greater than 5 days   Orbital Region (Subcutaneous Fat Loss): moderate depletion  Upper Arm Region (Subcutaneous Fat Loss): severe depletion  Thoracic and Lumbar Region: moderate depletion   [place] Region (Muscle Loss): moderate depletion  Clavicle Bone Region (Muscle Loss): moderate depletion  Clavicle and Acromion Bone Region (Muscle Loss): moderate depletion  Scapular Bone Region (Muscle Loss): moderate depletion  Dorsal Hand (Muscle Loss): mild depletion  Patellar Region (Muscle Loss): severe depletion  Anterior Thigh Region (Muscle Loss): moderate depletion  Posterior Calf Region (Muscle Loss): severe depletion   Edema (Fluid Accumulation): 0-->no edema present   Subcutaneous Fat Loss (Final Summary): severe protein-calorie malnutrition  Muscle Loss Evaluation (Final Summary): severe protein-calorie malnutrition       Nutrition Follow-Up     RD Follow-up?: Yes

## 2020-12-04 LAB
ALBUMIN SERPL BCP-MCNC: 2.5 G/DL (ref 3.5–5.2)
ALP SERPL-CCNC: 71 U/L (ref 55–135)
ALT SERPL W/O P-5'-P-CCNC: 28 U/L (ref 10–44)
ANION GAP SERPL CALC-SCNC: 9 MMOL/L (ref 8–16)
AST SERPL-CCNC: 55 U/L (ref 10–40)
BACTERIA SPEC AEROBE CULT: ABNORMAL
BASOPHILS # BLD AUTO: 0.04 K/UL (ref 0–0.2)
BASOPHILS NFR BLD: 0.5 % (ref 0–1.9)
BILIRUB SERPL-MCNC: 0.2 MG/DL (ref 0.1–1)
BUN SERPL-MCNC: 7 MG/DL (ref 8–23)
CALCIUM SERPL-MCNC: 9.7 MG/DL (ref 8.7–10.5)
CHLORIDE SERPL-SCNC: 95 MMOL/L (ref 95–110)
CO2 SERPL-SCNC: 33 MMOL/L (ref 23–29)
CREAT SERPL-MCNC: 0.6 MG/DL (ref 0.5–1.4)
DIFFERENTIAL METHOD: ABNORMAL
EOSINOPHIL # BLD AUTO: 0.3 K/UL (ref 0–0.5)
EOSINOPHIL NFR BLD: 3 % (ref 0–8)
ERYTHROCYTE [DISTWIDTH] IN BLOOD BY AUTOMATED COUNT: 15.9 % (ref 11.5–14.5)
EST. GFR  (AFRICAN AMERICAN): >60 ML/MIN/1.73 M^2
EST. GFR  (NON AFRICAN AMERICAN): >60 ML/MIN/1.73 M^2
GLUCOSE SERPL-MCNC: 110 MG/DL (ref 70–110)
GRAM STN SPEC: ABNORMAL
HCT VFR BLD AUTO: 42.1 % (ref 37–48.5)
HGB BLD-MCNC: 13.1 G/DL (ref 12–16)
IMM GRANULOCYTES # BLD AUTO: 0.07 K/UL (ref 0–0.04)
IMM GRANULOCYTES NFR BLD AUTO: 0.8 % (ref 0–0.5)
LYMPHOCYTES # BLD AUTO: 3.3 K/UL (ref 1–4.8)
LYMPHOCYTES NFR BLD: 37.5 % (ref 18–48)
MAGNESIUM SERPL-MCNC: 2.2 MG/DL (ref 1.6–2.6)
MCH RBC QN AUTO: 29 PG (ref 27–31)
MCHC RBC AUTO-ENTMCNC: 31.1 G/DL (ref 32–36)
MCV RBC AUTO: 93 FL (ref 82–98)
MONOCYTES # BLD AUTO: 0.5 K/UL (ref 0.3–1)
MONOCYTES NFR BLD: 6.2 % (ref 4–15)
NEUTROPHILS # BLD AUTO: 4.5 K/UL (ref 1.8–7.7)
NEUTROPHILS NFR BLD: 52 % (ref 38–73)
NRBC BLD-RTO: 0 /100 WBC
PLATELET # BLD AUTO: 364 K/UL (ref 150–350)
PMV BLD AUTO: 9 FL (ref 9.2–12.9)
POTASSIUM SERPL-SCNC: 4 MMOL/L (ref 3.5–5.1)
PROT SERPL-MCNC: 7.3 G/DL (ref 6–8.4)
RBC # BLD AUTO: 4.52 M/UL (ref 4–5.4)
SODIUM SERPL-SCNC: 137 MMOL/L (ref 136–145)
WBC # BLD AUTO: 8.69 K/UL (ref 3.9–12.7)

## 2020-12-04 PROCEDURE — 25000003 PHARM REV CODE 250: Performed by: PHYSICIAN ASSISTANT

## 2020-12-04 PROCEDURE — 85025 COMPLETE CBC W/AUTO DIFF WBC: CPT

## 2020-12-04 PROCEDURE — 99900035 HC TECH TIME PER 15 MIN (STAT)

## 2020-12-04 PROCEDURE — 97162 PT EVAL MOD COMPLEX 30 MIN: CPT

## 2020-12-04 PROCEDURE — 63600175 PHARM REV CODE 636 W HCPCS: Performed by: INTERNAL MEDICINE

## 2020-12-04 PROCEDURE — 25000242 PHARM REV CODE 250 ALT 637 W/ HCPCS: Performed by: NURSE PRACTITIONER

## 2020-12-04 PROCEDURE — 36415 COLL VENOUS BLD VENIPUNCTURE: CPT

## 2020-12-04 PROCEDURE — 99232 SBSQ HOSP IP/OBS MODERATE 35: CPT | Mod: ,,, | Performed by: INTERNAL MEDICINE

## 2020-12-04 PROCEDURE — 83735 ASSAY OF MAGNESIUM: CPT

## 2020-12-04 PROCEDURE — 94761 N-INVAS EAR/PLS OXIMETRY MLT: CPT

## 2020-12-04 PROCEDURE — 12000002 HC ACUTE/MED SURGE SEMI-PRIVATE ROOM

## 2020-12-04 PROCEDURE — 80053 COMPREHEN METABOLIC PANEL: CPT

## 2020-12-04 PROCEDURE — 99232 PR SUBSEQUENT HOSPITAL CARE,LEVL II: ICD-10-PCS | Mod: S$GLB,,, | Performed by: INTERNAL MEDICINE

## 2020-12-04 PROCEDURE — 99232 SBSQ HOSP IP/OBS MODERATE 35: CPT | Mod: S$GLB,,, | Performed by: INTERNAL MEDICINE

## 2020-12-04 PROCEDURE — 63600175 PHARM REV CODE 636 W HCPCS: Performed by: NURSE PRACTITIONER

## 2020-12-04 PROCEDURE — 97165 OT EVAL LOW COMPLEX 30 MIN: CPT

## 2020-12-04 PROCEDURE — 27000221 HC OXYGEN, UP TO 24 HOURS

## 2020-12-04 PROCEDURE — 94640 AIRWAY INHALATION TREATMENT: CPT

## 2020-12-04 PROCEDURE — 97116 GAIT TRAINING THERAPY: CPT

## 2020-12-04 PROCEDURE — 25000003 PHARM REV CODE 250: Performed by: NURSE PRACTITIONER

## 2020-12-04 PROCEDURE — 25000003 PHARM REV CODE 250: Performed by: INTERNAL MEDICINE

## 2020-12-04 PROCEDURE — 94664 DEMO&/EVAL PT USE INHALER: CPT

## 2020-12-04 PROCEDURE — 99232 PR SUBSEQUENT HOSPITAL CARE,LEVL II: ICD-10-PCS | Mod: ,,, | Performed by: INTERNAL MEDICINE

## 2020-12-04 PROCEDURE — 97535 SELF CARE MNGMENT TRAINING: CPT

## 2020-12-04 RX ADMIN — HYDROCODONE BITARTRATE AND ACETAMINOPHEN 1 TABLET: 5; 325 TABLET ORAL at 10:12

## 2020-12-04 RX ADMIN — FERROUS SULFATE TAB EC 325 MG (65 MG FE EQUIVALENT) 325 MG: 325 (65 FE) TABLET DELAYED RESPONSE at 09:12

## 2020-12-04 RX ADMIN — OXYCODONE HYDROCHLORIDE 5 MG: 5 TABLET ORAL at 09:12

## 2020-12-04 RX ADMIN — HYDROCODONE BITARTRATE AND ACETAMINOPHEN 1 TABLET: 5; 325 TABLET ORAL at 06:12

## 2020-12-04 RX ADMIN — ATORVASTATIN CALCIUM 40 MG: 40 TABLET, FILM COATED ORAL at 11:12

## 2020-12-04 RX ADMIN — OXYCODONE HYDROCHLORIDE 5 MG: 5 TABLET ORAL at 08:12

## 2020-12-04 RX ADMIN — MEROPENEM AND SODIUM CHLORIDE 1 G: 1 INJECTION, SOLUTION INTRAVENOUS at 03:12

## 2020-12-04 RX ADMIN — ETHAMBUTOL HYDROCHLORIDE 600 MG: 400 TABLET, FILM COATED ORAL at 09:12

## 2020-12-04 RX ADMIN — LINEZOLID 600 MG: 600 TABLET, FILM COATED ORAL at 09:12

## 2020-12-04 RX ADMIN — LINEZOLID 600 MG: 600 TABLET, FILM COATED ORAL at 08:12

## 2020-12-04 RX ADMIN — IPRATROPIUM BROMIDE AND ALBUTEROL SULFATE 3 ML: .5; 2.5 SOLUTION RESPIRATORY (INHALATION) at 04:12

## 2020-12-04 RX ADMIN — PREDNISONE 5 MG: 5 TABLET ORAL at 08:12

## 2020-12-04 RX ADMIN — MEROPENEM AND SODIUM CHLORIDE 1 G: 1 INJECTION, SOLUTION INTRAVENOUS at 12:12

## 2020-12-04 RX ADMIN — PREDNISONE 5 MG: 5 TABLET ORAL at 09:12

## 2020-12-04 RX ADMIN — HYDROCODONE BITARTRATE AND ACETAMINOPHEN 1 TABLET: 5; 325 TABLET ORAL at 11:12

## 2020-12-04 RX ADMIN — MEROPENEM AND SODIUM CHLORIDE 1 G: 1 INJECTION, SOLUTION INTRAVENOUS at 08:12

## 2020-12-04 RX ADMIN — HYDROCODONE BITARTRATE AND ACETAMINOPHEN 1 TABLET: 5; 325 TABLET ORAL at 03:12

## 2020-12-04 RX ADMIN — RIFAMPIN 300 MG: 300 CAPSULE ORAL at 09:12

## 2020-12-04 RX ADMIN — FLUTICASONE FUROATE AND VILANTEROL TRIFENATATE 1 PUFF: 200; 25 POWDER RESPIRATORY (INHALATION) at 07:12

## 2020-12-04 RX ADMIN — CLARITHROMYCIN 500 MG: 500 TABLET, FILM COATED ORAL at 08:12

## 2020-12-04 RX ADMIN — CLARITHROMYCIN 500 MG: 500 TABLET, FILM COATED ORAL at 09:12

## 2020-12-04 RX ADMIN — ENOXAPARIN SODIUM 40 MG: 100 INJECTION SUBCUTANEOUS at 05:12

## 2020-12-04 NOTE — PROGRESS NOTES
Consult Note  Infectious Disease    Reason for Consult:       HPI: Fabiana Morel is a   65 y.o. female with  PMHx of HTN, D LP,  anemia, malnutrition, cachexia, BMI of 15.9, right femur fracture status post surgery in December 2019, deconditioning, uses cane, asthma, immunodeficiency, bronchiectasis, pulmonary cavitary lung disease, COPD, pneumonia, chronic hypoxemic respiratory failure on 2-3 L. who presents to the ED with an onset of worsening SOB for 3 weeks, worse over the past 3 days associated with right-sided chest pain..       Patient and  states that she gets either pneumonia or bronchitis every year around October November.  This year was about the same.  She developed progressive shortness of breath.  Dr. Mackey had sent sputum cultures which are growing AFB, within 1 week.(11/05/--11/12)    She has received prednisone and antibiotics(Augmentin) as outpatient without much improvement.   Even if she had home oxygen for multiple years and not using it much, patient needed to use quite a lot of it and actually going over 3 L per minute   She developed progressive shortness of breath, not able to catch her breath, right-sided chest pain.       She came to ED  She was diagnosed with pneumothorax and underwent right chest tube placement.   Patient denies hemoptysis.     Decreased appetite despite multiple appetite stimulants.     She may have lost about 5-7 lb of to over the past 6 months.   No night sweats.  Does not change her clothes at night.  She gets hot flashes since she had hysterectomy.      No daily medications were taken today (Zithromax, Rifadin and Nydrazid). The patient denies any other symptoms at this time. No pertinent PMHx.     She continues to smoke.  No TB exposure.  She was never in an institution.  She has a dog.  No cats.  No birds.  No travel.      12/01/2020 right chest discomfort, understandable.  Ct  On Rt upper chest.  On 4 L o2    12/2:  Consult/notes reviewed, imaging and  cultures reviewed. D/w Dr. Mackey. Per Dr. 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 TB Gold neg. She is having more difficulty expectorating and her sputum is green pea soup in appearance. She has not been out of bed except to restroom. She is tolerating current meds very well. She is only eating about 2 meals per day but takes the boost. She refuses flu vaccine.   12/3:  Afebrile, on steroids.  Formal chest tube placed today with smaller air leak.  Chest x-rays reviewed Sputum culture from 12/01 with Pseudomonas, ID and susceptibility pending.  Labs review in are stable with very low albumin 2.2. She has substantial pain. Did not eat much today secondary to procedure. Tolerating multiple po meds.   12/4:  Interim reviewed.  She is receiving 3 L by OxyMask alternating with nasal cannula, she is afebrile, white blood cells normal, AST slightly higher.  Pseudomonas in her sputum is pansensitive.  The AFB in her sputum has not yet been identified but a rapidly growing afb susceptibility panel has been initiated.  She had a better day today, still is working on pain management.  She is coughing, does a get some benefit from the Acapella but is not expectorating any sputum.  She is eating well per .      Antibiotics (From admission, onward)    Start     Stop Route Frequency Ordered    12/02/20 0900  rifAMpin capsule 300 mg      -- Oral Daily 12/01/20 1829 12/01/20 2100  clarithromycin tablet 500 mg      -- Oral Every 12 hours 12/01/20 1829 12/01/20 1945  meropenem-0.9% sodium chloride 1 g/50 mL IVPB      -- IV Every 8 hours (non-standard times) 12/01/20 1837 12/01/20 1830  linezolid tablet 600 mg      -- Oral Every 12 hours 12/01/20 1829 12/01/20 1830  ethambutoL tablet 600 mg      -- Oral Daily 12/01/20 1829             EXAM & DIAGNOSTICS REVIEWED:   Vitals:     Temp:  [96 °F (35.6 °C)-97.5 °F (36.4 °C)]   Temp: 97.2 °F (36.2 °C) (12/04/20 1205)  Pulse: 103 (12/04/20 1630)  Resp: 18  (12/04/20 1630)  BP: 102/60 (12/04/20 1511)  SpO2: 96 % (12/04/20 1630)    Intake/Output Summary (Last 24 hours) at 12/4/2020 1710  Last data filed at 12/4/2020 1600  Gross per 24 hour   Intake 120 ml   Output 40 ml   Net 80 ml       General:  In NAD. Alert and attentive, cooperative, comfortable    thin  female  Eyes: Anicteric,   EOMI  ENT: No ulcers, exudates, thrush, nares patent, dentition is fair  Neck: supple,    Lungs:   decreased BS, but minimal crackles left base.   chest tube 12/3, minimal bloody drainage, air leak  Heart: RRR, no gallop/murmur/rub noted  Abd: Soft, NT, ND, normal BS, no masses or organomegaly appreciated.  :  Voids  no flank tenderness  Musc: Joints without effusion, swelling, erythema, synovitis, muscle wasting.   Skin: No rashes .   Wound:   Neuro: Alert, attentive, speech fluent, face symmetric, moves all extremities, no focal weakness  Psych: Calm, cooperative  Lymphatic:      Extrem: No edema, erythema, phlebitis, cellulitis, warm and well perfused  VAD:   peripheral   Isolation:  airborne    Lines/Tubes/Drains:    General Labs reviewed:  Recent Labs   Lab 12/02/20  0718 12/03/20  0609 12/04/20  0911   WBC 6.61 7.05 8.69   HGB 11.0* 11.4* 13.1   HCT 35.8* 37.0 42.1    332 364*       Recent Labs   Lab 12/02/20  0718 12/03/20  0609 12/04/20  0910    138 137   K 3.6 4.1 4.0   CL 98 99 95   CO2 31* 30* 33*   BUN 9 8 7*   CREATININE 0.5 0.6 0.6   CALCIUM 8.9 9.1 9.7   PROT 6.0 6.3 7.3   BILITOT 0.1 0.1 0.2   ALKPHOS 61 58 71   ALT 14 15 28   AST 24 29 55*           Micro:  Microbiology Results (last 7 days)     Procedure Component Value Units Date/Time    Culture, Respiratory with Gram Stain [[id number]]  (Abnormal)  (Susceptibility) Collected: 12/01/20 1108    Order Status: Completed Specimen: Respiratory from Sputum, Expectorated Updated: 12/04/20 1304     Respiratory Culture No S aureus isolated.      PSEUDOMONAS AERUGINOSA  Few        CANDIDA ALBICANS  Moderate  Normal  respiratory chelsea also present       Gram Stain (Respiratory) <10 epithelial cells per low power field.     Gram Stain (Respiratory) Rare WBC's     Gram Stain (Respiratory) No organisms seen    Culture, Respiratory with Gram Stain [[id number]] Collected: 12/01/20 1108    Order Status: Sent Specimen: Respiratory from Sputum, Expectorated Updated: 12/01/20 1109        Procedure Component Value Units Date/Time   Culture, Respiratory with Gram Stain [[id number]]    Order Status: No result Specimen: Respiratory from Sputum, Expectorated    AFB Culture & Smear [[id number]] (Abnormal) Collected: 11/05/20 1304   Order Status: Completed Specimen: Respiratory from Sputum, Expectorated Updated: 11/12/20 0740    AFB Culture & Smear Culture positive, identification pending     Verbal report given for previous positive smear    AFB CULTURE STAIN Positive for acid fast bacilli, 4 orgs/fieldAbnormal     AFB CULTURE STAIN Verbal report given for previous positive smearAbnormal    AFB Culture & Smear [[id number]] (Abnormal) Collected: 11/04/20 1529   Order Status: Completed Specimen: Respiratory from Sputum, Expectorated Updated: 11/18/20 1630    AFB Culture & Smear Culture positive, identification pending     Verbal report given for previous positive smear  11/18/2020  16:29    AFB CULTURE STAIN Positive for acid fast bacilli, 4 orgs/fieldAbnormal     AFB CULTURE STAIN Results called to and read back by: Brittney Brown  11/05/2020  14:14Abnormal    AFB Culture & Smear [[id number]] Collected: 11/03/20 1400   Order Status: Completed Specimen: Respiratory from Sputum, Expectorated Updated: 11/10/20 1359    AFB Culture & Smear Culture positive, identification pending     Verbal report given for previous positive smear    AFB CULTURE STAIN No acid fast bacilli seen.   Culture, Respiratory with Gram Stain [[id number]] Collected: 11/03/20 1400   Order Status: Completed Specimen: Respiratory from Sputum Updated: 11/06/20 1003    Respiratory Culture  Normal respiratory chelsea     No S aureus or Pseudomonas isolated.    Gram Stain (Respiratory) <10 epithelial cells per low power field.    Gram Stain (Respiratory) Rare WBC's    Gram Stain (Respiratory) Rare Gram positive cocci    Gram Stain (Respiratory) Rare Gram negative rods       Imaging Reviewed:   CXRUnchanged small volume right pneumothorax with chest tube in place.  New airspace disease at the right base suspicious for atelectasis or edema.  COPD.    CT11/01/2020    No evidence of pulmonary thromboemboli particularly in the large central arteries and lobar branches as imaged.  Peripheral branches are difficult to evaluate particularly in the upper lobes as described above.  New focus of nodular consolidation with cavitary of formation in the right upper lobe laterally and progression/increased in number of bilateral apical large cavities.  There is a small air-fluid level in 1 of the cavities on the left.  Findings are consistent with at infectious etiology.  Follow-up is suggested to exclude pulmonary nodular lesions/neoplasm.  Mediastinal adenopathy and possible hilar adenopathy which is likely reactive.  No new pleural or pericardial effusion or other acute process.  Additional stable incidental findings as above.      Cardiology:    IMPRESSION & PLAN     1. Pneumonia due to AFB in a cachectic patient.    Tb Gold Neg   AFB grew from sputum (11/4---11/18==14 days;  11/5--11/12==7 days)  This could be either MAC (more likely) or fast growing mycobacterium like M. fortuitum, M. abscesses, M Chelonae.   Less likely to be TB.  Discontinue isolation at discharge or if DNA probe is negative  2. Right pneumothorax.   Heimlich valve in place since admission, change to formal chest tube 12/3   Severe bullous emphysema  3. Potential superinfection with Pseudomonas, growing from sputum    4.  PMHx of HTN, DLP,  anemia, malnutrition, cachexia, BMI of 15.9, right femur fracture status post surgery in December 2019,  deconditioning, uses cane, asthma, immunodeficiency, bronchiectasis, pulmonary cavitary lung disease, COPD, pneumonia, chronic hypoxemic respiratory failure on 2-3 L.     Recommendations:   continue broad spectrum coverage (she is on half dose rifampin because her weight is so low)  Would give probiotic if she can tolerate  Follow DNA probe of AFB sent by Dr. Mackey  Limit steroids as able  For possible MAC: Clarithromycin or azithro + Rifampin +  Ethambutol  For possible Fast growers: Clarithro+ Zyvox + Meropenem (Imipenem is not available)  Follow final cultures  Baseline eye exam because of ethambutol in the near future  Will taper therapy further depending on recults    Meropenem  will cover the pseudomonas in her sputum    Will be available this weekend if needed.  Medical Decision Making during this encounter was  [_] Low Complexity  [_] Moderate Complexity  [x  ] High Complexity

## 2020-12-04 NOTE — SUBJECTIVE & OBJECTIVE
Interval History:  Notes reviewed, no acute events overnight. CT to right ant upper CW intact. Pt denies sob. Cough is still wet.  Pt states cw pain controlled with norco. Awaiting further recs from pulmonary.     Review of Systems   Constitutional: Negative for chills, fatigue and fever.   HENT: Negative for congestion, sinus pressure and sore throat.    Eyes: Negative for photophobia and visual disturbance.   Respiratory: Positive for cough. Negative for chest tightness and shortness of breath.         Pleuritic cw pain   Cardiovascular: Negative for chest pain, palpitations and leg swelling.   Gastrointestinal: Negative for abdominal pain, diarrhea, nausea and vomiting.   Endocrine: Negative for polyphagia and polyuria.   Genitourinary: Negative for difficulty urinating, dysuria and urgency.   Musculoskeletal: Negative for arthralgias, back pain and gait problem.   Skin: Negative for color change, pallor, rash and wound.   Neurological: Negative for dizziness, weakness and light-headedness.   Psychiatric/Behavioral: Negative for agitation, behavioral problems and confusion.   All other systems reviewed and are negative.    Objective:     Vital Signs (Most Recent):  Temp: 96 °F (35.6 °C) (12/04/20 0859)  Pulse: 98 (12/04/20 0859)  Resp: 18 (12/04/20 0906)  BP: 129/66 (12/04/20 0859)  SpO2: 96 % (12/04/20 0859) Vital Signs (24h Range):  Temp:  [96 °F (35.6 °C)-97.5 °F (36.4 °C)] 96 °F (35.6 °C)  Pulse:  [69-98] 98  Resp:  [16-20] 18  SpO2:  [96 %-98 %] 96 %  BP: (122-145)/(66-77) 129/66     Weight: 44 kg (97 lb)  Body mass index is 16.65 kg/m².    Intake/Output Summary (Last 24 hours) at 12/4/2020 1012  Last data filed at 12/4/2020 0331  Gross per 24 hour   Intake --   Output 15 ml   Net -15 ml      Physical Exam  Vitals signs and nursing note reviewed.   Constitutional:       General: She is not in acute distress.     Appearance: Normal appearance. She is well-developed. She is ill-appearing.      Comments:  Chronically ill-appearing   HENT:      Head: Normocephalic and atraumatic.      Right Ear: External ear normal.      Left Ear: External ear normal.      Nose: Nose normal. No congestion or rhinorrhea.      Mouth/Throat:      Mouth: Mucous membranes are moist.      Pharynx: Oropharynx is clear. No oropharyngeal exudate or posterior oropharyngeal erythema.   Eyes:      General: No scleral icterus.     Conjunctiva/sclera: Conjunctivae normal.      Pupils: Pupils are equal, round, and reactive to light.   Neck:      Musculoskeletal: Normal range of motion and neck supple.      Vascular: No JVD.   Cardiovascular:      Rate and Rhythm: Normal rate and regular rhythm.      Pulses: Normal pulses.      Heart sounds: Normal heart sounds. No murmur.   Pulmonary:      Effort: Pulmonary effort is normal. No respiratory distress.      Breath sounds: No stridor. Rhonchi and rales present. No wheezing.      Comments: Scattered rales and rhonchi throughout bilateral lung fields improved today.  Chest tube intact to right upper anterior chest wall.  Abdominal:      General: Bowel sounds are normal. There is no distension.      Palpations: Abdomen is soft.      Tenderness: There is no abdominal tenderness.   Musculoskeletal: Normal range of motion.         General: No swelling or tenderness.   Skin:     General: Skin is warm and dry.      Capillary Refill: Capillary refill takes 2 to 3 seconds.      Coloration: Skin is not jaundiced or pale.      Findings: No bruising or erythema.   Neurological:      General: No focal deficit present.      Mental Status: She is alert and oriented to person, place, and time.      Cranial Nerves: No cranial nerve deficit.      Sensory: No sensory deficit.      Motor: No weakness.      Coordination: Coordination normal.   Psychiatric:         Mood and Affect: Mood normal.         Behavior: Behavior normal.         Thought Content: Thought content normal.         Significant Labs:   CBC:   Recent Labs    Lab 12/03/20  0609 12/04/20  0911   WBC 7.05 8.69   HGB 11.4* 13.1   HCT 37.0 42.1    364*     CMP:   Recent Labs   Lab 12/03/20  0609      K 4.1   CL 99   CO2 30*   GLU 84   BUN 8   CREATININE 0.6   CALCIUM 9.1   PROT 6.3   ALBUMIN 2.2*   BILITOT 0.1   ALKPHOS 58   AST 29   ALT 15   ANIONGAP 9   EGFRNONAA >60     All pertinent labs within the past 24 hours have been reviewed.    Significant Imaging: I have reviewed all pertinent imaging results/findings within the past 24 hours.

## 2020-12-04 NOTE — PLAN OF CARE
Problem: Occupational Therapy Goal  Goal: Occupational Therapy Goal  Description: Goals to be met by: 12/18/2020     Patient will increase functional independence with ADLs by performing:    LE Dressing with Modified [place].  Grooming while standing at sink with Modified [place].  Toileting from toilet with Modified [place] for hygiene and clothing management.   Supine to sit with Modified [place].  Toilet transfer to toilet with Modified [place].    Outcome: Ongoing, Progressing

## 2020-12-04 NOTE — PROGRESS NOTES
Consult Note  Infectious Disease    Reason for Consult:       HPI: Fabiana Morel is a   65 y.o. female with  PMHx of HTN, D LP,  anemia, malnutrition, cachexia, BMI of 15.9, right femur fracture status post surgery in December 2019, deconditioning, uses cane, asthma, immunodeficiency, bronchiectasis, pulmonary cavitary lung disease, COPD, pneumonia, chronic hypoxemic respiratory failure on 2-3 L. who presents to the ED with an onset of worsening SOB for 3 weeks, worse over the past 3 days associated with right-sided chest pain..       Patient and  states that she gets either pneumonia or bronchitis every year around October November.  This year was about the same.  She developed progressive shortness of breath.  Dr. Mackey had sent sputum cultures which are growing AFB, within 1 week.(11/05/--11/12)    She has received prednisone and antibiotics(Augmentin) as outpatient without much improvement.   Even if she had home oxygen for multiple years and not using it much, patient needed to use quite a lot of it and actually going over 3 L per minute   She developed progressive shortness of breath, not able to catch her breath, right-sided chest pain.       She came to ED  She was diagnosed with pneumothorax and underwent right chest tube placement.   Patient denies hemoptysis.     Decreased appetite despite multiple appetite stimulants.     She may have lost about 5-7 lb of to over the past 6 months.   No night sweats.  Does not change her clothes at night.  She gets hot flashes since she had hysterectomy.      No daily medications were taken today (Zithromax, Rifadin and Nydrazid). The patient denies any other symptoms at this time. No pertinent PMHx.     She continues to smoke.  No TB exposure.  She was never in an institution.  She has a dog.  No cats.  No birds.  No travel.      12/01/2020 right chest discomfort, understandable.  Ct  On Rt upper chest.  On 4 L o2    12/2:  Consult/notes reviewed, imaging and  cultures reviewed. D/w Dr. Mackey. Per Dr. 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 TB Gold neg. She is having more difficulty expectorating and her sputum is green pea soup in appearance. She has not been out of bed except to restroom. She is tolerating current meds very well. She is only eating about 2 meals per day but takes the boost. She refuses flu vaccine.   12/3:  Afebrile, on steroids.  Formal chest tube placed today with smaller air leak.  Chest x-rays reviewed Sputum culture from 12/01 with Pseudomonas, ID and susceptibility pending.  Labs review in are stable with very low albumin 2.2. She has substantial pain. Did not eat much today secondary to procedure. Tolerating multiple po meds.       Antibiotics (From admission, onward)    Start     Stop Route Frequency Ordered    12/02/20 0900  rifAMpin capsule 300 mg      -- Oral Daily 12/01/20 1829 12/01/20 2100  clarithromycin tablet 500 mg      -- Oral Every 12 hours 12/01/20 1829 12/01/20 1945  meropenem-0.9% sodium chloride 1 g/50 mL IVPB      -- IV Every 8 hours (non-standard times) 12/01/20 1837 12/01/20 1830  linezolid tablet 600 mg      -- Oral Every 12 hours 12/01/20 1829 12/01/20 1830  ethambutoL tablet 600 mg      -- Oral Daily 12/01/20 1829             EXAM & DIAGNOSTICS REVIEWED:   Vitals:     Temp:  [96.5 °F (35.8 °C)-97.4 °F (36.3 °C)]   Temp: 96.5 °F (35.8 °C) (12/03/20 1129)  Pulse: 85 (12/03/20 1129)  Resp: 16 (12/03/20 1700)  BP: 122/66 (12/03/20 1129)  SpO2: 97 % (12/03/20 1129)    Intake/Output Summary (Last 24 hours) at 12/3/2020 1909  Last data filed at 12/3/2020 0330  Gross per 24 hour   Intake 100 ml   Output 0 ml   Net 100 ml       General:  In NAD. Alert and attentive, cooperative, comfortable    thin cachectic female  Eyes: Anicteric,   EOMI  ENT: No ulcers, exudates, thrush, nares patent, dentition is fair  Neck: supple,    Lungs:   decreased BS,  Right CTube/heimlich 11/29, chest tube 12/3, minimal  bloody drainage  Heart: RRR, no gallop/murmur/rub noted  Abd: Soft, NT, ND, normal BS, no masses or organomegaly appreciated.  :  Voids  no flank tenderness  Musc: Joints without effusion, swelling, erythema, synovitis, muscle wasting.   Skin: No rashes. No palmar or plantar lesions.   Wound:   Neuro: Alert, attentive, speech fluent, face symmetric, moves all extremities, no focal weakness  Psych: Calm, cooperative  Lymphatic:      Extrem: No edema, erythema, phlebitis, cellulitis, warm and well perfused  VAD:   peripheral   Isolation:  airborne    Lines/Tubes/Drains:    General Labs reviewed:  Recent Labs   Lab 12/01/20  0141 12/02/20  0718 12/03/20  0609   WBC 6.26 6.61 7.05   HGB 12.0 11.0* 11.4*   HCT 37.9 35.8* 37.0    304 332       Recent Labs   Lab 12/01/20  0141 12/02/20  0718 12/03/20  0609   * 136 138   K 3.7 3.6 4.1   CL 96 98 99   CO2 28 31* 30*   BUN 15 9 8   CREATININE 0.5 0.5 0.6   CALCIUM 8.7 8.9 9.1   PROT 6.0 6.0 6.3   BILITOT 0.2 0.1 0.1   ALKPHOS 67 61 58   ALT 17 14 15   AST 25 24 29           Micro:  Microbiology Results (last 7 days)     Procedure Component Value Units Date/Time    Culture, Respiratory with Gram Stain [[id number]]  (Abnormal) Collected: 12/01/20 1108    Order Status: Completed Specimen: Respiratory from Sputum, Expectorated Updated: 12/03/20 0843     Respiratory Culture PRESUMPTIVE PSEUDOMONAS SPECIES  Few  Identification and susceptibility pending  Normal respiratory chelsea also present       Gram Stain (Respiratory) <10 epithelial cells per low power field.     Gram Stain (Respiratory) Rare WBC's     Gram Stain (Respiratory) No organisms seen    Culture, Respiratory with Gram Stain [[id number]] Collected: 12/01/20 1108    Order Status: Sent Specimen: Respiratory from Sputum, Expectorated Updated: 12/01/20 1109        Procedure Component Value Units Date/Time   Culture, Respiratory with Gram Stain [[id number]]    Order Status: No result Specimen: Respiratory from  Sputum, Expectorated    AFB Culture & Smear [[id number]] (Abnormal) Collected: 11/05/20 1304   Order Status: Completed Specimen: Respiratory from Sputum, Expectorated Updated: 11/12/20 0740    AFB Culture & Smear Culture positive, identification pending     Verbal report given for previous positive smear    AFB CULTURE STAIN Positive for acid fast bacilli, 4 orgs/fieldAbnormal     AFB CULTURE STAIN Verbal report given for previous positive smearAbnormal    AFB Culture & Smear [[id number]] (Abnormal) Collected: 11/04/20 1529   Order Status: Completed Specimen: Respiratory from Sputum, Expectorated Updated: 11/18/20 1630    AFB Culture & Smear Culture positive, identification pending     Verbal report given for previous positive smear  11/18/2020  16:29    AFB CULTURE STAIN Positive for acid fast bacilli, 4 orgs/fieldAbnormal     AFB CULTURE STAIN Results called to and read back by: Brittney Brown  11/05/2020  14:14Abnormal    AFB Culture & Smear [[id number]] Collected: 11/03/20 1400   Order Status: Completed Specimen: Respiratory from Sputum, Expectorated Updated: 11/10/20 1359    AFB Culture & Smear Culture positive, identification pending     Verbal report given for previous positive smear    AFB CULTURE STAIN No acid fast bacilli seen.   Culture, Respiratory with Gram Stain [[id number]] Collected: 11/03/20 1400   Order Status: Completed Specimen: Respiratory from Sputum Updated: 11/06/20 1003    Respiratory Culture Normal respiratory chelsea     No S aureus or Pseudomonas isolated.    Gram Stain (Respiratory) <10 epithelial cells per low power field.    Gram Stain (Respiratory) Rare WBC's    Gram Stain (Respiratory) Rare Gram positive cocci    Gram Stain (Respiratory) Rare Gram negative rods       Imaging Reviewed:   CXRUnchanged small volume right pneumothorax with chest tube in place.  New airspace disease at the right base suspicious for atelectasis or edema.  COPD.    CT11/01/2020    No evidence of pulmonary  thromboemboli particularly in the large central arteries and lobar branches as imaged.  Peripheral branches are difficult to evaluate particularly in the upper lobes as described above.  New focus of nodular consolidation with cavitary of formation in the right upper lobe laterally and progression/increased in number of bilateral apical large cavities.  There is a small air-fluid level in 1 of the cavities on the left.  Findings are consistent with at infectious etiology.  Follow-up is suggested to exclude pulmonary nodular lesions/neoplasm.  Mediastinal adenopathy and possible hilar adenopathy which is likely reactive.  No new pleural or pericardial effusion or other acute process.  Additional stable incidental findings as above.      Cardiology:    IMPRESSION & PLAN     1. Pneumonia due to AFB in a cachectic patient.    Tb Gold Neg   AFB grew from sputum (11/4---11/18==14 days;  11/5--11/12==7 days)  This could be either MAC (more likely) or fast growing mycobacterium like M. fortuitum, M. abscesses, M Chelonae.   Less likely to be TB.  Discontinue isolation at discharge or if DNA probe is negative  2. Right pneumothorax.   Heimlich valve in place since admission, change to formal chest tube 12/3   Severe bullous emphysema  3. Potential superinfection with Pseudomonas, growing from sputum    4.  PMHx of HTN, DLP,  anemia, malnutrition, cachexia, BMI of 15.9, right femur fracture status post surgery in December 2019, deconditioning, uses cane, asthma, immunodeficiency, bronchiectasis, pulmonary cavitary lung disease, COPD, pneumonia, chronic hypoxemic respiratory failure on 2-3 L.     Recommendations:   continue broad spectrum coverage (she is on half dose rifampin because her weight is so low)  Would give probiotic if she can tolerate  Follow DNA probe of AFB sent by Dr. Mackey  Limit steroids as able  For possible MAC: Clarithromycin or azithro + Rifampin +  Ethambutol  For possible Fast growers: Clarithro+ Zyvox +  Meropenem (Imipenem is not available)  Follow final cultures  Baseline eye exam because of ethambutol in the near future  Will taper therapy further depending on recults    Meropenem hopefully will cover the pseudomonas in her sputum    Medical Decision Making during this encounter was  [_] Low Complexity  [_] Moderate Complexity  [x  ] High Complexity

## 2020-12-04 NOTE — PLAN OF CARE
POC reviewed with patient at the start of shift. Ate minimal portion of supper.  Remains on IV and PO  ABX. Wear briefs,incontinent. NO bm this shift. Right chest tube intact and to 20 cm of sx with minimal leak. Noted small output of serosangious drainage. O2 at 4 liters per nc. Remains on airborne, droplet and contact precautions.Tele in progress.22g patent and intact to right forearm.   Bed in low and locked position, bed alarm set, call light in reach. Will continue to monitor. Pain not managed with scheduled Norco at the start of shift to right chest tube site,  notified NP who ordered OXY every 6 hours as needed. Pain was slightly relieved after this. Will continue to monitor.

## 2020-12-04 NOTE — PLAN OF CARE
Patient sitting in chair, nonskid footwear on, bed in lowest locked position , call bell in reach instructed to call if needed, telemetry on reading -110s, tolerating room air     at bedside, Posey pad alarm on, Antibiotics for infection administered as scheduled and, pain meds given scheduled and prn, lighting adequate and walkway free from clutter, pt has no complaints or questions at this time      Chest tube output was 25cc, claim above 40cc on atrium, Site WNL, air leak noted and MD aware    No further orders, questions or concerns at this time,   will cont with plan of care

## 2020-12-04 NOTE — PROGRESS NOTES
[facility] Hospital Medicine  Progress Note    Patient Name: Fabiana Morel  MRN: [id number]  Patient Class: IP- Inpatient   Admission Date: 11/29/2020  Length of Stay: 4 days  Attending Physician: Davie Pierre MD  Primary Care Provider: Dominique Bartlett MD        Subjective:     Principal Problem:Pneumothorax on right        HPI:  Fabiana Morel is a 65-year-old female with a past medical history of COPD, hyperlipidemia, iron deficiency anemia, and tobacco use who presents to the emergency room tonight with reports of shortness of breath.  Patient reports shortness of breath x3 weeks, has increased in severity since onset.  Patient requiring supplemental oxygen therapy for the past three weeks at 2-3 L, however has required increase in normal O2 dose over the past three days. Patient currently under the care of Dr. Mackey, pulmonology, has a history of COPD. Patient accompanied by her  during my initial interview and physical exam. a history of COPD. Patient was reportedly seen in the emergency room on 11/06 for chest pain, a CT chest was reportedly obtained revealing right lung abscess.  Patient followed with Dr. Mackey in clinic on 11/09 in which sputum samples were obtained and treatment was initiated for tuberculosis versus MAC.  Patient also reports decreased p.o. intake, utilizes boost supplementation daily.  Patient endorses productive cough x3 days, with brown sputum production.  Patient endorses dyspnea on exertion, stating she can not walk the length of the room without becoming SOB.  Patient states she quit tobacco use on 11/05/2020.  In the ER, patient noted to have right pneumothorax on chest x-ray.  ER physician placed Heimlich valve chest tube with reported immediate relief in shortness of breath.  Repeat chest x-ray revealing improvement of pneumothorax.  ER MD reportedly spoke with Dr. Mackey, pulmonology who recommended admission with IV vancomycin and Zosyn and isolation  precautions.  Patient placed in observation on 11/29/2020 at approximately 9:30 p.m..  Full code status verified upon admission to the hospital.    Overview/Hospital Course:  No notes on file    Interval History:  Notes reviewed, no acute events overnight. CT to right ant upper CW intact. Pt denies sob. Cough is still wet.  Pt states cw pain controlled with norco. Awaiting further recs from pulmonary.     Review of Systems   Constitutional: Negative for chills, fatigue and fever.   HENT: Negative for congestion, sinus pressure and sore throat.    Eyes: Negative for photophobia and visual disturbance.   Respiratory: Positive for cough. Negative for chest tightness and shortness of breath.         Pleuritic cw pain   Cardiovascular: Negative for chest pain, palpitations and leg swelling.   Gastrointestinal: Negative for abdominal pain, diarrhea, nausea and vomiting.   Endocrine: Negative for polyphagia and polyuria.   Genitourinary: Negative for difficulty urinating, dysuria and urgency.   Musculoskeletal: Negative for arthralgias, back pain and gait problem.   Skin: Negative for color change, pallor, rash and wound.   Neurological: Negative for dizziness, weakness and light-headedness.   Psychiatric/Behavioral: Negative for agitation, behavioral problems and confusion.   All other systems reviewed and are negative.    Objective:     Vital Signs (Most Recent):  Temp: 96 °F (35.6 °C) (12/04/20 0859)  Pulse: 98 (12/04/20 0859)  Resp: 18 (12/04/20 0906)  BP: 129/66 (12/04/20 0859)  SpO2: 96 % (12/04/20 0859) Vital Signs (24h Range):  Temp:  [96 °F (35.6 °C)-97.5 °F (36.4 °C)] 96 °F (35.6 °C)  Pulse:  [69-98] 98  Resp:  [16-20] 18  SpO2:  [96 %-98 %] 96 %  BP: (122-145)/(66-77) 129/66     Weight: 44 kg (97 lb)  Body mass index is 16.65 kg/m².    Intake/Output Summary (Last 24 hours) at 12/4/2020 1012  Last data filed at 12/4/2020 0331  Gross per 24 hour   Intake --   Output 15 ml   Net -15 ml      Physical Exam  Vitals  signs and nursing note reviewed.   Constitutional:       General: She is not in acute distress.     Appearance: Normal appearance. She is well-developed. She is ill-appearing.      Comments: Chronically ill-appearing   HENT:      Head: Normocephalic and atraumatic.      Right Ear: External ear normal.      Left Ear: External ear normal.      Nose: Nose normal. No congestion or rhinorrhea.      Mouth/Throat:      Mouth: Mucous membranes are moist.      Pharynx: Oropharynx is clear. No oropharyngeal exudate or posterior oropharyngeal erythema.   Eyes:      General: No scleral icterus.     Conjunctiva/sclera: Conjunctivae normal.      Pupils: Pupils are equal, round, and reactive to light.   Neck:      Musculoskeletal: Normal range of motion and neck supple.      Vascular: No JVD.   Cardiovascular:      Rate and Rhythm: Normal rate and regular rhythm.      Pulses: Normal pulses.      Heart sounds: Normal heart sounds. No murmur.   Pulmonary:      Effort: Pulmonary effort is normal. No respiratory distress.      Breath sounds: No stridor. Rhonchi and rales present. No wheezing.      Comments: Scattered rales and rhonchi throughout bilateral lung fields improved today.  Chest tube intact to right upper anterior chest wall.  Abdominal:      General: Bowel sounds are normal. There is no distension.      Palpations: Abdomen is soft.      Tenderness: There is no abdominal tenderness.   Musculoskeletal: Normal range of motion.         General: No swelling or tenderness.   Skin:     General: Skin is warm and dry.      Capillary Refill: Capillary refill takes 2 to 3 seconds.      Coloration: Skin is not jaundiced or pale.      Findings: No bruising or erythema.   Neurological:      General: No focal deficit present.      Mental Status: She is alert and oriented to person, place, and time.      Cranial Nerves: No cranial nerve deficit.      Sensory: No sensory deficit.      Motor: No weakness.      Coordination: Coordination  normal.   Psychiatric:         Mood and Affect: Mood normal.         Behavior: Behavior normal.         Thought Content: Thought content normal.         Significant Labs:   CBC:   Recent Labs   Lab 12/03/20  0609 12/04/20  0911   WBC 7.05 8.69   HGB 11.4* 13.1   HCT 37.0 42.1    364*     CMP:   Recent Labs   Lab 12/03/20  0609      K 4.1   CL 99   CO2 30*   GLU 84   BUN 8   CREATININE 0.6   CALCIUM 9.1   PROT 6.3   ALBUMIN 2.2*   BILITOT 0.1   ALKPHOS 58   AST 29   ALT 15   ANIONGAP 9   EGFRNONAA >60     All pertinent labs within the past 24 hours have been reviewed.    Significant Imaging: I have reviewed all pertinent imaging results/findings within the past 24 hours.      Assessment/Plan:      * Pneumothorax on right  New Chest tube intact with min int air leak  Continue current treatment regimen  Chest x-ray reviewed - remains stable        Cachexia  Secondary to protein malnutrition and poor oral intake  Supplement with boost  Appetite stable      Mycobacterium avium complex  Appreciate ID consult  Cont abx per ID  Sputum culture reviewed        Hypoalbuminemia due to protein-calorie malnutrition  Noted, encourage PO intake - boost supplementation.        Iron deficiency  Chronic, continue home iron supplementation.        Acute on chronic respiratory failure with hypoxia  Noted, continue the use of supplemental O2.        Bronchiectasis  Sputum culture reviewed - pseudomonas  Awaiting further ID recs for home abx      COPD (chronic obstructive pulmonary disease)  Chronic - continue home medication, pulmonology consult. Continuous supplemental oxygen, telemetry, and pulse oximetry monitoring.        Lung nodule  Managed by Dr. Mackey      Hyperlipidemia    Chronic, controlled. Will continue home medication.    Lab Results   Component Value Date    CHOL 187 08/21/2020    CHOL 182 05/22/2020    CHOL 182 06/25/2019     Lab Results   Component Value Date    HDL 55 08/21/2020    HDL 57 05/22/2020    HDL  65 06/25/2019     Lab Results   Component Value Date    LDLCALC 105.4 08/21/2020    LDLCALC 102.2 05/22/2020    LDLCALC 97.6 06/25/2019     Lab Results   Component Value Date    TRIG 133 08/21/2020    TRIG 114 05/22/2020    TRIG 97 06/25/2019     Lab Results   Component Value Date    CHOLHDL 29.4 08/21/2020    CHOLHDL 31.3 05/22/2020    CHOLHDL 35.7 06/25/2019               VTE Risk Mitigation (From admission, onward)         Ordered     enoxaparin injection 40 mg  Every 24 hours      11/29/20 2224     IP VTE HIGH RISK PATIENT  Once      11/29/20 2224     Place sequential compression device  Until discontinued      11/29/20 2224     Place STEPHANIE hose  Until discontinued      11/29/20 2224                Discharge Planning   DION: 12/4/2020     Code Status: Full Code   Is the patient medically ready for discharge?:     Reason for patient still in hospital (select all that apply): Treatment and Consult recommendations  Discharge Plan A: Home with family                  Rosa Rogers NP  Department of Hospital Medicine   [facility]

## 2020-12-04 NOTE — RESPIRATORY THERAPY
12/03/20 1930   Patient Assessment/Suction   Level of Consciousness (AVPU) alert   Respiratory Effort Short of breath   Expansion/Accessory Muscles/Retractions expansion symmetric;no retractions;no use of accessory muscles   All Lung Fields Breath Sounds diminished   Cough Type nonproductive   PRE-TX-O2   O2 Device (Oxygen Therapy) nasal cannula   Flow (L/min) 3   Oxygen Concentration (%) 32   SpO2 98 %   Pulse Oximetry Type Intermittent   Pulse 90   Resp 20   Aerosol Therapy   $ Aerosol Therapy Charges Aerosol Treatment   Respiratory Treatment Status (SVN) given   Treatment Route (SVN) mask;oxygen   Patient Position (SVN) semi-John's   Signs of Intolerance (SVN) none   Vibratory PEP Therapy   $ Vibratory PEP Charges Aerobika Therapy   Device (PEP Therapy) flutter   Route (PEP Therapy) mouthpiece   Breaths per Cycle (PEP Therapy) 10   Cycles (PEP Therapy) 1   Signs of Intolerance (PEP Therapy) none   Ready to Wean/Extubation Screen   FIO2<=50 (chart decimal) 0.32

## 2020-12-04 NOTE — PLAN OF CARE
Plan of care reviewed with patient. Patient verbalized complete understanding. Chest tube insertion by MD. Respiratory tx as needed. Currently on 3L oxygen. Antibiotics administered as scheduled. Afebrile throughout shift. All fall precautions maintained. Bed in lowest position, locked, call light within reach. Side rails up x's 2. Slip resistant socks maintained.

## 2020-12-04 NOTE — PROGRESS NOTES
Progress Note  PULMONARY    Admit Date: 11/29/2020 12/04/2020  History of Present Illness:    smoker, severe copd , h/o recurrent pneumonias with neg bronch in 2017.   Pt has some vague immune def.   Had recent ct chest with non  Resolving cavitary disease ppt eval with pos afb early November.  Tb gold neg, and pt rx on  Inh/rif/azithr pending id. dna probe requested.  Pt continued with night sweats - chronic? And had  Brown mucous increase with sob 3 wks ago.  Pt had decreased appetite taking in boost only, and became bed bound.  Breathing worsened - 02 needs increased from2.5to 3.5-subjective sob.       Pt was not acutely symptomatic when mycobacterial rx started but worsened.  She was to call if worsened.  She did stop smoking.     Er eval  Pm showed right pneumo- very large. Thoracic vent to heimlich valve done with some re inflation.      Pt still sob.   Plan: will need to get id mycobacteria, will ask id to see, needs better pneumo treatment. Ppn,  Will need therpay.  Could have pseudomonas/mras complicating.   Needs sputum culture.  SUBJECTIVE:     12/1 no new c/o, feels much better.    12/2 having increased sob last few min.   Thick brown green mucous  12/3 no mucous today. Post  Chest tube pain.    12/4 no new c/o      PFSH and Allergies reviewed.    OBJECTIVE:     Vitals (Most recent):  Vitals:    12/04/20 0906   BP:    Pulse:    Resp: 18   Temp:        Vitals (24 hour range):  Temp:  [96 °F (35.6 °C)-97.5 °F (36.4 °C)]   Pulse:  [69-98]   Resp:  [16-20]   BP: (122-145)/(66-77)   SpO2:  [96 %-98 %]       Intake/Output Summary (Last 24 hours) at 12/4/2020 1116  Last data filed at 12/4/2020 0745  Gross per 24 hour   Intake 0 ml   Output 15 ml   Net -15 ml          Physical Exam:  The patient's neuro status (alertness,orientation,cognitive function,motor skills,), pharyngeal exam (oral lesions, hygiene, abn dentition,), Neck (jvd,mass,thyroid,nodes in neck and above/below  clavicle),RESPIRATORY(symmetry,effort,fremitus,percussion,auscultation),  Cor(rhythm,heart tones including gallops,perfusion,edema)ABD(distention,hepatic&splenomegaly,tenderness,masses), Skin(rash,cyanosis),Psyc(affect,judgement,).  Exam negative except for these pertinent findings:    Alert, chest is symmetric, no distress, normal percussion, normal fremitus and  decreaed breath sounds- less air leak.      Radiographs reviewed: view by direct vision  No pneumo seen 12/1  Results for orders placed during the hospital encounter of 11/29/20   X-Ray Chest 1 View    Narrative EXAMINATION:  XR CHEST 1 VIEW    CLINICAL HISTORY:  pneumothorax; Pneumothorax, unspecified    TECHNIQUE:  Single frontal view of the chest was performed.    COMPARISON:  11/30/2020    FINDINGS:  Chest tube remains in place in the right upper lung field and other tube or line projects over the right lung base.  Right pneumothorax is not seen on the current film.  Subcutaneous emphysema appears mildly decreased.  Right basilar infiltrate with probable also small right pleural effusion do not appear significantly changed.  Stranding left suprahilar not significantly changed.  Heart not enlarged.      Impression Right-sided chest tube remains in place with the appearance of the chest not significantly changed compared to the prior exam.  No visible pneumothorax      Electronically signed by: Kristin Chávez MD  Date:    12/01/2020  Time:    08:29   ]    Labs     Recent Labs   Lab 12/04/20  0911   WBC 8.69   HGB 13.1   HCT 42.1   *     Recent Labs   Lab 12/04/20  0910      K 4.0   CL 95   CO2 33*   BUN 7*   CREATININE 0.6      CALCIUM 9.7   MG 2.2   AST 55*   ALT 28   ALKPHOS 71   BILITOT 0.2   PROT 7.3   ALBUMIN 2.5*   No results for input(s): PH, PCO2, PO2, HCO3 in the last 24 hours.  Microbiology Results (last 7 days)     Procedure Component Value Units Date/Time    Culture, Respiratory with Gram Stain [[id number]]  (Abnormal)   (Susceptibility) Collected: 12/01/20 1108    Order Status: Completed Specimen: Respiratory from Sputum, Expectorated Updated: 12/04/20 1010     Respiratory Culture No S aureus isolated.      PSEUDOMONAS AERUGINOSA  Few        YEAST   Moderate  Identification pending  Normal respiratory chelsea also present       Gram Stain (Respiratory) <10 epithelial cells per low power field.     Gram Stain (Respiratory) Rare WBC's     Gram Stain (Respiratory) No organisms seen    Culture, Respiratory with Gram Stain [[id number]] Collected: 12/01/20 1108    Order Status: Sent Specimen: Respiratory from Sputum, Expectorated Updated: 12/01/20 1109          Impression:  Active Hospital Problems    Diagnosis  POA    *Pneumothorax on right [J93.9]  Yes    Cachexia [R64]  Yes    Hypoalbuminemia due to protein-calorie malnutrition [E88.09, E46]  Yes    Mycobacterium avium complex [A31.0]  Yes    Iron deficiency [E61.1]  Yes    Immune deficiency disorder [D84.9]  Yes    Acute on chronic respiratory failure with hypoxia [J96.21]  Yes    Bronchiectasis [J47.9]  Yes    COPD (chronic obstructive pulmonary disease) [J44.9]  Yes    Hyperlipidemia [E78.5]  Yes    Lung nodule [R91.1]  Yes     Established with Dr. Mackey, had CT in 12/15.  Demonstrated multiple lung nodules, several spiculated masses        Resolved Hospital Problems   No resolved problems to display.               Plan:     12/1- cxr with resolved pneumo, pt feeling much better, no night sweats, eating very well.  Suspect she developed pneumo shortly after starting mycobacterial therapy.  Need to mobilize.  Called main [place] - hoping id of mycobacterium out on 12/2.  Air leak brisk - pt not good operative risk for chest surg.  May need prolonged suction?      If no tb would stop isolation and start physical therapy.    Sputum submitted today - taper abx per results.       12/2 cxr ok, sm sub q air, no id for afb.    Will recheck cxr - air leak brisk.  Having chest pain  that may ppt atelectasis/sob- place norco q4 hold if sedate.     Lab says looks like mac, may have 2nd organism?, probe should be out next wk.    12/3 cxr today post chest tube is better, control pain, mobilize to chair am    12/4 no fever, on clarithro, etham, zyvox, rifampin and prednisone 5 bid.    3lpm nc, merrem for pseudomonas .    Pt progressing- try heimlich valve 1st of next wk?              .

## 2020-12-04 NOTE — PLAN OF CARE
Problem: Physical Therapy Goal  Goal: Physical Therapy Goal  Description: Goals to be met by: 2020     Patient will increase functional independence with mobility by performin. Supine to sit with Contact Guard Assistance  2. Sit to stand transfer with Contact Guard Assistance  3. Bed to chair transfer with Contact Guard Assistance using Rolling Walker  4. Gait  x 150 feet with Contact Guard Assistance using Rolling Walker.   5. Lower extremity exercise program x20 reps     Outcome: Ongoing, Progressing   PT eval- pt ambulated within room with RW,CT to water seal/IV/O2. HHPT

## 2020-12-04 NOTE — CARE UPDATE
Prn tx given       12/04/20 1630   Patient Assessment/Suction   Level of Consciousness (AVPU) alert   Respiratory Effort Unlabored   Expansion/Accessory Muscles/Retractions no use of accessory muscles   All Lung Fields Breath Sounds diminished   PRE-TX-O2   O2 Device (Oxygen Therapy) nasal cannula   $ Is the patient on Low Flow Oxygen? Yes   Flow (L/min) 3   Oxygen Concentration (%) 32   SpO2 96 %   Pulse Oximetry Type Intermittent   $ Pulse Oximetry - Multiple Charge Pulse Oximetry - Multiple   Pulse 103   Resp 18   Aerosol Therapy   $ Aerosol Therapy Charges Aerosol Treatment   Respiratory Treatment Status (SVN) given   Treatment Route (SVN) mask;oxygen   Patient Position (SVN) semi-John's   Post Treatment Assessment (SVN) breath sounds unchanged   Signs of Intolerance (SVN) none   Breath Sounds Post-Respiratory Treatment   Throughout All Fields Post-Treatment All Fields   Throughout All Fields Post-Treatment no change   Post-treatment Heart Rate (beats/min) 101   Post-treatment Resp Rate (breaths/min) 18   Vibratory PEP Therapy   $ Vibratory PEP Charges Aerobika Therapy   Type (PEP Therapy) vibratory/oscillatory   Device (PEP Therapy) flutter   Route (PEP Therapy) mouthpiece   Breaths per Cycle (PEP Therapy) 12   Cycles (PEP Therapy) 1   Settings (PEP Therapy) PEP 3   Patient Position (PEP Therapy) semi-John's   Post Treatment Assessment (PEP) breath sounds unchanged   Signs of Intolerance (PEP Therapy) none   Ready to Wean/Extubation Screen   FIO2<=50 (chart decimal) 0.32

## 2020-12-04 NOTE — PT/OT/SLP EVAL
Physical Therapy Evaluation    Patient Name:  Fabiana Morel   MRN:  [id number]    Recommendations:     Discharge Recommendations:  home health PT   Discharge Equipment Recommendations: none   Barriers to discharge: None    Assessment:     Fabiana Morel is a 65 y.o. female admitted with a medical diagnosis of Pneumothorax on right.  She presents with the following impairments/functional limitations:  impaired endurance, impaired self care skills, impaired functional mobilty, gait instability, impaired balance, pain, impaired cardiopulmonary response to activity . Pt is alert with excellent support from spouse- has R CT to wall suction but ok to water seal for ambulation per Dr Mackey. Pt ambulated within room with RW with IV/O2 and R CT. Pt easily fatigued with slight SOB. Pt coughing post PT. Pt to benefit from continued therapies.    Rehab Prognosis: Fair; patient would benefit from acute skilled PT services to address these deficits and reach maximum level of function.    Recent Surgery: * No surgery found *      Plan:     During this hospitalization, patient to be seen 6 x/week to address the identified rehab impairments via gait training, therapeutic activities, therapeutic exercises and progress toward the following goals:    · Plan of Care Expires:  12/30/20    Subjective   Pt seen up in chair post OT  Pt agreeable to ambulate- pt wanting RT post PT. coughing  Chief Complaint: telemetry as heavy in pocket- pouch obtained and provided to pt  Patient/Family Comments/goals: get home  Pain/Comfort:  · Pain Rating 1: 4/10  · Location - Side 1: Right  · Location 1: chest  · Pain Addressed 1: Reposition, Distraction, Cessation of Activity    Patients cultural, spiritual, Judaism conflicts given the current situation:      Living Environment:  Home with spouse  Prior to admission, patients level of function was ambulatory with assist from spouse.  Equipment used at home: walker, rolling, oxygen.  DME owned (not  currently used): none.  Upon discharge, patient will have assistance from family.    Objective:     Communicated with nurse Gamboa prior to session.  Patient found up in chair with chest tube, telemetry, peripheral IV, pulse ox (continuous), oxygen  upon PT entry to room.    General Precautions: Standard, fall, airborne, contact, respiratory   Orthopedic Precautions:N/A   Braces: N/A     Exams:  · Postural Exam:  Patient presented with the following abnormalities:    · -       Rounded shoulders  · -       Forward head  · -       thin at 97 lbs  · RLE ROM: WFL  · RLE Strength: Deficits: 3+/5  · LLE ROM: WFL  · LLE Strength: Deficits: 3+/5    Functional Mobility:  · Transfers:     · Sit to Stand:  minimum assistance with rolling walker  · Gait: 30ft with RW min assist and another person managing IV pole/O2. care handling R CT to water seal    Therapeutic Activities and Exercises:   Patient was educated on the importance of OOB activity and functional mobility to negate negative effects of prolonged bed rest during hospitalization, safe transfers and ambulation, and D/C planning   Back to chair post PT  R CT back to wall suction  Dr Narendra noe    AM-PAC 6 CLICK MOBILITY  Total Score:16     Patient left up in chair with all lines intact, call button in reach, nurse Gamboa notified and spouse present.    GOALS:   Multidisciplinary Problems     Physical Therapy Goals        Problem: Physical Therapy Goal    Goal Priority Disciplines Outcome Goal Variances Interventions   Physical Therapy Goal     PT, PT/OT Ongoing, Progressing     Description: Goals to be met by: 2020     Patient will increase functional independence with mobility by performin. Supine to sit with Contact Guard Assistance  2. Sit to stand transfer with Contact Guard Assistance  3. Bed to chair transfer with Contact Guard Assistance using Rolling Walker  4. Gait  x 150 feet with Contact Guard Assistance using Rolling Walker.   5. Lower  extremity exercise program x20 reps                      History:     Past Medical History:   Diagnosis Date    Abnormal CT scan 1/8/2015    Asthma     Colitis     COPD (chronic obstructive pulmonary disease)     Diverticulitis of sigmoid colon 11/4/2014    Erosive gastritis 7/17/2013    Fracture of left clavicle     H pylori ulcer 7/17/2013    Hypertension     Peptic ulcer disease 7/17/2013    Pneumonia of right upper lobe due to infectious organism 8/15/2017    Rectal bleed 11/3/2014    Scoliosis     SOB (shortness of breath)     Yeast infection 8/25/2017       Past Surgical History:   Procedure Laterality Date    APPENDECTOMY      BREAST BIOPSY Right     cyst    COLONOSCOPY N/A 7/15/2019    Procedure: COLONOSCOPY;  Surgeon: Sharif Chun MD;  Location: [facility] ENDO;  Service: Endoscopy;  Laterality: N/A;    HIP ARTHROPLASTY Right 12/18/2019    Procedure: ARTHROPLASTY, HIP;  Surgeon: Bernardino Wilson II, MD;  Location: [facility] OR;  Service: Orthopedics;  Laterality: Right;  [facility] [employer]    HYSTERECTOMY      HASMUKH/BSO, DUB    OOPHORECTOMY      TUBAL LIGATION         Time Tracking:     PT Received On: 12/04/20  PT Start Time: 1147     PT Stop Time: 1209  PT Total Time (min): 22 min     Billable Minutes: Evaluation 10 and Gait Training 12      Kellie Ivey, PT  12/04/2020

## 2020-12-04 NOTE — PT/OT/SLP EVAL
Occupational Therapy   Evaluation    Name: Fabiana Morel  MRN: [id number]  Admitting Diagnosis:  Pneumothorax on right      Recommendations:     Discharge Recommendations: home health OT, home  Discharge Equipment Recommendations:  none  Barriers to discharge:  None    Assessment:     Fabiana Morel is a 65 y.o. female with a medical diagnosis of Pneumothorax on right.  Patient reports 4/10 pain due to chest tube placement, but still participatory. Patient able to perform transfers with and without RW from bed<>chair and bed<>BSC requiring SBA > CGA. Patient performed grooming tasks at sink with SBA with no problems with balance. However, patient was noticeably SOB after returning to EOB after performing grooming tasks at sink requiring a sitting rest break. Patient was able to perform toileting on BSC requiring SBA and set up assistance.  Performance deficits affecting function: weakness, impaired endurance, impaired self care skills, impaired functional mobilty, gait instability.      Rehab Prognosis: Good; patient would benefit from acute skilled OT services to address these deficits and reach maximum level of function.       Plan:     Patient to be seen 3 x/week to address the above listed problems via self-care/home management, therapeutic activities, therapeutic exercises  · Plan of Care Expires: 12/18/20  · Plan of Care Reviewed with: patient, spouse    Subjective     Chief Complaint: pain at site of chest tube  Patient/Family Comments/goals: none    Occupational Profile:  Living Environment: Patient lives with  in a [facility].   Previous level of function: Patient was independent with ADLs and mobility.   Equipment Used at Home:  bedside commode, walker, rolling, cane, straight, respiratory supplies, oxygen, shower chair(transport chair)  Assistance upon Discharge: Patient will receive assistance from .     Pain/Comfort:  · Pain Rating 1: 4/10  · Location - Side 1: Right  · Location - Orientation  1: lateral  · Location 1: abdomen  · Pain Addressed 1: Reposition, Distraction  · Pain Rating Post-Intervention 1: 4/10    Patients cultural, spiritual, Adventism conflicts given the current situation:      Objective:     Communicated with: nurse Bee prior to session.  Patient found HOB elevated with chest tube, oxygen, peripheral IV, telemetry upon OT entry to room.    General Precautions: Standard, fall, contact, airborne   Orthopedic Precautions:N/A   Braces: N/A     Occupational Performance:    Bed Mobility:    · Patient completed Scooting/Bridging with stand by assistance  · Patient completed Supine to Sit with stand by assistance    Functional Mobility/Transfers:  · Patient completed Sit <> Stand Transfer with contact guard assistance  with  rolling walker   · Patient completed Bed <> Chair Transfer using Stand Pivot technique with stand by assistance with no assistive device  · Patient completed Bed <> BSC Stand Pivot technique with contact guard assistance with rolling walker      Activities of Daily Living:  · Grooming: stand by assistance with oral and facial hygiene while standing at sink.  · Lower Body Dressing: stand by assistance to don/doff socks while seated EOB.  · Toileting: stand by assistance with boubacar-hygiene after voiding while seated on BSC.    Cognitive/Visual Perceptual:  Cognitive/Psychosocial Skills:     -       Oriented to: x4   -       Follows Commands/attention:Follows multistep  commands  -       Communication: clear/fluent  -       Safety awareness/insight to disability: intact   -       Mood/Affect/Coping skills/emotional control: Appropriate to situation and Cooperative  Visual/Perceptual:      -Intact     Physical Exam:  Postural examination/scapula alignment:    -       Rounded shoulders  -       Forward head  Upper Extremity Range of Motion:     -       Right Upper Extremity: WFL  -       Left Upper Extremity: WFL  Upper Extremity Strength:    -       Right Upper Extremity:  WFL  -       Left Upper Extremity: WFL   Strength:    -       Right Upper Extremity: WFL  -       Left Upper Extremity: WFL  Fine Motor Coordination:    -       Intact  Gross motor coordination:   WFL    AMPAC 6 Click ADL:  AMPAC Total Score: 20    Treatment & Education:  OT ed patient on safety with walker use for functional mobility with cues for hand placement & sequencing.   OT ed pt on OT role & POC as well as discharge recommendations.    Education:    Patient left up in chair with all lines intact, call button in reach, nurse notified and  present    GOALS:   Multidisciplinary Problems     Occupational Therapy Goals        Problem: Occupational Therapy Goal    Goal Priority Disciplines Outcome Interventions   Occupational Therapy Goal     OT, PT/OT Ongoing, Progressing    Description: Goals to be met by: 12/18/2020     Patient will increase functional independence with ADLs by performing:    LE Dressing with Modified [place].  Grooming while standing at sink with Modified [place].  Toileting from toilet with Modified [place] for hygiene and clothing management.   Supine to sit with Modified [place].  Toilet transfer to toilet with Modified Marcell.                     History:     Past Medical History:   Diagnosis Date    Abnormal CT scan 1/8/2015    Asthma     Colitis     COPD (chronic obstructive pulmonary disease)     Diverticulitis of sigmoid colon 11/4/2014    Erosive gastritis 7/17/2013    Fracture of left clavicle     H pylori ulcer 7/17/2013    Hypertension     Peptic ulcer disease 7/17/2013    Pneumonia of right upper lobe due to infectious organism 8/15/2017    Rectal bleed 11/3/2014    Scoliosis     SOB (shortness of breath)     Yeast infection 8/25/2017       Past Surgical History:   Procedure Laterality Date    APPENDECTOMY      BREAST BIOPSY Right     cyst    COLONOSCOPY N/A 7/15/2019    Procedure: COLONOSCOPY;  Surgeon: Sharif Chun MD;   Location: [facility] ENDO;  Service: Endoscopy;  Laterality: N/A;    HIP ARTHROPLASTY Right 12/18/2019    Procedure: ARTHROPLASTY, HIP;  Surgeon: Bernardino Wilson II, MD;  Location: [facility] OR;  Service: Orthopedics;  Laterality: Right;  [facility] - [employer]    HYSTERECTOMY      HASMUKH/BSO, DUB    OOPHORECTOMY      TUBAL LIGATION         Time Tracking:     OT Date of Treatment: 12/04/20  OT Start Time: 1106  OT Stop Time: 1141  OT Total Time (min): 35 min    Billable Minutes:Evaluation 10  Self Care/Home Management 25    Chris Harrell OT  12/4/2020

## 2020-12-04 NOTE — CARE UPDATE
12/04/20 0746   Patient Assessment/Suction   Level of Consciousness (AVPU) alert   Expansion/Accessory Muscles/Retractions no use of accessory muscles   All Lung Fields Breath Sounds diminished   Rhythm/Pattern, Respiratory unlabored   Cough Frequency frequent   Cough Type congested;nonproductive   PRE-TX-O2   O2 Device (Oxygen Therapy) nasal cannula   $ Is the patient on Low Flow Oxygen? Yes   Flow (L/min) 3   SpO2 96 %   Pulse Oximetry Type Intermittent   $ Pulse Oximetry - Multiple Charge Pulse Oximetry - Multiple   Pulse 92   Resp 18   Aerosol Therapy   $ Aerosol Therapy Charges PRN treatment not required   Inhaler   $ Inhaler Charges MDI (Metered Dose Inahler) Treatment   Respiratory Treatment Status (Inhaler) given   Treatment Route (Inhaler) mouthpiece   Patient Position (Inhaler) HOB elevated   Post Treatment Assessment (Inhaler) breath sounds unchanged   Signs of Intolerance (Inhaler) none   Breath Sounds Post-Respiratory Treatment   Throughout All Fields Post-Treatment All Fields   Throughout All Fields Post-Treatment no change   Post-treatment Heart Rate (beats/min) 92   Post-treatment Resp Rate (breaths/min) 18   Vibratory PEP Therapy   $ Vibratory PEP Charges Aerobika Therapy   Type (PEP Therapy) vibratory/oscillatory   Device (PEP Therapy) flutter   Route (PEP Therapy) mouthpiece   Breaths per Cycle (PEP Therapy) 12   Cycles (PEP Therapy) 1   Settings (PEP Therapy) PEP 3   Patient Position (PEP Therapy) HOB elevated   Post Treatment Assessment (PEP) breath sounds unchanged   Signs of Intolerance (PEP Therapy) none

## 2020-12-04 NOTE — ASSESSMENT & PLAN NOTE
New Chest tube intact with min int air leak  Continue current treatment regimen  Chest x-ray reviewed - remains stable

## 2020-12-05 ENCOUNTER — OUTSIDE PLACE OF SERVICE (OUTPATIENT)
Dept: PULMONOLOGY | Facility: CLINIC | Age: 65
End: 2020-12-05
Payer: COMMERCIAL

## 2020-12-05 DIAGNOSIS — A31.0 MYCOBACTERIAL DISEASE, PULMONARY: Primary | ICD-10-CM

## 2020-12-05 LAB
ALBUMIN SERPL BCP-MCNC: 2.4 G/DL (ref 3.5–5.2)
ALP SERPL-CCNC: 65 U/L (ref 55–135)
ALT SERPL W/O P-5'-P-CCNC: 27 U/L (ref 10–44)
ANION GAP SERPL CALC-SCNC: 9 MMOL/L (ref 8–16)
AST SERPL-CCNC: 33 U/L (ref 10–40)
BASOPHILS # BLD AUTO: 0.04 K/UL (ref 0–0.2)
BASOPHILS NFR BLD: 0.4 % (ref 0–1.9)
BILIRUB SERPL-MCNC: 0.2 MG/DL (ref 0.1–1)
BUN SERPL-MCNC: 12 MG/DL (ref 8–23)
CALCIUM SERPL-MCNC: 9.6 MG/DL (ref 8.7–10.5)
CHLORIDE SERPL-SCNC: 96 MMOL/L (ref 95–110)
CO2 SERPL-SCNC: 33 MMOL/L (ref 23–29)
CREAT SERPL-MCNC: 0.6 MG/DL (ref 0.5–1.4)
DIFFERENTIAL METHOD: ABNORMAL
EOSINOPHIL # BLD AUTO: 0.3 K/UL (ref 0–0.5)
EOSINOPHIL NFR BLD: 2.9 % (ref 0–8)
ERYTHROCYTE [DISTWIDTH] IN BLOOD BY AUTOMATED COUNT: 16.1 % (ref 11.5–14.5)
EST. GFR  (AFRICAN AMERICAN): >60 ML/MIN/1.73 M^2
EST. GFR  (NON AFRICAN AMERICAN): >60 ML/MIN/1.73 M^2
GLUCOSE SERPL-MCNC: 105 MG/DL (ref 70–110)
HCT VFR BLD AUTO: 39.9 % (ref 37–48.5)
HGB BLD-MCNC: 12.3 G/DL (ref 12–16)
IMM GRANULOCYTES # BLD AUTO: 0.07 K/UL (ref 0–0.04)
IMM GRANULOCYTES NFR BLD AUTO: 0.7 % (ref 0–0.5)
LYMPHOCYTES # BLD AUTO: 2.2 K/UL (ref 1–4.8)
LYMPHOCYTES NFR BLD: 21.8 % (ref 18–48)
MAGNESIUM SERPL-MCNC: 2.1 MG/DL (ref 1.6–2.6)
MCH RBC QN AUTO: 29.2 PG (ref 27–31)
MCHC RBC AUTO-ENTMCNC: 30.8 G/DL (ref 32–36)
MCV RBC AUTO: 95 FL (ref 82–98)
MONOCYTES # BLD AUTO: 0.7 K/UL (ref 0.3–1)
MONOCYTES NFR BLD: 6.9 % (ref 4–15)
NEUTROPHILS # BLD AUTO: 6.8 K/UL (ref 1.8–7.7)
NEUTROPHILS NFR BLD: 67.3 % (ref 38–73)
NRBC BLD-RTO: 0 /100 WBC
PLATELET # BLD AUTO: 327 K/UL (ref 150–350)
PMV BLD AUTO: 8.7 FL (ref 9.2–12.9)
POTASSIUM SERPL-SCNC: 3.7 MMOL/L (ref 3.5–5.1)
PROT SERPL-MCNC: 7 G/DL (ref 6–8.4)
RBC # BLD AUTO: 4.21 M/UL (ref 4–5.4)
SODIUM SERPL-SCNC: 138 MMOL/L (ref 136–145)
WBC # BLD AUTO: 10.04 K/UL (ref 3.9–12.7)

## 2020-12-05 PROCEDURE — 94664 DEMO&/EVAL PT USE INHALER: CPT

## 2020-12-05 PROCEDURE — 80053 COMPREHEN METABOLIC PANEL: CPT

## 2020-12-05 PROCEDURE — 85025 COMPLETE CBC W/AUTO DIFF WBC: CPT

## 2020-12-05 PROCEDURE — 25000003 PHARM REV CODE 250: Performed by: INTERNAL MEDICINE

## 2020-12-05 PROCEDURE — 25000003 PHARM REV CODE 250: Performed by: NURSE PRACTITIONER

## 2020-12-05 PROCEDURE — 12000002 HC ACUTE/MED SURGE SEMI-PRIVATE ROOM

## 2020-12-05 PROCEDURE — 27000646 HC AEROBIKA DEVICE

## 2020-12-05 PROCEDURE — 94640 AIRWAY INHALATION TREATMENT: CPT

## 2020-12-05 PROCEDURE — 99900035 HC TECH TIME PER 15 MIN (STAT)

## 2020-12-05 PROCEDURE — 63600175 PHARM REV CODE 636 W HCPCS: Performed by: INTERNAL MEDICINE

## 2020-12-05 PROCEDURE — 27000221 HC OXYGEN, UP TO 24 HOURS

## 2020-12-05 PROCEDURE — 25000242 PHARM REV CODE 250 ALT 637 W/ HCPCS: Performed by: NURSE PRACTITIONER

## 2020-12-05 PROCEDURE — 94761 N-INVAS EAR/PLS OXIMETRY MLT: CPT

## 2020-12-05 PROCEDURE — 83735 ASSAY OF MAGNESIUM: CPT

## 2020-12-05 PROCEDURE — 99232 SBSQ HOSP IP/OBS MODERATE 35: CPT | Mod: S$GLB,,, | Performed by: INTERNAL MEDICINE

## 2020-12-05 PROCEDURE — 63600175 PHARM REV CODE 636 W HCPCS: Performed by: NURSE PRACTITIONER

## 2020-12-05 PROCEDURE — 36415 COLL VENOUS BLD VENIPUNCTURE: CPT

## 2020-12-05 PROCEDURE — 99232 PR SUBSEQUENT HOSPITAL CARE,LEVL II: ICD-10-PCS | Mod: S$GLB,,, | Performed by: INTERNAL MEDICINE

## 2020-12-05 PROCEDURE — 97116 GAIT TRAINING THERAPY: CPT

## 2020-12-05 RX ORDER — OXYCODONE AND ACETAMINOPHEN 5; 325 MG/1; MG/1
1 TABLET ORAL EVERY 4 HOURS PRN
Status: DISCONTINUED | OUTPATIENT
Start: 2020-12-05 | End: 2020-12-09 | Stop reason: HOSPADM

## 2020-12-05 RX ADMIN — ALBUTEROL SULFATE 2 PUFF: 90 AEROSOL, METERED RESPIRATORY (INHALATION) at 07:12

## 2020-12-05 RX ADMIN — OXYCODONE HYDROCHLORIDE AND ACETAMINOPHEN 1 TABLET: 5; 325 TABLET ORAL at 11:12

## 2020-12-05 RX ADMIN — ENOXAPARIN SODIUM 40 MG: 100 INJECTION SUBCUTANEOUS at 05:12

## 2020-12-05 RX ADMIN — ATORVASTATIN CALCIUM 40 MG: 40 TABLET, FILM COATED ORAL at 09:12

## 2020-12-05 RX ADMIN — PREDNISONE 5 MG: 5 TABLET ORAL at 09:12

## 2020-12-05 RX ADMIN — MEROPENEM AND SODIUM CHLORIDE 1 G: 1 INJECTION, SOLUTION INTRAVENOUS at 06:12

## 2020-12-05 RX ADMIN — LINEZOLID 600 MG: 600 TABLET, FILM COATED ORAL at 09:12

## 2020-12-05 RX ADMIN — MEROPENEM AND SODIUM CHLORIDE 1 G: 1 INJECTION, SOLUTION INTRAVENOUS at 11:12

## 2020-12-05 RX ADMIN — ALBUTEROL SULFATE 2 PUFF: 90 AEROSOL, METERED RESPIRATORY (INHALATION) at 12:12

## 2020-12-05 RX ADMIN — ETHAMBUTOL HYDROCHLORIDE 600 MG: 400 TABLET, FILM COATED ORAL at 09:12

## 2020-12-05 RX ADMIN — RIFAMPIN 300 MG: 300 CAPSULE ORAL at 09:12

## 2020-12-05 RX ADMIN — HYDROCODONE BITARTRATE AND ACETAMINOPHEN 1 TABLET: 5; 325 TABLET ORAL at 09:12

## 2020-12-05 RX ADMIN — HYDROCODONE BITARTRATE AND ACETAMINOPHEN 1 TABLET: 5; 325 TABLET ORAL at 05:12

## 2020-12-05 RX ADMIN — HYDROCODONE BITARTRATE AND ACETAMINOPHEN 1 TABLET: 5; 325 TABLET ORAL at 03:12

## 2020-12-05 RX ADMIN — MEROPENEM AND SODIUM CHLORIDE 1 G: 1 INJECTION, SOLUTION INTRAVENOUS at 03:12

## 2020-12-05 RX ADMIN — CLARITHROMYCIN 500 MG: 500 TABLET, FILM COATED ORAL at 09:12

## 2020-12-05 RX ADMIN — FLUTICASONE FUROATE AND VILANTEROL TRIFENATATE 1 PUFF: 200; 25 POWDER RESPIRATORY (INHALATION) at 07:12

## 2020-12-05 RX ADMIN — FERROUS SULFATE TAB EC 325 MG (65 MG FE EQUIVALENT) 325 MG: 325 (65 FE) TABLET DELAYED RESPONSE at 09:12

## 2020-12-05 NOTE — CARE UPDATE
12/05/20 0706   Patient Assessment/Suction   Level of Consciousness (AVPU) alert   Respiratory Effort Normal;Unlabored   Expansion/Accessory Muscles/Retractions no use of accessory muscles;no retractions;expansion symmetric   All Lung Fields Breath Sounds diminished   Rhythm/Pattern, Respiratory unlabored;pattern regular;depth regular   Cough Frequency infrequent   Cough Type good;nonproductive   PRE-TX-O2   O2 Device (Oxygen Therapy) nasal cannula   Flow (L/min) 3   SpO2 98 %   Pulse Oximetry Type Intermittent   $ Pulse Oximetry - Multiple Charge Pulse Oximetry - Multiple   Pulse 88   Resp 20   Inhaler   $ Inhaler Charges MDI (Metered Dose Inahler) Treatment;Given With Spacer   Daily Review of Necessity (Inhaler) completed   Respiratory Treatment Status (Inhaler) given   Treatment Route (Inhaler) mouthpiece;spacer/holding chamber   Patient Position (Inhaler) HOB elevated   Post Treatment Assessment (Inhaler) breath sounds unchanged   Signs of Intolerance (Inhaler) none   Breath Sounds Post-Respiratory Treatment   Throughout All Fields Post-Treatment All Fields   Throughout All Fields Post-Treatment no change   RUL Post-Treatment no change   Post-treatment Heart Rate (beats/min) 89   Post-treatment Resp Rate (breaths/min) 20   Vibratory PEP Therapy   $ Vibratory PEP Charges Aerobika Therapy   $ Vibratory PEP Equipment Aerobika Equipment   $ Vibratory PEP Tech Time Charges 15 min   Daily Review of Necessity (PEP Therapy) completed   Type (PEP Therapy) vibratory/oscillatory   Device (PEP Therapy) flutter   Route (PEP Therapy) mouthpiece   Breaths per Cycle (PEP Therapy) 10   Cycles (PEP Therapy) 1   Patient Position (PEP Therapy) HOB elevated   Post Treatment Assessment (PEP) breath sounds unchanged   Signs of Intolerance (PEP Therapy) none     Aerobika q6 hours. Breo q day. Albuterol MDI q4PRN.

## 2020-12-05 NOTE — HOSPITAL COURSE
Patient admitted for right pneumothorax.  Patient had Heimlich valve chest tube placed in the ER with improvement in pneumothorax.  Patient was being seen outpatient by Dr. 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  Dr. Mackey was consulted and follow patient closely in the hospital.  Patient was also evaluated by Infectious Disease to assist with antibiotic regimen.  Patient receiving clarithromycin, ethambutol, Zyvox, meropenem and rifampin. Patient's sputum culture growing Pseudomonas and ID closely following with further sensitivities pending.  Patient had air leak with chest tube and CT surgery was consulted. Dr. Bucio removed Heimlich valve and replaced with percutaneous chest tube on 12/3.  Chest tube was placed back to continuous Pleurovac suction. Air leak was still present but now intermittent and significantly smaller.  Patient's respiratory symptoms improving.  Patient was initiated on IV Clinimax due to poor oral intake and dietary was consulted.  Patient's appetite improved and she is tolerating boost supplements well and Clinimax was discontinued.  Patient with generalized weakness and deconditioning and PT/OT consulted and patient progressing well.  Heimlich valve was placed by Dr. Mackey on 12/7.  She remained stable thereafter.  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 Bartlett.  A PICC line was placed and Pt was D/C home with home health with Heimlich valve in place.  She will have weekly labs with results sent to Dr. Jansen. Prior to D/C Dr. 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  This was communicated with ID and pulmonology prior to discharge.  Infectious Disease recommended continued current Abx recommendations.  Dr. Mackey discussed with Pt referral to ID at [facility].  He initiated ethambutol on discharge as well.  Return precautions were discussed.  Her  underwent  Education by by a script for infusion therapy.  They verbalized competence.  Pt was D/C home with home health on 12/10.      Physical Exam:  HRRR, lungs with few rales bibasilar.  Heimlich to RIGHT posterior chest with dressing CDI.  Abd soft, non-tender.

## 2020-12-05 NOTE — PLAN OF CARE
Plan of care continues. 3L NC. Chest tube in place to suction @ 20. Daily cxr done. SR on tele. Abx given per order. Pt with poor appetite. Resp tx's ordered. Bed locked and low. Call light in reach. Rounding for safety.

## 2020-12-05 NOTE — SUBJECTIVE & OBJECTIVE
Interval History:  Notes reviewed, no acute events overnight. CT to right ant upper CW intact. Pt denies sob. Cough is still wet.  Pt states cw pain better controlled with oxycodone but returns before 6 hr dose. Advised will adjust timing for better pain control.  Patient tolerating diet well.  She has been up out of bed with PT.  Per Pulmonary recommendations will discuss discharge planning on Monday.    Review of Systems   Constitutional: Negative for chills, fatigue and fever.   HENT: Negative for congestion, sinus pressure and sore throat.    Eyes: Negative for photophobia and visual disturbance.   Respiratory: Positive for cough. Negative for chest tightness and shortness of breath.         Pleuritic cw pain   Cardiovascular: Negative for chest pain, palpitations and leg swelling.   Gastrointestinal: Negative for abdominal pain, diarrhea, nausea and vomiting.   Endocrine: Negative for polyphagia and polyuria.   Genitourinary: Negative for difficulty urinating, dysuria and urgency.   Musculoskeletal: Negative for arthralgias, back pain and gait problem.   Skin: Negative for color change, pallor, rash and wound.   Neurological: Negative for dizziness, weakness and light-headedness.   Psychiatric/Behavioral: Negative for agitation, behavioral problems and confusion.   All other systems reviewed and are negative.    Objective:     Vital Signs (Most Recent):  Temp: 97 °F (36.1 °C) (12/05/20 0919)  Pulse: 102 (12/05/20 0919)  Resp: 20 (12/05/20 1117)  BP: (!) 121/58 (12/05/20 0919)  SpO2: 96 % (12/05/20 0919) Vital Signs (24h Range):  Temp:  [96.2 °F (35.7 °C)-97 °F (36.1 °C)] 97 °F (36.1 °C)  Pulse:  [] 102  Resp:  [16-20] 20  SpO2:  [95 %-98 %] 96 %  BP: (102-125)/(58-74) 121/58     Weight: 44.1 kg (97 lb 3.6 oz)  Body mass index is 16.69 kg/m².    Intake/Output Summary (Last 24 hours) at 12/5/2020 1207  Last data filed at 12/5/2020 0921  Gross per 24 hour   Intake 100 ml   Output 65 ml   Net 35 ml       Physical Exam  Vitals signs and nursing note reviewed.   Constitutional:       General: She is not in acute distress.     Appearance: Normal appearance. She is well-developed. She is ill-appearing.      Comments: Chronically ill-appearing   HENT:      Head: Normocephalic and atraumatic.      Right Ear: External ear normal.      Left Ear: External ear normal.      Nose: Nose normal. No congestion or rhinorrhea.      Mouth/Throat:      Mouth: Mucous membranes are moist.      Pharynx: Oropharynx is clear. No oropharyngeal exudate or posterior oropharyngeal erythema.   Eyes:      General: No scleral icterus.     Conjunctiva/sclera: Conjunctivae normal.      Pupils: Pupils are equal, round, and reactive to light.   Neck:      Musculoskeletal: Normal range of motion and neck supple.      Vascular: No JVD.   Cardiovascular:      Rate and Rhythm: Normal rate and regular rhythm.      Pulses: Normal pulses.      Heart sounds: Normal heart sounds. No murmur.   Pulmonary:      Effort: Pulmonary effort is normal. No respiratory distress.      Breath sounds: No stridor. Wheezing, rhonchi and rales present.      Comments: Scattered rales and rhonchi with mild exp wheeze throughout bilateral lung fields.  Chest tube intact to right upper anterior chest wall draining small amount of serosangenous fluid.   Abdominal:      General: Bowel sounds are normal. There is no distension.      Palpations: Abdomen is soft.      Tenderness: There is no abdominal tenderness.   Musculoskeletal: Normal range of motion.         General: No swelling or tenderness.   Skin:     General: Skin is warm and dry.      Capillary Refill: Capillary refill takes 2 to 3 seconds.      Coloration: Skin is not jaundiced or pale.      Findings: No bruising or erythema.   Neurological:      General: No focal deficit present.      Mental Status: She is alert and oriented to person, place, and time.      Cranial Nerves: No cranial nerve deficit.      Sensory: No  sensory deficit.      Motor: No weakness.      Coordination: Coordination normal.   Psychiatric:         Mood and Affect: Mood normal.         Behavior: Behavior normal.         Thought Content: Thought content normal.         Significant Labs:   CBC:   Recent Labs   Lab 12/04/20  0911   WBC 8.69   HGB 13.1   HCT 42.1   *     CMP:   Recent Labs   Lab 12/04/20  0910      K 4.0   CL 95   CO2 33*      BUN 7*   CREATININE 0.6   CALCIUM 9.7   PROT 7.3   ALBUMIN 2.5*   BILITOT 0.2   ALKPHOS 71   AST 55*   ALT 28   ANIONGAP 9   EGFRNONAA >60     All pertinent labs within the past 24 hours have been reviewed.    Significant Imaging: I have reviewed all pertinent imaging results/findings within the past 24 hours.

## 2020-12-05 NOTE — PLAN OF CARE
Problem: Physical Therapy Goal  Goal: Physical Therapy Goal  Description: Goals to be met by: 2020     Patient will increase functional independence with mobility by performin. Supine to sit with Contact Guard Assistance  2. Sit to stand transfer with Contact Guard Assistance  3. Bed to chair transfer with Contact Guard Assistance using Rolling Walker  4. Gait  x 150 feet with Contact Guard Assistance using Rolling Walker.   5. Lower extremity exercise program x20 reps     Outcome: Ongoing, Progressing

## 2020-12-05 NOTE — PLAN OF CARE
POC reviewed with patient at the start of shift. States she ate a little better at supper time.  Remains on IV and PO  ABX. Wear briefs,incontinent.Patient able to stand with 1 person assist  to change pull up, tolerated well.  NO bm this shift. Right chest tube intact and to 20 cm of sx with minimal water leak, MD is aware. Occlusive dressing to right side clean dry and intact.  Noted small output of serosangious drainage. O2 at 4 liters per nc. Remains on airborne, droplet and contact precautions.Tele in progress. 22g patent and intact to right forearm. 20 g saline locked to left forearm.   Bed in low and locked position, bed alarm set, call light in reach. Will continue to monitor. Pain managed with PRN pain meds. No concerns noted at this time.

## 2020-12-05 NOTE — PROGRESS NOTES
[facility] Hospital Medicine  Progress Note    Patient Name: Fabiana Morel  MRN: [id number]  Patient Class: IP- Inpatient   Admission Date: 11/29/2020  Length of Stay: 5 days  Attending Physician: Aniket Olguin MD  Primary Care Provider: Dominique Bartlett MD        Subjective:     Principal Problem:Pneumothorax on right        HPI:  Fabiana Morel is a 65-year-old female with a past medical history of COPD, hyperlipidemia, iron deficiency anemia, and tobacco use who presents to the emergency room tonight with reports of shortness of breath.  Patient reports shortness of breath x3 weeks, has increased in severity since onset.  Patient requiring supplemental oxygen therapy for the past three weeks at 2-3 L, however has required increase in normal O2 dose over the past three days. Patient currently under the care of Dr. Mackey, pulmonology, has a history of COPD. Patient accompanied by her  during my initial interview and physical exam. a history of COPD. Patient was reportedly seen in the emergency room on 11/06 for chest pain, a CT chest was reportedly obtained revealing right lung abscess.  Patient followed with Dr. Mackey in clinic on 11/09 in which sputum samples were obtained and treatment was initiated for tuberculosis versus MAC.  Patient also reports decreased p.o. intake, utilizes boost supplementation daily.  Patient endorses productive cough x3 days, with brown sputum production.  Patient endorses dyspnea on exertion, stating she can not walk the length of the room without becoming SOB.  Patient states she quit tobacco use on 11/05/2020.  In the ER, patient noted to have right pneumothorax on chest x-ray.  ER physician placed Heimlich valve chest tube with reported immediate relief in shortness of breath.  Repeat chest x-ray revealing improvement of pneumothorax.  ER MD reportedly spoke with Dr. Mackey, pulmonology who recommended admission with IV vancomycin and Zosyn and  isolation precautions.  Patient placed in observation on 11/29/2020 at approximately 9:30 p.m..  Full code status verified upon admission to the hospital.    Overview/Hospital Course:  No notes on file    Interval History:  Notes reviewed, no acute events overnight. CT to right ant upper CW intact. Pt denies sob. Cough is still wet.  Pt states cw pain better controlled with oxycodone but returns before 6 hr dose. Advised will adjust timing for better pain control.  Patient tolerating diet well.  She has been up out of bed with PT.  Per Pulmonary recommendations will discuss discharge planning on Monday.    Review of Systems   Constitutional: Negative for chills, fatigue and fever.   HENT: Negative for congestion, sinus pressure and sore throat.    Eyes: Negative for photophobia and visual disturbance.   Respiratory: Positive for cough. Negative for chest tightness and shortness of breath.         Pleuritic cw pain   Cardiovascular: Negative for chest pain, palpitations and leg swelling.   Gastrointestinal: Negative for abdominal pain, diarrhea, nausea and vomiting.   Endocrine: Negative for polyphagia and polyuria.   Genitourinary: Negative for difficulty urinating, dysuria and urgency.   Musculoskeletal: Negative for arthralgias, back pain and gait problem.   Skin: Negative for color change, pallor, rash and wound.   Neurological: Negative for dizziness, weakness and light-headedness.   Psychiatric/Behavioral: Negative for agitation, behavioral problems and confusion.   All other systems reviewed and are negative.    Objective:     Vital Signs (Most Recent):  Temp: 97 °F (36.1 °C) (12/05/20 0919)  Pulse: 102 (12/05/20 0919)  Resp: 20 (12/05/20 1117)  BP: (!) 121/58 (12/05/20 0919)  SpO2: 96 % (12/05/20 0919) Vital Signs (24h Range):  Temp:  [96.2 °F (35.7 °C)-97 °F (36.1 °C)] 97 °F (36.1 °C)  Pulse:  [] 102  Resp:  [16-20] 20  SpO2:  [95 %-98 %] 96 %  BP: (102-125)/(58-74) 121/58     Weight: 44.1 kg (97 lb  3.6 oz)  Body mass index is 16.69 kg/m².    Intake/Output Summary (Last 24 hours) at 12/5/2020 1207  Last data filed at 12/5/2020 0921  Gross per 24 hour   Intake 100 ml   Output 65 ml   Net 35 ml      Physical Exam  Vitals signs and nursing note reviewed.   Constitutional:       General: She is not in acute distress.     Appearance: Normal appearance. She is well-developed. She is ill-appearing.      Comments: Chronically ill-appearing   HENT:      Head: Normocephalic and atraumatic.      Right Ear: External ear normal.      Left Ear: External ear normal.      Nose: Nose normal. No congestion or rhinorrhea.      Mouth/Throat:      Mouth: Mucous membranes are moist.      Pharynx: Oropharynx is clear. No oropharyngeal exudate or posterior oropharyngeal erythema.   Eyes:      General: No scleral icterus.     Conjunctiva/sclera: Conjunctivae normal.      Pupils: Pupils are equal, round, and reactive to light.   Neck:      Musculoskeletal: Normal range of motion and neck supple.      Vascular: No JVD.   Cardiovascular:      Rate and Rhythm: Normal rate and regular rhythm.      Pulses: Normal pulses.      Heart sounds: Normal heart sounds. No murmur.   Pulmonary:      Effort: Pulmonary effort is normal. No respiratory distress.      Breath sounds: No stridor. Wheezing, rhonchi and rales present.      Comments: Scattered rales and rhonchi with mild exp wheeze throughout bilateral lung fields.  Chest tube intact to right upper anterior chest wall draining small amount of serosangenous fluid.   Abdominal:      General: Bowel sounds are normal. There is no distension.      Palpations: Abdomen is soft.      Tenderness: There is no abdominal tenderness.   Musculoskeletal: Normal range of motion.         General: No swelling or tenderness.   Skin:     General: Skin is warm and dry.      Capillary Refill: Capillary refill takes 2 to 3 seconds.      Coloration: Skin is not jaundiced or pale.      Findings: No bruising or  erythema.   Neurological:      General: No focal deficit present.      Mental Status: She is alert and oriented to person, place, and time.      Cranial Nerves: No cranial nerve deficit.      Sensory: No sensory deficit.      Motor: No weakness.      Coordination: Coordination normal.   Psychiatric:         Mood and Affect: Mood normal.         Behavior: Behavior normal.         Thought Content: Thought content normal.         Significant Labs:   CBC:   Recent Labs   Lab 12/04/20  0911   WBC 8.69   HGB 13.1   HCT 42.1   *     CMP:   Recent Labs   Lab 12/04/20  0910      K 4.0   CL 95   CO2 33*      BUN 7*   CREATININE 0.6   CALCIUM 9.7   PROT 7.3   ALBUMIN 2.5*   BILITOT 0.2   ALKPHOS 71   AST 55*   ALT 28   ANIONGAP 9   EGFRNONAA >60     All pertinent labs within the past 24 hours have been reviewed.    Significant Imaging: I have reviewed all pertinent imaging results/findings within the past 24 hours.      Assessment/Plan:      * Pneumothorax on right  Chest tube intact and functioning well  Continue current treatment regimen  Chest x-ray reviewed - remains stable        Cachexia  Secondary to protein malnutrition and poor oral intake  Supplement with boost  Appetite stable      Mycobacterium avium complex  Appreciate ID consult  Cont abx per ID  Sputum culture reviewed        Hypoalbuminemia due to protein-calorie malnutrition  Noted, encourage PO intake - boost supplementation.        Iron deficiency  Chronic, continue home iron supplementation.        Acute on chronic respiratory failure with hypoxia  Noted, continue the use of supplemental O2.        Bronchiectasis  Sputum culture reviewed - pseudomonas  Awaiting further ID recs for home abx      COPD (chronic obstructive pulmonary disease)  Chronic - continue home medication, pulmonology consult. Continuous supplemental oxygen, telemetry, and pulse oximetry monitoring.        Lung nodule  Managed by   Narendra      Hyperlipidemia    Chronic, controlled. Will continue home medication.    Lab Results   Component Value Date    CHOL 187 08/21/2020    CHOL 182 05/22/2020    CHOL 182 06/25/2019     Lab Results   Component Value Date    HDL 55 08/21/2020    HDL 57 05/22/2020    HDL 65 06/25/2019     Lab Results   Component Value Date    LDLCALC 105.4 08/21/2020    LDLCALC 102.2 05/22/2020    LDLCALC 97.6 06/25/2019     Lab Results   Component Value Date    TRIG 133 08/21/2020    TRIG 114 05/22/2020    TRIG 97 06/25/2019     Lab Results   Component Value Date    CHOLHDL 29.4 08/21/2020    CHOLHDL 31.3 05/22/2020    CHOLHDL 35.7 06/25/2019               VTE Risk Mitigation (From admission, onward)         Ordered     enoxaparin injection 40 mg  Every 24 hours      11/29/20 2224     IP VTE HIGH RISK PATIENT  Once      11/29/20 2224     Place sequential compression device  Until discontinued      11/29/20 2224     Place STEPHANIE hose  Until discontinued      11/29/20 2224                Discharge Planning   DION: 12/4/2020     Code Status: Full Code   Is the patient medically ready for discharge?:     Reason for patient still in hospital (select all that apply): Treatment and Consult recommendations  Discharge Plan A: Home with family                  Rosa Rogers NP  Department of Hospital Medicine   [facility]

## 2020-12-05 NOTE — RESPIRATORY THERAPY
12/04/20 2010   Patient Assessment/Suction   Level of Consciousness (AVPU) alert   Respiratory Effort Normal;Unlabored   Expansion/Accessory Muscles/Retractions expansion symmetric;no retractions;no use of accessory muscles   All Lung Fields Breath Sounds diminished   PRE-TX-O2   O2 Device (Oxygen Therapy) nasal cannula   Flow (L/min) 4   Oxygen Concentration (%) 36   SpO2 96 %   Pulse Oximetry Type Intermittent   Pulse 101   Resp 18   Aerosol Therapy   $ Aerosol Therapy Charges PRN treatment not required   Respiratory Treatment Status (SVN) PRN treatment not required   Vibratory PEP Therapy   $ Vibratory PEP Charges   (done independently by patient)   Ready to Wean/Extubation Screen   FIO2<=50 (chart decimal) 0.36

## 2020-12-05 NOTE — ASSESSMENT & PLAN NOTE
Chest tube intact and functioning well  Continue current treatment regimen  Chest x-ray reviewed - remains stable

## 2020-12-05 NOTE — PT/OT/SLP PROGRESS
Physical Therapy Treatment    Patient Name:  Fabiana Morel   MRN:  [id number]    Recommendations:     Discharge Recommendations:  home health PT   Discharge Equipment Recommendations: none   Barriers to discharge: None    Assessment:     Fabiana Morel is a 65 y.o. female admitted with a medical diagnosis of Pneumothorax on right.  She presents with the following impairments/functional limitations:  weakness, impaired endurance, impaired self care skills, impaired functional mobilty, gait instability, impaired balance, impaired cardiopulmonary response to activity. Pt agreeable to working with PT for gait training despite fatigue. Pt's  present and proactive with pt's care.     Rehab Prognosis: Fair; patient would benefit from acute skilled PT services to address these deficits and reach maximum level of function.    Recent Surgery: * No surgery found *      Plan:     During this hospitalization, patient to be seen 6 x/week to address the identified rehab impairments via gait training, therapeutic activities, therapeutic exercises and progress toward the following goals:    · Plan of Care Expires:  12/30/20    Subjective     Chief Complaint: fatigue  Patient/Family Comments/goals: to go home with medically stable  Pain/Comfort:  · Pain Rating 1: 0/10      Objective:     Communicated with YOBANI Fields prior to session.  Patient found HOB elevated with chest tube, oxygen, peripheral IV, pulse ox (continuous), telemetry upon PT entry to room.     General Precautions: Standard, fall, airborne, contact, respiratory   Orthopedic Precautions:N/A   Braces: N/A     Functional Mobility:  · Bed Mobility:     · Scooting: contact guard assistance and minimum assistance  · Supine to Sit: contact guard assistance and minimum assistance  · Transfers:     · Sit to Stand:  contact guard assistance with rolling walker  · Gait: x 60 feet in room with RW and CGA for safety plus tech for management of chest tube/suction and O2  with pt's 's assist, as well. Pt with kyphotic posture and exhibited short step/stride length and slow yadi.      AM-PAC 6 CLICK MOBILITY            Patient left up in chair with all lines intact, call button in reach, RN notified and pt's  present..    GOALS:   Multidisciplinary Problems     Physical Therapy Goals        Problem: Physical Therapy Goal    Goal Priority Disciplines Outcome Goal Variances Interventions   Physical Therapy Goal     PT, PT/OT Ongoing, Progressing     Description: Goals to be met by: 2020     Patient will increase functional independence with mobility by performin. Supine to sit with Contact Guard Assistance  2. Sit to stand transfer with Contact Guard Assistance  3. Bed to chair transfer with Contact Guard Assistance using Rolling Walker  4. Gait  x 150 feet with Contact Guard Assistance using Rolling Walker.   5. Lower extremity exercise program x20 reps                      Time Tracking:     PT Received On: 20  PT Start Time: 1220     PT Stop Time: 1230  PT Total Time (min): 10 min     Billable Minutes: Gait Training 10    Treatment Type: Treatment  PT/PTA: PT     PTA Visit Number: 0     Pamella Bush, PT  2020

## 2020-12-06 LAB
ALBUMIN SERPL BCP-MCNC: 2.4 G/DL (ref 3.5–5.2)
ALP SERPL-CCNC: 60 U/L (ref 55–135)
ALT SERPL W/O P-5'-P-CCNC: 26 U/L (ref 10–44)
ANION GAP SERPL CALC-SCNC: 10 MMOL/L (ref 8–16)
AST SERPL-CCNC: 30 U/L (ref 10–40)
BASOPHILS # BLD AUTO: 0.04 K/UL (ref 0–0.2)
BASOPHILS NFR BLD: 0.6 % (ref 0–1.9)
BILIRUB SERPL-MCNC: 0.1 MG/DL (ref 0.1–1)
BUN SERPL-MCNC: 11 MG/DL (ref 8–23)
CALCIUM SERPL-MCNC: 9.1 MG/DL (ref 8.7–10.5)
CHLORIDE SERPL-SCNC: 97 MMOL/L (ref 95–110)
CO2 SERPL-SCNC: 30 MMOL/L (ref 23–29)
CREAT SERPL-MCNC: 0.6 MG/DL (ref 0.5–1.4)
DIFFERENTIAL METHOD: ABNORMAL
EOSINOPHIL # BLD AUTO: 0.2 K/UL (ref 0–0.5)
EOSINOPHIL NFR BLD: 3 % (ref 0–8)
ERYTHROCYTE [DISTWIDTH] IN BLOOD BY AUTOMATED COUNT: 16.1 % (ref 11.5–14.5)
EST. GFR  (AFRICAN AMERICAN): >60 ML/MIN/1.73 M^2
EST. GFR  (NON AFRICAN AMERICAN): >60 ML/MIN/1.73 M^2
GLUCOSE SERPL-MCNC: 98 MG/DL (ref 70–110)
HCT VFR BLD AUTO: 38.5 % (ref 37–48.5)
HGB BLD-MCNC: 11.9 G/DL (ref 12–16)
IMM GRANULOCYTES # BLD AUTO: 0.06 K/UL (ref 0–0.04)
IMM GRANULOCYTES NFR BLD AUTO: 0.8 % (ref 0–0.5)
LYMPHOCYTES # BLD AUTO: 2.4 K/UL (ref 1–4.8)
LYMPHOCYTES NFR BLD: 33.7 % (ref 18–48)
MAGNESIUM SERPL-MCNC: 2.2 MG/DL (ref 1.6–2.6)
MCH RBC QN AUTO: 29.3 PG (ref 27–31)
MCHC RBC AUTO-ENTMCNC: 30.9 G/DL (ref 32–36)
MCV RBC AUTO: 95 FL (ref 82–98)
MONOCYTES # BLD AUTO: 0.4 K/UL (ref 0.3–1)
MONOCYTES NFR BLD: 5.8 % (ref 4–15)
NEUTROPHILS # BLD AUTO: 4.1 K/UL (ref 1.8–7.7)
NEUTROPHILS NFR BLD: 56.1 % (ref 38–73)
NRBC BLD-RTO: 0 /100 WBC
PLATELET # BLD AUTO: 305 K/UL (ref 150–350)
PMV BLD AUTO: 8.6 FL (ref 9.2–12.9)
POTASSIUM SERPL-SCNC: 5 MMOL/L (ref 3.5–5.1)
PROT SERPL-MCNC: 6.7 G/DL (ref 6–8.4)
RBC # BLD AUTO: 4.06 M/UL (ref 4–5.4)
SODIUM SERPL-SCNC: 137 MMOL/L (ref 136–145)
WBC # BLD AUTO: 7.23 K/UL (ref 3.9–12.7)

## 2020-12-06 PROCEDURE — 99232 SBSQ HOSP IP/OBS MODERATE 35: CPT | Mod: S$GLB,,, | Performed by: INTERNAL MEDICINE

## 2020-12-06 PROCEDURE — 25000003 PHARM REV CODE 250: Performed by: INTERNAL MEDICINE

## 2020-12-06 PROCEDURE — 25000003 PHARM REV CODE 250: Performed by: NURSE PRACTITIONER

## 2020-12-06 PROCEDURE — 99232 PR SUBSEQUENT HOSPITAL CARE,LEVL II: ICD-10-PCS | Mod: S$GLB,,, | Performed by: INTERNAL MEDICINE

## 2020-12-06 PROCEDURE — 99900035 HC TECH TIME PER 15 MIN (STAT)

## 2020-12-06 PROCEDURE — 83735 ASSAY OF MAGNESIUM: CPT

## 2020-12-06 PROCEDURE — 85025 COMPLETE CBC W/AUTO DIFF WBC: CPT

## 2020-12-06 PROCEDURE — 27000221 HC OXYGEN, UP TO 24 HOURS

## 2020-12-06 PROCEDURE — 12000002 HC ACUTE/MED SURGE SEMI-PRIVATE ROOM

## 2020-12-06 PROCEDURE — 94664 DEMO&/EVAL PT USE INHALER: CPT

## 2020-12-06 PROCEDURE — 94760 N-INVAS EAR/PLS OXIMETRY 1: CPT

## 2020-12-06 PROCEDURE — 36415 COLL VENOUS BLD VENIPUNCTURE: CPT

## 2020-12-06 PROCEDURE — 63600175 PHARM REV CODE 636 W HCPCS: Performed by: NURSE PRACTITIONER

## 2020-12-06 PROCEDURE — 63600175 PHARM REV CODE 636 W HCPCS: Performed by: INTERNAL MEDICINE

## 2020-12-06 PROCEDURE — 94640 AIRWAY INHALATION TREATMENT: CPT

## 2020-12-06 PROCEDURE — 80053 COMPREHEN METABOLIC PANEL: CPT

## 2020-12-06 PROCEDURE — 94761 N-INVAS EAR/PLS OXIMETRY MLT: CPT

## 2020-12-06 RX ADMIN — HYDROCODONE BITARTRATE AND ACETAMINOPHEN 1 TABLET: 5; 325 TABLET ORAL at 05:12

## 2020-12-06 RX ADMIN — ALBUTEROL SULFATE 2 PUFF: 90 AEROSOL, METERED RESPIRATORY (INHALATION) at 07:12

## 2020-12-06 RX ADMIN — LINEZOLID 600 MG: 600 TABLET, FILM COATED ORAL at 08:12

## 2020-12-06 RX ADMIN — LINEZOLID 600 MG: 600 TABLET, FILM COATED ORAL at 09:12

## 2020-12-06 RX ADMIN — CLARITHROMYCIN 500 MG: 500 TABLET, FILM COATED ORAL at 08:12

## 2020-12-06 RX ADMIN — OXYCODONE HYDROCHLORIDE AND ACETAMINOPHEN 1 TABLET: 5; 325 TABLET ORAL at 08:12

## 2020-12-06 RX ADMIN — ATORVASTATIN CALCIUM 40 MG: 40 TABLET, FILM COATED ORAL at 08:12

## 2020-12-06 RX ADMIN — PREDNISONE 5 MG: 5 TABLET ORAL at 08:12

## 2020-12-06 RX ADMIN — ETHAMBUTOL HYDROCHLORIDE 600 MG: 400 TABLET, FILM COATED ORAL at 08:12

## 2020-12-06 RX ADMIN — RIFAMPIN 300 MG: 300 CAPSULE ORAL at 08:12

## 2020-12-06 RX ADMIN — HYDROCODONE BITARTRATE AND ACETAMINOPHEN 1 TABLET: 5; 325 TABLET ORAL at 06:12

## 2020-12-06 RX ADMIN — MEROPENEM AND SODIUM CHLORIDE 1 G: 1 INJECTION, SOLUTION INTRAVENOUS at 11:12

## 2020-12-06 RX ADMIN — ENOXAPARIN SODIUM 40 MG: 100 INJECTION SUBCUTANEOUS at 06:12

## 2020-12-06 RX ADMIN — FLUTICASONE FUROATE AND VILANTEROL TRIFENATATE 1 PUFF: 200; 25 POWDER RESPIRATORY (INHALATION) at 07:12

## 2020-12-06 RX ADMIN — HYDROCODONE BITARTRATE AND ACETAMINOPHEN 1 TABLET: 5; 325 TABLET ORAL at 01:12

## 2020-12-06 RX ADMIN — MEROPENEM AND SODIUM CHLORIDE 1 G: 1 INJECTION, SOLUTION INTRAVENOUS at 09:12

## 2020-12-06 RX ADMIN — CLARITHROMYCIN 500 MG: 500 TABLET, FILM COATED ORAL at 09:12

## 2020-12-06 RX ADMIN — PREDNISONE 5 MG: 5 TABLET ORAL at 09:12

## 2020-12-06 RX ADMIN — FERROUS SULFATE TAB EC 325 MG (65 MG FE EQUIVALENT) 325 MG: 325 (65 FE) TABLET DELAYED RESPONSE at 08:12

## 2020-12-06 RX ADMIN — HYDROCODONE BITARTRATE AND ACETAMINOPHEN 1 TABLET: 5; 325 TABLET ORAL at 09:12

## 2020-12-06 RX ADMIN — MEROPENEM AND SODIUM CHLORIDE 1 G: 1 INJECTION, SOLUTION INTRAVENOUS at 04:12

## 2020-12-06 NOTE — PROGRESS NOTES
Progress Note  PULMONARY    Admit Date: 11/29/2020 12/06/2020  History of Present Illness:    smoker, severe copd , h/o recurrent pneumonias with neg bronch in 2017.   Pt has some vague immune def.   Had recent ct chest with non  Resolving cavitary disease ppt eval with pos afb early November.  Tb gold neg, and pt rx on  Inh/rif/azithr pending id. dna probe requested.  Pt continued with night sweats - chronic? And had  Brown mucous increase with sob 3 wks ago.  Pt had decreased appetite taking in boost only, and became bed bound.  Breathing worsened - 02 needs increased from2.5to 3.5-subjective sob.       Pt was not acutely symptomatic when mycobacterial rx started but worsened.  She was to call if worsened.  She did stop smoking.     Er eval  Pm showed right pneumo- very large. Thoracic vent to heimlich valve done with some re inflation.      Pt still sob.   Plan: will need to get id mycobacteria, will ask id to see, needs better pneumo treatment. Ppn,  Will need therpay.  Could have pseudomonas/mras complicating.   Needs sputum culture.    12/5 the patient is without complaints.  She is coughing up green mucus and not clearing it most of the time    12/6 the patient is without complaints.  She is fatigued.  SUBJECTIVE:     12/1 no new c/o, feels much better.    12/2 having increased sob last few min.   Thick brown green mucous  12/3 no mucous today. Post  Chest tube pain.    12/4 no new c/o  12/5 she has some pain at the thoracostomy tube site  12/6 patient states she had a good night.      PFSH and Allergies reviewed.    OBJECTIVE:     Vitals (Most recent):  Vitals:    12/06/20 0850   BP:    Pulse:    Resp: 18   Temp:        Vitals (24 hour range):  Temp:  [96.4 °F (35.8 °C)-97.9 °F (36.6 °C)]   Pulse:  []   Resp:  [16-20]   BP: (102-134)/(60-78)   SpO2:  [96 %-99 %]       Intake/Output Summary (Last 24 hours) at 12/6/2020 1052  Last data filed at 12/6/2020 0456  Gross per 24 hour   Intake 870 ml    Output 30 ml   Net 840 ml          Physical Exam:  The patient's neuro status (alertness,orientation,cognitive function,motor skills,), pharyngeal exam (oral lesions, hygiene, abn dentition,), Neck (jvd,mass,thyroid,nodes in neck and above/below clavicle),RESPIRATORY(symmetry,effort,fremitus,percussion,auscultation),  Cor(rhythm,heart tones including gallops,perfusion,edema)ABD(distention,hepatic&splenomegaly,tenderness,masses), Skin(rash,cyanosis),Psyc(affect,judgement,).  Exam negative except for these pertinent findings:    Alert, chest is symmetric, no distress, normal percussion, normal fremitus and there is an expiratory wheeze on the left.  There crackles on the right.  There are rhonchi present bilaterally.  Sputum is thick and green but small quantity.      Radiographs reviewed: view by direct vision  No pneumo seen 12/1  Results for orders placed during the hospital encounter of 11/29/20   X-Ray Chest 1 View    Narrative EXAMINATION:  XR CHEST 1 VIEW    CLINICAL HISTORY:  pneumothorax; Pneumothorax, unspecified    TECHNIQUE:  Single frontal view of the chest was performed.    COMPARISON:  11/30/2020    FINDINGS:  Chest tube remains in place in the right upper lung field and other tube or line projects over the right lung base.  Right pneumothorax is not seen on the current film.  Subcutaneous emphysema appears mildly decreased.  Right basilar infiltrate with probable also small right pleural effusion do not appear significantly changed.  Stranding left suprahilar not significantly changed.  Heart not enlarged.      Impression Right-sided chest tube remains in place with the appearance of the chest not significantly changed compared to the prior exam.  No visible pneumothorax      Electronically signed by: Kristin Chávez MD  Date:    12/01/2020  Time:    08:29   ] 12/5 no change in the chest x-ray with good placement of the thoracostomy tube, no pneumothorax    12/6 bilateral apical lung disease present.   Elevated right hemidiaphragm.  Thoracostomy tube in position.     Labs     Recent Labs   Lab 12/06/20  0544   WBC 7.23   HGB 11.9*   HCT 38.5        Recent Labs   Lab 12/06/20  0544      K 5.0   CL 97   CO2 30*   BUN 11   CREATININE 0.6   GLU 98   CALCIUM 9.1   MG 2.2   AST 30   ALT 26   ALKPHOS 60   BILITOT 0.1   PROT 6.7   ALBUMIN 2.4*   No results for input(s): PH, PCO2, PO2, HCO3 in the last 24 hours.  Microbiology Results (last 7 days)     Procedure Component Value Units Date/Time    Culture, Respiratory with Gram Stain [[id number]]  (Abnormal)  (Susceptibility) Collected: 12/01/20 1108    Order Status: Completed Specimen: Respiratory from Sputum, Expectorated Updated: 12/04/20 1304     Respiratory Culture No S aureus isolated.      PSEUDOMONAS AERUGINOSA  Few        CANDIDA ALBICANS  Moderate  Normal respiratory chelsea also present       Gram Stain (Respiratory) <10 epithelial cells per low power field.     Gram Stain (Respiratory) Rare WBC's     Gram Stain (Respiratory) No organisms seen    Culture, Respiratory with Gram Stain [[id number]] Collected: 12/01/20 1108    Order Status: Sent Specimen: Respiratory from Sputum, Expectorated Updated: 12/01/20 1109          Impression:  Active Hospital Problems    Diagnosis  POA    *Pneumothorax on right [J93.9]  Yes    Cachexia [R64]  Yes    Hypoalbuminemia due to protein-calorie malnutrition [E88.09, E46]  Yes    Mycobacterium avium complex [A31.0]  Yes    Iron deficiency [E61.1]  Yes    Immune deficiency disorder [D84.9]  Yes    Acute on chronic respiratory failure with hypoxia [J96.21]  Yes    Bronchiectasis [J47.9]  Yes    COPD (chronic obstructive pulmonary disease) [J44.9]  Yes    Hyperlipidemia [E78.5]  Yes    Lung nodule [R91.1]  Yes     Established with Dr. Mackey, had CT in 12/15.  Demonstrated multiple lung nodules, several spiculated masses        Resolved Hospital Problems   No resolved problems to display.    Tobacco abuse  Spontaneous  pneumothorax secondary to cavitary lung disease  Bronchiectasis  COPD  Presumed DAVIS, AFB positive, QuantiFERON gold negative  Pseudomonas in sputum  Under weight          Plan:     12/1- cxr with resolved pneumo, pt feeling much better, no night sweats, eating very well.  Suspect she developed pneumo shortly after starting mycobacterial therapy.  Need to mobilize.  Called [facility] - hoping id of mycobacterium out on 12/2.  Air leak brisk - pt not good operative risk for chest surg.  May need prolonged suction?      If no tb would stop isolation and start physical therapy.    Sputum submitted today - taper abx per results.       12/2 cxr ok, sm sub q air, no id for afb.    Will recheck cxr - air leak brisk.  Having chest pain that may ppt atelectasis/sob- place norco q4 hold if sedate.     Lab says looks like mac, may have 2nd organism?, probe should be out next wk.    12/3 cxr today post chest tube is better, control pain, mobilize to chair am    12/4 no fever, on clarithro, etham, zyvox, rifampin and prednisone 5 bid.    3lpm nc, merrem for pseudomonas .    Pt progressing- try heimlich valve 1st of next wk?    12/5 continue thoracostomy tube to suction.  Receiving antibiotics for atypical mycobacterium as well as the Pseudomonas in her sputum  Strongly encouraged to stop smoking    12/6   Continue bronchodilators  Continue treatment for DAVIS  Will defer to ID about treatment for Pseudomonas although this is likely a colonizer with her bronchiectasis              .

## 2020-12-06 NOTE — PLAN OF CARE
Plan of care continues. 3L NC. Chest tube in place to suction @ 20. Daily cxr done. Plan to place Heimlich valve Monday. SR on tele. Abx given per order. Pt with poor appetite. Resp tx's ordered. Bed locked and low. Call light in reach. Rounding for safety.

## 2020-12-06 NOTE — PROGRESS NOTES
Progress Note  PULMONARY    Admit Date: 11/29/2020 12/06/2020  History of Present Illness:    smoker, severe copd , h/o recurrent pneumonias with neg bronch in 2017.   Pt has some vague immune def.   Had recent ct chest with non  Resolving cavitary disease ppt eval with pos afb early November.  Tb gold neg, and pt rx on  Inh/rif/azithr pending id. dna probe requested.  Pt continued with night sweats - chronic? And had  Brown mucous increase with sob 3 wks ago.  Pt had decreased appetite taking in boost only, and became bed bound.  Breathing worsened - 02 needs increased from2.5to 3.5-subjective sob.       Pt was not acutely symptomatic when mycobacterial rx started but worsened.  She was to call if worsened.  She did stop smoking.     Er eval  Pm showed right pneumo- very large. Thoracic vent to heimlich valve done with some re inflation.      Pt still sob.   Plan: will need to get id mycobacteria, will ask id to see, needs better pneumo treatment. Ppn,  Will need therpay.  Could have pseudomonas/mras complicating.   Needs sputum culture.    12/5 the patient is without complaints.  She is coughing up green mucus and not clearing it most of the time  SUBJECTIVE:     12/1 no new c/o, feels much better.    12/2 having increased sob last few min.   Thick brown green mucous  12/3 no mucous today. Post  Chest tube pain.    12/4 no new c/o  12/5 she has some pain at the thoracostomy tube site      PFSH and Allergies reviewed.    OBJECTIVE:     Vitals (Most recent):  Vitals:    12/06/20 0850   BP:    Pulse:    Resp: 18   Temp:        Vitals (24 hour range):  Temp:  [96.4 °F (35.8 °C)-97.9 °F (36.6 °C)]   Pulse:  []   Resp:  [16-20]   BP: (102-134)/(60-78)   SpO2:  [96 %-99 %]       Intake/Output Summary (Last 24 hours) at 12/6/2020 1047  Last data filed at 12/6/2020 0456  Gross per 24 hour   Intake 870 ml   Output 30 ml   Net 840 ml          Physical Exam:  The patient's neuro status  (alertness,orientation,cognitive function,motor skills,), pharyngeal exam (oral lesions, hygiene, abn dentition,), Neck (jvd,mass,thyroid,nodes in neck and above/below clavicle),RESPIRATORY(symmetry,effort,fremitus,percussion,auscultation),  Cor(rhythm,heart tones including gallops,perfusion,edema)ABD(distention,hepatic&splenomegaly,tenderness,masses), Skin(rash,cyanosis),Psyc(affect,judgement,).  Exam negative except for these pertinent findings:    Alert, chest is symmetric, no distress, normal percussion, normal fremitus and there is an inspiratory squeak in the left posterior lung field.  There crackles on the right.  There are rhonchi present bilaterally.      Radiographs reviewed: view by direct vision  No pneumo seen 12/1  Results for orders placed during the hospital encounter of 11/29/20   X-Ray Chest 1 View    Narrative EXAMINATION:  XR CHEST 1 VIEW    CLINICAL HISTORY:  pneumothorax; Pneumothorax, unspecified    TECHNIQUE:  Single frontal view of the chest was performed.    COMPARISON:  11/30/2020    FINDINGS:  Chest tube remains in place in the right upper lung field and other tube or line projects over the right lung base.  Right pneumothorax is not seen on the current film.  Subcutaneous emphysema appears mildly decreased.  Right basilar infiltrate with probable also small right pleural effusion do not appear significantly changed.  Stranding left suprahilar not significantly changed.  Heart not enlarged.      Impression Right-sided chest tube remains in place with the appearance of the chest not significantly changed compared to the prior exam.  No visible pneumothorax      Electronically signed by: Kristin Chávez MD  Date:    12/01/2020  Time:    08:29   ] 12/5 no change in the chest x-ray with good placement of the thoracostomy tube, no pneumothorax    Labs     Recent Labs   Lab 12/06/20  0544   WBC 7.23   HGB 11.9*   HCT 38.5        Recent Labs   Lab 12/06/20  0544      K 5.0   CL 97    CO2 30*   BUN 11   CREATININE 0.6   GLU 98   CALCIUM 9.1   MG 2.2   AST 30   ALT 26   ALKPHOS 60   BILITOT 0.1   PROT 6.7   ALBUMIN 2.4*   No results for input(s): PH, PCO2, PO2, HCO3 in the last 24 hours.  Microbiology Results (last 7 days)     Procedure Component Value Units Date/Time    Culture, Respiratory with Gram Stain [[id number]]  (Abnormal)  (Susceptibility) Collected: 12/01/20 1108    Order Status: Completed Specimen: Respiratory from Sputum, Expectorated Updated: 12/04/20 1304     Respiratory Culture No S aureus isolated.      PSEUDOMONAS AERUGINOSA  Few        CANDIDA ALBICANS  Moderate  Normal respiratory chelsea also present       Gram Stain (Respiratory) <10 epithelial cells per low power field.     Gram Stain (Respiratory) Rare WBC's     Gram Stain (Respiratory) No organisms seen    Culture, Respiratory with Gram Stain [[id number]] Collected: 12/01/20 1108    Order Status: Sent Specimen: Respiratory from Sputum, Expectorated Updated: 12/01/20 1109          Impression:  Active Hospital Problems    Diagnosis  POA    *Pneumothorax on right [J93.9]  Yes    Cachexia [R64]  Yes    Hypoalbuminemia due to protein-calorie malnutrition [E88.09, E46]  Yes    Mycobacterium avium complex [A31.0]  Yes    Iron deficiency [E61.1]  Yes    Immune deficiency disorder [D84.9]  Yes    Acute on chronic respiratory failure with hypoxia [J96.21]  Yes    Bronchiectasis [J47.9]  Yes    COPD (chronic obstructive pulmonary disease) [J44.9]  Yes    Hyperlipidemia [E78.5]  Yes    Lung nodule [R91.1]  Yes     Established with Dr. Mackey, had CT in 12/15.  Demonstrated multiple lung nodules, several spiculated masses        Resolved Hospital Problems   No resolved problems to display.    Tobacco abuse            Plan:     12/1- cxr with resolved pneumo, pt feeling much better, no night sweats, eating very well.  Suspect she developed pneumo shortly after starting mycobacterial therapy.  Need to mobilize.  Called main campus  - hoping id of mycobacterium out on 12/2.  Air leak brisk - pt not good operative risk for chest surg.  May need prolonged suction?      If no tb would stop isolation and start physical therapy.    Sputum submitted today - taper abx per results.       12/2 cxr ok, sm sub q air, no id for afb.    Will recheck cxr - air leak brisk.  Having chest pain that may ppt atelectasis/sob- place norco q4 hold if sedate.     Lab says looks like mac, may have 2nd organism?, probe should be out next wk.    12/3 cxr today post chest tube is better, control pain, mobilize to chair am    12/4 no fever, on clarithro, etham, zyvox, rifampin and prednisone 5 bid.    3lpm nc, merrem for pseudomonas .    Pt progressing- try heimlich valve 1st of next wk?    12/5 continue thoracostomy tube to suction.  Receiving antibiotics for atypical mycobacterium as well as the Pseudomonas in her sputum  Strongly encouraged to stop smoking            .

## 2020-12-06 NOTE — CARE UPDATE
12/06/20 1330   Vibratory PEP Therapy   $ Vibratory PEP Charges [employer] Therapy   $ Vibratory PEP Tech Time Charges 15 min   Type (PEP Therapy) vibratory/oscillatory   Device (PEP Therapy) flutter   Route (PEP Therapy) mouthpiece   Breaths per Cycle (PEP Therapy) 10   Cycles (PEP Therapy) 1   Settings (PEP Therapy) PEP 3   Patient Position (PEP Therapy) semi-John's   Signs of Intolerance (PEP Therapy) none

## 2020-12-06 NOTE — PROGRESS NOTES
[facility] Hospital Medicine  Progress Note    Patient Name: Fabiana Morel  MRN: [id number]  Patient Class: IP- Inpatient   Admission Date: 11/29/2020  Length of Stay: 6 days  Attending Physician: Aniket Olguin MD  Primary Care Provider: Dominique Bartlett MD        Subjective:     Principal Problem:Pneumothorax on right        HPI:  Fabiana Morel is a 65-year-old female with a past medical history of COPD, hyperlipidemia, iron deficiency anemia, and tobacco use who presents to the emergency room tonight with reports of shortness of breath.  Patient reports shortness of breath x3 weeks, has increased in severity since onset.  Patient requiring supplemental oxygen therapy for the past three weeks at 2-3 L, however has required increase in normal O2 dose over the past three days. Patient currently under the care of Dr. Mackey, pulmonology, has a history of COPD. Patient accompanied by her  during my initial interview and physical exam. a history of COPD. Patient was reportedly seen in the emergency room on 11/06 for chest pain, a CT chest was reportedly obtained revealing right lung abscess.  Patient followed with Dr. Mackey in clinic on 11/09 in which sputum samples were obtained and treatment was initiated for tuberculosis versus MAC.  Patient also reports decreased p.o. intake, utilizes boost supplementation daily.  Patient endorses productive cough x3 days, with brown sputum production.  Patient endorses dyspnea on exertion, stating she can not walk the length of the room without becoming SOB.  Patient states she quit tobacco use on 11/05/2020.  In the ER, patient noted to have right pneumothorax on chest x-ray.  ER physician placed Heimlich valve chest tube with reported immediate relief in shortness of breath.  Repeat chest x-ray revealing improvement of pneumothorax.  ER MD reportedly spoke with Dr. Mackey, pulmonology who recommended admission with IV vancomycin and Zosyn and  isolation precautions.  Patient placed in observation on 11/29/2020 at approximately 9:30 p.m..  Full code status verified upon admission to the hospital.    Overview/Hospital Course:  Patient admitted for right pneumothorax.  Patient had Heimlich valve chest tube placed in the ER with improvement in pneumothorax.  Patient was being seen outpatient by Dr. 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  Dr. Mackey was consulted and follow patient closely in the hospital.  Patient was also evaluated by Infectious Disease to assist with antibiotic regimen.  Patient receiving clarithromycin, ethambutol, Zyvox, meropenem and rifampin. Patient's sputum culture growing Pseudomonas and ID closely following with further sensitivities pending.  Patient had air leak with chest tube and CT surgery was consulted. Dr. Bucio removed Heimlich valve and replaced with percutaneous chest tube on 12/3.  Chest tube was placed back to continuous Pleurovac suction. Air leak was still present but now intermittent and significantly smaller.  Patient's respiratory symptoms improving.  Patient was initiated on IV Clindamax due to poor oral intake and dietary was consulted.  Patient's appetite improved and she is tolerating boost supplements well and Clindamax was discontinued.  Patient with generalized weakness and deconditioning and PT/OT consulted and patient progressing well.       Interval History:  Notes reviewed, no acute events overnight. CT to right ant upper CW intact. Pt denies sob. Cough is still wet.  Pt states cw pain better controlled with oxycodone but returns before 6 hr dose. Advised will adjust timing for better pain control.  Patient tolerating diet well.  She has been up out of bed with PT.  Per Pulmonary recommendations will discuss discharge planning on Monday.    Review of Systems   Constitutional: Negative for fatigue and fever.   Respiratory: Positive for cough. Negative for chest tightness and shortness of breath.          Pleuritic cw pain   Cardiovascular: Negative for chest pain.   Gastrointestinal: Negative for abdominal pain, nausea and vomiting.   Musculoskeletal: Negative for arthralgias, back pain and gait problem.   Skin: Negative for color change, pallor, rash and wound.   Neurological: Negative for weakness and headaches.   Psychiatric/Behavioral: Negative for confusion.   All other systems reviewed and are negative.    Objective:     Vital Signs (Most Recent):  Temp: 97 °F (36.1 °C) (12/06/20 0728)  Pulse: 91 (12/06/20 0741)  Resp: 18 (12/06/20 0850)  BP: 134/77 (12/06/20 0728)  SpO2: 99 % (12/06/20 0740) Vital Signs (24h Range):  Temp:  [96.4 °F (35.8 °C)-97.9 °F (36.6 °C)] 97 °F (36.1 °C)  Pulse:  [] 91  Resp:  [16-20] 18  SpO2:  [96 %-99 %] 99 %  BP: (102-134)/(60-78) 134/77     Weight: 45.3 kg (99 lb 13.9 oz)  Body mass index is 17.14 kg/m².    Intake/Output Summary (Last 24 hours) at 12/6/2020 1109  Last data filed at 12/6/2020 0456  Gross per 24 hour   Intake 870 ml   Output 30 ml   Net 840 ml      Physical Exam  Vitals signs and nursing note reviewed.   Constitutional:       General: She is not in acute distress.     Appearance: Normal appearance. She is well-developed. She is ill-appearing.      Comments: Chronically ill-appearing   HENT:      Head: Normocephalic and atraumatic.      Nose: Nose normal. No congestion or rhinorrhea.   Eyes:      Conjunctiva/sclera: Conjunctivae normal.      Pupils: Pupils are equal, round, and reactive to light.   Neck:      Musculoskeletal: Normal range of motion and neck supple.      Vascular: No JVD.   Cardiovascular:      Rate and Rhythm: Normal rate and regular rhythm.      Pulses: Normal pulses.      Heart sounds: Normal heart sounds. No murmur.   Pulmonary:      Effort: Pulmonary effort is normal. No respiratory distress.      Breath sounds: No stridor. Rhonchi and rales present. No wheezing.      Comments: Scattered rales and rhonchi with mild exp wheeze throughout  bilateral lung fields.  Chest tube intact to right upper anterior chest wall draining small amount of serosangenous fluid.   Abdominal:      General: Bowel sounds are normal. There is no distension.      Palpations: Abdomen is soft.      Tenderness: There is no abdominal tenderness.   Musculoskeletal: Normal range of motion.         General: No swelling or tenderness.   Skin:     General: Skin is warm and dry.      Capillary Refill: Capillary refill takes 2 to 3 seconds.   Neurological:      General: No focal deficit present.      Mental Status: She is alert and oriented to person, place, and time.      Cranial Nerves: No cranial nerve deficit.      Sensory: No sensory deficit.      Motor: No weakness.      Coordination: Coordination normal.   Psychiatric:         Mood and Affect: Mood normal.         Behavior: Behavior normal.         Thought Content: Thought content normal.         Significant Labs:   CBC:   Recent Labs   Lab 12/05/20  1326 12/06/20  0544   WBC 10.04 7.23   HGB 12.3 11.9*   HCT 39.9 38.5    305     CMP:   Recent Labs   Lab 12/05/20  1326 12/06/20  0544    137   K 3.7 5.0   CL 96 97   CO2 33* 30*    98   BUN 12 11   CREATININE 0.6 0.6   CALCIUM 9.6 9.1   PROT 7.0 6.7   ALBUMIN 2.4* 2.4*   BILITOT 0.2 0.1   ALKPHOS 65 60   AST 33 30   ALT 27 26   ANIONGAP 9 10   EGFRNONAA >60 >60     All pertinent labs within the past 24 hours have been reviewed.    Significant Imaging: I have reviewed all pertinent imaging results/findings within the past 24 hours.      Assessment/Plan:      * Pneumothorax on right  Chest tube intact and functioning well  Continue current treatment regimen  Chest x-ray reviewed - remains stable  Cardiothoracic surgery following.    Plan is to evaluate for Heimlich valve and determine discharge dispo tomorrow.      Cachexia  Secondary to protein malnutrition and poor oral intake  Supplement with boost  Appetite stable      Mycobacterium avium complex  Appreciate  ID consult  Cont abx per ID  Sputum culture reviewed-Pseudomonas on 1, another pending.      Hypoalbuminemia due to protein-calorie malnutrition  Noted, encourage PO intake - boost supplementation.        Iron deficiency  Chronic, continue home iron supplementation.        Immune deficiency disorder  Chronic issue complicating clinical picture.  Abx per ID and pulmonology recommendations.      Acute on chronic respiratory failure with hypoxia  Noted, stable on 3 L nasal cannula.  Continue supplemental oxygen.  Pt does have oxygen at home.        Bronchiectasis  Sputum culture reviewed - pseudomonas  ID recs noted.  Will discuss with pulmonology tomorrow.      COPD (chronic obstructive pulmonary disease)  Chronic - continue home medication, pulmonology consult. Continuous supplemental oxygen, telemetry, and pulse oximetry monitoring.        Lung nodule  Noted.  Followed closely by pulmonology      Hyperlipidemia    Chronic, controlled. Will continue home medication.    Lab Results   Component Value Date    CHOL 187 08/21/2020    CHOL 182 05/22/2020    CHOL 182 06/25/2019     Lab Results   Component Value Date    HDL 55 08/21/2020    HDL 57 05/22/2020    HDL 65 06/25/2019     Lab Results   Component Value Date    LDLCALC 105.4 08/21/2020    LDLCALC 102.2 05/22/2020    LDLCALC 97.6 06/25/2019     Lab Results   Component Value Date    TRIG 133 08/21/2020    TRIG 114 05/22/2020    TRIG 97 06/25/2019     Lab Results   Component Value Date    CHOLHDL 29.4 08/21/2020    CHOLHDL 31.3 05/22/2020    CHOLHDL 35.7 06/25/2019               VTE Risk Mitigation (From admission, onward)         Ordered     enoxaparin injection 40 mg  Every 24 hours      11/29/20 2224     IP VTE HIGH RISK PATIENT  Once      11/29/20 2224     Place sequential compression device  Until discontinued      11/29/20 2224     Place STEPHANIE hose  Until discontinued      11/29/20 2224                Discharge Planning   DION: 12/4/2020     Code Status: Full Code    Is the patient medically ready for discharge?:     Reason for patient still in hospital (select all that apply): Patient trending condition, Treatment and Consult recommendations  Discharge Plan A: Home with family                  Leilani Mederos NP  Department of Hospital Medicine   [facility]

## 2020-12-06 NOTE — SUBJECTIVE & OBJECTIVE
Interval History:  Notes reviewed, no acute events overnight. CT to right ant upper CW intact. Pt denies sob. Cough is still wet.  Pt states cw pain better controlled with oxycodone but returns before 6 hr dose. Advised will adjust timing for better pain control.  Patient tolerating diet well.  She has been up out of bed with PT.  Per Pulmonary recommendations will discuss discharge planning on Monday.    Review of Systems   Constitutional: Negative for fatigue and fever.   Respiratory: Positive for cough. Negative for chest tightness and shortness of breath.         Pleuritic cw pain   Cardiovascular: Negative for chest pain.   Gastrointestinal: Negative for abdominal pain, nausea and vomiting.   Musculoskeletal: Negative for arthralgias, back pain and gait problem.   Skin: Negative for color change, pallor, rash and wound.   Neurological: Negative for weakness and headaches.   Psychiatric/Behavioral: Negative for confusion.   All other systems reviewed and are negative.    Objective:     Vital Signs (Most Recent):  Temp: 97 °F (36.1 °C) (12/06/20 0728)  Pulse: 91 (12/06/20 0741)  Resp: 18 (12/06/20 0850)  BP: 134/77 (12/06/20 0728)  SpO2: 99 % (12/06/20 0740) Vital Signs (24h Range):  Temp:  [96.4 °F (35.8 °C)-97.9 °F (36.6 °C)] 97 °F (36.1 °C)  Pulse:  [] 91  Resp:  [16-20] 18  SpO2:  [96 %-99 %] 99 %  BP: (102-134)/(60-78) 134/77     Weight: 45.3 kg (99 lb 13.9 oz)  Body mass index is 17.14 kg/m².    Intake/Output Summary (Last 24 hours) at 12/6/2020 1109  Last data filed at 12/6/2020 0456  Gross per 24 hour   Intake 870 ml   Output 30 ml   Net 840 ml      Physical Exam  Vitals signs and nursing note reviewed.   Constitutional:       General: She is not in acute distress.     Appearance: Normal appearance. She is well-developed. She is ill-appearing.      Comments: Chronically ill-appearing   HENT:      Head: Normocephalic and atraumatic.      Nose: Nose normal. No congestion or rhinorrhea.   Eyes:       Conjunctiva/sclera: Conjunctivae normal.      Pupils: Pupils are equal, round, and reactive to light.   Neck:      Musculoskeletal: Normal range of motion and neck supple.      Vascular: No JVD.   Cardiovascular:      Rate and Rhythm: Normal rate and regular rhythm.      Pulses: Normal pulses.      Heart sounds: Normal heart sounds. No murmur.   Pulmonary:      Effort: Pulmonary effort is normal. No respiratory distress.      Breath sounds: No stridor. Rhonchi and rales present. No wheezing.      Comments: Scattered rales and rhonchi with mild exp wheeze throughout bilateral lung fields.  Chest tube intact to right upper anterior chest wall draining small amount of serosangenous fluid.   Abdominal:      General: Bowel sounds are normal. There is no distension.      Palpations: Abdomen is soft.      Tenderness: There is no abdominal tenderness.   Musculoskeletal: Normal range of motion.         General: No swelling or tenderness.   Skin:     General: Skin is warm and dry.      Capillary Refill: Capillary refill takes 2 to 3 seconds.   Neurological:      General: No focal deficit present.      Mental Status: She is alert and oriented to person, place, and time.      Cranial Nerves: No cranial nerve deficit.      Sensory: No sensory deficit.      Motor: No weakness.      Coordination: Coordination normal.   Psychiatric:         Mood and Affect: Mood normal.         Behavior: Behavior normal.         Thought Content: Thought content normal.         Significant Labs:   CBC:   Recent Labs   Lab 12/05/20  1326 12/06/20  0544   WBC 10.04 7.23   HGB 12.3 11.9*   HCT 39.9 38.5    305     CMP:   Recent Labs   Lab 12/05/20  1326 12/06/20  0544    137   K 3.7 5.0   CL 96 97   CO2 33* 30*    98   BUN 12 11   CREATININE 0.6 0.6   CALCIUM 9.6 9.1   PROT 7.0 6.7   ALBUMIN 2.4* 2.4*   BILITOT 0.2 0.1   ALKPHOS 65 60   AST 33 30   ALT 27 26   ANIONGAP 9 10   EGFRNONAA >60 >60     All pertinent labs within the past  24 hours have been reviewed.    Significant Imaging: I have reviewed all pertinent imaging results/findings within the past 24 hours.

## 2020-12-06 NOTE — RESPIRATORY THERAPY
12/05/20 2010   Patient Assessment/Suction   Level of Consciousness (AVPU) alert   Respiratory Effort Normal;Unlabored   Expansion/Accessory Muscles/Retractions expansion symmetric;no retractions;no use of accessory muscles   All Lung Fields Breath Sounds diminished   PRE-TX-O2   O2 Device (Oxygen Therapy) nasal cannula   Flow (L/min) 3   Oxygen Concentration (%) 32   SpO2 97 %   Pulse Oximetry Type Intermittent   Aerosol Therapy   $ Aerosol Therapy Charges PRN treatment not required   Respiratory Treatment Status (SVN) PRN treatment not required   Inhaler   $ Inhaler Charges PRN treatment not required   Respiratory Treatment Status (Inhaler) PRN treatment not required   Vibratory PEP Therapy   $ Vibratory PEP Charges   (done independently)   Ready to Wean/Extubation Screen   FIO2<=50 (chart decimal) 0.32

## 2020-12-06 NOTE — CARE UPDATE
12/06/20 0740 12/06/20 0741   Patient Assessment/Suction   Level of Consciousness (AVPU) alert  --    Respiratory Effort Unlabored  --    Expansion/Accessory Muscles/Retractions no use of accessory muscles  --    All Lung Fields Breath Sounds diminished  --    RUL Breath Sounds wheezes, expiratory  --    RML Breath Sounds diminished;wheezes, expiratory  --    Rhythm/Pattern, Respiratory shortness of breath  --    Cough Frequency frequent  --    Cough Type productive  --    PRE-TX-O2   O2 Device (Oxygen Therapy) nasal cannula  --    $ Is the patient on Low Flow Oxygen? Yes  --    Flow (L/min) 3  --    SpO2 99 %  --    Pulse Oximetry Type Intermittent  --    $ Pulse Oximetry - Multiple Charge Pulse Oximetry - Multiple  --    Pulse 91 91   Resp 18 18   Inhaler   $ Inhaler Charges MDI (Metered Dose Inahler) Treatment;Given With Spacer MDI (Metered Dose Inahler) Treatment   Respiratory Treatment Status (Inhaler) given given;mouth rinsed post treatment   Treatment Route (Inhaler) mouthpiece mouthpiece   Patient Position (Inhaler) John's John's   Post Treatment Assessment (Inhaler) breath sounds unchanged breath sounds unchanged   Signs of Intolerance (Inhaler) none none   Breath Sounds Post-Respiratory Treatment   Throughout All Fields Post-Treatment All Fields All Fields   Throughout All Fields Post-Treatment no change no change   Post-treatment Heart Rate (beats/min) 91 91   Post-treatment Resp Rate (breaths/min) 20 20

## 2020-12-06 NOTE — ASSESSMENT & PLAN NOTE
Appreciate ID consult  Cont abx per ID  Sputum culture reviewed-Pseudomonas on 1, another pending.

## 2020-12-06 NOTE — ASSESSMENT & PLAN NOTE
Chest tube intact and functioning well  Continue current treatment regimen  Chest x-ray reviewed - remains stable  Cardiothoracic surgery following.    Plan is to evaluate for Heimlich valve and determine discharge dispo tomorrow.

## 2020-12-07 LAB
ALBUMIN SERPL BCP-MCNC: 2.4 G/DL (ref 3.5–5.2)
ALP SERPL-CCNC: 65 U/L (ref 55–135)
ALT SERPL W/O P-5'-P-CCNC: 23 U/L (ref 10–44)
ANION GAP SERPL CALC-SCNC: 12 MMOL/L (ref 8–16)
AST SERPL-CCNC: 23 U/L (ref 10–40)
BASOPHILS # BLD AUTO: 0.04 K/UL (ref 0–0.2)
BASOPHILS NFR BLD: 0.5 % (ref 0–1.9)
BILIRUB SERPL-MCNC: 0.1 MG/DL (ref 0.1–1)
BUN SERPL-MCNC: 13 MG/DL (ref 8–23)
CALCIUM SERPL-MCNC: 9.1 MG/DL (ref 8.7–10.5)
CHLORIDE SERPL-SCNC: 96 MMOL/L (ref 95–110)
CO2 SERPL-SCNC: 30 MMOL/L (ref 23–29)
CREAT SERPL-MCNC: 0.6 MG/DL (ref 0.5–1.4)
DIFFERENTIAL METHOD: ABNORMAL
EOSINOPHIL # BLD AUTO: 0.3 K/UL (ref 0–0.5)
EOSINOPHIL NFR BLD: 3.9 % (ref 0–8)
ERYTHROCYTE [DISTWIDTH] IN BLOOD BY AUTOMATED COUNT: 16.3 % (ref 11.5–14.5)
EST. GFR  (AFRICAN AMERICAN): >60 ML/MIN/1.73 M^2
EST. GFR  (NON AFRICAN AMERICAN): >60 ML/MIN/1.73 M^2
GLUCOSE SERPL-MCNC: 88 MG/DL (ref 70–110)
HCT VFR BLD AUTO: 37.8 % (ref 37–48.5)
HGB BLD-MCNC: 11.9 G/DL (ref 12–16)
IMM GRANULOCYTES # BLD AUTO: 0.03 K/UL (ref 0–0.04)
IMM GRANULOCYTES NFR BLD AUTO: 0.4 % (ref 0–0.5)
LYMPHOCYTES # BLD AUTO: 2.5 K/UL (ref 1–4.8)
LYMPHOCYTES NFR BLD: 30.4 % (ref 18–48)
MAGNESIUM SERPL-MCNC: 2.2 MG/DL (ref 1.6–2.6)
MCH RBC QN AUTO: 29.8 PG (ref 27–31)
MCHC RBC AUTO-ENTMCNC: 31.5 G/DL (ref 32–36)
MCV RBC AUTO: 95 FL (ref 82–98)
MONOCYTES # BLD AUTO: 0.5 K/UL (ref 0.3–1)
MONOCYTES NFR BLD: 5.8 % (ref 4–15)
NEUTROPHILS # BLD AUTO: 4.8 K/UL (ref 1.8–7.7)
NEUTROPHILS NFR BLD: 59 % (ref 38–73)
NRBC BLD-RTO: 0 /100 WBC
PLATELET # BLD AUTO: 318 K/UL (ref 150–350)
PMV BLD AUTO: 8.7 FL (ref 9.2–12.9)
POTASSIUM SERPL-SCNC: 4.2 MMOL/L (ref 3.5–5.1)
PROT SERPL-MCNC: 6.7 G/DL (ref 6–8.4)
RBC # BLD AUTO: 4 M/UL (ref 4–5.4)
SODIUM SERPL-SCNC: 138 MMOL/L (ref 136–145)
WBC # BLD AUTO: 8.12 K/UL (ref 3.9–12.7)

## 2020-12-07 PROCEDURE — 94664 DEMO&/EVAL PT USE INHALER: CPT

## 2020-12-07 PROCEDURE — 25000003 PHARM REV CODE 250: Performed by: INTERNAL MEDICINE

## 2020-12-07 PROCEDURE — 25000003 PHARM REV CODE 250: Performed by: NURSE PRACTITIONER

## 2020-12-07 PROCEDURE — 94761 N-INVAS EAR/PLS OXIMETRY MLT: CPT

## 2020-12-07 PROCEDURE — 27100233

## 2020-12-07 PROCEDURE — 63600175 PHARM REV CODE 636 W HCPCS: Performed by: NURSE PRACTITIONER

## 2020-12-07 PROCEDURE — 25000242 PHARM REV CODE 250 ALT 637 W/ HCPCS: Performed by: INTERNAL MEDICINE

## 2020-12-07 PROCEDURE — 27000646 HC AEROBIKA DEVICE

## 2020-12-07 PROCEDURE — 94669 MECHANICAL CHEST WALL OSCILL: CPT

## 2020-12-07 PROCEDURE — 99900035 HC TECH TIME PER 15 MIN (STAT)

## 2020-12-07 PROCEDURE — 27000221 HC OXYGEN, UP TO 24 HOURS

## 2020-12-07 PROCEDURE — 99232 PR SUBSEQUENT HOSPITAL CARE,LEVL II: ICD-10-PCS | Mod: S$GLB,,, | Performed by: INTERNAL MEDICINE

## 2020-12-07 PROCEDURE — 85025 COMPLETE CBC W/AUTO DIFF WBC: CPT

## 2020-12-07 PROCEDURE — 94640 AIRWAY INHALATION TREATMENT: CPT

## 2020-12-07 PROCEDURE — 99232 PR SUBSEQUENT HOSPITAL CARE,LEVL II: ICD-10-PCS | Mod: ,,, | Performed by: INTERNAL MEDICINE

## 2020-12-07 PROCEDURE — 99232 SBSQ HOSP IP/OBS MODERATE 35: CPT | Mod: ,,, | Performed by: INTERNAL MEDICINE

## 2020-12-07 PROCEDURE — 63600175 PHARM REV CODE 636 W HCPCS: Performed by: INTERNAL MEDICINE

## 2020-12-07 PROCEDURE — 12000002 HC ACUTE/MED SURGE SEMI-PRIVATE ROOM

## 2020-12-07 PROCEDURE — 99232 SBSQ HOSP IP/OBS MODERATE 35: CPT | Mod: S$GLB,,, | Performed by: INTERNAL MEDICINE

## 2020-12-07 PROCEDURE — 83735 ASSAY OF MAGNESIUM: CPT

## 2020-12-07 PROCEDURE — 80053 COMPREHEN METABOLIC PANEL: CPT

## 2020-12-07 PROCEDURE — 94667 MNPJ CHEST WALL 1ST: CPT

## 2020-12-07 RX ORDER — IPRATROPIUM BROMIDE AND ALBUTEROL SULFATE 2.5; .5 MG/3ML; MG/3ML
3 SOLUTION RESPIRATORY (INHALATION) EVERY 6 HOURS
Status: DISCONTINUED | OUTPATIENT
Start: 2020-12-07 | End: 2020-12-09 | Stop reason: HOSPADM

## 2020-12-07 RX ADMIN — ATORVASTATIN CALCIUM 40 MG: 40 TABLET, FILM COATED ORAL at 08:12

## 2020-12-07 RX ADMIN — HYDROCODONE BITARTRATE AND ACETAMINOPHEN 1 TABLET: 5; 325 TABLET ORAL at 05:12

## 2020-12-07 RX ADMIN — HYDROCODONE BITARTRATE AND ACETAMINOPHEN 1 TABLET: 5; 325 TABLET ORAL at 09:12

## 2020-12-07 RX ADMIN — RIFAMPIN 300 MG: 300 CAPSULE ORAL at 08:12

## 2020-12-07 RX ADMIN — ENOXAPARIN SODIUM 40 MG: 100 INJECTION SUBCUTANEOUS at 04:12

## 2020-12-07 RX ADMIN — MEROPENEM AND SODIUM CHLORIDE 1 G: 1 INJECTION, SOLUTION INTRAVENOUS at 11:12

## 2020-12-07 RX ADMIN — MEROPENEM AND SODIUM CHLORIDE 1 G: 1 INJECTION, SOLUTION INTRAVENOUS at 05:12

## 2020-12-07 RX ADMIN — HYDROCODONE BITARTRATE AND ACETAMINOPHEN 1 TABLET: 5; 325 TABLET ORAL at 04:12

## 2020-12-07 RX ADMIN — IPRATROPIUM BROMIDE AND ALBUTEROL SULFATE 3 ML: .5; 2.5 SOLUTION RESPIRATORY (INHALATION) at 08:12

## 2020-12-07 RX ADMIN — PREDNISONE 5 MG: 5 TABLET ORAL at 08:12

## 2020-12-07 RX ADMIN — OXYCODONE HYDROCHLORIDE AND ACETAMINOPHEN 1 TABLET: 5; 325 TABLET ORAL at 08:12

## 2020-12-07 RX ADMIN — CLARITHROMYCIN 500 MG: 500 TABLET, FILM COATED ORAL at 08:12

## 2020-12-07 RX ADMIN — ETHAMBUTOL HYDROCHLORIDE 600 MG: 400 TABLET, FILM COATED ORAL at 08:12

## 2020-12-07 RX ADMIN — MEROPENEM AND SODIUM CHLORIDE 1 G: 1 INJECTION, SOLUTION INTRAVENOUS at 09:12

## 2020-12-07 RX ADMIN — FERROUS SULFATE TAB EC 325 MG (65 MG FE EQUIVALENT) 325 MG: 325 (65 FE) TABLET DELAYED RESPONSE at 08:12

## 2020-12-07 RX ADMIN — LINEZOLID 600 MG: 600 TABLET, FILM COATED ORAL at 08:12

## 2020-12-07 RX ADMIN — FLUTICASONE FUROATE AND VILANTEROL TRIFENATATE 1 PUFF: 200; 25 POWDER RESPIRATORY (INHALATION) at 09:12

## 2020-12-07 RX ADMIN — PREDNISONE 5 MG: 5 TABLET ORAL at 09:12

## 2020-12-07 RX ADMIN — LINEZOLID 600 MG: 600 TABLET, FILM COATED ORAL at 09:12

## 2020-12-07 RX ADMIN — CLARITHROMYCIN 500 MG: 500 TABLET, FILM COATED ORAL at 09:12

## 2020-12-07 NOTE — PLAN OF CARE
Plan of care continues. Heimlich valve to be placed by Dr Mackey. Pain controlled. Chest tube with suction @ 20, serosang drainage. O2 @ 3L NC. Up x1 assist to bsc, bm this shift. ISO maintained, waiting for AFB results. Appetite better today. SR on tele. Bed locked and low.  assisting with care. Call light in reach.

## 2020-12-07 NOTE — PT/OT/SLP PROGRESS
Physical Therapy      Patient Name:  Fabiana Morel   MRN:  [id number]    PT attempted- per nurse Donnie- pt up in chair and awaiting procedure with Dr Mackey- will re attempt 12-08.    Kellie Ivey, PT

## 2020-12-07 NOTE — ASSESSMENT & PLAN NOTE
Appreciate ID consult  Cont abx per ID.  Awaiting AFB DNA probe  Sputum culture reviewed-Pseudomonas noted-on Merrem

## 2020-12-07 NOTE — SUBJECTIVE & OBJECTIVE
Interval History:  Notes reviewed, no acute events overnight. Heimlich valve placed today per Dr. Mackey.  Pt reports stable SOB on supplemental oxygen.  No chest pain.  Minimal pain at Heimlich site.    Awaiting final AFB results before Pt can discharge.  Discussed with Dr. Mackey and Dr. Bartlett.    Review of Systems   Constitutional: Negative for fatigue and fever.   Respiratory: Positive for cough. Negative for chest tightness and shortness of breath.         Pleuritic cw pain   Cardiovascular: Negative for chest pain.   Gastrointestinal: Negative for abdominal pain, nausea and vomiting.   Musculoskeletal: Negative for arthralgias, back pain and gait problem.   Skin: Negative for color change, pallor, rash and wound.   Neurological: Negative for weakness and headaches.   Psychiatric/Behavioral: Negative for confusion.   All other systems reviewed and are negative.    Objective:     Vital Signs (Most Recent):  Temp: 97.6 °F (36.4 °C) (12/07/20 1549)  Pulse: 99 (12/07/20 1549)  Resp: 16 (12/07/20 1644)  BP: 121/69 (12/07/20 1549)  SpO2: 99 % (12/07/20 1549) Vital Signs (24h Range):  Temp:  [97 °F (36.1 °C)-97.7 °F (36.5 °C)] 97.6 °F (36.4 °C)  Pulse:  [] 99  Resp:  [14-21] 16  SpO2:  [96 %-99 %] 99 %  BP: (117-137)/(67-85) 121/69     Weight: 45.3 kg (99 lb 13.9 oz)  Body mass index is 17.14 kg/m².    Intake/Output Summary (Last 24 hours) at 12/7/2020 1728  Last data filed at 12/7/2020 1400  Gross per 24 hour   Intake 1040 ml   Output 40 ml   Net 1000 ml      Physical Exam  Vitals signs and nursing note reviewed.   Constitutional:       General: She is not in acute distress.     Appearance: Normal appearance. She is well-developed. She is ill-appearing.      Comments: Chronically ill-appearing  cachectic   HENT:      Head: Normocephalic and atraumatic.      Nose: Nose normal. No congestion or rhinorrhea.   Eyes:      Conjunctiva/sclera: Conjunctivae normal.      Pupils: Pupils are equal, round, and reactive to  light.   Neck:      Musculoskeletal: Normal range of motion and neck supple.      Vascular: No JVD.   Cardiovascular:      Rate and Rhythm: Normal rate and regular rhythm.      Pulses: Normal pulses.      Heart sounds: Normal heart sounds. No murmur.   Pulmonary:      Effort: Pulmonary effort is normal. No respiratory distress.      Breath sounds: No stridor. Rhonchi and rales present. No wheezing.      Comments: Scattered rales and rhonchi with mild exp wheeze throughout bilateral lung fields-improved from yesterday.  Heimlich valve to right posterior chest wall with dressing CDI.  Abdominal:      General: Bowel sounds are normal. There is no distension.      Palpations: Abdomen is soft.      Tenderness: There is no abdominal tenderness.   Musculoskeletal: Normal range of motion.         General: No swelling or tenderness.   Skin:     General: Skin is warm and dry.      Capillary Refill: Capillary refill takes 2 to 3 seconds.   Neurological:      General: No focal deficit present.      Mental Status: She is alert and oriented to person, place, and time.      Cranial Nerves: No cranial nerve deficit.      Sensory: No sensory deficit.      Motor: No weakness.      Coordination: Coordination normal.   Psychiatric:         Mood and Affect: Mood normal.         Behavior: Behavior normal.         Thought Content: Thought content normal.         Significant Labs:   CBC:   Recent Labs   Lab 12/06/20  0544 12/07/20  0714   WBC 7.23 8.12   HGB 11.9* 11.9*   HCT 38.5 37.8    318     CMP:   Recent Labs   Lab 12/06/20  0544 12/07/20  0714    138   K 5.0 4.2   CL 97 96   CO2 30* 30*   GLU 98 88   BUN 11 13   CREATININE 0.6 0.6   CALCIUM 9.1 9.1   PROT 6.7 6.7   ALBUMIN 2.4* 2.4*   BILITOT 0.1 0.1   ALKPHOS 60 65   AST 30 23   ALT 26 23   ANIONGAP 10 12   EGFRNONAA >60 >60     All pertinent labs within the past 24 hours have been reviewed.    Significant Imaging: I have reviewed all pertinent imaging results/findings  within the past 24 hours.

## 2020-12-07 NOTE — NURSING
Pt does not need to be NPO per Dr Bucio & he is going to talk to  Dr Mackey regarding placing Heimlich valve

## 2020-12-07 NOTE — PLAN OF CARE
Patient AAO X 4. Patient free from injury during shift. Pain managed pharmacologically. Provided full relief. Patient free from N/V during shift. IV access has KVO. Patient placed on cardiac monitoring. NSR. Remained afebrile during shift. Pt on O2 @ 3 tolerating well. Pt able to ambulate to restroom with assist X1.   Restroom offered. Repositioned as needed. Safety maintained with bed alarm. Bed in lowest position, call light and personal items within reach. Patient verbalized understanding of care. Will continue to monitor with every 2 hour rounding.

## 2020-12-07 NOTE — ASSESSMENT & PLAN NOTE
Chest tube intact and functioning well  Continue current treatment regimen  Chest x-ray reviewed - remains stable  Heimlich valve placed today.

## 2020-12-07 NOTE — NURSING
Heimlich valve connected to malone bag    Malone bag noted to be filling up with air. Spoke with Dr Mackey, the bag needs to be vented. Malone unclamped for air to exit. Night nurse, Tamara, aware

## 2020-12-07 NOTE — PROGRESS NOTES
Consult Note  Infectious Disease    Reason for Consult:       HPI: Fabiana Morel is a   65 y.o. female with  PMHx of HTN, D LP,  anemia, malnutrition, cachexia, BMI of 15.9, right femur fracture status post surgery in December 2019, deconditioning, uses cane, asthma, immunodeficiency, bronchiectasis, pulmonary cavitary lung disease, COPD, pneumonia, chronic hypoxemic respiratory failure on 2-3 L. who presents to the ED with an onset of worsening SOB for 3 weeks, worse over the past 3 days associated with right-sided chest pain..       Patient and  states that she gets either pneumonia or bronchitis every year around October November.  This year was about the same.  She developed progressive shortness of breath.  Dr. Mackey had sent sputum cultures which are growing AFB, within 1 week.(11/05/--11/12)    She has received prednisone and antibiotics(Augmentin) as outpatient without much improvement.   Even if she had home oxygen for multiple years and not using it much, patient needed to use quite a lot of it and actually going over 3 L per minute   She developed progressive shortness of breath, not able to catch her breath, right-sided chest pain.       She came to ED  She was diagnosed with pneumothorax and underwent right chest tube placement.   Patient denies hemoptysis.     Decreased appetite despite multiple appetite stimulants.     She may have lost about 5-7 lb of to over the past 6 months.   No night sweats.  Does not change her clothes at night.  She gets hot flashes since she had hysterectomy.      No daily medications were taken today (Zithromax, Rifadin and Nydrazid). The patient denies any other symptoms at this time. No pertinent PMHx.     She continues to smoke.  No TB exposure.  She was never in an institution.  She has a dog.  No cats.  No birds.  No travel.      12/01/2020 right chest discomfort, understandable.  Ct  On Rt upper chest.  On 4 L o2    12/2:  Consult/notes reviewed, imaging and  cultures reviewed. D/w Dr. Mackey. Per Dr. 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 TB Gold neg. She is having more difficulty expectorating and her sputum is green pea soup in appearance. She has not been out of bed except to restroom. She is tolerating current meds very well. She is only eating about 2 meals per day but takes the boost. She refuses flu vaccine.   12/3:  Afebrile, on steroids.  Formal chest tube placed today with smaller air leak.  Chest x-rays reviewed Sputum culture from 12/01 with Pseudomonas, ID and susceptibility pending.  Labs review in are stable with very low albumin 2.2. She has substantial pain. Did not eat much today secondary to procedure. Tolerating multiple po meds.   12/4:  Interim reviewed.  She is receiving 3 L by OxyMask alternating with nasal cannula, she is afebrile, white blood cells normal, AST slightly higher.  Pseudomonas in her sputum is pansensitive.  The AFB in her sputum has not yet been identified but a rapidly growing afb susceptibility panel has been initiated.  She had a better day today, still is working on pain management.  She is coughing, does a get some benefit from the Acapella but is not expectorating any sputum.  She is eating well per .  12/7:  Afebrile, Heimlichvalve replaced today by . AFB ID is still pending. Labs stable.  It may be the technique but the left apical infiltrates are slightly improved from admission. pleurevac disconnected, chest tube vented to malone with intention to vent. She had 3 soft stools today. No med intolerance. Eating 2 meals per day.     Antibiotics (From admission, onward)    Start     Stop Route Frequency Ordered    12/02/20 0900  rifAMpin capsule 300 mg      -- Oral Daily 12/01/20 1829 12/01/20 2100  clarithromycin tablet 500 mg      -- Oral Every 12 hours 12/01/20 1829 12/01/20 1945  meropenem-0.9% sodium chloride 1 g/50 mL IVPB      -- IV Every 8 hours (non-standard times) 12/01/20 1837     12/01/20 1830  linezolid tablet 600 mg      -- Oral Every 12 hours 12/01/20 1829 12/01/20 1830  ethambutoL tablet 600 mg      -- Oral Daily 12/01/20 1829             EXAM & DIAGNOSTICS REVIEWED:   Vitals:     Temp:  [97 °F (36.1 °C)-97.7 °F (36.5 °C)]   Temp: 97.6 °F (36.4 °C) (12/07/20 1549)  Pulse: 99 (12/07/20 1549)  Resp: 16 (12/07/20 1644)  BP: 121/69 (12/07/20 1549)  SpO2: 99 % (12/07/20 1549)    Intake/Output Summary (Last 24 hours) at 12/7/2020 1743  Last data filed at 12/7/2020 1400  Gross per 24 hour   Intake 1040 ml   Output 40 ml   Net 1000 ml       General:  In NAD. Alert and attentive, cooperative, comfortable    thin  female  Eyes: Anicteric,   EOMI  ENT: No ulcers, exudates, thrush, nares patent, dentition is fair  Neck: supple,    Lungs:   decreased BS, but minimal crackles left base.   chest tube 12/3,   Heart: RRR, no gallop/murmur/rub noted  Abd: Soft, NT, ND, normal BS, no masses or organomegaly appreciated.  :  Voids  no flank tenderness  Musc: Joints without effusion, swelling, erythema, synovitis, muscle wasting.   Skin: No rashes .   Wound:   Neuro: Alert, attentive, speech fluent, face symmetric, moves all extremities, no focal weakness  Psych: Calm, cooperative  Lymphatic:      Extrem: No edema, erythema, phlebitis, cellulitis, warm and well perfused  VAD:   peripheral   Isolation:  airborne    Lines/Tubes/Drains:    General Labs reviewed:  Recent Labs   Lab 12/05/20  1326 12/06/20  0544 12/07/20  0714   WBC 10.04 7.23 8.12   HGB 12.3 11.9* 11.9*   HCT 39.9 38.5 37.8    305 318       Recent Labs   Lab 12/05/20  1326 12/06/20  0544 12/07/20  0714    137 138   K 3.7 5.0 4.2   CL 96 97 96   CO2 33* 30* 30*   BUN 12 11 13   CREATININE 0.6 0.6 0.6   CALCIUM 9.6 9.1 9.1   PROT 7.0 6.7 6.7   BILITOT 0.2 0.1 0.1   ALKPHOS 65 60 65   ALT 27 26 23   AST 33 30 23           Micro:  Microbiology Results (last 7 days)     Procedure Component Value Units Date/Time    Culture,  Respiratory with Gram Stain [[id number]]  (Abnormal)  (Susceptibility) Collected: 12/01/20 1108    Order Status: Completed Specimen: Respiratory from Sputum, Expectorated Updated: 12/04/20 1304     Respiratory Culture No S aureus isolated.      PSEUDOMONAS AERUGINOSA  Few        CANDIDA ALBICANS  Moderate  Normal respiratory chelsea also present       Gram Stain (Respiratory) <10 epithelial cells per low power field.     Gram Stain (Respiratory) Rare WBC's     Gram Stain (Respiratory) No organisms seen    Culture, Respiratory with Gram Stain [[id number]] Collected: 12/01/20 1108    Order Status: Sent Specimen: Respiratory from Sputum, Expectorated Updated: 12/01/20 1109        Procedure Component Value Units Date/Time   Culture, Respiratory with Gram Stain [[id number]]    Order Status: No result Specimen: Respiratory from Sputum, Expectorated    AFB Culture & Smear [[id number]] (Abnormal) Collected: 11/05/20 1304   Order Status: Completed Specimen: Respiratory from Sputum, Expectorated Updated: 11/12/20 0740    AFB Culture & Smear Culture positive, identification pending     Verbal report given for previous positive smear    AFB CULTURE STAIN Positive for acid fast bacilli, 4 orgs/fieldAbnormal     AFB CULTURE STAIN Verbal report given for previous positive smearAbnormal    AFB Culture & Smear [[id number]] (Abnormal) Collected: 11/04/20 1529   Order Status: Completed Specimen: Respiratory from Sputum, Expectorated Updated: 11/18/20 1630    AFB Culture & Smear Culture positive, identification pending     Verbal report given for previous positive smear  11/18/2020  16:29    AFB CULTURE STAIN Positive for acid fast bacilli, 4 orgs/fieldAbnormal     AFB CULTURE STAIN Results called to and read back by: Brittney Brown  11/05/2020  14:14Abnormal    AFB Culture & Smear [[id number]] Collected: 11/03/20 1400   Order Status: Completed Specimen: Respiratory from Sputum, Expectorated Updated: 11/10/20 1359    AFB Culture & Smear Culture  positive, identification pending     Verbal report given for previous positive smear    AFB CULTURE STAIN No acid fast bacilli seen.   Culture, Respiratory with Gram Stain [[id number]] Collected: 11/03/20 1400   Order Status: Completed Specimen: Respiratory from Sputum Updated: 11/06/20 1003    Respiratory Culture Normal respiratory chelsea     No S aureus or Pseudomonas isolated.    Gram Stain (Respiratory) <10 epithelial cells per low power field.    Gram Stain (Respiratory) Rare WBC's    Gram Stain (Respiratory) Rare Gram positive cocci    Gram Stain (Respiratory) Rare Gram negative rods       Imaging Reviewed:   CXRUnchanged small volume right pneumothorax with chest tube in place.  New airspace disease at the right base suspicious for atelectasis or edema.  COPD.    CT11/01/2020    No evidence of pulmonary thromboemboli particularly in the large central arteries and lobar branches as imaged.  Peripheral branches are difficult to evaluate particularly in the upper lobes as described above.  New focus of nodular consolidation with cavitary of formation in the right upper lobe laterally and progression/increased in number of bilateral apical large cavities.  There is a small air-fluid level in 1 of the cavities on the left.  Findings are consistent with at infectious etiology.  Follow-up is suggested to exclude pulmonary nodular lesions/neoplasm.  Mediastinal adenopathy and possible hilar adenopathy which is likely reactive.  No new pleural or pericardial effusion or other acute process.  Additional stable incidental findings as above.      Cardiology:    IMPRESSION & PLAN     1. Pneumonia due to AFB in a cachectic patient.    Tb Gold Neg   AFB grew from sputum (11/4---11/18==14 days;  11/5--11/12==7 days)  This could be either MAC (more likely) or fast growing mycobacterium like M. fortuitum, M. abscesses, M Chelonae.   Less likely to be TB.  Discontinue isolation at discharge or if DNA probe is negative  2. Right  pneumothorax.   Heimlich valve in place since admission, change to formal chest tube 12/3   Severe bullous emphysema  3. Potential superinfection with Pseudomonas, growing from sputum    4.  PMHx of HTN, DLP,  anemia, malnutrition, cachexia, BMI of 15.9, right femur fracture status post surgery in December 2019, deconditioning, uses cane, asthma, immunodeficiency, bronchiectasis, pulmonary cavitary lung disease, COPD, pneumonia, chronic hypoxemic respiratory failure on 2-3 L.     Recommendations:   continue broad spectrum coverage (she is on half dose rifampin because her weight is so low)  Would give probiotic if she can tolerate  Follow DNA probe of AFB sent by Dr. Mackey  Limit steroids as able  For possible MAC: Clarithromycin or azithro + Rifampin +  Ethambutol  For possible Fast growers: Clarithro+ Zyvox + Meropenem    Follow final cultures  Baseline eye exam because of ethambutol in the near future  Will taper therapy further depending on recults    Meropenem  will cover the pseudomonas in her sputum     Medical Decision Making during this encounter was  [_] Low Complexity  [_] Moderate Complexity  [x  ] High Complexity

## 2020-12-07 NOTE — ASSESSMENT & PLAN NOTE
Noted, stable on 3 L nasal cannula.  Continue supplemental oxygen.  Pt does have oxygen at home.

## 2020-12-07 NOTE — PROGRESS NOTES
Progress Note  PULMONARY    Admit Date: 11/29/2020 12/07/2020  History of Present Illness:    smoker, severe copd , h/o recurrent pneumonias with neg bronch in 2017.   Pt has some vague immune def.   Had recent ct chest with non  Resolving cavitary disease ppt eval with pos afb early November.  Tb gold neg, and pt rx on  Inh/rif/azithr pending id. dna probe requested.  Pt continued with night sweats - chronic? And had  Brown mucous increase with sob 3 wks ago.  Pt had decreased appetite taking in boost only, and became bed bound.  Breathing worsened - 02 needs increased from2.5to 3.5-subjective sob.       Pt was not acutely symptomatic when mycobacterial rx started but worsened.  She was to call if worsened.  She did stop smoking.     Er eval  Pm showed right pneumo- very large. Thoracic vent to heimlich valve done with some re inflation.      Pt still sob.   Plan: will need to get id mycobacteria, will ask id to see, needs better pneumo treatment. Ppn,  Will need therpay.  Could have pseudomonas/mras complicating.   Needs sputum culture.    12/5 the patient is without complaints.  She is coughing up green mucus and not clearing it most of the time    12/6 the patient is without complaints.  She is fatigued.  SUBJECTIVE:     12/1 no new c/o, feels much better.    12/2 having increased sob last few min.   Thick brown green mucous  12/3 no mucous today. Post  Chest tube pain.    12/4 no new c/o  12/5 she has some pain at the thoracostomy tube site  12/6 patient states she had a good night.  Below from Dr Mackey,   12/7 no new c/o        PFSH and Allergies reviewed.    OBJECTIVE:     Vitals (Most recent):  Vitals:    12/07/20 0900   BP:    Pulse: 94   Resp: 16   Temp: 97 °F (36.1 °C)       Vitals (24 hour range):  Temp:  [97 °F (36.1 °C)-97.7 °F (36.5 °C)]   Pulse:  []   Resp:  [14-18]   BP: (117-137)/(72-85)   SpO2:  [96 %-99 %]       Intake/Output Summary (Last 24 hours) at 12/7/2020 0958  Last data  filed at 12/7/2020 0520  Gross per 24 hour   Intake 530 ml   Output 40 ml   Net 490 ml          Physical Exam:  The patient's neuro status (alertness,orientation,cognitive function,motor skills,), pharyngeal exam (oral lesions, hygiene, abn dentition,), Neck (jvd,mass,thyroid,nodes in neck and above/below clavicle),RESPIRATORY(symmetry,effort,fremitus,percussion,auscultation),  Cor(rhythm,heart tones including gallops,perfusion,edema)ABD(distention,hepatic&splenomegaly,tenderness,masses), Skin(rash,cyanosis),Psyc(affect,judgement,).  Exam negative except for these pertinent findings:    Alert, chest is symmetric, no distress, normal percussion, normal fremitus and there is an expiratory wheeze on the left.  There crackles on the right.  There are rhonchi present bilaterally.  Sputum is thick and green but small quantity.      Radiographs reviewed: view by direct vision  No pneumo seen 12/1  Results for orders placed during the hospital encounter of 11/29/20   X-Ray Chest 1 View    Narrative EXAMINATION:  XR CHEST 1 VIEW    CLINICAL HISTORY:  pneumothorax; Pneumothorax, unspecified    TECHNIQUE:  Single frontal view of the chest was performed.    COMPARISON:  11/30/2020    FINDINGS:  Chest tube remains in place in the right upper lung field and other tube or line projects over the right lung base.  Right pneumothorax is not seen on the current film.  Subcutaneous emphysema appears mildly decreased.  Right basilar infiltrate with probable also small right pleural effusion do not appear significantly changed.  Stranding left suprahilar not significantly changed.  Heart not enlarged.      Impression Right-sided chest tube remains in place with the appearance of the chest not significantly changed compared to the prior exam.  No visible pneumothorax      Electronically signed by: Kristin Chávez MD  Date:    12/01/2020  Time:    08:29   ] 12/5 no change in the chest x-ray with good placement of the thoracostomy tube, no  pneumothorax    12/6 bilateral apical lung disease present.  Elevated right hemidiaphragm.  Thoracostomy tube in position.     Labs     Recent Labs   Lab 12/07/20  0714   WBC 8.12   HGB 11.9*   HCT 37.8        Recent Labs   Lab 12/07/20  0714      K 4.2   CL 96   CO2 30*   BUN 13   CREATININE 0.6   GLU 88   CALCIUM 9.1   MG 2.2   AST 23   ALT 23   ALKPHOS 65   BILITOT 0.1   PROT 6.7   ALBUMIN 2.4*   No results for input(s): PH, PCO2, PO2, HCO3 in the last 24 hours.  Microbiology Results (last 7 days)     Procedure Component Value Units Date/Time    Culture, Respiratory with Gram Stain [[id number]]  (Abnormal)  (Susceptibility) Collected: 12/01/20 1108    Order Status: Completed Specimen: Respiratory from Sputum, Expectorated Updated: 12/04/20 1304     Respiratory Culture No S aureus isolated.      PSEUDOMONAS AERUGINOSA  Few        CANDIDA ALBICANS  Moderate  Normal respiratory chelsea also present       Gram Stain (Respiratory) <10 epithelial cells per low power field.     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 Mackey, had CT in 12/15.  Demonstrated multiple lung nodules, several spiculated masses        Resolved Hospital Problems    No resolved problems to display.    Tobacco abuse  Spontaneous pneumothorax secondary to cavitary lung disease  Bronchiectasis  COPD  Presumed DAVIS, AFB positive, QuantiFERON gold negative  Pseudomonas in sputum  Under weight          Plan:     12/1- cxr with resolved pneumo, pt feeling much better, no night sweats, eating very well.  Suspect she developed pneumo shortly after starting mycobacterial therapy.  Need to mobilize.  Called main [facility] - hoping id of mycobacterium out on 12/2.  Air leak brisk - pt not good operative risk for chest surg.  May need prolonged suction?      If no tb would stop isolation and start physical therapy.    Sputum submitted today - taper abx per results.       12/2 cxr ok, sm sub q air, no id for afb.    Will recheck cxr - air leak brisk.  Having chest pain that may ppt atelectasis/sob- place norco q4 hold if sedate.     Lab says looks like mac, may have 2nd organism?, probe should be out next wk.    12/3 cxr today post chest tube is better, control pain, mobilize to chair am    12/4 no fever, on clarithro, etham, zyvox, rifampin and prednisone 5 bid.    3lpm nc, merrem for pseudomonas .    Pt progressing- try heimlich valve 1st of next wk?    12/5 continue thoracostomy tube to suction.  Receiving antibiotics for atypical mycobacterium as well as the Pseudomonas in her sputum  Strongly encouraged to stop smoking    12/6   Continue bronchodilators  Continue treatment for DAVIS  Will defer to ID about treatment for Pseudomonas although this is likely a colonizer with her bronchiectasis    Below from Dr Mackey  12/7 with air leak would treat pseudomonas,    Heimlich valve would be good to try.  Heimlich placed, will try vest, needs to mobilize.      Consider outpt soon- would like mycobacterial rx cleared up.        .

## 2020-12-07 NOTE — PLAN OF CARE
SW spoke to patient's spouse [place] Adriel [phone] regarding discharge plan home with family.  Morales verbalized understanding.       12/07/20 1355   Discharge Reassessment   Assessment Type Discharge Planning Reassessment   Provided patient/caregiver education on the expected discharge date and the discharge plan Yes   Do you have any problems affording any of your prescribed medications? Yes   Discharge Plan A Home   Discharge Plan B Home with family   DME Needed Upon Discharge  none   Patient choice form signed by patient/caregiver Yes

## 2020-12-08 DIAGNOSIS — Z96.641 HISTORY OF RIGHT HIP REPLACEMENT: Primary | ICD-10-CM

## 2020-12-08 PROBLEM — E43 SEVERE MALNUTRITION: Status: ACTIVE | Noted: 2020-11-30

## 2020-12-08 LAB
ALBUMIN SERPL BCP-MCNC: 2.5 G/DL (ref 3.5–5.2)
ALP SERPL-CCNC: 64 U/L (ref 55–135)
ALT SERPL W/O P-5'-P-CCNC: 20 U/L (ref 10–44)
ANION GAP SERPL CALC-SCNC: 12 MMOL/L (ref 8–16)
AST SERPL-CCNC: 21 U/L (ref 10–40)
BASOPHILS # BLD AUTO: 0.05 K/UL (ref 0–0.2)
BASOPHILS NFR BLD: 0.5 % (ref 0–1.9)
BILIRUB SERPL-MCNC: 0.2 MG/DL (ref 0.1–1)
BUN SERPL-MCNC: 17 MG/DL (ref 8–23)
CALCIUM SERPL-MCNC: 9.1 MG/DL (ref 8.7–10.5)
CHLORIDE SERPL-SCNC: 97 MMOL/L (ref 95–110)
CO2 SERPL-SCNC: 28 MMOL/L (ref 23–29)
CREAT SERPL-MCNC: 0.5 MG/DL (ref 0.5–1.4)
DIFFERENTIAL METHOD: ABNORMAL
EOSINOPHIL # BLD AUTO: 0.3 K/UL (ref 0–0.5)
EOSINOPHIL NFR BLD: 2.8 % (ref 0–8)
ERYTHROCYTE [DISTWIDTH] IN BLOOD BY AUTOMATED COUNT: 16.6 % (ref 11.5–14.5)
EST. GFR  (AFRICAN AMERICAN): >60 ML/MIN/1.73 M^2
EST. GFR  (NON AFRICAN AMERICAN): >60 ML/MIN/1.73 M^2
GLUCOSE SERPL-MCNC: 94 MG/DL (ref 70–110)
HCT VFR BLD AUTO: 36.8 % (ref 37–48.5)
HGB BLD-MCNC: 11.2 G/DL (ref 12–16)
IMM GRANULOCYTES # BLD AUTO: 0.05 K/UL (ref 0–0.04)
IMM GRANULOCYTES NFR BLD AUTO: 0.5 % (ref 0–0.5)
LYMPHOCYTES # BLD AUTO: 2.2 K/UL (ref 1–4.8)
LYMPHOCYTES NFR BLD: 22.4 % (ref 18–48)
M TB DNA SPEC QL NAA+PROBE: ABNORMAL
MAGNESIUM SERPL-MCNC: 2.2 MG/DL (ref 1.6–2.6)
MCH RBC QN AUTO: 28.9 PG (ref 27–31)
MCHC RBC AUTO-ENTMCNC: 30.4 G/DL (ref 32–36)
MCV RBC AUTO: 95 FL (ref 82–98)
MONOCYTES # BLD AUTO: 0.5 K/UL (ref 0.3–1)
MONOCYTES NFR BLD: 4.8 % (ref 4–15)
NEUTROPHILS # BLD AUTO: 6.6 K/UL (ref 1.8–7.7)
NEUTROPHILS NFR BLD: 69 % (ref 38–73)
NRBC BLD-RTO: 0 /100 WBC
PLATELET # BLD AUTO: 300 K/UL (ref 150–350)
PMV BLD AUTO: 8.8 FL (ref 9.2–12.9)
POTASSIUM SERPL-SCNC: 4.2 MMOL/L (ref 3.5–5.1)
PROT SERPL-MCNC: 6.8 G/DL (ref 6–8.4)
RBC # BLD AUTO: 3.87 M/UL (ref 4–5.4)
SODIUM SERPL-SCNC: 137 MMOL/L (ref 136–145)
WBC # BLD AUTO: 9.61 K/UL (ref 3.9–12.7)

## 2020-12-08 PROCEDURE — 25000003 PHARM REV CODE 250: Performed by: NURSE PRACTITIONER

## 2020-12-08 PROCEDURE — 25000242 PHARM REV CODE 250 ALT 637 W/ HCPCS: Performed by: INTERNAL MEDICINE

## 2020-12-08 PROCEDURE — 27100233

## 2020-12-08 PROCEDURE — 99232 SBSQ HOSP IP/OBS MODERATE 35: CPT | Mod: S$GLB,,, | Performed by: INTERNAL MEDICINE

## 2020-12-08 PROCEDURE — 36415 COLL VENOUS BLD VENIPUNCTURE: CPT

## 2020-12-08 PROCEDURE — 99232 PR SUBSEQUENT HOSPITAL CARE,LEVL II: ICD-10-PCS | Mod: S$GLB,,, | Performed by: INTERNAL MEDICINE

## 2020-12-08 PROCEDURE — 94669 MECHANICAL CHEST WALL OSCILL: CPT

## 2020-12-08 PROCEDURE — 63600175 PHARM REV CODE 636 W HCPCS: Performed by: INTERNAL MEDICINE

## 2020-12-08 PROCEDURE — 83735 ASSAY OF MAGNESIUM: CPT

## 2020-12-08 PROCEDURE — 94664 DEMO&/EVAL PT USE INHALER: CPT

## 2020-12-08 PROCEDURE — 99232 SBSQ HOSP IP/OBS MODERATE 35: CPT | Mod: ,,, | Performed by: INTERNAL MEDICINE

## 2020-12-08 PROCEDURE — 63600175 PHARM REV CODE 636 W HCPCS: Performed by: NURSE PRACTITIONER

## 2020-12-08 PROCEDURE — 94640 AIRWAY INHALATION TREATMENT: CPT

## 2020-12-08 PROCEDURE — 25000003 PHARM REV CODE 250: Performed by: INTERNAL MEDICINE

## 2020-12-08 PROCEDURE — 27100098 HC SPACER

## 2020-12-08 PROCEDURE — 97116 GAIT TRAINING THERAPY: CPT

## 2020-12-08 PROCEDURE — 99900035 HC TECH TIME PER 15 MIN (STAT)

## 2020-12-08 PROCEDURE — 80053 COMPREHEN METABOLIC PANEL: CPT

## 2020-12-08 PROCEDURE — 99232 PR SUBSEQUENT HOSPITAL CARE,LEVL II: ICD-10-PCS | Mod: ,,, | Performed by: INTERNAL MEDICINE

## 2020-12-08 PROCEDURE — 94668 MNPJ CHEST WALL SBSQ: CPT

## 2020-12-08 PROCEDURE — 27000221 HC OXYGEN, UP TO 24 HOURS

## 2020-12-08 PROCEDURE — 27000646 HC AEROBIKA DEVICE

## 2020-12-08 PROCEDURE — 85025 COMPLETE CBC W/AUTO DIFF WBC: CPT

## 2020-12-08 PROCEDURE — 11000001 HC ACUTE MED/SURG PRIVATE ROOM

## 2020-12-08 PROCEDURE — 94761 N-INVAS EAR/PLS OXIMETRY MLT: CPT

## 2020-12-08 RX ADMIN — HYDROCODONE BITARTRATE AND ACETAMINOPHEN 1 TABLET: 5; 325 TABLET ORAL at 05:12

## 2020-12-08 RX ADMIN — MEROPENEM AND SODIUM CHLORIDE 1 G: 1 INJECTION, SOLUTION INTRAVENOUS at 05:12

## 2020-12-08 RX ADMIN — MEROPENEM AND SODIUM CHLORIDE 1 G: 1 INJECTION, SOLUTION INTRAVENOUS at 11:12

## 2020-12-08 RX ADMIN — OXYCODONE HYDROCHLORIDE AND ACETAMINOPHEN 1 TABLET: 5; 325 TABLET ORAL at 08:12

## 2020-12-08 RX ADMIN — PREDNISONE 5 MG: 5 TABLET ORAL at 08:12

## 2020-12-08 RX ADMIN — IPRATROPIUM BROMIDE AND ALBUTEROL SULFATE 3 ML: .5; 2.5 SOLUTION RESPIRATORY (INHALATION) at 07:12

## 2020-12-08 RX ADMIN — HYDROCODONE BITARTRATE AND ACETAMINOPHEN 1 TABLET: 5; 325 TABLET ORAL at 01:12

## 2020-12-08 RX ADMIN — MEROPENEM AND SODIUM CHLORIDE 1 G: 1 INJECTION, SOLUTION INTRAVENOUS at 08:12

## 2020-12-08 RX ADMIN — ENOXAPARIN SODIUM 40 MG: 100 INJECTION SUBCUTANEOUS at 05:12

## 2020-12-08 RX ADMIN — FERROUS SULFATE TAB EC 325 MG (65 MG FE EQUIVALENT) 325 MG: 325 (65 FE) TABLET DELAYED RESPONSE at 08:12

## 2020-12-08 RX ADMIN — HYDROCODONE BITARTRATE AND ACETAMINOPHEN 1 TABLET: 5; 325 TABLET ORAL at 09:12

## 2020-12-08 RX ADMIN — LINEZOLID 600 MG: 600 TABLET, FILM COATED ORAL at 08:12

## 2020-12-08 RX ADMIN — FLUTICASONE FUROATE AND VILANTEROL TRIFENATATE 1 PUFF: 200; 25 POWDER RESPIRATORY (INHALATION) at 07:12

## 2020-12-08 RX ADMIN — CLARITHROMYCIN 500 MG: 500 TABLET, FILM COATED ORAL at 08:12

## 2020-12-08 RX ADMIN — RIFAMPIN 300 MG: 300 CAPSULE ORAL at 08:12

## 2020-12-08 RX ADMIN — HYDROCODONE BITARTRATE AND ACETAMINOPHEN 1 TABLET: 5; 325 TABLET ORAL at 11:12

## 2020-12-08 RX ADMIN — IPRATROPIUM BROMIDE AND ALBUTEROL SULFATE 3 ML: .5; 2.5 SOLUTION RESPIRATORY (INHALATION) at 02:12

## 2020-12-08 RX ADMIN — ETHAMBUTOL HYDROCHLORIDE 600 MG: 400 TABLET, FILM COATED ORAL at 08:12

## 2020-12-08 RX ADMIN — AMIKACIN SULFATE 450 MG: 250 INJECTION INTRAMUSCULAR; INTRAVENOUS at 09:12

## 2020-12-08 RX ADMIN — ATORVASTATIN CALCIUM 40 MG: 40 TABLET, FILM COATED ORAL at 08:12

## 2020-12-08 RX ADMIN — IPRATROPIUM BROMIDE AND ALBUTEROL SULFATE 3 ML: .5; 2.5 SOLUTION RESPIRATORY (INHALATION) at 12:12

## 2020-12-08 NOTE — NURSING
Pt transported to PCU via wheelchair with supplemental oxygen at 3 lpm NC, IV saline locked, Vaseline gauze available. Arrived on PCU without issue, pt reconnected to Tele Monitor, wall oxygen, IV intact. Vaseline gauze left at the bedside and chart given to receiving nurse.

## 2020-12-08 NOTE — PLAN OF CARE
Patient aerosol Q6 given with mask tolerated well sats 98% on 3lpm/CPT with vest Q6 done x 10mins, Aerobika Q6 done 10bpm

## 2020-12-08 NOTE — RESPIRATORY THERAPY
12/08/20 0710   Patient Assessment/Suction   Level of Consciousness (AVPU) alert   Respiratory Effort Unlabored   Expansion/Accessory Muscles/Retractions no use of accessory muscles;no retractions   All Lung Fields Breath Sounds diminished   Rhythm/Pattern, Respiratory unlabored;pattern regular   Cough Frequency frequent   Cough Type weak;congested   PRE-TX-O2   O2 Device (Oxygen Therapy) nasal cannula   $ Is the patient on Low Flow Oxygen? Yes   Flow (L/min) 3   Oxygen Analyzed Concentration (%) 32 %   SpO2 98 %   Pulse Oximetry Type Continuous   $ Pulse Oximetry - Multiple Charge Pulse Oximetry - Multiple   Pulse 108   Resp 18   Aerosol Therapy   $ Aerosol Therapy Charges Aerosol Treatment   Respiratory Treatment Status (SVN) given   Treatment Route (SVN) mask   Patient Position (SVN) HOB elevated   Post Treatment Assessment (SVN) breath sounds unchanged   Signs of Intolerance (SVN) none   Inhaler   $ Inhaler Charges MDI (Metered Dose Inahler) Treatment;Given With Spacer;Spacer - Equipment   Respiratory Treatment Status (Inhaler) given   Treatment Route (Inhaler) mouthpiece   Patient Position (Inhaler) HOB elevated   Post Treatment Assessment (Inhaler) breath sounds unchanged   Signs of Intolerance (Inhaler) none   Vibratory PEP Therapy   $ Vibratory PEP Charges Aerobika Therapy   $ Vibratory PEP Equipment Aerobika Equipment   $ Vibratory PEP Tech Time Charges 15 min   Type (PEP Therapy) vibratory/oscillatory   Route (PEP Therapy) mouthpiece   Cycles (PEP Therapy) 10   Settings (PEP Therapy) PEP 3   Patient Position (PEP Therapy) HOB elevated   Post Treatment Assessment (PEP) breath sounds improved   Signs of Intolerance (PEP Therapy) none   High Frequency Chest Compression Vest   $ Chest Compression Charges Therapy;Vest - Equipment   Vest Type (HFCCV) chest wrap vest   Duration (HFCCV) 10 mins   Patient Position (HFCCV) HOB elevated   Post Treatment Assessment (HFCCV) breath sounds improved   Signs of  Intolerance (HFCCV) none

## 2020-12-08 NOTE — ASSESSMENT & PLAN NOTE
Appreciate ID consult  Cont abx per ID.  Awaiting AFB DNA probe for plans re: discharge abx and isolation.  Sputum culture reviewed-Pseudomonas noted-on Merrem

## 2020-12-08 NOTE — PROGRESS NOTES
[facility] Hospital Medicine  Progress Note    Patient Name: Fabiana Morel  MRN: [id number]  Patient Class: IP- Inpatient   Admission Date: 11/29/2020  Length of Stay: 8 days  Attending Physician: Aniket Olguin MD  Primary Care Provider: Dominique Bartlett MD        Subjective:     Principal Problem:Pneumothorax on right        HPI:  Fabiana Morel is a 65-year-old female with a past medical history of COPD, hyperlipidemia, iron deficiency anemia, and tobacco use who presents to the emergency room tonight with reports of shortness of breath.  Patient reports shortness of breath x3 weeks, has increased in severity since onset.  Patient requiring supplemental oxygen therapy for the past three weeks at 2-3 L, however has required increase in normal O2 dose over the past three days. Patient currently under the care of Dr. Mackey, pulmonology, has a history of COPD. Patient accompanied by her  during my initial interview and physical exam. a history of COPD. Patient was reportedly seen in the emergency room on 11/06 for chest pain, a CT chest was reportedly obtained revealing right lung abscess.  Patient followed with Dr. Mackey in clinic on 11/09 in which sputum samples were obtained and treatment was initiated for tuberculosis versus MAC.  Patient also reports decreased p.o. intake, utilizes boost supplementation daily.  Patient endorses productive cough x3 days, with brown sputum production.  Patient endorses dyspnea on exertion, stating she can not walk the length of the room without becoming SOB.  Patient states she quit tobacco use on 11/05/2020.  In the ER, patient noted to have right pneumothorax on chest x-ray.  ER physician placed Heimlich valve chest tube with reported immediate relief in shortness of breath.  Repeat chest x-ray revealing improvement of pneumothorax.  ER MD reportedly spoke with Dr. Mackey, pulmonology who recommended admission with IV vancomycin and Zosyn and  isolation precautions.  Patient placed in observation on 11/29/2020 at approximately 9:30 p.m..  Full code status verified upon admission to the hospital.    Overview/Hospital Course:  Patient admitted for right pneumothorax.  Patient had Heimlich valve chest tube placed in the ER with improvement in pneumothorax.  Patient was being seen outpatient by Dr. 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  Dr. Mackey was consulted and follow patient closely in the hospital.  Patient was also evaluated by Infectious Disease to assist with antibiotic regimen.  Patient receiving clarithromycin, ethambutol, Zyvox, meropenem and rifampin. Patient's sputum culture growing Pseudomonas and ID closely following with further sensitivities pending.  Patient had air leak with chest tube and CT surgery was consulted. Dr. Bucio removed Heimlich valve and replaced with percutaneous chest tube on 12/3.  Chest tube was placed back to continuous Pleurovac suction. Air leak was still present but now intermittent and significantly smaller.  Patient's respiratory symptoms improving.  Patient was initiated on IV Clindamax due to poor oral intake and dietary was consulted.  Patient's appetite improved and she is tolerating boost supplements well and Clindamax was discontinued.  Patient with generalized weakness and deconditioning and PT/OT consulted and patient progressing well.       Interval History:  Notes reviewed, no acute events overnight. Heimlich valve placed today per Dr. Mackey.  Pt reports stable SOB on supplemental oxygen.  Mild pain at heimlich site.      Awaiting final AFB results before Pt can discharge.  Discussed with Dr. Mackey and Dr. Bartlett.    Review of Systems   Constitutional: Negative for fatigue and fever.   Respiratory: Positive for cough. Negative for chest tightness and shortness of breath.         Pleuritic cw pain at heimlich site   Cardiovascular: Negative for chest pain.   Gastrointestinal: Negative for abdominal  pain, nausea and vomiting.   Musculoskeletal: Negative for arthralgias, back pain and gait problem.   Skin: Negative for color change, pallor, rash and wound.   Neurological: Negative for weakness and headaches.   Psychiatric/Behavioral: Negative for confusion.   All other systems reviewed and are negative.    Objective:     Vital Signs (Most Recent):  Temp: 98 °F (36.7 °C) (12/07/20 2147)  Pulse: 100 (12/08/20 0839)  Resp: 16 (12/08/20 1101)  BP: (!) 143/86 (12/08/20 0839)  SpO2: 98 % (12/08/20 0839) Vital Signs (24h Range):  Temp:  [97.6 °F (36.4 °C)-98 °F (36.7 °C)] 98 °F (36.7 °C)  Pulse:  [] 100  Resp:  [14-21] 16  SpO2:  [96 %-99 %] 98 %  BP: (121-143)/(69-86) 143/86     Weight: 45.3 kg (99 lb 13.9 oz)  Body mass index is 17.14 kg/m².    Intake/Output Summary (Last 24 hours) at 12/8/2020 1130  Last data filed at 12/8/2020 0745  Gross per 24 hour   Intake 1030 ml   Output 20 ml   Net 1010 ml      Physical Exam  Vitals signs and nursing note reviewed.   Constitutional:       General: She is not in acute distress.     Appearance: Normal appearance. She is well-developed. She is ill-appearing.      Comments: Chronically ill-appearing  cachectic   HENT:      Head: Normocephalic and atraumatic.      Nose: Nose normal. No congestion or rhinorrhea.   Eyes:      Conjunctiva/sclera: Conjunctivae normal.      Pupils: Pupils are equal, round, and reactive to light.   Neck:      Musculoskeletal: Normal range of motion and neck supple.      Vascular: No JVD.   Cardiovascular:      Rate and Rhythm: Normal rate and regular rhythm.      Pulses: Normal pulses.      Heart sounds: Normal heart sounds. No murmur.   Pulmonary:      Effort: Pulmonary effort is normal. No respiratory distress.      Breath sounds: No stridor. Rales (basilar) present. No wheezing or rhonchi.      Comments: Heimlich valve to right posterior chest wall with dressing CDI. Attached to malone bag (vented) no drainage noted.  Abdominal:      General:  Bowel sounds are normal. There is no distension.      Palpations: Abdomen is soft.      Tenderness: There is no abdominal tenderness.   Musculoskeletal: Normal range of motion.         General: No swelling or tenderness.   Skin:     General: Skin is warm and dry.      Capillary Refill: Capillary refill takes 2 to 3 seconds.   Neurological:      General: No focal deficit present.      Mental Status: She is alert and oriented to person, place, and time.      Cranial Nerves: No cranial nerve deficit.      Sensory: No sensory deficit.      Motor: No weakness.      Coordination: Coordination normal.   Psychiatric:         Mood and Affect: Mood normal.         Behavior: Behavior normal.         Thought Content: Thought content normal.         Significant Labs:   CBC:   Recent Labs   Lab 12/07/20  0714 12/08/20  0606   WBC 8.12 9.61   HGB 11.9* 11.2*   HCT 37.8 36.8*    300     CMP:   Recent Labs   Lab 12/07/20  0714 12/08/20  0606    137   K 4.2 4.2   CL 96 97   CO2 30* 28   GLU 88 94   BUN 13 17   CREATININE 0.6 0.5   CALCIUM 9.1 9.1   PROT 6.7 6.8   ALBUMIN 2.4* 2.5*   BILITOT 0.1 0.2   ALKPHOS 65 64   AST 23 21   ALT 23 20   ANIONGAP 12 12   EGFRNONAA >60 >60     All pertinent labs within the past 24 hours have been reviewed.    Significant Imaging: I have reviewed all pertinent imaging results/findings within the past 24 hours.      Assessment/Plan:      * Pneumothorax on right  Chest tube intact and functioning well  Continue current treatment regimen  Chest x-ray reviewed - remains stable  Heimlich valve placed today.       Severe malnutrition  Secondary to protein malnutrition and poor oral intake  Supplement with boost  Appetite stable      Mycobacterium avium complex  Appreciate ID consult  Cont abx per ID.  Awaiting AFB DNA probe for plans re: discharge abx and isolation.  Sputum culture reviewed-Pseudomonas noted-on Merrem      Hypoalbuminemia due to protein-calorie malnutrition  Noted, encourage  PO intake - boost supplementation.        Iron deficiency  Chronic, continue home iron supplementation.        Immune deficiency disorder  Chronic issue complicating clinical picture.  Abx per ID and pulmonology recommendations.      Acute on chronic respiratory failure with hypoxia  Noted, stable on 3 L nasal cannula.  Continue supplemental oxygen.  Pt does have oxygen at home.        Bronchiectasis  Sputum culture reviewed - pseudomonas  ID recs noted.  Continue Abx      COPD (chronic obstructive pulmonary disease)  Chronic - continue home medication, pulmonology consult. Continuous supplemental oxygen, telemetry, and pulse oximetry monitoring.        Lung nodule  Noted.  Followed closely by pulmonology      Hyperlipidemia    Chronic, controlled. Will continue home medication.    Lab Results   Component Value Date    CHOL 187 08/21/2020    CHOL 182 05/22/2020    CHOL 182 06/25/2019     Lab Results   Component Value Date    HDL 55 08/21/2020    HDL 57 05/22/2020    HDL 65 06/25/2019     Lab Results   Component Value Date    LDLCALC 105.4 08/21/2020    LDLCALC 102.2 05/22/2020    LDLCALC 97.6 06/25/2019     Lab Results   Component Value Date    TRIG 133 08/21/2020    TRIG 114 05/22/2020    TRIG 97 06/25/2019     Lab Results   Component Value Date    CHOLHDL 29.4 08/21/2020    CHOLHDL 31.3 05/22/2020    CHOLHDL 35.7 06/25/2019               VTE Risk Mitigation (From admission, onward)         Ordered     enoxaparin injection 40 mg  Every 24 hours      11/29/20 2224     IP VTE HIGH RISK PATIENT  Once      11/29/20 2224     Place sequential compression device  Until discontinued      11/29/20 2224     Place STEPHANIE hose  Until discontinued      11/29/20 2224                Discharge Planning   DION: 12/4/2020     Code Status: Full Code   Is the patient medically ready for discharge?:     Reason for patient still in hospital (select all that apply): Other (specify) awaiting culture results.   Discharge Plan A: Home                   Leilani Mederos NP  Department of Hospital Medicine   [facility]

## 2020-12-08 NOTE — PROGRESS NOTES
Progress Note  PULMONARY    Admit Date: 11/29/2020 12/08/2020  History of Present Illness:    smoker, severe copd , h/o recurrent pneumonias with neg bronch in 2017.   Pt has some vague immune def.   Had recent ct chest with non  Resolving cavitary disease ppt eval with pos afb early November.  Tb gold neg, and pt rx on  Inh/rif/azithr pending id. dna probe requested.  Pt continued with night sweats - chronic? And had  Brown mucous increase with sob 3 wks ago.  Pt had decreased appetite taking in boost only, and became bed bound.  Breathing worsened - 02 needs increased from2.5to 3.5-subjective sob.       Pt was not acutely symptomatic when mycobacterial rx started but worsened.  She was to call if worsened.  She did stop smoking.     Er eval  Pm showed right pneumo- very large. Thoracic vent to heimlich valve done with some re inflation.      Pt still sob.   Plan: will need to get id mycobacteria, will ask id to see, needs better pneumo treatment. Ppn,  Will need therpay.  Could have pseudomonas/mras complicating.   Needs sputum culture.    12/5 the patient is without complaints.  She is coughing up green mucus and not clearing it most of the time    12/6 the patient is without complaints.  She is fatigued.  SUBJECTIVE:     12/1 no new c/o, feels much better.    12/2 having increased sob last few min.   Thick brown green mucous  12/3 no mucous today. Post  Chest tube pain.    12/4 no new c/o  12/5 she has some pain at the thoracostomy tube site  12/6 patient states she had a good night.  Below from Dr Mackey,   12/7 no new c/o  12/8 no new c/o- mucous gone now.        PFSH and Allergies reviewed.    OBJECTIVE:     Vitals (Most recent):  Vitals:    12/08/20 1434   BP:    Pulse: 108   Resp: 18   Temp:        Vitals (24 hour range):  Temp:  [97.6 °F (36.4 °C)-98 °F (36.7 °C)]   Pulse:  []   Resp:  [14-21]   BP: (121-143)/(69-86)   SpO2:  [96 %-99 %]       Intake/Output Summary (Last 24 hours) at 12/8/2020  1502  Last data filed at 12/8/2020 1200  Gross per 24 hour   Intake 880 ml   Output 20 ml   Net 860 ml          Physical Exam:  The patient's neuro status (alertness,orientation,cognitive function,motor skills,), pharyngeal exam (oral lesions, hygiene, abn dentition,), Neck (jvd,mass,thyroid,nodes in neck and above/below clavicle),RESPIRATORY(symmetry,effort,fremitus,percussion,auscultation),  Cor(rhythm,heart tones including gallops,perfusion,edema)ABD(distention,hepatic&splenomegaly,tenderness,masses), Skin(rash,cyanosis),Psyc(affect,judgement,).  Exam negative except for these pertinent findings:    Alert, chest is symmetric, no distress, normal percussion, normal fremitus and there is an expiratory wheeze on the left.  There crackles on the right.  There are rhonchi present bilaterally.  Sputum is thick and green but small quantity.      Radiographs reviewed: view by direct vision  No pneumo seen 12/1  Results for orders placed during the hospital encounter of 11/29/20   X-Ray Chest 1 View    Narrative EXAMINATION:  XR CHEST 1 VIEW    CLINICAL HISTORY:  pneumothorax; Pneumothorax, unspecified    TECHNIQUE:  Single frontal view of the chest was performed.    COMPARISON:  11/30/2020    FINDINGS:  Chest tube remains in place in the right upper lung field and other tube or line projects over the right lung base.  Right pneumothorax is not seen on the current film.  Subcutaneous emphysema appears mildly decreased.  Right basilar infiltrate with probable also small right pleural effusion do not appear significantly changed.  Stranding left suprahilar not significantly changed.  Heart not enlarged.      Impression Right-sided chest tube remains in place with the appearance of the chest not significantly changed compared to the prior exam.  No visible pneumothorax      Electronically signed by: Kristin Chávez MD  Date:    12/01/2020  Time:    08:29   ] 12/5 no change in the chest x-ray with good placement of the  thoracostomy tube, no pneumothorax    12/6 bilateral apical lung disease present.  Elevated right hemidiaphragm.  Thoracostomy tube in position.     Labs     Recent Labs   Lab 12/08/20  0606   WBC 9.61   HGB 11.2*   HCT 36.8*        Recent Labs   Lab 12/08/20  0606      K 4.2   CL 97   CO2 28   BUN 17   CREATININE 0.5   GLU 94   CALCIUM 9.1   MG 2.2   AST 21   ALT 20   ALKPHOS 64   BILITOT 0.2   PROT 6.8   ALBUMIN 2.5*   No results for input(s): PH, PCO2, PO2, HCO3 in the last 24 hours.  Microbiology Results (last 7 days)     Procedure Component Value Units Date/Time    Culture, Respiratory with Gram Stain [[id number]]  (Abnormal)  (Susceptibility) Collected: 12/01/20 1108    Order Status: Completed Specimen: Respiratory from Sputum, Expectorated Updated: 12/04/20 1304     Respiratory Culture No S aureus isolated.      PSEUDOMONAS AERUGINOSA  Few        CANDIDA ALBICANS  Moderate  Normal respiratory chelsea also present       Gram Stain (Respiratory) <10 epithelial cells per low power field.     Gram Stain (Respiratory) Rare WBC's     Gram Stain (Respiratory) No organisms seen          Impression:  Active Hospital Problems    Diagnosis  POA    *Pneumothorax on right [J93.9]  Yes    Severe malnutrition [E43]  Yes    Hypoalbuminemia due to protein-calorie malnutrition [E88.09, E46]  Yes    Mycobacterium avium complex [A31.0]  Yes    Iron deficiency [E61.1]  Yes    Immune deficiency disorder [D84.9]  Yes    Acute on chronic respiratory failure with hypoxia [J96.21]  Yes    Bronchiectasis [J47.9]  Yes    COPD (chronic obstructive pulmonary disease) [J44.9]  Yes    Hyperlipidemia [E78.5]  Yes    Lung nodule [R91.1]  Yes     Established with Dr. Mackey, had CT in 12/15.  Demonstrated multiple lung nodules, several spiculated masses        Resolved Hospital Problems   No resolved problems to display.    Tobacco abuse  Spontaneous pneumothorax secondary to cavitary lung  disease  Bronchiectasis  COPD  Presumed DAVIS, AFB positive, QuantiFERON gold negative  Pseudomonas in sputum  Under weight          Plan:     12/1- cxr with resolved pneumo, pt feeling much better, no night sweats, eating very well.  Suspect she developed pneumo shortly after starting mycobacterial therapy.  Need to mobilize.  Called [facility] - hoping id of mycobacterium out on 12/2.  Air leak brisk - pt not good operative risk for chest surg.  May need prolonged suction?      If no tb would stop isolation and start physical therapy.    Sputum submitted today - taper abx per results.       12/2 cxr ok, sm sub q air, no id for afb.    Will recheck cxr - air leak brisk.  Having chest pain that may ppt atelectasis/sob- place norco q4 hold if sedate.     Lab says looks like mac, may have 2nd organism?, probe should be out next wk.    12/3 cxr today post chest tube is better, control pain, mobilize to chair am    12/4 no fever, on clarithro, etham, zyvox, rifampin and prednisone 5 bid.    3lpm nc, merrem for pseudomonas .    Pt progressing- try heimlich valve 1st of next wk?    12/5 continue thoracostomy tube to suction.  Receiving antibiotics for atypical mycobacterium as well as the Pseudomonas in her sputum  Strongly encouraged to stop smoking    12/6   Continue bronchodilators  Continue treatment for DAVIS  Will defer to ID about treatment for Pseudomonas although this is likely a colonizer with her bronchiectasis    Below from Dr Mackey  12/7 with air leak would treat pseudomonas,    Heimlich valve would be good to try.  Heimlich placed, will try vest, needs to mobilize.      Consider outpt soon- would like mycobacterial rx cleared up.    12/8 tried vest but not having any mucous.    cxr looks good wrt pneumo  Abscesus dx, likely can stop isolation but defer to ID.  Also regimen to rx- likley will need iv abx?  Air leak lingering as expected.  Pt seems to be much better.  .

## 2020-12-08 NOTE — SUBJECTIVE & OBJECTIVE
Interval History:  Notes reviewed, no acute events overnight. Heimlich valve placed today per Dr. Mackey.  Pt reports stable SOB on supplemental oxygen.  Mild pain at heimlich site.      Awaiting final AFB results before Pt can discharge.  Discussed with Dr. Mackey and Dr. Bartlett.    Review of Systems   Constitutional: Negative for fatigue and fever.   Respiratory: Positive for cough. Negative for chest tightness and shortness of breath.         Pleuritic cw pain at heimlich site   Cardiovascular: Negative for chest pain.   Gastrointestinal: Negative for abdominal pain, nausea and vomiting.   Musculoskeletal: Negative for arthralgias, back pain and gait problem.   Skin: Negative for color change, pallor, rash and wound.   Neurological: Negative for weakness and headaches.   Psychiatric/Behavioral: Negative for confusion.   All other systems reviewed and are negative.    Objective:     Vital Signs (Most Recent):  Temp: 98 °F (36.7 °C) (12/07/20 2147)  Pulse: 100 (12/08/20 0839)  Resp: 16 (12/08/20 1101)  BP: (!) 143/86 (12/08/20 0839)  SpO2: 98 % (12/08/20 0839) Vital Signs (24h Range):  Temp:  [97.6 °F (36.4 °C)-98 °F (36.7 °C)] 98 °F (36.7 °C)  Pulse:  [] 100  Resp:  [14-21] 16  SpO2:  [96 %-99 %] 98 %  BP: (121-143)/(69-86) 143/86     Weight: 45.3 kg (99 lb 13.9 oz)  Body mass index is 17.14 kg/m².    Intake/Output Summary (Last 24 hours) at 12/8/2020 1130  Last data filed at 12/8/2020 0745  Gross per 24 hour   Intake 1030 ml   Output 20 ml   Net 1010 ml      Physical Exam  Vitals signs and nursing note reviewed.   Constitutional:       General: She is not in acute distress.     Appearance: Normal appearance. She is well-developed. She is ill-appearing.      Comments: Chronically ill-appearing  cachectic   HENT:      Head: Normocephalic and atraumatic.      Nose: Nose normal. No congestion or rhinorrhea.   Eyes:      Conjunctiva/sclera: Conjunctivae normal.      Pupils: Pupils are equal, round, and reactive to  light.   Neck:      Musculoskeletal: Normal range of motion and neck supple.      Vascular: No JVD.   Cardiovascular:      Rate and Rhythm: Normal rate and regular rhythm.      Pulses: Normal pulses.      Heart sounds: Normal heart sounds. No murmur.   Pulmonary:      Effort: Pulmonary effort is normal. No respiratory distress.      Breath sounds: No stridor. Rales (basilar) present. No wheezing or rhonchi.      Comments: Heimlich valve to right posterior chest wall with dressing CDI. Attached to malone bag (vented) no drainage noted.  Abdominal:      General: Bowel sounds are normal. There is no distension.      Palpations: Abdomen is soft.      Tenderness: There is no abdominal tenderness.   Musculoskeletal: Normal range of motion.         General: No swelling or tenderness.   Skin:     General: Skin is warm and dry.      Capillary Refill: Capillary refill takes 2 to 3 seconds.   Neurological:      General: No focal deficit present.      Mental Status: She is alert and oriented to person, place, and time.      Cranial Nerves: No cranial nerve deficit.      Sensory: No sensory deficit.      Motor: No weakness.      Coordination: Coordination normal.   Psychiatric:         Mood and Affect: Mood normal.         Behavior: Behavior normal.         Thought Content: Thought content normal.         Significant Labs:   CBC:   Recent Labs   Lab 12/07/20  0714 12/08/20  0606   WBC 8.12 9.61   HGB 11.9* 11.2*   HCT 37.8 36.8*    300     CMP:   Recent Labs   Lab 12/07/20  0714 12/08/20  0606    137   K 4.2 4.2   CL 96 97   CO2 30* 28   GLU 88 94   BUN 13 17   CREATININE 0.6 0.5   CALCIUM 9.1 9.1   PROT 6.7 6.8   ALBUMIN 2.4* 2.5*   BILITOT 0.1 0.2   ALKPHOS 65 64   AST 23 21   ALT 23 20   ANIONGAP 12 12   EGFRNONAA >60 >60     All pertinent labs within the past 24 hours have been reviewed.    Significant Imaging: I have reviewed all pertinent imaging results/findings within the past 24 hours.

## 2020-12-08 NOTE — PLAN OF CARE
Patient AAO X 4. Patient free from injury during shift. Pain managed pharmacologically. Provided full relief. Patient free from N/V during shift. IV access has KVO. Patient placed on cardiac monitoring. NSR. Remained afebrile during shift. Pt on O2 @3L via nasal canula  tolerating well. Pt up to BSC w/ minimal assist. Chest tube dressing CDI. Hemlich valve vented through malone bag. Restroom offered. Repositioned as needed. Safety maintained with bed alarm. Bed in lowest position, call light and personal items within reach. Patient verbalized understanding of care. Will continue to monitor with every 2 hour rounding.

## 2020-12-08 NOTE — NURSING
Received report from primary nurse. Pt transferred from MS3 to room 204. No complaints or distress noted. Vital stable. Pt up to bedside commode without issues. Denies pain. Pt  at bedside. Airborne precautions continued.    1835- Spoke with ID Dr Bartlett who ordered to discontinue Airborne isolation precautions. new orders placed.

## 2020-12-08 NOTE — PHYSICIAN QUERY
"PT Name: Fabiana Morel  MR #: [id number]    Consultant Diagnosis Clarification     CDS/: Anamika Pineda RN             Contact information: [email or web address]   This form is a permanent document in the medical record.    Query Date: 2020      By submitting this query, we are merely seeking further clarification of documentation.  Please utilize your independent clinical judgment when addressing the question(s) below.    The Medical Record reflects the following:    Clinical Information Location in Medical Record   Hypoalbuminemia due to protein-calorie malnutrition     Cachexia  Secondary to protein malnutrition and poor oral intake  Supplement with boost      SUMMARY   Intervention: parenteral nutrition therapy, general healthful diet, and nutrition supplement therapy  Current intake: PPN @ 75 ml/hr: 612 kcal (40% EEN), 76 g protein ( > 100% EPN))    Current Diet Order: Regular + PPN above    Anthropometrics  Temp: 96.7 °F (35.9 °C)  Height Method: Measured(office 20)  Height: 5' 4"  Height (inches): 64 in  Weight Method: Bed Scale  Weight: 42.2 kg (93 lb 0.6 oz)  Weight (lb): 93.04 lb  Ideal Body Weight (IBW), Female: 120 lb  % Ideal Body Weight, Female (lb): 77.53 %  BMI (Calculated): 16  BMI Grade: 16 - 16.9 protein-energy malnutrition grade II  Weight Loss: unintentional  Usual Body Weight (UBW), k.3 kg(19)  Weight Change Amount: (46.5 kg 20)  % Usual Body Weight: 75.11  % Weight Change From Usual Weight: -25.05 %    Malnutrition Assessment  Malnutrition Type: acute illness or injury  Skin (Micronutrient): (Galdino = 14)  Teeth (Micronutrient): (missing some)   Micronutrient Evaluation: suspected deficiency  Micronutrient Evaluation Comments: Na   Energy Intake (Malnutrition): less than 50% for greater than 5 days   Orbital Region (Subcutaneous Fat Loss): moderate depletion  Upper Arm Region (Subcutaneous Fat Loss): severe depletion  Thoracic and Lumbar Region: " moderate depletion   Jain Region (Muscle Loss): moderate depletion  Clavicle Bone Region (Muscle Loss): moderate depletion  Clavicle and Acromion Bone Region (Muscle Loss): moderate depletion  Scapular Bone Region (Muscle Loss): moderate depletion  Dorsal Hand (Muscle Loss): mild depletion  Patellar Region (Muscle Loss): severe depletion  Anterior Thigh Region (Muscle Loss): moderate depletion  Posterior Calf Region (Muscle Loss): severe depletion   Edema (Fluid Accumulation): 0-->no edema present   Subcutaneous Fat Loss (Final Summary): severe protein-calorie malnutrition  Muscle Loss Evaluation (Final Summary): severe protein-calorie malnutrition        H & P of 11/29       HM PN of 11/30         Nutritional PN of 12/1         Nutritional PN of 12/1       Nutritional PN of 12/1                                   Nutritional PN of 12/1          Please clarify/confirm the Consultants diagnosis of severe protein malnutrtion:     [ x ] Severe Protein Malnutrition ruled in   [  ] Other diagnosis (please specify): _____________________________   [  ] Clinically undetermined

## 2020-12-08 NOTE — PT/OT/SLP PROGRESS
Physical Therapy Treatment    Patient Name:  Fabiana Morel   MRN:  [id number]    Recommendations:     Discharge Recommendations:  home health PT   Discharge Equipment Recommendations: none   Barriers to discharge: None    Assessment:     Fabiana Morel is a 65 y.o. female admitted with a medical diagnosis of Pneumothorax on right.  She presents with the following impairments/functional limitations:  weakness, impaired endurance, impaired functional mobilty, impaired balance, impaired cardiopulmonary response to activity. Patient is agreeable to participation with PT treatment. She requires SBA for supine to sit and CGA for sit to stand transfer with RW. She ambulated 40' in room with RW and CGA. She requests to use the BSC before returning to bed to take a nap. She is eager to get home.     Rehab Prognosis: Good; patient would benefit from acute skilled PT services to address these deficits and reach maximum level of function.    Recent Surgery: * No surgery found *      Plan:     During this hospitalization, patient to be seen 6 x/week to address the identified rehab impairments via gait training, therapeutic activities, therapeutic exercises and progress toward the following goals:    · Plan of Care Expires:  12/30/20    Subjective     Chief Complaint: would like to take a nep  Patient/Family Comments/goals: home  Pain/Comfort:  · Pain Rating 1: 0/10      Objective:     Communicated with charge nurse Liane prior to session.  Patient found HOB elevated with chest tube, oxygen, peripheral IV, pulse ox (continuous), telemetry upon PT entry to room.     General Precautions: Standard, airborne, contact, fall, respiratory   Orthopedic Precautions:N/A   Braces: N/A     Functional Mobility:  · Bed Mobility:     · Supine to Sit: stand by assistance  · Transfers:     · Sit to Stand:  contact guard assistance with rolling walker  · Gait: 40' RW, CGA, kyphotic      AM-PAC 6 CLICK MOBILITY          Therapeutic Activities and  Exercises:   Patient was educated on the importance of OOB activity and functional mobility to negate negative effects of prolonged bed rest during hospitalization, safe transfers and ambulation, and D/C planning     Patient left HOB elevated with all lines intact, call button in reach and  present..    GOALS:   Multidisciplinary Problems     Physical Therapy Goals        Problem: Physical Therapy Goal    Goal Priority Disciplines Outcome Goal Variances Interventions   Physical Therapy Goal     PT, PT/OT Ongoing, Progressing     Description: Goals to be met by: 2020     Patient will increase functional independence with mobility by performin. Supine to sit with Contact Guard Assistance  2. Sit to stand transfer with Contact Guard Assistance  3. Bed to chair transfer with Contact Guard Assistance using Rolling Walker  4. Gait  x 150 feet with Contact Guard Assistance using Rolling Walker.   5. Lower extremity exercise program x20 reps                      Time Tracking:     PT Received On: 20  PT Start Time: 1410     PT Stop Time: 1430  PT Total Time (min): 20 min     Billable Minutes: Gait Training 20    Treatment Type: Treatment  PT/PTA: PT     PTA Visit Number: 0     Olamide Carty, PT  2020

## 2020-12-09 VITALS
HEIGHT: 64 IN | OXYGEN SATURATION: 98 % | RESPIRATION RATE: 17 BRPM | SYSTOLIC BLOOD PRESSURE: 111 MMHG | BODY MASS INDEX: 17.05 KG/M2 | DIASTOLIC BLOOD PRESSURE: 57 MMHG | HEART RATE: 108 BPM | WEIGHT: 99.88 LBS | TEMPERATURE: 96 F

## 2020-12-09 DIAGNOSIS — A31.8 MYCOBACTERIUM ABSCESSUS INFECTION: Primary | ICD-10-CM

## 2020-12-09 LAB
ALBUMIN SERPL BCP-MCNC: 2.5 G/DL (ref 3.5–5.2)
ALP SERPL-CCNC: 68 U/L (ref 55–135)
ALT SERPL W/O P-5'-P-CCNC: 25 U/L (ref 10–44)
AMIKACIN SERPL-MCNC: 7 UG/ML
ANION GAP SERPL CALC-SCNC: 9 MMOL/L (ref 8–16)
AST SERPL-CCNC: 23 U/L (ref 10–40)
BASOPHILS # BLD AUTO: 0.05 K/UL (ref 0–0.2)
BASOPHILS NFR BLD: 0.5 % (ref 0–1.9)
BILIRUB SERPL-MCNC: 0.2 MG/DL (ref 0.1–1)
BUN SERPL-MCNC: 18 MG/DL (ref 8–23)
CALCIUM SERPL-MCNC: 9.2 MG/DL (ref 8.7–10.5)
CHLORIDE SERPL-SCNC: 96 MMOL/L (ref 95–110)
CO2 SERPL-SCNC: 34 MMOL/L (ref 23–29)
CREAT SERPL-MCNC: 0.6 MG/DL (ref 0.5–1.4)
DIFFERENTIAL METHOD: ABNORMAL
EOSINOPHIL # BLD AUTO: 0.4 K/UL (ref 0–0.5)
EOSINOPHIL NFR BLD: 3.9 % (ref 0–8)
ERYTHROCYTE [DISTWIDTH] IN BLOOD BY AUTOMATED COUNT: 16.7 % (ref 11.5–14.5)
EST. GFR  (AFRICAN AMERICAN): >60 ML/MIN/1.73 M^2
EST. GFR  (NON AFRICAN AMERICAN): >60 ML/MIN/1.73 M^2
GLUCOSE SERPL-MCNC: 89 MG/DL (ref 70–110)
HCT VFR BLD AUTO: 36.5 % (ref 37–48.5)
HGB BLD-MCNC: 11.1 G/DL (ref 12–16)
IMM GRANULOCYTES # BLD AUTO: 0.05 K/UL (ref 0–0.04)
IMM GRANULOCYTES NFR BLD AUTO: 0.5 % (ref 0–0.5)
LYMPHOCYTES # BLD AUTO: 2.4 K/UL (ref 1–4.8)
LYMPHOCYTES NFR BLD: 24.7 % (ref 18–48)
MAGNESIUM SERPL-MCNC: 2.2 MG/DL (ref 1.6–2.6)
MCH RBC QN AUTO: 29.1 PG (ref 27–31)
MCHC RBC AUTO-ENTMCNC: 30.4 G/DL (ref 32–36)
MCV RBC AUTO: 96 FL (ref 82–98)
MONOCYTES # BLD AUTO: 0.7 K/UL (ref 0.3–1)
MONOCYTES NFR BLD: 7.6 % (ref 4–15)
NEUTROPHILS # BLD AUTO: 6 K/UL (ref 1.8–7.7)
NEUTROPHILS NFR BLD: 62.8 % (ref 38–73)
NRBC BLD-RTO: 0 /100 WBC
PLATELET # BLD AUTO: 268 K/UL (ref 150–350)
PMV BLD AUTO: 8.5 FL (ref 9.2–12.9)
POTASSIUM SERPL-SCNC: 4.3 MMOL/L (ref 3.5–5.1)
PROT SERPL-MCNC: 6.6 G/DL (ref 6–8.4)
RBC # BLD AUTO: 3.82 M/UL (ref 4–5.4)
SODIUM SERPL-SCNC: 139 MMOL/L (ref 136–145)
WBC # BLD AUTO: 9.55 K/UL (ref 3.9–12.7)

## 2020-12-09 PROCEDURE — A4216 STERILE WATER/SALINE, 10 ML: HCPCS | Performed by: INTERNAL MEDICINE

## 2020-12-09 PROCEDURE — 25000003 PHARM REV CODE 250: Performed by: INTERNAL MEDICINE

## 2020-12-09 PROCEDURE — 99232 PR SUBSEQUENT HOSPITAL CARE,LEVL II: ICD-10-PCS | Mod: ,,, | Performed by: INTERNAL MEDICINE

## 2020-12-09 PROCEDURE — 94761 N-INVAS EAR/PLS OXIMETRY MLT: CPT

## 2020-12-09 PROCEDURE — 80150 ASSAY OF AMIKACIN: CPT

## 2020-12-09 PROCEDURE — C1751 CATH, INF, PER/CENT/MIDLINE: HCPCS

## 2020-12-09 PROCEDURE — 25000003 PHARM REV CODE 250: Performed by: NURSE PRACTITIONER

## 2020-12-09 PROCEDURE — 63600175 PHARM REV CODE 636 W HCPCS: Performed by: INTERNAL MEDICINE

## 2020-12-09 PROCEDURE — 25000242 PHARM REV CODE 250 ALT 637 W/ HCPCS: Performed by: INTERNAL MEDICINE

## 2020-12-09 PROCEDURE — 27000221 HC OXYGEN, UP TO 24 HOURS

## 2020-12-09 PROCEDURE — 80053 COMPREHEN METABOLIC PANEL: CPT

## 2020-12-09 PROCEDURE — 97803 MED NUTRITION INDIV SUBSEQ: CPT

## 2020-12-09 PROCEDURE — 97116 GAIT TRAINING THERAPY: CPT | Mod: CQ

## 2020-12-09 PROCEDURE — 36573 INSJ PICC RS&I 5 YR+: CPT

## 2020-12-09 PROCEDURE — 94640 AIRWAY INHALATION TREATMENT: CPT

## 2020-12-09 PROCEDURE — 99232 SBSQ HOSP IP/OBS MODERATE 35: CPT | Mod: ,,, | Performed by: INTERNAL MEDICINE

## 2020-12-09 PROCEDURE — 36415 COLL VENOUS BLD VENIPUNCTURE: CPT

## 2020-12-09 PROCEDURE — 83735 ASSAY OF MAGNESIUM: CPT

## 2020-12-09 PROCEDURE — 99900035 HC TECH TIME PER 15 MIN (STAT)

## 2020-12-09 PROCEDURE — 85025 COMPLETE CBC W/AUTO DIFF WBC: CPT

## 2020-12-09 PROCEDURE — 94664 DEMO&/EVAL PT USE INHALER: CPT

## 2020-12-09 RX ORDER — ETHAMBUTOL HYDROCHLORIDE 400 MG/1
400 TABLET, FILM COATED ORAL DAILY
Qty: 30 TABLET | Refills: 3 | Status: ON HOLD | OUTPATIENT
Start: 2020-12-09 | End: 2021-01-05 | Stop reason: HOSPADM

## 2020-12-09 RX ORDER — LACTOBACILLUS COMBINATION NO.4 3B CELL
1 CAPSULE ORAL DAILY
COMMUNITY
Start: 2020-12-09

## 2020-12-09 RX ORDER — SODIUM CHLORIDE 0.9 % (FLUSH) 0.9 %
10 SYRINGE (ML) INJECTION
Status: DISCONTINUED | OUTPATIENT
Start: 2020-12-09 | End: 2020-12-09 | Stop reason: HOSPADM

## 2020-12-09 RX ORDER — HYDROCODONE BITARTRATE AND ACETAMINOPHEN 5; 325 MG/1; MG/1
1 TABLET ORAL EVERY 4 HOURS
Qty: 15 TABLET | Refills: 0 | Status: SHIPPED | OUTPATIENT
Start: 2020-12-09 | End: 2020-12-21 | Stop reason: SDUPTHER

## 2020-12-09 RX ORDER — MUPIROCIN 20 MG/G
OINTMENT TOPICAL 2 TIMES DAILY
Status: DISCONTINUED | OUTPATIENT
Start: 2020-12-09 | End: 2020-12-09 | Stop reason: HOSPADM

## 2020-12-09 RX ORDER — LINEZOLID 600 MG/1
600 TABLET, FILM COATED ORAL EVERY 12 HOURS
Qty: 60 TABLET | Refills: 0 | Status: ON HOLD
Start: 2020-12-09 | End: 2021-01-05 | Stop reason: HOSPADM

## 2020-12-09 RX ORDER — CLARITHROMYCIN 500 MG/1
500 TABLET, FILM COATED ORAL EVERY 12 HOURS
Qty: 60 TABLET | Refills: 0 | Status: ON HOLD | OUTPATIENT
Start: 2020-12-09 | End: 2021-01-05 | Stop reason: HOSPADM

## 2020-12-09 RX ORDER — SODIUM CHLORIDE 0.9 % (FLUSH) 0.9 %
10 SYRINGE (ML) INJECTION EVERY 6 HOURS
Status: DISCONTINUED | OUTPATIENT
Start: 2020-12-09 | End: 2020-12-09 | Stop reason: HOSPADM

## 2020-12-09 RX ORDER — ETHAMBUTOL HYDROCHLORIDE 100 MG/1
300 TABLET, FILM COATED ORAL DAILY
Qty: 90 TABLET | Refills: 3 | Status: ON HOLD | OUTPATIENT
Start: 2020-12-09 | End: 2021-01-05 | Stop reason: HOSPADM

## 2020-12-09 RX ADMIN — FLUTICASONE FUROATE AND VILANTEROL TRIFENATATE 1 PUFF: 200; 25 POWDER RESPIRATORY (INHALATION) at 06:12

## 2020-12-09 RX ADMIN — IPRATROPIUM BROMIDE AND ALBUTEROL SULFATE 3 ML: .5; 2.5 SOLUTION RESPIRATORY (INHALATION) at 12:12

## 2020-12-09 RX ADMIN — Medication 10 ML: at 11:12

## 2020-12-09 RX ADMIN — OXYCODONE HYDROCHLORIDE AND ACETAMINOPHEN 1 TABLET: 5; 325 TABLET ORAL at 07:12

## 2020-12-09 RX ADMIN — Medication 2 TABLET: at 07:12

## 2020-12-09 RX ADMIN — PREDNISONE 5 MG: 5 TABLET ORAL at 07:12

## 2020-12-09 RX ADMIN — HYDROCODONE BITARTRATE AND ACETAMINOPHEN 1 TABLET: 5; 325 TABLET ORAL at 02:12

## 2020-12-09 RX ADMIN — HYDROCODONE BITARTRATE AND ACETAMINOPHEN 1 TABLET: 5; 325 TABLET ORAL at 06:12

## 2020-12-09 RX ADMIN — Medication 2 TABLET: at 11:12

## 2020-12-09 RX ADMIN — LINEZOLID 600 MG: 600 TABLET, FILM COATED ORAL at 07:12

## 2020-12-09 RX ADMIN — MUPIROCIN: 20 OINTMENT TOPICAL at 11:12

## 2020-12-09 RX ADMIN — IPRATROPIUM BROMIDE AND ALBUTEROL SULFATE 3 ML: .5; 2.5 SOLUTION RESPIRATORY (INHALATION) at 06:12

## 2020-12-09 RX ADMIN — CLARITHROMYCIN 500 MG: 500 TABLET, FILM COATED ORAL at 07:12

## 2020-12-09 RX ADMIN — HYDROCODONE BITARTRATE AND ACETAMINOPHEN 1 TABLET: 5; 325 TABLET ORAL at 03:12

## 2020-12-09 RX ADMIN — MEROPENEM AND SODIUM CHLORIDE 1 G: 1 INJECTION, SOLUTION INTRAVENOUS at 11:12

## 2020-12-09 RX ADMIN — FERROUS SULFATE TAB EC 325 MG (65 MG FE EQUIVALENT) 325 MG: 325 (65 FE) TABLET DELAYED RESPONSE at 07:12

## 2020-12-09 RX ADMIN — MEROPENEM AND SODIUM CHLORIDE 1 G: 1 INJECTION, SOLUTION INTRAVENOUS at 03:12

## 2020-12-09 RX ADMIN — HYDROCODONE BITARTRATE AND ACETAMINOPHEN 1 TABLET: 5; 325 TABLET ORAL at 09:12

## 2020-12-09 RX ADMIN — OXYCODONE HYDROCHLORIDE AND ACETAMINOPHEN 1 TABLET: 5; 325 TABLET ORAL at 11:12

## 2020-12-09 NOTE — PLAN OF CARE
Apt scheduled with PCP and ID. AVS updated.   CM requested follow up apt with Dr. Mackey from [facility].  CM left message at Dr. Bucio's office for them to contact pt for 1 week follow up

## 2020-12-09 NOTE — DISCHARGE INSTRUCTIONS
Dr Mackey discharge instructions:  use loosely taped glove to collect minimal chest tube output. keeping chest tube secure with 2 inch silk tape, keeping wound clean and dry, and also to do prompt chest xray at ER if any problems or issues occur. .

## 2020-12-09 NOTE — RESPIRATORY THERAPY
12/09/20 0652   Patient Assessment/Suction   Level of Consciousness (AVPU) alert   All Lung Fields Breath Sounds diminished   Cough Frequency no cough   PRE-TX-O2   O2 Device (Oxygen Therapy) nasal cannula   $ Is the patient on Low Flow Oxygen? Yes   Flow (L/min) 2   SpO2 95 %   Pulse Oximetry Type Intermittent   $ Pulse Oximetry - Multiple Charge Pulse Oximetry - Multiple   Pulse 101   Resp 18   Aerosol Therapy   $ Aerosol Therapy Charges Aerosol Treatment   Respiratory Treatment Status (SVN) given   Treatment Route (SVN) mask   Patient Position (SVN) John's   Post Treatment Assessment (SVN) breath sounds unchanged   Breath Sounds Post-Respiratory Treatment   Post-treatment Heart Rate (beats/min) 104   Post-treatment Resp Rate (breaths/min) 18   Vibratory PEP Therapy   $ Vibratory PEP Charges Aerobika Therapy   $ Vibratory PEP Tech Time Charges 15 min   Type (PEP Therapy) vibratory/oscillatory   Device (PEP Therapy) flutter   Route (PEP Therapy) mouthpiece   Breaths per Cycle (PEP Therapy) 10   Cycles (PEP Therapy) 1   Signs of Intolerance (PEP Therapy) none

## 2020-12-09 NOTE — PLAN OF CARE
CM spoke with Rachel from [facility] [employer]. Pt pending home IVAB education. ID and pulm have cleared pt for DC.        12/09/20 0362   Post-Acute Status   Post-Acute Authorization Other  (home infusion)   Other Status   (pending education)

## 2020-12-09 NOTE — NURSING
back on unit with pts home o2 tank. Dr aguilar met with pt again wile pt was in wheelchair about to roll off unit. Pt and  verbalize readiness to d/c and state they feel adequestly educated etc. Dr aguilar finished bedside eval with pt. Pt transferred off unit via wheelchair with chest tube (as Dr aguilar ordered) and PICC line intact

## 2020-12-09 NOTE — PLAN OF CARE
Problem: Physical Therapy Goal  Goal: Physical Therapy Goal  Description: Goals to be met by: 2020     Patient will increase functional independence with mobility by performin. Supine to sit with Contact Guard Assistance  2. Sit to stand transfer with Contact Guard Assistance  3. Bed to chair transfer with Contact Guard Assistance using Rolling Walker  4. Gait  x 150 feet with Contact Guard Assistance using Rolling Walker.   5. Lower extremity exercise program x20 reps     Outcome: Ongoing, Progressing   Ambulate with rw and assistance with O2 attached.

## 2020-12-09 NOTE — PT/OT/SLP PROGRESS
Physical Therapy Treatment    Patient Name:  Fabiana Morel   MRN:  [id number]    Recommendations:     Discharge Recommendations:  home health PT   Discharge Equipment Recommendations: none   Barriers to discharge: None    Assessment:     Fabiana Morel is a 65 y.o. female admitted with a medical diagnosis of Pneumothorax on right.  She presents with the following impairments/functional limitations:  weakness, impaired endurance, impaired self care skills, impaired functional mobilty, impaired cardiopulmonary response to activity . Tolerated treatment. Ambulated increased distance with rw and CGA with O2 attached.     Rehab Prognosis: Good; patient would benefit from acute skilled PT services to address these deficits and reach maximum level of function.    Recent Surgery: * No surgery found *      Plan:     During this hospitalization, patient to be seen 6 x/week to address the identified rehab impairments via gait training, therapeutic activities, therapeutic exercises and progress toward the following goals:    · Plan of Care Expires:  12/30/20    Subjective     Chief Complaint: none stated  Patient/Family Comments/goals: none stated  Pain/Comfort:  · Pain Rating Post-Intervention 1: 0/10      Objective:     Communicated with nurse Triplett prior to session.  Patient found supine with bed alarm, chest tube, oxygen, peripheral IV upon PT entry to room.     General Precautions: Standard, airborne, contact, fall, respiratory   Orthopedic Precautions:N/A   Braces: N/A     Functional Mobility:  · Bed Mobility:     · Rolling Left:  stand by assistance  · Rolling Right: stand by assistance  · Supine to Sit: contact guard assistance  · Sit to Supine: contact guard assistance  · Transfers:     · Sit to Stand:  contact guard assistance with rolling walker  · Gait: 250' with rw and CGA      AM-PAC 6 CLICK MOBILITY          Therapeutic Activities and Exercises:   Ambulated in hallway with rw , CGA and O2 attached with  chest tube attached.    Sat briefly in chair for bed to be made.    Returned to bed with CGA per request.    Patient left supine with all lines intact, call button in reach, bed alarm on, nurse Ioana notified and spouse present..    GOALS:   Multidisciplinary Problems     Physical Therapy Goals        Problem: Physical Therapy Goal    Goal Priority Disciplines Outcome Goal Variances Interventions   Physical Therapy Goal     PT, PT/OT Ongoing, Progressing     Description: Goals to be met by: 2020     Patient will increase functional independence with mobility by performin. Supine to sit with Contact Guard Assistance  2. Sit to stand transfer with Contact Guard Assistance  3. Bed to chair transfer with Contact Guard Assistance using Rolling Walker  4. Gait  x 150 feet with Contact Guard Assistance using Rolling Walker.   5. Lower extremity exercise program x20 reps                      Time Tracking:     PT Received On: 20  PT Start Time: 1145     PT Stop Time: 1155  PT Total Time (min): 10 min     Billable Minutes: Gait Training 10min    Treatment Type: Treatment  PT/PTA: PTA     PTA Visit Number: 1     Gia Sutton PTA  2020

## 2020-12-09 NOTE — PLAN OF CARE
I sent the pts HH orders  and packet to [facility] to review for admit. CM Following. AVS updated. Skye Baez LCSW      Date Status/Notes Created By   · 12/9/2020 3:26:34 PM Declined: Cannot Meet Needs: Patient is too high-acquity for home health d/t chest tubes. Unable to adequately provide care for this in the home.  Destini Aden@PAC  · 12/9/2020 2:58:00 PM Under Review: Onsite Review  Destini Aden@PAC  · 12/9/2020 2:45:44 PM New: pt will have home IV ABX through [facility]   Declined by [facility]      Date Status/Notes Created By   · 12/9/2020 2:45:44 PM New: pt will have home IV ABX through [facility]  ·  · 12/09/20 1445   · Post-Acute Status   · Post-Acute Authorization · Home Health   · Home Health Status · Referrals Sent   ·

## 2020-12-09 NOTE — PLAN OF CARE
The pt is cleared for discharge from case management. Skye Baez LCSW     12/09/20 1616   Final Note   Assessment Type Final Discharge Note   Anticipated Discharge Disposition Home-Health   Post-Acute Status   Post-Acute Authorization Home Health   Home Health Status Set-up Complete

## 2020-12-09 NOTE — NURSING
[employer]ios[employer] representative wanda now finished with bedside teaching. I spoke with aron Mederos who met with pt at bedside prior to transfer off unit. ARON Mederos asked that I provided return demonstration eduction to  when flushing picc line. Pt  educated and return demonstrated flushing picc line and care etc. Provided pt with PICC line education Via DVD,  to measure UE circum, and education on extremity circumference measuring.  Discharge instructions provided and explained to pt and pt's spouse, Understanding verbalized. Written prescriptions provided. Peripheral IV removed catheter intact, tolerated well. VSS. Telemetry monitor removed and returned to monitor room. No complaints. PICC line remains intact, both lumens flushed without difficulty, and saline locked. No issues. Pt's  states he accidentally left pt's home oxygen tank at home and will have to run home real quickly to get it.   Pt  left unit to pick-up awaiting his return

## 2020-12-09 NOTE — PROGRESS NOTES
Progress Note  PULMONARY    Admit Date: 11/29/2020 12/09/2020  History of Present Illness:    smoker, severe copd , h/o recurrent pneumonias with neg bronch in 2017.   Pt has some vague immune def.   Had recent ct chest with non  Resolving cavitary disease ppt eval with pos afb early November.  Tb gold neg, and pt rx on  Inh/rif/azithr pending id. dna probe requested.  Pt continued with night sweats - chronic? And had  Brown mucous increase with sob 3 wks ago.  Pt had decreased appetite taking in boost only, and became bed bound.  Breathing worsened - 02 needs increased from2.5to 3.5-subjective sob.       Pt was not acutely symptomatic when mycobacterial rx started but worsened.  She was to call if worsened.  She did stop smoking.     Er eval  Pm showed right pneumo- very large. Thoracic vent to heimlich valve done with some re inflation.      Pt still sob.   Plan: will need to get id mycobacteria, will ask id to see, needs better pneumo treatment. Ppn,  Will need therpay.  Could have pseudomonas/mras complicating.   Needs sputum culture.    12/5 the patient is without complaints.  She is coughing up green mucus and not clearing it most of the time    12/6 the patient is without complaints.  She is fatigued.  SUBJECTIVE:     12/1 no new c/o, feels much better.    12/2 having increased sob last few min.   Thick brown green mucous  12/3 no mucous today. Post  Chest tube pain.    12/4 no new c/o  12/5 she has some pain at the thoracostomy tube site  12/6 patient states she had a good night.  Below from Dr Mackey,   12/7 no new c/o  12/8 no new c/o- mucous gone now.        PFSH and Allergies reviewed.    OBJECTIVE:     Vitals (Most recent):  Vitals:    12/09/20 1200   BP:    Pulse: 108   Resp: 20   Temp:        Vitals (24 hour range):  Temp:  [96.1 °F (35.6 °C)-98 °F (36.7 °C)]   Pulse:  [101-120]   Resp:  [17-20]   BP: (108-144)/(56-88)   SpO2:  [95 %-99 %]       Intake/Output Summary (Last 24 hours) at  12/9/2020 1320  Last data filed at 12/9/2020 0732  Gross per 24 hour   Intake 940 ml   Output 1400 ml   Net -460 ml          Physical Exam:  The patient's neuro status (alertness,orientation,cognitive function,motor skills,), pharyngeal exam (oral lesions, hygiene, abn dentition,), Neck (jvd,mass,thyroid,nodes in neck and above/below clavicle),RESPIRATORY(symmetry,effort,fremitus,percussion,auscultation),  Cor(rhythm,heart tones including gallops,perfusion,edema)ABD(distention,hepatic&splenomegaly,tenderness,masses), Skin(rash,cyanosis),Psyc(affect,judgement,).  Exam negative except for these pertinent findings:    Alert, chest is symmetric, no distress, normal percussion, normal fremitus and there is an expiratory wheeze on the left.  There crackles on the right.  There are rhonchi present bilaterally.  Sputum is thick and green but small quantity.      Radiographs reviewed: view by direct vision  No pneumo seen 12/1  Results for orders placed during the hospital encounter of 11/29/20   X-Ray Chest 1 View    Narrative EXAMINATION:  XR CHEST 1 VIEW    CLINICAL HISTORY:  pneumothorax; Pneumothorax, unspecified    TECHNIQUE:  Single frontal view of the chest was performed.    COMPARISON:  11/30/2020    FINDINGS:  Chest tube remains in place in the right upper lung field and other tube or line projects over the right lung base.  Right pneumothorax is not seen on the current film.  Subcutaneous emphysema appears mildly decreased.  Right basilar infiltrate with probable also small right pleural effusion do not appear significantly changed.  Stranding left suprahilar not significantly changed.  Heart not enlarged.      Impression Right-sided chest tube remains in place with the appearance of the chest not significantly changed compared to the prior exam.  No visible pneumothorax      Electronically signed by: Kristin Chávez MD  Date:    12/01/2020  Time:    08:29   ] 12/5 no change in the chest x-ray with good placement  of the thoracostomy tube, no pneumothorax    12/6 bilateral apical lung disease present.  Elevated right hemidiaphragm.  Thoracostomy tube in position.     Labs     Recent Labs   Lab 12/09/20  0638   WBC 9.55   HGB 11.1*   HCT 36.5*        Recent Labs   Lab 12/09/20  0638      K 4.3   CL 96   CO2 34*   BUN 18   CREATININE 0.6   GLU 89   CALCIUM 9.2   MG 2.2   AST 23   ALT 25   ALKPHOS 68   BILITOT 0.2   PROT 6.6   ALBUMIN 2.5*   No results for input(s): PH, PCO2, PO2, HCO3 in the last 24 hours.  Microbiology Results (last 7 days)     Procedure Component Value Units Date/Time    Culture, Respiratory with Gram Stain [[id number]]  (Abnormal)  (Susceptibility) Collected: 12/01/20 1108    Order Status: Completed Specimen: Respiratory from Sputum, Expectorated Updated: 12/04/20 1304     Respiratory Culture No S aureus isolated.      PSEUDOMONAS AERUGINOSA  Few        CANDIDA ALBICANS  Moderate  Normal respiratory chelsea also present       Gram Stain (Respiratory) <10 epithelial cells per low power field.     Gram Stain (Respiratory) Rare WBC's     Gram Stain (Respiratory) No organisms seen          Impression:  Active Hospital Problems    Diagnosis  POA    *Pneumothorax on right [J93.9]  Yes    Severe malnutrition [E43]  Yes    Hypoalbuminemia due to protein-calorie malnutrition [E88.09, E46]  Yes    Mycobacterium avium complex [A31.0]  Yes    Iron deficiency [E61.1]  Yes    Immune deficiency disorder [D84.9]  Yes    Acute on chronic respiratory failure with hypoxia [J96.21]  Yes    Bronchiectasis [J47.9]  Yes    COPD (chronic obstructive pulmonary disease) [J44.9]  Yes    Hyperlipidemia [E78.5]  Yes    Lung nodule [R91.1]  Yes     Established with Dr. Mackey, had CT in 12/15.  Demonstrated multiple lung nodules, several spiculated masses        Resolved Hospital Problems   No resolved problems to display.    Tobacco abuse  Spontaneous pneumothorax secondary to cavitary lung  disease  Bronchiectasis  COPD  Presumed DAVIS, AFB positive, QuantiFERON gold negative  Pseudomonas in sputum  Under weight          Plan:     12/1- cxr with resolved pneumo, pt feeling much better, no night sweats, eating very well.  Suspect she developed pneumo shortly after starting mycobacterial therapy.  Need to mobilize.  Called [facility] - hoping id of mycobacterium out on 12/2.  Air leak brisk - pt not good operative risk for chest surg.  May need prolonged suction?      If no tb would stop isolation and start physical therapy.    Sputum submitted today - taper abx per results.       12/2 cxr ok, sm sub q air, no id for afb.    Will recheck cxr - air leak brisk.  Having chest pain that may ppt atelectasis/sob- place norco q4 hold if sedate.     Lab says looks like mac, may have 2nd organism?, probe should be out next wk.    12/3 cxr today post chest tube is better, control pain, mobilize to chair am    12/4 no fever, on clarithro, etham, zyvox, rifampin and prednisone 5 bid.    3lpm nc, merrem for pseudomonas .    Pt progressing- try heimlich valve 1st of next wk?    12/5 continue thoracostomy tube to suction.  Receiving antibiotics for atypical mycobacterium as well as the Pseudomonas in her sputum  Strongly encouraged to stop smoking    12/6   Continue bronchodilators  Continue treatment for DAVIS  Will defer to ID about treatment for Pseudomonas although this is likely a colonizer with her bronchiectasis    Below from Dr Mackey  12/7 with air leak would treat pseudomonas,    Heimlich valve would be good to try.  Heimlich placed, will try vest, needs to mobilize.      Consider outpt soon- would like mycobacterial rx cleared up.    12/8 tried vest but not having any mucous.    cxr looks good wrt pneumo  Abscesus dx, likely can stop isolation but defer to ID.  Also regimen to rx- amieley will need iv abx?  Air leak lingering as expected.  Pt seems to be much better.    12/9 abx regimen being set up.     would  have pt use loosely taped glove to collect minimal chest tube output.  Discussed with pt and  keeping chest tube secure with 2 inch silk tape, discussed keeping wound clean and dry, and also to do prompt cxr (or er visit) if any problems.  I would wish to see her 12/21 and have her f/u Dr Bucio soon.  Pt needs to stop prednisone.

## 2020-12-09 NOTE — PLAN OF CARE
CM called in zyvox 600mg daily #30 ref 5 per Dr. Bartlett to pt's pharmacy. Per pharmacist, medication is covered. Pt has $21.41 copay.      [id number] - [place] - [address]  [address] [place] [address]  Phone: [phone] Fax: [phone]     12/09/20 0902   Post-Acute Status   Post-Acute Authorization Medications   Medication Status Set-up Complete

## 2020-12-09 NOTE — PLAN OF CARE
Intervention: general healthful diet, and nutrition supplement therapy     Recommendation:   1) continue regular diet + boost plus with meals   2) weigh pt weekly, replete iron and lytes if needed   3) nutrition education verbally given     Goals: 1) PO intakes > 50% of meals/supplements at f/u  Nutrition Goal Status: meeting-continues  Communication of RD Recs:  POC, sticky note, reviewed with RN

## 2020-12-09 NOTE — NURSING
discharging today on home Amikin IV Q24hr- dose due Tonight at 10pm. Rachel with [employer]cript now at bedside to provided pt education. Rachel states that she recommends pt skip tonights scheduled dose at 10pm so that tomrrowing morning the HH nurse can arrive at pt's house to do lab levels and dose from those results early tomorrow morning. Contacted Dr Bartlett to see if okay for the above. Dr Bartlett said okay to give next dose  dose tomorrow morning and labs wont be needed until Friday. Updated Rachel with [employer] to notify me when pt is finished with education.

## 2020-12-09 NOTE — PLAN OF CARE
CM sent referral for home IVAB to [facility] via [facility].     12/09/20 0810   Post-Acute Status   Post-Acute Authorization Other  (Home infusion)   Medication Status   (referral sent)

## 2020-12-09 NOTE — NURSING
Notified by CM that pt will d/c today after representative from [employer] comes to pt bedside to give education of IV Atx ordered for outpt purposes.

## 2020-12-09 NOTE — PROGRESS NOTES
Consult Note  Infectious Disease    Reason for Consult:       HPI: Fabiana Morel is a   65 y.o. female with  PMHx of HTN, D LP,  anemia, malnutrition, cachexia, BMI of 15.9, right femur fracture status post surgery in December 2019, deconditioning, uses cane, asthma, immunodeficiency, bronchiectasis, pulmonary cavitary lung disease, COPD, pneumonia, chronic hypoxemic respiratory failure on 2-3 L. who presents to the ED with an onset of worsening SOB for 3 weeks, worse over the past 3 days associated with right-sided chest pain..       Patient and  states that she gets either pneumonia or bronchitis every year around October November.  This year was about the same.  She developed progressive shortness of breath.  Dr. Mackey had sent sputum cultures which are growing AFB, within 1 week.(11/05/--11/12)    She has received prednisone and antibiotics(Augmentin) as outpatient without much improvement.   Even if she had home oxygen for multiple years and not using it much, patient needed to use quite a lot of it and actually going over 3 L per minute   She developed progressive shortness of breath, not able to catch her breath, right-sided chest pain.       She came to ED  She was diagnosed with pneumothorax and underwent right chest tube placement.   Patient denies hemoptysis.     Decreased appetite despite multiple appetite stimulants.     She may have lost about 5-7 lb of to over the past 6 months.   No night sweats.  Does not change her clothes at night.  She gets hot flashes since she had hysterectomy.      No daily medications were taken today (Zithromax, Rifadin and Nydrazid). The patient denies any other symptoms at this time. No pertinent PMHx.     She continues to smoke.  No TB exposure.  She was never in an institution.  She has a dog.  No cats.  No birds.  No travel.      12/01/2020 right chest discomfort, understandable.  Ct  On Rt upper chest.  On 4 L o2    12/2:  Consult/notes reviewed, imaging and  cultures reviewed. D/w Dr. Mackey. Per Dr. 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 TB Gold neg. She is having more difficulty expectorating and her sputum is green pea soup in appearance. She has not been out of bed except to restroom. She is tolerating current meds very well. She is only eating about 2 meals per day but takes the boost. She refuses flu vaccine.   12/3:  Afebrile, on steroids.  Formal chest tube placed today with smaller air leak.  Chest x-rays reviewed Sputum culture from 12/01 with Pseudomonas, ID and susceptibility pending.  Labs review in are stable with very low albumin 2.2. She has substantial pain. Did not eat much today secondary to procedure. Tolerating multiple po meds.   12/4:  Interim reviewed.  She is receiving 3 L by OxyMask alternating with nasal cannula, she is afebrile, white blood cells normal, AST slightly higher.  Pseudomonas in her sputum is pansensitive.  The AFB in her sputum has not yet been identified but a rapidly growing afb susceptibility panel has been initiated.  She had a better day today, still is working on pain management.  She is coughing, does a get some benefit from the Acapella but is not expectorating any sputum.  She is eating well per .  12/7:  Afebrile, Heimlichvalve replaced today by . AFB ID is still pending. Labs stable.  It may be the technique but the left apical infiltrates are slightly improved from admission. pleurevac disconnected, chest tube vented to malone with intention to vent. She had 3 soft stools today. No med intolerance. Eating 2 meals per day.   12/8: AFB identified as M. Abscessus. Sensitivities will take a couple of weeks. Chest tube vented to malone    Antibiotics (From admission, onward)    Start     Stop Route Frequency Ordered    12/08/20 2000  amikacin (AMIKIN) 450 mg in dextrose 5 % 100 mL IVPB      -- IV Every 24 hours (non-standard times) 12/08/20 1913 12/08/20 1927  amikacin - pharmacy to dose   (amikacin IVPB)      -- IV pharmacy to manage frequency 12/08/20 1828 12/01/20 2100  clarithromycin tablet 500 mg      -- Oral Every 12 hours 12/01/20 1829 12/01/20 1945  meropenem-0.9% sodium chloride 1 g/50 mL IVPB      -- IV Every 8 hours (non-standard times) 12/01/20 1837 12/01/20 1830  linezolid tablet 600 mg      -- Oral Every 12 hours 12/01/20 1829             EXAM & DIAGNOSTICS REVIEWED:   Vitals:     Temp:  [97.1 °F (36.2 °C)-98 °F (36.7 °C)]   Temp: 97.1 °F (36.2 °C) (12/08/20 2010)  Pulse: 108 (12/08/20 2010)  Resp: 20 (12/08/20 2011)  BP: 108/63 (12/08/20 2010)  SpO2: 97 % (12/08/20 2010)    Intake/Output Summary (Last 24 hours) at 12/8/2020 2033  Last data filed at 12/8/2020 1843  Gross per 24 hour   Intake 1020 ml   Output 650 ml   Net 370 ml       General:  In NAD. Alert and attentive, cooperative, comfortable    thin  female  Eyes: Anicteric,   EOMI  ENT:    Neck: supple,    Lungs:       chest tube 12/3, venting to malone  Heart: RRR,    Abd: Soft, NT, ND,    :  Voids     Musc: Joints without effusion, swelling, erythema, synovitis, muscle wasting.   Skin: No rashes .   Wound:   Neuro: Alert, attentive, speech fluent, face symmetric, moves all extremities, no focal weakness  Psych: Calm, cooperative  Lymphatic:      Extrem: No edema, erythema, phlebitis, cellulitis, warm and well perfused  VAD:   peripheral   Isolation:      Lines/Tubes/Drains:    General Labs reviewed:  Recent Labs   Lab 12/06/20  0544 12/07/20  0714 12/08/20  0606   WBC 7.23 8.12 9.61   HGB 11.9* 11.9* 11.2*   HCT 38.5 37.8 36.8*    318 300       Recent Labs   Lab 12/06/20  0544 12/07/20  0714 12/08/20  0606    138 137   K 5.0 4.2 4.2   CL 97 96 97   CO2 30* 30* 28   BUN 11 13 17   CREATININE 0.6 0.6 0.5   CALCIUM 9.1 9.1 9.1   PROT 6.7 6.7 6.8   BILITOT 0.1 0.1 0.2   ALKPHOS 60 65 64   ALT 26 23 20   AST 30 23 21           Micro:    11/4/20  Component 1mo ago   Culture referred for ID, Mycobacterium FINAL  12/08/2020 1525Abnormal     Comment: SOURCE: SPUTUM,     ,                  FINAL   MYCOBACTERIUM ABSCESSUS COMPLEX          Microbiology Results (last 7 days)     Procedure Component Value Units Date/Time    Culture, Respiratory with Gram Stain [[id number]]  (Abnormal)  (Susceptibility) Collected: 12/01/20 1108    Order Status: Completed Specimen: Respiratory from Sputum, Expectorated Updated: 12/04/20 1304     Respiratory Culture No S aureus isolated.      PSEUDOMONAS AERUGINOSA  Few        CANDIDA ALBICANS  Moderate  Normal respiratory chelsea also present       Gram Stain (Respiratory) <10 epithelial cells per low power field.     Gram Stain (Respiratory) Rare WBC's     Gram Stain (Respiratory) No organisms seen          Imaging Reviewed:   CXRUnchanged small volume right pneumothorax with chest tube in place.  New airspace disease at the right base suspicious for atelectasis or edema.  COPD.    CT11/01/2020    No evidence of pulmonary thromboemboli particularly in the large central arteries and lobar branches as imaged.  Peripheral branches are difficult to evaluate particularly in the upper lobes as described above.  New focus of nodular consolidation with cavitary of formation in the right upper lobe laterally and progression/increased in number of bilateral apical large cavities.  There is a small air-fluid level in 1 of the cavities on the left.  Findings are consistent with at infectious etiology.  Follow-up is suggested to exclude pulmonary nodular lesions/neoplasm.  Mediastinal adenopathy and possible hilar adenopathy which is likely reactive.  No new pleural or pericardial effusion or other acute process.  Additional stable incidental findings as above.      Cardiology:    IMPRESSION & PLAN     1. Pneumonia due to AFB in a cachectic patient.    With Mycobacterium abscessus(rapid grower)     2. Right pneumothorax.   Heimlich valve in place since admission, change to formal chest tube 12/3   Severe bullous  emphysema  3. Potential superinfection with Pseudomonas, growing from sputum    4.  PMHx of HTN, DLP,  anemia, malnutrition, cachexia, BMI of 15.9, right femur fracture status post surgery in December 2019, deconditioning, uses cane, asthma, immunodeficiency, bronchiectasis, pulmonary cavitary lung disease, COPD, pneumonia, chronic hypoxemic respiratory failure on 2-3 L.     Recommendations:  Stop isolation    While awaiting sensitivities for the M. Abscessus  Biaxin, plus Amikacin 10 mg/kg IV daily, plus either zyvox 600 mg daily or meropenem  Minimum therapy 3 months pending clearance of sputum of AFB.   Will place picc     Medical Decision Making during this encounter was  [_] Low Complexity  [_] Moderate Complexity  [x  ] High Complexity

## 2020-12-09 NOTE — PROCEDURES
"Fabiana Morel is a 65 y.o. female patient.    Temp: 97 °F (36.1 °C) (12/09/20 0732)  Pulse: 103 (12/09/20 0732)  Resp: 17 (12/09/20 0924)  BP: (!) 108/59 (12/09/20 0732)  SpO2: 96 % (12/09/20 0732)  Weight: 45.3 kg (99 lb 13.9 oz) (12/08/20 1843)  Height: 5' 4" (162.6 cm) (12/08/20 1843)    PICC  Date/Time: 12/9/2020 10:24 AM  Location procedure was performed: [facility]/PROGRESSIVE CARE UNIT  Performed by: Marty Mathews RN  Assisting provider: Jacey Lopez RN  Consent Done: Yes  Time out: Immediately prior to procedure a time out was called to verify the correct patient, procedure, equipment, support staff and site/side marked as required  Indications: med administration  Anesthesia: local infiltration  Local anesthetic: lidocaine 1% without epinephrine  Anesthetic Total (mL): 4  Preparation: skin prepped with ChloraPrep  Skin prep agent dried: skin prep agent completely dried prior to procedure  Sterile barriers: all five maximum sterile barriers used - cap, mask, sterile gown, sterile gloves, and large sterile sheet  Hand hygiene: hand hygiene performed prior to central venous catheter insertion  Location details: right basilic  Catheter type: double lumen  Catheter size: 5 Fr  Catheter Length: 38cm    Ultrasound guidance: yes  Vessel Caliber: large and patent, compressibility normal  Vascular Doppler: not done  Needle advanced into vessel with real time Ultrasound guidance.  Guidewire confirmed in vessel.  Image recorded and saved.  Sterile sheath used.  no esophageal manometryNumber of attempts: 1  Post-procedure: blood return through all ports, chlorhexidine patch and sterile dressing applied  Technical procedures used: ANGELA;Shyam 3CG  Estimated blood loss (mL): 1  Specimens: No  Implants: No  Assessment: placement verified by x-ray, free fluid flow, no pneumothorax on x-ray, tip termination and successful placement  Complications: none          Marty Mathews  12/9/2020  "

## 2020-12-10 ENCOUNTER — TELEPHONE (OUTPATIENT)
Dept: PULMONOLOGY | Facility: CLINIC | Age: 65
End: 2020-12-10

## 2020-12-10 ENCOUNTER — TELEPHONE (OUTPATIENT)
Dept: MEDSURG UNIT | Facility: HOSPITAL | Age: 65
End: 2020-12-10

## 2020-12-10 ENCOUNTER — PATIENT OUTREACH (OUTPATIENT)
Dept: ADMINISTRATIVE | Facility: CLINIC | Age: 65
End: 2020-12-10

## 2020-12-10 DIAGNOSIS — J96.21 ACUTE ON CHRONIC RESPIRATORY FAILURE WITH HYPOXIA: Primary | ICD-10-CM

## 2020-12-10 PROCEDURE — G0180 MD CERTIFICATION HHA PATIENT: HCPCS | Mod: ,,, | Performed by: FAMILY MEDICINE

## 2020-12-10 PROCEDURE — G0180 PR HOME HEALTH MD CERTIFICATION: ICD-10-PCS | Mod: ,,, | Performed by: FAMILY MEDICINE

## 2020-12-10 NOTE — DISCHARGE SUMMARY
[facility] Hospital Medicine  Discharge Summary      Patient Name: Fabiana Morel  MRN: [id number]  Admission Date: 11/29/2020  Hospital Length of Stay: 9 days  Discharge Date and Time: 12/9/2020  4:51 PM  Attending Physician: No att. providers found   Discharging Provider: Leilani Mederos NP  Primary Care Provider: Dominique Bartlett MD      HPI:   Fabiana Morel is a 65-year-old female with a past medical history of COPD, hyperlipidemia, iron deficiency anemia, and tobacco use who presents to the emergency room tonight with reports of shortness of breath.  Patient reports shortness of breath x3 weeks, has increased in severity since onset.  Patient requiring supplemental oxygen therapy for the past three weeks at 2-3 L, however has required increase in normal O2 dose over the past three days. Patient currently under the care of Dr. Mackey, pulmonology, has a history of COPD. Patient accompanied by her  during my initial interview and physical exam. a history of COPD. Patient was reportedly seen in the emergency room on 11/06 for chest pain, a CT chest was reportedly obtained revealing right lung abscess.  Patient followed with Dr. Mackey in clinic on 11/09 in which sputum samples were obtained and treatment was initiated for tuberculosis versus MAC.  Patient also reports decreased p.o. intake, utilizes boost supplementation daily.  Patient endorses productive cough x3 days, with brown sputum production.  Patient endorses dyspnea on exertion, stating she can not walk the length of the room without becoming SOB.  Patient states she quit tobacco use on 11/05/2020.  In the ER, patient noted to have right pneumothorax on chest x-ray.  ER physician placed Heimlich valve chest tube with reported immediate relief in shortness of breath.  Repeat chest x-ray revealing improvement of pneumothorax.  ER MD reportedly spoke with Dr. Mackey, pulmonology who recommended admission with IV vancomycin and Zosyn  and isolation precautions.  Patient placed in observation on 11/29/2020 at approximately 9:30 p.m..  Full code status verified upon admission to the hospital.    * No surgery found *      Hospital Course:   Patient admitted for right pneumothorax.  Patient had Heimlich valve chest tube placed in the ER with improvement in pneumothorax.  Patient was being seen outpatient by Dr. 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  Dr. Mackey was consulted and follow patient closely in the hospital.  Patient was also evaluated by Infectious Disease to assist with antibiotic regimen.  Patient receiving clarithromycin, ethambutol, Zyvox, meropenem and rifampin. Patient's sputum culture growing Pseudomonas and ID closely following with further sensitivities pending.  Patient had air leak with chest tube and CT surgery was consulted. Dr. Bucio removed Heimlich valve and replaced with percutaneous chest tube on 12/3.  Chest tube was placed back to continuous Pleurovac suction. Air leak was still present but now intermittent and significantly smaller.  Patient's respiratory symptoms improving.  Patient was initiated on IV Clinimax due to poor oral intake and dietary was consulted.  Patient's appetite improved and she is tolerating boost supplements well and Clinimax was discontinued.  Patient with generalized weakness and deconditioning and PT/OT consulted and patient progressing well.  Heimlich valve was placed by Dr. Mackey on 12/7.  She remained stable thereafter.  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 Bartlett.  A PICC line was placed and Pt was D/C home with home health with Heimlich valve in place.  She will have weekly labs with results sent to Dr. Jansen. Prior to D/C Dr. 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  This was communicated with ID and pulmonology prior to discharge.  Infectious Disease  recommended continued current Abx recommendations.  Dr. Manjarrez discussed with Pt referral to ID at [facility].  He initiated ethambutol on discharge as well.  Return precautions were discussed.  Her  underwent Education by by a script for infusion therapy.  They verbalized competence.  Pt was D/C home with home health on 12/10.      Physical Exam:  HRRR, lungs with few rales bibasilar.  Heimlich to RIGHT posterior chest with dressing CDI.  Abd soft, non-tender.     Consults:   Consults (From admission, onward)        Status Ordering Provider     Inpatient consult to Infectious Diseases  Once     Provider:  (Not yet assigned)    Completed ILYA MANJARREZ     Inpatient consult to PICC Line Nurse  Once     Provider:  (Not yet assigned)    Completed ADRIANA PEREZ     Inpatient consult to Pulmonology  Once     Provider:  Ilya Manjarrez MD    Completed ZARA HANKS     Inpatient consult to Social Work/Case Management  Once     Provider:  (Not yet assigned)    Completed ADRIANA PEREZ          No new Assessment & Plan notes have been filed under this hospital service since the last note was generated.  Service: Hospital Medicine    Final Active Diagnoses:    Diagnosis Date Noted POA    PRINCIPAL PROBLEM:  Pneumothorax on right [J93.9] 11/29/2020 Yes    Severe malnutrition [E43] 11/30/2020 Yes    Hypoalbuminemia due to protein-calorie malnutrition [E88.09, E46] 11/29/2020 Yes    Mycobacterium avium complex [A31.0] 11/29/2020 Yes    Iron deficiency [E61.1] 01/27/2020 Yes    Immune deficiency disorder [D84.9] 12/18/2019 Yes    Acute on chronic respiratory failure with hypoxia [J96.21] 12/16/2019 Yes    Bronchiectasis [J47.9] 12/16/2019 Yes    COPD (chronic obstructive pulmonary disease) [J44.9] 08/12/2016 Yes    Hyperlipidemia [E78.5] 04/29/2016 Yes    Lung nodule [R91.1] 04/29/2016 Yes      Problems Resolved During this Admission:       Discharged Condition: good    Disposition: Home-Health  Care c    Follow Up:  Follow-up Information     Bhupendra Mackey MD Today.    Specialty: Pulmonary Disease  Why: need to see in office 12/21  Contact information:  [address]  [address]  [place] [address]  [phone]             Reilly Bucio MD In 1 week.    Specialty: Cardiothoracic Surgery  Contact information:  [address] [place] [address]  [phone]             Giovana Jansen MD On 12/29/2020.    Specialty: Infectious Diseases  Why: 11:20-call for f/u appointment  Contact information:  [address]  [address]  [place] [address]  [phone]             [facility].    Specialty: Home Health Services  Why: Home Health  Contact information:  [address]  [address]  [facility] [id number] [phone]                 Patient Instructions:      X-Ray Chest PA And Lateral   Standing Status: Standing Number of Occurrences: 6 Standing Exp. Date: 03/09/21     Order Specific Question Answer Comments   Reason for Exam: pneumothorax    May the Radiologist modify the order per protocol to meet the clinical needs of the patient? Yes      Ambulatory referral/consult to Home Health   Standing Status: Future   Referral Priority: Routine Referral Type: Home Health   Referral Reason: Specialty Services Required   Requested Specialty: Home Health Services   Number of Visits Requested: 1     Diet Adult Regular     Notify your health care provider if you experience any of the following:  temperature >100.4     Notify your health care provider if you experience any of the following:  redness, tenderness, or signs of infection (pain, swelling, redness, odor or green/yellow discharge around incision site)     Notify your health care provider if you experience any of the following:  difficulty breathing or increased cough     Notify your health care provider if you experience any of the following:  persistent dizziness, light-headedness, or visual disturbances     Notify your health care provider if you  experience any of the following:  increased confusion or weakness     Activity as tolerated       Significant Diagnostic Studies: Labs:   CMP   Recent Labs   Lab 12/09/20  0638      K 4.3   CL 96   CO2 34*   GLU 89   BUN 18   CREATININE 0.6   CALCIUM 9.2   PROT 6.6   ALBUMIN 2.5*   BILITOT 0.2   ALKPHOS 68   AST 23   ALT 25   ANIONGAP 9   ESTGFRAFRICA >60   EGFRNONAA >60    and CBC   Recent Labs   Lab 12/09/20  0638   WBC 9.55   HGB 11.1*   HCT 36.5*          Pending Diagnostic Studies:     None         Medications:  Reconciled Home Medications:      Medication List      START taking these medications    clarithromycin 500 MG tablet  Commonly known as: BIAXIN  Take 1 tablet (500 mg total) by mouth every 12 (twelve) hours.     dextrose 5 % SolP 100 mL with amikacin 500 mg/2 mL Soln 450 mg  Inject 450 mg into the vein once daily.     * ethambutoL 400 MG Tab  Commonly known as: MYAMBUTOL  Take 1 tablet (400 mg total) by mouth once daily.     * ethambutoL 100 MG Tab  Commonly known as: MYAMBUTOL  Take 3 tablets (300 mg total) by mouth once daily.     HYDROcodone-acetaminophen 5-325 mg per tablet  Commonly known as: NORCO  Take 1 tablet by mouth every 4 (four) hours.     linezolid 600 mg Tab  Commonly known as: ZYVOX  Take 1 tablet (600 mg total) by mouth every 12 (twelve) hours.     PROBIOTIC 3 billion cell Cap  Generic drug: lactobacillus combination no.4  Take 1 capsule by mouth once daily.         * This list has 2 medication(s) that are the same as other medications prescribed for you. Read the directions carefully, and ask your doctor or other care provider to review them with you.            CONTINUE taking these medications    acetaminophen 325 MG tablet  Commonly known as: TYLENOL  Take 650 mg by mouth every 6 (six) hours as needed for Pain or Temperature greater than.     albuterol-ipratropium 2.5 mg-0.5 mg/3 mL nebulizer solution  Commonly known as: DUO-NEB  Take 3 mLs by nebulization every 6  (six) hours as needed for Wheezing.     alendronate 10 MG Tab  Commonly known as: FOSAMAX  Take 1 tablet (10 mg total) by mouth once daily.     atorvastatin 40 MG tablet  Commonly known as: LIPITOR  Take 1 tablet (40 mg total) by mouth once daily.     ferrous sulfate 325 mg (65 mg iron) Tab tablet  Commonly known as: FEOSOL  Take 325 mg by mouth 2 (two) times a day.     fluticasone-umeclidin-vilanter 100-62.5-25 mcg Dsdv  Commonly known as: TRELEGY ELLIPTA  Inhale 1 puff into the lungs once daily.     ibuprofen 400 MG tablet  Commonly known as: ADVIL,MOTRIN  Take 400 mg by mouth every 6 (six) hours as needed for Other or Temperature greater than.     megestroL 400 mg/10 mL (40 mg/mL) Susp  Commonly known as: MEGACE  Take 5 mLs (200 mg total) by mouth once daily.     VENTOLIN HFA 90 mcg/actuation inhaler  Generic drug: albuterol  Inhale 2 puffs into the lungs every 4 (four) hours as needed for Wheezing.        STOP taking these medications    amoxicillin-clavulanate 875-125mg 875-125 mg per tablet  Commonly known as: AUGMENTIN     azithromycin 500 MG tablet  Commonly known as: ZITHROMAX     isoniazid 300 MG Tab  Commonly known as: NYDRAZID     predniSONE 20 MG tablet  Commonly known as: DELTASONE     rifAMpin 150 MG capsule  Commonly known as: RIFADIN            Indwelling Lines/Drains at time of discharge:   Lines/Drains/Airways     Peripherally Inserted Central Catheter Line            PICC Double Lumen 12/09/20 1013 right basilic less than 1 day          Drain                 Chest Tube 12/03/20 1600 Right Midaxillary 6 days                Time spent on the discharge of patient: 38 minutes  Patient was seen and examined on the date of discharge and determined to be suitable for discharge.         Leilani Mederos NP  Department of Hospital Medicine  [facility]

## 2020-12-10 NOTE — PROGRESS NOTES
C3 nurse attempted to contact patient. No answer. The following message was left for the patient to return the call:  Good morning  I am a nurse calling on behalf of [facility] from the [facility].  This is a Transitional Care Call for Fabiana. When you have a moment please contact us at [phone] or [phone] Monday through Friday, between the hours of 8 am to 4 pm. We look forward to speaking with you. On behalf of [facility] have a nice day.    The patient has a scheduled HOS appointment with Elio Sanchez NP on 12/16/20 @ 1020. Message sent to Physician staff.

## 2020-12-10 NOTE — TELEPHONE ENCOUNTER
Spoke with Pharmacy about medications and yes she needs both strengths.      ----- Message from Kourtney Santacruz sent at 12/10/2020 10:21 AM CST -----  Regarding: pharmacy call  Contact: teresa  Type:  Pharmacy Calling to Clarify an RX    Name of Caller:  teresa   [facility]  Prescription Name:  ethambutoL (MYAMBUTOL) 100 MG Tab  What do they need to clarify?:  receive 2 diff strengths  Best Call Back Number:  [phone]  Additional Information:  please verify which to use or is both to be used together

## 2020-12-11 NOTE — PATIENT INSTRUCTIONS
Pneumothorax, Spontaneous  Pneumothorax is when air leaks out and gets trapped in the space between the lung and the chest wall (pleural space). It can cause complete or partial collapse of a lung. The trapped air prevents the lung from re-inflating. Spontaneous pneumothorax occurs when a weakened spot on the lung surface (bleb) ruptures. It may occur in people with asthma or emphysema, or even in those with no pre-existing lung disease.    Your pneumothorax is small and should get better without treatment. This can be observed at home. If the amount of trapped air grows larger, it must be removed with a tube placed into the pleural space (catheter).  Home care  · Rest at home. Do not do vigorous activity or exercise for the next week.  · You may use acetaminophen or ibuprofen to control pain, unless another medicine was prescribed. Note: If you have chronic liver or kidney disease or have ever had a stomach ulcer or gastrointestinal bleeding, talk with your healthcare provider before using these medicines. Also talk to your provider if you are taking medicine to prevent blood clots.  · During the next 3 days, it is important to take 4 slow, deep breaths every 1 to 2 hours while awake. Do this even though your chest may hurt when you breathe. It sends extra oxygen and blood to the lung. This is important to help keep the lung expanded. If an incentive spirometer (breathing exercise device) was given, use it as directed.  · If you smoke or use e-cigarettes, quit.  Follow-up care  Follow up with your healthcare provider, or as advised, for a repeat chest X-ray to be sure the pneumothorax is not getting larger.  Note: Any X-rays taken will be reviewed by a specialist. You will be notified of any new findings that may affect your care.  Call 911  Contact emergency services right away if any of these occur.  · Trouble breathing  · Confusion or difficulty arousing  · Fainting or loss of consciousness  · Rapid heart  rate  · Passing out or fainting  · New pain in the chest, arm, shoulder, neck, or upper back  When to seek medical advice  Call your healthcare provider right away if any of these occur:  · Increased pain with breathing  · Weakness or dizziness  · Fever or productive cough  Date Last Reviewed: 9/13/2020  © 8881-3727 [employer]. [address] [place] [address], [place] [address]. All rights reserved. This information is not intended as a substitute for professional medical care. Always follow your healthcare professional's instructions.

## 2020-12-14 ENCOUNTER — LAB VISIT (OUTPATIENT)
Dept: LAB | Facility: HOSPITAL | Age: 65
End: 2020-12-14
Attending: FAMILY MEDICINE
Payer: COMMERCIAL

## 2020-12-14 DIAGNOSIS — A31.0 PULMONARY DISEASE DUE TO MYCOBACTERIA: Primary | ICD-10-CM

## 2020-12-14 LAB
ALBUMIN SERPL BCP-MCNC: 2.7 G/DL (ref 3.5–5.2)
ALP SERPL-CCNC: 55 U/L (ref 55–135)
ALT SERPL W/O P-5'-P-CCNC: 17 U/L (ref 10–44)
ANION GAP SERPL CALC-SCNC: 9 MMOL/L (ref 8–16)
AST SERPL-CCNC: 18 U/L (ref 10–40)
BASOPHILS # BLD AUTO: 0.04 K/UL (ref 0–0.2)
BASOPHILS NFR BLD: 0.4 % (ref 0–1.9)
BILIRUB SERPL-MCNC: 0.3 MG/DL (ref 0.1–1)
BUN SERPL-MCNC: 16 MG/DL (ref 8–23)
CALCIUM SERPL-MCNC: 9 MG/DL (ref 8.7–10.5)
CHLORIDE SERPL-SCNC: 98 MMOL/L (ref 95–110)
CO2 SERPL-SCNC: 29 MMOL/L (ref 23–29)
CREAT SERPL-MCNC: 0.4 MG/DL (ref 0.5–1.4)
CRP SERPL-MCNC: 13.75 MG/DL
DIFFERENTIAL METHOD: ABNORMAL
EOSINOPHIL # BLD AUTO: 0.6 K/UL (ref 0–0.5)
EOSINOPHIL NFR BLD: 5 % (ref 0–8)
ERYTHROCYTE [DISTWIDTH] IN BLOOD BY AUTOMATED COUNT: 16.4 % (ref 11.5–14.5)
EST. GFR  (AFRICAN AMERICAN): >60 ML/MIN/1.73 M^2
EST. GFR  (NON AFRICAN AMERICAN): >60 ML/MIN/1.73 M^2
GLUCOSE SERPL-MCNC: 139 MG/DL (ref 70–110)
HCT VFR BLD AUTO: 36.1 % (ref 37–48.5)
HGB BLD-MCNC: 10.8 G/DL (ref 12–16)
IMM GRANULOCYTES # BLD AUTO: 0.04 K/UL (ref 0–0.04)
IMM GRANULOCYTES NFR BLD AUTO: 0.4 % (ref 0–0.5)
LYMPHOCYTES # BLD AUTO: 1.9 K/UL (ref 1–4.8)
LYMPHOCYTES NFR BLD: 16.7 % (ref 18–48)
MCH RBC QN AUTO: 28.5 PG (ref 27–31)
MCHC RBC AUTO-ENTMCNC: 29.9 G/DL (ref 32–36)
MCV RBC AUTO: 95 FL (ref 82–98)
MONOCYTES # BLD AUTO: 0.9 K/UL (ref 0.3–1)
MONOCYTES NFR BLD: 7.7 % (ref 4–15)
NEUTROPHILS # BLD AUTO: 7.8 K/UL (ref 1.8–7.7)
NEUTROPHILS NFR BLD: 69.8 % (ref 38–73)
NRBC BLD-RTO: 0 /100 WBC
PLATELET # BLD AUTO: 281 K/UL (ref 150–350)
PMV BLD AUTO: 9.2 FL (ref 9.2–12.9)
POTASSIUM SERPL-SCNC: 3.7 MMOL/L (ref 3.5–5.1)
PROT SERPL-MCNC: 6.9 G/DL (ref 6–8.4)
RBC # BLD AUTO: 3.79 M/UL (ref 4–5.4)
SODIUM SERPL-SCNC: 136 MMOL/L (ref 136–145)
WBC # BLD AUTO: 11.1 K/UL (ref 3.9–12.7)

## 2020-12-14 PROCEDURE — 85025 COMPLETE CBC W/AUTO DIFF WBC: CPT

## 2020-12-14 PROCEDURE — 36415 COLL VENOUS BLD VENIPUNCTURE: CPT

## 2020-12-14 PROCEDURE — 80053 COMPREHEN METABOLIC PANEL: CPT

## 2020-12-14 PROCEDURE — 86140 C-REACTIVE PROTEIN: CPT

## 2020-12-15 LAB — AMIKACIN TROUGH SERPL-MCNC: <2 UG/ML (ref 0–6)

## 2020-12-15 PROCEDURE — 80150 ASSAY OF AMIKACIN: CPT

## 2020-12-16 ENCOUNTER — OFFICE VISIT (OUTPATIENT)
Dept: FAMILY MEDICINE | Facility: CLINIC | Age: 65
End: 2020-12-16
Payer: COMMERCIAL

## 2020-12-16 VITALS
HEIGHT: 64 IN | WEIGHT: 95 LBS | SYSTOLIC BLOOD PRESSURE: 88 MMHG | HEART RATE: 116 BPM | DIASTOLIC BLOOD PRESSURE: 60 MMHG | TEMPERATURE: 97 F | BODY MASS INDEX: 16.22 KG/M2

## 2020-12-16 DIAGNOSIS — J93.9 PNEUMOTHORAX ON RIGHT: Primary | ICD-10-CM

## 2020-12-16 DIAGNOSIS — J44.0 CHRONIC OBSTRUCTIVE PULMONARY DISEASE WITH ACUTE LOWER RESPIRATORY INFECTION: ICD-10-CM

## 2020-12-16 DIAGNOSIS — A31.0 MYCOBACTERIUM AVIUM COMPLEX: ICD-10-CM

## 2020-12-16 PROCEDURE — 3074F PR MOST RECENT SYSTOLIC BLOOD PRESSURE < 130 MM HG: ICD-10-PCS | Mod: CPTII,S$GLB,, | Performed by: NURSE PRACTITIONER

## 2020-12-16 PROCEDURE — 99999 PR PBB SHADOW E&M-EST. PATIENT-LVL IV: ICD-10-PCS | Mod: PBBFAC,,, | Performed by: NURSE PRACTITIONER

## 2020-12-16 PROCEDURE — 3008F PR BODY MASS INDEX (BMI) DOCUMENTED: ICD-10-PCS | Mod: CPTII,S$GLB,, | Performed by: NURSE PRACTITIONER

## 2020-12-16 PROCEDURE — 99214 OFFICE O/P EST MOD 30 MIN: CPT | Mod: S$GLB,,, | Performed by: NURSE PRACTITIONER

## 2020-12-16 PROCEDURE — 3078F DIAST BP <80 MM HG: CPT | Mod: CPTII,S$GLB,, | Performed by: NURSE PRACTITIONER

## 2020-12-16 PROCEDURE — 1126F AMNT PAIN NOTED NONE PRSNT: CPT | Mod: S$GLB,,, | Performed by: NURSE PRACTITIONER

## 2020-12-16 PROCEDURE — 1126F PR PAIN SEVERITY QUANTIFIED, NO PAIN PRESENT: ICD-10-PCS | Mod: S$GLB,,, | Performed by: NURSE PRACTITIONER

## 2020-12-16 PROCEDURE — 3074F SYST BP LT 130 MM HG: CPT | Mod: CPTII,S$GLB,, | Performed by: NURSE PRACTITIONER

## 2020-12-16 PROCEDURE — 3008F BODY MASS INDEX DOCD: CPT | Mod: CPTII,S$GLB,, | Performed by: NURSE PRACTITIONER

## 2020-12-16 PROCEDURE — 99214 PR OFFICE/OUTPT VISIT, EST, LEVL IV, 30-39 MIN: ICD-10-PCS | Mod: S$GLB,,, | Performed by: NURSE PRACTITIONER

## 2020-12-16 PROCEDURE — 99999 PR PBB SHADOW E&M-EST. PATIENT-LVL IV: CPT | Mod: PBBFAC,,, | Performed by: NURSE PRACTITIONER

## 2020-12-16 PROCEDURE — 3078F PR MOST RECENT DIASTOLIC BLOOD PRESSURE < 80 MM HG: ICD-10-PCS | Mod: CPTII,S$GLB,, | Performed by: NURSE PRACTITIONER

## 2020-12-16 NOTE — PROGRESS NOTES
This dictation has been generated using Modal Fluency Dictation some phonetic errors may occur. Please contact author for clarification if needed.     Problem List Items Addressed This Visit     COPD (chronic obstructive pulmonary disease)    Pneumothorax on right - Primary    Mycobacterium avium complex        COPD PNEUMOTHORAX AND MYCOBACTERIUM AVIUM FOLLOWING UP FROM HOSPITAL.  CONTINUE FOLLOW-UP WITH PULMONOLOGY DR. MANJARREZ INFECTIOUS DISEASE DOCTOR ANA.  COMPLETE MEDS AS DIRECTED.    No follow-ups on file.    ________________________________________________________________  ________________________________________________________________      Chief Complaint   Patient presents with    Hospital Follow Up     History of present illness  This 65 y.o. presents today for complaint of hospital follow-up.  Transitional Care Note    Family and/or Caretaker present at visit?  Yes.  Diagnostic tests reviewed/disposition: No diagnosic tests pending after this hospitalization.  Disease/illness education: MAC. COPD. MEDS  Home health/community services discussion/referrals: Patient has home health established at [facility] .   Establishment or re-establishment of referral orders for community resources: No other necessary community resources.   Discussion with other health care providers: No discussion with other health care providers necessary.     Admission Date: 11/29/2020  Hospital Length of Stay: 9 days  Discharge Date and Time: 12/9/2020  4:51 PM  Attending Physician: No att. providers found   Discharging Provider: Leilani Mederos NP  Primary Care Provider: Dominique Bartlett MD        HPI:   Fabiana Morel is a 65-year-old female with a past medical history of COPD, hyperlipidemia, iron deficiency anemia, and tobacco use who presents to the emergency room tonight with reports of shortness of breath.  Patient reports shortness of breath x3 weeks, has increased in severity since onset.  Patient requiring supplemental  oxygen therapy for the past three weeks at 2-3 L, however has required increase in normal O2 dose over the past three days. Patient currently under the care of Dr. Mackey, pulmonology, has a history of COPD. Patient accompanied by her  during my initial interview and physical exam. a history of COPD. Patient was reportedly seen in the emergency room on 11/06 for chest pain, a CT chest was reportedly obtained revealing right lung abscess.  Patient followed with Dr. Mackey in clinic on 11/09 in which sputum samples were obtained and treatment was initiated for tuberculosis versus MAC.  Patient also reports decreased p.o. intake, utilizes boost supplementation daily.  Patient endorses productive cough x3 days, with brown sputum production.  Patient endorses dyspnea on exertion, stating she can not walk the length of the room without becoming SOB.  Patient states she quit tobacco use on 11/05/2020.  In the ER, patient noted to have right pneumothorax on chest x-ray.  ER physician placed Heimlich valve chest tube with reported immediate relief in shortness of breath.  Repeat chest x-ray revealing improvement of pneumothorax.  ER MD reportedly spoke with Dr. Mackey, pulmonology who recommended admission with IV vancomycin and Zosyn and isolation precautions.  Patient placed in observation on 11/29/2020 at approximately 9:30 p.m..  Full code status verified upon admission to the hospital.     * No surgery found *       Hospital Course:   Patient admitted for right pneumothorax.  Patient had Heimlich valve chest tube placed in the ER with improvement in pneumothorax.  Patient was being seen outpatient by Dr. 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  Dr. Mackey was consulted and follow patient closely in the hospital.  Patient was also evaluated by Infectious Disease to assist with antibiotic regimen.  Patient receiving clarithromycin, ethambutol, Zyvox, meropenem and rifampin. Patient's sputum culture growing Pseudomonas  and ID closely following with further sensitivities pending.  Patient had air leak with chest tube and CT surgery was consulted. Dr. Bucio removed Heimlich valve and replaced with percutaneous chest tube on 12/3.  Chest tube was placed back to continuous Pleurovac suction. Air leak was still present but now intermittent and significantly smaller.  Patient's respiratory symptoms improving.  Patient was initiated on IV Clinimax due to poor oral intake and dietary was consulted.  Patient's appetite improved and she is tolerating boost supplements well and Clinimax was discontinued.  Patient with generalized weakness and deconditioning and PT/OT consulted and patient progressing well.  Heimlich valve was placed by Dr. Mackey on 12/7.  She remained stable thereafter.  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 Bartlett.  A PICC line was placed and Pt was D/C home with home health with Heimlich valve in place.  She will have weekly labs with results sent to Dr. Jansen. Prior to D/C Dr. 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  This was communicated with ID and pulmonology prior to discharge.  Infectious Disease recommended continued current Abx recommendations.  Dr. Mackey discussed with Pt referral to ID at [facility].  He initiated ethambutol on discharge as well.  Return precautions were discussed.  Her  underwent Education by by a script for infusion therapy.  They verbalized competence.  Pt was D/C home with home health on 12/10.     Reviewed recent labs.  Reviewed meds with patient's.  No questions.        Past Medical History:   Diagnosis Date    Abnormal CT scan 1/8/2015    Asthma     Colitis     COPD (chronic obstructive pulmonary disease)     Diverticulitis of sigmoid colon 11/4/2014    Erosive gastritis 7/17/2013    Fracture of left clavicle     H pylori ulcer 7/17/2013     "Hypertension     Peptic ulcer disease 7/17/2013    Pneumonia of right upper lobe due to infectious organism 8/15/2017    Rectal bleed 11/3/2014    Scoliosis     SOB (shortness of breath)     Yeast infection 8/25/2017       Past Surgical History:   Procedure Laterality Date    APPENDECTOMY      BREAST BIOPSY Right     cyst    COLONOSCOPY N/A 7/15/2019    Procedure: COLONOSCOPY;  Surgeon: Sharif Chun MD;  Location: [facility] ENDO;  Service: Endoscopy;  Laterality: N/A;    HIP ARTHROPLASTY Right 12/18/2019    Procedure: ARTHROPLASTY, HIP;  Surgeon: Bernardino Wilson II, MD;  Location: [facility] OR;  Service: Orthopedics;  Laterality: Right;  Jose - Morel and Nephew    HYSTERECTOMY      HASMUKH/BSO, DUB    OOPHORECTOMY      TUBAL LIGATION         Family History   Problem Relation Age of Onset    Cancer Mother 49        "lymph nodes"    Cancer Sister         lung?       Social History     Socioeconomic History    Marital status:      Spouse name: Not on file    Number of children: Not on file    Years of education: Not on file    Highest education level: Not on file   Occupational History    Not on file   Social Needs    Financial resource strain: Not on file    Food insecurity     Worry: Not on file     Inability: Not on file    Transportation needs     Medical: Not on file     Non-medical: Not on file   Tobacco Use    Smoking status: Former Smoker     Packs/day: 0.50     Years: 30.00     Pack years: 15.00     Types: Cigarettes    Smokeless tobacco: Never Used    Tobacco comment: Quit on 11-5-2020   Substance and Sexual Activity    Alcohol use: Not Currently     Alcohol/week: 12.0 standard drinks     Types: 12 Cans of beer per week    Drug use: No    Sexual activity: Yes     Partners: Male   Lifestyle    Physical activity     Days per week: Not on file     Minutes per session: Not on file    Stress: Not on file   Relationships    Social connections     Talks on phone: Not on file     Gets " together: Not on file     Attends Orthodoxy service: Not on file     Active member of club or organization: Not on file     Attends meetings of clubs or organizations: Not on file     Relationship status: Not on file   Other Topics Concern    Not on file   Social History Narrative    Not on file       Current Outpatient Medications   Medication Sig Dispense Refill    acetaminophen (TYLENOL) 325 MG tablet Take 650 mg by mouth every 6 (six) hours as needed for Pain or Temperature greater than.      albuterol-ipratropium (DUO-NEB) 2.5 mg-0.5 mg/3 mL nebulizer solution Take 3 mLs by nebulization every 6 (six) hours as needed for Wheezing. 25 vial 2    alendronate (FOSAMAX) 10 MG Tab Take 1 tablet (10 mg total) by mouth once daily. 90 tablet 3    clarithromycin (BIAXIN) 500 MG tablet Take 1 tablet (500 mg total) by mouth every 12 (twelve) hours. 60 tablet 0    dextrose 5 % SolP 100 mL with amikacin 500 mg/2 mL Soln 450 mg Inject 450 mg into the vein once daily.      ethambutoL (MYAMBUTOL) 400 MG Tab Take 1 tablet (400 mg total) by mouth once daily. 30 tablet 3    ferrous sulfate (FEOSOL) 325 mg (65 mg iron) Tab tablet Take 325 mg by mouth 2 (two) times a day.      fluticasone-umeclidin-vilanter (TRELEGY ELLIPTA) 100-62.5-25 mcg DsDv Inhale 1 puff into the lungs once daily. 1 each 11    HYDROcodone-acetaminophen (NORCO) 5-325 mg per tablet Take 1 tablet by mouth every 4 (four) hours. (Patient taking differently: Take 1 tablet by mouth every 4 (four) hours as needed. ) 15 tablet 0    ibuprofen (ADVIL,MOTRIN) 400 MG tablet Take 400 mg by mouth every 6 (six) hours as needed for Other or Temperature greater than.      lactobacillus combination no.4 (PROBIOTIC) 3 billion cell Cap Take 1 capsule by mouth once daily.      linezolid (ZYVOX) 600 mg Tab Take 1 tablet (600 mg total) by mouth every 12 (twelve) hours. 60 tablet 0    VENTOLIN HFA 90 mcg/actuation inhaler Inhale 2 puffs into the lungs every 4 (four) hours  as needed for Wheezing. 18 g 11    atorvastatin (LIPITOR) 40 MG tablet Take 1 tablet (40 mg total) by mouth once daily. 90 tablet 3    ethambutoL (MYAMBUTOL) 100 MG Tab Take 3 tablets (300 mg total) by mouth once daily. (Patient not taking: Reported on 12/11/2020) 90 tablet 3    megestroL (MEGACE) 400 mg/10 mL (40 mg/mL) Susp Take 5 mLs (200 mg total) by mouth once daily. 480 mL 1     No current facility-administered medications for this visit.        Review of patient's allergies indicates:   Allergen Reactions    Erythromycin Nausea Only       Physical examination  Vitals Reviewed\  Vitals:    12/16/20 1044   BP: (!) 88/60   Pulse: (!) 116   Temp: 97.2 °F (36.2 °C)     Weight: 43.1 kg (95 lb 0.3 oz)    Gen. Well-dressed well-nourished   Skin warm dry and intact.  No rashes noted.  Chest.  Respirations are even unlabored.  Lungs are clear to auscultation.  Cardiac regular rate and rhythm.  No chest wall adenopathy noted.  Neuro. Awake alert oriented x4.  Normal judgment and cognition noted.  Extremities no clubbing cyanosis or edema noted.     Call or return to clinic prn if these symptoms worsen or fail to improve as anticipated.

## 2020-12-18 ENCOUNTER — PATIENT OUTREACH (OUTPATIENT)
Dept: ADMINISTRATIVE | Facility: OTHER | Age: 65
End: 2020-12-18

## 2020-12-18 LAB
ACID FAST MOD KINY STN SPEC: ABNORMAL
ACID FAST MOD KINY STN SPEC: ABNORMAL
MYCOBACTERIUM SPEC QL CULT: ABNORMAL
MYCOBACTERIUM SPEC QL CULT: ABNORMAL

## 2020-12-18 NOTE — PROGRESS NOTES
Chart was reviewed for overdue Proactive [facility] Encounters ([facility])  topics  Updates were requested from care everywhere  Health Maintenance has been updated  LINKS immunization registry triggered

## 2020-12-21 ENCOUNTER — OFFICE VISIT (OUTPATIENT)
Dept: PULMONOLOGY | Facility: CLINIC | Age: 65
End: 2020-12-21
Payer: COMMERCIAL

## 2020-12-21 ENCOUNTER — TELEPHONE (OUTPATIENT)
Dept: PULMONOLOGY | Facility: CLINIC | Age: 65
End: 2020-12-21

## 2020-12-21 ENCOUNTER — HOSPITAL ENCOUNTER (OUTPATIENT)
Dept: RADIOLOGY | Facility: HOSPITAL | Age: 65
Discharge: HOME | End: 2020-12-21
Attending: INTERNAL MEDICINE
Payer: COMMERCIAL

## 2020-12-21 VITALS
DIASTOLIC BLOOD PRESSURE: 67 MMHG | WEIGHT: 95.88 LBS | OXYGEN SATURATION: 98 % | SYSTOLIC BLOOD PRESSURE: 122 MMHG | HEIGHT: 64 IN | HEART RATE: 90 BPM | BODY MASS INDEX: 16.37 KG/M2

## 2020-12-21 DIAGNOSIS — J93.9 PNEUMOTHORAX ON RIGHT: ICD-10-CM

## 2020-12-21 DIAGNOSIS — J47.0 BRONCHIECTASIS WITH ACUTE LOWER RESPIRATORY INFECTION: Primary | ICD-10-CM

## 2020-12-21 DIAGNOSIS — A31.8 MYCOBACTERIUM ABSCESSUS INFECTION: ICD-10-CM

## 2020-12-21 DIAGNOSIS — Z96.89 CHEST TUBE IN PLACE: ICD-10-CM

## 2020-12-21 DIAGNOSIS — D84.9 IMMUNE DEFICIENCY DISORDER: ICD-10-CM

## 2020-12-21 DIAGNOSIS — A31.0 MYCOBACTERIUM AVIUM COMPLEX: ICD-10-CM

## 2020-12-21 PROCEDURE — 3078F DIAST BP <80 MM HG: CPT | Mod: CPTII,S$GLB,, | Performed by: INTERNAL MEDICINE

## 2020-12-21 PROCEDURE — 71046 XR CHEST PA AND LATERAL: ICD-10-PCS | Mod: 26,,, | Performed by: RADIOLOGY

## 2020-12-21 PROCEDURE — 3008F BODY MASS INDEX DOCD: CPT | Mod: CPTII,S$GLB,, | Performed by: INTERNAL MEDICINE

## 2020-12-21 PROCEDURE — 99214 OFFICE O/P EST MOD 30 MIN: CPT | Mod: S$GLB,,, | Performed by: INTERNAL MEDICINE

## 2020-12-21 PROCEDURE — 3078F PR MOST RECENT DIASTOLIC BLOOD PRESSURE < 80 MM HG: ICD-10-PCS | Mod: CPTII,S$GLB,, | Performed by: INTERNAL MEDICINE

## 2020-12-21 PROCEDURE — 99999 PR PBB SHADOW E&M-EST. PATIENT-LVL IV: CPT | Mod: PBBFAC,,, | Performed by: INTERNAL MEDICINE

## 2020-12-21 PROCEDURE — 1125F PR PAIN SEVERITY QUANTIFIED, PAIN PRESENT: ICD-10-PCS | Mod: S$GLB,,, | Performed by: INTERNAL MEDICINE

## 2020-12-21 PROCEDURE — 1125F AMNT PAIN NOTED PAIN PRSNT: CPT | Mod: S$GLB,,, | Performed by: INTERNAL MEDICINE

## 2020-12-21 PROCEDURE — 99214 PR OFFICE/OUTPT VISIT, EST, LEVL IV, 30-39 MIN: ICD-10-PCS | Mod: S$GLB,,, | Performed by: INTERNAL MEDICINE

## 2020-12-21 PROCEDURE — 3008F PR BODY MASS INDEX (BMI) DOCUMENTED: ICD-10-PCS | Mod: CPTII,S$GLB,, | Performed by: INTERNAL MEDICINE

## 2020-12-21 PROCEDURE — 71046 X-RAY EXAM CHEST 2 VIEWS: CPT | Mod: TC,FY

## 2020-12-21 PROCEDURE — 99999 PR PBB SHADOW E&M-EST. PATIENT-LVL IV: ICD-10-PCS | Mod: PBBFAC,,, | Performed by: INTERNAL MEDICINE

## 2020-12-21 PROCEDURE — 3074F SYST BP LT 130 MM HG: CPT | Mod: CPTII,S$GLB,, | Performed by: INTERNAL MEDICINE

## 2020-12-21 PROCEDURE — 3074F PR MOST RECENT SYSTOLIC BLOOD PRESSURE < 130 MM HG: ICD-10-PCS | Mod: CPTII,S$GLB,, | Performed by: INTERNAL MEDICINE

## 2020-12-21 PROCEDURE — 71046 X-RAY EXAM CHEST 2 VIEWS: CPT | Mod: 26,,, | Performed by: RADIOLOGY

## 2020-12-21 RX ORDER — HYDROCODONE BITARTRATE AND ACETAMINOPHEN 5; 325 MG/1; MG/1
1 TABLET ORAL EVERY 6 HOURS PRN
Qty: 90 TABLET | Refills: 0 | Status: ON HOLD | OUTPATIENT
Start: 2020-12-21 | End: 2021-01-05 | Stop reason: SDUPTHER

## 2020-12-21 RX ORDER — HYDROCODONE BITARTRATE AND ACETAMINOPHEN 5; 325 MG/1; MG/1
1 TABLET ORAL EVERY 6 HOURS PRN
Qty: 90 TABLET | Refills: 0 | Status: SHIPPED | OUTPATIENT
Start: 2020-12-21 | End: 2020-12-21 | Stop reason: SDUPTHER

## 2020-12-21 NOTE — TELEPHONE ENCOUNTER
Rx being sent in as describe.    ----- Message from Chetna Duque sent at 12/21/2020  2:43 PM CST -----  Regarding: pharmacy  Contact: Bryan Burns/[phone]  Type:  Pharmacy Calling to Clarify an RX    Name of Caller:  Bryan Burns/[phone]  Pharmacy Name:    [id number] - [place] - [address]  [address] [place] [address]  Phone: [phone] Fax: [phone]     Prescription Name:  HYDROcodone-acetaminophen (NORCO) 5-325 mg per tablet      What do they need to clarify?:  this medication cannot be faxed. It has to be escribed or a hard copy given to the patient. Please call to advise.

## 2020-12-21 NOTE — PATIENT INSTRUCTIONS
Clamp off chest tube Tuesday night - unclamp for problem.  Do chest xray prior to visit with Dr Bucio Wednesday.    cxr looks good.  Standing order for chest xray.    Not clear if abscessus playing a role.     See Dr Hanson- may adjust meds - should plan to see ID at main campus.      cxr response very good.  Could do ct to evaluate response but hard to say will change rx.

## 2020-12-21 NOTE — PROGRESS NOTES
12/21/2020    Fabiana Morel  Office Note    Chief Complaint   Patient presents with    Hospital Follow Up     2 week    Sputum Production     yellowish       12/21/2020 - eating better, mucous near clear, chest tube leak stopped 2 days ago.  Had acute admit with pneumo 11.29,  aren and abscusus found.  On rx merrem,zyvox, ethambutol,aztihro, biaxin- no problems, sees eye doc for ethambutol.  No prednisone.  On amikacin. Off megace and eating ok .  Breathing ok         11/9/2020 no tb exposure, had chest pain, cxr with upper lung cavitary, had afb checks with positive x 2. No id.  Night sweats forever- slight dampness with no need to change clothes.  Pain was 9/10 ppt er visit, no prior pain, no ppt factors- pain better with steroids/abx.  Started levaquin 750 in er- off prednisone and abx..  Was on prednisone/augmentin prior to er visit.      5/27/2020- 1/2ppd, no problem,  Hip recovered.  No falls/cane.  No abx nor prednisone  \  Patient Instructions   I added iron level to blood test for future- not urgent.  Would stay on iron til later in year.    Do chest xray if any problems.      Jan 27, 2020- had hip fx in dec, had repair hip with good recovery.  Still on abx, had prevnar on 10/15/19-  Off smokes with better oxygen, doing well.  occ hip pain. No prednisone,   Patient Instructions   trelegy has 3 24/7 breathing medications- daily good    Use prednisone if cough or wheezes or more short breath.    Use augmentin if cough, short breath.    Chest xray now and repeat in 3-4 months - sooner if any problems.    Could check immune in 3 months, you had vaccine in October 2019    Ferrous sulfate/gluconate once daily may be reasonable - iv iron infusion?    Oct 3, 2019 lost 5lbs since aug, cxr with apparent bullae left lung posterior lung. No mucous production.  Still on abx - augmentin.  Has had damp night sweats for years with no change.  Patient Instructions   Pneumonia germ was 8 of 14 protected.      Get  "vaccine next wk    Stay on augmentin til lung is cleared - optimal.    Do crp, cbc, cxr next week- would follow what is abnormal.  If ongoing improvement found- follow til baseline reasonable - ct and scope test if not clearing or worsens.    Try mirtazapine 7.5mg bedtime, go to 15 mg bedtime if not too sleepy. Will make sleep and should stimulate appetite.    Periactin- 4 mg - try 2nd if mirtazapine not effective, not both, start one bedtime, going to twice daily (could break 1/2), antihisatmine.    Try megace next time?  aug  27, developed left chest pain and short of breath,   occ yellow mucous,  Still smokes. noox testing.  Uses trelegy, no falls.     Patient Instructions   Likely has had left upper lung pnumonia last 2 months.  Bronch in 2017 did not show much.     Chest xray recheck in a month- do monthly til this pneumonia cleared-you have a standing order for cxr  Monthly - get routinely every 3 months once this pneumonia cleared.       Check blood work weekly for "pneumonia" up to next month to assure normalizes.     Sputum culture- resistant  Germ possible.     Check immune response to vaccine.       Do prednisone 20mgdaily for  3 days monthly.     Use augmentin if pain,weakness, short breath, any concern regarding pneumonia.       Use trelegy.       Feb 26, 2019- took abx with  recent developing resp problems. Still smokes.  resp same.   Has old compression fx seen on  Lateral cxr.  Discussed with patient above for education the following:    Could do f/u ct chest - but chest xray looks stable.     Lung capacity was only 27% in 2014- hard to see lungs going much more into future.  Chronic lung disease usually causes chronic debility/weakness.  Acute illnesses will be hard to recover strength.      Prompt antibiotic and prednisone may be wise.    Chest xray for illness may be wise.     Use xanax as needed for anxiety.    Stop smokes please.    aug 28, 2018- still smokes, some celestin, uses trelegy. cxr " stable.  Follow-up in about 6 months (around 2/28/2019).  Discussed with patient above for education the following:     Use prednisone or antibiotic If bronchitis  Check chest xray if ill  Chest xray 6 months.  Likely do ct in a yr?  Stop smokes.  Feb 28, 2018 breathing better, still cough but slight.  Nearly off smokes.  Had exacerbation needing steriods.  Lung transplant discussed if pt stops smokes but pt declines.  nov 16, 2017 - off smokes transiently - on chantix with no problems.  No severe stress.    oct 17, 2017- no fever or night sweats or cough. No new c/o - still smokes but trying chantrix.  sept 20, still smokes same, feels sl better than 6  Months ago, mucous clear, night sweats still with no improvement with abx.    AUg 15, having right shoulder pain last 3 weeks at 10/10 intially  And subsequent 3-4/10,  No fever but night sweats chr, no n/v/d or headache.  No tb exposure.   Had tb eval past negative.  Still smokes.  Nerves ok.  Breathing seems  Better.  Feb 13, 2017, hpi doing rehab, no action plan, still smoking.  Using all meds, nerves better with xanax and toprol.  Aug 12, 2016HPI:initial encounter in [facility], copd and bronchiectasis and lung nodule.  No prednisone use recent.  No recent azithromycin.  breo and incruse regular use, atdfjpgyf0y/d, not using nebulizer.  Last ct 12/2015.  fev1 27% in 2014 with dramatic bd response.  Working as filing and typing.  Feels resp comfortable.  Has had lung dz age 56.    Scant mucous with no color chr, no acapella.    The chief compliant  problem is stable  PFSH:  Past Medical History:   Diagnosis Date    Abnormal CT scan 1/8/2015    Asthma     Colitis     COPD (chronic obstructive pulmonary disease)     Diverticulitis of sigmoid colon 11/4/2014    Erosive gastritis 7/17/2013    Fracture of left clavicle     H pylori ulcer 7/17/2013    Hypertension     Peptic ulcer disease 7/17/2013    Pneumonia of right upper lobe due to infectious organism  "8/15/2017    Rectal bleed 11/3/2014    Scoliosis     SOB (shortness of breath)     Yeast infection 8/25/2017         Past Surgical History:   Procedure Laterality Date    APPENDECTOMY      BREAST BIOPSY Right     cyst    COLONOSCOPY N/A 7/15/2019    Procedure: COLONOSCOPY;  Surgeon: Sharif Chun MD;  Location: [facility] ENDO;  Service: Endoscopy;  Laterality: N/A;    HIP ARTHROPLASTY Right 12/18/2019    Procedure: ARTHROPLASTY, HIP;  Surgeon: Bernardino Wilson II, MD;  Location: [facility] OR;  Service: Orthopedics;  Laterality: Right;  Jose - Morel and Nephew    HYSTERECTOMY      HASMUKH/BSO, DUB    OOPHORECTOMY      TUBAL LIGATION       Social History     Tobacco Use    Smoking status: Former Smoker     Packs/day: 0.50     Years: 30.00     Pack years: 15.00     Types: Cigarettes    Smokeless tobacco: Never Used    Tobacco comment: Quit on 11-5-2020   Substance Use Topics    Alcohol use: Not Currently     Alcohol/week: 12.0 standard drinks     Types: 12 Cans of beer per week    Drug use: No     Family History   Problem Relation Age of Onset    Cancer Mother 49        "lymph nodes"    Cancer Sister         lung?     Review of patient's allergies indicates:   Allergen Reactions    Erythromycin Nausea Only       Performance Status:The patient's activity level is functions out of house.      Review of Systems:  a review of eleven systems covering constitutional, Eye, HEENT, Psych, Respiratory, Cardiac, GI, , Musculoskeletal, Endocrine, Dermatologic was negative except for pertinent findings as listed ABOVE and below: night sweats chr due to menopause,      Exam:Comprehensive exam done.   /67 (BP Location: Left arm, Patient Position: Sitting)   Pulse 90   Ht 5' 4" (1.626 m)   Wt 43.5 kg (95 lb 14.4 oz)   SpO2 98% Comment: on room air at rest  BMI 16.46 kg/m²   Exam included Vitals as listed, and patient's appearance and affect and alertness and mood, oral exam for yeast and hygiene and pharynx " lesions and Mallapatti (M) score, neck with inspection for jvd and masses and thyroid abnormalities and lymph nodes (supraclavicular and infraclavicular nodes and axillary also examined and noted if abn), chest exam included symmetry and effort and fremitus and percussion and auscultation, cardiac exam included rhythm and gallops and murmur and rubs and jvd and edema, abdominal exam for mass and hepatosplenomegaly and tenderness and hernias and bowel sounds, Musculoskeletal exam with muscle tone and posture and mobility/gait and  strength, and skin for rashes and cyanosis and pallor and turgor, extremity for clubbing.  Findings were normal except for pertinent findings listed below:  Mild kyphosis scoliosis, M2, no wheezes no edema, no clubbing- no air leak from right chest tube 12/21/2020     Radiographs reviewed:   Ct chest 11/1/2020 cavities sl worse than 10/2019.    Ct chest 10/2019- infected emphysema left upper suggested - right lung just severe copd    cxr 2/25/2019 same as aug 2018.  Scars both upper lungs.  ct 2016  With left lung abn better now, right lung was clear and now with infiltrate with vol loss of emphysema??  cxr may have smaller cavity rul cavity than ct c/w aug ct with sept cxr.    cxr 10/16 same as sept 2017,  cxr  Feb 26, 2018 improved rul density with smaller hyper inflated lungs.      Labs reviewed  No pos cultures  PFT results [id number]  fev 27%    [place] BRONCHOSCOPY,TRANSBRONCH BIOPSY [46340 (CPT®)]   BRONCHOSCOPY - BRONCHOALVEOLAR LAVAGE [PFT43 (Custom)]           []Hide copied text    []Emilver for details  10/20/2017     After review of ct, informed consent, updated history and physical, time out, and anesthesia sedation- pt underwent left nares bronchoscopy (right nares couldn't be cannulated).     Thick secretions encountered and washed free.  Airways were wnl.  Lavage from rul 90 cc in and 20 cc out.  Transbronchial bx x 2 for cultures done (afb, fungal, routine).  Transbronchial  bx x 6 for path.  Lavage and wash pooled and submitted for culture, cytology, afb, and fungal evals..     ebl 15 cc post transbronchial bx. Airway suctioned til bleed ceased.  Post floro with no pneumo/complication and post cxr pending.  No complications.      Electronically signed by Bhupendra Mackey MD at 10/20/2017  8:10 AM         Plan:  Clinical impression is apparently straight forward and impression with management as below.    Fabiana was seen today for hospital follow up and sputum production.    Diagnoses and all orders for this visit:    Bronchiectasis with acute lower respiratory infection    Immune deficiency disorder    Mycobacterium avium complex    Pneumothorax on right    Mycobacterium abscessus infection    Chest tube in place  -     HYDROcodone-acetaminophen (NORCO) 5-325 mg per tablet; Take 1 tablet by mouth every 6 (six) hours as needed for Pain.        Follow up in about 8 weeks (around 2/15/2021), or if symptoms worsen or fail to improve.    Discussed with patient above for education the following:      Discussed with patient above for education the following:      Patient Instructions   Clamp off chest tube Tuesday night - unclamp for problem.  Do chest xray prior to visit with Dr Bucio Wednesday.    cxr looks good.  Standing order for chest xray.    Not clear if abscessus playing a role.     See Dr Hanson- may adjust meds - should plan to see ID at main campus.      cxr response very good.  Could do ct to evaluate response but hard to say will change rx.

## 2020-12-22 LAB
AFB SUSCEPTIBILITY, RAPID GROWER: ABNORMAL
SPECIMEN SOURCE AND ORGANISM NAME: ABNORMAL

## 2020-12-23 ENCOUNTER — HOSPITAL ENCOUNTER (OUTPATIENT)
Dept: RADIOLOGY | Facility: HOSPITAL | Age: 65
Discharge: HOME | DRG: 199 | End: 2020-12-23
Attending: INTERNAL MEDICINE
Payer: COMMERCIAL

## 2020-12-23 ENCOUNTER — HOSPITAL ENCOUNTER (OUTPATIENT)
Dept: RADIOLOGY | Facility: HOSPITAL | Age: 65
Discharge: HOME | DRG: 199 | End: 2020-12-23
Attending: THORACIC SURGERY (CARDIOTHORACIC VASCULAR SURGERY)
Payer: COMMERCIAL

## 2020-12-23 ENCOUNTER — LAB VISIT (OUTPATIENT)
Dept: LAB | Facility: HOSPITAL | Age: 65
End: 2020-12-23
Attending: INTERNAL MEDICINE
Payer: COMMERCIAL

## 2020-12-23 DIAGNOSIS — J93.9 PNEUMOTHORAX ON RIGHT: ICD-10-CM

## 2020-12-23 DIAGNOSIS — A31.0 PULMONARY DISEASE DUE TO MYCOBACTERIA: Primary | ICD-10-CM

## 2020-12-23 DIAGNOSIS — J93.9 PNEUMOTHORAX, RIGHT: Primary | ICD-10-CM

## 2020-12-23 DIAGNOSIS — J93.9 PNEUMOTHORAX, RIGHT: ICD-10-CM

## 2020-12-23 LAB
ALBUMIN SERPL BCP-MCNC: 3 G/DL (ref 3.5–5.2)
ALP SERPL-CCNC: 59 U/L (ref 55–135)
ALT SERPL W/O P-5'-P-CCNC: 11 U/L (ref 10–44)
ANION GAP SERPL CALC-SCNC: 16 MMOL/L (ref 8–16)
AST SERPL-CCNC: 15 U/L (ref 10–40)
BILIRUB SERPL-MCNC: 0.4 MG/DL (ref 0.1–1)
BUN SERPL-MCNC: 9 MG/DL (ref 8–23)
CALCIUM SERPL-MCNC: 9.6 MG/DL (ref 8.7–10.5)
CHLORIDE SERPL-SCNC: 98 MMOL/L (ref 95–110)
CO2 SERPL-SCNC: 23 MMOL/L (ref 23–29)
CREAT SERPL-MCNC: 0.6 MG/DL (ref 0.5–1.4)
CRP SERPL-MCNC: 14.62 MG/DL
ERYTHROCYTE [DISTWIDTH] IN BLOOD BY AUTOMATED COUNT: 16.3 % (ref 11.5–14.5)
EST. GFR  (AFRICAN AMERICAN): >60 ML/MIN/1.73 M^2
EST. GFR  (NON AFRICAN AMERICAN): >60 ML/MIN/1.73 M^2
GLUCOSE SERPL-MCNC: 89 MG/DL (ref 70–110)
HCT VFR BLD AUTO: 35.1 % (ref 37–48.5)
HGB BLD-MCNC: 10.9 G/DL (ref 12–16)
MCH RBC QN AUTO: 28.8 PG (ref 27–31)
MCHC RBC AUTO-ENTMCNC: 31.1 G/DL (ref 32–36)
MCV RBC AUTO: 93 FL (ref 82–98)
PLATELET # BLD AUTO: 376 K/UL (ref 150–350)
PMV BLD AUTO: 9 FL (ref 9.2–12.9)
POTASSIUM SERPL-SCNC: 4 MMOL/L (ref 3.5–5.1)
PROT SERPL-MCNC: 7.6 G/DL (ref 6–8.4)
RBC # BLD AUTO: 3.78 M/UL (ref 4–5.4)
SODIUM SERPL-SCNC: 137 MMOL/L (ref 136–145)
WBC # BLD AUTO: 11.54 K/UL (ref 3.9–12.7)

## 2020-12-23 PROCEDURE — 71046 X-RAY EXAM CHEST 2 VIEWS: CPT | Mod: TC,FY

## 2020-12-23 PROCEDURE — 80150 ASSAY OF AMIKACIN: CPT

## 2020-12-23 PROCEDURE — 71046 XR CHEST PA AND LATERAL: ICD-10-PCS | Mod: 26,,, | Performed by: RADIOLOGY

## 2020-12-23 PROCEDURE — 71046 X-RAY EXAM CHEST 2 VIEWS: CPT | Mod: 26,,, | Performed by: RADIOLOGY

## 2020-12-23 PROCEDURE — 85027 COMPLETE CBC AUTOMATED: CPT

## 2020-12-23 PROCEDURE — 71046 X-RAY EXAM CHEST 2 VIEWS: CPT | Mod: 26,76,, | Performed by: RADIOLOGY

## 2020-12-23 PROCEDURE — 36415 COLL VENOUS BLD VENIPUNCTURE: CPT

## 2020-12-23 PROCEDURE — 71046 XR CHEST PA AND LATERAL: ICD-10-PCS | Mod: 26,76,, | Performed by: RADIOLOGY

## 2020-12-23 PROCEDURE — 80053 COMPREHEN METABOLIC PANEL: CPT

## 2020-12-23 PROCEDURE — 86140 C-REACTIVE PROTEIN: CPT

## 2020-12-24 ENCOUNTER — TELEPHONE (OUTPATIENT)
Dept: FAMILY MEDICINE | Facility: CLINIC | Age: 65
End: 2020-12-24

## 2020-12-24 LAB — AMIKACIN TROUGH SERPL-MCNC: 8.1 UG/ML (ref 0–6)

## 2020-12-24 NOTE — TELEPHONE ENCOUNTER
----- Message from Laura Bartlett MD sent at 12/23/2020 10:32 AM CST -----  So sorry,  you've been receiving the labs for her. Dr. Jansen and I would be glad to take responsibility for these since she is on waqar eIV antibiotics. I don't know which home health she has, but feel free to tell her home health to send us the labs: Dr. Giovana Jansen and Dr. Mena Bartlett,    Thanks, and Eleni Russell

## 2020-12-24 NOTE — TELEPHONE ENCOUNTER
Call placed to [facility] spoke to Lucille for notification to send lab results to Dr Jansen or Dr Bartlett. Mrs Adkins verbalized understanding.

## 2020-12-26 ENCOUNTER — HOSPITAL ENCOUNTER (INPATIENT)
Facility: HOSPITAL | Age: 65
LOS: 10 days | Discharge: HOME HEALTH | DRG: 199 | End: 2021-01-05
Attending: EMERGENCY MEDICINE | Admitting: HOSPITALIST
Payer: COMMERCIAL

## 2020-12-26 DIAGNOSIS — Z46.82 ENCOUNTER FOR CHEST TUBE PLACEMENT: ICD-10-CM

## 2020-12-26 DIAGNOSIS — A31.0 PULMONARY INFECTION DUE TO MYCOBACTERIUM AVIUM: Primary | ICD-10-CM

## 2020-12-26 DIAGNOSIS — I47.20 V-TACH: ICD-10-CM

## 2020-12-26 DIAGNOSIS — Z96.89 CHEST TUBE IN PLACE: ICD-10-CM

## 2020-12-26 DIAGNOSIS — A31.8 INFECTION DUE TO MYCOBACTERIUM ABSCESSUS: ICD-10-CM

## 2020-12-26 DIAGNOSIS — J93.9 PNEUMOTHORAX ON RIGHT: ICD-10-CM

## 2020-12-26 DIAGNOSIS — I21.4 NSTEMI (NON-ST ELEVATED MYOCARDIAL INFARCTION): ICD-10-CM

## 2020-12-26 DIAGNOSIS — R06.02 SOB (SHORTNESS OF BREATH): ICD-10-CM

## 2020-12-26 DIAGNOSIS — J93.9 PNEUMOTHORAX: ICD-10-CM

## 2020-12-26 LAB
ALBUMIN SERPL BCP-MCNC: 3.4 G/DL (ref 3.5–5.2)
ALP SERPL-CCNC: 82 U/L (ref 55–135)
ALT SERPL W/O P-5'-P-CCNC: 15 U/L (ref 10–44)
ANION GAP SERPL CALC-SCNC: 19 MMOL/L (ref 8–16)
AST SERPL-CCNC: 20 U/L (ref 10–40)
BASOPHILS # BLD AUTO: 0.04 K/UL (ref 0–0.2)
BASOPHILS NFR BLD: 0.3 % (ref 0–1.9)
BILIRUB SERPL-MCNC: 0.2 MG/DL (ref 0.1–1)
BUN SERPL-MCNC: 18 MG/DL (ref 8–23)
CALCIUM SERPL-MCNC: 11 MG/DL (ref 8.7–10.5)
CHLORIDE SERPL-SCNC: 98 MMOL/L (ref 95–110)
CO2 SERPL-SCNC: 23 MMOL/L (ref 23–29)
CREAT SERPL-MCNC: 0.7 MG/DL (ref 0.5–1.4)
DIFFERENTIAL METHOD: ABNORMAL
EOSINOPHIL # BLD AUTO: 0 K/UL (ref 0–0.5)
EOSINOPHIL NFR BLD: 0 % (ref 0–8)
ERYTHROCYTE [DISTWIDTH] IN BLOOD BY AUTOMATED COUNT: 15.9 % (ref 11.5–14.5)
EST. GFR  (AFRICAN AMERICAN): >60 ML/MIN/1.73 M^2
EST. GFR  (NON AFRICAN AMERICAN): >60 ML/MIN/1.73 M^2
GLUCOSE SERPL-MCNC: 117 MG/DL (ref 70–110)
HCT VFR BLD AUTO: 41.7 % (ref 37–48.5)
HGB BLD-MCNC: 12.7 G/DL (ref 12–16)
IMM GRANULOCYTES # BLD AUTO: 0.08 K/UL (ref 0–0.04)
IMM GRANULOCYTES NFR BLD AUTO: 0.5 % (ref 0–0.5)
INR PPP: 1.1 (ref 0.8–1.2)
LACTATE SERPL-SCNC: 1.2 MMOL/L (ref 0.5–2.2)
LYMPHOCYTES # BLD AUTO: 1.3 K/UL (ref 1–4.8)
LYMPHOCYTES NFR BLD: 9 % (ref 18–48)
MCH RBC QN AUTO: 28.6 PG (ref 27–31)
MCHC RBC AUTO-ENTMCNC: 30.5 G/DL (ref 32–36)
MCV RBC AUTO: 94 FL (ref 82–98)
MONOCYTES # BLD AUTO: 0.4 K/UL (ref 0.3–1)
MONOCYTES NFR BLD: 2.9 % (ref 4–15)
NEUTROPHILS # BLD AUTO: 12.8 K/UL (ref 1.8–7.7)
NEUTROPHILS NFR BLD: 87.3 % (ref 38–73)
NRBC BLD-RTO: 0 /100 WBC
PLATELET # BLD AUTO: 465 K/UL (ref 150–350)
PMV BLD AUTO: 8.5 FL (ref 9.2–12.9)
POTASSIUM SERPL-SCNC: 4.3 MMOL/L (ref 3.5–5.1)
PROT SERPL-MCNC: 8.7 G/DL (ref 6–8.4)
PROTHROMBIN TIME: 11.2 SEC (ref 9–12.5)
RBC # BLD AUTO: 4.44 M/UL (ref 4–5.4)
SARS-COV-2 RDRP RESP QL NAA+PROBE: NEGATIVE
SODIUM SERPL-SCNC: 140 MMOL/L (ref 136–145)
WBC # BLD AUTO: 14.68 K/UL (ref 3.9–12.7)

## 2020-12-26 PROCEDURE — 25000003 PHARM REV CODE 250: Performed by: HOSPITALIST

## 2020-12-26 PROCEDURE — 99900035 HC TECH TIME PER 15 MIN (STAT)

## 2020-12-26 PROCEDURE — 85025 COMPLETE CBC W/AUTO DIFF WBC: CPT

## 2020-12-26 PROCEDURE — 63600175 PHARM REV CODE 636 W HCPCS: Performed by: EMERGENCY MEDICINE

## 2020-12-26 PROCEDURE — 99292 CRITICAL CARE ADDL 30 MIN: CPT | Mod: 25

## 2020-12-26 PROCEDURE — 85610 PROTHROMBIN TIME: CPT

## 2020-12-26 PROCEDURE — 80053 COMPREHEN METABOLIC PANEL: CPT

## 2020-12-26 PROCEDURE — 32551 INSERTION OF CHEST TUBE: CPT | Mod: RT

## 2020-12-26 PROCEDURE — 27000221 HC OXYGEN, UP TO 24 HOURS

## 2020-12-26 PROCEDURE — 83605 ASSAY OF LACTIC ACID: CPT

## 2020-12-26 PROCEDURE — 11000001 HC ACUTE MED/SURG PRIVATE ROOM

## 2020-12-26 PROCEDURE — U0002 COVID-19 LAB TEST NON-CDC: HCPCS

## 2020-12-26 PROCEDURE — 36415 COLL VENOUS BLD VENIPUNCTURE: CPT

## 2020-12-26 PROCEDURE — 25000003 PHARM REV CODE 250: Performed by: EMERGENCY MEDICINE

## 2020-12-26 PROCEDURE — 99291 CRITICAL CARE FIRST HOUR: CPT | Mod: 25

## 2020-12-26 PROCEDURE — 63600175 PHARM REV CODE 636 W HCPCS: Performed by: HOSPITALIST

## 2020-12-26 PROCEDURE — 96366 THER/PROPH/DIAG IV INF ADDON: CPT | Mod: 59

## 2020-12-26 PROCEDURE — 94760 N-INVAS EAR/PLS OXIMETRY 1: CPT

## 2020-12-26 PROCEDURE — 96365 THER/PROPH/DIAG IV INF INIT: CPT | Mod: 59

## 2020-12-26 RX ORDER — MEROPENEM AND SODIUM CHLORIDE 500 MG/50ML
500 INJECTION, SOLUTION INTRAVENOUS
Status: COMPLETED | OUTPATIENT
Start: 2020-12-26 | End: 2020-12-26

## 2020-12-26 RX ORDER — ONDANSETRON 4 MG/1
8 TABLET, ORALLY DISINTEGRATING ORAL EVERY 8 HOURS PRN
Status: DISCONTINUED | OUTPATIENT
Start: 2020-12-26 | End: 2020-12-31

## 2020-12-26 RX ORDER — OXYCODONE HYDROCHLORIDE 10 MG/1
10 TABLET ORAL EVERY 6 HOURS PRN
Status: DISCONTINUED | OUTPATIENT
Start: 2020-12-26 | End: 2021-01-05 | Stop reason: HOSPADM

## 2020-12-26 RX ORDER — OXYCODONE HYDROCHLORIDE 5 MG/1
5 TABLET ORAL EVERY 6 HOURS PRN
Status: DISCONTINUED | OUTPATIENT
Start: 2020-12-26 | End: 2021-01-05 | Stop reason: HOSPADM

## 2020-12-26 RX ORDER — PROMETHAZINE HYDROCHLORIDE 12.5 MG/1
25 TABLET ORAL EVERY 6 HOURS PRN
Status: DISCONTINUED | OUTPATIENT
Start: 2020-12-26 | End: 2020-12-31

## 2020-12-26 RX ORDER — ACETAMINOPHEN 325 MG/1
650 TABLET ORAL EVERY 6 HOURS PRN
Status: DISCONTINUED | OUTPATIENT
Start: 2020-12-26 | End: 2021-01-05 | Stop reason: HOSPADM

## 2020-12-26 RX ORDER — ATORVASTATIN CALCIUM 40 MG/1
40 TABLET, FILM COATED ORAL DAILY
Status: DISCONTINUED | OUTPATIENT
Start: 2020-12-27 | End: 2021-01-05 | Stop reason: HOSPADM

## 2020-12-26 RX ORDER — MUPIROCIN 20 MG/G
OINTMENT TOPICAL 2 TIMES DAILY
Status: COMPLETED | OUTPATIENT
Start: 2020-12-26 | End: 2020-12-31

## 2020-12-26 RX ORDER — LIDOCAINE HYDROCHLORIDE AND EPINEPHRINE 10; 10 MG/ML; UG/ML
10 INJECTION, SOLUTION INFILTRATION; PERINEURAL
Status: COMPLETED | OUTPATIENT
Start: 2020-12-26 | End: 2020-12-26

## 2020-12-26 RX ORDER — SODIUM CHLORIDE 0.9 % (FLUSH) 0.9 %
10 SYRINGE (ML) INJECTION
Status: DISCONTINUED | OUTPATIENT
Start: 2020-12-26 | End: 2021-01-05 | Stop reason: HOSPADM

## 2020-12-26 RX ORDER — OXYCODONE AND ACETAMINOPHEN 5; 325 MG/1; MG/1
1 TABLET ORAL
Status: DISCONTINUED | OUTPATIENT
Start: 2020-12-26 | End: 2020-12-26

## 2020-12-26 RX ORDER — IPRATROPIUM BROMIDE AND ALBUTEROL SULFATE 2.5; .5 MG/3ML; MG/3ML
3 SOLUTION RESPIRATORY (INHALATION) EVERY 6 HOURS PRN
Status: DISCONTINUED | OUTPATIENT
Start: 2020-12-26 | End: 2020-12-28

## 2020-12-26 RX ORDER — MORPHINE SULFATE 4 MG/ML
4 INJECTION, SOLUTION INTRAMUSCULAR; INTRAVENOUS
Status: COMPLETED | OUTPATIENT
Start: 2020-12-26 | End: 2020-12-26

## 2020-12-26 RX ORDER — LINEZOLID 600 MG/1
600 TABLET, FILM COATED ORAL EVERY 12 HOURS
Status: DISCONTINUED | OUTPATIENT
Start: 2020-12-26 | End: 2020-12-27

## 2020-12-26 RX ADMIN — MUPIROCIN: 20 OINTMENT TOPICAL at 09:12

## 2020-12-26 RX ADMIN — LIDOCAINE HYDROCHLORIDE AND EPINEPHRINE 10 ML: 10; 10 INJECTION, SOLUTION INFILTRATION; PERINEURAL at 04:12

## 2020-12-26 RX ADMIN — AMIKACIN SULFATE 625 MG: 250 INJECTION INTRAMUSCULAR; INTRAVENOUS at 09:12

## 2020-12-26 RX ADMIN — LINEZOLID 600 MG: 600 TABLET, FILM COATED ORAL at 09:12

## 2020-12-26 RX ADMIN — MORPHINE SULFATE 4 MG: 4 INJECTION INTRAVENOUS at 05:12

## 2020-12-26 RX ADMIN — MEROPENEM AND SODIUM CHLORIDE 500 MG: 500 INJECTION, SOLUTION INTRAVENOUS at 04:12

## 2020-12-26 RX ADMIN — SODIUM CHLORIDE 500 ML: 0.9 INJECTION, SOLUTION INTRAVENOUS at 05:12

## 2020-12-26 RX ADMIN — OXYCODONE HYDROCHLORIDE 10 MG: 10 TABLET ORAL at 09:12

## 2020-12-26 RX ADMIN — SODIUM CHLORIDE 1000 ML: 0.9 INJECTION, SOLUTION INTRAVENOUS at 04:12

## 2020-12-27 LAB — AMIKACIN TROUGH SERPL-MCNC: 14.2 UG/ML (ref 0–6)

## 2020-12-27 PROCEDURE — 94640 AIRWAY INHALATION TREATMENT: CPT

## 2020-12-27 PROCEDURE — 80150 ASSAY OF AMIKACIN: CPT

## 2020-12-27 PROCEDURE — 87186 SC STD MICRODIL/AGAR DIL: CPT

## 2020-12-27 PROCEDURE — 99900035 HC TECH TIME PER 15 MIN (STAT)

## 2020-12-27 PROCEDURE — 63600175 PHARM REV CODE 636 W HCPCS: Performed by: HOSPITALIST

## 2020-12-27 PROCEDURE — 87205 SMEAR GRAM STAIN: CPT

## 2020-12-27 PROCEDURE — 36415 COLL VENOUS BLD VENIPUNCTURE: CPT

## 2020-12-27 PROCEDURE — 11000001 HC ACUTE MED/SURG PRIVATE ROOM

## 2020-12-27 PROCEDURE — 87070 CULTURE OTHR SPECIMN AEROBIC: CPT

## 2020-12-27 PROCEDURE — 27000221 HC OXYGEN, UP TO 24 HOURS

## 2020-12-27 PROCEDURE — 87077 CULTURE AEROBIC IDENTIFY: CPT

## 2020-12-27 PROCEDURE — 94761 N-INVAS EAR/PLS OXIMETRY MLT: CPT

## 2020-12-27 PROCEDURE — 25000242 PHARM REV CODE 250 ALT 637 W/ HCPCS: Performed by: HOSPITALIST

## 2020-12-27 PROCEDURE — 25000003 PHARM REV CODE 250: Performed by: HOSPITALIST

## 2020-12-27 RX ORDER — FLUTICASONE FUROATE AND VILANTEROL 200; 25 UG/1; UG/1
1 POWDER RESPIRATORY (INHALATION) DAILY
Status: DISCONTINUED | OUTPATIENT
Start: 2020-12-28 | End: 2021-01-05 | Stop reason: HOSPADM

## 2020-12-27 RX ORDER — MEROPENEM AND SODIUM CHLORIDE 1 G/50ML
1 INJECTION, SOLUTION INTRAVENOUS
Status: DISCONTINUED | OUTPATIENT
Start: 2020-12-27 | End: 2021-01-05 | Stop reason: HOSPADM

## 2020-12-27 RX ORDER — AZITHROMYCIN 250 MG/1
500 TABLET, FILM COATED ORAL DAILY
Status: DISCONTINUED | OUTPATIENT
Start: 2020-12-28 | End: 2021-01-05 | Stop reason: HOSPADM

## 2020-12-27 RX ORDER — RIFAMPIN 300 MG/1
600 CAPSULE ORAL DAILY
Status: DISCONTINUED | OUTPATIENT
Start: 2020-12-28 | End: 2020-12-28

## 2020-12-27 RX ADMIN — MUPIROCIN: 20 OINTMENT TOPICAL at 10:12

## 2020-12-27 RX ADMIN — IPRATROPIUM BROMIDE AND ALBUTEROL SULFATE 3 ML: .5; 2.5 SOLUTION RESPIRATORY (INHALATION) at 02:12

## 2020-12-27 RX ADMIN — OXYCODONE HYDROCHLORIDE 10 MG: 10 TABLET ORAL at 08:12

## 2020-12-27 RX ADMIN — MEROPENEM AND SODIUM CHLORIDE 1 G: 1 INJECTION, SOLUTION INTRAVENOUS at 10:12

## 2020-12-27 RX ADMIN — OXYCODONE HYDROCHLORIDE 10 MG: 10 TABLET ORAL at 07:12

## 2020-12-27 RX ADMIN — LINEZOLID 600 MG: 600 TABLET, FILM COATED ORAL at 08:12

## 2020-12-27 RX ADMIN — ONDANSETRON 8 MG: 4 TABLET, ORALLY DISINTEGRATING ORAL at 07:12

## 2020-12-27 RX ADMIN — OXYCODONE HYDROCHLORIDE 10 MG: 10 TABLET ORAL at 02:12

## 2020-12-27 RX ADMIN — TIGECYCLINE 100 MG: 50 INJECTION, POWDER, LYOPHILIZED, FOR SOLUTION INTRAVENOUS at 10:12

## 2020-12-27 RX ADMIN — MUPIROCIN: 20 OINTMENT TOPICAL at 08:12

## 2020-12-28 ENCOUNTER — OUTSIDE PLACE OF SERVICE (OUTPATIENT)
Dept: INFECTIOUS DISEASES | Facility: CLINIC | Age: 65
End: 2020-12-28
Payer: COMMERCIAL

## 2020-12-28 DIAGNOSIS — A31.8 INFECTION DUE TO MYCOBACTERIUM ABSCESSUS: ICD-10-CM

## 2020-12-28 DIAGNOSIS — J93.9 PNEUMOTHORAX ON RIGHT: ICD-10-CM

## 2020-12-28 LAB
AMIKACIN TROUGH SERPL-MCNC: <2 UG/ML (ref 0–6)
ANION GAP SERPL CALC-SCNC: 8 MMOL/L (ref 8–16)
BUN SERPL-MCNC: 14 MG/DL (ref 8–23)
CALCIUM SERPL-MCNC: 9.3 MG/DL (ref 8.7–10.5)
CHLORIDE SERPL-SCNC: 93 MMOL/L (ref 95–110)
CO2 SERPL-SCNC: 33 MMOL/L (ref 23–29)
CREAT SERPL-MCNC: 0.5 MG/DL (ref 0.5–1.4)
EST. GFR  (AFRICAN AMERICAN): >60 ML/MIN/1.73 M^2
EST. GFR  (NON AFRICAN AMERICAN): >60 ML/MIN/1.73 M^2
GLUCOSE SERPL-MCNC: 90 MG/DL (ref 70–110)
GRAM STN SPEC: NORMAL
POTASSIUM SERPL-SCNC: 4.6 MMOL/L (ref 3.5–5.1)
SODIUM SERPL-SCNC: 134 MMOL/L (ref 136–145)

## 2020-12-28 PROCEDURE — 63700000 PHARM REV CODE 250 ALT 637 W/O HCPCS: Performed by: HOSPITALIST

## 2020-12-28 PROCEDURE — 94761 N-INVAS EAR/PLS OXIMETRY MLT: CPT

## 2020-12-28 PROCEDURE — 25000242 PHARM REV CODE 250 ALT 637 W/ HCPCS: Performed by: HOSPITALIST

## 2020-12-28 PROCEDURE — 11000001 HC ACUTE MED/SURG PRIVATE ROOM

## 2020-12-28 PROCEDURE — 80150 ASSAY OF AMIKACIN: CPT

## 2020-12-28 PROCEDURE — 99233 PR SUBSEQUENT HOSPITAL CARE,LEVL III: ICD-10-PCS | Mod: S$GLB,,, | Performed by: INTERNAL MEDICINE

## 2020-12-28 PROCEDURE — 80048 BASIC METABOLIC PNL TOTAL CA: CPT

## 2020-12-28 PROCEDURE — 99233 SBSQ HOSP IP/OBS HIGH 50: CPT | Mod: S$GLB,,, | Performed by: INTERNAL MEDICINE

## 2020-12-28 PROCEDURE — 36415 COLL VENOUS BLD VENIPUNCTURE: CPT

## 2020-12-28 PROCEDURE — 99223 1ST HOSP IP/OBS HIGH 75: CPT | Mod: ,,, | Performed by: INTERNAL MEDICINE

## 2020-12-28 PROCEDURE — 94640 AIRWAY INHALATION TREATMENT: CPT

## 2020-12-28 PROCEDURE — 25000003 PHARM REV CODE 250: Performed by: INTERNAL MEDICINE

## 2020-12-28 PROCEDURE — 27000221 HC OXYGEN, UP TO 24 HOURS

## 2020-12-28 PROCEDURE — 63600175 PHARM REV CODE 636 W HCPCS: Performed by: HOSPITALIST

## 2020-12-28 PROCEDURE — 25000242 PHARM REV CODE 250 ALT 637 W/ HCPCS: Performed by: INTERNAL MEDICINE

## 2020-12-28 PROCEDURE — 25000003 PHARM REV CODE 250: Performed by: HOSPITALIST

## 2020-12-28 PROCEDURE — 99223 PR INITIAL HOSPITAL CARE,LEVL III: ICD-10-PCS | Mod: ,,, | Performed by: INTERNAL MEDICINE

## 2020-12-28 RX ORDER — IPRATROPIUM BROMIDE AND ALBUTEROL SULFATE 2.5; .5 MG/3ML; MG/3ML
3 SOLUTION RESPIRATORY (INHALATION) EVERY 6 HOURS
Status: DISCONTINUED | OUTPATIENT
Start: 2020-12-28 | End: 2021-01-05 | Stop reason: HOSPADM

## 2020-12-28 RX ORDER — L. ACIDOPHILUS/L.BULGARICUS 100MM CELL
1 GRANULES IN PACKET (EA) ORAL 2 TIMES DAILY
Status: DISCONTINUED | OUTPATIENT
Start: 2020-12-28 | End: 2021-01-05 | Stop reason: HOSPADM

## 2020-12-28 RX ADMIN — AMIKACIN SULFATE 450 MG: 250 INJECTION INTRAMUSCULAR; INTRAVENOUS at 09:12

## 2020-12-28 RX ADMIN — MEROPENEM AND SODIUM CHLORIDE 1 G: 1 INJECTION, SOLUTION INTRAVENOUS at 02:12

## 2020-12-28 RX ADMIN — OXYCODONE HYDROCHLORIDE 10 MG: 10 TABLET ORAL at 04:12

## 2020-12-28 RX ADMIN — FLUTICASONE FUROATE AND VILANTEROL TRIFENATATE 1 PUFF: 200; 25 POWDER RESPIRATORY (INHALATION) at 07:12

## 2020-12-28 RX ADMIN — MUPIROCIN: 20 OINTMENT TOPICAL at 08:12

## 2020-12-28 RX ADMIN — TIGECYCLINE 50 MG: 50 INJECTION, POWDER, LYOPHILIZED, FOR SOLUTION INTRAVENOUS at 10:12

## 2020-12-28 RX ADMIN — MUPIROCIN: 20 OINTMENT TOPICAL at 10:12

## 2020-12-28 RX ADMIN — LACTOBACILLUS ACIDOPHILUS / LACTOBACILLUS BULGARICUS 1 EACH: 100 MILLION CFU STRENGTH GRANULES at 09:12

## 2020-12-28 RX ADMIN — ETHAMBUTOL HYDROCHLORIDE 700 MG: 400 TABLET, FILM COATED ORAL at 10:12

## 2020-12-28 RX ADMIN — ONDANSETRON 8 MG: 4 TABLET, ORALLY DISINTEGRATING ORAL at 04:12

## 2020-12-28 RX ADMIN — MEROPENEM AND SODIUM CHLORIDE 1 G: 1 INJECTION, SOLUTION INTRAVENOUS at 06:12

## 2020-12-28 RX ADMIN — OXYCODONE HYDROCHLORIDE 5 MG: 5 TABLET ORAL at 08:12

## 2020-12-28 RX ADMIN — IPRATROPIUM BROMIDE AND ALBUTEROL SULFATE 3 ML: .5; 2.5 SOLUTION RESPIRATORY (INHALATION) at 08:12

## 2020-12-28 RX ADMIN — OXYCODONE HYDROCHLORIDE 10 MG: 10 TABLET ORAL at 02:12

## 2020-12-28 RX ADMIN — AZITHROMYCIN MONOHYDRATE 500 MG: 250 TABLET ORAL at 10:12

## 2020-12-28 RX ADMIN — TIGECYCLINE 50 MG: 50 INJECTION, POWDER, LYOPHILIZED, FOR SOLUTION INTRAVENOUS at 09:12

## 2020-12-28 RX ADMIN — MEROPENEM AND SODIUM CHLORIDE 1 G: 1 INJECTION, SOLUTION INTRAVENOUS at 11:12

## 2020-12-28 RX ADMIN — IPRATROPIUM BROMIDE AND ALBUTEROL SULFATE 3 ML: .5; 2.5 SOLUTION RESPIRATORY (INHALATION) at 07:12

## 2020-12-28 RX ADMIN — ATORVASTATIN CALCIUM 40 MG: 40 TABLET, FILM COATED ORAL at 10:12

## 2020-12-28 RX ADMIN — RIFAMPIN 600 MG: 300 CAPSULE ORAL at 10:12

## 2020-12-28 RX ADMIN — FLUCONAZOLE 150 MG: 50 TABLET ORAL at 02:12

## 2020-12-29 ENCOUNTER — EXTERNAL HOME HEALTH (OUTPATIENT)
Dept: HOME HEALTH SERVICES | Facility: HOSPITAL | Age: 65
End: 2020-12-29
Payer: COMMERCIAL

## 2020-12-29 LAB
ANION GAP SERPL CALC-SCNC: 9 MMOL/L (ref 8–16)
BUN SERPL-MCNC: 18 MG/DL (ref 8–23)
CALCIUM SERPL-MCNC: 9 MG/DL (ref 8.7–10.5)
CHLORIDE SERPL-SCNC: 91 MMOL/L (ref 95–110)
CO2 SERPL-SCNC: 35 MMOL/L (ref 23–29)
CREAT SERPL-MCNC: 0.5 MG/DL (ref 0.5–1.4)
CRP SERPL-MCNC: 91.3 MG/L (ref 0–8.2)
EST. GFR  (AFRICAN AMERICAN): >60 ML/MIN/1.73 M^2
EST. GFR  (NON AFRICAN AMERICAN): >60 ML/MIN/1.73 M^2
GLUCOSE SERPL-MCNC: 89 MG/DL (ref 70–110)
POTASSIUM SERPL-SCNC: 4.1 MMOL/L (ref 3.5–5.1)
PROCALCITONIN SERPL IA-MCNC: 0.03 NG/ML
SODIUM SERPL-SCNC: 135 MMOL/L (ref 136–145)

## 2020-12-29 PROCEDURE — 11000001 HC ACUTE MED/SURG PRIVATE ROOM

## 2020-12-29 PROCEDURE — 63600175 PHARM REV CODE 636 W HCPCS: Performed by: HOSPITALIST

## 2020-12-29 PROCEDURE — 25000003 PHARM REV CODE 250: Performed by: INTERNAL MEDICINE

## 2020-12-29 PROCEDURE — 63700000 PHARM REV CODE 250 ALT 637 W/O HCPCS: Performed by: HOSPITALIST

## 2020-12-29 PROCEDURE — 36415 COLL VENOUS BLD VENIPUNCTURE: CPT

## 2020-12-29 PROCEDURE — 99233 SBSQ HOSP IP/OBS HIGH 50: CPT | Mod: ,,, | Performed by: INTERNAL MEDICINE

## 2020-12-29 PROCEDURE — 99231 SBSQ HOSP IP/OBS SF/LOW 25: CPT | Mod: S$GLB,,, | Performed by: INTERNAL MEDICINE

## 2020-12-29 PROCEDURE — 25000242 PHARM REV CODE 250 ALT 637 W/ HCPCS: Performed by: INTERNAL MEDICINE

## 2020-12-29 PROCEDURE — 27000221 HC OXYGEN, UP TO 24 HOURS

## 2020-12-29 PROCEDURE — 94761 N-INVAS EAR/PLS OXIMETRY MLT: CPT

## 2020-12-29 PROCEDURE — 99233 PR SUBSEQUENT HOSPITAL CARE,LEVL III: ICD-10-PCS | Mod: ,,, | Performed by: INTERNAL MEDICINE

## 2020-12-29 PROCEDURE — 99231 PR SUBSEQUENT HOSPITAL CARE,LEVL I: ICD-10-PCS | Mod: S$GLB,,, | Performed by: INTERNAL MEDICINE

## 2020-12-29 PROCEDURE — 86140 C-REACTIVE PROTEIN: CPT

## 2020-12-29 PROCEDURE — 80048 BASIC METABOLIC PNL TOTAL CA: CPT

## 2020-12-29 PROCEDURE — 25000003 PHARM REV CODE 250: Performed by: HOSPITALIST

## 2020-12-29 PROCEDURE — 94640 AIRWAY INHALATION TREATMENT: CPT

## 2020-12-29 PROCEDURE — 84145 PROCALCITONIN (PCT): CPT

## 2020-12-29 RX ADMIN — TIGECYCLINE 50 MG: 50 INJECTION, POWDER, LYOPHILIZED, FOR SOLUTION INTRAVENOUS at 09:12

## 2020-12-29 RX ADMIN — MUPIROCIN: 20 OINTMENT TOPICAL at 09:12

## 2020-12-29 RX ADMIN — LACTOBACILLUS ACIDOPHILUS / LACTOBACILLUS BULGARICUS 1 EACH: 100 MILLION CFU STRENGTH GRANULES at 09:12

## 2020-12-29 RX ADMIN — OXYCODONE HYDROCHLORIDE 10 MG: 10 TABLET ORAL at 09:12

## 2020-12-29 RX ADMIN — AMIKACIN SULFATE 450 MG: 250 INJECTION INTRAMUSCULAR; INTRAVENOUS at 10:12

## 2020-12-29 RX ADMIN — IPRATROPIUM BROMIDE AND ALBUTEROL SULFATE 3 ML: .5; 2.5 SOLUTION RESPIRATORY (INHALATION) at 12:12

## 2020-12-29 RX ADMIN — AZITHROMYCIN MONOHYDRATE 500 MG: 250 TABLET ORAL at 09:12

## 2020-12-29 RX ADMIN — MEROPENEM AND SODIUM CHLORIDE 1 G: 1 INJECTION, SOLUTION INTRAVENOUS at 03:12

## 2020-12-29 RX ADMIN — OXYCODONE HYDROCHLORIDE 10 MG: 10 TABLET ORAL at 05:12

## 2020-12-29 RX ADMIN — ETHAMBUTOL HYDROCHLORIDE 700 MG: 400 TABLET, FILM COATED ORAL at 09:12

## 2020-12-29 RX ADMIN — MEROPENEM AND SODIUM CHLORIDE 1 G: 1 INJECTION, SOLUTION INTRAVENOUS at 11:12

## 2020-12-29 RX ADMIN — FLUTICASONE FUROATE AND VILANTEROL TRIFENATATE 1 PUFF: 200; 25 POWDER RESPIRATORY (INHALATION) at 06:12

## 2020-12-29 RX ADMIN — IPRATROPIUM BROMIDE AND ALBUTEROL SULFATE 3 ML: .5; 2.5 SOLUTION RESPIRATORY (INHALATION) at 06:12

## 2020-12-29 RX ADMIN — IPRATROPIUM BROMIDE AND ALBUTEROL SULFATE 3 ML: .5; 2.5 SOLUTION RESPIRATORY (INHALATION) at 07:12

## 2020-12-29 RX ADMIN — MEROPENEM AND SODIUM CHLORIDE 1 G: 1 INJECTION, SOLUTION INTRAVENOUS at 05:12

## 2020-12-29 RX ADMIN — OXYCODONE HYDROCHLORIDE 10 MG: 10 TABLET ORAL at 03:12

## 2020-12-29 RX ADMIN — MUPIROCIN: 20 OINTMENT TOPICAL at 11:12

## 2020-12-30 LAB
AMIKACIN TROUGH SERPL-MCNC: <2 UG/ML (ref 0–6)
ANION GAP SERPL CALC-SCNC: 8 MMOL/L (ref 8–16)
BACTERIA SPEC AEROBE CULT: ABNORMAL
BACTERIA SPEC AEROBE CULT: ABNORMAL
BUN SERPL-MCNC: 16 MG/DL (ref 8–23)
CALCIUM SERPL-MCNC: 9.1 MG/DL (ref 8.7–10.5)
CHLORIDE SERPL-SCNC: 92 MMOL/L (ref 95–110)
CO2 SERPL-SCNC: 35 MMOL/L (ref 23–29)
CREAT SERPL-MCNC: 0.5 MG/DL (ref 0.5–1.4)
EST. GFR  (AFRICAN AMERICAN): >60 ML/MIN/1.73 M^2
EST. GFR  (NON AFRICAN AMERICAN): >60 ML/MIN/1.73 M^2
GLUCOSE SERPL-MCNC: 97 MG/DL (ref 70–110)
POTASSIUM SERPL-SCNC: 4.4 MMOL/L (ref 3.5–5.1)
SODIUM SERPL-SCNC: 135 MMOL/L (ref 136–145)

## 2020-12-30 PROCEDURE — 11000001 HC ACUTE MED/SURG PRIVATE ROOM

## 2020-12-30 PROCEDURE — 80048 BASIC METABOLIC PNL TOTAL CA: CPT

## 2020-12-30 PROCEDURE — 99231 SBSQ HOSP IP/OBS SF/LOW 25: CPT | Mod: S$GLB,,, | Performed by: INTERNAL MEDICINE

## 2020-12-30 PROCEDURE — 99231 PR SUBSEQUENT HOSPITAL CARE,LEVL I: ICD-10-PCS | Mod: S$GLB,,, | Performed by: INTERNAL MEDICINE

## 2020-12-30 PROCEDURE — 99231 SBSQ HOSP IP/OBS SF/LOW 25: CPT | Mod: ,,, | Performed by: INTERNAL MEDICINE

## 2020-12-30 PROCEDURE — 25000242 PHARM REV CODE 250 ALT 637 W/ HCPCS: Performed by: INTERNAL MEDICINE

## 2020-12-30 PROCEDURE — 63600175 PHARM REV CODE 636 W HCPCS

## 2020-12-30 PROCEDURE — 63700000 PHARM REV CODE 250 ALT 637 W/O HCPCS: Performed by: HOSPITALIST

## 2020-12-30 PROCEDURE — 63600175 PHARM REV CODE 636 W HCPCS: Performed by: HOSPITALIST

## 2020-12-30 PROCEDURE — 94761 N-INVAS EAR/PLS OXIMETRY MLT: CPT

## 2020-12-30 PROCEDURE — 80150 ASSAY OF AMIKACIN: CPT

## 2020-12-30 PROCEDURE — 94640 AIRWAY INHALATION TREATMENT: CPT

## 2020-12-30 PROCEDURE — 25000003 PHARM REV CODE 250: Performed by: HOSPITALIST

## 2020-12-30 PROCEDURE — 36415 COLL VENOUS BLD VENIPUNCTURE: CPT

## 2020-12-30 PROCEDURE — 25000003 PHARM REV CODE 250: Performed by: INTERNAL MEDICINE

## 2020-12-30 PROCEDURE — 99231 PR SUBSEQUENT HOSPITAL CARE,LEVL I: ICD-10-PCS | Mod: ,,, | Performed by: INTERNAL MEDICINE

## 2020-12-30 PROCEDURE — 27000221 HC OXYGEN, UP TO 24 HOURS

## 2020-12-30 RX ORDER — MORPHINE SULFATE 2 MG/ML
2 INJECTION, SOLUTION INTRAMUSCULAR; INTRAVENOUS ONCE
Status: COMPLETED | OUTPATIENT
Start: 2020-12-30 | End: 2020-12-30

## 2020-12-30 RX ORDER — MORPHINE SULFATE 2 MG/ML
INJECTION, SOLUTION INTRAMUSCULAR; INTRAVENOUS
Status: COMPLETED
Start: 2020-12-30 | End: 2020-12-30

## 2020-12-30 RX ADMIN — IPRATROPIUM BROMIDE AND ALBUTEROL SULFATE 3 ML: .5; 2.5 SOLUTION RESPIRATORY (INHALATION) at 07:12

## 2020-12-30 RX ADMIN — MORPHINE SULFATE 2 MG: 2 INJECTION, SOLUTION INTRAMUSCULAR; INTRAVENOUS at 01:12

## 2020-12-30 RX ADMIN — TIGECYCLINE 50 MG: 50 INJECTION, POWDER, LYOPHILIZED, FOR SOLUTION INTRAVENOUS at 01:12

## 2020-12-30 RX ADMIN — OXYCODONE HYDROCHLORIDE 10 MG: 10 TABLET ORAL at 04:12

## 2020-12-30 RX ADMIN — MEROPENEM AND SODIUM CHLORIDE 1 G: 1 INJECTION, SOLUTION INTRAVENOUS at 01:12

## 2020-12-30 RX ADMIN — IPRATROPIUM BROMIDE AND ALBUTEROL SULFATE 3 ML: .5; 2.5 SOLUTION RESPIRATORY (INHALATION) at 12:12

## 2020-12-30 RX ADMIN — LACTOBACILLUS ACIDOPHILUS / LACTOBACILLUS BULGARICUS 1 EACH: 100 MILLION CFU STRENGTH GRANULES at 08:12

## 2020-12-30 RX ADMIN — OXYCODONE HYDROCHLORIDE 10 MG: 10 TABLET ORAL at 11:12

## 2020-12-30 RX ADMIN — IPRATROPIUM BROMIDE AND ALBUTEROL SULFATE 3 ML: .5; 2.5 SOLUTION RESPIRATORY (INHALATION) at 02:12

## 2020-12-30 RX ADMIN — MUPIROCIN: 20 OINTMENT TOPICAL at 08:12

## 2020-12-30 RX ADMIN — AZITHROMYCIN MONOHYDRATE 500 MG: 250 TABLET ORAL at 01:12

## 2020-12-30 RX ADMIN — TIGECYCLINE 50 MG: 50 INJECTION, POWDER, LYOPHILIZED, FOR SOLUTION INTRAVENOUS at 09:12

## 2020-12-30 RX ADMIN — LACTOBACILLUS ACIDOPHILUS / LACTOBACILLUS BULGARICUS 1 EACH: 100 MILLION CFU STRENGTH GRANULES at 01:12

## 2020-12-30 RX ADMIN — ETHAMBUTOL HYDROCHLORIDE 700 MG: 400 TABLET, FILM COATED ORAL at 01:12

## 2020-12-30 RX ADMIN — MUPIROCIN: 20 OINTMENT TOPICAL at 01:12

## 2020-12-30 RX ADMIN — AMIKACIN SULFATE 450 MG: 250 INJECTION INTRAMUSCULAR; INTRAVENOUS at 08:12

## 2020-12-30 RX ADMIN — MEROPENEM AND SODIUM CHLORIDE 1 G: 1 INJECTION, SOLUTION INTRAVENOUS at 05:12

## 2020-12-30 RX ADMIN — FLUTICASONE FUROATE AND VILANTEROL TRIFENATATE 1 PUFF: 200; 25 POWDER RESPIRATORY (INHALATION) at 07:12

## 2020-12-30 RX ADMIN — MEROPENEM AND SODIUM CHLORIDE 1 G: 1 INJECTION, SOLUTION INTRAVENOUS at 09:12

## 2020-12-31 ENCOUNTER — OUTSIDE PLACE OF SERVICE (OUTPATIENT)
Dept: INFECTIOUS DISEASES | Facility: CLINIC | Age: 65
End: 2020-12-31
Payer: COMMERCIAL

## 2020-12-31 DIAGNOSIS — J93.9 PNEUMOTHORAX ON RIGHT: ICD-10-CM

## 2020-12-31 PROBLEM — I47.20 V-TACH: Status: ACTIVE | Noted: 2020-12-31

## 2020-12-31 PROBLEM — R07.9 CHEST PAIN: Status: ACTIVE | Noted: 2020-12-21

## 2020-12-31 LAB
ANION GAP SERPL CALC-SCNC: 8 MMOL/L (ref 8–16)
BASOPHILS # BLD AUTO: 0.05 K/UL (ref 0–0.2)
BASOPHILS NFR BLD: 0.3 % (ref 0–1.9)
BUN SERPL-MCNC: 17 MG/DL (ref 8–23)
C DIFF GDH STL QL: NEGATIVE
C DIFF TOX A+B STL QL IA: NEGATIVE
CALCIUM SERPL-MCNC: 8.8 MG/DL (ref 8.7–10.5)
CHLORIDE SERPL-SCNC: 93 MMOL/L (ref 95–110)
CO2 SERPL-SCNC: 31 MMOL/L (ref 23–29)
CREAT SERPL-MCNC: 0.5 MG/DL (ref 0.5–1.4)
DIFFERENTIAL METHOD: ABNORMAL
EOSINOPHIL # BLD AUTO: 0.3 K/UL (ref 0–0.5)
EOSINOPHIL NFR BLD: 1.9 % (ref 0–8)
ERYTHROCYTE [DISTWIDTH] IN BLOOD BY AUTOMATED COUNT: 16.4 % (ref 11.5–14.5)
EST. GFR  (AFRICAN AMERICAN): >60 ML/MIN/1.73 M^2
EST. GFR  (NON AFRICAN AMERICAN): >60 ML/MIN/1.73 M^2
GLUCOSE SERPL-MCNC: 110 MG/DL (ref 70–110)
HCT VFR BLD AUTO: 35.7 % (ref 37–48.5)
HGB BLD-MCNC: 11.3 G/DL (ref 12–16)
IMM GRANULOCYTES # BLD AUTO: 0.08 K/UL (ref 0–0.04)
IMM GRANULOCYTES NFR BLD AUTO: 0.5 % (ref 0–0.5)
LYMPHOCYTES # BLD AUTO: 2.2 K/UL (ref 1–4.8)
LYMPHOCYTES NFR BLD: 13.6 % (ref 18–48)
MAGNESIUM SERPL-MCNC: 2.3 MG/DL (ref 1.6–2.6)
MCH RBC QN AUTO: 29.1 PG (ref 27–31)
MCHC RBC AUTO-ENTMCNC: 31.7 G/DL (ref 32–36)
MCV RBC AUTO: 92 FL (ref 82–98)
MONOCYTES # BLD AUTO: 1.8 K/UL (ref 0.3–1)
MONOCYTES NFR BLD: 11.3 % (ref 4–15)
NEUTROPHILS # BLD AUTO: 11.4 K/UL (ref 1.8–7.7)
NEUTROPHILS NFR BLD: 72.4 % (ref 38–73)
NRBC BLD-RTO: 0 /100 WBC
PLATELET # BLD AUTO: 352 K/UL (ref 150–350)
PMV BLD AUTO: 8.8 FL (ref 9.2–12.9)
POTASSIUM SERPL-SCNC: 4.1 MMOL/L (ref 3.5–5.1)
RBC # BLD AUTO: 3.88 M/UL (ref 4–5.4)
SODIUM SERPL-SCNC: 132 MMOL/L (ref 136–145)
TROPONIN I SERPL DL<=0.01 NG/ML-MCNC: <0.006 NG/ML (ref 0–0.03)
TROPONIN I SERPL DL<=0.01 NG/ML-MCNC: <0.006 NG/ML (ref 0–0.03)
WBC # BLD AUTO: 15.79 K/UL (ref 3.9–12.7)

## 2020-12-31 PROCEDURE — 63600175 PHARM REV CODE 636 W HCPCS: Performed by: HOSPITALIST

## 2020-12-31 PROCEDURE — 84484 ASSAY OF TROPONIN QUANT: CPT | Mod: 91

## 2020-12-31 PROCEDURE — 83735 ASSAY OF MAGNESIUM: CPT

## 2020-12-31 PROCEDURE — 94640 AIRWAY INHALATION TREATMENT: CPT

## 2020-12-31 PROCEDURE — 99232 PR SUBSEQUENT HOSPITAL CARE,LEVL II: ICD-10-PCS | Mod: ,,, | Performed by: INTERNAL MEDICINE

## 2020-12-31 PROCEDURE — 25000242 PHARM REV CODE 250 ALT 637 W/ HCPCS: Performed by: INTERNAL MEDICINE

## 2020-12-31 PROCEDURE — 63700000 PHARM REV CODE 250 ALT 637 W/O HCPCS: Performed by: HOSPITALIST

## 2020-12-31 PROCEDURE — 80048 BASIC METABOLIC PNL TOTAL CA: CPT

## 2020-12-31 PROCEDURE — 99233 PR SUBSEQUENT HOSPITAL CARE,LEVL III: ICD-10-PCS | Mod: S$GLB,,, | Performed by: INTERNAL MEDICINE

## 2020-12-31 PROCEDURE — 25000003 PHARM REV CODE 250: Performed by: HOSPITALIST

## 2020-12-31 PROCEDURE — 99233 SBSQ HOSP IP/OBS HIGH 50: CPT | Mod: S$GLB,,, | Performed by: INTERNAL MEDICINE

## 2020-12-31 PROCEDURE — 87449 NOS EACH ORGANISM AG IA: CPT

## 2020-12-31 PROCEDURE — 94761 N-INVAS EAR/PLS OXIMETRY MLT: CPT

## 2020-12-31 PROCEDURE — 27000221 HC OXYGEN, UP TO 24 HOURS

## 2020-12-31 PROCEDURE — 87324 CLOSTRIDIUM AG IA: CPT

## 2020-12-31 PROCEDURE — 36415 COLL VENOUS BLD VENIPUNCTURE: CPT

## 2020-12-31 PROCEDURE — 99232 SBSQ HOSP IP/OBS MODERATE 35: CPT | Mod: ,,, | Performed by: INTERNAL MEDICINE

## 2020-12-31 PROCEDURE — 85025 COMPLETE CBC W/AUTO DIFF WBC: CPT

## 2020-12-31 PROCEDURE — 25000003 PHARM REV CODE 250: Performed by: INTERNAL MEDICINE

## 2020-12-31 PROCEDURE — 11000001 HC ACUTE MED/SURG PRIVATE ROOM

## 2020-12-31 RX ADMIN — LACTOBACILLUS ACIDOPHILUS / LACTOBACILLUS BULGARICUS 1 EACH: 100 MILLION CFU STRENGTH GRANULES at 09:12

## 2020-12-31 RX ADMIN — MEROPENEM AND SODIUM CHLORIDE 1 G: 1 INJECTION, SOLUTION INTRAVENOUS at 09:12

## 2020-12-31 RX ADMIN — MUPIROCIN: 20 OINTMENT TOPICAL at 09:12

## 2020-12-31 RX ADMIN — AZITHROMYCIN MONOHYDRATE 500 MG: 250 TABLET ORAL at 09:12

## 2020-12-31 RX ADMIN — IPRATROPIUM BROMIDE AND ALBUTEROL SULFATE 3 ML: .5; 2.5 SOLUTION RESPIRATORY (INHALATION) at 12:12

## 2020-12-31 RX ADMIN — IPRATROPIUM BROMIDE AND ALBUTEROL SULFATE 3 ML: .5; 2.5 SOLUTION RESPIRATORY (INHALATION) at 01:12

## 2020-12-31 RX ADMIN — ETHAMBUTOL HYDROCHLORIDE 700 MG: 400 TABLET, FILM COATED ORAL at 09:12

## 2020-12-31 RX ADMIN — FLUTICASONE FUROATE AND VILANTEROL TRIFENATATE 1 PUFF: 200; 25 POWDER RESPIRATORY (INHALATION) at 08:12

## 2020-12-31 RX ADMIN — MEROPENEM AND SODIUM CHLORIDE 1 G: 1 INJECTION, SOLUTION INTRAVENOUS at 05:12

## 2020-12-31 RX ADMIN — TIGECYCLINE 50 MG: 50 INJECTION, POWDER, LYOPHILIZED, FOR SOLUTION INTRAVENOUS at 09:12

## 2020-12-31 RX ADMIN — AMIKACIN SULFATE 450 MG: 250 INJECTION INTRAMUSCULAR; INTRAVENOUS at 09:12

## 2020-12-31 RX ADMIN — OXYCODONE HYDROCHLORIDE 10 MG: 10 TABLET ORAL at 05:12

## 2020-12-31 RX ADMIN — ATORVASTATIN CALCIUM 40 MG: 40 TABLET, FILM COATED ORAL at 09:12

## 2020-12-31 RX ADMIN — IPRATROPIUM BROMIDE AND ALBUTEROL SULFATE 3 ML: .5; 2.5 SOLUTION RESPIRATORY (INHALATION) at 07:12

## 2020-12-31 RX ADMIN — OXYCODONE HYDROCHLORIDE 10 MG: 10 TABLET ORAL at 10:12

## 2020-12-31 RX ADMIN — MEROPENEM AND SODIUM CHLORIDE 1 G: 1 INJECTION, SOLUTION INTRAVENOUS at 02:12

## 2020-12-31 RX ADMIN — IPRATROPIUM BROMIDE AND ALBUTEROL SULFATE 3 ML: .5; 2.5 SOLUTION RESPIRATORY (INHALATION) at 08:12

## 2020-12-31 RX ADMIN — FLUCONAZOLE 150 MG: 50 TABLET ORAL at 04:12

## 2020-12-31 RX ADMIN — OXYCODONE HYDROCHLORIDE 5 MG: 5 TABLET ORAL at 09:12

## 2020-12-31 RX ADMIN — OXYCODONE HYDROCHLORIDE 5 MG: 5 TABLET ORAL at 04:12

## 2021-01-01 ENCOUNTER — TELEPHONE (OUTPATIENT)
Dept: ORTHOPEDICS | Facility: CLINIC | Age: 66
End: 2021-01-01
Payer: MEDICARE

## 2021-01-01 ENCOUNTER — EXTERNAL HOME HEALTH (OUTPATIENT)
Dept: HOME HEALTH SERVICES | Facility: HOSPITAL | Age: 66
End: 2021-01-01
Payer: MEDICARE

## 2021-01-01 DIAGNOSIS — Z96.641 HISTORY OF RIGHT HIP REPLACEMENT: Primary | ICD-10-CM

## 2021-01-01 LAB
ANION GAP SERPL CALC-SCNC: 6 MMOL/L (ref 8–16)
BASOPHILS # BLD AUTO: 0.05 K/UL (ref 0–0.2)
BASOPHILS NFR BLD: 0.4 % (ref 0–1.9)
BUN SERPL-MCNC: 16 MG/DL (ref 8–23)
CALCIUM SERPL-MCNC: 8.5 MG/DL (ref 8.7–10.5)
CHLORIDE SERPL-SCNC: 95 MMOL/L (ref 95–110)
CO2 SERPL-SCNC: 32 MMOL/L (ref 23–29)
CREAT SERPL-MCNC: 0.5 MG/DL (ref 0.5–1.4)
DIFFERENTIAL METHOD: ABNORMAL
EOSINOPHIL # BLD AUTO: 0.4 K/UL (ref 0–0.5)
EOSINOPHIL NFR BLD: 2.8 % (ref 0–8)
ERYTHROCYTE [DISTWIDTH] IN BLOOD BY AUTOMATED COUNT: 16.2 % (ref 11.5–14.5)
EST. GFR  (AFRICAN AMERICAN): >60 ML/MIN/1.73 M^2
EST. GFR  (NON AFRICAN AMERICAN): >60 ML/MIN/1.73 M^2
GLUCOSE SERPL-MCNC: 110 MG/DL (ref 70–110)
HCT VFR BLD AUTO: 34.6 % (ref 37–48.5)
HGB BLD-MCNC: 10.9 G/DL (ref 12–16)
IMM GRANULOCYTES # BLD AUTO: 0.05 K/UL (ref 0–0.04)
IMM GRANULOCYTES NFR BLD AUTO: 0.4 % (ref 0–0.5)
LYMPHOCYTES # BLD AUTO: 2.4 K/UL (ref 1–4.8)
LYMPHOCYTES NFR BLD: 19.1 % (ref 18–48)
MCH RBC QN AUTO: 29.1 PG (ref 27–31)
MCHC RBC AUTO-ENTMCNC: 31.5 G/DL (ref 32–36)
MCV RBC AUTO: 92 FL (ref 82–98)
MONOCYTES # BLD AUTO: 1.5 K/UL (ref 0.3–1)
MONOCYTES NFR BLD: 11.7 % (ref 4–15)
NEUTROPHILS # BLD AUTO: 8.3 K/UL (ref 1.8–7.7)
NEUTROPHILS NFR BLD: 65.6 % (ref 38–73)
NRBC BLD-RTO: 0 /100 WBC
PLATELET # BLD AUTO: 319 K/UL (ref 150–350)
PMV BLD AUTO: 9.1 FL (ref 9.2–12.9)
POTASSIUM SERPL-SCNC: 3.9 MMOL/L (ref 3.5–5.1)
RBC # BLD AUTO: 3.75 M/UL (ref 4–5.4)
SODIUM SERPL-SCNC: 133 MMOL/L (ref 136–145)
TROPONIN I SERPL DL<=0.01 NG/ML-MCNC: <0.006 NG/ML (ref 0–0.03)
WBC # BLD AUTO: 12.64 K/UL (ref 3.9–12.7)

## 2021-01-01 PROCEDURE — 99232 SBSQ HOSP IP/OBS MODERATE 35: CPT | Mod: ,,, | Performed by: INTERNAL MEDICINE

## 2021-01-01 PROCEDURE — 87116 MYCOBACTERIA CULTURE: CPT

## 2021-01-01 PROCEDURE — 36415 COLL VENOUS BLD VENIPUNCTURE: CPT

## 2021-01-01 PROCEDURE — 87206 SMEAR FLUORESCENT/ACID STAI: CPT

## 2021-01-01 PROCEDURE — 63700000 PHARM REV CODE 250 ALT 637 W/O HCPCS: Performed by: HOSPITALIST

## 2021-01-01 PROCEDURE — 84484 ASSAY OF TROPONIN QUANT: CPT

## 2021-01-01 PROCEDURE — 25000003 PHARM REV CODE 250: Performed by: HOSPITALIST

## 2021-01-01 PROCEDURE — 87118 MYCOBACTERIC IDENTIFICATION: CPT | Mod: 59

## 2021-01-01 PROCEDURE — 94761 N-INVAS EAR/PLS OXIMETRY MLT: CPT

## 2021-01-01 PROCEDURE — 94640 AIRWAY INHALATION TREATMENT: CPT

## 2021-01-01 PROCEDURE — 11000001 HC ACUTE MED/SURG PRIVATE ROOM

## 2021-01-01 PROCEDURE — 87015 SPECIMEN INFECT AGNT CONCNTJ: CPT

## 2021-01-01 PROCEDURE — 87149 DNA/RNA DIRECT PROBE: CPT

## 2021-01-01 PROCEDURE — 25000003 PHARM REV CODE 250: Performed by: INTERNAL MEDICINE

## 2021-01-01 PROCEDURE — 80048 BASIC METABOLIC PNL TOTAL CA: CPT

## 2021-01-01 PROCEDURE — 25000242 PHARM REV CODE 250 ALT 637 W/ HCPCS: Performed by: INTERNAL MEDICINE

## 2021-01-01 PROCEDURE — 63600175 PHARM REV CODE 636 W HCPCS: Performed by: HOSPITALIST

## 2021-01-01 PROCEDURE — 27000221 HC OXYGEN, UP TO 24 HOURS

## 2021-01-01 PROCEDURE — 85025 COMPLETE CBC W/AUTO DIFF WBC: CPT

## 2021-01-01 PROCEDURE — 99232 PR SUBSEQUENT HOSPITAL CARE,LEVL II: ICD-10-PCS | Mod: ,,, | Performed by: INTERNAL MEDICINE

## 2021-01-01 RX ORDER — HYDROCODONE BITARTRATE AND ACETAMINOPHEN 5; 325 MG/1; MG/1
1 TABLET ORAL EVERY 6 HOURS PRN
Qty: 28 TABLET | Refills: 0 | Status: SHIPPED | OUTPATIENT
Start: 2021-01-01 | End: 2022-01-01

## 2021-01-01 RX ADMIN — IPRATROPIUM BROMIDE AND ALBUTEROL SULFATE 3 ML: .5; 2.5 SOLUTION RESPIRATORY (INHALATION) at 07:01

## 2021-01-01 RX ADMIN — MEROPENEM AND SODIUM CHLORIDE 1 G: 1 INJECTION, SOLUTION INTRAVENOUS at 10:01

## 2021-01-01 RX ADMIN — MEROPENEM AND SODIUM CHLORIDE 1 G: 1 INJECTION, SOLUTION INTRAVENOUS at 02:01

## 2021-01-01 RX ADMIN — TIGECYCLINE 50 MG: 50 INJECTION, POWDER, LYOPHILIZED, FOR SOLUTION INTRAVENOUS at 08:01

## 2021-01-01 RX ADMIN — OXYCODONE HYDROCHLORIDE 10 MG: 10 TABLET ORAL at 09:01

## 2021-01-01 RX ADMIN — OXYCODONE HYDROCHLORIDE 10 MG: 10 TABLET ORAL at 10:01

## 2021-01-01 RX ADMIN — LACTOBACILLUS ACIDOPHILUS / LACTOBACILLUS BULGARICUS 1 EACH: 100 MILLION CFU STRENGTH GRANULES at 09:01

## 2021-01-01 RX ADMIN — IPRATROPIUM BROMIDE AND ALBUTEROL SULFATE 3 ML: .5; 2.5 SOLUTION RESPIRATORY (INHALATION) at 06:01

## 2021-01-01 RX ADMIN — ATORVASTATIN CALCIUM 40 MG: 40 TABLET, FILM COATED ORAL at 09:01

## 2021-01-01 RX ADMIN — IPRATROPIUM BROMIDE AND ALBUTEROL SULFATE 3 ML: .5; 2.5 SOLUTION RESPIRATORY (INHALATION) at 12:01

## 2021-01-01 RX ADMIN — LACTOBACILLUS ACIDOPHILUS / LACTOBACILLUS BULGARICUS 1 EACH: 100 MILLION CFU STRENGTH GRANULES at 08:01

## 2021-01-01 RX ADMIN — FLUTICASONE FUROATE AND VILANTEROL TRIFENATATE 1 PUFF: 200; 25 POWDER RESPIRATORY (INHALATION) at 06:01

## 2021-01-01 RX ADMIN — ETHAMBUTOL HYDROCHLORIDE 700 MG: 400 TABLET, FILM COATED ORAL at 09:01

## 2021-01-01 RX ADMIN — OXYCODONE HYDROCHLORIDE 10 MG: 10 TABLET ORAL at 04:01

## 2021-01-01 RX ADMIN — TIGECYCLINE 50 MG: 50 INJECTION, POWDER, LYOPHILIZED, FOR SOLUTION INTRAVENOUS at 09:01

## 2021-01-01 RX ADMIN — AMIKACIN SULFATE 450 MG: 250 INJECTION INTRAMUSCULAR; INTRAVENOUS at 08:01

## 2021-01-01 RX ADMIN — MEROPENEM AND SODIUM CHLORIDE 1 G: 1 INJECTION, SOLUTION INTRAVENOUS at 05:01

## 2021-01-01 RX ADMIN — AZITHROMYCIN MONOHYDRATE 500 MG: 250 TABLET ORAL at 09:01

## 2021-01-02 LAB
ANION GAP SERPL CALC-SCNC: 7 MMOL/L (ref 8–16)
AORTIC ROOT ANNULUS: 3.55 CM
AORTIC VALVE CUSP SEPERATION: 1.72 CM
AV INDEX (PROSTH): 0.86
AV MEAN GRADIENT: 5 MMHG
AV PEAK GRADIENT: 9 MMHG
AV VALVE AREA: 2.6 CM2
AV VELOCITY RATIO: 1
BASOPHILS # BLD AUTO: 0.05 K/UL (ref 0–0.2)
BASOPHILS NFR BLD: 0.4 % (ref 0–1.9)
BSA FOR ECHO PROCEDURE: 1.42 M2
BUN SERPL-MCNC: 20 MG/DL (ref 8–23)
CALCIUM SERPL-MCNC: 8.3 MG/DL (ref 8.7–10.5)
CHLORIDE SERPL-SCNC: 97 MMOL/L (ref 95–110)
CO2 SERPL-SCNC: 30 MMOL/L (ref 23–29)
CREAT SERPL-MCNC: 0.5 MG/DL (ref 0.5–1.4)
CV ECHO LV RWT: 0.55 CM
DIFFERENTIAL METHOD: ABNORMAL
DOP CALC AO PEAK VEL: 1.52 M/S
DOP CALC AO VTI: 23.9 CM
DOP CALC LVOT AREA: 3 CM2
DOP CALC LVOT DIAMETER: 1.96 CM
DOP CALC LVOT PEAK VEL: 1.52 M/S
DOP CALC LVOT STROKE VOLUME: 62.15 CM3
DOP CALCLVOT PEAK VEL VTI: 20.61 CM
E WAVE DECELERATION TIME: 146.2 MSEC
E/A RATIO: 0.76
E/E' RATIO: 8.6 M/S
ECHO LV POSTERIOR WALL: 0.96 CM (ref 0.6–1.1)
EOSINOPHIL # BLD AUTO: 0.4 K/UL (ref 0–0.5)
EOSINOPHIL NFR BLD: 3.7 % (ref 0–8)
ERYTHROCYTE [DISTWIDTH] IN BLOOD BY AUTOMATED COUNT: 16.1 % (ref 11.5–14.5)
EST. GFR  (AFRICAN AMERICAN): >60 ML/MIN/1.73 M^2
EST. GFR  (NON AFRICAN AMERICAN): >60 ML/MIN/1.73 M^2
FRACTIONAL SHORTENING: 59 % (ref 28–44)
GLUCOSE SERPL-MCNC: 103 MG/DL (ref 70–110)
HCT VFR BLD AUTO: 34.4 % (ref 37–48.5)
HGB BLD-MCNC: 10.7 G/DL (ref 12–16)
IMM GRANULOCYTES # BLD AUTO: 0.06 K/UL (ref 0–0.04)
IMM GRANULOCYTES NFR BLD AUTO: 0.5 % (ref 0–0.5)
INTERVENTRICULAR SEPTUM: 0.95 CM (ref 0.6–1.1)
IVRT: 74.22 MSEC
LEFT ATRIUM SIZE: 3.86 CM
LEFT INTERNAL DIMENSION IN SYSTOLE: 1.44 CM (ref 2.1–4)
LEFT VENTRICLE DIASTOLIC VOLUME INDEX: 35.67 ML/M2
LEFT VENTRICLE DIASTOLIC VOLUME: 51.74 ML
LEFT VENTRICLE MASS INDEX: 67 G/M2
LEFT VENTRICLE SYSTOLIC VOLUME INDEX: 3.8 ML/M2
LEFT VENTRICLE SYSTOLIC VOLUME: 5.48 ML
LEFT VENTRICULAR INTERNAL DIMENSION IN DIASTOLE: 3.52 CM (ref 3.5–6)
LEFT VENTRICULAR MASS: 97.52 G
LV LATERAL E/E' RATIO: 7.17 M/S
LV SEPTAL E/E' RATIO: 10.75 M/S
LYMPHOCYTES # BLD AUTO: 2.6 K/UL (ref 1–4.8)
LYMPHOCYTES NFR BLD: 23.4 % (ref 18–48)
MCH RBC QN AUTO: 29 PG (ref 27–31)
MCHC RBC AUTO-ENTMCNC: 31.1 G/DL (ref 32–36)
MCV RBC AUTO: 93 FL (ref 82–98)
MONOCYTES # BLD AUTO: 1.2 K/UL (ref 0.3–1)
MONOCYTES NFR BLD: 10.9 % (ref 4–15)
MV PEAK A VEL: 1.13 M/S
MV PEAK E VEL: 0.86 M/S
NEUTROPHILS # BLD AUTO: 6.8 K/UL (ref 1.8–7.7)
NEUTROPHILS NFR BLD: 61.1 % (ref 38–73)
NRBC BLD-RTO: 0 /100 WBC
PISA TR MAX VEL: 2.81 M/S
PLATELET # BLD AUTO: 325 K/UL (ref 150–350)
PMV BLD AUTO: 9 FL (ref 9.2–12.9)
POTASSIUM SERPL-SCNC: 4 MMOL/L (ref 3.5–5.1)
PV PEAK VELOCITY: 1.13 CM/S
RBC # BLD AUTO: 3.69 M/UL (ref 4–5.4)
RIGHT VENTRICULAR END-DIASTOLIC DIMENSION: 3.49 CM
RV TISSUE DOPPLER FREE WALL SYSTOLIC VELOCITY 1 (APICAL 4 CHAMBER VIEW): 18.05 CM/S
SODIUM SERPL-SCNC: 134 MMOL/L (ref 136–145)
TDI LATERAL: 0.12 M/S
TDI SEPTAL: 0.08 M/S
TDI: 0.1 M/S
TR MAX PG: 32 MMHG
WBC # BLD AUTO: 11.22 K/UL (ref 3.9–12.7)

## 2021-01-02 PROCEDURE — 63600175 PHARM REV CODE 636 W HCPCS: Performed by: HOSPITALIST

## 2021-01-02 PROCEDURE — 25000003 PHARM REV CODE 250: Performed by: HOSPITALIST

## 2021-01-02 PROCEDURE — 99233 SBSQ HOSP IP/OBS HIGH 50: CPT | Mod: S$GLB,,, | Performed by: INTERNAL MEDICINE

## 2021-01-02 PROCEDURE — 27000221 HC OXYGEN, UP TO 24 HOURS

## 2021-01-02 PROCEDURE — 36415 COLL VENOUS BLD VENIPUNCTURE: CPT

## 2021-01-02 PROCEDURE — 99232 SBSQ HOSP IP/OBS MODERATE 35: CPT | Mod: ,,, | Performed by: INTERNAL MEDICINE

## 2021-01-02 PROCEDURE — 80150 ASSAY OF AMIKACIN: CPT

## 2021-01-02 PROCEDURE — 99232 PR SUBSEQUENT HOSPITAL CARE,LEVL II: ICD-10-PCS | Mod: ,,, | Performed by: INTERNAL MEDICINE

## 2021-01-02 PROCEDURE — 25000003 PHARM REV CODE 250: Performed by: INTERNAL MEDICINE

## 2021-01-02 PROCEDURE — 80048 BASIC METABOLIC PNL TOTAL CA: CPT

## 2021-01-02 PROCEDURE — 99233 PR SUBSEQUENT HOSPITAL CARE,LEVL III: ICD-10-PCS | Mod: S$GLB,,, | Performed by: INTERNAL MEDICINE

## 2021-01-02 PROCEDURE — 85025 COMPLETE CBC W/AUTO DIFF WBC: CPT

## 2021-01-02 PROCEDURE — 63700000 PHARM REV CODE 250 ALT 637 W/O HCPCS: Performed by: HOSPITALIST

## 2021-01-02 PROCEDURE — 25000242 PHARM REV CODE 250 ALT 637 W/ HCPCS: Performed by: INTERNAL MEDICINE

## 2021-01-02 PROCEDURE — 94640 AIRWAY INHALATION TREATMENT: CPT

## 2021-01-02 PROCEDURE — 94761 N-INVAS EAR/PLS OXIMETRY MLT: CPT

## 2021-01-02 PROCEDURE — 11000001 HC ACUTE MED/SURG PRIVATE ROOM

## 2021-01-02 RX ORDER — RIFAMPIN 300 MG/1
300 CAPSULE ORAL EVERY 12 HOURS
Status: DISCONTINUED | OUTPATIENT
Start: 2021-01-02 | End: 2021-01-03

## 2021-01-02 RX ADMIN — AZITHROMYCIN MONOHYDRATE 500 MG: 250 TABLET ORAL at 09:01

## 2021-01-02 RX ADMIN — OXYCODONE HYDROCHLORIDE 10 MG: 10 TABLET ORAL at 05:01

## 2021-01-02 RX ADMIN — RIFAMPIN 300 MG: 300 CAPSULE ORAL at 11:01

## 2021-01-02 RX ADMIN — IPRATROPIUM BROMIDE AND ALBUTEROL SULFATE 3 ML: .5; 2.5 SOLUTION RESPIRATORY (INHALATION) at 07:01

## 2021-01-02 RX ADMIN — IPRATROPIUM BROMIDE AND ALBUTEROL SULFATE 3 ML: .5; 2.5 SOLUTION RESPIRATORY (INHALATION) at 01:01

## 2021-01-02 RX ADMIN — TIGECYCLINE 50 MG: 50 INJECTION, POWDER, LYOPHILIZED, FOR SOLUTION INTRAVENOUS at 09:01

## 2021-01-02 RX ADMIN — RIFAMPIN 300 MG: 300 CAPSULE ORAL at 09:01

## 2021-01-02 RX ADMIN — IPRATROPIUM BROMIDE AND ALBUTEROL SULFATE 3 ML: .5; 2.5 SOLUTION RESPIRATORY (INHALATION) at 12:01

## 2021-01-02 RX ADMIN — OXYCODONE HYDROCHLORIDE 10 MG: 10 TABLET ORAL at 11:01

## 2021-01-02 RX ADMIN — MEROPENEM AND SODIUM CHLORIDE 1 G: 1 INJECTION, SOLUTION INTRAVENOUS at 10:01

## 2021-01-02 RX ADMIN — ETHAMBUTOL HYDROCHLORIDE 700 MG: 400 TABLET, FILM COATED ORAL at 09:01

## 2021-01-02 RX ADMIN — LACTOBACILLUS ACIDOPHILUS / LACTOBACILLUS BULGARICUS 1 EACH: 100 MILLION CFU STRENGTH GRANULES at 09:01

## 2021-01-02 RX ADMIN — MEROPENEM AND SODIUM CHLORIDE 1 G: 1 INJECTION, SOLUTION INTRAVENOUS at 06:01

## 2021-01-02 RX ADMIN — MEROPENEM AND SODIUM CHLORIDE 1 G: 1 INJECTION, SOLUTION INTRAVENOUS at 01:01

## 2021-01-02 RX ADMIN — FLUTICASONE FUROATE AND VILANTEROL TRIFENATATE 1 PUFF: 200; 25 POWDER RESPIRATORY (INHALATION) at 07:01

## 2021-01-02 RX ADMIN — OXYCODONE HYDROCHLORIDE 10 MG: 10 TABLET ORAL at 06:01

## 2021-01-02 RX ADMIN — AMIKACIN SULFATE 450 MG: 250 INJECTION INTRAMUSCULAR; INTRAVENOUS at 11:01

## 2021-01-03 PROBLEM — Z86.010 HISTORY OF COLON POLYPS: Status: RESOLVED | Noted: 2019-06-25 | Resolved: 2021-01-03

## 2021-01-03 PROBLEM — J98.4 PNEUMATOCELE OF LUNG: Status: RESOLVED | Noted: 2019-12-16 | Resolved: 2021-01-03

## 2021-01-03 PROBLEM — J96.21 ACUTE ON CHRONIC RESPIRATORY FAILURE WITH HYPOXIA: Status: RESOLVED | Noted: 2019-12-16 | Resolved: 2021-01-03

## 2021-01-03 PROBLEM — E44.0 MODERATE MALNUTRITION: Status: RESOLVED | Noted: 2019-12-16 | Resolved: 2021-01-03

## 2021-01-03 PROBLEM — E46 HYPOALBUMINEMIA DUE TO PROTEIN-CALORIE MALNUTRITION: Status: RESOLVED | Noted: 2020-11-29 | Resolved: 2021-01-03

## 2021-01-03 PROBLEM — E61.1 IRON DEFICIENCY: Status: RESOLVED | Noted: 2020-01-27 | Resolved: 2021-01-03

## 2021-01-03 PROBLEM — Z96.649 S/P TOTAL HIP ARTHROPLASTY: Status: RESOLVED | Noted: 2019-12-20 | Resolved: 2021-01-03

## 2021-01-03 PROBLEM — R73.9 HYPERGLYCEMIA: Status: RESOLVED | Noted: 2017-11-21 | Resolved: 2021-01-03

## 2021-01-03 PROBLEM — R93.89 ABNORMAL CXR: Status: RESOLVED | Noted: 2020-01-27 | Resolved: 2021-01-03

## 2021-01-03 PROBLEM — E88.09 HYPOALBUMINEMIA DUE TO PROTEIN-CALORIE MALNUTRITION: Status: RESOLVED | Noted: 2020-11-29 | Resolved: 2021-01-03

## 2021-01-03 LAB
AMIKACIN TROUGH SERPL-MCNC: <2 UG/ML (ref 0–6)
ANION GAP SERPL CALC-SCNC: 10 MMOL/L (ref 8–16)
ANION GAP SERPL CALC-SCNC: 4 MMOL/L (ref 8–16)
BASOPHILS # BLD AUTO: 0.05 K/UL (ref 0–0.2)
BASOPHILS NFR BLD: 0.5 % (ref 0–1.9)
BUN SERPL-MCNC: 22 MG/DL (ref 8–23)
BUN SERPL-MCNC: 23 MG/DL (ref 8–23)
CALCIUM SERPL-MCNC: 8.1 MG/DL (ref 8.7–10.5)
CALCIUM SERPL-MCNC: 8.2 MG/DL (ref 8.7–10.5)
CHLORIDE SERPL-SCNC: 96 MMOL/L (ref 95–110)
CHLORIDE SERPL-SCNC: 98 MMOL/L (ref 95–110)
CO2 SERPL-SCNC: 27 MMOL/L (ref 23–29)
CO2 SERPL-SCNC: 31 MMOL/L (ref 23–29)
CREAT SERPL-MCNC: 0.5 MG/DL (ref 0.5–1.4)
CREAT SERPL-MCNC: 0.5 MG/DL (ref 0.5–1.4)
DIFFERENTIAL METHOD: ABNORMAL
EOSINOPHIL # BLD AUTO: 0.4 K/UL (ref 0–0.5)
EOSINOPHIL NFR BLD: 4.3 % (ref 0–8)
ERYTHROCYTE [DISTWIDTH] IN BLOOD BY AUTOMATED COUNT: 16 % (ref 11.5–14.5)
EST. GFR  (AFRICAN AMERICAN): >60 ML/MIN/1.73 M^2
EST. GFR  (AFRICAN AMERICAN): >60 ML/MIN/1.73 M^2
EST. GFR  (NON AFRICAN AMERICAN): >60 ML/MIN/1.73 M^2
EST. GFR  (NON AFRICAN AMERICAN): >60 ML/MIN/1.73 M^2
GLUCOSE SERPL-MCNC: 93 MG/DL (ref 70–110)
GLUCOSE SERPL-MCNC: 96 MG/DL (ref 70–110)
HCT VFR BLD AUTO: 33.7 % (ref 37–48.5)
HGB BLD-MCNC: 10.5 G/DL (ref 12–16)
IMM GRANULOCYTES # BLD AUTO: 0.03 K/UL (ref 0–0.04)
IMM GRANULOCYTES NFR BLD AUTO: 0.3 % (ref 0–0.5)
LYMPHOCYTES # BLD AUTO: 2.4 K/UL (ref 1–4.8)
LYMPHOCYTES NFR BLD: 23.2 % (ref 18–48)
MAGNESIUM SERPL-MCNC: 2.1 MG/DL (ref 1.6–2.6)
MCH RBC QN AUTO: 28.8 PG (ref 27–31)
MCHC RBC AUTO-ENTMCNC: 31.2 G/DL (ref 32–36)
MCV RBC AUTO: 92 FL (ref 82–98)
MONOCYTES # BLD AUTO: 1.2 K/UL (ref 0.3–1)
MONOCYTES NFR BLD: 11.3 % (ref 4–15)
NEUTROPHILS # BLD AUTO: 6.2 K/UL (ref 1.8–7.7)
NEUTROPHILS NFR BLD: 60.4 % (ref 38–73)
NRBC BLD-RTO: 0 /100 WBC
PLATELET # BLD AUTO: 353 K/UL (ref 150–350)
PMV BLD AUTO: 9.1 FL (ref 9.2–12.9)
POTASSIUM SERPL-SCNC: 4 MMOL/L (ref 3.5–5.1)
POTASSIUM SERPL-SCNC: 4 MMOL/L (ref 3.5–5.1)
RBC # BLD AUTO: 3.65 M/UL (ref 4–5.4)
SODIUM SERPL-SCNC: 133 MMOL/L (ref 136–145)
SODIUM SERPL-SCNC: 133 MMOL/L (ref 136–145)
WBC # BLD AUTO: 10.3 K/UL (ref 3.9–12.7)

## 2021-01-03 PROCEDURE — 80048 BASIC METABOLIC PNL TOTAL CA: CPT

## 2021-01-03 PROCEDURE — 27000221 HC OXYGEN, UP TO 24 HOURS

## 2021-01-03 PROCEDURE — 94761 N-INVAS EAR/PLS OXIMETRY MLT: CPT

## 2021-01-03 PROCEDURE — 99233 SBSQ HOSP IP/OBS HIGH 50: CPT | Mod: S$GLB,,, | Performed by: INTERNAL MEDICINE

## 2021-01-03 PROCEDURE — 63700000 PHARM REV CODE 250 ALT 637 W/O HCPCS: Performed by: HOSPITALIST

## 2021-01-03 PROCEDURE — 99223 1ST HOSP IP/OBS HIGH 75: CPT | Mod: ,,, | Performed by: INTERNAL MEDICINE

## 2021-01-03 PROCEDURE — 25000003 PHARM REV CODE 250: Performed by: INTERNAL MEDICINE

## 2021-01-03 PROCEDURE — 25000242 PHARM REV CODE 250 ALT 637 W/ HCPCS: Performed by: INTERNAL MEDICINE

## 2021-01-03 PROCEDURE — 99233 PR SUBSEQUENT HOSPITAL CARE,LEVL III: ICD-10-PCS | Mod: S$GLB,,, | Performed by: INTERNAL MEDICINE

## 2021-01-03 PROCEDURE — 93005 ELECTROCARDIOGRAM TRACING: CPT

## 2021-01-03 PROCEDURE — 85025 COMPLETE CBC W/AUTO DIFF WBC: CPT

## 2021-01-03 PROCEDURE — 36415 COLL VENOUS BLD VENIPUNCTURE: CPT

## 2021-01-03 PROCEDURE — 11000001 HC ACUTE MED/SURG PRIVATE ROOM

## 2021-01-03 PROCEDURE — 94640 AIRWAY INHALATION TREATMENT: CPT

## 2021-01-03 PROCEDURE — 99232 PR SUBSEQUENT HOSPITAL CARE,LEVL II: ICD-10-PCS | Mod: ,,, | Performed by: INTERNAL MEDICINE

## 2021-01-03 PROCEDURE — 80048 BASIC METABOLIC PNL TOTAL CA: CPT | Mod: 91

## 2021-01-03 PROCEDURE — 99232 SBSQ HOSP IP/OBS MODERATE 35: CPT | Mod: ,,, | Performed by: INTERNAL MEDICINE

## 2021-01-03 PROCEDURE — 93010 ELECTROCARDIOGRAM REPORT: CPT | Mod: ,,, | Performed by: INTERNAL MEDICINE

## 2021-01-03 PROCEDURE — 25000003 PHARM REV CODE 250: Performed by: HOSPITALIST

## 2021-01-03 PROCEDURE — 99223 PR INITIAL HOSPITAL CARE,LEVL III: ICD-10-PCS | Mod: ,,, | Performed by: INTERNAL MEDICINE

## 2021-01-03 PROCEDURE — 97161 PT EVAL LOW COMPLEX 20 MIN: CPT | Performed by: PHYSICAL THERAPIST

## 2021-01-03 PROCEDURE — 83735 ASSAY OF MAGNESIUM: CPT

## 2021-01-03 PROCEDURE — 63600175 PHARM REV CODE 636 W HCPCS: Performed by: HOSPITALIST

## 2021-01-03 PROCEDURE — 93010 EKG 12-LEAD: ICD-10-PCS | Mod: ,,, | Performed by: INTERNAL MEDICINE

## 2021-01-03 PROCEDURE — 97116 GAIT TRAINING THERAPY: CPT | Performed by: PHYSICAL THERAPIST

## 2021-01-03 RX ORDER — CARVEDILOL 3.12 MG/1
3.12 TABLET ORAL 2 TIMES DAILY
Status: DISCONTINUED | OUTPATIENT
Start: 2021-01-03 | End: 2021-01-05 | Stop reason: HOSPADM

## 2021-01-03 RX ORDER — ONDANSETRON 2 MG/ML
4 INJECTION INTRAMUSCULAR; INTRAVENOUS EVERY 8 HOURS PRN
Status: DISCONTINUED | OUTPATIENT
Start: 2021-01-03 | End: 2021-01-05 | Stop reason: HOSPADM

## 2021-01-03 RX ADMIN — LACTOBACILLUS ACIDOPHILUS / LACTOBACILLUS BULGARICUS 1 EACH: 100 MILLION CFU STRENGTH GRANULES at 08:01

## 2021-01-03 RX ADMIN — CARVEDILOL 3.12 MG: 3.12 TABLET, FILM COATED ORAL at 08:01

## 2021-01-03 RX ADMIN — FLUTICASONE FUROATE AND VILANTEROL TRIFENATATE 1 PUFF: 200; 25 POWDER RESPIRATORY (INHALATION) at 07:01

## 2021-01-03 RX ADMIN — MEROPENEM AND SODIUM CHLORIDE 1 G: 1 INJECTION, SOLUTION INTRAVENOUS at 10:01

## 2021-01-03 RX ADMIN — AMIKACIN SULFATE 450 MG: 250 INJECTION INTRAMUSCULAR; INTRAVENOUS at 10:01

## 2021-01-03 RX ADMIN — IPRATROPIUM BROMIDE AND ALBUTEROL SULFATE 3 ML: .5; 2.5 SOLUTION RESPIRATORY (INHALATION) at 07:01

## 2021-01-03 RX ADMIN — RIFAMPIN 300 MG: 300 CAPSULE ORAL at 08:01

## 2021-01-03 RX ADMIN — MEROPENEM AND SODIUM CHLORIDE 1 G: 1 INJECTION, SOLUTION INTRAVENOUS at 05:01

## 2021-01-03 RX ADMIN — OXYCODONE HYDROCHLORIDE 10 MG: 10 TABLET ORAL at 06:01

## 2021-01-03 RX ADMIN — OXYCODONE HYDROCHLORIDE 10 MG: 10 TABLET ORAL at 12:01

## 2021-01-03 RX ADMIN — ETHAMBUTOL HYDROCHLORIDE 700 MG: 400 TABLET, FILM COATED ORAL at 08:01

## 2021-01-03 RX ADMIN — TIGECYCLINE 50 MG: 50 INJECTION, POWDER, LYOPHILIZED, FOR SOLUTION INTRAVENOUS at 08:01

## 2021-01-03 RX ADMIN — IPRATROPIUM BROMIDE AND ALBUTEROL SULFATE 3 ML: .5; 2.5 SOLUTION RESPIRATORY (INHALATION) at 12:01

## 2021-01-03 RX ADMIN — OXYCODONE HYDROCHLORIDE 10 MG: 10 TABLET ORAL at 05:01

## 2021-01-03 RX ADMIN — AZITHROMYCIN MONOHYDRATE 500 MG: 250 TABLET ORAL at 08:01

## 2021-01-03 RX ADMIN — FLUCONAZOLE 150 MG: 50 TABLET ORAL at 01:01

## 2021-01-03 RX ADMIN — MEROPENEM AND SODIUM CHLORIDE 1 G: 1 INJECTION, SOLUTION INTRAVENOUS at 01:01

## 2021-01-04 LAB
ANION GAP SERPL CALC-SCNC: 8 MMOL/L (ref 8–16)
BASOPHILS # BLD AUTO: 0.05 K/UL (ref 0–0.2)
BASOPHILS NFR BLD: 0.7 % (ref 0–1.9)
BUN SERPL-MCNC: 18 MG/DL (ref 8–23)
CALCIUM SERPL-MCNC: 7.8 MG/DL (ref 8.7–10.5)
CHLORIDE SERPL-SCNC: 101 MMOL/L (ref 95–110)
CO2 SERPL-SCNC: 27 MMOL/L (ref 23–29)
CREAT SERPL-MCNC: 0.5 MG/DL (ref 0.5–1.4)
DIFFERENTIAL METHOD: ABNORMAL
EOSINOPHIL # BLD AUTO: 0.5 K/UL (ref 0–0.5)
EOSINOPHIL NFR BLD: 6.4 % (ref 0–8)
ERYTHROCYTE [DISTWIDTH] IN BLOOD BY AUTOMATED COUNT: 16.4 % (ref 11.5–14.5)
EST. GFR  (AFRICAN AMERICAN): >60 ML/MIN/1.73 M^2
EST. GFR  (NON AFRICAN AMERICAN): >60 ML/MIN/1.73 M^2
GLUCOSE SERPL-MCNC: 73 MG/DL (ref 70–110)
HCT VFR BLD AUTO: 32.3 % (ref 37–48.5)
HGB BLD-MCNC: 10.1 G/DL (ref 12–16)
IMM GRANULOCYTES # BLD AUTO: 0.03 K/UL (ref 0–0.04)
IMM GRANULOCYTES NFR BLD AUTO: 0.4 % (ref 0–0.5)
LYMPHOCYTES # BLD AUTO: 2.2 K/UL (ref 1–4.8)
LYMPHOCYTES NFR BLD: 29.4 % (ref 18–48)
MCH RBC QN AUTO: 29.2 PG (ref 27–31)
MCHC RBC AUTO-ENTMCNC: 31.3 G/DL (ref 32–36)
MCV RBC AUTO: 93 FL (ref 82–98)
MONOCYTES # BLD AUTO: 0.9 K/UL (ref 0.3–1)
MONOCYTES NFR BLD: 11.5 % (ref 4–15)
NEUTROPHILS # BLD AUTO: 3.9 K/UL (ref 1.8–7.7)
NEUTROPHILS NFR BLD: 51.6 % (ref 38–73)
NRBC BLD-RTO: 0 /100 WBC
PLATELET # BLD AUTO: 398 K/UL (ref 150–350)
PMV BLD AUTO: 9.5 FL (ref 9.2–12.9)
POTASSIUM SERPL-SCNC: 3.8 MMOL/L (ref 3.5–5.1)
RBC # BLD AUTO: 3.46 M/UL (ref 4–5.4)
SODIUM SERPL-SCNC: 136 MMOL/L (ref 136–145)
WBC # BLD AUTO: 7.62 K/UL (ref 3.9–12.7)

## 2021-01-04 PROCEDURE — 11000001 HC ACUTE MED/SURG PRIVATE ROOM

## 2021-01-04 PROCEDURE — 99232 SBSQ HOSP IP/OBS MODERATE 35: CPT | Mod: ,,, | Performed by: INTERNAL MEDICINE

## 2021-01-04 PROCEDURE — 94640 AIRWAY INHALATION TREATMENT: CPT

## 2021-01-04 PROCEDURE — 25000003 PHARM REV CODE 250: Performed by: INTERNAL MEDICINE

## 2021-01-04 PROCEDURE — 94761 N-INVAS EAR/PLS OXIMETRY MLT: CPT

## 2021-01-04 PROCEDURE — 25000242 PHARM REV CODE 250 ALT 637 W/ HCPCS: Performed by: INTERNAL MEDICINE

## 2021-01-04 PROCEDURE — 99232 PR SUBSEQUENT HOSPITAL CARE,LEVL II: ICD-10-PCS | Mod: S$GLB,,, | Performed by: INTERNAL MEDICINE

## 2021-01-04 PROCEDURE — 99232 PR SUBSEQUENT HOSPITAL CARE,LEVL II: ICD-10-PCS | Mod: ,,, | Performed by: INTERNAL MEDICINE

## 2021-01-04 PROCEDURE — 36415 COLL VENOUS BLD VENIPUNCTURE: CPT

## 2021-01-04 PROCEDURE — 99232 SBSQ HOSP IP/OBS MODERATE 35: CPT | Mod: S$GLB,,, | Performed by: INTERNAL MEDICINE

## 2021-01-04 PROCEDURE — 63600175 PHARM REV CODE 636 W HCPCS: Performed by: HOSPITALIST

## 2021-01-04 PROCEDURE — 85025 COMPLETE CBC W/AUTO DIFF WBC: CPT

## 2021-01-04 PROCEDURE — 25000003 PHARM REV CODE 250: Performed by: HOSPITALIST

## 2021-01-04 PROCEDURE — 63700000 PHARM REV CODE 250 ALT 637 W/O HCPCS: Performed by: HOSPITALIST

## 2021-01-04 PROCEDURE — 97802 MEDICAL NUTRITION INDIV IN: CPT

## 2021-01-04 PROCEDURE — 80048 BASIC METABOLIC PNL TOTAL CA: CPT

## 2021-01-04 PROCEDURE — 27000221 HC OXYGEN, UP TO 24 HOURS

## 2021-01-04 RX ADMIN — LACTOBACILLUS ACIDOPHILUS / LACTOBACILLUS BULGARICUS 1 EACH: 100 MILLION CFU STRENGTH GRANULES at 08:01

## 2021-01-04 RX ADMIN — OXYCODONE HYDROCHLORIDE 10 MG: 10 TABLET ORAL at 09:01

## 2021-01-04 RX ADMIN — CARVEDILOL 3.12 MG: 3.12 TABLET, FILM COATED ORAL at 09:01

## 2021-01-04 RX ADMIN — ETHAMBUTOL HYDROCHLORIDE 700 MG: 400 TABLET, FILM COATED ORAL at 09:01

## 2021-01-04 RX ADMIN — LACTOBACILLUS ACIDOPHILUS / LACTOBACILLUS BULGARICUS 1 EACH: 100 MILLION CFU STRENGTH GRANULES at 09:01

## 2021-01-04 RX ADMIN — FLUTICASONE FUROATE AND VILANTEROL TRIFENATATE 1 PUFF: 200; 25 POWDER RESPIRATORY (INHALATION) at 06:01

## 2021-01-04 RX ADMIN — IPRATROPIUM BROMIDE AND ALBUTEROL SULFATE 3 ML: .5; 2.5 SOLUTION RESPIRATORY (INHALATION) at 12:01

## 2021-01-04 RX ADMIN — CARVEDILOL 3.12 MG: 3.12 TABLET, FILM COATED ORAL at 08:01

## 2021-01-04 RX ADMIN — TIGECYCLINE 50 MG: 50 INJECTION, POWDER, LYOPHILIZED, FOR SOLUTION INTRAVENOUS at 09:01

## 2021-01-04 RX ADMIN — MEROPENEM AND SODIUM CHLORIDE 1 G: 1 INJECTION, SOLUTION INTRAVENOUS at 02:01

## 2021-01-04 RX ADMIN — OXYCODONE HYDROCHLORIDE 10 MG: 10 TABLET ORAL at 01:01

## 2021-01-04 RX ADMIN — AMIKACIN SULFATE 450 MG: 250 INJECTION INTRAMUSCULAR; INTRAVENOUS at 10:01

## 2021-01-04 RX ADMIN — MEROPENEM AND SODIUM CHLORIDE 1 G: 1 INJECTION, SOLUTION INTRAVENOUS at 05:01

## 2021-01-04 RX ADMIN — TIGECYCLINE 50 MG: 50 INJECTION, POWDER, LYOPHILIZED, FOR SOLUTION INTRAVENOUS at 08:01

## 2021-01-04 RX ADMIN — IPRATROPIUM BROMIDE AND ALBUTEROL SULFATE 3 ML: .5; 2.5 SOLUTION RESPIRATORY (INHALATION) at 06:01

## 2021-01-04 RX ADMIN — OXYCODONE HYDROCHLORIDE 10 MG: 10 TABLET ORAL at 04:01

## 2021-01-04 RX ADMIN — MEROPENEM AND SODIUM CHLORIDE 1 G: 1 INJECTION, SOLUTION INTRAVENOUS at 10:01

## 2021-01-04 RX ADMIN — OXYCODONE HYDROCHLORIDE 5 MG: 5 TABLET ORAL at 10:01

## 2021-01-04 RX ADMIN — AZITHROMYCIN MONOHYDRATE 500 MG: 250 TABLET ORAL at 09:01

## 2021-01-05 VITALS
BODY MASS INDEX: 16.56 KG/M2 | HEART RATE: 91 BPM | TEMPERATURE: 98 F | OXYGEN SATURATION: 97 % | SYSTOLIC BLOOD PRESSURE: 98 MMHG | DIASTOLIC BLOOD PRESSURE: 55 MMHG | HEIGHT: 64 IN | WEIGHT: 97 LBS | RESPIRATION RATE: 18 BRPM

## 2021-01-05 LAB
ANION GAP SERPL CALC-SCNC: 9 MMOL/L (ref 8–16)
BASOPHILS # BLD AUTO: 0.04 K/UL (ref 0–0.2)
BASOPHILS NFR BLD: 0.5 % (ref 0–1.9)
BUN SERPL-MCNC: 22 MG/DL (ref 8–23)
CALCIUM SERPL-MCNC: 8.4 MG/DL (ref 8.7–10.5)
CHLORIDE SERPL-SCNC: 96 MMOL/L (ref 95–110)
CO2 SERPL-SCNC: 28 MMOL/L (ref 23–29)
CREAT SERPL-MCNC: 0.5 MG/DL (ref 0.5–1.4)
CRP SERPL-MCNC: 62.2 MG/L (ref 0–8.2)
DIFFERENTIAL METHOD: ABNORMAL
EOSINOPHIL # BLD AUTO: 0.4 K/UL (ref 0–0.5)
EOSINOPHIL NFR BLD: 5 % (ref 0–8)
ERYTHROCYTE [DISTWIDTH] IN BLOOD BY AUTOMATED COUNT: 16 % (ref 11.5–14.5)
EST. GFR  (AFRICAN AMERICAN): >60 ML/MIN/1.73 M^2
EST. GFR  (NON AFRICAN AMERICAN): >60 ML/MIN/1.73 M^2
GLUCOSE SERPL-MCNC: 100 MG/DL (ref 70–110)
HCT VFR BLD AUTO: 33.9 % (ref 37–48.5)
HGB BLD-MCNC: 10.5 G/DL (ref 12–16)
IMM GRANULOCYTES # BLD AUTO: 0.03 K/UL (ref 0–0.04)
IMM GRANULOCYTES NFR BLD AUTO: 0.4 % (ref 0–0.5)
LYMPHOCYTES # BLD AUTO: 2.5 K/UL (ref 1–4.8)
LYMPHOCYTES NFR BLD: 30 % (ref 18–48)
MCH RBC QN AUTO: 28.7 PG (ref 27–31)
MCHC RBC AUTO-ENTMCNC: 31 G/DL (ref 32–36)
MCV RBC AUTO: 93 FL (ref 82–98)
MONOCYTES # BLD AUTO: 1 K/UL (ref 0.3–1)
MONOCYTES NFR BLD: 11.7 % (ref 4–15)
NEUTROPHILS # BLD AUTO: 4.4 K/UL (ref 1.8–7.7)
NEUTROPHILS NFR BLD: 52.4 % (ref 38–73)
NRBC BLD-RTO: 0 /100 WBC
PLATELET # BLD AUTO: 404 K/UL (ref 150–350)
PMV BLD AUTO: 9.4 FL (ref 9.2–12.9)
POTASSIUM SERPL-SCNC: 4.2 MMOL/L (ref 3.5–5.1)
RBC # BLD AUTO: 3.66 M/UL (ref 4–5.4)
SODIUM SERPL-SCNC: 133 MMOL/L (ref 136–145)
WBC # BLD AUTO: 8.37 K/UL (ref 3.9–12.7)

## 2021-01-05 PROCEDURE — 25000003 PHARM REV CODE 250: Performed by: INTERNAL MEDICINE

## 2021-01-05 PROCEDURE — 85025 COMPLETE CBC W/AUTO DIFF WBC: CPT

## 2021-01-05 PROCEDURE — 80048 BASIC METABOLIC PNL TOTAL CA: CPT

## 2021-01-05 PROCEDURE — 86140 C-REACTIVE PROTEIN: CPT

## 2021-01-05 PROCEDURE — 25000003 PHARM REV CODE 250: Performed by: HOSPITALIST

## 2021-01-05 PROCEDURE — 94761 N-INVAS EAR/PLS OXIMETRY MLT: CPT

## 2021-01-05 PROCEDURE — 25000242 PHARM REV CODE 250 ALT 637 W/ HCPCS: Performed by: INTERNAL MEDICINE

## 2021-01-05 PROCEDURE — 27000221 HC OXYGEN, UP TO 24 HOURS

## 2021-01-05 PROCEDURE — 97802 MEDICAL NUTRITION INDIV IN: CPT

## 2021-01-05 PROCEDURE — 99232 PR SUBSEQUENT HOSPITAL CARE,LEVL II: ICD-10-PCS | Mod: ,,, | Performed by: INTERNAL MEDICINE

## 2021-01-05 PROCEDURE — 94640 AIRWAY INHALATION TREATMENT: CPT

## 2021-01-05 PROCEDURE — 63600175 PHARM REV CODE 636 W HCPCS: Performed by: HOSPITALIST

## 2021-01-05 PROCEDURE — 63700000 PHARM REV CODE 250 ALT 637 W/O HCPCS: Performed by: HOSPITALIST

## 2021-01-05 PROCEDURE — 36415 COLL VENOUS BLD VENIPUNCTURE: CPT

## 2021-01-05 PROCEDURE — 99232 SBSQ HOSP IP/OBS MODERATE 35: CPT | Mod: ,,, | Performed by: INTERNAL MEDICINE

## 2021-01-05 RX ORDER — HYDROCODONE BITARTRATE AND ACETAMINOPHEN 5; 325 MG/1; MG/1
1 TABLET ORAL EVERY 6 HOURS PRN
Qty: 25 TABLET | Refills: 0 | Status: SHIPPED | OUTPATIENT
Start: 2021-01-05 | End: 2021-05-05

## 2021-01-05 RX ORDER — CARVEDILOL 3.12 MG/1
3.12 TABLET ORAL 2 TIMES DAILY
Qty: 60 TABLET | Refills: 11 | Status: SHIPPED | OUTPATIENT
Start: 2021-01-05 | End: 2022-01-01

## 2021-01-05 RX ORDER — AZITHROMYCIN 500 MG/1
500 TABLET, FILM COATED ORAL DAILY
Qty: 2 TABLET
Start: 2021-01-06 | End: 2021-01-20 | Stop reason: SDUPTHER

## 2021-01-05 RX ORDER — MEROPENEM AND SODIUM CHLORIDE 1 G/50ML
1 INJECTION, SOLUTION INTRAVENOUS EVERY 8 HOURS
Start: 2021-01-05 | End: 2021-01-20

## 2021-01-05 RX ORDER — ETHAMBUTOL HYDROCHLORIDE 100 MG/1
700 TABLET, FILM COATED ORAL DAILY
Start: 2021-01-06 | End: 2021-01-20 | Stop reason: SDUPTHER

## 2021-01-05 RX ADMIN — CARVEDILOL 3.12 MG: 3.12 TABLET, FILM COATED ORAL at 09:01

## 2021-01-05 RX ADMIN — TIGECYCLINE 50 MG: 50 INJECTION, POWDER, LYOPHILIZED, FOR SOLUTION INTRAVENOUS at 09:01

## 2021-01-05 RX ADMIN — ETHAMBUTOL HYDROCHLORIDE 700 MG: 400 TABLET, FILM COATED ORAL at 09:01

## 2021-01-05 RX ADMIN — MEROPENEM AND SODIUM CHLORIDE 1 G: 1 INJECTION, SOLUTION INTRAVENOUS at 03:01

## 2021-01-05 RX ADMIN — LACTOBACILLUS ACIDOPHILUS / LACTOBACILLUS BULGARICUS 1 EACH: 100 MILLION CFU STRENGTH GRANULES at 09:01

## 2021-01-05 RX ADMIN — IPRATROPIUM BROMIDE AND ALBUTEROL SULFATE 3 ML: .5; 2.5 SOLUTION RESPIRATORY (INHALATION) at 06:01

## 2021-01-05 RX ADMIN — FLUTICASONE FUROATE AND VILANTEROL TRIFENATATE 1 PUFF: 200; 25 POWDER RESPIRATORY (INHALATION) at 06:01

## 2021-01-05 RX ADMIN — OXYCODONE HYDROCHLORIDE 10 MG: 10 TABLET ORAL at 05:01

## 2021-01-05 RX ADMIN — AZITHROMYCIN MONOHYDRATE 500 MG: 250 TABLET ORAL at 09:01

## 2021-01-05 RX ADMIN — OXYCODONE HYDROCHLORIDE 10 MG: 10 TABLET ORAL at 01:01

## 2021-01-05 RX ADMIN — MEROPENEM AND SODIUM CHLORIDE 1 G: 1 INJECTION, SOLUTION INTRAVENOUS at 05:01

## 2021-01-05 RX ADMIN — IPRATROPIUM BROMIDE AND ALBUTEROL SULFATE 3 ML: .5; 2.5 SOLUTION RESPIRATORY (INHALATION) at 12:01

## 2021-01-05 RX ADMIN — ATORVASTATIN CALCIUM 40 MG: 40 TABLET, FILM COATED ORAL at 09:01

## 2021-01-07 DIAGNOSIS — Z12.31 OTHER SCREENING MAMMOGRAM: ICD-10-CM

## 2021-01-08 ENCOUNTER — PATIENT OUTREACH (OUTPATIENT)
Dept: ADMINISTRATIVE | Facility: CLINIC | Age: 66
End: 2021-01-08

## 2021-01-11 ENCOUNTER — TELEPHONE (OUTPATIENT)
Dept: MEDSURG UNIT | Facility: HOSPITAL | Age: 66
End: 2021-01-11

## 2021-01-12 ENCOUNTER — LAB VISIT (OUTPATIENT)
Dept: LAB | Facility: HOSPITAL | Age: 66
End: 2021-01-12
Attending: INTERNAL MEDICINE
Payer: COMMERCIAL

## 2021-01-12 DIAGNOSIS — A31.0 PULMONARY DISEASE DUE TO MYCOBACTERIA: Primary | ICD-10-CM

## 2021-01-12 LAB
ALBUMIN SERPL BCP-MCNC: 2.1 G/DL (ref 3.5–5.2)
ALP SERPL-CCNC: 101 U/L (ref 55–135)
ALT SERPL W/O P-5'-P-CCNC: 24 U/L (ref 10–44)
ANION GAP SERPL CALC-SCNC: 10 MMOL/L (ref 8–16)
AST SERPL-CCNC: 29 U/L (ref 10–40)
BILIRUB SERPL-MCNC: 0.6 MG/DL (ref 0.1–1)
BUN SERPL-MCNC: 16 MG/DL (ref 8–23)
CALCIUM SERPL-MCNC: 8.7 MG/DL (ref 8.7–10.5)
CHLORIDE SERPL-SCNC: 97 MMOL/L (ref 95–110)
CO2 SERPL-SCNC: 27 MMOL/L (ref 23–29)
CREAT SERPL-MCNC: 0.4 MG/DL (ref 0.5–1.4)
CRP SERPL-MCNC: 6.47 MG/DL
ERYTHROCYTE [DISTWIDTH] IN BLOOD BY AUTOMATED COUNT: 16.3 % (ref 11.5–14.5)
EST. GFR  (AFRICAN AMERICAN): >60 ML/MIN/1.73 M^2
EST. GFR  (NON AFRICAN AMERICAN): >60 ML/MIN/1.73 M^2
GLUCOSE SERPL-MCNC: 66 MG/DL (ref 70–110)
HCT VFR BLD AUTO: 35.6 % (ref 37–48.5)
HGB BLD-MCNC: 11 G/DL (ref 12–16)
MCH RBC QN AUTO: 28.7 PG (ref 27–31)
MCHC RBC AUTO-ENTMCNC: 30.9 G/DL (ref 32–36)
MCV RBC AUTO: 93 FL (ref 82–98)
PLATELET # BLD AUTO: 573 K/UL (ref 150–350)
PMV BLD AUTO: 9.2 FL (ref 9.2–12.9)
POTASSIUM SERPL-SCNC: 4.6 MMOL/L (ref 3.5–5.1)
PROT SERPL-MCNC: 5.9 G/DL (ref 6–8.4)
RBC # BLD AUTO: 3.83 M/UL (ref 4–5.4)
SODIUM SERPL-SCNC: 134 MMOL/L (ref 136–145)
WBC # BLD AUTO: 11.89 K/UL (ref 3.9–12.7)

## 2021-01-12 PROCEDURE — 86140 C-REACTIVE PROTEIN: CPT

## 2021-01-12 PROCEDURE — 80150 ASSAY OF AMIKACIN: CPT

## 2021-01-12 PROCEDURE — 85027 COMPLETE CBC AUTOMATED: CPT

## 2021-01-12 PROCEDURE — 80053 COMPREHEN METABOLIC PANEL: CPT

## 2021-01-12 PROCEDURE — 36415 COLL VENOUS BLD VENIPUNCTURE: CPT

## 2021-01-13 ENCOUNTER — TELEPHONE (OUTPATIENT)
Dept: INFECTIOUS DISEASES | Facility: CLINIC | Age: 66
End: 2021-01-13

## 2021-01-13 ENCOUNTER — HOSPITAL ENCOUNTER (OUTPATIENT)
Dept: RADIOLOGY | Facility: HOSPITAL | Age: 66
Discharge: HOME | End: 2021-01-13
Attending: INTERNAL MEDICINE
Payer: COMMERCIAL

## 2021-01-13 ENCOUNTER — OFFICE VISIT (OUTPATIENT)
Dept: PULMONOLOGY | Facility: CLINIC | Age: 66
End: 2021-01-13
Payer: COMMERCIAL

## 2021-01-13 VITALS
HEIGHT: 64 IN | OXYGEN SATURATION: 98 % | WEIGHT: 94.13 LBS | HEART RATE: 97 BPM | SYSTOLIC BLOOD PRESSURE: 88 MMHG | BODY MASS INDEX: 16.07 KG/M2 | DIASTOLIC BLOOD PRESSURE: 57 MMHG

## 2021-01-13 DIAGNOSIS — J93.9 PNEUMOTHORAX ON RIGHT: ICD-10-CM

## 2021-01-13 DIAGNOSIS — A31.8 INFECTION DUE TO MYCOBACTERIUM ABSCESSUS: Primary | ICD-10-CM

## 2021-01-13 DIAGNOSIS — A31.0 PULMONARY INFECTION DUE TO MYCOBACTERIUM AVIUM: ICD-10-CM

## 2021-01-13 LAB — AMIKACIN TROUGH SERPL-MCNC: <2 UG/ML (ref 0–6)

## 2021-01-13 PROCEDURE — 99214 PR OFFICE/OUTPT VISIT, EST, LEVL IV, 30-39 MIN: ICD-10-PCS | Mod: S$GLB,,, | Performed by: INTERNAL MEDICINE

## 2021-01-13 PROCEDURE — 3074F PR MOST RECENT SYSTOLIC BLOOD PRESSURE < 130 MM HG: ICD-10-PCS | Mod: CPTII,S$GLB,, | Performed by: INTERNAL MEDICINE

## 2021-01-13 PROCEDURE — 99214 OFFICE O/P EST MOD 30 MIN: CPT | Mod: S$GLB,,, | Performed by: INTERNAL MEDICINE

## 2021-01-13 PROCEDURE — 71046 XR CHEST PA AND LATERAL: ICD-10-PCS | Mod: 26,,, | Performed by: RADIOLOGY

## 2021-01-13 PROCEDURE — 71046 X-RAY EXAM CHEST 2 VIEWS: CPT | Mod: TC,FY

## 2021-01-13 PROCEDURE — 3008F BODY MASS INDEX DOCD: CPT | Mod: CPTII,S$GLB,, | Performed by: INTERNAL MEDICINE

## 2021-01-13 PROCEDURE — 3078F DIAST BP <80 MM HG: CPT | Mod: CPTII,S$GLB,, | Performed by: INTERNAL MEDICINE

## 2021-01-13 PROCEDURE — 3008F PR BODY MASS INDEX (BMI) DOCUMENTED: ICD-10-PCS | Mod: CPTII,S$GLB,, | Performed by: INTERNAL MEDICINE

## 2021-01-13 PROCEDURE — 1125F PR PAIN SEVERITY QUANTIFIED, PAIN PRESENT: ICD-10-PCS | Mod: S$GLB,,, | Performed by: INTERNAL MEDICINE

## 2021-01-13 PROCEDURE — 3078F PR MOST RECENT DIASTOLIC BLOOD PRESSURE < 80 MM HG: ICD-10-PCS | Mod: CPTII,S$GLB,, | Performed by: INTERNAL MEDICINE

## 2021-01-13 PROCEDURE — 1125F AMNT PAIN NOTED PAIN PRSNT: CPT | Mod: S$GLB,,, | Performed by: INTERNAL MEDICINE

## 2021-01-13 PROCEDURE — 99999 PR PBB SHADOW E&M-EST. PATIENT-LVL IV: CPT | Mod: PBBFAC,,, | Performed by: INTERNAL MEDICINE

## 2021-01-13 PROCEDURE — 99999 PR PBB SHADOW E&M-EST. PATIENT-LVL IV: ICD-10-PCS | Mod: PBBFAC,,, | Performed by: INTERNAL MEDICINE

## 2021-01-13 PROCEDURE — 71046 X-RAY EXAM CHEST 2 VIEWS: CPT | Mod: 26,,, | Performed by: RADIOLOGY

## 2021-01-13 PROCEDURE — 3074F SYST BP LT 130 MM HG: CPT | Mod: CPTII,S$GLB,, | Performed by: INTERNAL MEDICINE

## 2021-01-13 RX ORDER — ALTEPLASE 2.2 MG/2ML
2 INJECTION, POWDER, LYOPHILIZED, FOR SOLUTION INTRAVENOUS ONCE
Qty: 2 MG | Refills: 0 | Status: SHIPPED | OUTPATIENT
Start: 2021-01-13 | End: 2021-01-13

## 2021-01-19 ENCOUNTER — OFFICE VISIT (OUTPATIENT)
Dept: INFECTIOUS DISEASES | Facility: CLINIC | Age: 66
End: 2021-01-19
Payer: COMMERCIAL

## 2021-01-19 ENCOUNTER — PATIENT OUTREACH (OUTPATIENT)
Dept: ADMINISTRATIVE | Facility: OTHER | Age: 66
End: 2021-01-19

## 2021-01-19 VITALS
DIASTOLIC BLOOD PRESSURE: 58 MMHG | OXYGEN SATURATION: 98 % | WEIGHT: 96.19 LBS | HEIGHT: 64 IN | HEART RATE: 90 BPM | BODY MASS INDEX: 16.42 KG/M2 | SYSTOLIC BLOOD PRESSURE: 89 MMHG | TEMPERATURE: 97 F

## 2021-01-19 DIAGNOSIS — A31.0 PULMONARY INFECTION DUE TO MYCOBACTERIUM AVIUM: Primary | ICD-10-CM

## 2021-01-19 DIAGNOSIS — J47.0 BRONCHIECTASIS WITH ACUTE LOWER RESPIRATORY INFECTION: ICD-10-CM

## 2021-01-19 DIAGNOSIS — I47.20 V-TACH: ICD-10-CM

## 2021-01-19 DIAGNOSIS — A31.8 INFECTION DUE TO MYCOBACTERIUM ABSCESSUS: ICD-10-CM

## 2021-01-19 DIAGNOSIS — E43 SEVERE MALNUTRITION: ICD-10-CM

## 2021-01-19 DIAGNOSIS — J93.9 PNEUMOTHORAX ON RIGHT: ICD-10-CM

## 2021-01-19 DIAGNOSIS — D84.9 IMMUNE DEFICIENCY DISORDER: ICD-10-CM

## 2021-01-19 DIAGNOSIS — B37.31 VAGINAL YEAST INFECTION: ICD-10-CM

## 2021-01-19 DIAGNOSIS — A31.0 MYCOBACTERIUM AVIUM-INTRACELLULARE COMPLEX: ICD-10-CM

## 2021-01-19 PROCEDURE — 1125F PR PAIN SEVERITY QUANTIFIED, PAIN PRESENT: ICD-10-PCS | Mod: S$GLB,,, | Performed by: INTERNAL MEDICINE

## 2021-01-19 PROCEDURE — 3078F PR MOST RECENT DIASTOLIC BLOOD PRESSURE < 80 MM HG: ICD-10-PCS | Mod: S$GLB,,, | Performed by: INTERNAL MEDICINE

## 2021-01-19 PROCEDURE — 1111F DSCHRG MED/CURRENT MED MERGE: CPT | Mod: S$GLB,,, | Performed by: INTERNAL MEDICINE

## 2021-01-19 PROCEDURE — 1125F AMNT PAIN NOTED PAIN PRSNT: CPT | Mod: S$GLB,,, | Performed by: INTERNAL MEDICINE

## 2021-01-19 PROCEDURE — 3078F DIAST BP <80 MM HG: CPT | Mod: S$GLB,,, | Performed by: INTERNAL MEDICINE

## 2021-01-19 PROCEDURE — 3074F SYST BP LT 130 MM HG: CPT | Mod: S$GLB,,, | Performed by: INTERNAL MEDICINE

## 2021-01-19 PROCEDURE — 1111F PR DISCHARGE MEDS RECONCILED W/ CURRENT OUTPATIENT MED LIST: ICD-10-PCS | Mod: S$GLB,,, | Performed by: INTERNAL MEDICINE

## 2021-01-19 PROCEDURE — 99215 OFFICE O/P EST HI 40 MIN: CPT | Mod: S$GLB,,, | Performed by: INTERNAL MEDICINE

## 2021-01-19 PROCEDURE — 3074F PR MOST RECENT SYSTOLIC BLOOD PRESSURE < 130 MM HG: ICD-10-PCS | Mod: S$GLB,,, | Performed by: INTERNAL MEDICINE

## 2021-01-19 PROCEDURE — 99215 PR OFFICE/OUTPT VISIT, EST, LEVL V, 40-54 MIN: ICD-10-PCS | Mod: S$GLB,,, | Performed by: INTERNAL MEDICINE

## 2021-01-19 RX ORDER — FLUCONAZOLE 100 MG/1
100 TABLET ORAL DAILY
Qty: 14 TABLET | Refills: 0 | Status: SHIPPED | OUTPATIENT
Start: 2021-01-19 | End: 2021-01-20

## 2021-01-20 ENCOUNTER — OFFICE VISIT (OUTPATIENT)
Dept: INFECTIOUS DISEASES | Facility: CLINIC | Age: 66
End: 2021-01-20
Payer: COMMERCIAL

## 2021-01-20 ENCOUNTER — TELEPHONE (OUTPATIENT)
Dept: INFECTIOUS DISEASES | Facility: CLINIC | Age: 66
End: 2021-01-20

## 2021-01-20 ENCOUNTER — CLINICAL SUPPORT (OUTPATIENT)
Dept: AUDIOLOGY | Facility: CLINIC | Age: 66
End: 2021-01-20
Payer: COMMERCIAL

## 2021-01-20 ENCOUNTER — LAB VISIT (OUTPATIENT)
Dept: LAB | Facility: HOSPITAL | Age: 66
End: 2021-01-20
Attending: INTERNAL MEDICINE
Payer: COMMERCIAL

## 2021-01-20 VITALS
DIASTOLIC BLOOD PRESSURE: 71 MMHG | SYSTOLIC BLOOD PRESSURE: 104 MMHG | HEIGHT: 64 IN | TEMPERATURE: 98 F | BODY MASS INDEX: 15.85 KG/M2 | HEART RATE: 88 BPM | WEIGHT: 92.81 LBS

## 2021-01-20 DIAGNOSIS — Z22.39 MYCOBACTERIUM ABSCESSUS COLONIZATION: ICD-10-CM

## 2021-01-20 DIAGNOSIS — Z78.9 HEALTH CARE HOME, ACTIVE CARE COORDINATION: ICD-10-CM

## 2021-01-20 DIAGNOSIS — A31.0 MYCOBACTERIUM AVIUM-INTRACELLULARE COMPLEX: ICD-10-CM

## 2021-01-20 DIAGNOSIS — E43 SEVERE MALNUTRITION: ICD-10-CM

## 2021-01-20 DIAGNOSIS — H91.93 BILATERAL HEARING LOSS, UNSPECIFIED HEARING LOSS TYPE: Primary | ICD-10-CM

## 2021-01-20 DIAGNOSIS — Z79.2 RECEIVING INTRAVENOUS ANTIBIOTIC TREATMENT AT HOME: ICD-10-CM

## 2021-01-20 DIAGNOSIS — J93.9 PNEUMOTHORAX ON RIGHT: ICD-10-CM

## 2021-01-20 DIAGNOSIS — A31.0 PULMONARY INFECTION DUE TO MYCOBACTERIUM AVIUM: Primary | ICD-10-CM

## 2021-01-20 DIAGNOSIS — Z51.81 THERAPEUTIC DRUG MONITORING: ICD-10-CM

## 2021-01-20 LAB
A1AT SERPL-MCNC: 147 MG/DL (ref 100–190)
CCP AB SER IA-ACNC: <0.5 U/ML
CRP SERPL-MCNC: 60.7 MG/L (ref 0–8.2)
ERYTHROCYTE [SEDIMENTATION RATE] IN BLOOD BY WESTERGREN METHOD: 52 MM/HR (ref 0–36)
IGA SERPL-MCNC: 1057 MG/DL (ref 40–350)
IGG SERPL-MCNC: 1419 MG/DL (ref 650–1600)
IGM SERPL-MCNC: 106 MG/DL (ref 50–300)
PREALB SERPL-MCNC: 7 MG/DL (ref 20–43)
RHEUMATOID FACT SERPL-ACNC: <10 IU/ML (ref 0–15)

## 2021-01-20 PROCEDURE — 3074F PR MOST RECENT SYSTOLIC BLOOD PRESSURE < 130 MM HG: ICD-10-PCS | Mod: CPTII,S$GLB,, | Performed by: INTERNAL MEDICINE

## 2021-01-20 PROCEDURE — 3078F DIAST BP <80 MM HG: CPT | Mod: CPTII,S$GLB,, | Performed by: INTERNAL MEDICINE

## 2021-01-20 PROCEDURE — 85652 RBC SED RATE AUTOMATED: CPT

## 2021-01-20 PROCEDURE — 1126F PR PAIN SEVERITY QUANTIFIED, NO PAIN PRESENT: ICD-10-PCS | Mod: S$GLB,,, | Performed by: INTERNAL MEDICINE

## 2021-01-20 PROCEDURE — 99999 PR PBB SHADOW E&M-EST. PATIENT-LVL III: ICD-10-PCS | Mod: PBBFAC,,, | Performed by: INTERNAL MEDICINE

## 2021-01-20 PROCEDURE — 1126F AMNT PAIN NOTED NONE PRSNT: CPT | Mod: S$GLB,,, | Performed by: INTERNAL MEDICINE

## 2021-01-20 PROCEDURE — 86431 RHEUMATOID FACTOR QUANT: CPT

## 2021-01-20 PROCEDURE — 82103 ALPHA-1-ANTITRYPSIN TOTAL: CPT

## 2021-01-20 PROCEDURE — 99205 PR OFFICE/OUTPT VISIT, NEW, LEVL V, 60-74 MIN: ICD-10-PCS | Mod: S$GLB,,, | Performed by: INTERNAL MEDICINE

## 2021-01-20 PROCEDURE — 92557 COMPREHENSIVE HEARING TEST: CPT | Mod: S$GLB,,, | Performed by: AUDIOLOGIST

## 2021-01-20 PROCEDURE — 3074F SYST BP LT 130 MM HG: CPT | Mod: CPTII,S$GLB,, | Performed by: INTERNAL MEDICINE

## 2021-01-20 PROCEDURE — 86703 HIV-1/HIV-2 1 RESULT ANTBDY: CPT

## 2021-01-20 PROCEDURE — 92567 PR TYMPA2METRY: ICD-10-PCS | Mod: S$GLB,,, | Performed by: AUDIOLOGIST

## 2021-01-20 PROCEDURE — 86200 CCP ANTIBODY: CPT

## 2021-01-20 PROCEDURE — 99999 PR PBB SHADOW E&M-EST. PATIENT-LVL III: CPT | Mod: PBBFAC,,, | Performed by: INTERNAL MEDICINE

## 2021-01-20 PROCEDURE — 36415 COLL VENOUS BLD VENIPUNCTURE: CPT

## 2021-01-20 PROCEDURE — 86140 C-REACTIVE PROTEIN: CPT

## 2021-01-20 PROCEDURE — 3008F BODY MASS INDEX DOCD: CPT | Mod: CPTII,S$GLB,, | Performed by: INTERNAL MEDICINE

## 2021-01-20 PROCEDURE — 92567 TYMPANOMETRY: CPT | Mod: S$GLB,,, | Performed by: AUDIOLOGIST

## 2021-01-20 PROCEDURE — 3078F PR MOST RECENT DIASTOLIC BLOOD PRESSURE < 80 MM HG: ICD-10-PCS | Mod: CPTII,S$GLB,, | Performed by: INTERNAL MEDICINE

## 2021-01-20 PROCEDURE — 81220 CFTR GENE COM VARIANTS: CPT

## 2021-01-20 PROCEDURE — 3008F PR BODY MASS INDEX (BMI) DOCUMENTED: ICD-10-PCS | Mod: CPTII,S$GLB,, | Performed by: INTERNAL MEDICINE

## 2021-01-20 PROCEDURE — 99205 OFFICE O/P NEW HI 60 MIN: CPT | Mod: S$GLB,,, | Performed by: INTERNAL MEDICINE

## 2021-01-20 PROCEDURE — 84134 ASSAY OF PREALBUMIN: CPT

## 2021-01-20 PROCEDURE — 92587 PR EVOKED AUDITORY TEST,LIMITED: ICD-10-PCS | Mod: S$GLB,,, | Performed by: AUDIOLOGIST

## 2021-01-20 PROCEDURE — 92557 PR COMPREHENSIVE HEARING TEST: ICD-10-PCS | Mod: S$GLB,,, | Performed by: AUDIOLOGIST

## 2021-01-20 PROCEDURE — 82784 ASSAY IGA/IGD/IGG/IGM EACH: CPT | Mod: 59

## 2021-01-20 RX ORDER — RIFABUTIN 150 MG/1
300 CAPSULE ORAL DAILY
Qty: 60 CAPSULE | Refills: 5 | Status: SHIPPED | OUTPATIENT
Start: 2021-01-20 | End: 2021-03-11 | Stop reason: SDUPTHER

## 2021-01-20 RX ORDER — AZITHROMYCIN 500 MG/1
500 TABLET, FILM COATED ORAL DAILY
Qty: 30 TABLET | Refills: 5
Start: 2021-01-20 | End: 2021-02-19

## 2021-01-20 RX ORDER — ETHAMBUTOL HYDROCHLORIDE 400 MG/1
800 TABLET, FILM COATED ORAL DAILY
Qty: 60 TABLET | Refills: 5
Start: 2021-01-20 | End: 2021-03-11 | Stop reason: SDUPTHER

## 2021-01-20 RX ORDER — SODIUM CHLORIDE FOR INHALATION 3 %
4 VIAL, NEBULIZER (ML) INHALATION 2 TIMES DAILY
Qty: 500 ML | Refills: 11 | Status: SHIPPED | OUTPATIENT
Start: 2021-01-20

## 2021-01-21 ENCOUNTER — LAB VISIT (OUTPATIENT)
Dept: LAB | Facility: HOSPITAL | Age: 66
End: 2021-01-21
Attending: INTERNAL MEDICINE
Payer: COMMERCIAL

## 2021-01-21 DIAGNOSIS — A31.0 MYCOBACTERIUM AVIUM-INTRACELLULARE COMPLEX: ICD-10-CM

## 2021-01-21 DIAGNOSIS — A31.0 PULMONARY MYCOBACTERIAL INFECTION: Primary | ICD-10-CM

## 2021-01-21 DIAGNOSIS — A31.8 INFECTION DUE TO MYCOBACTERIUM ABSCESSUS: ICD-10-CM

## 2021-01-21 LAB
ALBUMIN SERPL BCP-MCNC: 1.9 G/DL (ref 3.5–5.2)
ALP SERPL-CCNC: 89 U/L (ref 55–135)
ALT SERPL W/O P-5'-P-CCNC: 27 U/L (ref 10–44)
ANION GAP SERPL CALC-SCNC: 8 MMOL/L (ref 8–16)
AST SERPL-CCNC: 25 U/L (ref 10–40)
BILIRUB SERPL-MCNC: 0.4 MG/DL (ref 0.1–1)
BNP SERPL-MCNC: 78 PG/ML (ref 0–99)
BUN SERPL-MCNC: 11 MG/DL (ref 8–23)
CALCIUM SERPL-MCNC: 8.5 MG/DL (ref 8.7–10.5)
CHLORIDE SERPL-SCNC: 103 MMOL/L (ref 95–110)
CO2 SERPL-SCNC: 28 MMOL/L (ref 23–29)
CREAT SERPL-MCNC: <0.3 MG/DL (ref 0.5–1.4)
CRP SERPL-MCNC: 4.07 MG/DL
ERYTHROCYTE [DISTWIDTH] IN BLOOD BY AUTOMATED COUNT: 16.3 % (ref 11.5–14.5)
ERYTHROCYTE [SEDIMENTATION RATE] IN BLOOD BY WESTERGREN METHOD: 54 MM/HR (ref 0–20)
EST. GFR  (AFRICAN AMERICAN): >60 ML/MIN/1.73 M^2
EST. GFR  (NON AFRICAN AMERICAN): >60 ML/MIN/1.73 M^2
GLUCOSE SERPL-MCNC: 91 MG/DL (ref 70–110)
HCT VFR BLD AUTO: 32.6 % (ref 37–48.5)
HGB BLD-MCNC: 10.5 G/DL (ref 12–16)
HIV 1+2 AB+HIV1 P24 AG SERPL QL IA: NEGATIVE
LDH SERPL L TO P-CCNC: 129 U/L (ref 110–260)
MCH RBC QN AUTO: 29.2 PG (ref 27–31)
MCHC RBC AUTO-ENTMCNC: 32.2 G/DL (ref 32–36)
MCV RBC AUTO: 91 FL (ref 82–98)
PLATELET # BLD AUTO: 432 K/UL (ref 150–350)
PMV BLD AUTO: 9.5 FL (ref 9.2–12.9)
POTASSIUM SERPL-SCNC: 3.8 MMOL/L (ref 3.5–5.1)
PROT SERPL-MCNC: 5.5 G/DL (ref 6–8.4)
RBC # BLD AUTO: 3.6 M/UL (ref 4–5.4)
SODIUM SERPL-SCNC: 139 MMOL/L (ref 136–145)
WBC # BLD AUTO: 9.57 K/UL (ref 3.9–12.7)

## 2021-01-21 PROCEDURE — 83880 ASSAY OF NATRIURETIC PEPTIDE: CPT

## 2021-01-21 PROCEDURE — 87116 MYCOBACTERIA CULTURE: CPT

## 2021-01-21 PROCEDURE — 86140 C-REACTIVE PROTEIN: CPT

## 2021-01-21 PROCEDURE — 80053 COMPREHEN METABOLIC PANEL: CPT

## 2021-01-21 PROCEDURE — 85027 COMPLETE CBC AUTOMATED: CPT

## 2021-01-21 PROCEDURE — 87206 SMEAR FLUORESCENT/ACID STAI: CPT

## 2021-01-21 PROCEDURE — 87149 DNA/RNA DIRECT PROBE: CPT

## 2021-01-21 PROCEDURE — 36415 COLL VENOUS BLD VENIPUNCTURE: CPT

## 2021-01-21 PROCEDURE — 83615 LACTATE (LD) (LDH) ENZYME: CPT

## 2021-01-21 PROCEDURE — 87118 MYCOBACTERIC IDENTIFICATION: CPT | Mod: 59

## 2021-01-21 PROCEDURE — 85651 RBC SED RATE NONAUTOMATED: CPT

## 2021-01-21 PROCEDURE — 87015 SPECIMEN INFECT AGNT CONCNTJ: CPT

## 2021-01-25 LAB — CFTR MUT ANL BLD/T: NORMAL

## 2021-01-26 LAB
ACID FAST SUSCEPTIBILITY, SLOW GROWER: ABNORMAL
SPECIMEN SOURCE AND ORGANISM NAME: ABNORMAL

## 2021-01-27 ENCOUNTER — LAB VISIT (OUTPATIENT)
Dept: LAB | Facility: HOSPITAL | Age: 66
End: 2021-01-27
Attending: NURSE PRACTITIONER
Payer: COMMERCIAL

## 2021-01-27 DIAGNOSIS — J93.9 PNEUMOTHORAX: Primary | ICD-10-CM

## 2021-01-27 DIAGNOSIS — I49.9 CARDIAC ARRHYTHMIA: ICD-10-CM

## 2021-01-27 DIAGNOSIS — R07.9 CHEST PAIN: ICD-10-CM

## 2021-01-27 LAB
ALBUMIN SERPL BCP-MCNC: 2.5 G/DL (ref 3.5–5.2)
ALP SERPL-CCNC: 65 U/L (ref 55–135)
ALT SERPL W/O P-5'-P-CCNC: 17 U/L (ref 10–44)
ANION GAP SERPL CALC-SCNC: 8 MMOL/L (ref 8–16)
AST SERPL-CCNC: 18 U/L (ref 10–40)
BASOPHILS # BLD AUTO: 0.04 K/UL (ref 0–0.2)
BASOPHILS NFR BLD: 0.4 % (ref 0–1.9)
BILIRUB SERPL-MCNC: 0.7 MG/DL (ref 0.1–1)
BUN SERPL-MCNC: 10 MG/DL (ref 8–23)
CALCIUM SERPL-MCNC: 9.3 MG/DL (ref 8.7–10.5)
CHLORIDE SERPL-SCNC: 102 MMOL/L (ref 95–110)
CO2 SERPL-SCNC: 27 MMOL/L (ref 23–29)
CREAT SERPL-MCNC: 0.5 MG/DL (ref 0.5–1.4)
CRP SERPL-MCNC: 6.27 MG/DL
DIFFERENTIAL METHOD: ABNORMAL
EOSINOPHIL # BLD AUTO: 0.4 K/UL (ref 0–0.5)
EOSINOPHIL NFR BLD: 4 % (ref 0–8)
ERYTHROCYTE [DISTWIDTH] IN BLOOD BY AUTOMATED COUNT: 16.1 % (ref 11.5–14.5)
EST. GFR  (AFRICAN AMERICAN): >60 ML/MIN/1.73 M^2
EST. GFR  (NON AFRICAN AMERICAN): >60 ML/MIN/1.73 M^2
GLUCOSE SERPL-MCNC: 79 MG/DL (ref 70–110)
HCT VFR BLD AUTO: 34.1 % (ref 37–48.5)
HGB BLD-MCNC: 10.6 G/DL (ref 12–16)
IMM GRANULOCYTES # BLD AUTO: 0.06 K/UL (ref 0–0.04)
IMM GRANULOCYTES NFR BLD AUTO: 0.6 % (ref 0–0.5)
LYMPHOCYTES # BLD AUTO: 2.2 K/UL (ref 1–4.8)
LYMPHOCYTES NFR BLD: 23 % (ref 18–48)
MCH RBC QN AUTO: 28 PG (ref 27–31)
MCHC RBC AUTO-ENTMCNC: 31.1 G/DL (ref 32–36)
MCV RBC AUTO: 90 FL (ref 82–98)
MONOCYTES # BLD AUTO: 1 K/UL (ref 0.3–1)
MONOCYTES NFR BLD: 10.7 % (ref 4–15)
NEUTROPHILS # BLD AUTO: 5.9 K/UL (ref 1.8–7.7)
NEUTROPHILS NFR BLD: 61.3 % (ref 38–73)
NRBC BLD-RTO: 0 /100 WBC
PLATELET # BLD AUTO: 547 K/UL (ref 150–350)
PMV BLD AUTO: 9.2 FL (ref 9.2–12.9)
POTASSIUM SERPL-SCNC: 3.6 MMOL/L (ref 3.5–5.1)
PROT SERPL-MCNC: 7 G/DL (ref 6–8.4)
RBC # BLD AUTO: 3.79 M/UL (ref 4–5.4)
SODIUM SERPL-SCNC: 137 MMOL/L (ref 136–145)
WBC # BLD AUTO: 9.66 K/UL (ref 3.9–12.7)

## 2021-01-27 PROCEDURE — 86140 C-REACTIVE PROTEIN: CPT

## 2021-01-27 PROCEDURE — 80053 COMPREHEN METABOLIC PANEL: CPT

## 2021-01-27 PROCEDURE — 36415 COLL VENOUS BLD VENIPUNCTURE: CPT

## 2021-01-27 PROCEDURE — 85025 COMPLETE CBC W/AUTO DIFF WBC: CPT

## 2021-01-27 PROCEDURE — 80150 ASSAY OF AMIKACIN: CPT

## 2021-01-29 ENCOUNTER — DOCUMENT SCAN (OUTPATIENT)
Dept: HOME HEALTH SERVICES | Facility: HOSPITAL | Age: 66
End: 2021-01-29
Payer: COMMERCIAL

## 2021-01-29 LAB
ACID FAST MOD KINY STN SPEC: ABNORMAL
AMIKACIN TROUGH SERPL-MCNC: <2 UG/ML (ref 0–6)
MYCOBACTERIUM SPEC QL CULT: ABNORMAL
MYCOBACTERIUM SPEC QL CULT: ABNORMAL

## 2021-01-30 ENCOUNTER — HOSPITAL ENCOUNTER (OUTPATIENT)
Dept: RADIOLOGY | Facility: HOSPITAL | Age: 66
Discharge: HOME | End: 2021-01-30
Attending: INTERNAL MEDICINE
Payer: COMMERCIAL

## 2021-01-30 DIAGNOSIS — J93.9 PNEUMOTHORAX ON RIGHT: ICD-10-CM

## 2021-01-30 PROCEDURE — 71046 X-RAY EXAM CHEST 2 VIEWS: CPT | Mod: TC,FY

## 2021-01-30 PROCEDURE — 71046 X-RAY EXAM CHEST 2 VIEWS: CPT | Mod: 26,,, | Performed by: RADIOLOGY

## 2021-01-30 PROCEDURE — 71046 XR CHEST PA AND LATERAL: ICD-10-PCS | Mod: 26,,, | Performed by: RADIOLOGY

## 2021-02-02 ENCOUNTER — LAB VISIT (OUTPATIENT)
Dept: LAB | Facility: HOSPITAL | Age: 66
End: 2021-02-02
Attending: INTERNAL MEDICINE
Payer: COMMERCIAL

## 2021-02-02 DIAGNOSIS — J93.83 CLOSED PNEUMOTHORAX: ICD-10-CM

## 2021-02-02 DIAGNOSIS — A31.0 PULMONARY DISEASE DUE TO MYCOBACTERIA: Primary | ICD-10-CM

## 2021-02-02 LAB
ALBUMIN SERPL BCP-MCNC: 2.6 G/DL (ref 3.5–5.2)
ALP SERPL-CCNC: 67 U/L (ref 55–135)
ALT SERPL W/O P-5'-P-CCNC: 12 U/L (ref 10–44)
AMIKACIN TROUGH SERPL-MCNC: <2 UG/ML (ref 0–6)
ANION GAP SERPL CALC-SCNC: 16 MMOL/L (ref 8–16)
AST SERPL-CCNC: 16 U/L (ref 10–40)
BASOPHILS # BLD AUTO: 0.04 K/UL (ref 0–0.2)
BASOPHILS NFR BLD: 0.6 % (ref 0–1.9)
BILIRUB SERPL-MCNC: 0.3 MG/DL (ref 0.1–1)
BUN SERPL-MCNC: 14 MG/DL (ref 8–23)
CALCIUM SERPL-MCNC: 9.1 MG/DL (ref 8.7–10.5)
CHLORIDE SERPL-SCNC: 100 MMOL/L (ref 95–110)
CO2 SERPL-SCNC: 19 MMOL/L (ref 23–29)
CREAT SERPL-MCNC: 0.7 MG/DL (ref 0.5–1.4)
CRP SERPL-MCNC: 7.27 MG/DL
DIFFERENTIAL METHOD: ABNORMAL
EOSINOPHIL # BLD AUTO: 0.1 K/UL (ref 0–0.5)
EOSINOPHIL NFR BLD: 1.4 % (ref 0–8)
ERYTHROCYTE [DISTWIDTH] IN BLOOD BY AUTOMATED COUNT: 15.7 % (ref 11.5–14.5)
ERYTHROCYTE [SEDIMENTATION RATE] IN BLOOD BY WESTERGREN METHOD: 16 MM/HR (ref 0–20)
EST. GFR  (AFRICAN AMERICAN): >60 ML/MIN/1.73 M^2
EST. GFR  (NON AFRICAN AMERICAN): >60 ML/MIN/1.73 M^2
GLUCOSE SERPL-MCNC: 148 MG/DL (ref 70–110)
HCT VFR BLD AUTO: 33.8 % (ref 37–48.5)
HGB BLD-MCNC: 10.6 G/DL (ref 12–16)
IMM GRANULOCYTES # BLD AUTO: 0.05 K/UL (ref 0–0.04)
IMM GRANULOCYTES NFR BLD AUTO: 0.7 % (ref 0–0.5)
LYMPHOCYTES # BLD AUTO: 1.2 K/UL (ref 1–4.8)
LYMPHOCYTES NFR BLD: 17.5 % (ref 18–48)
MCH RBC QN AUTO: 28.3 PG (ref 27–31)
MCHC RBC AUTO-ENTMCNC: 31.4 G/DL (ref 32–36)
MCV RBC AUTO: 90 FL (ref 82–98)
MONOCYTES # BLD AUTO: 0.7 K/UL (ref 0.3–1)
MONOCYTES NFR BLD: 10.4 % (ref 4–15)
NEUTROPHILS # BLD AUTO: 4.8 K/UL (ref 1.8–7.7)
NEUTROPHILS NFR BLD: 69.4 % (ref 38–73)
NRBC BLD-RTO: 0 /100 WBC
PLATELET # BLD AUTO: 480 K/UL (ref 150–350)
PMV BLD AUTO: 9.4 FL (ref 9.2–12.9)
POTASSIUM SERPL-SCNC: 3.2 MMOL/L (ref 3.5–5.1)
PROT SERPL-MCNC: 6.9 G/DL (ref 6–8.4)
RBC # BLD AUTO: 3.75 M/UL (ref 4–5.4)
SODIUM SERPL-SCNC: 135 MMOL/L (ref 136–145)
WBC # BLD AUTO: 6.92 K/UL (ref 3.9–12.7)

## 2021-02-02 PROCEDURE — 85025 COMPLETE CBC W/AUTO DIFF WBC: CPT

## 2021-02-02 PROCEDURE — 80150 ASSAY OF AMIKACIN: CPT

## 2021-02-02 PROCEDURE — 80053 COMPREHEN METABOLIC PANEL: CPT

## 2021-02-02 PROCEDURE — 86140 C-REACTIVE PROTEIN: CPT

## 2021-02-02 PROCEDURE — 36415 COLL VENOUS BLD VENIPUNCTURE: CPT

## 2021-02-02 PROCEDURE — 85651 RBC SED RATE NONAUTOMATED: CPT

## 2021-02-08 ENCOUNTER — EXTERNAL HOME HEALTH (OUTPATIENT)
Dept: HOME HEALTH SERVICES | Facility: HOSPITAL | Age: 66
End: 2021-02-08
Payer: COMMERCIAL

## 2021-02-08 PROCEDURE — G0179 MD RECERTIFICATION HHA PT: HCPCS | Mod: ,,, | Performed by: FAMILY MEDICINE

## 2021-02-08 PROCEDURE — G0179 PR HOME HEALTH MD RECERTIFICATION: ICD-10-PCS | Mod: ,,, | Performed by: FAMILY MEDICINE

## 2021-02-09 ENCOUNTER — LAB VISIT (OUTPATIENT)
Dept: LAB | Facility: HOSPITAL | Age: 66
End: 2021-02-09
Attending: INTERNAL MEDICINE
Payer: COMMERCIAL

## 2021-02-09 DIAGNOSIS — A31.0 PULMONARY MYCOBACTERIAL INFECTION: Primary | ICD-10-CM

## 2021-02-09 LAB
ALBUMIN SERPL BCP-MCNC: 2.8 G/DL (ref 3.5–5.2)
ALP SERPL-CCNC: 65 U/L (ref 55–135)
ALT SERPL W/O P-5'-P-CCNC: 11 U/L (ref 10–44)
ANION GAP SERPL CALC-SCNC: 9 MMOL/L (ref 8–16)
AST SERPL-CCNC: 21 U/L (ref 10–40)
BILIRUB SERPL-MCNC: 0.5 MG/DL (ref 0.1–1)
BUN SERPL-MCNC: 12 MG/DL (ref 8–23)
CALCIUM SERPL-MCNC: 9.1 MG/DL (ref 8.7–10.5)
CHLORIDE SERPL-SCNC: 101 MMOL/L (ref 95–110)
CO2 SERPL-SCNC: 25 MMOL/L (ref 23–29)
CREAT SERPL-MCNC: 0.6 MG/DL (ref 0.5–1.4)
CRP SERPL-MCNC: 3.24 MG/DL
ERYTHROCYTE [DISTWIDTH] IN BLOOD BY AUTOMATED COUNT: 15.3 % (ref 11.5–14.5)
ERYTHROCYTE [SEDIMENTATION RATE] IN BLOOD BY WESTERGREN METHOD: 48 MM/HR (ref 0–20)
EST. GFR  (AFRICAN AMERICAN): >60 ML/MIN/1.73 M^2
EST. GFR  (NON AFRICAN AMERICAN): >60 ML/MIN/1.73 M^2
GLUCOSE SERPL-MCNC: 86 MG/DL (ref 70–110)
HCT VFR BLD AUTO: 35.9 % (ref 37–48.5)
HGB BLD-MCNC: 11.4 G/DL (ref 12–16)
MCH RBC QN AUTO: 28.1 PG (ref 27–31)
MCHC RBC AUTO-ENTMCNC: 31.8 G/DL (ref 32–36)
MCV RBC AUTO: 89 FL (ref 82–98)
PLATELET # BLD AUTO: 338 K/UL (ref 150–350)
PMV BLD AUTO: 9.2 FL (ref 9.2–12.9)
POTASSIUM SERPL-SCNC: 3.7 MMOL/L (ref 3.5–5.1)
PROT SERPL-MCNC: 7.2 G/DL (ref 6–8.4)
RBC # BLD AUTO: 4.05 M/UL (ref 4–5.4)
SODIUM SERPL-SCNC: 135 MMOL/L (ref 136–145)
WBC # BLD AUTO: 4.45 K/UL (ref 3.9–12.7)

## 2021-02-09 PROCEDURE — 85651 RBC SED RATE NONAUTOMATED: CPT

## 2021-02-09 PROCEDURE — 80053 COMPREHEN METABOLIC PANEL: CPT

## 2021-02-09 PROCEDURE — 80150 ASSAY OF AMIKACIN: CPT

## 2021-02-09 PROCEDURE — 86140 C-REACTIVE PROTEIN: CPT

## 2021-02-09 PROCEDURE — 85027 COMPLETE CBC AUTOMATED: CPT

## 2021-02-09 PROCEDURE — 36415 COLL VENOUS BLD VENIPUNCTURE: CPT

## 2021-02-10 LAB — AMIKACIN TROUGH SERPL-MCNC: <2 UG/ML (ref 0–6)

## 2021-02-11 ENCOUNTER — OFFICE VISIT (OUTPATIENT)
Dept: INFECTIOUS DISEASES | Facility: CLINIC | Age: 66
End: 2021-02-11
Payer: COMMERCIAL

## 2021-02-11 VITALS
TEMPERATURE: 98 F | HEIGHT: 64 IN | OXYGEN SATURATION: 98 % | WEIGHT: 92 LBS | HEART RATE: 89 BPM | SYSTOLIC BLOOD PRESSURE: 99 MMHG | DIASTOLIC BLOOD PRESSURE: 80 MMHG | BODY MASS INDEX: 15.71 KG/M2

## 2021-02-11 DIAGNOSIS — D84.9 IMMUNE DEFICIENCY DISORDER: ICD-10-CM

## 2021-02-11 DIAGNOSIS — A31.8 INFECTION DUE TO MYCOBACTERIUM ABSCESSUS: ICD-10-CM

## 2021-02-11 DIAGNOSIS — A31.0 MYCOBACTERIUM AVIUM-INTRACELLULARE COMPLEX: Primary | ICD-10-CM

## 2021-02-11 DIAGNOSIS — Z23 HIGH PRIORITY FOR COVID-19 VIRUS VACCINATION: ICD-10-CM

## 2021-02-11 DIAGNOSIS — J47.0 BRONCHIECTASIS WITH ACUTE LOWER RESPIRATORY INFECTION: ICD-10-CM

## 2021-02-11 DIAGNOSIS — E43 SEVERE MALNUTRITION: ICD-10-CM

## 2021-02-11 DIAGNOSIS — J93.9 PNEUMOTHORAX ON RIGHT: ICD-10-CM

## 2021-02-11 DIAGNOSIS — B37.31 VAGINAL YEAST INFECTION: ICD-10-CM

## 2021-02-11 PROCEDURE — 99215 OFFICE O/P EST HI 40 MIN: CPT | Mod: S$GLB,,, | Performed by: INTERNAL MEDICINE

## 2021-02-11 PROCEDURE — 99215 PR OFFICE/OUTPT VISIT, EST, LEVL V, 40-54 MIN: ICD-10-PCS | Mod: S$GLB,,, | Performed by: INTERNAL MEDICINE

## 2021-02-11 PROCEDURE — 3079F DIAST BP 80-89 MM HG: CPT | Mod: S$GLB,,, | Performed by: INTERNAL MEDICINE

## 2021-02-11 PROCEDURE — 3074F PR MOST RECENT SYSTOLIC BLOOD PRESSURE < 130 MM HG: ICD-10-PCS | Mod: S$GLB,,, | Performed by: INTERNAL MEDICINE

## 2021-02-11 PROCEDURE — 3079F PR MOST RECENT DIASTOLIC BLOOD PRESSURE 80-89 MM HG: ICD-10-PCS | Mod: S$GLB,,, | Performed by: INTERNAL MEDICINE

## 2021-02-11 PROCEDURE — 3074F SYST BP LT 130 MM HG: CPT | Mod: S$GLB,,, | Performed by: INTERNAL MEDICINE

## 2021-02-12 ENCOUNTER — LAB VISIT (OUTPATIENT)
Dept: LAB | Facility: HOSPITAL | Age: 66
End: 2021-02-12
Attending: INTERNAL MEDICINE
Payer: COMMERCIAL

## 2021-02-12 DIAGNOSIS — A31.0 MYCOBACTERIUM AVIUM-INTRACELLULARE COMPLEX: ICD-10-CM

## 2021-02-12 DIAGNOSIS — J44.9 COPD, SEVERE: ICD-10-CM

## 2021-02-12 PROCEDURE — 87206 SMEAR FLUORESCENT/ACID STAI: CPT

## 2021-02-12 PROCEDURE — 87116 MYCOBACTERIA CULTURE: CPT

## 2021-02-12 PROCEDURE — 87118 MYCOBACTERIC IDENTIFICATION: CPT | Performed by: INTERNAL MEDICINE

## 2021-02-12 PROCEDURE — 87149 DNA/RNA DIRECT PROBE: CPT | Performed by: INTERNAL MEDICINE

## 2021-02-12 PROCEDURE — 87015 SPECIMEN INFECT AGNT CONCNTJ: CPT

## 2021-02-15 ENCOUNTER — HOSPITAL ENCOUNTER (OUTPATIENT)
Dept: RADIOLOGY | Facility: HOSPITAL | Age: 66
Discharge: HOME | End: 2021-02-15
Attending: INTERNAL MEDICINE
Payer: COMMERCIAL

## 2021-02-15 ENCOUNTER — OFFICE VISIT (OUTPATIENT)
Dept: PULMONOLOGY | Facility: CLINIC | Age: 66
End: 2021-02-15
Payer: COMMERCIAL

## 2021-02-15 VITALS — SYSTOLIC BLOOD PRESSURE: 93 MMHG | DIASTOLIC BLOOD PRESSURE: 57 MMHG | OXYGEN SATURATION: 99 % | HEART RATE: 92 BPM

## 2021-02-15 DIAGNOSIS — D84.9 IMMUNE DEFICIENCY DISORDER: ICD-10-CM

## 2021-02-15 DIAGNOSIS — J93.9 PNEUMOTHORAX ON RIGHT: ICD-10-CM

## 2021-02-15 DIAGNOSIS — J47.0 BRONCHIECTASIS WITH ACUTE LOWER RESPIRATORY INFECTION: ICD-10-CM

## 2021-02-15 DIAGNOSIS — A31.0 PULMONARY INFECTION DUE TO MYCOBACTERIUM AVIUM: Primary | ICD-10-CM

## 2021-02-15 DIAGNOSIS — J44.0 CHRONIC OBSTRUCTIVE PULMONARY DISEASE WITH ACUTE LOWER RESPIRATORY INFECTION: ICD-10-CM

## 2021-02-15 PROCEDURE — 3078F DIAST BP <80 MM HG: CPT | Mod: CPTII,S$GLB,, | Performed by: INTERNAL MEDICINE

## 2021-02-15 PROCEDURE — 71046 X-RAY EXAM CHEST 2 VIEWS: CPT | Mod: TC,FY

## 2021-02-15 PROCEDURE — 3078F PR MOST RECENT DIASTOLIC BLOOD PRESSURE < 80 MM HG: ICD-10-PCS | Mod: CPTII,S$GLB,, | Performed by: INTERNAL MEDICINE

## 2021-02-15 PROCEDURE — 1126F PR PAIN SEVERITY QUANTIFIED, NO PAIN PRESENT: ICD-10-PCS | Mod: S$GLB,,, | Performed by: INTERNAL MEDICINE

## 2021-02-15 PROCEDURE — 71046 XR CHEST PA AND LATERAL: ICD-10-PCS | Mod: 26,,, | Performed by: RADIOLOGY

## 2021-02-15 PROCEDURE — 3074F PR MOST RECENT SYSTOLIC BLOOD PRESSURE < 130 MM HG: ICD-10-PCS | Mod: CPTII,S$GLB,, | Performed by: INTERNAL MEDICINE

## 2021-02-15 PROCEDURE — 99999 PR PBB SHADOW E&M-EST. PATIENT-LVL III: CPT | Mod: PBBFAC,,, | Performed by: INTERNAL MEDICINE

## 2021-02-15 PROCEDURE — 99214 OFFICE O/P EST MOD 30 MIN: CPT | Mod: S$GLB,,, | Performed by: INTERNAL MEDICINE

## 2021-02-15 PROCEDURE — 99999 PR PBB SHADOW E&M-EST. PATIENT-LVL III: ICD-10-PCS | Mod: PBBFAC,,, | Performed by: INTERNAL MEDICINE

## 2021-02-15 PROCEDURE — 99214 PR OFFICE/OUTPT VISIT, EST, LEVL IV, 30-39 MIN: ICD-10-PCS | Mod: S$GLB,,, | Performed by: INTERNAL MEDICINE

## 2021-02-15 PROCEDURE — 3074F SYST BP LT 130 MM HG: CPT | Mod: CPTII,S$GLB,, | Performed by: INTERNAL MEDICINE

## 2021-02-15 PROCEDURE — 1126F AMNT PAIN NOTED NONE PRSNT: CPT | Mod: S$GLB,,, | Performed by: INTERNAL MEDICINE

## 2021-02-15 PROCEDURE — 71046 X-RAY EXAM CHEST 2 VIEWS: CPT | Mod: 26,,, | Performed by: RADIOLOGY

## 2021-02-18 ENCOUNTER — LAB VISIT (OUTPATIENT)
Dept: LAB | Facility: HOSPITAL | Age: 66
End: 2021-02-18
Attending: INTERNAL MEDICINE
Payer: COMMERCIAL

## 2021-02-18 DIAGNOSIS — A31.0 PULMONARY MYCOBACTERIAL INFECTION: Primary | ICD-10-CM

## 2021-02-18 LAB
ALBUMIN SERPL BCP-MCNC: 3 G/DL (ref 3.5–5.2)
ALP SERPL-CCNC: 70 U/L (ref 55–135)
ALT SERPL W/O P-5'-P-CCNC: 29 U/L (ref 10–44)
AMIKACIN PEAK SERPL-MCNC: <2 UG/ML (ref 10–60)
ANION GAP SERPL CALC-SCNC: 12 MMOL/L (ref 8–16)
AST SERPL-CCNC: 38 U/L (ref 10–40)
BILIRUB SERPL-MCNC: 0.5 MG/DL (ref 0.1–1)
BUN SERPL-MCNC: 11 MG/DL (ref 8–23)
CALCIUM SERPL-MCNC: 9.1 MG/DL (ref 8.7–10.5)
CHLORIDE SERPL-SCNC: 101 MMOL/L (ref 95–110)
CO2 SERPL-SCNC: 21 MMOL/L (ref 23–29)
CREAT SERPL-MCNC: 0.5 MG/DL (ref 0.5–1.4)
CRP SERPL-MCNC: 1.74 MG/DL
ERYTHROCYTE [DISTWIDTH] IN BLOOD BY AUTOMATED COUNT: 15.4 % (ref 11.5–14.5)
ERYTHROCYTE [SEDIMENTATION RATE] IN BLOOD BY WESTERGREN METHOD: 45 MM/HR (ref 0–20)
EST. GFR  (AFRICAN AMERICAN): >60 ML/MIN/1.73 M^2
EST. GFR  (NON AFRICAN AMERICAN): >60 ML/MIN/1.73 M^2
GLUCOSE SERPL-MCNC: 95 MG/DL (ref 70–110)
HCT VFR BLD AUTO: 36.1 % (ref 37–48.5)
HGB BLD-MCNC: 11.4 G/DL (ref 12–16)
MCH RBC QN AUTO: 27.8 PG (ref 27–31)
MCHC RBC AUTO-ENTMCNC: 31.6 G/DL (ref 32–36)
MCV RBC AUTO: 88 FL (ref 82–98)
PLATELET # BLD AUTO: 298 K/UL (ref 150–350)
PMV BLD AUTO: 9.7 FL (ref 9.2–12.9)
POTASSIUM SERPL-SCNC: 4 MMOL/L (ref 3.5–5.1)
PROT SERPL-MCNC: 6.8 G/DL (ref 6–8.4)
RBC # BLD AUTO: 4.1 M/UL (ref 4–5.4)
SODIUM SERPL-SCNC: 134 MMOL/L (ref 136–145)
WBC # BLD AUTO: 4.34 K/UL (ref 3.9–12.7)

## 2021-02-18 PROCEDURE — 86140 C-REACTIVE PROTEIN: CPT

## 2021-02-18 PROCEDURE — 80150 ASSAY OF AMIKACIN: CPT

## 2021-02-18 PROCEDURE — 80053 COMPREHEN METABOLIC PANEL: CPT

## 2021-02-18 PROCEDURE — 85027 COMPLETE CBC AUTOMATED: CPT

## 2021-02-18 PROCEDURE — 36415 COLL VENOUS BLD VENIPUNCTURE: CPT

## 2021-02-18 PROCEDURE — 85651 RBC SED RATE NONAUTOMATED: CPT

## 2021-02-19 PROBLEM — Z22.39 MYCOBACTERIUM ABSCESSUS COLONIZATION: Status: ACTIVE | Noted: 2021-02-19

## 2021-02-19 PROBLEM — A31.8 INFECTION DUE TO MYCOBACTERIUM ABSCESSUS: Status: RESOLVED | Noted: 2020-12-21 | Resolved: 2021-02-19

## 2021-02-23 ENCOUNTER — CLINICAL SUPPORT (OUTPATIENT)
Dept: AUDIOLOGY | Facility: CLINIC | Age: 66
End: 2021-02-23
Payer: COMMERCIAL

## 2021-02-23 DIAGNOSIS — H91.93 BILATERAL HEARING LOSS, UNSPECIFIED HEARING LOSS TYPE: Primary | ICD-10-CM

## 2021-02-23 PROCEDURE — 92552 PURE TONE AUDIOMETRY AIR: CPT | Mod: S$GLB,,, | Performed by: AUDIOLOGIST

## 2021-02-23 PROCEDURE — 92552 PR PURE TONE AUDIOMETRY, AIR: ICD-10-PCS | Mod: S$GLB,,, | Performed by: AUDIOLOGIST

## 2021-02-24 ENCOUNTER — LAB VISIT (OUTPATIENT)
Dept: LAB | Facility: HOSPITAL | Age: 66
End: 2021-02-24
Attending: INTERNAL MEDICINE
Payer: COMMERCIAL

## 2021-02-24 DIAGNOSIS — A31.0 PULMONARY MYCOBACTERIAL INFECTION: Primary | ICD-10-CM

## 2021-02-24 LAB
ALBUMIN SERPL BCP-MCNC: 2.7 G/DL (ref 3.5–5.2)
ALP SERPL-CCNC: 66 U/L (ref 55–135)
ALT SERPL W/O P-5'-P-CCNC: 11 U/L (ref 10–44)
AMIKACIN TROUGH SERPL-MCNC: <2 UG/ML (ref 0–6)
ANION GAP SERPL CALC-SCNC: 9 MMOL/L (ref 8–16)
AST SERPL-CCNC: 13 U/L (ref 10–40)
BASOPHILS # BLD AUTO: 0.01 K/UL (ref 0–0.2)
BASOPHILS NFR BLD: 0.2 % (ref 0–1.9)
BILIRUB SERPL-MCNC: 0.5 MG/DL (ref 0.1–1)
BUN SERPL-MCNC: 9 MG/DL (ref 8–23)
CALCIUM SERPL-MCNC: 9.3 MG/DL (ref 8.7–10.5)
CHLORIDE SERPL-SCNC: 102 MMOL/L (ref 95–110)
CO2 SERPL-SCNC: 23 MMOL/L (ref 23–29)
CREAT SERPL-MCNC: 0.5 MG/DL (ref 0.5–1.4)
CRP SERPL-MCNC: 17.82 MG/DL
DIFFERENTIAL METHOD: ABNORMAL
EOSINOPHIL # BLD AUTO: 0.1 K/UL (ref 0–0.5)
EOSINOPHIL NFR BLD: 0.8 % (ref 0–8)
ERYTHROCYTE [DISTWIDTH] IN BLOOD BY AUTOMATED COUNT: 15 % (ref 11.5–14.5)
ERYTHROCYTE [SEDIMENTATION RATE] IN BLOOD BY WESTERGREN METHOD: 75 MM/HR (ref 0–20)
EST. GFR  (AFRICAN AMERICAN): >60 ML/MIN/1.73 M^2
EST. GFR  (NON AFRICAN AMERICAN): >60 ML/MIN/1.73 M^2
GLUCOSE SERPL-MCNC: 91 MG/DL (ref 70–110)
HCT VFR BLD AUTO: 32.8 % (ref 37–48.5)
HGB BLD-MCNC: 10.4 G/DL (ref 12–16)
IMM GRANULOCYTES # BLD AUTO: 0.04 K/UL (ref 0–0.04)
IMM GRANULOCYTES NFR BLD AUTO: 0.7 % (ref 0–0.5)
LYMPHOCYTES # BLD AUTO: 1.9 K/UL (ref 1–4.8)
LYMPHOCYTES NFR BLD: 31.3 % (ref 18–48)
MCH RBC QN AUTO: 27.7 PG (ref 27–31)
MCHC RBC AUTO-ENTMCNC: 31.7 G/DL (ref 32–36)
MCV RBC AUTO: 87 FL (ref 82–98)
MONOCYTES # BLD AUTO: 0.7 K/UL (ref 0.3–1)
MONOCYTES NFR BLD: 12.4 % (ref 4–15)
NEUTROPHILS # BLD AUTO: 3.3 K/UL (ref 1.8–7.7)
NEUTROPHILS NFR BLD: 54.6 % (ref 38–73)
NRBC BLD-RTO: 0 /100 WBC
PLATELET # BLD AUTO: 389 K/UL (ref 150–350)
PMV BLD AUTO: 9.1 FL (ref 9.2–12.9)
POTASSIUM SERPL-SCNC: 3.9 MMOL/L (ref 3.5–5.1)
PROT SERPL-MCNC: 7.2 G/DL (ref 6–8.4)
RBC # BLD AUTO: 3.76 M/UL (ref 4–5.4)
SODIUM SERPL-SCNC: 134 MMOL/L (ref 136–145)
WBC # BLD AUTO: 5.97 K/UL (ref 3.9–12.7)

## 2021-02-24 PROCEDURE — 85025 COMPLETE CBC W/AUTO DIFF WBC: CPT

## 2021-02-24 PROCEDURE — 80053 COMPREHEN METABOLIC PANEL: CPT

## 2021-02-24 PROCEDURE — 80150 ASSAY OF AMIKACIN: CPT

## 2021-02-24 PROCEDURE — 86140 C-REACTIVE PROTEIN: CPT

## 2021-02-24 PROCEDURE — 85651 RBC SED RATE NONAUTOMATED: CPT

## 2021-02-24 PROCEDURE — 36415 COLL VENOUS BLD VENIPUNCTURE: CPT

## 2021-03-02 ENCOUNTER — LAB VISIT (OUTPATIENT)
Dept: LAB | Facility: HOSPITAL | Age: 66
End: 2021-03-02
Attending: INTERNAL MEDICINE
Payer: COMMERCIAL

## 2021-03-02 DIAGNOSIS — A31.0 PULMONARY MYCOBACTERIAL INFECTION: Primary | ICD-10-CM

## 2021-03-02 LAB
ALBUMIN SERPL BCP-MCNC: 2.6 G/DL (ref 3.5–5.2)
ALP SERPL-CCNC: 57 U/L (ref 55–135)
ALT SERPL W/O P-5'-P-CCNC: 11 U/L (ref 10–44)
ANION GAP SERPL CALC-SCNC: 9 MMOL/L (ref 8–16)
AST SERPL-CCNC: 17 U/L (ref 10–40)
BILIRUB SERPL-MCNC: 0.6 MG/DL (ref 0.1–1)
BUN SERPL-MCNC: 13 MG/DL (ref 8–23)
CALCIUM SERPL-MCNC: 8.6 MG/DL (ref 8.7–10.5)
CHLORIDE SERPL-SCNC: 103 MMOL/L (ref 95–110)
CO2 SERPL-SCNC: 23 MMOL/L (ref 23–29)
CREAT SERPL-MCNC: 0.5 MG/DL (ref 0.5–1.4)
CRP SERPL-MCNC: 11.46 MG/DL
ERYTHROCYTE [DISTWIDTH] IN BLOOD BY AUTOMATED COUNT: 14.6 % (ref 11.5–14.5)
ERYTHROCYTE [SEDIMENTATION RATE] IN BLOOD BY WESTERGREN METHOD: 55 MM/HR (ref 0–20)
EST. GFR  (AFRICAN AMERICAN): >60 ML/MIN/1.73 M^2
EST. GFR  (NON AFRICAN AMERICAN): >60 ML/MIN/1.73 M^2
GLUCOSE SERPL-MCNC: 82 MG/DL (ref 70–110)
HCT VFR BLD AUTO: 32.3 % (ref 37–48.5)
HGB BLD-MCNC: 10.2 G/DL (ref 12–16)
MCH RBC QN AUTO: 27.3 PG (ref 27–31)
MCHC RBC AUTO-ENTMCNC: 31.6 G/DL (ref 32–36)
MCV RBC AUTO: 87 FL (ref 82–98)
PLATELET # BLD AUTO: 407 K/UL (ref 150–350)
PMV BLD AUTO: 9 FL (ref 9.2–12.9)
POTASSIUM SERPL-SCNC: 3.7 MMOL/L (ref 3.5–5.1)
PROT SERPL-MCNC: 7.2 G/DL (ref 6–8.4)
RBC # BLD AUTO: 3.73 M/UL (ref 4–5.4)
SODIUM SERPL-SCNC: 135 MMOL/L (ref 136–145)
VANCOMYCIN TROUGH SERPL-MCNC: <3.5 UG/ML (ref 10–22)
WBC # BLD AUTO: 6.83 K/UL (ref 3.9–12.7)

## 2021-03-02 PROCEDURE — 85027 COMPLETE CBC AUTOMATED: CPT

## 2021-03-02 PROCEDURE — 86140 C-REACTIVE PROTEIN: CPT

## 2021-03-02 PROCEDURE — 80202 ASSAY OF VANCOMYCIN: CPT

## 2021-03-02 PROCEDURE — 80053 COMPREHEN METABOLIC PANEL: CPT

## 2021-03-02 PROCEDURE — 85651 RBC SED RATE NONAUTOMATED: CPT

## 2021-03-03 ENCOUNTER — TELEPHONE (OUTPATIENT)
Dept: INFECTIOUS DISEASES | Facility: CLINIC | Age: 66
End: 2021-03-03

## 2021-03-03 DIAGNOSIS — A31.0 MYCOBACTERIUM AVIUM-INTRACELLULARE COMPLEX: Primary | ICD-10-CM

## 2021-03-03 RX ORDER — AZITHROMYCIN 500 MG/1
500 TABLET, FILM COATED ORAL DAILY
Qty: 90 TABLET | Refills: 1 | Status: SHIPPED | OUTPATIENT
Start: 2021-03-03 | End: 2021-03-11 | Stop reason: SDUPTHER

## 2021-03-11 ENCOUNTER — OFFICE VISIT (OUTPATIENT)
Dept: INFECTIOUS DISEASES | Facility: CLINIC | Age: 66
End: 2021-03-11
Payer: COMMERCIAL

## 2021-03-11 VITALS
WEIGHT: 94.81 LBS | DIASTOLIC BLOOD PRESSURE: 48 MMHG | HEART RATE: 70 BPM | OXYGEN SATURATION: 98 % | SYSTOLIC BLOOD PRESSURE: 93 MMHG | BODY MASS INDEX: 16.19 KG/M2 | HEIGHT: 64 IN | TEMPERATURE: 97 F

## 2021-03-11 DIAGNOSIS — D84.9 IMMUNE DEFICIENCY DISORDER: ICD-10-CM

## 2021-03-11 DIAGNOSIS — A31.8 INFECTION DUE TO MYCOBACTERIUM ABSCESSUS: ICD-10-CM

## 2021-03-11 DIAGNOSIS — I47.20 V-TACH: ICD-10-CM

## 2021-03-11 DIAGNOSIS — E43 SEVERE MALNUTRITION: ICD-10-CM

## 2021-03-11 DIAGNOSIS — Z22.39 MYCOBACTERIUM ABSCESSUS COLONIZATION: ICD-10-CM

## 2021-03-11 DIAGNOSIS — Z51.81 THERAPEUTIC DRUG MONITORING: ICD-10-CM

## 2021-03-11 DIAGNOSIS — J47.0 BRONCHIECTASIS WITH ACUTE LOWER RESPIRATORY INFECTION: ICD-10-CM

## 2021-03-11 DIAGNOSIS — A31.0 MYCOBACTERIUM AVIUM-INTRACELLULARE COMPLEX: Primary | ICD-10-CM

## 2021-03-11 DIAGNOSIS — A31.9 MYCOBACTERIAL INFECTION, UNSPECIFIED: ICD-10-CM

## 2021-03-11 DIAGNOSIS — J93.9 PNEUMOTHORAX ON RIGHT: ICD-10-CM

## 2021-03-11 DIAGNOSIS — B37.31 VAGINAL YEAST INFECTION: ICD-10-CM

## 2021-03-11 PROCEDURE — 1126F AMNT PAIN NOTED NONE PRSNT: CPT | Mod: S$GLB,,, | Performed by: INTERNAL MEDICINE

## 2021-03-11 PROCEDURE — 3074F SYST BP LT 130 MM HG: CPT | Mod: S$GLB,,, | Performed by: INTERNAL MEDICINE

## 2021-03-11 PROCEDURE — 99215 PR OFFICE/OUTPT VISIT, EST, LEVL V, 40-54 MIN: ICD-10-PCS | Mod: S$GLB,,, | Performed by: INTERNAL MEDICINE

## 2021-03-11 PROCEDURE — 3074F PR MOST RECENT SYSTOLIC BLOOD PRESSURE < 130 MM HG: ICD-10-PCS | Mod: S$GLB,,, | Performed by: INTERNAL MEDICINE

## 2021-03-11 PROCEDURE — 1126F PR PAIN SEVERITY QUANTIFIED, NO PAIN PRESENT: ICD-10-PCS | Mod: S$GLB,,, | Performed by: INTERNAL MEDICINE

## 2021-03-11 PROCEDURE — 3078F DIAST BP <80 MM HG: CPT | Mod: S$GLB,,, | Performed by: INTERNAL MEDICINE

## 2021-03-11 PROCEDURE — 3078F PR MOST RECENT DIASTOLIC BLOOD PRESSURE < 80 MM HG: ICD-10-PCS | Mod: S$GLB,,, | Performed by: INTERNAL MEDICINE

## 2021-03-11 PROCEDURE — 99215 OFFICE O/P EST HI 40 MIN: CPT | Mod: S$GLB,,, | Performed by: INTERNAL MEDICINE

## 2021-03-11 RX ORDER — RIFAMPIN 150 MG/1
CAPSULE ORAL
COMMUNITY
Start: 2020-12-16 | End: 2021-03-11

## 2021-03-11 RX ORDER — ETHAMBUTOL HYDROCHLORIDE 400 MG/1
800 TABLET, FILM COATED ORAL DAILY
Qty: 60 TABLET | Refills: 5
Start: 2021-03-11 | End: 2021-08-03

## 2021-03-11 RX ORDER — AZITHROMYCIN 500 MG/1
500 TABLET, FILM COATED ORAL DAILY
Qty: 90 TABLET | Refills: 3 | Status: SHIPPED | OUTPATIENT
Start: 2021-03-11 | End: 2021-05-05 | Stop reason: SDUPTHER

## 2021-03-11 RX ORDER — ISONIAZID 300 MG/1
TABLET ORAL
COMMUNITY
Start: 2020-12-16 | End: 2021-03-11

## 2021-03-11 RX ORDER — RIFABUTIN 150 MG/1
300 CAPSULE ORAL DAILY
Qty: 60 CAPSULE | Refills: 5 | Status: SHIPPED | OUTPATIENT
Start: 2021-03-11 | End: 2021-05-05 | Stop reason: SDUPTHER

## 2021-03-12 ENCOUNTER — LAB VISIT (OUTPATIENT)
Dept: LAB | Facility: HOSPITAL | Age: 66
End: 2021-03-12
Attending: FAMILY MEDICINE
Payer: COMMERCIAL

## 2021-03-12 DIAGNOSIS — A31.0 PULMONARY DISEASE DUE TO MYCOBACTERIA: Primary | ICD-10-CM

## 2021-03-12 DIAGNOSIS — J93.83 CLOSED PNEUMOTHORAX: ICD-10-CM

## 2021-03-12 LAB
ALBUMIN SERPL BCP-MCNC: 3.2 G/DL (ref 3.5–5.2)
ALP SERPL-CCNC: 74 U/L (ref 55–135)
ALT SERPL W/O P-5'-P-CCNC: 12 U/L (ref 10–44)
AMIKACIN TROUGH SERPL-MCNC: <2 UG/ML (ref 0–6)
ANION GAP SERPL CALC-SCNC: 12 MMOL/L (ref 8–16)
AST SERPL-CCNC: 15 U/L (ref 10–40)
BASOPHILS # BLD AUTO: 0.01 K/UL (ref 0–0.2)
BASOPHILS NFR BLD: 0.1 % (ref 0–1.9)
BILIRUB SERPL-MCNC: 0.6 MG/DL (ref 0.1–1)
BUN SERPL-MCNC: 15 MG/DL (ref 8–23)
CALCIUM SERPL-MCNC: 10.3 MG/DL (ref 8.7–10.5)
CHLORIDE SERPL-SCNC: 99 MMOL/L (ref 95–110)
CO2 SERPL-SCNC: 23 MMOL/L (ref 23–29)
CREAT SERPL-MCNC: 0.6 MG/DL (ref 0.5–1.4)
CRP SERPL-MCNC: 9.26 MG/DL
DIFFERENTIAL METHOD: ABNORMAL
EOSINOPHIL # BLD AUTO: 0.1 K/UL (ref 0–0.5)
EOSINOPHIL NFR BLD: 0.8 % (ref 0–8)
ERYTHROCYTE [DISTWIDTH] IN BLOOD BY AUTOMATED COUNT: 15.3 % (ref 11.5–14.5)
ERYTHROCYTE [SEDIMENTATION RATE] IN BLOOD BY WESTERGREN METHOD: 58 MM/HR (ref 0–20)
EST. GFR  (AFRICAN AMERICAN): >60 ML/MIN/1.73 M^2
EST. GFR  (NON AFRICAN AMERICAN): >60 ML/MIN/1.73 M^2
GLUCOSE SERPL-MCNC: 95 MG/DL (ref 70–110)
HCT VFR BLD AUTO: 34.6 % (ref 37–48.5)
HGB BLD-MCNC: 10.7 G/DL (ref 12–16)
IMM GRANULOCYTES # BLD AUTO: 0.03 K/UL (ref 0–0.04)
IMM GRANULOCYTES NFR BLD AUTO: 0.4 % (ref 0–0.5)
LYMPHOCYTES # BLD AUTO: 1.7 K/UL (ref 1–4.8)
LYMPHOCYTES NFR BLD: 21.2 % (ref 18–48)
MCH RBC QN AUTO: 26.6 PG (ref 27–31)
MCHC RBC AUTO-ENTMCNC: 30.9 G/DL (ref 32–36)
MCV RBC AUTO: 86 FL (ref 82–98)
MONOCYTES # BLD AUTO: 0.5 K/UL (ref 0.3–1)
MONOCYTES NFR BLD: 6.4 % (ref 4–15)
NEUTROPHILS # BLD AUTO: 5.6 K/UL (ref 1.8–7.7)
NEUTROPHILS NFR BLD: 71.1 % (ref 38–73)
NRBC BLD-RTO: 0 /100 WBC
PLATELET # BLD AUTO: 437 K/UL (ref 150–350)
PMV BLD AUTO: 8.9 FL (ref 9.2–12.9)
POTASSIUM SERPL-SCNC: 4.4 MMOL/L (ref 3.5–5.1)
PROT SERPL-MCNC: 7.4 G/DL (ref 6–8.4)
RBC # BLD AUTO: 4.03 M/UL (ref 4–5.4)
SODIUM SERPL-SCNC: 134 MMOL/L (ref 136–145)
WBC # BLD AUTO: 7.82 K/UL (ref 3.9–12.7)

## 2021-03-12 PROCEDURE — 36415 COLL VENOUS BLD VENIPUNCTURE: CPT | Performed by: FAMILY MEDICINE

## 2021-03-12 PROCEDURE — 85025 COMPLETE CBC W/AUTO DIFF WBC: CPT | Performed by: FAMILY MEDICINE

## 2021-03-12 PROCEDURE — 80053 COMPREHEN METABOLIC PANEL: CPT | Performed by: FAMILY MEDICINE

## 2021-03-12 PROCEDURE — 80150 ASSAY OF AMIKACIN: CPT | Performed by: FAMILY MEDICINE

## 2021-03-12 PROCEDURE — 86140 C-REACTIVE PROTEIN: CPT | Performed by: FAMILY MEDICINE

## 2021-03-12 PROCEDURE — 85651 RBC SED RATE NONAUTOMATED: CPT | Performed by: FAMILY MEDICINE

## 2021-03-15 ENCOUNTER — LAB VISIT (OUTPATIENT)
Dept: LAB | Facility: HOSPITAL | Age: 66
End: 2021-03-15
Attending: INTERNAL MEDICINE
Payer: COMMERCIAL

## 2021-03-15 DIAGNOSIS — D84.9 IMMUNE DEFICIENCY DISORDER: ICD-10-CM

## 2021-03-15 DIAGNOSIS — J44.0 CHRONIC OBSTRUCTIVE PULMONARY DISEASE WITH ACUTE LOWER RESPIRATORY INFECTION: ICD-10-CM

## 2021-03-15 DIAGNOSIS — A31.0 PULMONARY INFECTION DUE TO MYCOBACTERIUM AVIUM: ICD-10-CM

## 2021-03-15 DIAGNOSIS — J47.0 BRONCHIECTASIS WITH ACUTE LOWER RESPIRATORY INFECTION: ICD-10-CM

## 2021-03-15 LAB
ACID FAST MOD KINY STN SPEC: ABNORMAL
MYCOBACTERIUM SPEC QL CULT: ABNORMAL
MYCOBACTERIUM SPEC QL CULT: ABNORMAL

## 2021-03-15 PROCEDURE — 87070 CULTURE OTHR SPECIMN AEROBIC: CPT | Performed by: INTERNAL MEDICINE

## 2021-03-15 PROCEDURE — 87205 SMEAR GRAM STAIN: CPT | Performed by: INTERNAL MEDICINE

## 2021-03-15 PROCEDURE — 87077 CULTURE AEROBIC IDENTIFY: CPT | Performed by: INTERNAL MEDICINE

## 2021-03-15 PROCEDURE — 87186 SC STD MICRODIL/AGAR DIL: CPT | Performed by: INTERNAL MEDICINE

## 2021-03-18 DIAGNOSIS — J44.9 COPD, SEVERE: ICD-10-CM

## 2021-03-18 RX ORDER — ALBUTEROL SULFATE 90 UG/1
2 AEROSOL, METERED RESPIRATORY (INHALATION) EVERY 4 HOURS PRN
Qty: 18 G | Refills: 11 | Status: SHIPPED | OUTPATIENT
Start: 2021-03-18 | End: 2022-01-01 | Stop reason: SDUPTHER

## 2021-03-19 ENCOUNTER — LAB VISIT (OUTPATIENT)
Dept: LAB | Facility: HOSPITAL | Age: 66
End: 2021-03-19
Attending: FAMILY MEDICINE
Payer: COMMERCIAL

## 2021-03-19 DIAGNOSIS — J44.0 CHRONIC OBSTRUCTIVE PULMONARY DISEASE WITH ACUTE LOWER RESPIRATORY INFECTION: Primary | ICD-10-CM

## 2021-03-19 DIAGNOSIS — A31.0 PULMONARY DISEASE DUE TO MYCOBACTERIA: Primary | ICD-10-CM

## 2021-03-19 DIAGNOSIS — J93.83 CLOSED PNEUMOTHORAX: ICD-10-CM

## 2021-03-19 LAB
ALBUMIN SERPL BCP-MCNC: 3 G/DL (ref 3.5–5.2)
ALP SERPL-CCNC: 57 U/L (ref 55–135)
ALT SERPL W/O P-5'-P-CCNC: 11 U/L (ref 10–44)
ANION GAP SERPL CALC-SCNC: 10 MMOL/L (ref 8–16)
AST SERPL-CCNC: 24 U/L (ref 10–40)
BACTERIA SPEC AEROBE CULT: ABNORMAL
BACTERIA SPEC AEROBE CULT: ABNORMAL
BASOPHILS # BLD AUTO: 0.01 K/UL (ref 0–0.2)
BASOPHILS NFR BLD: 0.4 % (ref 0–1.9)
BILIRUB SERPL-MCNC: 0.5 MG/DL (ref 0.1–1)
BUN SERPL-MCNC: 13 MG/DL (ref 8–23)
CALCIUM SERPL-MCNC: 8.8 MG/DL (ref 8.7–10.5)
CHLORIDE SERPL-SCNC: 100 MMOL/L (ref 95–110)
CO2 SERPL-SCNC: 22 MMOL/L (ref 23–29)
CREAT SERPL-MCNC: 0.6 MG/DL (ref 0.5–1.4)
CRP SERPL-MCNC: 6.21 MG/DL
DIFFERENTIAL METHOD: ABNORMAL
EOSINOPHIL # BLD AUTO: 0 K/UL (ref 0–0.5)
EOSINOPHIL NFR BLD: 0 % (ref 0–8)
ERYTHROCYTE [DISTWIDTH] IN BLOOD BY AUTOMATED COUNT: 15.5 % (ref 11.5–14.5)
ERYTHROCYTE [SEDIMENTATION RATE] IN BLOOD BY WESTERGREN METHOD: 55 MM/HR (ref 0–20)
EST. GFR  (AFRICAN AMERICAN): >60 ML/MIN/1.73 M^2
EST. GFR  (NON AFRICAN AMERICAN): >60 ML/MIN/1.73 M^2
GLUCOSE SERPL-MCNC: 88 MG/DL (ref 70–110)
GRAM STN SPEC: ABNORMAL
HCT VFR BLD AUTO: 36.8 % (ref 37–48.5)
HGB BLD-MCNC: 11.3 G/DL (ref 12–16)
IMM GRANULOCYTES # BLD AUTO: 0.01 K/UL (ref 0–0.04)
IMM GRANULOCYTES NFR BLD AUTO: 0.4 % (ref 0–0.5)
LYMPHOCYTES # BLD AUTO: 0.8 K/UL (ref 1–4.8)
LYMPHOCYTES NFR BLD: 32.2 % (ref 18–48)
MCH RBC QN AUTO: 26.3 PG (ref 27–31)
MCHC RBC AUTO-ENTMCNC: 30.7 G/DL (ref 32–36)
MCV RBC AUTO: 86 FL (ref 82–98)
MONOCYTES # BLD AUTO: 0.2 K/UL (ref 0.3–1)
MONOCYTES NFR BLD: 8.6 % (ref 4–15)
NEUTROPHILS # BLD AUTO: 1.5 K/UL (ref 1.8–7.7)
NEUTROPHILS NFR BLD: 58.4 % (ref 38–73)
NRBC BLD-RTO: 0 /100 WBC
PLATELET # BLD AUTO: 259 K/UL (ref 150–350)
PLATELET BLD QL SMEAR: ABNORMAL
PMV BLD AUTO: 8.8 FL (ref 9.2–12.9)
POTASSIUM SERPL-SCNC: 4 MMOL/L (ref 3.5–5.1)
PROT SERPL-MCNC: 7.3 G/DL (ref 6–8.4)
RBC # BLD AUTO: 4.3 M/UL (ref 4–5.4)
SODIUM SERPL-SCNC: 132 MMOL/L (ref 136–145)
WBC # BLD AUTO: 2.55 K/UL (ref 3.9–12.7)

## 2021-03-19 PROCEDURE — 36415 COLL VENOUS BLD VENIPUNCTURE: CPT | Performed by: FAMILY MEDICINE

## 2021-03-19 PROCEDURE — 86140 C-REACTIVE PROTEIN: CPT | Performed by: FAMILY MEDICINE

## 2021-03-19 PROCEDURE — 80150 ASSAY OF AMIKACIN: CPT | Performed by: FAMILY MEDICINE

## 2021-03-19 PROCEDURE — 80053 COMPREHEN METABOLIC PANEL: CPT | Performed by: FAMILY MEDICINE

## 2021-03-19 PROCEDURE — 85025 COMPLETE CBC W/AUTO DIFF WBC: CPT | Performed by: FAMILY MEDICINE

## 2021-03-19 PROCEDURE — 85651 RBC SED RATE NONAUTOMATED: CPT | Performed by: FAMILY MEDICINE

## 2021-03-19 RX ORDER — CIPROFLOXACIN 500 MG/1
500 TABLET ORAL 2 TIMES DAILY
Qty: 28 TABLET | Refills: 0 | Status: SHIPPED | OUTPATIENT
Start: 2021-03-19 | End: 2021-08-02 | Stop reason: SDUPTHER

## 2021-03-20 LAB — AMIKACIN TROUGH SERPL-MCNC: <2 UG/ML (ref 0–6)

## 2021-03-26 ENCOUNTER — LAB VISIT (OUTPATIENT)
Dept: LAB | Facility: HOSPITAL | Age: 66
End: 2021-03-26
Attending: FAMILY MEDICINE
Payer: COMMERCIAL

## 2021-03-26 DIAGNOSIS — A31.0 PULMONARY DISEASE DUE TO MYCOBACTERIA: Primary | ICD-10-CM

## 2021-03-26 DIAGNOSIS — J93.83 CLOSED PNEUMOTHORAX: ICD-10-CM

## 2021-03-26 LAB
ALBUMIN SERPL BCP-MCNC: 2.5 G/DL (ref 3.5–5.2)
ALP SERPL-CCNC: 47 U/L (ref 55–135)
ALT SERPL W/O P-5'-P-CCNC: 13 U/L (ref 10–44)
AMIKACIN TROUGH SERPL-MCNC: <2 UG/ML (ref 0–6)
ANION GAP SERPL CALC-SCNC: 9 MMOL/L (ref 8–16)
AST SERPL-CCNC: 23 U/L (ref 10–40)
BASOPHILS # BLD AUTO: 0.01 K/UL (ref 0–0.2)
BASOPHILS NFR BLD: 0.2 % (ref 0–1.9)
BILIRUB SERPL-MCNC: 0.6 MG/DL (ref 0.1–1)
BUN SERPL-MCNC: 9 MG/DL (ref 8–23)
CALCIUM SERPL-MCNC: 7.7 MG/DL (ref 8.7–10.5)
CHLORIDE SERPL-SCNC: 109 MMOL/L (ref 95–110)
CO2 SERPL-SCNC: 20 MMOL/L (ref 23–29)
CREAT SERPL-MCNC: 0.4 MG/DL (ref 0.5–1.4)
CRP SERPL-MCNC: 2.68 MG/DL
DIFFERENTIAL METHOD: ABNORMAL
EOSINOPHIL # BLD AUTO: 0 K/UL (ref 0–0.5)
EOSINOPHIL NFR BLD: 0.2 % (ref 0–8)
ERYTHROCYTE [DISTWIDTH] IN BLOOD BY AUTOMATED COUNT: 15.9 % (ref 11.5–14.5)
ERYTHROCYTE [SEDIMENTATION RATE] IN BLOOD BY WESTERGREN METHOD: 35 MM/HR (ref 0–20)
EST. GFR  (AFRICAN AMERICAN): >60 ML/MIN/1.73 M^2
EST. GFR  (NON AFRICAN AMERICAN): >60 ML/MIN/1.73 M^2
GLUCOSE SERPL-MCNC: 75 MG/DL (ref 70–110)
HCT VFR BLD AUTO: 34.2 % (ref 37–48.5)
HGB BLD-MCNC: 10.6 G/DL (ref 12–16)
IMM GRANULOCYTES # BLD AUTO: 0.03 K/UL (ref 0–0.04)
IMM GRANULOCYTES NFR BLD AUTO: 0.7 % (ref 0–0.5)
LYMPHOCYTES # BLD AUTO: 1.6 K/UL (ref 1–4.8)
LYMPHOCYTES NFR BLD: 37.3 % (ref 18–48)
MCH RBC QN AUTO: 26.6 PG (ref 27–31)
MCHC RBC AUTO-ENTMCNC: 31 G/DL (ref 32–36)
MCV RBC AUTO: 86 FL (ref 82–98)
MONOCYTES # BLD AUTO: 0.5 K/UL (ref 0.3–1)
MONOCYTES NFR BLD: 11.2 % (ref 4–15)
NEUTROPHILS # BLD AUTO: 2.1 K/UL (ref 1.8–7.7)
NEUTROPHILS NFR BLD: 50.4 % (ref 38–73)
NRBC BLD-RTO: 0 /100 WBC
PLATELET # BLD AUTO: 299 K/UL (ref 150–350)
PMV BLD AUTO: 9.3 FL (ref 9.2–12.9)
POTASSIUM SERPL-SCNC: 3.1 MMOL/L (ref 3.5–5.1)
PROT SERPL-MCNC: 5.7 G/DL (ref 6–8.4)
RBC # BLD AUTO: 3.99 M/UL (ref 4–5.4)
SODIUM SERPL-SCNC: 138 MMOL/L (ref 136–145)
WBC # BLD AUTO: 4.21 K/UL (ref 3.9–12.7)

## 2021-03-26 PROCEDURE — 80053 COMPREHEN METABOLIC PANEL: CPT | Performed by: FAMILY MEDICINE

## 2021-03-26 PROCEDURE — 85025 COMPLETE CBC W/AUTO DIFF WBC: CPT | Performed by: FAMILY MEDICINE

## 2021-03-26 PROCEDURE — 36415 COLL VENOUS BLD VENIPUNCTURE: CPT | Performed by: FAMILY MEDICINE

## 2021-03-26 PROCEDURE — 85651 RBC SED RATE NONAUTOMATED: CPT | Performed by: FAMILY MEDICINE

## 2021-03-26 PROCEDURE — 86140 C-REACTIVE PROTEIN: CPT | Performed by: FAMILY MEDICINE

## 2021-03-26 PROCEDURE — 80150 ASSAY OF AMIKACIN: CPT | Performed by: FAMILY MEDICINE

## 2021-04-01 ENCOUNTER — LAB VISIT (OUTPATIENT)
Dept: LAB | Facility: HOSPITAL | Age: 66
End: 2021-04-01
Attending: FAMILY MEDICINE
Payer: COMMERCIAL

## 2021-04-01 DIAGNOSIS — A31.0 PULMONARY DISEASE DUE TO MYCOBACTERIA: Primary | ICD-10-CM

## 2021-04-01 DIAGNOSIS — J93.83 CLOSED PNEUMOTHORAX: ICD-10-CM

## 2021-04-01 LAB
ALBUMIN SERPL BCP-MCNC: 2.9 G/DL (ref 3.5–5.2)
ALP SERPL-CCNC: 61 U/L (ref 55–135)
ALT SERPL W/O P-5'-P-CCNC: 9 U/L (ref 10–44)
ANION GAP SERPL CALC-SCNC: 9 MMOL/L (ref 8–16)
AST SERPL-CCNC: 13 U/L (ref 10–40)
BILIRUB SERPL-MCNC: 0.7 MG/DL (ref 0.1–1)
BUN SERPL-MCNC: 9 MG/DL (ref 8–23)
CALCIUM SERPL-MCNC: 9 MG/DL (ref 8.7–10.5)
CHLORIDE SERPL-SCNC: 103 MMOL/L (ref 95–110)
CO2 SERPL-SCNC: 21 MMOL/L (ref 23–29)
CREAT SERPL-MCNC: 0.5 MG/DL (ref 0.5–1.4)
CRP SERPL-MCNC: 14.57 MG/DL
ERYTHROCYTE [DISTWIDTH] IN BLOOD BY AUTOMATED COUNT: 15.5 % (ref 11.5–14.5)
ERYTHROCYTE [SEDIMENTATION RATE] IN BLOOD BY WESTERGREN METHOD: 53 MM/HR (ref 0–20)
EST. GFR  (AFRICAN AMERICAN): >60 ML/MIN/1.73 M^2
EST. GFR  (NON AFRICAN AMERICAN): >60 ML/MIN/1.73 M^2
GLUCOSE SERPL-MCNC: 94 MG/DL (ref 70–110)
HCT VFR BLD AUTO: 32 % (ref 37–48.5)
HGB BLD-MCNC: 10 G/DL (ref 12–16)
MCH RBC QN AUTO: 26.2 PG (ref 27–31)
MCHC RBC AUTO-ENTMCNC: 31.3 G/DL (ref 32–36)
MCV RBC AUTO: 84 FL (ref 82–98)
PLATELET # BLD AUTO: 424 K/UL (ref 150–450)
PLATELET BLD QL SMEAR: NORMAL
PMV BLD AUTO: 9.1 FL (ref 9.2–12.9)
POTASSIUM SERPL-SCNC: 3.5 MMOL/L (ref 3.5–5.1)
PROT SERPL-MCNC: 7.5 G/DL (ref 6–8.4)
RBC # BLD AUTO: 3.82 M/UL (ref 4–5.4)
SODIUM SERPL-SCNC: 133 MMOL/L (ref 136–145)
WBC # BLD AUTO: 7.48 K/UL (ref 3.9–12.7)

## 2021-04-01 PROCEDURE — 85027 COMPLETE CBC AUTOMATED: CPT | Performed by: FAMILY MEDICINE

## 2021-04-01 PROCEDURE — 80150 ASSAY OF AMIKACIN: CPT | Performed by: FAMILY MEDICINE

## 2021-04-01 PROCEDURE — 85651 RBC SED RATE NONAUTOMATED: CPT | Performed by: FAMILY MEDICINE

## 2021-04-01 PROCEDURE — 80053 COMPREHEN METABOLIC PANEL: CPT | Performed by: FAMILY MEDICINE

## 2021-04-01 PROCEDURE — 86140 C-REACTIVE PROTEIN: CPT | Performed by: FAMILY MEDICINE

## 2021-04-01 PROCEDURE — 36415 COLL VENOUS BLD VENIPUNCTURE: CPT | Performed by: FAMILY MEDICINE

## 2021-04-02 LAB — AMIKACIN SERPL-MCNC: <2 UG/ML

## 2021-04-05 ENCOUNTER — PATIENT MESSAGE (OUTPATIENT)
Dept: ADMINISTRATIVE | Facility: HOSPITAL | Age: 66
End: 2021-04-05

## 2021-04-07 ENCOUNTER — LAB VISIT (OUTPATIENT)
Dept: LAB | Facility: HOSPITAL | Age: 66
End: 2021-04-07
Attending: INTERNAL MEDICINE
Payer: COMMERCIAL

## 2021-04-07 ENCOUNTER — PATIENT OUTREACH (OUTPATIENT)
Dept: ADMINISTRATIVE | Facility: OTHER | Age: 66
End: 2021-04-07

## 2021-04-07 DIAGNOSIS — J18.9 PULMONARY INFECTION: Primary | ICD-10-CM

## 2021-04-07 LAB
ALBUMIN SERPL BCP-MCNC: 2.9 G/DL (ref 3.5–5.2)
ALP SERPL-CCNC: 58 U/L (ref 55–135)
ALT SERPL W/O P-5'-P-CCNC: 8 U/L (ref 10–44)
ANION GAP SERPL CALC-SCNC: 12 MMOL/L (ref 8–16)
AST SERPL-CCNC: 16 U/L (ref 10–40)
BASOPHILS # BLD AUTO: 0.06 K/UL (ref 0–0.2)
BASOPHILS NFR BLD: 0.9 % (ref 0–1.9)
BILIRUB SERPL-MCNC: 0.7 MG/DL (ref 0.1–1)
BUN SERPL-MCNC: 7 MG/DL (ref 8–23)
CALCIUM SERPL-MCNC: 9.2 MG/DL (ref 8.7–10.5)
CHLORIDE SERPL-SCNC: 98 MMOL/L (ref 95–110)
CO2 SERPL-SCNC: 23 MMOL/L (ref 23–29)
CREAT SERPL-MCNC: 0.6 MG/DL (ref 0.5–1.4)
CRP SERPL-MCNC: 6.09 MG/DL
DIFFERENTIAL METHOD: ABNORMAL
EOSINOPHIL # BLD AUTO: 0.2 K/UL (ref 0–0.5)
EOSINOPHIL NFR BLD: 3.6 % (ref 0–8)
ERYTHROCYTE [DISTWIDTH] IN BLOOD BY AUTOMATED COUNT: 16.1 % (ref 11.5–14.5)
ERYTHROCYTE [SEDIMENTATION RATE] IN BLOOD BY WESTERGREN METHOD: 65 MM/HR (ref 0–20)
EST. GFR  (AFRICAN AMERICAN): >60 ML/MIN/1.73 M^2
EST. GFR  (NON AFRICAN AMERICAN): >60 ML/MIN/1.73 M^2
GLUCOSE SERPL-MCNC: 93 MG/DL (ref 70–110)
HCT VFR BLD AUTO: 33 % (ref 37–48.5)
HGB BLD-MCNC: 10.2 G/DL (ref 12–16)
IMM GRANULOCYTES # BLD AUTO: 0.02 K/UL (ref 0–0.04)
IMM GRANULOCYTES NFR BLD AUTO: 0.3 % (ref 0–0.5)
LYMPHOCYTES # BLD AUTO: 2 K/UL (ref 1–4.8)
LYMPHOCYTES NFR BLD: 30.4 % (ref 18–48)
MCH RBC QN AUTO: 26.4 PG (ref 27–31)
MCHC RBC AUTO-ENTMCNC: 30.9 G/DL (ref 32–36)
MCV RBC AUTO: 86 FL (ref 82–98)
MONOCYTES # BLD AUTO: 0.5 K/UL (ref 0.3–1)
MONOCYTES NFR BLD: 8.1 % (ref 4–15)
NEUTROPHILS # BLD AUTO: 3.6 K/UL (ref 1.8–7.7)
NEUTROPHILS NFR BLD: 56.7 % (ref 38–73)
NRBC BLD-RTO: 0 /100 WBC
PLATELET # BLD AUTO: 421 K/UL (ref 150–450)
PMV BLD AUTO: 8.7 FL (ref 9.2–12.9)
POTASSIUM SERPL-SCNC: 3.6 MMOL/L (ref 3.5–5.1)
PROT SERPL-MCNC: 7.1 G/DL (ref 6–8.4)
RBC # BLD AUTO: 3.86 M/UL (ref 4–5.4)
SODIUM SERPL-SCNC: 133 MMOL/L (ref 136–145)
WBC # BLD AUTO: 6.41 K/UL (ref 3.9–12.7)

## 2021-04-07 PROCEDURE — 80150 ASSAY OF AMIKACIN: CPT | Performed by: INTERNAL MEDICINE

## 2021-04-07 PROCEDURE — 86140 C-REACTIVE PROTEIN: CPT | Performed by: INTERNAL MEDICINE

## 2021-04-07 PROCEDURE — 36415 COLL VENOUS BLD VENIPUNCTURE: CPT | Performed by: INTERNAL MEDICINE

## 2021-04-07 PROCEDURE — 85651 RBC SED RATE NONAUTOMATED: CPT | Performed by: INTERNAL MEDICINE

## 2021-04-07 PROCEDURE — 85025 COMPLETE CBC W/AUTO DIFF WBC: CPT | Performed by: INTERNAL MEDICINE

## 2021-04-07 PROCEDURE — 80053 COMPREHEN METABOLIC PANEL: CPT | Performed by: INTERNAL MEDICINE

## 2021-04-08 ENCOUNTER — TELEPHONE (OUTPATIENT)
Dept: PULMONOLOGY | Facility: CLINIC | Age: 66
End: 2021-04-08

## 2021-04-08 ENCOUNTER — OFFICE VISIT (OUTPATIENT)
Dept: PULMONOLOGY | Facility: CLINIC | Age: 66
End: 2021-04-08
Payer: COMMERCIAL

## 2021-04-08 ENCOUNTER — SPECIALTY PHARMACY (OUTPATIENT)
Dept: PHARMACY | Facility: CLINIC | Age: 66
End: 2021-04-08

## 2021-04-08 VITALS
HEIGHT: 64 IN | OXYGEN SATURATION: 98 % | DIASTOLIC BLOOD PRESSURE: 60 MMHG | HEART RATE: 81 BPM | WEIGHT: 91.25 LBS | BODY MASS INDEX: 15.58 KG/M2 | SYSTOLIC BLOOD PRESSURE: 115 MMHG

## 2021-04-08 DIAGNOSIS — J44.0 CHRONIC OBSTRUCTIVE PULMONARY DISEASE WITH ACUTE LOWER RESPIRATORY INFECTION: ICD-10-CM

## 2021-04-08 DIAGNOSIS — J44.0 CHRONIC OBSTRUCTIVE PULMONARY DISEASE WITH ACUTE LOWER RESPIRATORY INFECTION: Primary | ICD-10-CM

## 2021-04-08 DIAGNOSIS — Z22.39 MYCOBACTERIUM ABSCESSUS COLONIZATION: ICD-10-CM

## 2021-04-08 DIAGNOSIS — J47.0 BRONCHIECTASIS WITH ACUTE LOWER RESPIRATORY INFECTION: ICD-10-CM

## 2021-04-08 DIAGNOSIS — M81.0 OSTEOPOROSIS, UNSPECIFIED OSTEOPOROSIS TYPE, UNSPECIFIED PATHOLOGICAL FRACTURE PRESENCE: Primary | ICD-10-CM

## 2021-04-08 DIAGNOSIS — A31.0 PULMONARY INFECTION DUE TO MYCOBACTERIUM AVIUM: ICD-10-CM

## 2021-04-08 PROCEDURE — 99999 PR PBB SHADOW E&M-EST. PATIENT-LVL IV: CPT | Mod: PBBFAC,,, | Performed by: INTERNAL MEDICINE

## 2021-04-08 PROCEDURE — 99214 OFFICE O/P EST MOD 30 MIN: CPT | Mod: S$GLB,,, | Performed by: INTERNAL MEDICINE

## 2021-04-08 PROCEDURE — 3008F BODY MASS INDEX DOCD: CPT | Mod: CPTII,S$GLB,, | Performed by: INTERNAL MEDICINE

## 2021-04-08 PROCEDURE — 99999 PR PBB SHADOW E&M-EST. PATIENT-LVL IV: ICD-10-PCS | Mod: PBBFAC,,, | Performed by: INTERNAL MEDICINE

## 2021-04-08 PROCEDURE — 1126F AMNT PAIN NOTED NONE PRSNT: CPT | Mod: S$GLB,,, | Performed by: INTERNAL MEDICINE

## 2021-04-08 PROCEDURE — 99214 PR OFFICE/OUTPT VISIT, EST, LEVL IV, 30-39 MIN: ICD-10-PCS | Mod: S$GLB,,, | Performed by: INTERNAL MEDICINE

## 2021-04-08 PROCEDURE — 1126F PR PAIN SEVERITY QUANTIFIED, NO PAIN PRESENT: ICD-10-PCS | Mod: S$GLB,,, | Performed by: INTERNAL MEDICINE

## 2021-04-08 PROCEDURE — 3008F PR BODY MASS INDEX (BMI) DOCUMENTED: ICD-10-PCS | Mod: CPTII,S$GLB,, | Performed by: INTERNAL MEDICINE

## 2021-04-08 RX ORDER — DENOSUMAB 60 MG/ML
60 INJECTION SUBCUTANEOUS
Qty: 2 ML | Refills: 0 | Status: SHIPPED | OUTPATIENT
Start: 2021-04-08 | End: 2021-05-05

## 2021-04-08 RX ORDER — ETHAMBUTOL HYDROCHLORIDE 100 MG/1
TABLET, FILM COATED ORAL
COMMUNITY
Start: 2021-04-05 | End: 2021-05-05 | Stop reason: SDUPTHER

## 2021-04-09 LAB — AMIKACIN TROUGH SERPL-MCNC: <2 UG/ML (ref 0–6)

## 2021-04-09 PROCEDURE — G0179 PR HOME HEALTH MD RECERTIFICATION: ICD-10-PCS | Mod: ,,, | Performed by: FAMILY MEDICINE

## 2021-04-09 PROCEDURE — G0179 MD RECERTIFICATION HHA PT: HCPCS | Mod: ,,, | Performed by: FAMILY MEDICINE

## 2021-04-12 ENCOUNTER — TELEPHONE (OUTPATIENT)
Dept: INFECTIOUS DISEASES | Facility: CLINIC | Age: 66
End: 2021-04-12

## 2021-04-13 ENCOUNTER — OFFICE VISIT (OUTPATIENT)
Dept: INFECTIOUS DISEASES | Facility: CLINIC | Age: 66
End: 2021-04-13
Payer: COMMERCIAL

## 2021-04-13 ENCOUNTER — TELEPHONE (OUTPATIENT)
Dept: PULMONOLOGY | Facility: CLINIC | Age: 66
End: 2021-04-13

## 2021-04-13 ENCOUNTER — LAB VISIT (OUTPATIENT)
Dept: LAB | Facility: HOSPITAL | Age: 66
End: 2021-04-13
Attending: INTERNAL MEDICINE
Payer: COMMERCIAL

## 2021-04-13 VITALS
HEART RATE: 94 BPM | TEMPERATURE: 98 F | BODY MASS INDEX: 15.54 KG/M2 | DIASTOLIC BLOOD PRESSURE: 63 MMHG | OXYGEN SATURATION: 97 % | WEIGHT: 91 LBS | SYSTOLIC BLOOD PRESSURE: 102 MMHG | HEIGHT: 64 IN

## 2021-04-13 DIAGNOSIS — J18.9 LUNG INFECTION: Primary | ICD-10-CM

## 2021-04-13 DIAGNOSIS — M81.0 OSTEOPOROSIS, UNSPECIFIED OSTEOPOROSIS TYPE, UNSPECIFIED PATHOLOGICAL FRACTURE PRESENCE: Primary | ICD-10-CM

## 2021-04-13 DIAGNOSIS — D84.9 IMMUNE DEFICIENCY DISORDER: ICD-10-CM

## 2021-04-13 DIAGNOSIS — A31.0 MYCOBACTERIUM AVIUM-INTRACELLULARE COMPLEX: Primary | ICD-10-CM

## 2021-04-13 DIAGNOSIS — B37.31 VAGINAL YEAST INFECTION: ICD-10-CM

## 2021-04-13 DIAGNOSIS — E43 SEVERE MALNUTRITION: ICD-10-CM

## 2021-04-13 DIAGNOSIS — J47.0 BRONCHIECTASIS WITH ACUTE LOWER RESPIRATORY INFECTION: ICD-10-CM

## 2021-04-13 DIAGNOSIS — M81.0 OSTEOPOROSIS, UNSPECIFIED OSTEOPOROSIS TYPE, UNSPECIFIED PATHOLOGICAL FRACTURE PRESENCE: ICD-10-CM

## 2021-04-13 DIAGNOSIS — J15.1 PNEUMONIA DUE TO PSEUDOMONAS AERUGINOSA: ICD-10-CM

## 2021-04-13 DIAGNOSIS — A31.8 INFECTION DUE TO MYCOBACTERIUM ABSCESSUS: ICD-10-CM

## 2021-04-13 LAB
25(OH)D3+25(OH)D2 SERPL-MCNC: 16 NG/ML (ref 30–96)
ALBUMIN SERPL BCP-MCNC: 3.3 G/DL (ref 3.5–5.2)
ALP SERPL-CCNC: 71 U/L (ref 55–135)
ALT SERPL W/O P-5'-P-CCNC: 10 U/L (ref 10–44)
AMIKACIN SERPL-MCNC: 3.1 UG/ML
ANION GAP SERPL CALC-SCNC: 10 MMOL/L (ref 8–16)
AST SERPL-CCNC: 18 U/L (ref 10–40)
BILIRUB SERPL-MCNC: 0.7 MG/DL (ref 0.1–1)
BUN SERPL-MCNC: 10 MG/DL (ref 8–23)
CALCIUM SERPL-MCNC: 9.9 MG/DL (ref 8.7–10.5)
CHLORIDE SERPL-SCNC: 95 MMOL/L (ref 95–110)
CO2 SERPL-SCNC: 26 MMOL/L (ref 23–29)
CREAT SERPL-MCNC: 0.5 MG/DL (ref 0.5–1.4)
CRP SERPL-MCNC: 7 MG/DL
ERYTHROCYTE [DISTWIDTH] IN BLOOD BY AUTOMATED COUNT: 16.5 % (ref 11.5–14.5)
ERYTHROCYTE [SEDIMENTATION RATE] IN BLOOD BY WESTERGREN METHOD: 20 MM/HR (ref 0–20)
EST. GFR  (AFRICAN AMERICAN): >60 ML/MIN/1.73 M^2
EST. GFR  (NON AFRICAN AMERICAN): >60 ML/MIN/1.73 M^2
GLUCOSE SERPL-MCNC: 100 MG/DL (ref 70–110)
HCT VFR BLD AUTO: 35.1 % (ref 37–48.5)
HGB BLD-MCNC: 10.9 G/DL (ref 12–16)
MCH RBC QN AUTO: 26.5 PG (ref 27–31)
MCHC RBC AUTO-ENTMCNC: 31.1 G/DL (ref 32–36)
MCV RBC AUTO: 85 FL (ref 82–98)
PLATELET # BLD AUTO: 413 K/UL (ref 150–450)
PMV BLD AUTO: 9 FL (ref 9.2–12.9)
POTASSIUM SERPL-SCNC: 4 MMOL/L (ref 3.5–5.1)
PROT SERPL-MCNC: 7.9 G/DL (ref 6–8.4)
RBC # BLD AUTO: 4.11 M/UL (ref 4–5.4)
SODIUM SERPL-SCNC: 131 MMOL/L (ref 136–145)
WBC # BLD AUTO: 7.34 K/UL (ref 3.9–12.7)

## 2021-04-13 PROCEDURE — 1126F PR PAIN SEVERITY QUANTIFIED, NO PAIN PRESENT: ICD-10-PCS | Mod: S$GLB,,, | Performed by: INTERNAL MEDICINE

## 2021-04-13 PROCEDURE — 82306 VITAMIN D 25 HYDROXY: CPT | Performed by: INTERNAL MEDICINE

## 2021-04-13 PROCEDURE — 36415 COLL VENOUS BLD VENIPUNCTURE: CPT | Performed by: INTERNAL MEDICINE

## 2021-04-13 PROCEDURE — 85027 COMPLETE CBC AUTOMATED: CPT | Performed by: INTERNAL MEDICINE

## 2021-04-13 PROCEDURE — 80053 COMPREHEN METABOLIC PANEL: CPT | Performed by: INTERNAL MEDICINE

## 2021-04-13 PROCEDURE — 99215 OFFICE O/P EST HI 40 MIN: CPT | Mod: S$GLB,,, | Performed by: INTERNAL MEDICINE

## 2021-04-13 PROCEDURE — 85651 RBC SED RATE NONAUTOMATED: CPT | Performed by: INTERNAL MEDICINE

## 2021-04-13 PROCEDURE — 86140 C-REACTIVE PROTEIN: CPT | Performed by: INTERNAL MEDICINE

## 2021-04-13 PROCEDURE — 1126F AMNT PAIN NOTED NONE PRSNT: CPT | Mod: S$GLB,,, | Performed by: INTERNAL MEDICINE

## 2021-04-13 PROCEDURE — 80150 ASSAY OF AMIKACIN: CPT | Performed by: INTERNAL MEDICINE

## 2021-04-13 PROCEDURE — 99215 PR OFFICE/OUTPT VISIT, EST, LEVL V, 40-54 MIN: ICD-10-PCS | Mod: S$GLB,,, | Performed by: INTERNAL MEDICINE

## 2021-04-14 ENCOUNTER — PATIENT MESSAGE (OUTPATIENT)
Dept: PULMONOLOGY | Facility: CLINIC | Age: 66
End: 2021-04-14

## 2021-04-14 ENCOUNTER — TELEPHONE (OUTPATIENT)
Dept: PULMONOLOGY | Facility: CLINIC | Age: 66
End: 2021-04-14

## 2021-04-15 ENCOUNTER — EXTERNAL HOME HEALTH (OUTPATIENT)
Dept: HOME HEALTH SERVICES | Facility: HOSPITAL | Age: 66
End: 2021-04-15
Payer: COMMERCIAL

## 2021-04-16 ENCOUNTER — LAB VISIT (OUTPATIENT)
Dept: LAB | Facility: HOSPITAL | Age: 66
End: 2021-04-16
Attending: INTERNAL MEDICINE
Payer: COMMERCIAL

## 2021-04-16 DIAGNOSIS — A31.0 MYCOBACTERIUM AVIUM-INTRACELLULARE COMPLEX: ICD-10-CM

## 2021-04-16 PROCEDURE — 87118 MYCOBACTERIC IDENTIFICATION: CPT | Performed by: INTERNAL MEDICINE

## 2021-04-16 PROCEDURE — 87015 SPECIMEN INFECT AGNT CONCNTJ: CPT | Performed by: INTERNAL MEDICINE

## 2021-04-16 PROCEDURE — 87149 DNA/RNA DIRECT PROBE: CPT | Performed by: INTERNAL MEDICINE

## 2021-04-16 PROCEDURE — 87206 SMEAR FLUORESCENT/ACID STAI: CPT | Performed by: INTERNAL MEDICINE

## 2021-04-16 PROCEDURE — 87116 MYCOBACTERIA CULTURE: CPT | Performed by: INTERNAL MEDICINE

## 2021-04-27 ENCOUNTER — CLINICAL SUPPORT (OUTPATIENT)
Dept: AUDIOLOGY | Facility: CLINIC | Age: 66
End: 2021-04-27
Payer: COMMERCIAL

## 2021-04-27 DIAGNOSIS — H90.3 SENSORINEURAL HEARING LOSS, BILATERAL: Primary | ICD-10-CM

## 2021-04-27 PROCEDURE — 92567 PR TYMPA2METRY: ICD-10-PCS | Mod: S$GLB,,, | Performed by: AUDIOLOGIST

## 2021-04-27 PROCEDURE — 92567 TYMPANOMETRY: CPT | Mod: S$GLB,,, | Performed by: AUDIOLOGIST

## 2021-04-27 PROCEDURE — 92557 COMPREHENSIVE HEARING TEST: CPT | Mod: S$GLB,,, | Performed by: AUDIOLOGIST

## 2021-04-27 PROCEDURE — 92557 PR COMPREHENSIVE HEARING TEST: ICD-10-PCS | Mod: S$GLB,,, | Performed by: AUDIOLOGIST

## 2021-04-30 ENCOUNTER — LAB VISIT (OUTPATIENT)
Dept: LAB | Facility: HOSPITAL | Age: 66
End: 2021-04-30
Attending: FAMILY MEDICINE
Payer: COMMERCIAL

## 2021-04-30 DIAGNOSIS — J93.83 CLOSED PNEUMOTHORAX: ICD-10-CM

## 2021-04-30 DIAGNOSIS — A31.0 PULMONARY DISEASE DUE TO MYCOBACTERIA: Primary | ICD-10-CM

## 2021-04-30 LAB
ALBUMIN SERPL BCP-MCNC: 3.3 G/DL (ref 3.5–5.2)
ALP SERPL-CCNC: 59 U/L (ref 55–135)
ALT SERPL W/O P-5'-P-CCNC: 11 U/L (ref 10–44)
AMIKACIN TROUGH SERPL-MCNC: <2 UG/ML (ref 0–6)
ANION GAP SERPL CALC-SCNC: 10 MMOL/L (ref 8–16)
AST SERPL-CCNC: 17 U/L (ref 10–40)
BILIRUB SERPL-MCNC: 0.5 MG/DL (ref 0.1–1)
BUN SERPL-MCNC: 15 MG/DL (ref 8–23)
CALCIUM SERPL-MCNC: 10.2 MG/DL (ref 8.7–10.5)
CHLORIDE SERPL-SCNC: 100 MMOL/L (ref 95–110)
CO2 SERPL-SCNC: 25 MMOL/L (ref 23–29)
CREAT SERPL-MCNC: 0.5 MG/DL (ref 0.5–1.4)
CRP SERPL-MCNC: 3.86 MG/DL
ERYTHROCYTE [DISTWIDTH] IN BLOOD BY AUTOMATED COUNT: 18.3 % (ref 11.5–14.5)
ERYTHROCYTE [SEDIMENTATION RATE] IN BLOOD BY WESTERGREN METHOD: 14 MM/HR (ref 0–20)
EST. GFR  (AFRICAN AMERICAN): >60 ML/MIN/1.73 M^2
EST. GFR  (NON AFRICAN AMERICAN): >60 ML/MIN/1.73 M^2
GLUCOSE SERPL-MCNC: 84 MG/DL (ref 70–110)
HCT VFR BLD AUTO: 38.1 % (ref 37–48.5)
HGB BLD-MCNC: 11.6 G/DL (ref 12–16)
MCH RBC QN AUTO: 26.7 PG (ref 27–31)
MCHC RBC AUTO-ENTMCNC: 30.4 G/DL (ref 32–36)
MCV RBC AUTO: 88 FL (ref 82–98)
PLATELET # BLD AUTO: 411 K/UL (ref 150–450)
PMV BLD AUTO: 9 FL (ref 9.2–12.9)
POTASSIUM SERPL-SCNC: 4.3 MMOL/L (ref 3.5–5.1)
PROT SERPL-MCNC: 7.6 G/DL (ref 6–8.4)
RBC # BLD AUTO: 4.35 M/UL (ref 4–5.4)
SODIUM SERPL-SCNC: 135 MMOL/L (ref 136–145)
WBC # BLD AUTO: 5.64 K/UL (ref 3.9–12.7)

## 2021-04-30 PROCEDURE — 80053 COMPREHEN METABOLIC PANEL: CPT | Performed by: FAMILY MEDICINE

## 2021-04-30 PROCEDURE — 85027 COMPLETE CBC AUTOMATED: CPT | Performed by: FAMILY MEDICINE

## 2021-04-30 PROCEDURE — 80150 ASSAY OF AMIKACIN: CPT | Performed by: FAMILY MEDICINE

## 2021-04-30 PROCEDURE — 86140 C-REACTIVE PROTEIN: CPT | Performed by: FAMILY MEDICINE

## 2021-04-30 PROCEDURE — 85651 RBC SED RATE NONAUTOMATED: CPT | Performed by: FAMILY MEDICINE

## 2021-05-04 ENCOUNTER — TELEPHONE (OUTPATIENT)
Dept: PULMONOLOGY | Facility: CLINIC | Age: 66
End: 2021-05-04

## 2021-05-04 ENCOUNTER — LAB VISIT (OUTPATIENT)
Dept: LAB | Facility: HOSPITAL | Age: 66
End: 2021-05-04
Attending: INTERNAL MEDICINE
Payer: COMMERCIAL

## 2021-05-04 DIAGNOSIS — J44.0 CHRONIC OBSTRUCTIVE PULMONARY DISEASE WITH ACUTE LOWER RESPIRATORY INFECTION: ICD-10-CM

## 2021-05-04 DIAGNOSIS — A31.0 PULMONARY INFECTION DUE TO MYCOBACTERIUM AVIUM: ICD-10-CM

## 2021-05-04 DIAGNOSIS — D84.9 IMMUNE DEFICIENCY DISORDER: ICD-10-CM

## 2021-05-04 DIAGNOSIS — J47.0 BRONCHIECTASIS WITH ACUTE LOWER RESPIRATORY INFECTION: ICD-10-CM

## 2021-05-04 PROCEDURE — 87070 CULTURE OTHR SPECIMN AEROBIC: CPT | Performed by: INTERNAL MEDICINE

## 2021-05-04 PROCEDURE — 87205 SMEAR GRAM STAIN: CPT | Performed by: INTERNAL MEDICINE

## 2021-05-05 ENCOUNTER — OFFICE VISIT (OUTPATIENT)
Dept: INFECTIOUS DISEASES | Facility: CLINIC | Age: 66
End: 2021-05-05
Payer: COMMERCIAL

## 2021-05-05 VITALS
TEMPERATURE: 97 F | BODY MASS INDEX: 15.54 KG/M2 | DIASTOLIC BLOOD PRESSURE: 76 MMHG | SYSTOLIC BLOOD PRESSURE: 114 MMHG | OXYGEN SATURATION: 98 % | HEIGHT: 64 IN | WEIGHT: 91 LBS | HEART RATE: 85 BPM

## 2021-05-05 DIAGNOSIS — A31.0 MYCOBACTERIUM AVIUM-INTRACELLULARE COMPLEX: Primary | ICD-10-CM

## 2021-05-05 DIAGNOSIS — R19.7 DIARRHEA, UNSPECIFIED TYPE: ICD-10-CM

## 2021-05-05 DIAGNOSIS — A31.0 MYCOBACTERIUM AVIUM-INTRACELLULARE COMPLEX: ICD-10-CM

## 2021-05-05 DIAGNOSIS — J47.0 BRONCHIECTASIS WITH ACUTE LOWER RESPIRATORY INFECTION: ICD-10-CM

## 2021-05-05 PROCEDURE — 99215 OFFICE O/P EST HI 40 MIN: CPT | Mod: S$GLB,,, | Performed by: INTERNAL MEDICINE

## 2021-05-05 PROCEDURE — 3008F BODY MASS INDEX DOCD: CPT | Mod: S$GLB,,, | Performed by: INTERNAL MEDICINE

## 2021-05-05 PROCEDURE — 99215 PR OFFICE/OUTPT VISIT, EST, LEVL V, 40-54 MIN: ICD-10-PCS | Mod: S$GLB,,, | Performed by: INTERNAL MEDICINE

## 2021-05-05 PROCEDURE — 1126F PR PAIN SEVERITY QUANTIFIED, NO PAIN PRESENT: ICD-10-PCS | Mod: S$GLB,,, | Performed by: INTERNAL MEDICINE

## 2021-05-05 PROCEDURE — 1126F AMNT PAIN NOTED NONE PRSNT: CPT | Mod: S$GLB,,, | Performed by: INTERNAL MEDICINE

## 2021-05-05 PROCEDURE — 3008F PR BODY MASS INDEX (BMI) DOCUMENTED: ICD-10-PCS | Mod: S$GLB,,, | Performed by: INTERNAL MEDICINE

## 2021-05-05 RX ORDER — ETHAMBUTOL HYDROCHLORIDE 100 MG/1
700 TABLET, FILM COATED ORAL DAILY
Qty: 630 TABLET | Refills: 3 | Status: SHIPPED | OUTPATIENT
Start: 2021-05-05 | End: 2021-11-03 | Stop reason: SDUPTHER

## 2021-05-05 RX ORDER — AZITHROMYCIN 500 MG/1
500 TABLET, FILM COATED ORAL DAILY
Qty: 90 TABLET | Refills: 3 | Status: SHIPPED | OUTPATIENT
Start: 2021-05-05 | End: 2021-08-02 | Stop reason: SDUPTHER

## 2021-05-05 RX ORDER — RIFABUTIN 150 MG/1
300 CAPSULE ORAL DAILY
Qty: 60 CAPSULE | Refills: 5 | Status: SHIPPED | OUTPATIENT
Start: 2021-05-05 | End: 2021-11-03 | Stop reason: SDUPTHER

## 2021-05-05 RX ORDER — ETHAMBUTOL HYDROCHLORIDE 400 MG/1
800 TABLET, FILM COATED ORAL DAILY
Qty: 60 TABLET | Refills: 5 | Status: CANCELLED | OUTPATIENT
Start: 2021-05-05 | End: 2021-11-01

## 2021-05-06 ENCOUNTER — TELEPHONE (OUTPATIENT)
Dept: PULMONOLOGY | Facility: CLINIC | Age: 66
End: 2021-05-06

## 2021-05-07 ENCOUNTER — LAB VISIT (OUTPATIENT)
Dept: LAB | Facility: HOSPITAL | Age: 66
End: 2021-05-07
Attending: INTERNAL MEDICINE
Payer: COMMERCIAL

## 2021-05-07 DIAGNOSIS — J93.83 CLOSED PNEUMOTHORAX: ICD-10-CM

## 2021-05-07 DIAGNOSIS — A31.0 PULMONARY DISEASE DUE TO MYCOBACTERIA: Primary | ICD-10-CM

## 2021-05-07 LAB
ALBUMIN SERPL BCP-MCNC: 3.5 G/DL (ref 3.5–5.2)
ALP SERPL-CCNC: 62 U/L (ref 55–135)
ALT SERPL W/O P-5'-P-CCNC: 10 U/L (ref 10–44)
ANION GAP SERPL CALC-SCNC: 8 MMOL/L (ref 8–16)
AST SERPL-CCNC: 16 U/L (ref 10–40)
BACTERIA SPEC AEROBE CULT: NORMAL
BASOPHILS # BLD AUTO: 0.03 K/UL (ref 0–0.2)
BASOPHILS NFR BLD: 0.5 % (ref 0–1.9)
BILIRUB SERPL-MCNC: 0.7 MG/DL (ref 0.1–1)
BUN SERPL-MCNC: 12 MG/DL (ref 8–23)
CALCIUM SERPL-MCNC: 9.6 MG/DL (ref 8.7–10.5)
CHLORIDE SERPL-SCNC: 101 MMOL/L (ref 95–110)
CO2 SERPL-SCNC: 26 MMOL/L (ref 23–29)
CREAT SERPL-MCNC: 0.5 MG/DL (ref 0.5–1.4)
CRP SERPL-MCNC: 4.37 MG/DL
DIFFERENTIAL METHOD: ABNORMAL
EOSINOPHIL # BLD AUTO: 0.1 K/UL (ref 0–0.5)
EOSINOPHIL NFR BLD: 1.9 % (ref 0–8)
ERYTHROCYTE [DISTWIDTH] IN BLOOD BY AUTOMATED COUNT: 18.4 % (ref 11.5–14.5)
ERYTHROCYTE [SEDIMENTATION RATE] IN BLOOD BY WESTERGREN METHOD: 19 MM/HR (ref 0–20)
EST. GFR  (AFRICAN AMERICAN): >60 ML/MIN/1.73 M^2
EST. GFR  (NON AFRICAN AMERICAN): >60 ML/MIN/1.73 M^2
GLUCOSE SERPL-MCNC: 83 MG/DL (ref 70–110)
GRAM STN SPEC: NORMAL
HCT VFR BLD AUTO: 39.6 % (ref 37–48.5)
HGB BLD-MCNC: 12.1 G/DL (ref 12–16)
IMM GRANULOCYTES # BLD AUTO: 0.01 K/UL (ref 0–0.04)
IMM GRANULOCYTES NFR BLD AUTO: 0.2 % (ref 0–0.5)
LYMPHOCYTES # BLD AUTO: 1.7 K/UL (ref 1–4.8)
LYMPHOCYTES NFR BLD: 30.1 % (ref 18–48)
MCH RBC QN AUTO: 26.8 PG (ref 27–31)
MCHC RBC AUTO-ENTMCNC: 30.6 G/DL (ref 32–36)
MCV RBC AUTO: 88 FL (ref 82–98)
MONOCYTES # BLD AUTO: 0.5 K/UL (ref 0.3–1)
MONOCYTES NFR BLD: 8.2 % (ref 4–15)
NEUTROPHILS # BLD AUTO: 3.4 K/UL (ref 1.8–7.7)
NEUTROPHILS NFR BLD: 59.1 % (ref 38–73)
NRBC BLD-RTO: 0 /100 WBC
PLATELET # BLD AUTO: 377 K/UL (ref 150–450)
PMV BLD AUTO: 9.1 FL (ref 9.2–12.9)
POTASSIUM SERPL-SCNC: 4.1 MMOL/L (ref 3.5–5.1)
PROT SERPL-MCNC: 7.8 G/DL (ref 6–8.4)
RBC # BLD AUTO: 4.52 M/UL (ref 4–5.4)
SODIUM SERPL-SCNC: 135 MMOL/L (ref 136–145)
WBC # BLD AUTO: 5.74 K/UL (ref 3.9–12.7)

## 2021-05-07 PROCEDURE — 80053 COMPREHEN METABOLIC PANEL: CPT | Performed by: INTERNAL MEDICINE

## 2021-05-07 PROCEDURE — 86140 C-REACTIVE PROTEIN: CPT | Performed by: INTERNAL MEDICINE

## 2021-05-07 PROCEDURE — 36415 COLL VENOUS BLD VENIPUNCTURE: CPT | Performed by: INTERNAL MEDICINE

## 2021-05-07 PROCEDURE — 85651 RBC SED RATE NONAUTOMATED: CPT | Performed by: INTERNAL MEDICINE

## 2021-05-07 PROCEDURE — 85025 COMPLETE CBC W/AUTO DIFF WBC: CPT | Performed by: INTERNAL MEDICINE

## 2021-05-07 PROCEDURE — 80150 ASSAY OF AMIKACIN: CPT | Performed by: INTERNAL MEDICINE

## 2021-05-08 LAB — AMIKACIN SERPL-MCNC: <2 UG/ML

## 2021-05-13 ENCOUNTER — LAB VISIT (OUTPATIENT)
Dept: LAB | Facility: HOSPITAL | Age: 66
End: 2021-05-13
Attending: INTERNAL MEDICINE
Payer: COMMERCIAL

## 2021-05-13 DIAGNOSIS — A31.0 PULMONARY MYCOBACTERIAL INFECTION: Primary | ICD-10-CM

## 2021-05-13 LAB
ALBUMIN SERPL BCP-MCNC: 3.4 G/DL (ref 3.5–5.2)
ALP SERPL-CCNC: 56 U/L (ref 55–135)
ALT SERPL W/O P-5'-P-CCNC: 9 U/L (ref 10–44)
ANION GAP SERPL CALC-SCNC: 10 MMOL/L (ref 8–16)
AST SERPL-CCNC: 15 U/L (ref 10–40)
BASOPHILS # BLD AUTO: 0.04 K/UL (ref 0–0.2)
BASOPHILS NFR BLD: 0.7 % (ref 0–1.9)
BILIRUB SERPL-MCNC: 0.6 MG/DL (ref 0.1–1)
BUN SERPL-MCNC: 11 MG/DL (ref 8–23)
CALCIUM SERPL-MCNC: 9.6 MG/DL (ref 8.7–10.5)
CHLORIDE SERPL-SCNC: 101 MMOL/L (ref 95–110)
CO2 SERPL-SCNC: 25 MMOL/L (ref 23–29)
CREAT SERPL-MCNC: 0.6 MG/DL (ref 0.5–1.4)
CRP SERPL-MCNC: 4.7 MG/DL
DIFFERENTIAL METHOD: ABNORMAL
EOSINOPHIL # BLD AUTO: 0.1 K/UL (ref 0–0.5)
EOSINOPHIL NFR BLD: 1.6 % (ref 0–8)
ERYTHROCYTE [DISTWIDTH] IN BLOOD BY AUTOMATED COUNT: 18.7 % (ref 11.5–14.5)
ERYTHROCYTE [SEDIMENTATION RATE] IN BLOOD BY WESTERGREN METHOD: 35 MM/HR (ref 0–20)
EST. GFR  (AFRICAN AMERICAN): >60 ML/MIN/1.73 M^2
EST. GFR  (NON AFRICAN AMERICAN): >60 ML/MIN/1.73 M^2
GLUCOSE SERPL-MCNC: 91 MG/DL (ref 70–110)
HCT VFR BLD AUTO: 39.4 % (ref 37–48.5)
HGB BLD-MCNC: 12.2 G/DL (ref 12–16)
IMM GRANULOCYTES # BLD AUTO: 0.02 K/UL (ref 0–0.04)
IMM GRANULOCYTES NFR BLD AUTO: 0.4 % (ref 0–0.5)
LYMPHOCYTES # BLD AUTO: 1.6 K/UL (ref 1–4.8)
LYMPHOCYTES NFR BLD: 28.5 % (ref 18–48)
MCH RBC QN AUTO: 27.6 PG (ref 27–31)
MCHC RBC AUTO-ENTMCNC: 31 G/DL (ref 32–36)
MCV RBC AUTO: 89 FL (ref 82–98)
MONOCYTES # BLD AUTO: 0.5 K/UL (ref 0.3–1)
MONOCYTES NFR BLD: 8 % (ref 4–15)
NEUTROPHILS # BLD AUTO: 3.4 K/UL (ref 1.8–7.7)
NEUTROPHILS NFR BLD: 60.8 % (ref 38–73)
NRBC BLD-RTO: 0 /100 WBC
PLATELET # BLD AUTO: 346 K/UL (ref 150–450)
PMV BLD AUTO: 9.3 FL (ref 9.2–12.9)
POTASSIUM SERPL-SCNC: 4.2 MMOL/L (ref 3.5–5.1)
PROT SERPL-MCNC: 7.6 G/DL (ref 6–8.4)
RBC # BLD AUTO: 4.42 M/UL (ref 4–5.4)
SODIUM SERPL-SCNC: 136 MMOL/L (ref 136–145)
WBC # BLD AUTO: 5.65 K/UL (ref 3.9–12.7)

## 2021-05-13 PROCEDURE — 80150 ASSAY OF AMIKACIN: CPT | Performed by: INTERNAL MEDICINE

## 2021-05-13 PROCEDURE — 36415 COLL VENOUS BLD VENIPUNCTURE: CPT | Performed by: INTERNAL MEDICINE

## 2021-05-13 PROCEDURE — 85651 RBC SED RATE NONAUTOMATED: CPT | Performed by: INTERNAL MEDICINE

## 2021-05-13 PROCEDURE — 80053 COMPREHEN METABOLIC PANEL: CPT | Performed by: INTERNAL MEDICINE

## 2021-05-13 PROCEDURE — 85025 COMPLETE CBC W/AUTO DIFF WBC: CPT | Performed by: INTERNAL MEDICINE

## 2021-05-13 PROCEDURE — 86140 C-REACTIVE PROTEIN: CPT | Performed by: INTERNAL MEDICINE

## 2021-05-14 ENCOUNTER — TELEPHONE (OUTPATIENT)
Dept: INFECTIOUS DISEASES | Facility: CLINIC | Age: 66
End: 2021-05-14

## 2021-05-14 LAB — AMIKACIN SERPL-MCNC: 4.3 UG/ML

## 2021-05-19 ENCOUNTER — LAB VISIT (OUTPATIENT)
Dept: LAB | Facility: HOSPITAL | Age: 66
End: 2021-05-19
Attending: INTERNAL MEDICINE
Payer: COMMERCIAL

## 2021-05-19 DIAGNOSIS — J93.83 CLOSED PNEUMOTHORAX: ICD-10-CM

## 2021-05-19 DIAGNOSIS — A31.0 PULMONARY DISEASE DUE TO MYCOBACTERIA: Primary | ICD-10-CM

## 2021-05-19 LAB
ALBUMIN SERPL BCP-MCNC: 3.5 G/DL (ref 3.5–5.2)
ALP SERPL-CCNC: 59 U/L (ref 55–135)
ALT SERPL W/O P-5'-P-CCNC: 10 U/L (ref 10–44)
AMIKACIN SERPL-MCNC: <2 UG/ML
ANION GAP SERPL CALC-SCNC: 10 MMOL/L (ref 8–16)
AST SERPL-CCNC: 15 U/L (ref 10–40)
BASOPHILS # BLD AUTO: 0.05 K/UL (ref 0–0.2)
BASOPHILS NFR BLD: 1 % (ref 0–1.9)
BILIRUB SERPL-MCNC: 0.7 MG/DL (ref 0.1–1)
BUN SERPL-MCNC: 13 MG/DL (ref 8–23)
CALCIUM SERPL-MCNC: 9.8 MG/DL (ref 8.7–10.5)
CHLORIDE SERPL-SCNC: 98 MMOL/L (ref 95–110)
CO2 SERPL-SCNC: 25 MMOL/L (ref 23–29)
CREAT SERPL-MCNC: 0.5 MG/DL (ref 0.5–1.4)
CRP SERPL-MCNC: 2.82 MG/DL
DIFFERENTIAL METHOD: ABNORMAL
EOSINOPHIL # BLD AUTO: 0.1 K/UL (ref 0–0.5)
EOSINOPHIL NFR BLD: 2.2 % (ref 0–8)
ERYTHROCYTE [DISTWIDTH] IN BLOOD BY AUTOMATED COUNT: 18.3 % (ref 11.5–14.5)
ERYTHROCYTE [SEDIMENTATION RATE] IN BLOOD BY WESTERGREN METHOD: 40 MM/HR (ref 0–20)
EST. GFR  (AFRICAN AMERICAN): >60 ML/MIN/1.73 M^2
EST. GFR  (NON AFRICAN AMERICAN): >60 ML/MIN/1.73 M^2
GLUCOSE SERPL-MCNC: 92 MG/DL (ref 70–110)
HCT VFR BLD AUTO: 41.6 % (ref 37–48.5)
HGB BLD-MCNC: 12.9 G/DL (ref 12–16)
IMM GRANULOCYTES # BLD AUTO: 0.01 K/UL (ref 0–0.04)
IMM GRANULOCYTES NFR BLD AUTO: 0.2 % (ref 0–0.5)
LYMPHOCYTES # BLD AUTO: 1.8 K/UL (ref 1–4.8)
LYMPHOCYTES NFR BLD: 35.9 % (ref 18–48)
MCH RBC QN AUTO: 27.3 PG (ref 27–31)
MCHC RBC AUTO-ENTMCNC: 31 G/DL (ref 32–36)
MCV RBC AUTO: 88 FL (ref 82–98)
MONOCYTES # BLD AUTO: 0.4 K/UL (ref 0.3–1)
MONOCYTES NFR BLD: 8.4 % (ref 4–15)
NEUTROPHILS # BLD AUTO: 2.6 K/UL (ref 1.8–7.7)
NEUTROPHILS NFR BLD: 52.3 % (ref 38–73)
NRBC BLD-RTO: 0 /100 WBC
PLATELET # BLD AUTO: 359 K/UL (ref 150–450)
PMV BLD AUTO: 9.3 FL (ref 9.2–12.9)
POTASSIUM SERPL-SCNC: 3.9 MMOL/L (ref 3.5–5.1)
PROT SERPL-MCNC: 7.7 G/DL (ref 6–8.4)
RBC # BLD AUTO: 4.73 M/UL (ref 4–5.4)
SODIUM SERPL-SCNC: 133 MMOL/L (ref 136–145)
WBC # BLD AUTO: 5.02 K/UL (ref 3.9–12.7)

## 2021-05-19 PROCEDURE — 80150 ASSAY OF AMIKACIN: CPT | Performed by: INTERNAL MEDICINE

## 2021-05-19 PROCEDURE — 80053 COMPREHEN METABOLIC PANEL: CPT | Performed by: INTERNAL MEDICINE

## 2021-05-19 PROCEDURE — 36415 COLL VENOUS BLD VENIPUNCTURE: CPT | Performed by: INTERNAL MEDICINE

## 2021-05-19 PROCEDURE — 85651 RBC SED RATE NONAUTOMATED: CPT | Performed by: INTERNAL MEDICINE

## 2021-05-19 PROCEDURE — 86140 C-REACTIVE PROTEIN: CPT | Performed by: INTERNAL MEDICINE

## 2021-05-19 PROCEDURE — 85025 COMPLETE CBC W/AUTO DIFF WBC: CPT | Performed by: INTERNAL MEDICINE

## 2021-05-26 ENCOUNTER — TELEPHONE (OUTPATIENT)
Dept: INFECTIOUS DISEASES | Facility: CLINIC | Age: 66
End: 2021-05-26

## 2021-06-02 ENCOUNTER — TELEPHONE (OUTPATIENT)
Dept: INFECTIOUS DISEASES | Facility: CLINIC | Age: 66
End: 2021-06-02

## 2021-06-02 ENCOUNTER — OFFICE VISIT (OUTPATIENT)
Dept: INFECTIOUS DISEASES | Facility: CLINIC | Age: 66
End: 2021-06-02
Payer: COMMERCIAL

## 2021-06-02 ENCOUNTER — LAB VISIT (OUTPATIENT)
Dept: LAB | Facility: HOSPITAL | Age: 66
End: 2021-06-02
Attending: INTERNAL MEDICINE
Payer: COMMERCIAL

## 2021-06-02 VITALS
OXYGEN SATURATION: 96 % | HEIGHT: 64 IN | SYSTOLIC BLOOD PRESSURE: 116 MMHG | TEMPERATURE: 98 F | BODY MASS INDEX: 14.92 KG/M2 | DIASTOLIC BLOOD PRESSURE: 79 MMHG | WEIGHT: 87.38 LBS | HEART RATE: 78 BPM

## 2021-06-02 DIAGNOSIS — A31.0 MYCOBACTERIUM AVIUM-INTRACELLULARE COMPLEX: ICD-10-CM

## 2021-06-02 DIAGNOSIS — A31.0 MYCOBACTERIUM AVIUM-INTRACELLULARE COMPLEX: Primary | ICD-10-CM

## 2021-06-02 LAB
ALBUMIN SERPL BCP-MCNC: 3.9 G/DL (ref 3.5–5.2)
ALP SERPL-CCNC: 64 U/L (ref 55–135)
ALT SERPL W/O P-5'-P-CCNC: 12 U/L (ref 10–44)
ANION GAP SERPL CALC-SCNC: 10 MMOL/L (ref 8–16)
AST SERPL-CCNC: 20 U/L (ref 10–40)
BASOPHILS # BLD AUTO: 0.04 K/UL (ref 0–0.2)
BASOPHILS NFR BLD: 0.6 % (ref 0–1.9)
BILIRUB SERPL-MCNC: 0.8 MG/DL (ref 0.1–1)
BUN SERPL-MCNC: 13 MG/DL (ref 8–23)
CALCIUM SERPL-MCNC: 9.7 MG/DL (ref 8.7–10.5)
CHLORIDE SERPL-SCNC: 99 MMOL/L (ref 95–110)
CO2 SERPL-SCNC: 26 MMOL/L (ref 23–29)
CREAT SERPL-MCNC: 0.6 MG/DL (ref 0.5–1.4)
CRP SERPL-MCNC: 4.04 MG/DL
DIFFERENTIAL METHOD: ABNORMAL
EOSINOPHIL # BLD AUTO: 0.1 K/UL (ref 0–0.5)
EOSINOPHIL NFR BLD: 1.2 % (ref 0–8)
ERYTHROCYTE [DISTWIDTH] IN BLOOD BY AUTOMATED COUNT: 18.5 % (ref 11.5–14.5)
ERYTHROCYTE [SEDIMENTATION RATE] IN BLOOD BY WESTERGREN METHOD: 10 MM/HR (ref 0–20)
EST. GFR  (AFRICAN AMERICAN): >60 ML/MIN/1.73 M^2
EST. GFR  (NON AFRICAN AMERICAN): >60 ML/MIN/1.73 M^2
GLUCOSE SERPL-MCNC: 93 MG/DL (ref 70–110)
HCT VFR BLD AUTO: 44.7 % (ref 37–48.5)
HGB BLD-MCNC: 14.1 G/DL (ref 12–16)
IMM GRANULOCYTES # BLD AUTO: 0.02 K/UL (ref 0–0.04)
IMM GRANULOCYTES NFR BLD AUTO: 0.3 % (ref 0–0.5)
LYMPHOCYTES # BLD AUTO: 1.9 K/UL (ref 1–4.8)
LYMPHOCYTES NFR BLD: 29.9 % (ref 18–48)
MCH RBC QN AUTO: 27.8 PG (ref 27–31)
MCHC RBC AUTO-ENTMCNC: 31.5 G/DL (ref 32–36)
MCV RBC AUTO: 88 FL (ref 82–98)
MONOCYTES # BLD AUTO: 0.4 K/UL (ref 0.3–1)
MONOCYTES NFR BLD: 6.9 % (ref 4–15)
NEUTROPHILS # BLD AUTO: 3.9 K/UL (ref 1.8–7.7)
NEUTROPHILS NFR BLD: 61.1 % (ref 38–73)
NRBC BLD-RTO: 0 /100 WBC
PLATELET # BLD AUTO: 314 K/UL (ref 150–450)
PMV BLD AUTO: 9.1 FL (ref 9.2–12.9)
POTASSIUM SERPL-SCNC: 4.1 MMOL/L (ref 3.5–5.1)
PROT SERPL-MCNC: 8.6 G/DL (ref 6–8.4)
RBC # BLD AUTO: 5.08 M/UL (ref 4–5.4)
SODIUM SERPL-SCNC: 135 MMOL/L (ref 136–145)
WBC # BLD AUTO: 6.42 K/UL (ref 3.9–12.7)

## 2021-06-02 PROCEDURE — 99215 PR OFFICE/OUTPT VISIT, EST, LEVL V, 40-54 MIN: ICD-10-PCS | Mod: S$GLB,,, | Performed by: INTERNAL MEDICINE

## 2021-06-02 PROCEDURE — 80150 ASSAY OF AMIKACIN: CPT | Performed by: INTERNAL MEDICINE

## 2021-06-02 PROCEDURE — 1126F AMNT PAIN NOTED NONE PRSNT: CPT | Mod: S$GLB,,, | Performed by: INTERNAL MEDICINE

## 2021-06-02 PROCEDURE — 99215 OFFICE O/P EST HI 40 MIN: CPT | Mod: S$GLB,,, | Performed by: INTERNAL MEDICINE

## 2021-06-02 PROCEDURE — 36415 COLL VENOUS BLD VENIPUNCTURE: CPT | Performed by: INTERNAL MEDICINE

## 2021-06-02 PROCEDURE — 85025 COMPLETE CBC W/AUTO DIFF WBC: CPT | Performed by: INTERNAL MEDICINE

## 2021-06-02 PROCEDURE — 1126F PR PAIN SEVERITY QUANTIFIED, NO PAIN PRESENT: ICD-10-PCS | Mod: S$GLB,,, | Performed by: INTERNAL MEDICINE

## 2021-06-02 PROCEDURE — 85651 RBC SED RATE NONAUTOMATED: CPT | Performed by: INTERNAL MEDICINE

## 2021-06-02 PROCEDURE — 80053 COMPREHEN METABOLIC PANEL: CPT | Performed by: INTERNAL MEDICINE

## 2021-06-02 PROCEDURE — 86140 C-REACTIVE PROTEIN: CPT | Performed by: INTERNAL MEDICINE

## 2021-06-03 ENCOUNTER — TELEPHONE (OUTPATIENT)
Dept: PULMONOLOGY | Facility: CLINIC | Age: 66
End: 2021-06-03

## 2021-06-03 LAB — AMIKACIN SERPL-MCNC: <2 UG/ML

## 2021-06-07 ENCOUNTER — PATIENT OUTREACH (OUTPATIENT)
Dept: ADMINISTRATIVE | Facility: OTHER | Age: 66
End: 2021-06-07

## 2021-06-08 ENCOUNTER — OFFICE VISIT (OUTPATIENT)
Dept: PULMONOLOGY | Facility: CLINIC | Age: 66
End: 2021-06-08
Payer: COMMERCIAL

## 2021-06-08 ENCOUNTER — HOSPITAL ENCOUNTER (OUTPATIENT)
Dept: RADIOLOGY | Facility: HOSPITAL | Age: 66
Discharge: HOME | End: 2021-06-08
Attending: INTERNAL MEDICINE
Payer: COMMERCIAL

## 2021-06-08 VITALS
BODY MASS INDEX: 15.41 KG/M2 | HEART RATE: 80 BPM | DIASTOLIC BLOOD PRESSURE: 71 MMHG | SYSTOLIC BLOOD PRESSURE: 120 MMHG | WEIGHT: 90.25 LBS | HEIGHT: 64 IN | OXYGEN SATURATION: 98 %

## 2021-06-08 DIAGNOSIS — J47.0 BRONCHIECTASIS WITH ACUTE LOWER RESPIRATORY INFECTION: ICD-10-CM

## 2021-06-08 DIAGNOSIS — A31.0 PULMONARY INFECTION DUE TO MYCOBACTERIUM AVIUM: ICD-10-CM

## 2021-06-08 DIAGNOSIS — J44.0 CHRONIC OBSTRUCTIVE PULMONARY DISEASE WITH ACUTE LOWER RESPIRATORY INFECTION: ICD-10-CM

## 2021-06-08 DIAGNOSIS — J44.0 CHRONIC OBSTRUCTIVE PULMONARY DISEASE WITH ACUTE LOWER RESPIRATORY INFECTION: Primary | ICD-10-CM

## 2021-06-08 DIAGNOSIS — R91.8 ABNORMAL CT SCAN OF LUNG: ICD-10-CM

## 2021-06-08 DIAGNOSIS — J98.4 CAVITARY LUNG DISEASE: ICD-10-CM

## 2021-06-08 LAB
ACID FAST MOD KINY STN SPEC: ABNORMAL
MYCOBACTERIUM SPEC QL CULT: ABNORMAL

## 2021-06-08 PROCEDURE — 71046 X-RAY EXAM CHEST 2 VIEWS: CPT | Mod: 26,,, | Performed by: RADIOLOGY

## 2021-06-08 PROCEDURE — 3008F PR BODY MASS INDEX (BMI) DOCUMENTED: ICD-10-PCS | Mod: CPTII,S$GLB,, | Performed by: INTERNAL MEDICINE

## 2021-06-08 PROCEDURE — 71046 XR CHEST PA AND LATERAL: ICD-10-PCS | Mod: 26,,, | Performed by: RADIOLOGY

## 2021-06-08 PROCEDURE — 71046 X-RAY EXAM CHEST 2 VIEWS: CPT | Mod: TC,FY

## 2021-06-08 PROCEDURE — 1126F PR PAIN SEVERITY QUANTIFIED, NO PAIN PRESENT: ICD-10-PCS | Mod: S$GLB,,, | Performed by: INTERNAL MEDICINE

## 2021-06-08 PROCEDURE — 99214 PR OFFICE/OUTPT VISIT, EST, LEVL IV, 30-39 MIN: ICD-10-PCS | Mod: S$GLB,,, | Performed by: INTERNAL MEDICINE

## 2021-06-08 PROCEDURE — 1126F AMNT PAIN NOTED NONE PRSNT: CPT | Mod: S$GLB,,, | Performed by: INTERNAL MEDICINE

## 2021-06-08 PROCEDURE — 99214 OFFICE O/P EST MOD 30 MIN: CPT | Mod: S$GLB,,, | Performed by: INTERNAL MEDICINE

## 2021-06-08 PROCEDURE — 99999 PR PBB SHADOW E&M-EST. PATIENT-LVL IV: ICD-10-PCS | Mod: PBBFAC,,, | Performed by: INTERNAL MEDICINE

## 2021-06-08 PROCEDURE — 99999 PR PBB SHADOW E&M-EST. PATIENT-LVL IV: CPT | Mod: PBBFAC,,, | Performed by: INTERNAL MEDICINE

## 2021-06-08 PROCEDURE — 3008F BODY MASS INDEX DOCD: CPT | Mod: CPTII,S$GLB,, | Performed by: INTERNAL MEDICINE

## 2021-06-08 RX ORDER — PREDNISONE 20 MG/1
TABLET ORAL
COMMUNITY
Start: 2020-12-16 | End: 2021-12-13

## 2021-06-10 ENCOUNTER — LAB VISIT (OUTPATIENT)
Dept: LAB | Facility: HOSPITAL | Age: 66
End: 2021-06-10
Attending: INTERNAL MEDICINE
Payer: COMMERCIAL

## 2021-06-10 DIAGNOSIS — A31.0 MYCOBACTERIUM AVIUM-INTRACELLULARE COMPLEX: ICD-10-CM

## 2021-06-10 PROCEDURE — 87206 SMEAR FLUORESCENT/ACID STAI: CPT | Performed by: INTERNAL MEDICINE

## 2021-06-10 PROCEDURE — 87116 MYCOBACTERIA CULTURE: CPT | Performed by: INTERNAL MEDICINE

## 2021-06-10 PROCEDURE — 87015 SPECIMEN INFECT AGNT CONCNTJ: CPT | Performed by: INTERNAL MEDICINE

## 2021-06-25 ENCOUNTER — TELEPHONE (OUTPATIENT)
Dept: INFECTIOUS DISEASES | Facility: HOSPITAL | Age: 66
End: 2021-06-25

## 2021-07-07 ENCOUNTER — PATIENT MESSAGE (OUTPATIENT)
Dept: ADMINISTRATIVE | Facility: HOSPITAL | Age: 66
End: 2021-07-07

## 2021-07-20 ENCOUNTER — OFFICE VISIT (OUTPATIENT)
Dept: INFECTIOUS DISEASES | Facility: CLINIC | Age: 66
End: 2021-07-20
Payer: COMMERCIAL

## 2021-07-20 ENCOUNTER — TELEPHONE (OUTPATIENT)
Dept: PULMONOLOGY | Facility: CLINIC | Age: 66
End: 2021-07-20

## 2021-07-20 ENCOUNTER — HOSPITAL ENCOUNTER (OUTPATIENT)
Dept: RADIOLOGY | Facility: HOSPITAL | Age: 66
Discharge: HOME | End: 2021-07-20
Attending: INTERNAL MEDICINE
Payer: COMMERCIAL

## 2021-07-20 ENCOUNTER — PATIENT OUTREACH (OUTPATIENT)
Dept: ADMINISTRATIVE | Facility: OTHER | Age: 66
End: 2021-07-20

## 2021-07-20 VITALS
DIASTOLIC BLOOD PRESSURE: 98 MMHG | OXYGEN SATURATION: 92 % | HEART RATE: 90 BPM | SYSTOLIC BLOOD PRESSURE: 149 MMHG | WEIGHT: 90 LBS | BODY MASS INDEX: 15.36 KG/M2 | HEIGHT: 64 IN | TEMPERATURE: 97 F

## 2021-07-20 DIAGNOSIS — J44.0 CHRONIC OBSTRUCTIVE PULMONARY DISEASE WITH ACUTE LOWER RESPIRATORY INFECTION: ICD-10-CM

## 2021-07-20 DIAGNOSIS — D84.9 IMMUNE DEFICIENCY DISORDER: ICD-10-CM

## 2021-07-20 DIAGNOSIS — A31.8 INFECTION DUE TO MYCOBACTERIUM ABSCESSUS: ICD-10-CM

## 2021-07-20 DIAGNOSIS — J44.1 CHRONIC OBSTRUCTIVE PULMONARY DISEASE WITH ACUTE EXACERBATION: ICD-10-CM

## 2021-07-20 DIAGNOSIS — A31.0 MYCOBACTERIUM AVIUM-INTRACELLULARE COMPLEX: Primary | ICD-10-CM

## 2021-07-20 DIAGNOSIS — B37.0 THRUSH: ICD-10-CM

## 2021-07-20 DIAGNOSIS — J93.9 PNEUMOTHORAX ON RIGHT: ICD-10-CM

## 2021-07-20 DIAGNOSIS — E43 SEVERE MALNUTRITION: ICD-10-CM

## 2021-07-20 DIAGNOSIS — R19.7 DIARRHEA, UNSPECIFIED TYPE: ICD-10-CM

## 2021-07-20 DIAGNOSIS — J15.1 PNEUMONIA DUE TO PSEUDOMONAS AERUGINOSA: ICD-10-CM

## 2021-07-20 PROCEDURE — 71046 X-RAY EXAM CHEST 2 VIEWS: CPT | Mod: 26,,, | Performed by: RADIOLOGY

## 2021-07-20 PROCEDURE — 99215 OFFICE O/P EST HI 40 MIN: CPT | Mod: S$GLB,,, | Performed by: INTERNAL MEDICINE

## 2021-07-20 PROCEDURE — 99215 PR OFFICE/OUTPT VISIT, EST, LEVL V, 40-54 MIN: ICD-10-PCS | Mod: S$GLB,,, | Performed by: INTERNAL MEDICINE

## 2021-07-20 PROCEDURE — 71046 X-RAY EXAM CHEST 2 VIEWS: CPT | Mod: TC,FY

## 2021-07-20 PROCEDURE — 1126F AMNT PAIN NOTED NONE PRSNT: CPT | Mod: S$GLB,,, | Performed by: INTERNAL MEDICINE

## 2021-07-20 PROCEDURE — 71046 XR CHEST PA AND LATERAL: ICD-10-PCS | Mod: 26,,, | Performed by: RADIOLOGY

## 2021-07-20 PROCEDURE — 1126F PR PAIN SEVERITY QUANTIFIED, NO PAIN PRESENT: ICD-10-PCS | Mod: S$GLB,,, | Performed by: INTERNAL MEDICINE

## 2021-07-20 RX ORDER — FLUCONAZOLE 100 MG/1
200 TABLET ORAL DAILY
Qty: 60 TABLET | Refills: 0 | Status: SHIPPED | OUTPATIENT
Start: 2021-07-20 | End: 2021-08-02

## 2021-07-20 RX ORDER — CHOLESTYRAMINE 4 G/9G
4 POWDER, FOR SUSPENSION ORAL 2 TIMES DAILY
Qty: 180 PACKET | Refills: 0 | Status: SHIPPED | OUTPATIENT
Start: 2021-07-20 | End: 2022-01-01

## 2021-07-20 RX ORDER — NYSTATIN 100000 [USP'U]/ML
6 SUSPENSION ORAL
Qty: 240 ML | Refills: 3 | Status: SHIPPED | OUTPATIENT
Start: 2021-07-20 | End: 2021-07-30

## 2021-07-20 RX ORDER — AMIKACIN 590 MG/8.4ML
SUSPENSION RESPIRATORY (INHALATION)
COMMUNITY
Start: 2021-07-08 | End: 2021-08-03

## 2021-07-22 ENCOUNTER — TELEPHONE (OUTPATIENT)
Dept: PULMONOLOGY | Facility: CLINIC | Age: 66
End: 2021-07-22

## 2021-07-22 ENCOUNTER — OFFICE VISIT (OUTPATIENT)
Dept: PULMONOLOGY | Facility: CLINIC | Age: 66
End: 2021-07-22
Payer: COMMERCIAL

## 2021-07-22 ENCOUNTER — LAB VISIT (OUTPATIENT)
Dept: LAB | Facility: HOSPITAL | Age: 66
End: 2021-07-22
Attending: INTERNAL MEDICINE
Payer: COMMERCIAL

## 2021-07-22 VITALS
HEIGHT: 64 IN | WEIGHT: 81.25 LBS | BODY MASS INDEX: 13.87 KG/M2 | SYSTOLIC BLOOD PRESSURE: 154 MMHG | HEART RATE: 93 BPM | DIASTOLIC BLOOD PRESSURE: 84 MMHG | OXYGEN SATURATION: 96 %

## 2021-07-22 DIAGNOSIS — J44.1 CHRONIC OBSTRUCTIVE PULMONARY DISEASE WITH ACUTE EXACERBATION: ICD-10-CM

## 2021-07-22 DIAGNOSIS — J47.9 BRONCHIECTASIS WITHOUT COMPLICATION: Primary | ICD-10-CM

## 2021-07-22 DIAGNOSIS — J98.4 CAVITARY LUNG DISEASE: ICD-10-CM

## 2021-07-22 DIAGNOSIS — A31.0 MYCOBACTERIUM AVIUM-INTRACELLULARE COMPLEX: ICD-10-CM

## 2021-07-22 LAB
ANION GAP SERPL CALC-SCNC: 15 MMOL/L (ref 8–16)
BUN SERPL-MCNC: 13 MG/DL (ref 8–23)
CALCIUM SERPL-MCNC: 11.2 MG/DL (ref 8.7–10.5)
CHLORIDE SERPL-SCNC: 95 MMOL/L (ref 95–110)
CO2 SERPL-SCNC: 27 MMOL/L (ref 23–29)
CREAT SERPL-MCNC: 0.7 MG/DL (ref 0.5–1.4)
EST. GFR  (AFRICAN AMERICAN): >60 ML/MIN/1.73 M^2
EST. GFR  (NON AFRICAN AMERICAN): >60 ML/MIN/1.73 M^2
GLUCOSE SERPL-MCNC: 117 MG/DL (ref 70–110)
POTASSIUM SERPL-SCNC: 4.4 MMOL/L (ref 3.5–5.1)
SODIUM SERPL-SCNC: 137 MMOL/L (ref 136–145)

## 2021-07-22 PROCEDURE — 36415 COLL VENOUS BLD VENIPUNCTURE: CPT | Performed by: INTERNAL MEDICINE

## 2021-07-22 PROCEDURE — 3008F BODY MASS INDEX DOCD: CPT | Mod: CPTII,S$GLB,, | Performed by: INTERNAL MEDICINE

## 2021-07-22 PROCEDURE — 99999 PR PBB SHADOW E&M-EST. PATIENT-LVL IV: CPT | Mod: PBBFAC,,, | Performed by: INTERNAL MEDICINE

## 2021-07-22 PROCEDURE — 3079F DIAST BP 80-89 MM HG: CPT | Mod: CPTII,S$GLB,, | Performed by: INTERNAL MEDICINE

## 2021-07-22 PROCEDURE — 99214 OFFICE O/P EST MOD 30 MIN: CPT | Mod: S$GLB,,, | Performed by: INTERNAL MEDICINE

## 2021-07-22 PROCEDURE — 3077F SYST BP >= 140 MM HG: CPT | Mod: CPTII,S$GLB,, | Performed by: INTERNAL MEDICINE

## 2021-07-22 PROCEDURE — 1126F PR PAIN SEVERITY QUANTIFIED, NO PAIN PRESENT: ICD-10-PCS | Mod: CPTII,S$GLB,, | Performed by: INTERNAL MEDICINE

## 2021-07-22 PROCEDURE — 3008F PR BODY MASS INDEX (BMI) DOCUMENTED: ICD-10-PCS | Mod: CPTII,S$GLB,, | Performed by: INTERNAL MEDICINE

## 2021-07-22 PROCEDURE — 3077F PR MOST RECENT SYSTOLIC BLOOD PRESSURE >= 140 MM HG: ICD-10-PCS | Mod: CPTII,S$GLB,, | Performed by: INTERNAL MEDICINE

## 2021-07-22 PROCEDURE — 99999 PR PBB SHADOW E&M-EST. PATIENT-LVL IV: ICD-10-PCS | Mod: PBBFAC,,, | Performed by: INTERNAL MEDICINE

## 2021-07-22 PROCEDURE — 99214 PR OFFICE/OUTPT VISIT, EST, LEVL IV, 30-39 MIN: ICD-10-PCS | Mod: S$GLB,,, | Performed by: INTERNAL MEDICINE

## 2021-07-22 PROCEDURE — 3079F PR MOST RECENT DIASTOLIC BLOOD PRESSURE 80-89 MM HG: ICD-10-PCS | Mod: CPTII,S$GLB,, | Performed by: INTERNAL MEDICINE

## 2021-07-22 PROCEDURE — 1126F AMNT PAIN NOTED NONE PRSNT: CPT | Mod: CPTII,S$GLB,, | Performed by: INTERNAL MEDICINE

## 2021-07-22 RX ORDER — FLUTICASONE FUROATE, UMECLIDINIUM BROMIDE AND VILANTEROL TRIFENATATE 200; 62.5; 25 UG/1; UG/1; UG/1
1 POWDER RESPIRATORY (INHALATION) DAILY
Qty: 60 EACH | Refills: 11 | Status: ON HOLD | OUTPATIENT
Start: 2021-07-22 | End: 2021-12-16 | Stop reason: HOSPADM

## 2021-07-22 RX ORDER — CHOLESTYRAMINE 4 G/5.5G
POWDER, FOR SUSPENSION ORAL
COMMUNITY
Start: 2021-07-20 | End: 2022-01-01

## 2021-07-22 RX ORDER — PREDNISONE 20 MG/1
TABLET ORAL
Qty: 36 TABLET | Refills: 1 | Status: SHIPPED | OUTPATIENT
Start: 2021-07-22 | End: 2021-08-02 | Stop reason: SDUPTHER

## 2021-07-29 LAB
ACID FAST MOD KINY STN SPEC: NORMAL
MYCOBACTERIUM SPEC QL CULT: NORMAL

## 2021-08-02 ENCOUNTER — OFFICE VISIT (OUTPATIENT)
Dept: PULMONOLOGY | Facility: CLINIC | Age: 66
End: 2021-08-02
Payer: COMMERCIAL

## 2021-08-02 VITALS
SYSTOLIC BLOOD PRESSURE: 142 MMHG | HEART RATE: 74 BPM | DIASTOLIC BLOOD PRESSURE: 71 MMHG | HEIGHT: 64 IN | BODY MASS INDEX: 14.04 KG/M2 | WEIGHT: 82.25 LBS | OXYGEN SATURATION: 100 %

## 2021-08-02 DIAGNOSIS — J98.4 CAVITARY LUNG DISEASE: ICD-10-CM

## 2021-08-02 DIAGNOSIS — J44.0 CHRONIC OBSTRUCTIVE PULMONARY DISEASE WITH ACUTE LOWER RESPIRATORY INFECTION: ICD-10-CM

## 2021-08-02 DIAGNOSIS — A31.0 PULMONARY INFECTION DUE TO MYCOBACTERIUM AVIUM: ICD-10-CM

## 2021-08-02 DIAGNOSIS — J44.1 CHRONIC OBSTRUCTIVE PULMONARY DISEASE WITH ACUTE EXACERBATION: ICD-10-CM

## 2021-08-02 DIAGNOSIS — A31.0 MYCOBACTERIUM AVIUM-INTRACELLULARE COMPLEX: ICD-10-CM

## 2021-08-02 DIAGNOSIS — J47.0 BRONCHIECTASIS WITH ACUTE LOWER RESPIRATORY INFECTION: Primary | ICD-10-CM

## 2021-08-02 DIAGNOSIS — D84.9 IMMUNE DEFICIENCY DISORDER: ICD-10-CM

## 2021-08-02 PROCEDURE — 3008F PR BODY MASS INDEX (BMI) DOCUMENTED: ICD-10-PCS | Mod: CPTII,S$GLB,, | Performed by: INTERNAL MEDICINE

## 2021-08-02 PROCEDURE — 99214 OFFICE O/P EST MOD 30 MIN: CPT | Mod: S$GLB,,, | Performed by: INTERNAL MEDICINE

## 2021-08-02 PROCEDURE — 3078F DIAST BP <80 MM HG: CPT | Mod: CPTII,S$GLB,, | Performed by: INTERNAL MEDICINE

## 2021-08-02 PROCEDURE — 99214 PR OFFICE/OUTPT VISIT, EST, LEVL IV, 30-39 MIN: ICD-10-PCS | Mod: S$GLB,,, | Performed by: INTERNAL MEDICINE

## 2021-08-02 PROCEDURE — 1126F PR PAIN SEVERITY QUANTIFIED, NO PAIN PRESENT: ICD-10-PCS | Mod: CPTII,S$GLB,, | Performed by: INTERNAL MEDICINE

## 2021-08-02 PROCEDURE — 3077F PR MOST RECENT SYSTOLIC BLOOD PRESSURE >= 140 MM HG: ICD-10-PCS | Mod: CPTII,S$GLB,, | Performed by: INTERNAL MEDICINE

## 2021-08-02 PROCEDURE — 3078F PR MOST RECENT DIASTOLIC BLOOD PRESSURE < 80 MM HG: ICD-10-PCS | Mod: CPTII,S$GLB,, | Performed by: INTERNAL MEDICINE

## 2021-08-02 PROCEDURE — 3008F BODY MASS INDEX DOCD: CPT | Mod: CPTII,S$GLB,, | Performed by: INTERNAL MEDICINE

## 2021-08-02 PROCEDURE — 3077F SYST BP >= 140 MM HG: CPT | Mod: CPTII,S$GLB,, | Performed by: INTERNAL MEDICINE

## 2021-08-02 PROCEDURE — 1126F AMNT PAIN NOTED NONE PRSNT: CPT | Mod: CPTII,S$GLB,, | Performed by: INTERNAL MEDICINE

## 2021-08-02 PROCEDURE — 1159F MED LIST DOCD IN RCRD: CPT | Mod: CPTII,S$GLB,, | Performed by: INTERNAL MEDICINE

## 2021-08-02 PROCEDURE — 99999 PR PBB SHADOW E&M-EST. PATIENT-LVL IV: ICD-10-PCS | Mod: PBBFAC,,, | Performed by: INTERNAL MEDICINE

## 2021-08-02 PROCEDURE — 99999 PR PBB SHADOW E&M-EST. PATIENT-LVL IV: CPT | Mod: PBBFAC,,, | Performed by: INTERNAL MEDICINE

## 2021-08-02 PROCEDURE — 1159F PR MEDICATION LIST DOCUMENTED IN MEDICAL RECORD: ICD-10-PCS | Mod: CPTII,S$GLB,, | Performed by: INTERNAL MEDICINE

## 2021-08-02 RX ORDER — CIPROFLOXACIN 500 MG/1
500 TABLET ORAL 2 TIMES DAILY
Qty: 28 TABLET | Refills: 0 | Status: SHIPPED | OUTPATIENT
Start: 2021-08-02 | End: 2021-12-13

## 2021-08-02 RX ORDER — PREDNISONE 20 MG/1
TABLET ORAL
Qty: 36 TABLET | Refills: 1 | Status: SHIPPED | OUTPATIENT
Start: 2021-08-02 | End: 2021-12-13

## 2021-08-02 RX ORDER — AZITHROMYCIN 500 MG/1
500 TABLET, FILM COATED ORAL DAILY
Qty: 90 TABLET | Refills: 3 | Status: SHIPPED | OUTPATIENT
Start: 2021-08-02 | End: 2021-11-03 | Stop reason: SDUPTHER

## 2021-08-03 ENCOUNTER — OFFICE VISIT (OUTPATIENT)
Dept: INFECTIOUS DISEASES | Facility: CLINIC | Age: 66
End: 2021-08-03
Payer: COMMERCIAL

## 2021-08-03 VITALS
WEIGHT: 82 LBS | BODY MASS INDEX: 14 KG/M2 | HEIGHT: 64 IN | SYSTOLIC BLOOD PRESSURE: 128 MMHG | TEMPERATURE: 97 F | DIASTOLIC BLOOD PRESSURE: 80 MMHG | OXYGEN SATURATION: 98 % | HEART RATE: 73 BPM

## 2021-08-03 DIAGNOSIS — A31.8 INFECTION DUE TO MYCOBACTERIUM ABSCESSUS: ICD-10-CM

## 2021-08-03 DIAGNOSIS — A31.0 MYCOBACTERIUM AVIUM-INTRACELLULARE COMPLEX: Primary | ICD-10-CM

## 2021-08-03 PROCEDURE — 3074F PR MOST RECENT SYSTOLIC BLOOD PRESSURE < 130 MM HG: ICD-10-PCS | Mod: S$GLB,,, | Performed by: INTERNAL MEDICINE

## 2021-08-03 PROCEDURE — 1160F RVW MEDS BY RX/DR IN RCRD: CPT | Mod: S$GLB,,, | Performed by: INTERNAL MEDICINE

## 2021-08-03 PROCEDURE — 3079F PR MOST RECENT DIASTOLIC BLOOD PRESSURE 80-89 MM HG: ICD-10-PCS | Mod: S$GLB,,, | Performed by: INTERNAL MEDICINE

## 2021-08-03 PROCEDURE — 1126F AMNT PAIN NOTED NONE PRSNT: CPT | Mod: S$GLB,,, | Performed by: INTERNAL MEDICINE

## 2021-08-03 PROCEDURE — 99215 OFFICE O/P EST HI 40 MIN: CPT | Mod: S$GLB,,, | Performed by: INTERNAL MEDICINE

## 2021-08-03 PROCEDURE — 3074F SYST BP LT 130 MM HG: CPT | Mod: S$GLB,,, | Performed by: INTERNAL MEDICINE

## 2021-08-03 PROCEDURE — 1160F PR REVIEW ALL MEDS BY PRESCRIBER/CLIN PHARMACIST DOCUMENTED: ICD-10-PCS | Mod: S$GLB,,, | Performed by: INTERNAL MEDICINE

## 2021-08-03 PROCEDURE — 1126F PR PAIN SEVERITY QUANTIFIED, NO PAIN PRESENT: ICD-10-PCS | Mod: S$GLB,,, | Performed by: INTERNAL MEDICINE

## 2021-08-03 PROCEDURE — 3008F PR BODY MASS INDEX (BMI) DOCUMENTED: ICD-10-PCS | Mod: S$GLB,,, | Performed by: INTERNAL MEDICINE

## 2021-08-03 PROCEDURE — 99215 PR OFFICE/OUTPT VISIT, EST, LEVL V, 40-54 MIN: ICD-10-PCS | Mod: S$GLB,,, | Performed by: INTERNAL MEDICINE

## 2021-08-03 PROCEDURE — 3079F DIAST BP 80-89 MM HG: CPT | Mod: S$GLB,,, | Performed by: INTERNAL MEDICINE

## 2021-08-03 PROCEDURE — 3008F BODY MASS INDEX DOCD: CPT | Mod: S$GLB,,, | Performed by: INTERNAL MEDICINE

## 2021-08-12 ENCOUNTER — LAB VISIT (OUTPATIENT)
Dept: LAB | Facility: HOSPITAL | Age: 66
End: 2021-08-12
Attending: INTERNAL MEDICINE
Payer: COMMERCIAL

## 2021-08-12 DIAGNOSIS — A31.0 MYCOBACTERIUM AVIUM-INTRACELLULARE COMPLEX: ICD-10-CM

## 2021-08-12 PROCEDURE — 87186 SC STD MICRODIL/AGAR DIL: CPT | Performed by: INTERNAL MEDICINE

## 2021-08-12 PROCEDURE — 87015 SPECIMEN INFECT AGNT CONCNTJ: CPT | Performed by: INTERNAL MEDICINE

## 2021-08-12 PROCEDURE — 87206 SMEAR FLUORESCENT/ACID STAI: CPT | Performed by: INTERNAL MEDICINE

## 2021-08-12 PROCEDURE — 87116 MYCOBACTERIA CULTURE: CPT | Performed by: INTERNAL MEDICINE

## 2021-08-12 PROCEDURE — 87118 MYCOBACTERIC IDENTIFICATION: CPT | Mod: 59 | Performed by: INTERNAL MEDICINE

## 2021-08-12 PROCEDURE — 87149 DNA/RNA DIRECT PROBE: CPT | Performed by: INTERNAL MEDICINE

## 2021-08-16 ENCOUNTER — TELEPHONE (OUTPATIENT)
Dept: PULMONOLOGY | Facility: CLINIC | Age: 66
End: 2021-08-16

## 2021-08-27 ENCOUNTER — TELEPHONE (OUTPATIENT)
Dept: INFECTIOUS DISEASES | Facility: CLINIC | Age: 66
End: 2021-08-27

## 2021-09-24 ENCOUNTER — PATIENT OUTREACH (OUTPATIENT)
Dept: ADMINISTRATIVE | Facility: OTHER | Age: 66
End: 2021-09-24

## 2021-09-27 ENCOUNTER — OFFICE VISIT (OUTPATIENT)
Dept: PULMONOLOGY | Facility: CLINIC | Age: 66
End: 2021-09-27
Payer: COMMERCIAL

## 2021-09-27 VITALS
DIASTOLIC BLOOD PRESSURE: 62 MMHG | HEART RATE: 73 BPM | BODY MASS INDEX: 15.4 KG/M2 | HEIGHT: 64 IN | OXYGEN SATURATION: 97 % | SYSTOLIC BLOOD PRESSURE: 109 MMHG | WEIGHT: 90.19 LBS

## 2021-09-27 DIAGNOSIS — J98.4 CAVITARY LUNG DISEASE: ICD-10-CM

## 2021-09-27 DIAGNOSIS — J44.1 CHRONIC OBSTRUCTIVE PULMONARY DISEASE WITH ACUTE EXACERBATION: ICD-10-CM

## 2021-09-27 DIAGNOSIS — J47.0 BRONCHIECTASIS WITH ACUTE LOWER RESPIRATORY INFECTION: Primary | ICD-10-CM

## 2021-09-27 PROCEDURE — 1159F PR MEDICATION LIST DOCUMENTED IN MEDICAL RECORD: ICD-10-PCS | Mod: CPTII,S$GLB,, | Performed by: INTERNAL MEDICINE

## 2021-09-27 PROCEDURE — 99999 PR PBB SHADOW E&M-EST. PATIENT-LVL V: CPT | Mod: PBBFAC,,, | Performed by: INTERNAL MEDICINE

## 2021-09-27 PROCEDURE — 1159F MED LIST DOCD IN RCRD: CPT | Mod: CPTII,S$GLB,, | Performed by: INTERNAL MEDICINE

## 2021-09-27 PROCEDURE — 3078F DIAST BP <80 MM HG: CPT | Mod: CPTII,S$GLB,, | Performed by: INTERNAL MEDICINE

## 2021-09-27 PROCEDURE — 3074F PR MOST RECENT SYSTOLIC BLOOD PRESSURE < 130 MM HG: ICD-10-PCS | Mod: CPTII,S$GLB,, | Performed by: INTERNAL MEDICINE

## 2021-09-27 PROCEDURE — 3008F BODY MASS INDEX DOCD: CPT | Mod: CPTII,S$GLB,, | Performed by: INTERNAL MEDICINE

## 2021-09-27 PROCEDURE — 3078F PR MOST RECENT DIASTOLIC BLOOD PRESSURE < 80 MM HG: ICD-10-PCS | Mod: CPTII,S$GLB,, | Performed by: INTERNAL MEDICINE

## 2021-09-27 PROCEDURE — 99999 PR PBB SHADOW E&M-EST. PATIENT-LVL V: ICD-10-PCS | Mod: PBBFAC,,, | Performed by: INTERNAL MEDICINE

## 2021-09-27 PROCEDURE — 3074F SYST BP LT 130 MM HG: CPT | Mod: CPTII,S$GLB,, | Performed by: INTERNAL MEDICINE

## 2021-09-27 PROCEDURE — 3008F PR BODY MASS INDEX (BMI) DOCUMENTED: ICD-10-PCS | Mod: CPTII,S$GLB,, | Performed by: INTERNAL MEDICINE

## 2021-09-27 PROCEDURE — 99213 OFFICE O/P EST LOW 20 MIN: CPT | Mod: S$GLB,,, | Performed by: INTERNAL MEDICINE

## 2021-09-27 PROCEDURE — 99213 PR OFFICE/OUTPT VISIT, EST, LEVL III, 20-29 MIN: ICD-10-PCS | Mod: S$GLB,,, | Performed by: INTERNAL MEDICINE

## 2021-10-07 ENCOUNTER — PATIENT MESSAGE (OUTPATIENT)
Dept: ADMINISTRATIVE | Facility: HOSPITAL | Age: 66
End: 2021-10-07

## 2021-10-13 ENCOUNTER — HOSPITAL ENCOUNTER (OUTPATIENT)
Dept: RADIOLOGY | Facility: HOSPITAL | Age: 66
Discharge: HOME | End: 2021-10-13
Attending: INTERNAL MEDICINE
Payer: COMMERCIAL

## 2021-10-13 DIAGNOSIS — J98.4 CAVITARY LUNG DISEASE: ICD-10-CM

## 2021-10-13 PROCEDURE — 71250 CT THORAX DX C-: CPT | Mod: TC

## 2021-10-13 PROCEDURE — 71250 CT CHEST WITHOUT CONTRAST: ICD-10-PCS | Mod: 26,,, | Performed by: RADIOLOGY

## 2021-10-13 PROCEDURE — 71250 CT THORAX DX C-: CPT | Mod: 26,,, | Performed by: RADIOLOGY

## 2021-10-14 ENCOUNTER — PATIENT MESSAGE (OUTPATIENT)
Dept: PULMONOLOGY | Facility: CLINIC | Age: 66
End: 2021-10-14
Payer: COMMERCIAL

## 2021-10-18 LAB — ACID FAST SUSCEPTIBILITY, SLOW GROWER: ABNORMAL

## 2021-11-03 ENCOUNTER — OFFICE VISIT (OUTPATIENT)
Dept: INFECTIOUS DISEASES | Facility: CLINIC | Age: 66
End: 2021-11-03
Payer: MEDICARE

## 2021-11-03 VITALS
BODY MASS INDEX: 15.98 KG/M2 | DIASTOLIC BLOOD PRESSURE: 74 MMHG | OXYGEN SATURATION: 95 % | HEIGHT: 64 IN | TEMPERATURE: 97 F | WEIGHT: 93.63 LBS | HEART RATE: 78 BPM | SYSTOLIC BLOOD PRESSURE: 110 MMHG

## 2021-11-03 DIAGNOSIS — A31.0 MYCOBACTERIUM AVIUM-INTRACELLULARE COMPLEX: ICD-10-CM

## 2021-11-03 PROCEDURE — 99215 PR OFFICE/OUTPT VISIT, EST, LEVL V, 40-54 MIN: ICD-10-PCS | Mod: S$GLB,,, | Performed by: INTERNAL MEDICINE

## 2021-11-03 PROCEDURE — 99215 OFFICE O/P EST HI 40 MIN: CPT | Mod: S$GLB,,, | Performed by: INTERNAL MEDICINE

## 2021-11-03 RX ORDER — RIFABUTIN 150 MG/1
300 CAPSULE ORAL DAILY
Qty: 60 CAPSULE | Refills: 5 | Status: SHIPPED | OUTPATIENT
Start: 2021-11-03 | End: 2022-01-01

## 2021-11-03 RX ORDER — ETHAMBUTOL HYDROCHLORIDE 100 MG/1
700 TABLET, FILM COATED ORAL DAILY
Qty: 630 TABLET | Refills: 6 | Status: SHIPPED | OUTPATIENT
Start: 2021-11-03 | End: 2022-01-01 | Stop reason: SDUPTHER

## 2021-11-03 RX ORDER — AZITHROMYCIN 500 MG/1
500 TABLET, FILM COATED ORAL DAILY
Qty: 90 TABLET | Refills: 3 | Status: SHIPPED | OUTPATIENT
Start: 2021-11-03 | End: 2022-01-01 | Stop reason: SDUPTHER

## 2021-11-15 NOTE — PROGRESS NOTES
"[facility]  Adult Nutrition  Progress Note    SUMMARY   Intervention: general healthful diet, and nutrition supplement therapy     Recommendation:   1) continue regular diet + boost plus with meals   2) weigh pt weekly, replete iron and lytes if needed   3) nutrition education verbally given     Goals: 1) PO intakes > 50% of meals/supplements at f/u  Nutrition Goal Status: meeting-continues  Communication of RD Recs:  POC, sticky note, reviewed with RN     Reason for Assessment     Reason For Assessment: follow up  Diagnosis: (pneumothorax)  Relevant Medical History: erosive gastritis, colitis, ETOH abuse, COPD, diverticulitis, smoking  Interdisciplinary Rounds: did not attend     General Information Comments: 64 y/o female admitted with pneumothorax, ruling out TB. + nasal cannula, pt breathing a bit better today and ate 1/2 cup eggs, 1/3 cup oatmeal, 100% OJ, 100% boost plus. PTA pt was eating with poor appetite x at least 3 weeks, drinking 1-2 Boost regular daily. NFPE done 12/1/20, moderate-severe wasting seen. insignificant wt loss x 7 months ( overall 25% x 1.5 years).  12/3/20 Pt NPO for procedure this morning. Yesterday pt state she ate 100% of her breakfast, 3 Boost plus, 0% of lunch, and 75% of dinner.   12/9/20 Pt typically eating 2 meals daily. Ate 100% of dinner and breakfast and drank all her boost yesterday per RN. Noted to have iron deficiency anemia.     Nutrition Discharge Planning: to be determined- Regular + boost plus 2-3 x daily     Nutrition Risk Screen     Nutrition Risk Screen: no indicators present     Nutrition/Diet History     Patient Reported Diet/Restrictions/Preferences: general  Spiritual, Cultural Beliefs, Uatsdin Practices, Values that Affect Care: no  Food Allergies: NKFA  Factors Affecting Nutritional Intake: none at this time     Anthropometrics     Height Method: Measured(office 11/9/20)  Height: 5' 4"  Height (inches): 64 in  Weight Method: Bed " Scale  Weight: 45.3 kg , 44 kg 20, 42.2 kg (admit)  Weight (lb): 99 lb  Ideal Body Weight (IBW), Female: 120 lb  BMI (Calculated): 17 k/gm2  BMI Grade: underweight  Weight Loss: unintentional  Usual Body Weight (UBW), k.3 kg(19)  Weight Change Amount: (46.5 kg 20)     Lab/Procedures/Meds     Pertinent Labs Reviewed: reviewed  BMP  Lab Results   Component Value Date     2020    K 4.3 2020    CL 96 2020    CO2 34 (H) 2020    BUN 18 2020    CREATININE 0.6 2020    CALCIUM 9.2 2020    ANIONGAP 9 2020    ESTGFRAFRICA >60 2020    EGFRNONAA >60 2020     Lab Results   Component Value Date    IRON 29 (L) 2020    TIBC 337 2020    FERRITIN 343 (H) 2020     Lab Results   Component Value Date    ALBUMIN 2.5 (L) 2020       Pertinent Medications Reviewed: reviewed  Iron, probiotic, prednisone, zofran     Estimated/Assessed Needs   admission  Weight Used For Calorie Calculations: 42.2 kg (93 lb 0.6 oz)  Energy Calorie Requirements (kcal): MSJ ( x 1.5) = 1435 kcal  Energy Need Method: [place] Gonsales  Protein Requirements: 1.2-1.5 g protein/kg ( wasting) = 50-63 g protein  Weight Used For Protein Calculations: 42.2 kg (93 lb 0.6 oz)  Fluid Requirements (mL): 1500 ml or per MD  Estimated Fluid Requirement Method: RDA Method  CHO Requirement: N/A        Nutrition Prescription Ordered     Current Diet Order: Regular + Boost plus TID     Evaluation of Received Nutrient/Fluid Intake     Energy Calories Required: meeting needs  Protein Required: meeting needs  Fluid Required: meeting needs  Tolerance: tolerating  % Intake of Estimated Energy Needs: % yesterday  % Meal Intake: % + 3 boost     Nutrition Risk     Level of Risk/Frequency of Follow-up: moderate 2 x weekly     Assessment and Plan     Hypoalbuminemia due to protein-calorie malnutrition  Contributing Nutrition Diagnosis  Severe acute illness related  malnutrition     Related to (etiology):   Shortness of breath, decreased appetite     Signs and Symptoms (as evidenced by):   1) PO intakes < 50% x 3 weeks  2) Moderate-severe wasting seen     Interventions/Recommendations (treatment strategy):  above     Nutrition Diagnosis Status:   continues     Monitor and Evaluation     Food and Nutrient Intake: energy intake, food and beverage intake  Food and Nutrient Adminstration: diet order  Anthropometric Measurements: weight  Biochemical Data, Medical Tests and Procedures: electrolyte and renal panel, gastrointestinal profile  Nutrition-Focused Physical Findings: overall appearance      Malnutrition Assessment  Malnutrition Type: acute illness or injury  Skin (Micronutrient): (Galdino = 19)  Teeth (Micronutrient): (missing some)   Micronutrient Evaluation: suspected deficiency  Micronutrient Evaluation Comments: Na   Energy Intake (Malnutrition): less than 50% for greater than 5 days   Orbital Region (Subcutaneous Fat Loss): moderate depletion  Upper Arm Region (Subcutaneous Fat Loss): severe depletion  Thoracic and Lumbar Region: moderate depletion   [place] Region (Muscle Loss): moderate depletion  Clavicle Bone Region (Muscle Loss): moderate depletion  Clavicle and Acromion Bone Region (Muscle Loss): moderate depletion  Scapular Bone Region (Muscle Loss): moderate depletion  Dorsal Hand (Muscle Loss): mild depletion  Patellar Region (Muscle Loss): severe depletion  Anterior Thigh Region (Muscle Loss): moderate depletion  Posterior Calf Region (Muscle Loss): severe depletion   Edema (Fluid Accumulation): 0-->no edema present   Subcutaneous Fat Loss (Final Summary): severe protein-calorie malnutrition  Muscle Loss Evaluation (Final Summary): severe protein-calorie malnutrition       Nutrition Follow-Up     RD Follow-up?: Yes        Yes

## 2021-12-13 ENCOUNTER — TELEPHONE (OUTPATIENT)
Dept: ORTHOPEDICS | Facility: CLINIC | Age: 66
End: 2021-12-13
Payer: MEDICARE

## 2021-12-13 ENCOUNTER — HOSPITAL ENCOUNTER (INPATIENT)
Facility: HOSPITAL | Age: 66
LOS: 3 days | Discharge: HOME | DRG: 522 | End: 2021-12-16
Attending: EMERGENCY MEDICINE | Admitting: STUDENT IN AN ORGANIZED HEALTH CARE EDUCATION/TRAINING PROGRAM
Payer: MEDICARE

## 2021-12-13 DIAGNOSIS — R91.8 ABNORMAL CT SCAN OF LUNG: ICD-10-CM

## 2021-12-13 DIAGNOSIS — Z22.39 MYCOBACTERIUM ABSCESSUS COLONIZATION: ICD-10-CM
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
## 2021-12-13 DIAGNOSIS — R07.9 CHEST PAIN: ICD-10-CM

## 2021-12-13 DIAGNOSIS — A31.0 PULMONARY INFECTION DUE TO MYCOBACTERIUM AVIUM: ICD-10-CM

## 2021-12-13 DIAGNOSIS — S72.002A CLOSED FRACTURE OF NECK OF LEFT FEMUR, INITIAL ENCOUNTER: Primary | ICD-10-CM

## 2021-12-13 PROBLEM — I47.20 V-TACH: Status: RESOLVED | Noted: 2020-12-31 | Resolved: 2021-12-13

## 2021-12-13 PROBLEM — J93.9 PNEUMOTHORAX ON RIGHT: Status: RESOLVED | Noted: 2020-11-29 | Resolved: 2021-12-13

## 2021-12-13 LAB
ANION GAP SERPL CALC-SCNC: 12 MMOL/L (ref 8–16)
BASOPHILS # BLD AUTO: 0.03 K/UL (ref 0–0.2)
BASOPHILS NFR BLD: 0.4 % (ref 0–1.9)
BUN SERPL-MCNC: 10 MG/DL (ref 8–23)
CALCIUM SERPL-MCNC: 9.7 MG/DL (ref 8.7–10.5)
CHLORIDE SERPL-SCNC: 98 MMOL/L (ref 95–110)
CO2 SERPL-SCNC: 28 MMOL/L (ref 23–29)
CREAT SERPL-MCNC: 0.6 MG/DL (ref 0.5–1.4)
DIFFERENTIAL METHOD: ABNORMAL
EOSINOPHIL # BLD AUTO: 0.1 K/UL (ref 0–0.5)
EOSINOPHIL NFR BLD: 0.6 % (ref 0–8)
ERYTHROCYTE [DISTWIDTH] IN BLOOD BY AUTOMATED COUNT: 13.6 % (ref 11.5–14.5)
EST. GFR  (AFRICAN AMERICAN): >60 ML/MIN/1.73 M^2
EST. GFR  (NON AFRICAN AMERICAN): >60 ML/MIN/1.73 M^2
GLUCOSE SERPL-MCNC: 99 MG/DL (ref 70–110)
HCT VFR BLD AUTO: 43.1 % (ref 37–48.5)
HGB BLD-MCNC: 13.8 G/DL (ref 12–16)
IMM GRANULOCYTES # BLD AUTO: 0.01 K/UL (ref 0–0.04)
IMM GRANULOCYTES NFR BLD AUTO: 0.1 % (ref 0–0.5)
LYMPHOCYTES # BLD AUTO: 1.2 K/UL (ref 1–4.8)
LYMPHOCYTES NFR BLD: 15 % (ref 18–48)
MCH RBC QN AUTO: 30.3 PG (ref 27–31)
MCHC RBC AUTO-ENTMCNC: 32 G/DL (ref 32–36)
MCV RBC AUTO: 95 FL (ref 82–98)
MONOCYTES # BLD AUTO: 0.7 K/UL (ref 0.3–1)
MONOCYTES NFR BLD: 8.7 % (ref 4–15)
NEUTROPHILS # BLD AUTO: 5.9 K/UL (ref 1.8–7.7)
NEUTROPHILS NFR BLD: 75.2 % (ref 38–73)
NRBC BLD-RTO: 0 /100 WBC
PLATELET # BLD AUTO: 249 K/UL (ref 150–450)
PMV BLD AUTO: 9.1 FL (ref 9.2–12.9)
POTASSIUM SERPL-SCNC: 3.7 MMOL/L (ref 3.5–5.1)
RBC # BLD AUTO: 4.55 M/UL (ref 4–5.4)
SODIUM SERPL-SCNC: 138 MMOL/L (ref 136–145)
WBC # BLD AUTO: 7.91 K/UL (ref 3.9–12.7)

## 2021-12-13 PROCEDURE — 63600175 PHARM REV CODE 636 W HCPCS: Performed by: EMERGENCY MEDICINE

## 2021-12-13 PROCEDURE — 85025 COMPLETE CBC W/AUTO DIFF WBC: CPT | Performed by: EMERGENCY MEDICINE

## 2021-12-13 PROCEDURE — 99900035 HC TECH TIME PER 15 MIN (STAT)

## 2021-12-13 PROCEDURE — 94760 N-INVAS EAR/PLS OXIMETRY 1: CPT

## 2021-12-13 PROCEDURE — 27000221 HC OXYGEN, UP TO 24 HOURS

## 2021-12-13 PROCEDURE — 96374 THER/PROPH/DIAG INJ IV PUSH: CPT

## 2021-12-13 PROCEDURE — 80048 BASIC METABOLIC PNL TOTAL CA: CPT | Performed by: EMERGENCY MEDICINE

## 2021-12-13 PROCEDURE — 51702 INSERT TEMP BLADDER CATH: CPT

## 2021-12-13 PROCEDURE — 63700000 PHARM REV CODE 250 ALT 637 W/O HCPCS: Performed by: STUDENT IN AN ORGANIZED HEALTH CARE EDUCATION/TRAINING PROGRAM

## 2021-12-13 PROCEDURE — 63600175 PHARM REV CODE 636 W HCPCS: Performed by: STUDENT IN AN ORGANIZED HEALTH CARE EDUCATION/TRAINING PROGRAM

## 2021-12-13 PROCEDURE — 12000002 HC ACUTE/MED SURGE SEMI-PRIVATE ROOM

## 2021-12-13 PROCEDURE — 36415 COLL VENOUS BLD VENIPUNCTURE: CPT | Performed by: EMERGENCY MEDICINE

## 2021-12-13 PROCEDURE — 25000003 PHARM REV CODE 250: Performed by: STUDENT IN AN ORGANIZED HEALTH CARE EDUCATION/TRAINING PROGRAM

## 2021-12-13 PROCEDURE — 99285 EMERGENCY DEPT VISIT HI MDM: CPT | Mod: 25

## 2021-12-13 RX ORDER — AZITHROMYCIN 250 MG/1
500 TABLET, FILM COATED ORAL DAILY
Status: DISCONTINUED | OUTPATIENT
Start: 2021-12-14 | End: 2021-12-13

## 2021-12-13 RX ORDER — RIFABUTIN 150 MG/1
300 CAPSULE ORAL DAILY
Status: DISCONTINUED | OUTPATIENT
Start: 2021-12-14 | End: 2021-12-13

## 2021-12-13 RX ORDER — HYDROCODONE BITARTRATE AND ACETAMINOPHEN 5; 325 MG/1; MG/1
1 TABLET ORAL EVERY 6 HOURS PRN
Status: DISCONTINUED | OUTPATIENT
Start: 2021-12-13 | End: 2021-12-16 | Stop reason: HOSPADM

## 2021-12-13 RX ORDER — RIFABUTIN 150 MG/1
300 CAPSULE ORAL NIGHTLY
Status: DISCONTINUED | OUTPATIENT
Start: 2021-12-13 | End: 2021-12-16 | Stop reason: HOSPADM

## 2021-12-13 RX ORDER — AZITHROMYCIN 250 MG/1
500 TABLET, FILM COATED ORAL NIGHTLY
Status: DISCONTINUED | OUTPATIENT
Start: 2021-12-13 | End: 2021-12-16 | Stop reason: HOSPADM

## 2021-12-13 RX ORDER — CARVEDILOL 3.12 MG/1
3.12 TABLET ORAL 2 TIMES DAILY
Status: DISCONTINUED | OUTPATIENT
Start: 2021-12-13 | End: 2021-12-16 | Stop reason: HOSPADM

## 2021-12-13 RX ORDER — NALOXONE HCL 0.4 MG/ML
0.02 VIAL (ML) INJECTION
Status: DISCONTINUED | OUTPATIENT
Start: 2021-12-13 | End: 2021-12-16 | Stop reason: HOSPADM

## 2021-12-13 RX ORDER — IPRATROPIUM BROMIDE AND ALBUTEROL SULFATE 2.5; .5 MG/3ML; MG/3ML
3 SOLUTION RESPIRATORY (INHALATION) EVERY 6 HOURS PRN
Status: DISCONTINUED | OUTPATIENT
Start: 2021-12-13 | End: 2021-12-16 | Stop reason: HOSPADM

## 2021-12-13 RX ORDER — SODIUM CHLORIDE 0.9 % (FLUSH) 0.9 %
10 SYRINGE (ML) INJECTION
Status: DISCONTINUED | OUTPATIENT
Start: 2021-12-13 | End: 2021-12-16 | Stop reason: HOSPADM

## 2021-12-13 RX ORDER — FLUTICASONE FUROATE AND VILANTEROL 100; 25 UG/1; UG/1
1 POWDER RESPIRATORY (INHALATION) DAILY
Status: DISCONTINUED | OUTPATIENT
Start: 2021-12-14 | End: 2021-12-16 | Stop reason: HOSPADM

## 2021-12-13 RX ORDER — ACETAMINOPHEN 325 MG/1
650 TABLET ORAL EVERY 6 HOURS PRN
Status: DISCONTINUED | OUTPATIENT
Start: 2021-12-13 | End: 2021-12-16 | Stop reason: HOSPADM

## 2021-12-13 RX ORDER — MORPHINE SULFATE 4 MG/ML
4 INJECTION, SOLUTION INTRAMUSCULAR; INTRAVENOUS EVERY 4 HOURS PRN
Status: DISCONTINUED | OUTPATIENT
Start: 2021-12-13 | End: 2021-12-16 | Stop reason: HOSPADM

## 2021-12-13 RX ORDER — MORPHINE SULFATE 2 MG/ML
2 INJECTION, SOLUTION INTRAMUSCULAR; INTRAVENOUS
Status: COMPLETED | OUTPATIENT
Start: 2021-12-13 | End: 2021-12-13

## 2021-12-13 RX ORDER — MUPIROCIN 20 MG/G
OINTMENT TOPICAL 2 TIMES DAILY
Status: DISCONTINUED | OUTPATIENT
Start: 2021-12-13 | End: 2021-12-16 | Stop reason: HOSPADM

## 2021-12-13 RX ADMIN — MUPIROCIN: 20 OINTMENT TOPICAL at 09:12

## 2021-12-13 RX ADMIN — MORPHINE SULFATE 2 MG: 2 INJECTION, SOLUTION INTRAMUSCULAR; INTRAVENOUS at 05:12

## 2021-12-13 RX ADMIN — AZITHROMYCIN MONOHYDRATE 500 MG: 250 TABLET ORAL at 11:12

## 2021-12-13 RX ADMIN — MORPHINE SULFATE 4 MG: 4 INJECTION INTRAVENOUS at 10:12

## 2021-12-13 RX ADMIN — CARVEDILOL 3.12 MG: 3.12 TABLET, FILM COATED ORAL at 09:12

## 2021-12-13 RX ADMIN — ETHAMBUTOL HYDROCHLORIDE 700 MG: 400 TABLET, FILM COATED ORAL at 11:12

## 2021-12-14 ENCOUNTER — ANESTHESIA (OUTPATIENT)
Dept: SURGERY | Facility: HOSPITAL | Age: 66
DRG: 522 | End: 2021-12-14
Payer: MEDICARE

## 2021-12-14 ENCOUNTER — ANESTHESIA EVENT (OUTPATIENT)
Dept: SURGERY | Facility: HOSPITAL | Age: 66
DRG: 522 | End: 2021-12-14
Payer: MEDICARE

## 2021-12-14 LAB
ALBUMIN SERPL BCP-MCNC: 3.2 G/DL (ref 3.5–5.2)
ALP SERPL-CCNC: 63 U/L (ref 55–135)
ALT SERPL W/O P-5'-P-CCNC: 10 U/L (ref 10–44)
ANION GAP SERPL CALC-SCNC: 11 MMOL/L (ref 8–16)
AST SERPL-CCNC: 12 U/L (ref 10–40)
BASOPHILS # BLD AUTO: 0.01 K/UL (ref 0–0.2)
BASOPHILS # BLD AUTO: 0.02 K/UL (ref 0–0.2)
BASOPHILS NFR BLD: 0.1 % (ref 0–1.9)
BASOPHILS NFR BLD: 0.3 % (ref 0–1.9)
BILIRUB SERPL-MCNC: 0.3 MG/DL (ref 0.1–1)
BUN SERPL-MCNC: 14 MG/DL (ref 8–23)
CALCIUM SERPL-MCNC: 9.8 MG/DL (ref 8.7–10.5)
CHLORIDE SERPL-SCNC: 99 MMOL/L (ref 95–110)
CO2 SERPL-SCNC: 29 MMOL/L (ref 23–29)
CREAT SERPL-MCNC: 0.7 MG/DL (ref 0.5–1.4)
DIFFERENTIAL METHOD: ABNORMAL
DIFFERENTIAL METHOD: ABNORMAL
EOSINOPHIL # BLD AUTO: 0 K/UL (ref 0–0.5)
EOSINOPHIL # BLD AUTO: 0.1 K/UL (ref 0–0.5)
EOSINOPHIL NFR BLD: 0.1 % (ref 0–8)
EOSINOPHIL NFR BLD: 1.7 % (ref 0–8)
ERYTHROCYTE [DISTWIDTH] IN BLOOD BY AUTOMATED COUNT: 13.3 % (ref 11.5–14.5)
ERYTHROCYTE [DISTWIDTH] IN BLOOD BY AUTOMATED COUNT: 13.4 % (ref 11.5–14.5)
EST. GFR  (AFRICAN AMERICAN): >60 ML/MIN/1.73 M^2
EST. GFR  (NON AFRICAN AMERICAN): >60 ML/MIN/1.73 M^2
GLUCOSE SERPL-MCNC: 112 MG/DL (ref 70–110)
HCT VFR BLD AUTO: 40.1 % (ref 37–48.5)
HCT VFR BLD AUTO: 41.6 % (ref 37–48.5)
HGB BLD-MCNC: 12.5 G/DL (ref 12–16)
HGB BLD-MCNC: 13.1 G/DL (ref 12–16)
IMM GRANULOCYTES # BLD AUTO: 0.04 K/UL (ref 0–0.04)
IMM GRANULOCYTES # BLD AUTO: 0.05 K/UL (ref 0–0.04)
IMM GRANULOCYTES NFR BLD AUTO: 0.5 % (ref 0–0.5)
IMM GRANULOCYTES NFR BLD AUTO: 0.6 % (ref 0–0.5)
LYMPHOCYTES # BLD AUTO: 0.7 K/UL (ref 1–4.8)
LYMPHOCYTES # BLD AUTO: 1.5 K/UL (ref 1–4.8)
LYMPHOCYTES NFR BLD: 20.5 % (ref 18–48)
LYMPHOCYTES NFR BLD: 6.3 % (ref 18–48)
MAGNESIUM SERPL-MCNC: 2.4 MG/DL (ref 1.6–2.6)
MCH RBC QN AUTO: 30.3 PG (ref 27–31)
MCH RBC QN AUTO: 30.3 PG (ref 27–31)
MCHC RBC AUTO-ENTMCNC: 31.2 G/DL (ref 32–36)
MCHC RBC AUTO-ENTMCNC: 31.5 G/DL (ref 32–36)
MCV RBC AUTO: 96 FL (ref 82–98)
MCV RBC AUTO: 97 FL (ref 82–98)
MONOCYTES # BLD AUTO: 0.5 K/UL (ref 0.3–1)
MONOCYTES # BLD AUTO: 0.8 K/UL (ref 0.3–1)
MONOCYTES NFR BLD: 10.9 % (ref 4–15)
MONOCYTES NFR BLD: 4.6 % (ref 4–15)
NEUTROPHILS # BLD AUTO: 4.7 K/UL (ref 1.8–7.7)
NEUTROPHILS # BLD AUTO: 9.2 K/UL (ref 1.8–7.7)
NEUTROPHILS NFR BLD: 66 % (ref 38–73)
NEUTROPHILS NFR BLD: 88.4 % (ref 38–73)
NRBC BLD-RTO: 0 /100 WBC
NRBC BLD-RTO: 0 /100 WBC
PHOSPHATE SERPL-MCNC: 3.5 MG/DL (ref 2.7–4.5)
PLATELET # BLD AUTO: 240 K/UL (ref 150–450)
PLATELET # BLD AUTO: 263 K/UL (ref 150–450)
PMV BLD AUTO: 9 FL (ref 9.2–12.9)
PMV BLD AUTO: 9.2 FL (ref 9.2–12.9)
POTASSIUM SERPL-SCNC: 4.6 MMOL/L (ref 3.5–5.1)
PROT SERPL-MCNC: 7.1 G/DL (ref 6–8.4)
RBC # BLD AUTO: 4.12 M/UL (ref 4–5.4)
RBC # BLD AUTO: 4.33 M/UL (ref 4–5.4)
SODIUM SERPL-SCNC: 139 MMOL/L (ref 136–145)
WBC # BLD AUTO: 10.4 K/UL (ref 3.9–12.7)
WBC # BLD AUTO: 7.08 K/UL (ref 3.9–12.7)

## 2021-12-14 PROCEDURE — 85025 COMPLETE CBC W/AUTO DIFF WBC: CPT | Mod: 91 | Performed by: ORTHOPAEDIC SURGERY

## 2021-12-14 PROCEDURE — 80053 COMPREHEN METABOLIC PANEL: CPT | Performed by: STUDENT IN AN ORGANIZED HEALTH CARE EDUCATION/TRAINING PROGRAM

## 2021-12-14 PROCEDURE — 12000002 HC ACUTE/MED SURGE SEMI-PRIVATE ROOM

## 2021-12-14 PROCEDURE — 25000003 PHARM REV CODE 250: Performed by: ANESTHESIOLOGY

## 2021-12-14 PROCEDURE — 37000009 HC ANESTHESIA EA ADD 15 MINS: Performed by: ORTHOPAEDIC SURGERY

## 2021-12-14 PROCEDURE — 63700000 PHARM REV CODE 250 ALT 637 W/O HCPCS: Performed by: ORTHOPAEDIC SURGERY

## 2021-12-14 PROCEDURE — C1776 JOINT DEVICE (IMPLANTABLE): HCPCS | Performed by: ORTHOPAEDIC SURGERY

## 2021-12-14 PROCEDURE — 37000008 HC ANESTHESIA 1ST 15 MINUTES: Performed by: ORTHOPAEDIC SURGERY

## 2021-12-14 PROCEDURE — 63600175 PHARM REV CODE 636 W HCPCS: Performed by: STUDENT IN AN ORGANIZED HEALTH CARE EDUCATION/TRAINING PROGRAM

## 2021-12-14 PROCEDURE — 71000033 HC RECOVERY, INTIAL HOUR: Performed by: ORTHOPAEDIC SURGERY

## 2021-12-14 PROCEDURE — 83735 ASSAY OF MAGNESIUM: CPT | Performed by: STUDENT IN AN ORGANIZED HEALTH CARE EDUCATION/TRAINING PROGRAM

## 2021-12-14 PROCEDURE — D9220A PRA ANESTHESIA: Mod: CRNA,,, | Performed by: NURSE ANESTHETIST, CERTIFIED REGISTERED

## 2021-12-14 PROCEDURE — 27201423 OPTIME MED/SURG SUP & DEVICES STERILE SUPPLY: Performed by: ORTHOPAEDIC SURGERY

## 2021-12-14 PROCEDURE — 36000710: Performed by: ORTHOPAEDIC SURGERY

## 2021-12-14 PROCEDURE — 25000003 PHARM REV CODE 250: Performed by: STUDENT IN AN ORGANIZED HEALTH CARE EDUCATION/TRAINING PROGRAM

## 2021-12-14 PROCEDURE — 25000242 PHARM REV CODE 250 ALT 637 W/ HCPCS: Performed by: STUDENT IN AN ORGANIZED HEALTH CARE EDUCATION/TRAINING PROGRAM

## 2021-12-14 PROCEDURE — 84100 ASSAY OF PHOSPHORUS: CPT | Performed by: STUDENT IN AN ORGANIZED HEALTH CARE EDUCATION/TRAINING PROGRAM

## 2021-12-14 PROCEDURE — 99900104 DSU ONLY-NO CHARGE-EA ADD'L HR (STAT): Performed by: ORTHOPAEDIC SURGERY

## 2021-12-14 PROCEDURE — 63600175 PHARM REV CODE 636 W HCPCS: Performed by: NURSE ANESTHETIST, CERTIFIED REGISTERED

## 2021-12-14 PROCEDURE — 71000039 HC RECOVERY, EACH ADD'L HOUR: Performed by: ORTHOPAEDIC SURGERY

## 2021-12-14 PROCEDURE — 36000711: Performed by: ORTHOPAEDIC SURGERY

## 2021-12-14 PROCEDURE — 25000003 PHARM REV CODE 250: Performed by: ORTHOPAEDIC SURGERY

## 2021-12-14 PROCEDURE — D9220A PRA ANESTHESIA: Mod: ANES,,, | Performed by: ANESTHESIOLOGY

## 2021-12-14 PROCEDURE — 94640 AIRWAY INHALATION TREATMENT: CPT

## 2021-12-14 PROCEDURE — 99900103 DSU ONLY-NO CHARGE-INITIAL HR (STAT): Performed by: ORTHOPAEDIC SURGERY

## 2021-12-14 PROCEDURE — 27000221 HC OXYGEN, UP TO 24 HOURS

## 2021-12-14 PROCEDURE — 94799 UNLISTED PULMONARY SVC/PX: CPT

## 2021-12-14 PROCEDURE — D9220A PRA ANESTHESIA: ICD-10-PCS | Mod: ANES,,, | Performed by: ANESTHESIOLOGY

## 2021-12-14 PROCEDURE — 36415 COLL VENOUS BLD VENIPUNCTURE: CPT | Performed by: ORTHOPAEDIC SURGERY

## 2021-12-14 PROCEDURE — 25000003 PHARM REV CODE 250: Performed by: NURSE ANESTHETIST, CERTIFIED REGISTERED

## 2021-12-14 PROCEDURE — 94761 N-INVAS EAR/PLS OXIMETRY MLT: CPT

## 2021-12-14 PROCEDURE — 36415 COLL VENOUS BLD VENIPUNCTURE: CPT | Performed by: STUDENT IN AN ORGANIZED HEALTH CARE EDUCATION/TRAINING PROGRAM

## 2021-12-14 PROCEDURE — 85025 COMPLETE CBC W/AUTO DIFF WBC: CPT | Performed by: STUDENT IN AN ORGANIZED HEALTH CARE EDUCATION/TRAINING PROGRAM

## 2021-12-14 PROCEDURE — D9220A PRA ANESTHESIA: ICD-10-PCS | Mod: CRNA,,, | Performed by: NURSE ANESTHETIST, CERTIFIED REGISTERED

## 2021-12-14 PROCEDURE — 63600175 PHARM REV CODE 636 W HCPCS: Performed by: ANESTHESIOLOGY

## 2021-12-14 PROCEDURE — S5010 5% DEXTROSE AND 0.45% SALINE: HCPCS | Performed by: ORTHOPAEDIC SURGERY

## 2021-12-14 DEVICE — HIP FEM STUMMIT SZ3: Type: IMPLANTABLE DEVICE | Site: HIP | Status: FUNCTIONAL

## 2021-12-14 DEVICE — HEAD ARTICUL CERAMAX +5 32MM: Type: IMPLANTABLE DEVICE | Site: HIP | Status: FUNCTIONAL

## 2021-12-14 DEVICE — CUP ACT PNCL SEC 48MM TITANIUM: Type: IMPLANTABLE DEVICE | Site: HIP | Status: FUNCTIONAL

## 2021-12-14 DEVICE — LINER PINNACLE SZ32 10D 4 48MM: Type: IMPLANTABLE DEVICE | Site: HIP | Status: FUNCTIONAL

## 2021-12-14 RX ORDER — OXYCODONE HYDROCHLORIDE 5 MG/1
5 TABLET ORAL
Status: DISCONTINUED | OUTPATIENT
Start: 2021-12-14 | End: 2021-12-14 | Stop reason: HOSPADM

## 2021-12-14 RX ORDER — ONDANSETRON 2 MG/ML
4 INJECTION INTRAMUSCULAR; INTRAVENOUS EVERY 6 HOURS PRN
Status: DISCONTINUED | OUTPATIENT
Start: 2021-12-14 | End: 2021-12-16 | Stop reason: HOSPADM

## 2021-12-14 RX ORDER — ENOXAPARIN SODIUM 100 MG/ML
40 INJECTION SUBCUTANEOUS EVERY 24 HOURS
Status: DISCONTINUED | OUTPATIENT
Start: 2021-12-15 | End: 2021-12-16 | Stop reason: HOSPADM

## 2021-12-14 RX ORDER — FENTANYL CITRATE 50 UG/ML
25 INJECTION, SOLUTION INTRAMUSCULAR; INTRAVENOUS EVERY 5 MIN PRN
Status: DISCONTINUED | OUTPATIENT
Start: 2021-12-14 | End: 2021-12-14 | Stop reason: HOSPADM

## 2021-12-14 RX ORDER — DEXTROSE MONOHYDRATE AND SODIUM CHLORIDE 5; .45 G/100ML; G/100ML
INJECTION, SOLUTION INTRAVENOUS CONTINUOUS
Status: DISCONTINUED | OUTPATIENT
Start: 2021-12-14 | End: 2021-12-16 | Stop reason: HOSPADM

## 2021-12-14 RX ORDER — LIDOCAINE HCL/PF 100 MG/5ML
SYRINGE (ML) INTRAVENOUS
Status: DISCONTINUED | OUTPATIENT
Start: 2021-12-14 | End: 2021-12-14

## 2021-12-14 RX ORDER — CEFAZOLIN SODIUM 1 G/50ML
1 SOLUTION INTRAVENOUS
Status: COMPLETED | OUTPATIENT
Start: 2021-12-15 | End: 2021-12-15

## 2021-12-14 RX ORDER — CEFAZOLIN SODIUM 2 G/50ML
2 SOLUTION INTRAVENOUS ONCE
Status: COMPLETED | OUTPATIENT
Start: 2021-12-14 | End: 2021-12-14

## 2021-12-14 RX ORDER — ONDANSETRON HYDROCHLORIDE 2 MG/ML
INJECTION, SOLUTION INTRAMUSCULAR; INTRAVENOUS
Status: DISCONTINUED | OUTPATIENT
Start: 2021-12-14 | End: 2021-12-14

## 2021-12-14 RX ORDER — HYDROCODONE BITARTRATE AND ACETAMINOPHEN 5; 325 MG/1; MG/1
1 TABLET ORAL EVERY 4 HOURS PRN
Status: DISCONTINUED | OUTPATIENT
Start: 2021-12-14 | End: 2021-12-14 | Stop reason: SDUPTHER

## 2021-12-14 RX ORDER — SUCCINYLCHOLINE CHLORIDE 20 MG/ML
INJECTION INTRAMUSCULAR; INTRAVENOUS
Status: DISCONTINUED | OUTPATIENT
Start: 2021-12-14 | End: 2021-12-14

## 2021-12-14 RX ORDER — NAPROXEN SODIUM 220 MG/1
81 TABLET, FILM COATED ORAL 2 TIMES DAILY
Status: DISCONTINUED | OUTPATIENT
Start: 2021-12-14 | End: 2021-12-16 | Stop reason: HOSPADM

## 2021-12-14 RX ORDER — NAPROXEN SODIUM 220 MG/1
81 TABLET, FILM COATED ORAL 2 TIMES DAILY
Status: DISCONTINUED | OUTPATIENT
Start: 2021-12-14 | End: 2021-12-14 | Stop reason: SDUPTHER

## 2021-12-14 RX ORDER — ZOLPIDEM TARTRATE 5 MG/1
5 TABLET ORAL NIGHTLY PRN
Status: DISCONTINUED | OUTPATIENT
Start: 2021-12-14 | End: 2021-12-16 | Stop reason: HOSPADM

## 2021-12-14 RX ORDER — FAMOTIDINE 20 MG/1
20 TABLET, FILM COATED ORAL 2 TIMES DAILY
Status: DISCONTINUED | OUTPATIENT
Start: 2021-12-14 | End: 2021-12-16 | Stop reason: HOSPADM

## 2021-12-14 RX ORDER — MIDAZOLAM HYDROCHLORIDE 1 MG/ML
INJECTION INTRAMUSCULAR; INTRAVENOUS
Status: DISCONTINUED | OUTPATIENT
Start: 2021-12-14 | End: 2021-12-14

## 2021-12-14 RX ORDER — MUPIROCIN 20 MG/G
1 OINTMENT TOPICAL 2 TIMES DAILY
Status: DISCONTINUED | OUTPATIENT
Start: 2021-12-14 | End: 2021-12-14 | Stop reason: SDUPTHER

## 2021-12-14 RX ORDER — TRANEXAMIC ACID 100 MG/ML
INJECTION, SOLUTION INTRAVENOUS
Status: DISCONTINUED | OUTPATIENT
Start: 2021-12-14 | End: 2021-12-14

## 2021-12-14 RX ORDER — ROCURONIUM BROMIDE 10 MG/ML
INJECTION, SOLUTION INTRAVENOUS
Status: DISCONTINUED | OUTPATIENT
Start: 2021-12-14 | End: 2021-12-14

## 2021-12-14 RX ORDER — CEFAZOLIN SODIUM 2 G/50ML
SOLUTION INTRAVENOUS
Status: COMPLETED
Start: 2021-12-14 | End: 2021-12-14

## 2021-12-14 RX ORDER — SODIUM CHLORIDE 0.9 % (FLUSH) 0.9 %
5 SYRINGE (ML) INJECTION
Status: DISCONTINUED | OUTPATIENT
Start: 2021-12-14 | End: 2021-12-16 | Stop reason: HOSPADM

## 2021-12-14 RX ORDER — LIDOCAINE HYDROCHLORIDE 10 MG/ML
1 INJECTION, SOLUTION EPIDURAL; INFILTRATION; INTRACAUDAL; PERINEURAL ONCE
Status: DISCONTINUED | OUTPATIENT
Start: 2021-12-14 | End: 2021-12-14 | Stop reason: HOSPADM

## 2021-12-14 RX ORDER — NEOSTIGMINE METHYLSULFATE 1 MG/ML
INJECTION, SOLUTION INTRAVENOUS
Status: DISCONTINUED | OUTPATIENT
Start: 2021-12-14 | End: 2021-12-14

## 2021-12-14 RX ORDER — DEXAMETHASONE SODIUM PHOSPHATE 4 MG/ML
INJECTION, SOLUTION INTRA-ARTICULAR; INTRALESIONAL; INTRAMUSCULAR; INTRAVENOUS; SOFT TISSUE
Status: DISCONTINUED | OUTPATIENT
Start: 2021-12-14 | End: 2021-12-14

## 2021-12-14 RX ORDER — PROPOFOL 10 MG/ML
VIAL (ML) INTRAVENOUS
Status: DISCONTINUED | OUTPATIENT
Start: 2021-12-14 | End: 2021-12-14

## 2021-12-14 RX ORDER — BISACODYL 10 MG
10 SUPPOSITORY, RECTAL RECTAL DAILY
Status: DISCONTINUED | OUTPATIENT
Start: 2021-12-15 | End: 2021-12-16 | Stop reason: HOSPADM

## 2021-12-14 RX ORDER — FENTANYL CITRATE 50 UG/ML
INJECTION, SOLUTION INTRAMUSCULAR; INTRAVENOUS
Status: DISCONTINUED | OUTPATIENT
Start: 2021-12-14 | End: 2021-12-14

## 2021-12-14 RX ORDER — HYDROMORPHONE HYDROCHLORIDE 2 MG/ML
0.2 INJECTION, SOLUTION INTRAMUSCULAR; INTRAVENOUS; SUBCUTANEOUS EVERY 5 MIN PRN
Status: COMPLETED | OUTPATIENT
Start: 2021-12-14 | End: 2021-12-14

## 2021-12-14 RX ADMIN — RIFABUTIN 300 MG: 150 CAPSULE ORAL at 08:12

## 2021-12-14 RX ADMIN — LIDOCAINE HYDROCHLORIDE 80 MG: 20 INJECTION INTRAVENOUS at 04:12

## 2021-12-14 RX ADMIN — DEXTROSE AND SODIUM CHLORIDE: 5; .45 INJECTION, SOLUTION INTRAVENOUS at 08:12

## 2021-12-14 RX ADMIN — HYDROMORPHONE HYDROCHLORIDE 0.2 MG: 2 INJECTION, SOLUTION INTRAMUSCULAR; INTRAVENOUS; SUBCUTANEOUS at 06:12

## 2021-12-14 RX ADMIN — TRANEXAMIC ACID 500 MG: 100 INJECTION, SOLUTION INTRAVENOUS at 04:12

## 2021-12-14 RX ADMIN — MORPHINE SULFATE 4 MG: 4 INJECTION INTRAVENOUS at 07:12

## 2021-12-14 RX ADMIN — ETHAMBUTOL HYDROCHLORIDE 700 MG: 400 TABLET, FILM COATED ORAL at 08:12

## 2021-12-14 RX ADMIN — ROCURONIUM BROMIDE 5 MG: 10 INJECTION, SOLUTION INTRAVENOUS at 04:12

## 2021-12-14 RX ADMIN — ONDANSETRON 4 MG: 2 INJECTION INTRAMUSCULAR; INTRAVENOUS at 07:12

## 2021-12-14 RX ADMIN — CEFAZOLIN SODIUM 2 G: 2 SOLUTION INTRAVENOUS at 04:12

## 2021-12-14 RX ADMIN — ROCURONIUM BROMIDE 15 MG: 10 INJECTION, SOLUTION INTRAVENOUS at 04:12

## 2021-12-14 RX ADMIN — HYDROMORPHONE HYDROCHLORIDE 0.2 MG: 2 INJECTION, SOLUTION INTRAMUSCULAR; INTRAVENOUS; SUBCUTANEOUS at 05:12

## 2021-12-14 RX ADMIN — GLYCOPYRROLATE 0.4 MG: 0.2 INJECTION, SOLUTION INTRAMUSCULAR; INTRAVENOUS at 05:12

## 2021-12-14 RX ADMIN — SODIUM CHLORIDE, SODIUM GLUCONATE, SODIUM ACETATE, POTASSIUM CHLORIDE, MAGNESIUM CHLORIDE, SODIUM PHOSPHATE, DIBASIC, AND POTASSIUM PHOSPHATE: .53; .5; .37; .037; .03; .012; .00082 INJECTION, SOLUTION INTRAVENOUS at 03:12

## 2021-12-14 RX ADMIN — ASPIRIN 81 MG CHEWABLE TABLET 81 MG: 81 TABLET CHEWABLE at 08:12

## 2021-12-14 RX ADMIN — CEFAZOLIN SODIUM 1 G: 1 SOLUTION INTRAVENOUS at 11:12

## 2021-12-14 RX ADMIN — MORPHINE SULFATE 4 MG: 4 INJECTION INTRAVENOUS at 01:12

## 2021-12-14 RX ADMIN — MUPIROCIN: 20 OINTMENT TOPICAL at 09:12

## 2021-12-14 RX ADMIN — PROPOFOL 100 MG: 10 INJECTION, EMULSION INTRAVENOUS at 04:12

## 2021-12-14 RX ADMIN — DEXAMETHASONE SODIUM PHOSPHATE 4 MG: 4 INJECTION, SOLUTION INTRA-ARTICULAR; INTRALESIONAL; INTRAMUSCULAR; INTRAVENOUS; SOFT TISSUE at 04:12

## 2021-12-14 RX ADMIN — FAMOTIDINE 20 MG: 20 TABLET ORAL at 08:12

## 2021-12-14 RX ADMIN — MORPHINE SULFATE 4 MG: 4 INJECTION INTRAVENOUS at 02:12

## 2021-12-14 RX ADMIN — AZITHROMYCIN MONOHYDRATE 500 MG: 250 TABLET ORAL at 08:12

## 2021-12-14 RX ADMIN — FLUTICASONE FUROATE AND VILANTEROL TRIFENATATE 1 PUFF: 100; 25 POWDER RESPIRATORY (INHALATION) at 07:12

## 2021-12-14 RX ADMIN — NEOSTIGMINE METHYLSULFATE 3 MG: 1 INJECTION INTRAVENOUS at 05:12

## 2021-12-14 RX ADMIN — MIDAZOLAM HYDROCHLORIDE 1 MG: 1 INJECTION, SOLUTION INTRAMUSCULAR; INTRAVENOUS at 04:12

## 2021-12-14 RX ADMIN — CARVEDILOL 3.12 MG: 3.12 TABLET, FILM COATED ORAL at 08:12

## 2021-12-14 RX ADMIN — SUCCINYLCHOLINE CHLORIDE 120 MG: 20 INJECTION, SOLUTION INTRAMUSCULAR; INTRAVENOUS; PARENTERAL at 04:12

## 2021-12-14 RX ADMIN — FENTANYL CITRATE 50 MCG: 50 INJECTION, SOLUTION INTRAMUSCULAR; INTRAVENOUS at 04:12

## 2021-12-14 RX ADMIN — OXYCODONE 5 MG: 5 TABLET ORAL at 05:12

## 2021-12-14 RX ADMIN — MUPIROCIN: 20 OINTMENT TOPICAL at 08:12

## 2021-12-14 RX ADMIN — ONDANSETRON 4 MG: 2 INJECTION, SOLUTION INTRAMUSCULAR; INTRAVENOUS at 04:12

## 2021-12-15 ENCOUNTER — TELEPHONE (OUTPATIENT)
Dept: INFECTIOUS DISEASES | Facility: CLINIC | Age: 66
End: 2021-12-15
Payer: MEDICARE

## 2021-12-15 LAB
ALBUMIN SERPL BCP-MCNC: 2.9 G/DL (ref 3.5–5.2)
ALP SERPL-CCNC: 61 U/L (ref 55–135)
ALT SERPL W/O P-5'-P-CCNC: 13 U/L (ref 10–44)
ANION GAP SERPL CALC-SCNC: 11 MMOL/L (ref 8–16)
AST SERPL-CCNC: 15 U/L (ref 10–40)
BASOPHILS # BLD AUTO: 0.02 K/UL (ref 0–0.2)
BASOPHILS NFR BLD: 0.3 % (ref 0–1.9)
BILIRUB SERPL-MCNC: 0.3 MG/DL (ref 0.1–1)
BUN SERPL-MCNC: 7 MG/DL (ref 8–23)
CALCIUM SERPL-MCNC: 9.1 MG/DL (ref 8.7–10.5)
CHLORIDE SERPL-SCNC: 99 MMOL/L (ref 95–110)
CO2 SERPL-SCNC: 28 MMOL/L (ref 23–29)
CREAT SERPL-MCNC: 0.6 MG/DL (ref 0.5–1.4)
DIFFERENTIAL METHOD: ABNORMAL
EOSINOPHIL # BLD AUTO: 0.1 K/UL (ref 0–0.5)
EOSINOPHIL NFR BLD: 0.7 % (ref 0–8)
ERYTHROCYTE [DISTWIDTH] IN BLOOD BY AUTOMATED COUNT: 13.3 % (ref 11.5–14.5)
EST. GFR  (AFRICAN AMERICAN): >60 ML/MIN/1.73 M^2
EST. GFR  (NON AFRICAN AMERICAN): >60 ML/MIN/1.73 M^2
GLUCOSE SERPL-MCNC: 113 MG/DL (ref 70–110)
HCT VFR BLD AUTO: 39.1 % (ref 37–48.5)
HGB BLD-MCNC: 12.1 G/DL (ref 12–16)
IMM GRANULOCYTES # BLD AUTO: 0.03 K/UL (ref 0–0.04)
IMM GRANULOCYTES NFR BLD AUTO: 0.4 % (ref 0–0.5)
LYMPHOCYTES # BLD AUTO: 1.6 K/UL (ref 1–4.8)
LYMPHOCYTES NFR BLD: 21.5 % (ref 18–48)
MAGNESIUM SERPL-MCNC: 2.2 MG/DL (ref 1.6–2.6)
MCH RBC QN AUTO: 29.8 PG (ref 27–31)
MCHC RBC AUTO-ENTMCNC: 30.9 G/DL (ref 32–36)
MCV RBC AUTO: 96 FL (ref 82–98)
MONOCYTES # BLD AUTO: 0.8 K/UL (ref 0.3–1)
MONOCYTES NFR BLD: 10.4 % (ref 4–15)
NEUTROPHILS # BLD AUTO: 4.9 K/UL (ref 1.8–7.7)
NEUTROPHILS NFR BLD: 66.7 % (ref 38–73)
NRBC BLD-RTO: 0 /100 WBC
PHOSPHATE SERPL-MCNC: 2.8 MG/DL (ref 2.7–4.5)
PLATELET # BLD AUTO: 268 K/UL (ref 150–450)
PMV BLD AUTO: 9.4 FL (ref 9.2–12.9)
POTASSIUM SERPL-SCNC: 4.3 MMOL/L (ref 3.5–5.1)
PROT SERPL-MCNC: 6.7 G/DL (ref 6–8.4)
RBC # BLD AUTO: 4.06 M/UL (ref 4–5.4)
SODIUM SERPL-SCNC: 138 MMOL/L (ref 136–145)
WBC # BLD AUTO: 7.34 K/UL (ref 3.9–12.7)

## 2021-12-15 PROCEDURE — 12000002 HC ACUTE/MED SURGE SEMI-PRIVATE ROOM

## 2021-12-15 PROCEDURE — 97535 SELF CARE MNGMENT TRAINING: CPT

## 2021-12-15 PROCEDURE — 63600175 PHARM REV CODE 636 W HCPCS: Performed by: ORTHOPAEDIC SURGERY

## 2021-12-15 PROCEDURE — 97161 PT EVAL LOW COMPLEX 20 MIN: CPT

## 2021-12-15 PROCEDURE — 27000221 HC OXYGEN, UP TO 24 HOURS

## 2021-12-15 PROCEDURE — 85025 COMPLETE CBC W/AUTO DIFF WBC: CPT | Performed by: ORTHOPAEDIC SURGERY

## 2021-12-15 PROCEDURE — 97116 GAIT TRAINING THERAPY: CPT

## 2021-12-15 PROCEDURE — 80053 COMPREHEN METABOLIC PANEL: CPT | Performed by: ORTHOPAEDIC SURGERY

## 2021-12-15 PROCEDURE — 94761 N-INVAS EAR/PLS OXIMETRY MLT: CPT

## 2021-12-15 PROCEDURE — 36415 COLL VENOUS BLD VENIPUNCTURE: CPT | Performed by: ORTHOPAEDIC SURGERY

## 2021-12-15 PROCEDURE — 25000003 PHARM REV CODE 250: Performed by: ORTHOPAEDIC SURGERY

## 2021-12-15 PROCEDURE — 94640 AIRWAY INHALATION TREATMENT: CPT

## 2021-12-15 PROCEDURE — 63700000 PHARM REV CODE 250 ALT 637 W/O HCPCS: Performed by: ORTHOPAEDIC SURGERY

## 2021-12-15 PROCEDURE — 94799 UNLISTED PULMONARY SVC/PX: CPT

## 2021-12-15 PROCEDURE — 97165 OT EVAL LOW COMPLEX 30 MIN: CPT

## 2021-12-15 PROCEDURE — 84100 ASSAY OF PHOSPHORUS: CPT | Performed by: ORTHOPAEDIC SURGERY

## 2021-12-15 PROCEDURE — 83735 ASSAY OF MAGNESIUM: CPT | Performed by: ORTHOPAEDIC SURGERY

## 2021-12-15 PROCEDURE — 97110 THERAPEUTIC EXERCISES: CPT

## 2021-12-15 RX ADMIN — HYDROCODONE BITARTRATE AND ACETAMINOPHEN 1 TABLET: 5; 325 TABLET ORAL at 07:12

## 2021-12-15 RX ADMIN — HYDROCODONE BITARTRATE AND ACETAMINOPHEN 1 TABLET: 5; 325 TABLET ORAL at 04:12

## 2021-12-15 RX ADMIN — AZITHROMYCIN MONOHYDRATE 500 MG: 250 TABLET ORAL at 09:12

## 2021-12-15 RX ADMIN — CEFAZOLIN SODIUM 1 G: 1 SOLUTION INTRAVENOUS at 04:12

## 2021-12-15 RX ADMIN — CEFAZOLIN SODIUM 1 G: 1 SOLUTION INTRAVENOUS at 10:12

## 2021-12-15 RX ADMIN — ASPIRIN 81 MG CHEWABLE TABLET 81 MG: 81 TABLET CHEWABLE at 08:12

## 2021-12-15 RX ADMIN — FAMOTIDINE 20 MG: 20 TABLET ORAL at 09:12

## 2021-12-15 RX ADMIN — FLUTICASONE FUROATE AND VILANTEROL TRIFENATATE 1 PUFF: 100; 25 POWDER RESPIRATORY (INHALATION) at 07:12

## 2021-12-15 RX ADMIN — HYDROCODONE BITARTRATE AND ACETAMINOPHEN 1 TABLET: 5; 325 TABLET ORAL at 12:12

## 2021-12-15 RX ADMIN — MORPHINE SULFATE 4 MG: 4 INJECTION INTRAVENOUS at 07:12

## 2021-12-15 RX ADMIN — CARVEDILOL 3.12 MG: 3.12 TABLET, FILM COATED ORAL at 09:12

## 2021-12-15 RX ADMIN — RIFABUTIN 300 MG: 150 CAPSULE ORAL at 09:12

## 2021-12-15 RX ADMIN — ETHAMBUTOL HYDROCHLORIDE 700 MG: 400 TABLET, FILM COATED ORAL at 09:12

## 2021-12-15 RX ADMIN — MUPIROCIN: 20 OINTMENT TOPICAL at 08:12

## 2021-12-15 RX ADMIN — ASPIRIN 81 MG CHEWABLE TABLET 81 MG: 81 TABLET CHEWABLE at 09:12

## 2021-12-15 RX ADMIN — FAMOTIDINE 20 MG: 20 TABLET ORAL at 08:12

## 2021-12-15 RX ADMIN — ENOXAPARIN SODIUM 40 MG: 40 INJECTION SUBCUTANEOUS at 04:12

## 2021-12-15 RX ADMIN — MUPIROCIN: 20 OINTMENT TOPICAL at 09:12

## 2021-12-15 RX ADMIN — CARVEDILOL 3.12 MG: 3.12 TABLET, FILM COATED ORAL at 08:12

## 2021-12-16 VITALS
DIASTOLIC BLOOD PRESSURE: 54 MMHG | TEMPERATURE: 98 F | SYSTOLIC BLOOD PRESSURE: 94 MMHG | BODY MASS INDEX: 16.05 KG/M2 | OXYGEN SATURATION: 98 % | HEART RATE: 89 BPM | RESPIRATION RATE: 18 BRPM | WEIGHT: 94 LBS | HEIGHT: 64 IN

## 2021-12-16 LAB
ALBUMIN SERPL BCP-MCNC: 2.6 G/DL (ref 3.5–5.2)
ALP SERPL-CCNC: 52 U/L (ref 55–135)
ALT SERPL W/O P-5'-P-CCNC: 14 U/L (ref 10–44)
ANION GAP SERPL CALC-SCNC: 7 MMOL/L (ref 8–16)
AST SERPL-CCNC: 18 U/L (ref 10–40)
BASOPHILS # BLD AUTO: 0.03 K/UL (ref 0–0.2)
BASOPHILS NFR BLD: 0.4 % (ref 0–1.9)
BILIRUB SERPL-MCNC: 0.3 MG/DL (ref 0.1–1)
BUN SERPL-MCNC: 10 MG/DL (ref 8–23)
CALCIUM SERPL-MCNC: 8.9 MG/DL (ref 8.7–10.5)
CHLORIDE SERPL-SCNC: 101 MMOL/L (ref 95–110)
CO2 SERPL-SCNC: 29 MMOL/L (ref 23–29)
CREAT SERPL-MCNC: 0.6 MG/DL (ref 0.5–1.4)
DIFFERENTIAL METHOD: ABNORMAL
EOSINOPHIL # BLD AUTO: 0.1 K/UL (ref 0–0.5)
EOSINOPHIL NFR BLD: 1.9 % (ref 0–8)
ERYTHROCYTE [DISTWIDTH] IN BLOOD BY AUTOMATED COUNT: 13.2 % (ref 11.5–14.5)
EST. GFR  (AFRICAN AMERICAN): >60 ML/MIN/1.73 M^2
EST. GFR  (NON AFRICAN AMERICAN): >60 ML/MIN/1.73 M^2
GLUCOSE SERPL-MCNC: 120 MG/DL (ref 70–110)
HCT VFR BLD AUTO: 32.6 % (ref 37–48.5)
HGB BLD-MCNC: 10.4 G/DL (ref 12–16)
IMM GRANULOCYTES # BLD AUTO: 0.03 K/UL (ref 0–0.04)
IMM GRANULOCYTES NFR BLD AUTO: 0.4 % (ref 0–0.5)
LYMPHOCYTES # BLD AUTO: 1.5 K/UL (ref 1–4.8)
LYMPHOCYTES NFR BLD: 22.4 % (ref 18–48)
MAGNESIUM SERPL-MCNC: 2 MG/DL (ref 1.6–2.6)
MCH RBC QN AUTO: 30.2 PG (ref 27–31)
MCHC RBC AUTO-ENTMCNC: 31.9 G/DL (ref 32–36)
MCV RBC AUTO: 95 FL (ref 82–98)
MONOCYTES # BLD AUTO: 0.7 K/UL (ref 0.3–1)
MONOCYTES NFR BLD: 9.9 % (ref 4–15)
NEUTROPHILS # BLD AUTO: 4.5 K/UL (ref 1.8–7.7)
NEUTROPHILS NFR BLD: 65 % (ref 38–73)
NRBC BLD-RTO: 0 /100 WBC
PHOSPHATE SERPL-MCNC: 2.9 MG/DL (ref 2.7–4.5)
PLATELET # BLD AUTO: 218 K/UL (ref 150–450)
PMV BLD AUTO: 9.6 FL (ref 9.2–12.9)
POTASSIUM SERPL-SCNC: 3.9 MMOL/L (ref 3.5–5.1)
PROT SERPL-MCNC: 6 G/DL (ref 6–8.4)
RBC # BLD AUTO: 3.44 M/UL (ref 4–5.4)
SODIUM SERPL-SCNC: 137 MMOL/L (ref 136–145)
WBC # BLD AUTO: 6.89 K/UL (ref 3.9–12.7)

## 2021-12-16 PROCEDURE — 99900035 HC TECH TIME PER 15 MIN (STAT)

## 2021-12-16 PROCEDURE — 97110 THERAPEUTIC EXERCISES: CPT

## 2021-12-16 PROCEDURE — 97116 GAIT TRAINING THERAPY: CPT

## 2021-12-16 PROCEDURE — 83735 ASSAY OF MAGNESIUM: CPT | Performed by: ORTHOPAEDIC SURGERY

## 2021-12-16 PROCEDURE — 25000003 PHARM REV CODE 250: Performed by: ORTHOPAEDIC SURGERY

## 2021-12-16 PROCEDURE — 94761 N-INVAS EAR/PLS OXIMETRY MLT: CPT

## 2021-12-16 PROCEDURE — 94640 AIRWAY INHALATION TREATMENT: CPT

## 2021-12-16 PROCEDURE — 27000221 HC OXYGEN, UP TO 24 HOURS

## 2021-12-16 PROCEDURE — 84100 ASSAY OF PHOSPHORUS: CPT | Performed by: ORTHOPAEDIC SURGERY

## 2021-12-16 PROCEDURE — 36415 COLL VENOUS BLD VENIPUNCTURE: CPT | Performed by: ORTHOPAEDIC SURGERY

## 2021-12-16 PROCEDURE — 80053 COMPREHEN METABOLIC PANEL: CPT | Performed by: ORTHOPAEDIC SURGERY

## 2021-12-16 PROCEDURE — S5010 5% DEXTROSE AND 0.45% SALINE: HCPCS | Performed by: ORTHOPAEDIC SURGERY

## 2021-12-16 PROCEDURE — 85025 COMPLETE CBC W/AUTO DIFF WBC: CPT | Performed by: ORTHOPAEDIC SURGERY

## 2021-12-16 PROCEDURE — 94799 UNLISTED PULMONARY SVC/PX: CPT

## 2021-12-16 RX ORDER — NAPROXEN SODIUM 220 MG/1
81 TABLET, FILM COATED ORAL 2 TIMES DAILY
Qty: 60 TABLET | Refills: 1 | Status: SHIPPED | OUTPATIENT
Start: 2021-12-16 | End: 2022-01-01

## 2021-12-16 RX ORDER — HYDROCODONE BITARTRATE AND ACETAMINOPHEN 5; 325 MG/1; MG/1
1 TABLET ORAL EVERY 6 HOURS PRN
Qty: 18 TABLET | Refills: 0 | Status: SHIPPED | OUTPATIENT
Start: 2021-12-16 | End: 2022-01-01

## 2021-12-16 RX ADMIN — ASPIRIN 81 MG CHEWABLE TABLET 81 MG: 81 TABLET CHEWABLE at 09:12

## 2021-12-16 RX ADMIN — FAMOTIDINE 20 MG: 20 TABLET ORAL at 09:12

## 2021-12-16 RX ADMIN — MUPIROCIN: 20 OINTMENT TOPICAL at 09:12

## 2021-12-16 RX ADMIN — DEXTROSE AND SODIUM CHLORIDE: 5; .45 INJECTION, SOLUTION INTRAVENOUS at 02:12

## 2021-12-16 RX ADMIN — FLUTICASONE FUROATE AND VILANTEROL TRIFENATATE 1 PUFF: 100; 25 POWDER RESPIRATORY (INHALATION) at 07:12

## 2021-12-16 RX ADMIN — HYDROCODONE BITARTRATE AND ACETAMINOPHEN 1 TABLET: 5; 325 TABLET ORAL at 09:12

## 2021-12-16 RX ADMIN — CARVEDILOL 3.12 MG: 3.12 TABLET, FILM COATED ORAL at 09:12

## 2021-12-16 RX ADMIN — HYDROCODONE BITARTRATE AND ACETAMINOPHEN 1 TABLET: 5; 325 TABLET ORAL at 02:12

## 2021-12-17 ENCOUNTER — TELEPHONE (OUTPATIENT)
Dept: MEDSURG UNIT | Facility: HOSPITAL | Age: 66
End: 2021-12-17
Payer: MEDICARE

## 2022-01-01 ENCOUNTER — PATIENT MESSAGE (OUTPATIENT)
Dept: ADMINISTRATIVE | Facility: HOSPITAL | Age: 67
End: 2022-01-01
Payer: MEDICARE

## 2022-01-01 ENCOUNTER — TELEPHONE (OUTPATIENT)
Dept: PULMONOLOGY | Facility: CLINIC | Age: 67
End: 2022-01-01
Payer: MEDICARE

## 2022-01-01 ENCOUNTER — HOSPITAL ENCOUNTER (OUTPATIENT)
Dept: RADIOLOGY | Facility: HOSPITAL | Age: 67
Discharge: HOME | End: 2022-03-28
Attending: INTERNAL MEDICINE
Payer: MEDICARE

## 2022-01-01 ENCOUNTER — PATIENT MESSAGE (OUTPATIENT)
Dept: PULMONOLOGY | Facility: CLINIC | Age: 67
End: 2022-01-01
Payer: MEDICARE

## 2022-01-01 ENCOUNTER — DOCUMENT SCAN (OUTPATIENT)
Dept: HOME HEALTH SERVICES | Facility: HOSPITAL | Age: 67
End: 2022-01-01
Payer: MEDICARE

## 2022-01-01 ENCOUNTER — HOSPITAL ENCOUNTER (OUTPATIENT)
Dept: PULMONOLOGY | Facility: HOSPITAL | Age: 67
Discharge: HOME | End: 2022-03-28
Attending: INTERNAL MEDICINE
Payer: MEDICARE

## 2022-01-01 ENCOUNTER — OFFICE VISIT (OUTPATIENT)
Dept: INFECTIOUS DISEASES | Facility: CLINIC | Age: 67
End: 2022-01-01
Payer: MEDICARE

## 2022-01-01 ENCOUNTER — TELEPHONE (OUTPATIENT)
Dept: INFECTIOUS DISEASES | Facility: CLINIC | Age: 67
End: 2022-01-01
Payer: MEDICARE

## 2022-01-01 ENCOUNTER — LAB VISIT (OUTPATIENT)
Dept: LAB | Facility: HOSPITAL | Age: 67
End: 2022-01-01
Attending: INTERNAL MEDICINE
Payer: MEDICARE

## 2022-01-01 ENCOUNTER — OFFICE VISIT (OUTPATIENT)
Dept: PULMONOLOGY | Facility: CLINIC | Age: 67
End: 2022-01-01
Payer: MEDICARE

## 2022-01-01 ENCOUNTER — OFFICE VISIT (OUTPATIENT)
Dept: ORTHOPEDICS | Facility: CLINIC | Age: 67
End: 2022-01-01
Payer: MEDICARE

## 2022-01-01 ENCOUNTER — TELEPHONE (OUTPATIENT)
Dept: HEPATOLOGY | Facility: HOSPITAL | Age: 67
End: 2022-01-01

## 2022-01-01 ENCOUNTER — HOSPITAL ENCOUNTER (OUTPATIENT)
Dept: RADIOLOGY | Facility: HOSPITAL | Age: 67
Discharge: HOME | End: 2022-10-11
Attending: INTERNAL MEDICINE
Payer: MEDICARE

## 2022-01-01 ENCOUNTER — PATIENT OUTREACH (OUTPATIENT)
Dept: ADMINISTRATIVE | Facility: OTHER | Age: 67
End: 2022-01-01
Payer: MEDICARE

## 2022-01-01 VITALS — BODY MASS INDEX: 16.05 KG/M2 | WEIGHT: 94 LBS | HEART RATE: 65 BPM | HEIGHT: 64 IN

## 2022-01-01 VITALS
WEIGHT: 104.75 LBS | OXYGEN SATURATION: 96 % | RESPIRATION RATE: 10 BRPM | BODY MASS INDEX: 17.88 KG/M2 | DIASTOLIC BLOOD PRESSURE: 70 MMHG | SYSTOLIC BLOOD PRESSURE: 122 MMHG | HEIGHT: 64 IN | HEART RATE: 76 BPM

## 2022-01-01 VITALS
HEIGHT: 64 IN | WEIGHT: 121.19 LBS | BODY MASS INDEX: 20.69 KG/M2 | SYSTOLIC BLOOD PRESSURE: 140 MMHG | DIASTOLIC BLOOD PRESSURE: 76 MMHG | HEART RATE: 77 BPM | TEMPERATURE: 97 F | OXYGEN SATURATION: 99 %

## 2022-01-01 VITALS
HEIGHT: 64 IN | HEART RATE: 82 BPM | DIASTOLIC BLOOD PRESSURE: 92 MMHG | SYSTOLIC BLOOD PRESSURE: 136 MMHG | WEIGHT: 123.44 LBS | BODY MASS INDEX: 21.07 KG/M2 | TEMPERATURE: 98 F

## 2022-01-01 VITALS — WEIGHT: 121 LBS | HEART RATE: 80 BPM | BODY MASS INDEX: 20.66 KG/M2 | HEIGHT: 64 IN | OXYGEN SATURATION: 99 %

## 2022-01-01 VITALS
DIASTOLIC BLOOD PRESSURE: 69 MMHG | SYSTOLIC BLOOD PRESSURE: 108 MMHG | BODY MASS INDEX: 16.05 KG/M2 | WEIGHT: 94 LBS | HEIGHT: 64 IN

## 2022-01-01 VITALS
OXYGEN SATURATION: 95 % | TEMPERATURE: 100 F | DIASTOLIC BLOOD PRESSURE: 58 MMHG | SYSTOLIC BLOOD PRESSURE: 108 MMHG | HEART RATE: 78 BPM | HEIGHT: 64 IN | BODY MASS INDEX: 17.64 KG/M2 | WEIGHT: 103.31 LBS

## 2022-01-01 VITALS
DIASTOLIC BLOOD PRESSURE: 77 MMHG | SYSTOLIC BLOOD PRESSURE: 145 MMHG | HEART RATE: 85 BPM | BODY MASS INDEX: 19.32 KG/M2 | HEIGHT: 64 IN | OXYGEN SATURATION: 99 % | WEIGHT: 113.19 LBS

## 2022-01-01 DIAGNOSIS — D84.9 IMMUNE DEFICIENCY DISORDER: ICD-10-CM

## 2022-01-01 DIAGNOSIS — J44.9 CHRONIC OBSTRUCTIVE PULMONARY DISEASE, UNSPECIFIED COPD TYPE: ICD-10-CM

## 2022-01-01 DIAGNOSIS — J44.1 CHRONIC OBSTRUCTIVE PULMONARY DISEASE WITH ACUTE EXACERBATION: Primary | ICD-10-CM

## 2022-01-01 DIAGNOSIS — J44.9 COPD, SEVERE: ICD-10-CM

## 2022-01-01 DIAGNOSIS — A31.0 PULMONARY INFECTION DUE TO MYCOBACTERIUM AVIUM: ICD-10-CM

## 2022-01-01 DIAGNOSIS — J98.4 CAVITARY LUNG DISEASE: ICD-10-CM

## 2022-01-01 DIAGNOSIS — Z12.31 ENCOUNTER FOR SCREENING MAMMOGRAM FOR MALIGNANT NEOPLASM OF BREAST: Primary | ICD-10-CM

## 2022-01-01 DIAGNOSIS — A31.0 MAI (MYCOBACTERIUM AVIUM-INTRACELLULARE): ICD-10-CM

## 2022-01-01 DIAGNOSIS — Z22.39 MYCOBACTERIUM ABSCESSUS COLONIZATION: Primary | ICD-10-CM

## 2022-01-01 DIAGNOSIS — J47.0 BRONCHIECTASIS WITH ACUTE LOWER RESPIRATORY INFECTION: ICD-10-CM

## 2022-01-01 DIAGNOSIS — J96.11 CHRONIC HYPOXEMIC RESPIRATORY FAILURE: ICD-10-CM

## 2022-01-01 DIAGNOSIS — A31.0 MYCOBACTERIUM AVIUM-INTRACELLULARE COMPLEX: Primary | ICD-10-CM

## 2022-01-01 DIAGNOSIS — Z96.642 S/P TOTAL LEFT HIP ARTHROPLASTY: Primary | ICD-10-CM

## 2022-01-01 DIAGNOSIS — Z78.0 MENOPAUSE: ICD-10-CM

## 2022-01-01 DIAGNOSIS — A31.0 MYCOBACTERIUM AVIUM-INTRACELLULARE COMPLEX: ICD-10-CM

## 2022-01-01 DIAGNOSIS — J44.1 CHRONIC OBSTRUCTIVE PULMONARY DISEASE WITH ACUTE EXACERBATION: ICD-10-CM

## 2022-01-01 LAB
ACID FAST MOD KINY STN SPEC: ABNORMAL
ACID FAST MOD KINY STN SPEC: NORMAL
ALBUMIN SERPL BCP-MCNC: 3.9 G/DL (ref 3.5–5.2)
ALP SERPL-CCNC: 61 U/L (ref 55–135)
ALT SERPL W/O P-5'-P-CCNC: 14 U/L (ref 10–44)
ANION GAP SERPL CALC-SCNC: 9 MMOL/L (ref 8–16)
AST SERPL-CCNC: 17 U/L (ref 10–40)
BASOPHILS # BLD AUTO: 0.05 K/UL (ref 0–0.2)
BASOPHILS NFR BLD: 0.8 % (ref 0–1.9)
BILIRUB SERPL-MCNC: 0.9 MG/DL (ref 0.1–1)
BR6MWT: NORMAL
BUN SERPL-MCNC: 18 MG/DL (ref 8–23)
CALCIUM SERPL-MCNC: 9.8 MG/DL (ref 8.7–10.5)
CHLORIDE SERPL-SCNC: 97 MMOL/L (ref 95–110)
CO2 SERPL-SCNC: 30 MMOL/L (ref 23–29)
CREAT SERPL-MCNC: 0.5 MG/DL (ref 0.5–1.4)
CRP SERPL-MCNC: 1.31 MG/DL
DIFFERENTIAL METHOD: ABNORMAL
EOSINOPHIL # BLD AUTO: 0.2 K/UL (ref 0–0.5)
EOSINOPHIL NFR BLD: 2.7 % (ref 0–8)
ERYTHROCYTE [DISTWIDTH] IN BLOOD BY AUTOMATED COUNT: 14.8 % (ref 11.5–14.5)
ERYTHROCYTE [SEDIMENTATION RATE] IN BLOOD BY WESTERGREN METHOD: 8 MM/HR (ref 0–20)
EST. GFR  (AFRICAN AMERICAN): >60 ML/MIN/1.73 M^2
EST. GFR  (NON AFRICAN AMERICAN): >60 ML/MIN/1.73 M^2
GLUCOSE SERPL-MCNC: 98 MG/DL (ref 70–110)
HCT VFR BLD AUTO: 44.6 % (ref 37–48.5)
HGB BLD-MCNC: 13.4 G/DL (ref 12–16)
IMM GRANULOCYTES # BLD AUTO: 0.03 K/UL (ref 0–0.04)
IMM GRANULOCYTES NFR BLD AUTO: 0.5 % (ref 0–0.5)
LYMPHOCYTES # BLD AUTO: 1.8 K/UL (ref 1–4.8)
LYMPHOCYTES NFR BLD: 28.1 % (ref 18–48)
MCH RBC QN AUTO: 29.3 PG (ref 27–31)
MCHC RBC AUTO-ENTMCNC: 30 G/DL (ref 32–36)
MCV RBC AUTO: 97 FL (ref 82–98)
MONOCYTES # BLD AUTO: 0.5 K/UL (ref 0.3–1)
MONOCYTES NFR BLD: 7.3 % (ref 4–15)
MYCOBACTERIUM SPEC QL CULT: ABNORMAL
MYCOBACTERIUM SPEC QL CULT: NORMAL
NEUTROPHILS # BLD AUTO: 3.8 K/UL (ref 1.8–7.7)
NEUTROPHILS NFR BLD: 60.6 % (ref 38–73)
NRBC BLD-RTO: 0 /100 WBC
PLATELET # BLD AUTO: 316 K/UL (ref 150–450)
PMV BLD AUTO: 8.9 FL (ref 9.2–12.9)
POTASSIUM SERPL-SCNC: 4.5 MMOL/L (ref 3.5–5.1)
PROT SERPL-MCNC: 7.6 G/DL (ref 6–8.4)
RBC # BLD AUTO: 4.58 M/UL (ref 4–5.4)
SODIUM SERPL-SCNC: 136 MMOL/L (ref 136–145)
WBC # BLD AUTO: 6.26 K/UL (ref 3.9–12.7)

## 2022-01-01 PROCEDURE — 99024 PR POST-OP FOLLOW-UP VISIT: ICD-10-PCS | Mod: S$GLB,POP,, | Performed by: PHYSICIAN ASSISTANT

## 2022-01-01 PROCEDURE — 99214 OFFICE O/P EST MOD 30 MIN: CPT | Mod: S$PBB,,, | Performed by: INTERNAL MEDICINE

## 2022-01-01 PROCEDURE — 99214 OFFICE O/P EST MOD 30 MIN: CPT | Mod: PBBFAC,PO | Performed by: INTERNAL MEDICINE

## 2022-01-01 PROCEDURE — 94618 PULMONARY STRESS TESTING: ICD-10-PCS | Mod: 26,,, | Performed by: INTERNAL MEDICINE

## 2022-01-01 PROCEDURE — 71046 X-RAY EXAM CHEST 2 VIEWS: CPT | Mod: 26,,, | Performed by: RADIOLOGY

## 2022-01-01 PROCEDURE — 99215 PR OFFICE/OUTPT VISIT, EST, LEVL V, 40-54 MIN: ICD-10-PCS | Mod: S$GLB,,, | Performed by: INTERNAL MEDICINE

## 2022-01-01 PROCEDURE — 99024 PR POST-OP FOLLOW-UP VISIT: ICD-10-PCS | Mod: S$GLB,POP,, | Performed by: ORTHOPAEDIC SURGERY

## 2022-01-01 PROCEDURE — 99214 PR OFFICE/OUTPT VISIT, EST, LEVL IV, 30-39 MIN: ICD-10-PCS | Mod: S$GLB,,, | Performed by: INTERNAL MEDICINE

## 2022-01-01 PROCEDURE — 99024 POSTOP FOLLOW-UP VISIT: CPT | Mod: S$GLB,POP,, | Performed by: PHYSICIAN ASSISTANT

## 2022-01-01 PROCEDURE — 99999 PR PBB SHADOW E&M-EST. PATIENT-LVL IV: CPT | Mod: PBBFAC,,, | Performed by: INTERNAL MEDICINE

## 2022-01-01 PROCEDURE — 86140 C-REACTIVE PROTEIN: CPT | Performed by: INTERNAL MEDICINE

## 2022-01-01 PROCEDURE — 99999 PR PBB SHADOW E&M-EST. PATIENT-LVL IV: ICD-10-PCS | Mod: PBBFAC,,, | Performed by: INTERNAL MEDICINE

## 2022-01-01 PROCEDURE — 94618 PULMONARY STRESS TESTING: CPT

## 2022-01-01 PROCEDURE — 85651 RBC SED RATE NONAUTOMATED: CPT | Performed by: INTERNAL MEDICINE

## 2022-01-01 PROCEDURE — 71046 XR CHEST PA AND LATERAL: ICD-10-PCS | Mod: 26,,, | Performed by: RADIOLOGY

## 2022-01-01 PROCEDURE — 99214 OFFICE O/P EST MOD 30 MIN: CPT | Mod: 25,S$PBB,, | Performed by: INTERNAL MEDICINE

## 2022-01-01 PROCEDURE — 99213 OFFICE O/P EST LOW 20 MIN: CPT | Mod: PBBFAC | Performed by: INTERNAL MEDICINE

## 2022-01-01 PROCEDURE — 94618 PULMONARY STRESS TESTING: CPT | Mod: 26,,, | Performed by: INTERNAL MEDICINE

## 2022-01-01 PROCEDURE — 71250 CT THORAX DX C-: CPT | Mod: TC

## 2022-01-01 PROCEDURE — 99999 PR PBB SHADOW E&M-EST. PATIENT-LVL III: CPT | Mod: PBBFAC,,, | Performed by: INTERNAL MEDICINE

## 2022-01-01 PROCEDURE — 36415 COLL VENOUS BLD VENIPUNCTURE: CPT | Performed by: INTERNAL MEDICINE

## 2022-01-01 PROCEDURE — 99999 PR PBB SHADOW E&M-EST. PATIENT-LVL III: ICD-10-PCS | Mod: PBBFAC,,, | Performed by: INTERNAL MEDICINE

## 2022-01-01 PROCEDURE — 99214 OFFICE O/P EST MOD 30 MIN: CPT | Mod: S$GLB,,, | Performed by: INTERNAL MEDICINE

## 2022-01-01 PROCEDURE — 99215 PR OFFICE/OUTPT VISIT, EST, LEVL V, 40-54 MIN: ICD-10-PCS | Mod: S$PBB,,, | Performed by: INTERNAL MEDICINE

## 2022-01-01 PROCEDURE — 99214 PR OFFICE/OUTPT VISIT, EST, LEVL IV, 30-39 MIN: ICD-10-PCS | Mod: 25,S$PBB,, | Performed by: INTERNAL MEDICINE

## 2022-01-01 PROCEDURE — 99214 PR OFFICE/OUTPT VISIT, EST, LEVL IV, 30-39 MIN: ICD-10-PCS | Mod: S$PBB,,, | Performed by: INTERNAL MEDICINE

## 2022-01-01 PROCEDURE — 99024 POSTOP FOLLOW-UP VISIT: CPT | Mod: S$GLB,POP,, | Performed by: ORTHOPAEDIC SURGERY

## 2022-01-01 PROCEDURE — 87206 SMEAR FLUORESCENT/ACID STAI: CPT | Performed by: INTERNAL MEDICINE

## 2022-01-01 PROCEDURE — 71250 CT THORAX DX C-: CPT | Mod: 26,,, | Performed by: RADIOLOGY

## 2022-01-01 PROCEDURE — 99213 OFFICE O/P EST LOW 20 MIN: CPT | Mod: PBBFAC,PO | Performed by: INTERNAL MEDICINE

## 2022-01-01 PROCEDURE — 87015 SPECIMEN INFECT AGNT CONCNTJ: CPT | Performed by: INTERNAL MEDICINE

## 2022-01-01 PROCEDURE — 85025 COMPLETE CBC W/AUTO DIFF WBC: CPT | Performed by: INTERNAL MEDICINE

## 2022-01-01 PROCEDURE — 99215 OFFICE O/P EST HI 40 MIN: CPT | Mod: S$PBB,,, | Performed by: INTERNAL MEDICINE

## 2022-01-01 PROCEDURE — 87116 MYCOBACTERIA CULTURE: CPT | Performed by: INTERNAL MEDICINE

## 2022-01-01 PROCEDURE — 99215 OFFICE O/P EST HI 40 MIN: CPT | Mod: S$GLB,,, | Performed by: INTERNAL MEDICINE

## 2022-01-01 PROCEDURE — 80053 COMPREHEN METABOLIC PANEL: CPT | Performed by: INTERNAL MEDICINE

## 2022-01-01 PROCEDURE — 71250 CT CHEST WITHOUT CONTRAST: ICD-10-PCS | Mod: 26,,, | Performed by: RADIOLOGY

## 2022-01-01 PROCEDURE — 71046 X-RAY EXAM CHEST 2 VIEWS: CPT | Mod: TC,FY

## 2022-01-01 RX ORDER — ALBUTEROL SULFATE 90 UG/1
2 AEROSOL, METERED RESPIRATORY (INHALATION) EVERY 4 HOURS PRN
Qty: 18 G | Refills: 11 | Status: SHIPPED | OUTPATIENT
Start: 2022-01-01

## 2022-01-01 RX ORDER — ETHAMBUTOL HYDROCHLORIDE 100 MG/1
700 TABLET, FILM COATED ORAL DAILY
Qty: 840 TABLET | Refills: 0 | OUTPATIENT
Start: 2022-01-01 | End: 2023-03-14

## 2022-01-01 RX ORDER — AZITHROMYCIN 500 MG/1
500 TABLET, FILM COATED ORAL DAILY
Qty: 90 TABLET | Refills: 3 | Status: SHIPPED | OUTPATIENT
Start: 2022-01-01 | End: 2022-01-01 | Stop reason: SDUPTHER

## 2022-01-01 RX ORDER — RIFABUTIN 150 MG/1
300 CAPSULE ORAL DAILY
Qty: 60 CAPSULE | Refills: 5 | Status: SHIPPED | OUTPATIENT
Start: 2022-01-01 | End: 2022-01-01

## 2022-01-01 RX ORDER — AZITHROMYCIN 500 MG/1
500 TABLET, FILM COATED ORAL DAILY
Qty: 120 TABLET | Refills: 0 | OUTPATIENT
Start: 2022-01-01 | End: 2023-03-14

## 2022-01-01 RX ORDER — SODIUM CHLORIDE FOR INHALATION 3 %
4 VIAL, NEBULIZER (ML) INHALATION 2 TIMES DAILY
Qty: 500 ML | Refills: 11 | Status: CANCELLED | OUTPATIENT
Start: 2022-01-01

## 2022-01-01 RX ORDER — FLUTICASONE FUROATE, UMECLIDINIUM BROMIDE AND VILANTEROL TRIFENATATE 200; 62.5; 25 UG/1; UG/1; UG/1
1 POWDER RESPIRATORY (INHALATION) DAILY
Qty: 60 EACH | Refills: 11 | Status: SHIPPED | OUTPATIENT
Start: 2022-01-01

## 2022-01-01 RX ORDER — ETHAMBUTOL HYDROCHLORIDE 100 MG/1
700 TABLET, FILM COATED ORAL DAILY
Qty: 630 TABLET | Refills: 6 | Status: SHIPPED | OUTPATIENT
Start: 2022-01-01 | End: 2022-01-01

## 2022-01-01 RX ORDER — CIPROFLOXACIN 500 MG/1
500 TABLET ORAL 2 TIMES DAILY
Qty: 20 TABLET | Refills: 2 | Status: SHIPPED | OUTPATIENT
Start: 2022-01-01 | End: 2022-01-01

## 2022-01-05 NOTE — PROGRESS NOTES
[facility] ORTHOPEDICS  [address]  [place] [address]  Phone: [phone]   [phone]    Patient's PCP:Dominique Bartlett MD  Referring Provider: No ref. provider found    POST-OP Note:    Subjective:        Chief Complaint:   Chief Complaint   Patient presents with    Left Hip - Post-op Evaluation     S/p L ZULAY 12/14, she is doing well, little pain, has been walking with a walker       Past Medical History:   Diagnosis Date    Abnormal CT scan 1/8/2015    Asthma     Colitis     COPD (chronic obstructive pulmonary disease)     Diverticulitis of sigmoid colon 11/4/2014    Erosive gastritis 7/17/2013    Fracture of left clavicle     H pylori ulcer 7/17/2013    Hypertension     Peptic ulcer disease 7/17/2013    Pneumonia of right upper lobe due to infectious organism 8/15/2017    Rectal bleed 11/3/2014    Scoliosis     SOB (shortness of breath)     Yeast infection 8/25/2017       Past Surgical History:   Procedure Laterality Date    APPENDECTOMY      BREAST BIOPSY Right     cyst    COLONOSCOPY N/A 7/15/2019    Procedure: COLONOSCOPY;  Surgeon: Sharif Chun MD;  Location: [facility];  Service: Endoscopy;  Laterality: N/A;    HIP ARTHROPLASTY Right 12/18/2019    Procedure: ARTHROPLASTY, HIP;  Surgeon: Bernardino Wilson II, MD;  Location: [facility] OR;  Service: Orthopedics;  Laterality: Right;  [facility] - [employer]    HIP ARTHROPLASTY Left 12/14/2021    Procedure: ARTHROPLASTY, HIP;  Surgeon: Abdulkadir Dasilva MD;  Location: [facility] OR;  Service: Orthopedics;  Laterality: Left;    HYSTERECTOMY      HASMUKH/BSO, DUB    OOPHORECTOMY      TUBAL LIGATION         Current Outpatient Medications   Medication Sig    acetaminophen (TYLENOL) 325 MG tablet Take 650 mg by mouth every 6 (six) hours as needed for Pain or Temperature greater than.    aspirin 81 MG Chew Take 1 tablet (81 mg total) by mouth 2 (two) times daily.    azithromycin (ZITHROMAX) 500 MG tablet Take 1 tablet (500 mg total) by  "mouth once daily.    carvediloL (COREG) 3.125 MG tablet Take 1 tablet (3.125 mg total) by mouth 2 (two) times daily.    ethambutoL (MYAMBUTOL) 100 MG Tab Take 7 tablets (700 mg total) by mouth once daily.    ferrous sulfate (FEOSOL) 325 mg (65 mg iron) Tab tablet Take 325 mg by mouth 2 (two) times a day.    fluticasone-umeclidin-vilanter (TRELEGY ELLIPTA) 100-62.5-25 mcg DsDv Inhale 1 puff into the lungs once daily.    HYDROcodone-acetaminophen (NORCO) 5-325 mg per tablet Take 1 tablet by mouth every 6 (six) hours as needed.    HYDROcodone-acetaminophen (NORCO) 5-325 mg per tablet Take 1 tablet by mouth every 6 (six) hours as needed for Pain.    lactobacillus combination no.4 (PROBIOTIC) 3 billion cell Cap Take 1 capsule by mouth once daily.    mucus clearing device (AEROBIKA OSCILLATING PEP SYSTM) by Misc.(Non-Drug; Combo Route) route 2 (two) times a day.    PREVALITE 4 gram PwPk Take by mouth.    rifabutin (MYCOBUTIN) 150 mg Cap Take 2 capsules (300 mg total) by mouth once daily.    sodium chloride 3% 3 % nebulizer solution Take 4 mLs by nebulization 2 (two) times a day.    VENTOLIN HFA 90 mcg/actuation inhaler Inhale 2 puffs into the lungs every 4 (four) hours as needed for Wheezing.    albuterol-ipratropium (DUO-NEB) 2.5 mg-0.5 mg/3 mL nebulizer solution Take 3 mLs by nebulization every 6 (six) hours as needed for Wheezing.    cholestyramine (QUESTRAN) 4 gram packet Take 1 packet (4 g total) by mouth 2 (two) times daily.     No current facility-administered medications for this visit.       Review of patient's allergies indicates:   Allergen Reactions    Erythromycin Nausea Only       Family History   Problem Relation Age of Onset    Cancer Mother 49        "lymph nodes"    Cancer Sister         lung?       Social History     Socioeconomic History    Marital status:    Tobacco Use    Smoking status: Former Smoker     Packs/day: 0.50     Years: 30.00     Pack years: 15.00     Types: " Cigarettes    Smokeless tobacco: Never Used    Tobacco comment: Quit on 11-5-2020   Substance and Sexual Activity    Alcohol use: Not Currently     Alcohol/week: 12.0 standard drinks     Types: 12 Cans of beer per week    Drug use: No    Sexual activity: Yes     Partners: Male       History of present illness:  Ms. Lewis is here for follow-up from her left total hip arthroplasty.  She is 3 weeks postop.  She underwent the surgical procedure secondary to a left femoral neck fracture.  She reports her pain is very well controlled and she is progressing with her home health physical therapy.  She did report to me today that she would like to not attend outpatient physical therapy secondary to fears of the current outbreak of the Omicron variant of COVID-19.      Review of Systems:    Musculoskeletal:  See HPI       Objective:        Physical Examination:    Vital Signs:   Vitals:    01/05/22 1136   Pulse: 65       Body mass index is 16.14 kg/m².    This a well-developed, well nourished patient in no acute distress.  They are alert and oriented and cooperative to examination.        Left hip exam:  Skin left hip is clean dry and intact.  There is no erythema or ecchymosis.  Left hip lateral incision is healing well without wound dehiscence or drainage.  There are no signs or symptoms of infection.  Patient has good internal and external rotation of left hip without pain.  Her left calf is soft and nontender.  She can weightbear as tolerated on her left lower extremity.  She is able to ambulate with a rolling walker.    Pertinent New Results:     XRAY Report / Interpretation:  Single AP view was taken of her left hip today.  He revealed an anatomically placed left total hip arthroplasty without evidence of hardware failure or subsidence.       Assessment:       1. S/P total left hip arthroplasty      Plan:     S/P total left hip arthroplasty  -     X-Ray Hip with Pelvis when performed, 1 View Left        Follow up  in about 4 weeks (around 2/2/2022) for PO Left ZULAY 12/14/21//Tues or Thurs .    Sutures removed from her left hip today.  She tolerated this well.  Patient declined the knee today for any pain medication.  She is advised to clean the operative site once a day with warm soapy water not to apply any topical creams or ointments.  She is advised not to submerge operative site underwater in a pool or bathtub type environment for least 1 more week.  We will extend her home health physical therapy for 2 more weeks to help increase her strength and independence in her ADLs.  She did state that she would not like to attend formal outpatient therapy due to fears of COVID-19.  Therefore, I think continuing with home health therapy for couple more weeks would be extremely beneficial and reasonable.  We will see her back in 1 month to see how she is progressing with her therapy.      Bernardo Alcaraz, MPAS, PA-C    This note was created using [employer] voice recognition software that occasionally misinterprets words or phrases.

## 2022-02-03 NOTE — PROGRESS NOTES
[facility] ORTHOPEDICS POST-OP NOTE    Subjective:           Chief Complaint:   Chief Complaint   Patient presents with    Left Hip - Post-op Evaluation     PO Left ZULAY 12/14/21. Pt reports she is doing well.       Past Medical History:   Diagnosis Date    Abnormal CT scan 1/8/2015    Asthma     Colitis     COPD (chronic obstructive pulmonary disease)     Diverticulitis of sigmoid colon 11/4/2014    Erosive gastritis 7/17/2013    Fracture of left clavicle     H pylori ulcer 7/17/2013    Hypertension     Peptic ulcer disease 7/17/2013    Pneumonia of right upper lobe due to infectious organism 8/15/2017    Rectal bleed 11/3/2014    Scoliosis     SOB (shortness of breath)     Yeast infection 8/25/2017       Past Surgical History:   Procedure Laterality Date    APPENDECTOMY      BREAST BIOPSY Right     cyst    COLONOSCOPY N/A 7/15/2019    Procedure: COLONOSCOPY;  Surgeon: Sharif Chun MD;  Location: [facility];  Service: Endoscopy;  Laterality: N/A;    HIP ARTHROPLASTY Right 12/18/2019    Procedure: ARTHROPLASTY, HIP;  Surgeon: Bernardino Wilson II, MD;  Location: [facility] OR;  Service: Orthopedics;  Laterality: Right;  [facility] - [employer]    HIP ARTHROPLASTY Left 12/14/2021    Procedure: ARTHROPLASTY, HIP;  Surgeon: Abdulkadir Dasilva MD;  Location: [facility] OR;  Service: Orthopedics;  Laterality: Left;    HYSTERECTOMY      HASMUKH/BSO, DUB    OOPHORECTOMY      TUBAL LIGATION         Current Outpatient Medications   Medication Sig    acetaminophen (TYLENOL) 325 MG tablet Take 650 mg by mouth every 6 (six) hours as needed for Pain or Temperature greater than.    aspirin 81 MG Chew Take 1 tablet (81 mg total) by mouth 2 (two) times daily.    azithromycin (ZITHROMAX) 500 MG tablet Take 1 tablet (500 mg total) by mouth once daily.    ethambutoL (MYAMBUTOL) 100 MG Tab Take 7 tablets (700 mg total) by mouth once daily.    ferrous sulfate (FEOSOL) 325 mg (65 mg iron) Tab tablet Take 325 mg by mouth 2  "(two) times a day.    fluticasone-umeclidin-vilanter (TRELEGY ELLIPTA) 100-62.5-25 mcg DsDv Inhale 1 puff into the lungs once daily.    lactobacillus combination no.4 (PROBIOTIC) 3 billion cell Cap Take 1 capsule by mouth once daily.    mucus clearing device (AEROBIKA OSCILLATING PEP SYSTM) by Misc.(Non-Drug; Combo Route) route 2 (two) times a day.    rifabutin (MYCOBUTIN) 150 mg Cap Take 2 capsules (300 mg total) by mouth once daily.    sodium chloride 3% 3 % nebulizer solution Take 4 mLs by nebulization 2 (two) times a day.    VENTOLIN HFA 90 mcg/actuation inhaler Inhale 2 puffs into the lungs every 4 (four) hours as needed for Wheezing.    albuterol-ipratropium (DUO-NEB) 2.5 mg-0.5 mg/3 mL nebulizer solution Take 3 mLs by nebulization every 6 (six) hours as needed for Wheezing.    cholestyramine (QUESTRAN) 4 gram packet Take 1 packet (4 g total) by mouth 2 (two) times daily.    HYDROcodone-acetaminophen (NORCO) 5-325 mg per tablet Take 1 tablet by mouth every 6 (six) hours as needed. (Patient not taking: Reported on 2/3/2022)    HYDROcodone-acetaminophen (NORCO) 5-325 mg per tablet Take 1 tablet by mouth every 6 (six) hours as needed for Pain. (Patient not taking: Reported on 2/3/2022)    PREVALITE 4 gram PwPk Take by mouth.     No current facility-administered medications for this visit.       Review of patient's allergies indicates:   Allergen Reactions    Erythromycin Nausea Only       Family History   Problem Relation Age of Onset    Cancer Mother 49        "lymph nodes"    Cancer Sister         lung?       Social History     Socioeconomic History    Marital status:    Tobacco Use    Smoking status: Former Smoker     Packs/day: 0.50     Years: 30.00     Pack years: 15.00     Types: Cigarettes    Smokeless tobacco: Never Used    Tobacco comment: Quit on 11-5-2020   Substance and Sexual Activity    Alcohol use: Not Currently     Alcohol/week: 12.0 standard drinks     Types: 12 Cans of " "beer per week    Drug use: No    Sexual activity: Yes     Partners: Male       History of present illness:  Ms. Morel is here for follow-up from her left total hip arthroplasty.  She is approximately 6 weeks postop and doing quite well.  She has finished her physical therapy.  She reports she has no pain.      Review of Systems:    Musculoskeletal:  See HPI      Objective:        Physical Examination:    Vital Signs:    Vitals:    02/03/22 1213   BP: 108/69       Body mass index is 16.14 kg/m².    This a well-developed, well nourished patient in no acute distress.  They are alert and oriented and cooperative to examination.        Left hip exam:  Patient is neurovascularly intact throughout her left lower extremity.  Left calf is soft and nontender.  She can forward flex at the left hip and has good internal/external rotation of the left hip all without pain.  Her lateral left hip incisions well healed.  She reports she can weight bear as tolerated and does so without an aid.  However, she does present to the clinic today ambulating via wheelchair.    Pertinent New Results:    XRAY Report / Interpretation:   Single AP view was taken of her left hip today.  Reveals no acute fractures or dislocations.  There is an anatomically placed left total hip arthroplasty without evidence of hardware failure or subsidence.    Assessment/Plan:      1.  Status post left total hip arthroplasty.    Once again reviewed total hip replacement precautions with the patient today to help reduce the incidence of dislocation.  She will follow-up with us in 2 months to see how she is continuing to progress.  She was advised if she was doing quite well that she is simply cancel appointment and follow-up on an as-needed basis thereafter.    Bernardo Alcaraz, Physician Assistant, served in the capacity as a "scribe" for this patient encounter.  A "face-to-face" encounter occurred with Dr. Abdulkadir Dasilva on this date.  The treatment plan " and medical decision-making is outlined above. Patient was seen and examined with a chaperone.     This note was created using Dragon voice recognition software that occasionally misinterpreted phrases or words.

## 2022-02-17 NOTE — PROGRESS NOTES
"  Patient ID: Fabiana Morel is a 66 y.o. female.    Chief Complaint:: Follow-up    HPI  65 y.o. female with  PMHx of HTN, DLP, anemia, malnutrition, cachexia, BMI of 16, right femur fracture s/p sx 12/ 2019, deconditioning, former smoker (quit on 11/05/2020) asthma, immunodeficiency, bronchiectasis, COPD,  advanced bullous lung disease, chronic hypoxemic respiratory failure on 2-3l/min o2     She became more short of breath with increasing sputum, CAMPOS,  in early November. Seen by pulmonologist, Dr Mackey who ordered CT chest and sent sputum cultures for AFB. CT was abnormal.Cultures grew M. Abscessus (R to clarithro) and DAVIS (11/03, 11/04)  Treated as OP with clarithro, rifampin ethamb   Then, she developed a right sided spontaneous pneumothorax 11/29/2020  and required placement of a chest tube, Hospitalized and tx with broad abx coverage, discharged on 12/09/2020 on clarithromycin, ethambutol, Zyvox..   Chest tube was discontinued as outpatient and replaced within 48 hours, bc of PNX recurrence.  This time she was hospitalized from 12/26--01/05/2021.  Discharge on Zithromax, ethambutol, meropenem, amikacin and Tygacil.    12/01- sp cx Pseudomonas  12/27 pseudomonas  01/01 cx are growing AF --- will have to wait and see which  Will it be? DAVIS or abscessus  01/19/2021  She feels less short of breath." I think I do not need o2 at times"  Tolerating Ethambol without vision changes  Will try rifampin again but probably not able to tolerate it   02/11/2021  Patient saw dr Hamlin., her opinion is greatly appreciated.  Treatment has been taper down to Amikacin Zithromax and ethambutol and rifabutin.  Her right chest tube has fallen off  She is not wearing oxygen today and feels great.  Cough is markedly improved.  No phlegm.  03/11/2021.  No fever no chills.  Positive for cough.  Minimal phlegm.  Usually sclera slightly colored.  No hemoptysis.  She is not wearing oxygen, but  notices that she gets tired and " short of breath when doing a lot of activity.  As per patient and  her pulse ox does never dropped below 91 but I encouraged them to measure it when walking.  No changes in vision while on ethambutol  No change in his hearing while on amikacin.  This is the 3rd month on amikacin.  She has regular visits with audiologist  She could not tolerate rifampin but she is tolerating rifabutin.  It is expensive, but thankfully they are able to afford it.  Patient is pleasant, but understandable tired of multiple visits including ID, Pulmonary optometry, audiology, labs,  During the trip they did not have enough NS and heparin flushes and that was postpone by a couple of days.  I reviewed with patient and  blood work that was ordered by Dr. Hamlin(normal cystic fibrosis mutation panel, normal alpha 1 anti trypsin, normal CCP, normal RF, normal IgG, normal IgM, elevated IgA.)  04/13/2021  She   was recently diagnosed by dr Mackey for recurrence of Pseudomonas  pneumonia and was given ciprofloxacin  She took a sputum sample to lab, but no AFB was run last month, I wrote the order again for sputum  AFB (and canceled prior order)  No fever, no chills, not much phlegm   and patient plan to  go to [place] for one week  Tolerating amikacin well.  No hearing problems.  No issues with vision, color vision while on ethambutol  05/05/2021  Patient has the usual cough.  Minimal phlegm if any.   feels that some of the sputum samples may have not been very accurate due to inability to produce sputum.  No fever no chills.  She has baseline diarrhea, about 2 loose stools a day but no dehydration on physical exam or labs.  We discussed the CT scan chest and the most recent sputum AFB, which are negative so far, and hopefully remain so.  We discussed ethambutol and no issues with vision/color.  Patient is proactive and offers this information.  We discussed audiogram results with some worsening of the high frequency.   If next audiogram shows further worsening will have discontinue amikacin, despite of all esle.  I was debating on prior visit and today what to do with amikacin .  I am trying not to keep it long, for fear of possible side effects.  Will give to 3 more weeks to sputum cultures to hopefully stay negative for AFB, then stop amikacin.   Patient who is very attentive to his wife, likes to take her on different trips to see family and friends.  He feels patient is more active, during their trips  No sick contacts.  She wears a mask at all times.  06/02/2021.  No new complaints.  Discussed stopping amikacin IV and giving it inhalation.  Monitoring  sputum AFB  Clinic visit 07/20/2021.  Amikacin inhalation, started on 06/10/2021, precipitated cough, with wet  rattle, no phlegm production.  Immediately after starting amikacin inhalation, she needed to 0.5 L of oxygen but now she is on 3.5-4 L of oxygen.  She held amikacin inhalation for 3 days and resumed it again.  No fever no chills.  As above mention no phlegm.  Appetite has decreased.  She did not notice, but she has thrush on physical exam.  Her activity has diminished in general.  Her  is understandably concerned.  The most recent sputum of 06/10/2021 is negative for AFB so far, which is the first negative sputum, which is reassuring.  Continues to have frequent loose stools.  No BMs today.  Two BMs yesterday.  No issues with hearing  No issues with vision.  07/20 08/03/2021  Patient is always compliant with follow-up and medical advise.  She comes in for a follow-up visit.  She has  less cough. No phlegm.  Need for oxygen has improved to 3 L. We decided not to take amikacin inhalation anymore, because it precipitated a COPD exacerbation.  She is on a higher dose prednisone, 20 which will be tapered down later, by pulmonologist..  I discontinued it in the records.  Patient is taking ethambutol 700.  Zithromax 500. Rifabutin 300. I discussed that eventually we  could eventually go on ethambutol 600.  She has no visual side effects at this time.  I cannot use Zithromax 250 because of presence of cavitation.  Rifabutin is easily tolerated.  She did not tolerate rifampin well.  less o2 ---3l now    11/03/2021.  No fever no chills.  Minimal dry cough intermittently.  Thrush has resolved  Diarrhea has resolved.  In good spirits.  Discuss continuing treatment for at least 6 months after sputum clears.    02/16/2022.  Patient comes in for follow-up for her MAC infection.  The most recent sputum culture, obtained on 02/07/2022 is negative so far, hopefully it stays so.  Patient feels well.  No chest pain no arrhythmias.  No color vision changes.  Appetite is improved she has gained a couple of lb.  Her  is in the room, all is attentive, an excellent caregiver.  Patient is in good spirits.  She feels that she is improving.  I am cautiously happy about her progress.    Review of patient's allergies indicates:   Allergen Reactions    Erythromycin Nausea Only     Past Medical History:   Diagnosis Date    Abnormal CT scan 1/8/2015    Asthma     Colitis     COPD (chronic obstructive pulmonary disease)     Diverticulitis of sigmoid colon 11/4/2014    Erosive gastritis 7/17/2013    Fracture of left clavicle     H pylori ulcer 7/17/2013    Hypertension     Peptic ulcer disease 7/17/2013    Pneumonia of right upper lobe due to infectious organism 8/15/2017    Rectal bleed 11/3/2014    Scoliosis     SOB (shortness of breath)     Yeast infection 8/25/2017     Past Surgical History:   Procedure Laterality Date    APPENDECTOMY      BREAST BIOPSY Right     cyst    COLONOSCOPY N/A 7/15/2019    Procedure: COLONOSCOPY;  Surgeon: Sharif Chun MD;  Location: [facility] ENDO;  Service: Endoscopy;  Laterality: N/A;    HIP ARTHROPLASTY Right 12/18/2019    Procedure: ARTHROPLASTY, HIP;  Surgeon: Bernardino Wilson II, MD;  Location: [facility] OR;  Service: Orthopedics;  Laterality:  "Right;  Jose Morel and Nephjose    HIP ARTHROPLASTY Left 12/14/2021    Procedure: ARTHROPLASTY, HIP;  Surgeon: Abdulkadir Dasilva MD;  Location: [facility];  Service: Orthopedics;  Laterality: Left;    HYSTERECTOMY      HASMUKH/BSO, DUB    OOPHORECTOMY      TUBAL LIGATION         Review of Systems   Constitutional: weight 41 kg--40.8 kg--46.9 kg. Negative for appetite change, chills, diaphoresis, fatigue and fever. Activity change:  She has 2 L home O2 but sometimes she does not needed.  HENT: Negative for congestion, dental problem, ear pain, hearing loss, mouth sores, nosebleeds, postnasal drip, rhinorrhea, sinus pain, sore throat and trouble swallowing.    Eyes: Negative for photophobia, pain and visual disturbance.   Respiratory: Negative for cough, choking, chest tightness and shortness of breath.    Improved shortness of breath, Dry cough mostly.  Sometimes minimal phlegm.  Cardiovascular: Negative for chest pain, palpitations and leg swelling.   Gastrointestinal: Negative for abdominal pain, anal bleeding, blood in stool, constipation, diarrhea, nausea, rectal pain and vomiting.   Endocrine: Negative for polydipsia and polyuria.   Genitourinary: Negative for difficulty urinating, dysuria, flank pain, frequency, genital sores, hematuria, urgency and vaginal discharge.   Musculoskeletal: Negative for arthralgias, back pain, joint swelling and myalgias.     Skin: Negative for rash.   Allergic/Immunologic: Negative for environmental allergies, food allergies and immunocompromised state.   Neurological: Negative for dizziness, syncope, speech difficulty, weakness, numbness and headaches.   Hematological: Negative for adenopathy. Does not bruise/bleed easily.   Psychiatric/Behavioral: Negative for dysphoric mood and sleep disturbance. The patient is not nervous/anxious.         Pertinent medications noted:     Objective:      Blood pressure (!) 108/58, pulse 78, temperature 99.6 °F (37.6 °C), height 5' 4" (1.626 m), " weight 46.9 kg (103 lb 4.8 oz), SpO2 95 %.  Body mass index is 17.73 kg/m².  Physical Exam  Constitutional:       General: She is not in acute distress.     Comments: Thin cachectic female, not wearing oxygen today.  She has a good color on her she looks and feels well.  HENT:      Head: Normocephalic and atraumatic.      Right Ear: Tympanic membrane, ear canal and external ear normal.      Left Ear: Tympanic membrane, ear canal and external ear normal.      Nose: Nose normal.      Mouth/Throat:  No thrush     Mouth: Mucous membranes are moist.   Eyes:      General: No scleral icterus.     Conjunctiva/sclera: Conjunctivae normal.      Pupils: Pupils are equal, round, and reactive to light.   Neck:      Vascular: No JVD.   Cardiovascular:      Rate and Rhythm: Normal rate and regular rhythm.      Pulses: Normal pulses.      Heart sounds: Normal heart sounds. No murmur heard.   No friction rub. No gallop.    Pulmonary:      Effort: Pulmonary effort is normal.      Breath sounds: Normal breath sounds. No wheezing or rales.      Comments:.  Some faint course in mid lung fields.  Better air entry compared to last time Chest:      Chest wall: No tenderness.   Abdominal:      General: Abdomen is flat. Bowel sounds are normal. There is no distension.      Palpations: Abdomen is soft. There is no mass.      Tenderness: There is no abdominal tenderness. There is no guarding or rebound.   Musculoskeletal:         General: Normal range of motion.      Cervical back: Normal range of motion and neck supple.   Lymphadenopathy:      Cervical: No cervical adenopathy.   Skin:     General: Skin is warm and dry.      Capillary Refill: Capillary refill takes less than 2 seconds.      Findings: No erythema or rash.   Neurological:      General: No focal deficit present.      Mental Status: She is alert and oriented to person, place, and time.      Cranial Nerves: No cranial nerve deficit.   Psychiatric:         Mood and Affect: Mood  normal.         Behavior: Behavior normal.         Thought Content: Thought content normal.      Comments: Pleasant.  Looks well rested.  In good spirits.  VAD:  none    General Labs reviewed:  Lab Results   Component Value Date    WBC 6.26 02/16/2022    RBC 4.58 02/16/2022    HGB 13.4 02/16/2022    HCT 44.6 02/16/2022    MCV 97 02/16/2022    MCH 29.3 02/16/2022    MCHC 30.0 (L) 02/16/2022    RDW 14.8 (H) 02/16/2022     02/16/2022    MPV 8.9 (L) 02/16/2022    GRAN 3.8 02/16/2022    GRAN 60.6 02/16/2022    LYMPH 1.8 02/16/2022    LYMPH 28.1 02/16/2022    MONO 0.5 02/16/2022    MONO 7.3 02/16/2022    EOS 0.2 02/16/2022    BASO 0.05 02/16/2022    EOSINOPHIL 2.7 02/16/2022    BASOPHIL 0.8 02/16/2022     CMP  Sodium   Date Value Ref Range Status   02/16/2022 136 136 - 145 mmol/L Final     Potassium   Date Value Ref Range Status   02/16/2022 4.5 3.5 - 5.1 mmol/L Final     Chloride   Date Value Ref Range Status   02/16/2022 97 95 - 110 mmol/L Final     CO2   Date Value Ref Range Status   02/16/2022 30 (H) 23 - 29 mmol/L Final     Glucose   Date Value Ref Range Status   02/16/2022 98 70 - 110 mg/dL Final     BUN   Date Value Ref Range Status   02/16/2022 18 8 - 23 mg/dL Final     Creatinine   Date Value Ref Range Status   02/16/2022 0.5 0.5 - 1.4 mg/dL Final     Calcium   Date Value Ref Range Status   02/16/2022 9.8 8.7 - 10.5 mg/dL Final     Total Protein   Date Value Ref Range Status   02/16/2022 7.6 6.0 - 8.4 g/dL Final     Albumin   Date Value Ref Range Status   02/16/2022 3.9 3.5 - 5.2 g/dL Final     Total Bilirubin   Date Value Ref Range Status   02/16/2022 0.9 0.1 - 1.0 mg/dL Final     Comment:     For infants and newborns, interpretation of results should be based  on gestational age, weight and in agreement with clinical  observations.    Premature Infant recommended reference ranges:  Up to 24 hours.............<8.0 mg/dL  Up to 48 hours............<12.0 mg/dL  3-5 days..................<15.0 mg/dL  6-29  days.................<15.0 mg/dL       Alkaline Phosphatase   Date Value Ref Range Status   02/16/2022 61 55 - 135 U/L Final     AST   Date Value Ref Range Status   02/16/2022 17 10 - 40 U/L Final     ALT   Date Value Ref Range Status   02/16/2022 14 10 - 44 U/L Final     Anion Gap   Date Value Ref Range Status   02/16/2022 9 8 - 16 mmol/L Final     eGFR if    Date Value Ref Range Status   02/16/2022 >60.0 >60 mL/min/1.73 m^2 Final     eGFR if non    Date Value Ref Range Status   02/16/2022 >60.0 >60 mL/min/1.73 m^2 Final     Comment:     Calculation used to obtain the estimated glomerular filtration  rate (eGFR) is the CKD-EPI equation.          Micro:  Microbiology Results (last 7 days)     ** No results found for the last 168 hours. **      01/21/21 09:34 Sputum no AFB    01/01/21 20:49  MYCOBACTERIUM AVIUM INTRACELLULARE COMPLEX  12/27/20 18:01 PSEUDOMONAS AERUGINOSA   12/01/20 11:08 PSEUDOMONAS AERUGINOSA      TEMI ALBICANS   11/05/20 13:04 MYCOBACTERIUM AVIUM INTRACELLULARE COMPLEX  11/04/20 15:29 MYCOBACTERIUM SPECIES         MYCOBACTERIUM ABSCESSUS COMPLEX     11/03/20 14:00 MYCOBACTERIUM AVIUM INTRACELLULARE COMPLEX    Organism     MYCOBACTERIUM ABSCESSUS COMPLEX   Antibiotic    ADÁN (mcg/mL)  Interpretation   ----------------------------------------------------------   Cefoxitin               32       I   Imipenem                 8       I   Ciprofloxacin           >4       R   Moxifloxacin            >8       R   Clarithromycin         >16       R   Amikacin                 8       S   Tobramycin               8       R   Doxycycline            >16       R   Minocycline             >8       R   Tigecycline           0.25   TMP/SMX             >8/152       R   Linezolid               16       I   ----------------------------------------------------------   Organism     MYCOBACTERIUM AVIUM COMPLEX   Antibiotic                   ADÁN (mcg/mL)  Interpretation    ----------------------------------------------------------   Clofazimine                          0.06   Moxifloxacin                          4       R   Clarithromycin                    2       S   Amikacin (IV)                     16       S   Amikacin (liposomal, inhaled)          16       S   Linezolid                              32       R         Respiratory Culture  03/15/2021 Abnormal   PSEUDOMONAS AERUGINOSA   Few   Normal respiratory chelsea also present     Gram Stain (Respiratory) <10 epithelial cells per low power field.    Gram Stain (Respiratory) Rare WBC's    Gram Stain (Respiratory) Rare Gram negative rods    Resulting Agency [facility]   Susceptibility     Pseudomonas aeruginosa    CULTURE, RESPIRATORY    Amikacin <=16 mcg/mL Sensitive    Cefepime <=2 mcg/mL Sensitive    Ciprofloxacin <=1 mcg/mL Sensitive    Gentamicin 8 mcg/mL Intermediate    Meropenem 4 mcg/mL Sensitive    Piperacillin/Tazo <=16 mcg/mL Sensitive    Tobramycin <=4 mcg/mL Sensitive          Linear View          Imaging Reviewed:  07/20/2021  Chronic scarring and volume loss of the upper lung fields with cavitations consistent with mycobacterium infection.  This appear stable.  Severe thoracic kyphosis.  Hyperlucency of the rest of the lung fields likely due to emphysema.     Assessment:       1. Mycobacterium avium-intracellulare complex    2. COPD, severe           Plan:     Mycobacterium avium-intracellulare complex  -     Comprehensive Metabolic Panel; Future; Expected date: 02/16/2022  -     CBC auto differential; Future; Expected date: 02/16/2022  -     Sedimentation rate; Future; Expected date: 02/16/2022  -     C-reactive protein; Future; Expected date: 02/16/2022  -     EKG 12-lead; Future; Expected date: 02/16/2022    COPD, severe  -     fluticasone-umeclidin-vilanter (TRELEGY ELLIPTA) 100-62.5-25 mcg DsDv; Inhale 1 puff into the lungs once daily.  Dispense: 1 each; Refill: 11  -     VENTOLIN HFA 90 mcg/actuation inhaler;  Inhale 2 puffs into the lungs every 4 (four) hours as needed for Wheezing.  Dispense: 18 g; Refill: 11        Follow up in about 8 months (around 10/4/2022).      This note was created using  voice recognition software that occasionally misinterpreted phrases or words.        SUMMARY   Mycobacterium avium-intracellulare complex lung infection    -- On Rifabutin 300 mg daily, Ethambutol 700 mg daily, Azithromycin 500 mg daily (she received amikacin iv 3 times a week, (then arikayce 06/10/2021--07/20/2021, then discontinued because it precipitated COPD exacerbation.)  ----Continue airway clearance,  Aerobika, nebulized hypertonic saline, breathing techniques  ----- decrease duration of hot showers, avoid soil and water aerosols, use fans in shower area, avoid fine mist shower heads (heavy droplets better), avoid bubbling water (hot tubs, humidifiers), flush water heater frequently to avoid biofilm buildup  -- Prevent gastroesophageal reflux  --- Probiotics: yogurt with live culture or geraldophilis 2 caps TID  --- Check AFB sputum monthly for clearance. Duration 12 months from cultures clearance (most people are on abx about ~18 months)-- Follow AFB q 4 weeks cx   ---- If she doesn't clear her sputum cultures after about 6 months of treatment, would consider adding INH  next (and clofazamine could be considered in future as well)  ----While on ethambutol- self vision screens daily. If blurry vision lasts 2 days in a row, patient known to stop ethambutol and call eye doctor for exam and to let us know that this happened.  --- while on Azithromycin will  Check EKG QTC was 448.   --- Check CT chest Q6 months on therapy?--may be next appointment  ---  if worsening, or recurrence:  Will consider ? Lung resection of predominant cavitary lesion could help increase chance of cure. Not clearif patient is surgical candidate, referal to [facility] for evaluation.   [email or web address]    I had considered decreasing the dose of the above medication?(decrease ethambutol to 600 mg daily and decrease a Zithromax to 250 mg daily?)  Her changed to 3 times weekly dosing, UNFORTUNATELY I CANNOT CHANGE TO 3 TIMES WEEKLY DOSING, DUE TO PRESENCE OF CAVITARY DISEASE, hopefully I can do so by the end of treatment.

## 2022-03-28 NOTE — PATIENT INSTRUCTIONS
You have mild yeast roof mouth, may need medication if worsens.    Do ct chest October.    Do chest xray now.     Will try to get portable oxygen concentrator-- need 6 min walk.    Would keep trelegy at 200.    Pseudomonas may relapse.  May use cipro for pseudomonas, best to culture.  If on cipro would skip rifabutin til off cipro/

## 2022-03-28 NOTE — PROGRESS NOTES
3/28/2022    Fabiana Morel  Office Note    No chief complaint on file.      3/28/2022-- sputum neg from Feb 2022 was neg 6 wks.no prednisone.  Off ox several hours to days -- still uses prn.  Walks about house-- limited by celestin.  Eyes ok and bowels well -- azithro/rifabutin/etham.        9/27/2021 seeing Dr Mandel, took another course prednisone after last visit.  May be considered for trail of clofazimine?? Sputum still pos 8/12.  Uses ox prn, eating better.  No vest. . Copd rx going well with treelgy    Patient Instructions   Do ct chest prior to Dr Jansen visit.     8/2/2021 took several days to stabilize breathing after boost last   Patient Instructions   You cannot use amikacin nebulized as had too severe bronchospasm.     You are having noise today, take 20 mg daily prednisone for 5 days , repeat til chest rattle nearly gone or gone completely.    Would recommend no arikayce-  You looked worse after Arikayce than with initial illness in December with collapsed lung and MAC (with abscessus concerns).      Continue azithromycin, rifabutin, ethambutol.    Could do follow up ct chest in October?      MAC not expected to abruptly give problem, should remain suppressed.  Infection on top of MAC could occur.  Pseudomonas risk for recurrence- should be less with good mucous clearing.  You have standing order for sputum culture--- may use cipro if abrupt bronchial worsening with yellow mucous. Also standing order still for chest xray in chart.    Vest was not tolerated in hospital.      Use nebulizer and aerobilka at least daily. Should work on pulmonary toilet-     Breathing comfortable at rest, could consider non invasive ventilator.          7/22/2021 started amikacin 5 wks ago, needed ox full time few days later.   Ox needed to 2.5 ,and now 4. Pt lost taste.  Found to have thrush -- rxed 3 days diflucan and to start mycostatin tomorrow.  Having diarrhea 8 months-- loose stools.  occ weak standing but no falls.   Needs help standing occasionally - no  Change lately.  No fever -- occ chest pain coughing but same chronically.   Started 20 mgprednisone 3 days ago -- off amikacin since 17th- last 5 days. Pt having cough, and wheezes with amikacin--       Pt cannot bring up mucous. No vest -- did not tolerate vest. Has aeorbilka but not using - not using as not convince of help.  Uses neb 1-2 times daily -- seems to worsen few minutes.       No use of bipap nor niv, will be sob for up to 5 min after activity.  Patient Instructions   You are having lingering bronchitis with wheezes today despite being off amikacin 5 days and on prednisone.    Increase prednisone to 60 mg daily for 3 days then 40 mg for 3 days, then 20 mg daily til recheck    Would increase trelegy to 200    Nebulized amikacin has  not tolerated with copd -- it induced bronchospasm.  Would avoid if able.      Use aerobilka with nebulizer-- should tolerate nebulizer  Better once prednisone works.      Non invasive ventilator may be of benefit?  6/8/2021 celestin limits along with weakness, mucous minimal. To start neb amikacin.  Having diarrhea last 7 months.  Ct chest March c/w November ct- cavities are diffusely smaller, apparent infiltrative lung disease is also less from November to March.   Patient Instructions   Ct chest March c/w November ct- cavities are diffusely smaller, apparent infiltrative lung disease is also less from November to March. cxr really shows no differences from November to June 8.  Would check chest xrays for acute worsening.  Could check ct for evidence of disease stabilization/improvemnet or failing treatment.  March ct clearly with evidence for xray improvement.      Bedside appearance has improved nicely- severe copd limits improvement.    Ct chest ordered for March 2022 - no new recommendations      Would not treat little yellow mucous- regular culture may help.  4/8/2021 - had cipro x 2 wks for pseudomonas , no falls , still weak, about  house with celestin.  Having diarrhea- no worse with cipro.  bm 2-3 times daily . Pt on iv amikacin still iv.  Also on azithro, rifabutin, etham.  No prednisone save 20/d x3 initially.      Pt was on fosamax but stopped but did not tolerate.  Cannot use with current regimen.    2/15/2021- on mainly mac rx rx now, saw Dr Hamlin and Dr Jansen.  Mucous clear, appetite better.  Wt stable at 92 - max usual would be 115?   No night sweats. Pt's chest tube fell out recently with pneumothorax resolved.  No ox use nor prednisone. azithro daily, myambutol, rifabutin, and amikacin (3 days weekly).  igg level .  cxr today with improved left upper lung and stable right with no pneumo.  No falls, still weak- breathing,weakness, and lack desire ppt slow improvement.  No chest pain.  Patient Instructions   Would consider sputum cultures if change in status.     Sputum for afb would check for DAVIS ---- sputum for regular culture for routine germs including pseudomonas.    Do chest xray if any major concerns for whatever reason.    Abrupt illness more characteristic of regular germs - slow illness more likely mycobacterium.    Fosamax stopped due to intake complications--  May be best to do shots?  Ask Dr Bartlett later.           From uptodate on ethambutol----Visual changes -- Optic neuropathy (usually manifested as a change in visual acuity or red-green color blindness) is the most important ethambutol toxicity. The reported incidence of optic neuropathy when ethambutol is taken for more than two months is 18 percent in subjects receiving more than 35 mg/kg per day, 5 to 6 percent with 25 mg/kg per day, and less than 1 percent with 15 mg/kg per day [15]. Optic neuritis is reversible in most patients.    Check color perception every 2 wks or so.  Note if vision changes.    1/13/2021 still air leak, had recurrent pneumo after chest tube pulled after last visit   Plan:  Clinical impression is apparently straight forward and impression with  management as below.    Fabiana was seen today for hospital follow up.    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    Discussed with patient above for education the following:      Discussed with patient above for education the following:      Patient Instructions   Still with pneumothorax but being drained by current chest tube.  Would follow up with infectious disease doctors, would try to see Yoli Jansen and Cherie.      Another chest ray needed.  Consider something else wrt collapsed lung if id requests or follow a month.    Regular bad germ, pseudomonas, complicates bronchiectasis/cavity, might do better with vest device?  Would use aerobilka with nebulizer treatments.    Blood sugar sl low at 66, not too bad.    crp - marker for infection-- was 90 12/29, then 62 on 1/5 ,and was 6.5 yesterday. Expect better yet soon?   12/21/2020 - eating better, mucous near clear, chest tube leak stopped 2 days ago.  Had acute admit with pneumo 11.29,  aren and abscusus found.  On rx merrem,zyvox, ethambutol,aztihro, biaxin- no problems, sees eye doc for ethambutol.  No prednisone.  On amikacin. Off megace and eating ok .  Breathing ok         11/9/2020 no tb exposure, had chest pain, cxr with upper lung cavitary, had afb checks with positive x 2. No id.  Night sweats forever- slight dampness with no need to change clothes.  Pain was 9/10 ppt er visit, no prior pain, no ppt factors- pain better with steroids/abx.  Started levaquin 750 in er- off prednisone and abx..  Was on prednisone/augmentin prior to er visit.      5/27/2020- 1/2ppd, no problem,  Hip recovered.  No falls/cane.  No abx nor prednisone  \  Patient  Instructions   I added iron level to blood test for future- not urgent.  Would stay on iron til later in year.    Do chest xray if any problems.      Jan 27, 2020- had hip fx in dec, had repair hip with good recovery.  Still on abx, had prevnar on 10/15/19-  Off smokes with better oxygen, doing well.  occ hip pain. No prednisone,   Patient Instructions   trelegy has 3 24/7 breathing medications- daily good    Use prednisone if cough or wheezes or more short breath.    Use augmentin if cough, short breath.    Chest xray now and repeat in 3-4 months - sooner if any problems.    Could check immune in 3 months, you had vaccine in October 2019    Ferrous sulfate/gluconate once daily may be reasonable - iv iron infusion?    Oct 3, 2019 lost 5lbs since aug, cxr with apparent bullae left lung posterior lung. No mucous production.  Still on abx - augmentin.  Has had damp night sweats for years with no change.  Patient Instructions   Pneumonia germ was 8 of 14 protected.      Get vaccine next wk    Stay on augmentin til lung is cleared - optimal.    Do crp, cbc, cxr next week- would follow what is abnormal.  If ongoing improvement found- follow til baseline reasonable - ct and scope test if not clearing or worsens.    Try mirtazapine 7.5mg bedtime, go to 15 mg bedtime if not too sleepy. Will make sleep and should stimulate appetite.    Periactin- 4 mg - try 2nd if mirtazapine not effective, not both, start one bedtime, going to twice daily (could break 1/2), antihisatmine.    Try megace next time?  aug  27, developed left chest pain and short of breath,   occ yellow mucous,  Still smokes. noox testing.  Uses trelegy, no falls.     Patient Instructions   Likely has had left upper lung pnumonia last 2 months.  Bronch in 2017 did not show much.     Chest xray recheck in a month- do monthly til this pneumonia cleared-you have a standing order for cxr  Monthly - get routinely every 3 months once this pneumonia cleared.       Check  "blood work weekly for "pneumonia" up to next month to assure normalizes.     Sputum culture- resistant  Germ possible.     Check immune response to vaccine.       Do prednisone 20mgdaily for  3 days monthly.     Use augmentin if pain,weakness, short breath, any concern regarding pneumonia.       Use trelegy.       Feb 26, 2019- took abx with  recent developing resp problems. Still smokes.  resp same.   Has old compression fx seen on  Lateral cxr.  Discussed with patient above for education the following:    Could do f/u ct chest - but chest xray looks stable.     Lung capacity was only 27% in 2014- hard to see lungs going much more into future.  Chronic lung disease usually causes chronic debility/weakness.  Acute illnesses will be hard to recover strength.      Prompt antibiotic and prednisone may be wise.    Chest xray for illness may be wise.     Use xanax as needed for anxiety.    Stop smokes please.    aug 28, 2018- still smokes, some celestin, uses trelegy. cxr stable.  Follow-up in about 6 months (around 2/28/2019).  Discussed with patient above for education the following:     Use prednisone or antibiotic If bronchitis  Check chest xray if ill  Chest xray 6 months.  Likely do ct in a yr?  Stop smokes.  Feb 28, 2018 breathing better, still cough but slight.  Nearly off smokes.  Had exacerbation needing steriods.  Lung transplant discussed if pt stops smokes but pt declines.  nov 16, 2017 - off smokes transiently - on chantix with no problems.  No severe stress.    oct 17, 2017- no fever or night sweats or cough. No new c/o - still smokes but trying chantrix.  sept 20, still smokes same, feels sl better than 6  Months ago, mucous clear, night sweats still with no improvement with abx.    AUg 15, having right shoulder pain last 3 weeks at 10/10 intially  And subsequent 3-4/10,  No fever but night sweats chr, no n/v/d or headache.  No tb exposure.   Had tb eval past negative.  Still smokes.  Nerves ok.  " Breathing seems  Better.  Feb 13, 2017, hpi doing rehab, no action plan, still smoking.  Using all meds, nerves better with xanax and toprol.  Aug 12, 2016HPI:initial encounter in [facility], copd and bronchiectasis and lung nodule.  No prednisone use recent.  No recent azithromycin.  breo and incruse regular use, sdfhlnofc0u/d, not using nebulizer.  Last ct 12/2015.  fev1 27% in 2014 with dramatic bd response.  Working as filing and typing.  Feels resp comfortable.  Has had lung dz age 56.    Scant mucous with no color chr, no acapella.    The chief compliant  problem is stable  PFSH:  Past Medical History:   Diagnosis Date    Abnormal CT scan 1/8/2015    Asthma     Colitis     COPD (chronic obstructive pulmonary disease)     Diverticulitis of sigmoid colon 11/4/2014    Erosive gastritis 7/17/2013    Fracture of left clavicle     H pylori ulcer 7/17/2013    Hypertension     Peptic ulcer disease 7/17/2013    Pneumonia of right upper lobe due to infectious organism 8/15/2017    Rectal bleed 11/3/2014    Scoliosis     SOB (shortness of breath)     Yeast infection 8/25/2017         Past Surgical History:   Procedure Laterality Date    APPENDECTOMY      BREAST BIOPSY Right     cyst    COLONOSCOPY N/A 7/15/2019    Procedure: COLONOSCOPY;  Surgeon: Sharif Chun MD;  Location: [facility];  Service: Endoscopy;  Laterality: N/A;    HIP ARTHROPLASTY Right 12/18/2019    Procedure: ARTHROPLASTY, HIP;  Surgeon: Bernardino Wilson II, MD;  Location: [facility] OR;  Service: Orthopedics;  Laterality: Right;  [facility] - [facility]l [employer]    HIP ARTHROPLASTY Left 12/14/2021    Procedure: ARTHROPLASTY, HIP;  Surgeon: Abdulkadir Dasilva MD;  Location: [facility] OR;  Service: Orthopedics;  Laterality: Left;    HYSTERECTOMY      HASMUKH/BSO, DUB    OOPHORECTOMY      TUBAL LIGATION       Social History     Tobacco Use    Smoking status: Former Smoker     Packs/day: 0.50     Years: 30.00     Pack years: 15.00     Types: Cigarettes     "Smokeless tobacco: Never Used    Tobacco comment: Quit on 11-5-2020   Substance Use Topics    Alcohol use: Not Currently     Alcohol/week: 12.0 standard drinks     Types: 12 Cans of beer per week    Drug use: No     Family History   Problem Relation Age of Onset    Cancer Mother 49        "lymph nodes"    Cancer Sister         lung?     Review of patient's allergies indicates:   Allergen Reactions    Erythromycin Nausea Only       Performance Status:The patient's activity level is functions out of house.      Review of Systems:  a review of eleven systems covering constitutional, Eye, HEENT, Psych, Respiratory, Cardiac, GI, , Musculoskeletal, Endocrine, Dermatologic was negative except for pertinent findings as listed ABOVE and below: night sweats chr due to menopause,      Exam:Comprehensive exam done.   /70 (BP Location: Right arm, Patient Position: Sitting)   Pulse 76   Resp 10   Ht 5' 4" (1.626 m)   Wt 47.5 kg (104 lb 11.5 oz)   SpO2 96% Comment: on room air at rest  BMI 17.97 kg/m²   Exam included Vitals as listed, and patient's appearance and affect and alertness and mood, oral exam for yeast and hygiene and pharynx lesions and Mallapatti (M) score, neck with inspection for jvd and masses and thyroid abnormalities and lymph nodes (supraclavicular and infraclavicular nodes and axillary also examined and noted if abn), chest exam included symmetry and effort and fremitus and percussion and auscultation, cardiac exam included rhythm and gallops and murmur and rubs and jvd and edema, abdominal exam for mass and hepatosplenomegaly and tenderness and hernias and bowel sounds, Musculoskeletal exam with muscle tone and posture and mobility/gait and  strength, and skin for rashes and cyanosis and pallor and turgor, extremity for clubbing.  Findings were normal except for pertinent findings listed below:  Mild kyphosis scoliosis, M2, no wheezes no edema, no clubbing, no ox      Radiographs " reviewed:   Ct chest 3/2021 dis c/w ct chest november  cxr 1/13/2021 loculuated pneumo and chest tube, stable cavitiies.  Ct chest 11/1/2020 cavities sl worse than 10/2019.    Ct chest 10/2019- infected emphysema left upper suggested - right lung just severe copd    cxr 2/25/2019 same as aug 2018.  Scars both upper lungs.  ct 2016  With left lung abn better now, right lung was clear and now with infiltrate with vol loss of emphysema??  cxr may have smaller cavity rul cavity than ct c/w aug ct with sept cxr.    cxr 10/16 same as sept 2017,  cxr  Feb 26, 2018 improved rul density with smaller hyper inflated lungs.      Labs reviewed  No pos cultures  PFT results [id number]  fev 27%    [place] BRONCHOSCOPY,TRANSBRONCH BIOPSY [46903 (CPT®)]   BRONCHOSCOPY - BRONCHOALVEOLAR LAVAGE [PFT43 (Custom)]           []Hide copied text    []Hover for details  10/20/2017     After review of ct, informed consent, updated history and physical, time out, and anesthesia sedation- pt underwent left nares bronchoscopy (right nares couldn't be cannulated).     Thick secretions encountered and washed free.  Airways were wnl.  Lavage from rul 90 cc in and 20 cc out.  Transbronchial bx x 2 for cultures done (afb, fungal, routine).  Transbronchial bx x 6 for path.  Lavage and wash pooled and submitted for culture, cytology, afb, and fungal evals..     ebl 15 cc post transbronchial bx. Airway suctioned til bleed ceased.  Post floro with no pneumo/complication and post cxr pending.  No complications.      Electronically signed by Bhupendra Mackey MD at 10/20/2017  8:10 AM         Plan:  Clinical impression is apparently straight forward and impression with management as below.    Diagnoses and all orders for this visit:    Chronic obstructive pulmonary disease with acute exacerbation  -     Six Minute Walk Test to qualify for Home Oxygen; Future  -     fluticasone-umeclidin-vilanter (TRELEGY ELLIPTA) 200-62.5-25 mcg inhaler; Inhale 1 puff into the  lungs once daily.    Cavitary lung disease  -     CT Chest Without Contrast; Future  -     X-Ray Chest PA And Lateral; Standing    Bronchiectasis with acute lower respiratory infection  -     ciprofloxacin HCl (CIPRO) 500 MG tablet; Take 1 tablet (500 mg total) by mouth 2 (two) times daily. 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    Discussed with patient above for education the following:      Discussed with patient above for education the following:      Patient Instructions   You have mild yeast roof mouth, may need medication if worsens.    Do ct chest October.    Do chest xray now.     Will try to get portable oxygen concentrator-- need 6 min walk.    Would keep trelegy at 200.    Pseudomonas may relapse.  May use cipro for pseudomonas, best to culture.  If on cipro would skip rifabutin til off cipro/

## 2022-06-27 NOTE — PATIENT INSTRUCTIONS
Cxr in March was improved.  Expect ct chest scheduled for October will just show scarring from  MAC.    May consider taper MAC therapy slowly in future

## 2022-06-27 NOTE — PROGRESS NOTES
6/27/2022    Fabiana Morel  Office Note    Chief Complaint   Patient presents with    Follow-up     3 month f/u       6/27/2022 no mucous, wt up to 112, uses ox intermittently.  cxr 3/2022 cavitary dz looked smaller. On azithr 500/d, etham 700/d, rifabutin 150 2 daily.  On trelegy.   Min albuterol use,  Gets about -- taking trips. Had poc--- battery short lived.      3/28/2022-- sputum neg from Feb 2022 was neg 6 wks.no prednisone.  Off ox several hours to days -- still uses prn.  Walks about house-- limited by celestin.  Eyes ok and bowels well -- azithro/rifabutin/etham.      Patient Instructions   You have mild yeast roof mouth, may need medication if worsens.    Do ct chest October.    Do chest xray now.     Will try to get portable oxygen concentrator-- need 6 min walk.    Would keep trelegy at 200.    Pseudomonas may relapse.  May use cipro for pseudomonas, best to culture.  If on cipro would skip rifabutin til off cipro/  9/27/2021 seeing Dr Mandel, took another course prednisone after last visit.  May be considered for trail of clofazimine?? Sputum still pos 8/12.  Uses ox prn, eating better.  No vest. . Copd rx going well with treelgy    Patient Instructions   Do ct chest prior to Dr Jansen visit.     8/2/2021 took several days to stabilize breathing after boost last   Patient Instructions   You cannot use amikacin nebulized as had too severe bronchospasm.     You are having noise today, take 20 mg daily prednisone for 5 days , repeat til chest rattle nearly gone or gone completely.    Would recommend no arikayce-  You looked worse after Arikayce than with initial illness in December with collapsed lung and MAC (with abscessus concerns).      Continue azithromycin, rifabutin, ethambutol.    Could do follow up ct chest in October?      MAC not expected to abruptly give problem, should remain suppressed.  Infection on top of MAC could occur.  Pseudomonas risk for recurrence- should be less with good mucous  clearing.  You have standing order for sputum culture--- may use cipro if abrupt bronchial worsening with yellow mucous. Also standing order still for chest xray in chart.    Vest was not tolerated in hospital.      Use nebulizer and aerobilka at least daily. Should work on pulmonary toilet-     Breathing comfortable at rest, could consider non invasive ventilator.          7/22/2021 started amikacin 5 wks ago, needed ox full time few days later.   Ox needed to 2.5 ,and now 4. Pt lost taste.  Found to have thrush -- rxed 3 days diflucan and to start mycostatin tomorrow.  Having diarrhea 8 months-- loose stools.  occ weak standing but no falls.  Needs help standing occasionally - no  Change lately.  No fever -- occ chest pain coughing but same chronically.   Started 20 mgprednisone 3 days ago -- off amikacin since 17th- last 5 days. Pt having cough, and wheezes with amikacin--       Pt cannot bring up mucous. No vest -- did not tolerate vest. Has aeorbilka but not using - not using as not convince of help.  Uses neb 1-2 times daily -- seems to worsen few minutes.       No use of bipap nor niv, will be sob for up to 5 min after activity.  Patient Instructions   You are having lingering bronchitis with wheezes today despite being off amikacin 5 days and on prednisone.    Increase prednisone to 60 mg daily for 3 days then 40 mg for 3 days, then 20 mg daily til recheck    Would increase trelegy to 200    Nebulized amikacin has  not tolerated with copd -- it induced bronchospasm.  Would avoid if able.      Use aerobilka with nebulizer-- should tolerate nebulizer  Better once prednisone works.      Non invasive ventilator may be of benefit?  6/8/2021 celestin limits along with weakness, mucous minimal. To start neb amikacin.  Having diarrhea last 7 months.  Ct chest March c/w November ct- cavities are diffusely smaller, apparent infiltrative lung disease is also less from November to March.   Patient Instructions   Ct chest  March c/w November ct- cavities are diffusely smaller, apparent infiltrative lung disease is also less from November to March. cxr really shows no differences from November to June 8.  Would check chest xrays for acute worsening.  Could check ct for evidence of disease stabilization/improvemnet or failing treatment.  March ct clearly with evidence for xray improvement.      Bedside appearance has improved nicely- severe copd limits improvement.    Ct chest ordered for March 2022 - no new recommendations      Would not treat little yellow mucous- regular culture may help.  4/8/2021 - had cipro x 2 wks for pseudomonas , no falls , still weak, about house with celestin.  Having diarrhea- no worse with cipro.  bm 2-3 times daily . Pt on iv amikacin still iv.  Also on azithro, rifabutin, etham.  No prednisone save 20/d x3 initially.      Pt was on fosamax but stopped but did not tolerate.  Cannot use with current regimen.    2/15/2021- on mainly mac rx rx now, saw Dr Hamlin and Dr Jansen.  Mucous clear, appetite better.  Wt stable at 92 - max usual would be 115?   No night sweats. Pt's chest tube fell out recently with pneumothorax resolved.  No ox use nor prednisone. azithro daily, myambutol, rifabutin, and amikacin (3 days weekly).  igg level .  cxr today with improved left upper lung and stable right with no pneumo.  No falls, still weak- breathing,weakness, and lack desire ppt slow improvement.  No chest pain.  Patient Instructions   Would consider sputum cultures if change in status.     Sputum for afb would check for DAVIS ---- sputum for regular culture for routine germs including pseudomonas.    Do chest xray if any major concerns for whatever reason.    Abrupt illness more characteristic of regular germs - slow illness more likely mycobacterium.    Fosamax stopped due to intake complications--  May be best to do shots?  Ask Dr Bartlett later.           From uptodate on ethambutol----Visual changes -- Optic neuropathy  (usually manifested as a change in visual acuity or red-green color blindness) is the most important ethambutol toxicity. The reported incidence of optic neuropathy when ethambutol is taken for more than two months is 18 percent in subjects receiving more than 35 mg/kg per day, 5 to 6 percent with 25 mg/kg per day, and less than 1 percent with 15 mg/kg per day [15]. Optic neuritis is reversible in most patients.    Check color perception every 2 wks or so.  Note if vision changes.    1/13/2021 still air leak, had recurrent pneumo after chest tube pulled after last visit   Plan:  Clinical impression is apparently straight forward and impression with management as below.    Fabiana was seen today for hospital follow up.    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    Discussed with patient above for education the following:      Discussed with patient above for education the following:      Patient Instructions   Still with pneumothorax but being drained by current chest tube.  Would follow up with infectious disease doctors, would try to see Yoli Jansen and Cherie.      Another chest ray needed.  Consider something else wrt collapsed lung if id requests or follow a month.    Regular bad germ, pseudomonas, complicates bronchiectasis/cavity, might do better with vest device?  Would use aerobilka with nebulizer treatments.    Blood sugar sl low at 66, not too bad.    crp - marker for infection-- was 90 12/29, then 62 on 1/5 ,and was 6.5 yesterday. Expect better yet soon?   12/21/2020 - eating better, mucous near clear, chest tube leak stopped 2 days ago.  Had acute admit with pneumo 11.29,  aren and abscusus  found.  On rx merrem,zyvox, ethambutol,aztihro, biaxin- no problems, sees eye doc for ethambutol.  No prednisone.  On amikacin. Off megace and eating ok .  Breathing ok         11/9/2020 no tb exposure, had chest pain, cxr with upper lung cavitary, had afb checks with positive x 2. No id.  Night sweats forever- slight dampness with no need to change clothes.  Pain was 9/10 ppt er visit, no prior pain, no ppt factors- pain better with steroids/abx.  Started levaquin 750 in er- off prednisone and abx..  Was on prednisone/augmentin prior to er visit.      5/27/2020- 1/2ppd, no problem,  Hip recovered.  No falls/cane.  No abx nor prednisone  \  Patient Instructions   I added iron level to blood test for future- not urgent.  Would stay on iron til later in year.    Do chest xray if any problems.      Jan 27, 2020- had hip fx in dec, had repair hip with good recovery.  Still on abx, had prevnar on 10/15/19-  Off smokes with better oxygen, doing well.  occ hip pain. No prednisone,   Patient Instructions   trelegy has 3 24/7 breathing medications- daily good    Use prednisone if cough or wheezes or more short breath.    Use augmentin if cough, short breath.    Chest xray now and repeat in 3-4 months - sooner if any problems.    Could check immune in 3 months, you had vaccine in October 2019    Ferrous sulfate/gluconate once daily may be reasonable - iv iron infusion?    Oct 3, 2019 lost 5lbs since aug, cxr with apparent bullae left lung posterior lung. No mucous production.  Still on abx - augmentin.  Has had damp night sweats for years with no change.  Patient Instructions   Pneumonia germ was 8 of 14 protected.      Get vaccine next wk    Stay on augmentin til lung is cleared - optimal.    Do crp, cbc, cxr next week- would follow what is abnormal.  If ongoing improvement found- follow til baseline reasonable - ct and scope test if not clearing or worsens.    Try mirtazapine 7.5mg bedtime, go to 15 mg bedtime if not too  "sleepy. Will make sleep and should stimulate appetite.    Periactin- 4 mg - try 2nd if mirtazapine not effective, not both, start one bedtime, going to twice daily (could break 1/2), antihisatmine.    Try megace next time?  aug  27, developed left chest pain and short of breath,   occ yellow mucous,  Still smokes. noox testing.  Uses trelegy, no falls.     Patient Instructions   Likely has had left upper lung pnumonia last 2 months.  Bronch in 2017 did not show much.     Chest xray recheck in a month- do monthly til this pneumonia cleared-you have a standing order for cxr  Monthly - get routinely every 3 months once this pneumonia cleared.       Check blood work weekly for "pneumonia" up to next month to assure normalizes.     Sputum culture- resistant  Germ possible.     Check immune response to vaccine.       Do prednisone 20mgdaily for  3 days monthly.     Use augmentin if pain,weakness, short breath, any concern regarding pneumonia.       Use trelegy.       Feb 26, 2019- took abx with  recent developing resp problems. Still smokes.  resp same.   Has old compression fx seen on  Lateral cxr.  Discussed with patient above for education the following:    Could do f/u ct chest - but chest xray looks stable.     Lung capacity was only 27% in 2014- hard to see lungs going much more into future.  Chronic lung disease usually causes chronic debility/weakness.  Acute illnesses will be hard to recover strength.      Prompt antibiotic and prednisone may be wise.    Chest xray for illness may be wise.     Use xanax as needed for anxiety.    Stop smokes please.    aug 28, 2018- still smokes, some celestin, uses trelegy. cxr stable.  Follow-up in about 6 months (around 2/28/2019).  Discussed with patient above for education the following:     Use prednisone or antibiotic If bronchitis  Check chest xray if ill  Chest xray 6 months.  Likely do ct in a yr?  Stop smokes.  Feb 28, 2018 breathing better, still cough but slight.  " Nearly off smokes.  Had exacerbation needing steriods.  Lung transplant discussed if pt stops smokes but pt declines.  nov 16, 2017 - off smokes transiently - on chantix with no problems.  No severe stress.    oct 17, 2017- no fever or night sweats or cough. No new c/o - still smokes but trying chantrix.  sept 20, still smokes same, feels sl better than 6  Months ago, mucous clear, night sweats still with no improvement with abx.    AUg 15, having right shoulder pain last 3 weeks at 10/10 intially  And subsequent 3-4/10,  No fever but night sweats chr, no n/v/d or headache.  No tb exposure.   Had tb eval past negative.  Still smokes.  Nerves ok.  Breathing seems  Better.  Feb 13, 2017, hpi doing rehab, no action plan, still smoking.  Using all meds, nerves better with xanax and toprol.  Aug 12, 2016HPI:initial encounter in [facility], copd and bronchiectasis and lung nodule.  No prednisone use recent.  No recent azithromycin.  breo and incruse regular use, [id number], not using nebulizer.  Last ct 12/2015.  fev1 27% in 2014 with dramatic bd response.  Working as filing and typing.  Feels resp comfortable.  Has had lung dz age 56.    Scant mucous with no color chr, no acapella.    The chief compliant  problem is stable  PFSH:  Past Medical History:   Diagnosis Date    Abnormal CT scan 1/8/2015    Asthma     Colitis     COPD (chronic obstructive pulmonary disease)     Diverticulitis of sigmoid colon 11/4/2014    Erosive gastritis 7/17/2013    Fracture of left clavicle     H pylori ulcer 7/17/2013    Hypertension     Peptic ulcer disease 7/17/2013    Pneumonia of right upper lobe due to infectious organism 8/15/2017    Rectal bleed 11/3/2014    Scoliosis     SOB (shortness of breath)     Yeast infection 8/25/2017         Past Surgical History:   Procedure Laterality Date    APPENDECTOMY      BREAST BIOPSY Right     cyst    COLONOSCOPY N/A 7/15/2019    Procedure: COLONOSCOPY;  Surgeon: Sharif Chun,  "MD;  Location: [facility] ENDO;  Service: Endoscopy;  Laterality: N/A;    HIP ARTHROPLASTY Right 12/18/2019    Procedure: ARTHROPLASTY, HIP;  Surgeon: Bernardino Wilson II, MD;  Location: [facility] OR;  Service: Orthopedics;  Laterality: Right;  [facility] - [facility]l [facility]phj[facility]    HIP ARTHROPLASTY Left 12/14/2021    Procedure: ARTHROPLASTY, HIP;  Surgeon: Abdulkadir Dasilva MD;  Location: [facility] OR;  Service: Orthopedics;  Laterality: Left;    HYSTERECTOMY      HASMUKH/BSO, DUB    OOPHORECTOMY      TUBAL LIGATION       Social History     Tobacco Use    Smoking status: Former Smoker     Packs/day: 0.50     Years: 30.00     Pack years: 15.00     Types: Cigarettes    Smokeless tobacco: Never Used    Tobacco comment: Quit on 11-5-2020   Substance Use Topics    Alcohol use: Not Currently     Alcohol/week: 12.0 standard drinks     Types: 12 Cans of beer per week    Drug use: No     Family History   Problem Relation Age of Onset    Cancer Mother 49        "lymph nodes"    Cancer Sister         lung?     Review of patient's allergies indicates:   Allergen Reactions    Erythromycin Nausea Only       Performance Status:The patient's activity level is functions out of house.      Review of Systems:  a review of eleven systems covering constitutional, Eye, HEENT, Psych, Respiratory, Cardiac, GI, , Musculoskeletal, Endocrine, Dermatologic was negative except for pertinent findings as listed ABOVE and below: night sweats chr due to menopause,      Exam:Comprehensive exam done.   BP (!) 145/77 (BP Location: Left arm, Patient Position: Sitting, BP Method: Small (Automatic))   Pulse 85   Ht 5' 4" (1.626 m)   Wt 51.4 kg (113 lb 3.3 oz)   SpO2 99% Comment: on room air at rest  BMI 19.43 kg/m²   Exam included Vitals as listed, and patient's appearance and affect and alertness and mood, oral exam for yeast and hygiene and pharynx lesions and Mallapatti (M) score, neck with inspection for jvd and masses and thyroid abnormalities and lymph nodes " (supraclavicular and infraclavicular nodes and axillary also examined and noted if abn), chest exam included symmetry and effort and fremitus and percussion and auscultation, cardiac exam included rhythm and gallops and murmur and rubs and jvd and edema, abdominal exam for mass and hepatosplenomegaly and tenderness and hernias and bowel sounds, Musculoskeletal exam with muscle tone and posture and mobility/gait and  strength, and skin for rashes and cyanosis and pallor and turgor, extremity for clubbing.  Findings were normal except for pertinent findings listed below:  Mild kyphosis scoliosis, M2, sl insp  wheezes no edema, no clubbing, no ox      Radiographs reviewed:   Ct chest 3/2021 dis c/w ct chest november  cxr 1/13/2021 loculuated pneumo and chest tube, stable cavitiies.  Ct chest 11/1/2020 cavities sl worse than 10/2019.    Ct chest 10/2019- infected emphysema left upper suggested - right lung just severe copd    cxr 2/25/2019 same as aug 2018.  Scars both upper lungs.  ct 2016  With left lung abn better now, right lung was clear and now with infiltrate with vol loss of emphysema??  cxr may have smaller cavity rul cavity than ct c/w aug ct with sept cxr.    cxr 10/16 same as sept 2017,  cxr  Feb 26, 2018 improved rul density with smaller hyper inflated lungs.      Labs reviewed  No pos cultures  PFT results [id number]  fev 27%    [facility] BRONCHOSCOPY,TRANSBRONCH BIOPSY [15677 (CPT®)]   BRONCHOSCOPY - BRONCHOALVEOLAR LAVAGE [PFT43 (Custom)]           []Hide copied text    []Anup for details  10/20/2017     After review of ct, informed consent, updated history and physical, time out, and anesthesia sedation- pt underwent left nares bronchoscopy (right nares couldn't be cannulated).     Thick secretions encountered and washed free.  Airways were wnl.  Lavage from rul 90 cc in and 20 cc out.  Transbronchial bx x 2 for cultures done (afb, fungal, routine).  Transbronchial bx x 6 for path.  Lavage and wash  pooled and submitted for culture, cytology, afb, and fungal evals..     ebl 15 cc post transbronchial bx. Airway suctioned til bleed ceased.  Post floro with no pneumo/complication and post cxr pending.  No complications.      Electronically signed by Bhupendra Mackey MD at 10/20/2017  8:10 AM         Plan:  Clinical impression is apparently straight forward and impression with management as below.    Fabiana was seen today for follow-up.    Diagnoses and all orders for this visit:    Mycobacterium abscessus colonization    DAVIS (mycobacterium avium-intracellulare)  -     AFB Culture & Smear; Standing    Chronic obstructive pulmonary disease, unspecified COPD type    Cavitary lung disease  -     AFB Culture & Smear; Standing        Follow up in about 18 weeks (around 10/31/2022).    Discussed with patient above for education the following:      Discussed with patient above for education the following:      Patient Instructions   Cxr in March was improved.  Expect ct chest scheduled for October will just show scarring from  MAC.    May consider taper MAC therapy slowly in future

## 2022-07-09 ENCOUNTER — TELEPHONE ENCOUNTER (OUTPATIENT)
Dept: URBAN - METROPOLITAN AREA CLINIC 121 | Facility: CLINIC | Age: 67
End: 2022-07-09

## 2022-07-10 ENCOUNTER — TELEPHONE ENCOUNTER (OUTPATIENT)
Dept: URBAN - METROPOLITAN AREA CLINIC 121 | Facility: CLINIC | Age: 67
End: 2022-07-10

## 2022-10-25 NOTE — Clinical Note
We  will follow Ct chest and sputum AFB q 6 months Patient has only   antibiotics till midst of November, then stop

## 2022-10-25 NOTE — PROGRESS NOTES
"  Patient ID: Fabiana Morel is a 66 y.o. female.    Chief Complaint:: Follow-up    HPI  65 y.o. female with  PMHx of HTN, DLP, anemia, malnutrition, cachexia, BMI of 16, right femur fracture s/p sx 12/ 2019, deconditioning, former smoker (quit on 11/05/2020) asthma, immunodeficiency, bronchiectasis, COPD,  advanced bullous lung disease, chronic hypoxemic respiratory failure on 2-3l/min o2     She became more short of breath with increasing sputum, CAMPOS,  in early November. Seen by pulmonologist, Dr Mackey who ordered CT chest and sent sputum cultures for AFB. CT was abnormal.Cultures grew M. Abscessus (R to clarithro) and DAVIS (11/03, 11/04)  Treated as OP with clarithro, rifampin ethamb   Then, she developed a right sided spontaneous pneumothorax 11/29/2020  and required placement of a chest tube, Hospitalized and tx with broad abx coverage, discharged on 12/09/2020 on clarithromycin, ethambutol, Zyvox..   Chest tube was discontinued as outpatient and replaced within 48 hours, bc of PNX recurrence.  This time she was hospitalized from 12/26--01/05/2021.  Discharge on Zithromax, ethambutol, meropenem, amikacin and Tygacil.    12/01- sp cx Pseudomonas  12/27 pseudomonas  01/01 cx are growing AF --- will have to wait and see which  Will it be? DAVIS or abscessus  01/19/2021  She feels less short of breath." I think I do not need o2 at times"  Tolerating Ethambol without vision changes  Will try rifampin again but probably not able to tolerate it   02/11/2021  Patient saw dr Hamlin., her opinion is greatly appreciated.  Treatment has been taper down to Amikacin Zithromax and ethambutol and rifabutin.  Her right chest tube has fallen off  She is not wearing oxygen today and feels great.  Cough is markedly improved.  No phlegm.  03/11/2021.  No fever no chills.  Positive for cough.  Minimal phlegm.  Usually sclera slightly colored.  No hemoptysis.  She is not wearing oxygen, but  notices that she gets tired and " short of breath when doing a lot of activity.  As per patient and  her pulse ox does never dropped below 91 but I encouraged them to measure it when walking.  No changes in vision while on ethambutol  No change in his hearing while on amikacin.  This is the 3rd month on amikacin.  She has regular visits with audiologist  She could not tolerate rifampin but she is tolerating rifabutin.  It is expensive, but thankfully they are able to afford it.  Patient is pleasant, but understandable tired of multiple visits including ID, Pulmonary optometry, audiology, labs,  During the trip they did not have enough NS and heparin flushes and that was postpone by a couple of days.  I reviewed with patient and  blood work that was ordered by Dr. Hamlin(normal cystic fibrosis mutation panel, normal alpha 1 anti trypsin, normal CCP, normal RF, normal IgG, normal IgM, elevated IgA.)  04/13/2021  She   was recently diagnosed by dr Mackey for recurrence of Pseudomonas  pneumonia and was given ciprofloxacin  She took a sputum sample to lab, but no AFB was run last month, I wrote the order again for sputum  AFB (and canceled prior order)  No fever, no chills, not much phlegm   and patient plan to  go to [place] for one week  Tolerating amikacin well.  No hearing problems.  No issues with vision, color vision while on ethambutol  05/05/2021  Patient has the usual cough.  Minimal phlegm if any.   feels that some of the sputum samples may have not been very accurate due to inability to produce sputum.  No fever no chills.  She has baseline diarrhea, about 2 loose stools a day but no dehydration on physical exam or labs.  We discussed the CT scan chest and the most recent sputum AFB, which are negative so far, and hopefully remain so.  We discussed ethambutol and no issues with vision/color.  Patient is proactive and offers this information.  We discussed audiogram results with some worsening of the high frequency.   If next audiogram shows further worsening will have discontinue amikacin, despite of all esle.  I was debating on prior visit and today what to do with amikacin .  I am trying not to keep it long, for fear of possible side effects.  Will give to 3 more weeks to sputum cultures to hopefully stay negative for AFB, then stop amikacin.   Patient who is very attentive to his wife, likes to take her on different trips to see family and friends.  He feels patient is more active, during their trips  No sick contacts.  She wears a mask at all times.  06/02/2021.  No new complaints.  Discussed stopping amikacin IV and giving it inhalation.  Monitoring  sputum AFB  Clinic visit 07/20/2021.  Amikacin inhalation, started on 06/10/2021, precipitated cough, with wet  rattle, no phlegm production.  Immediately after starting amikacin inhalation, she needed to 0.5 L of oxygen but now she is on 3.5-4 L of oxygen.  She held amikacin inhalation for 3 days and resumed it again.  No fever no chills.  As above mention no phlegm.  Appetite has decreased.  She did not notice, but she has thrush on physical exam.  Her activity has diminished in general.  Her  is understandably concerned.  The most recent sputum of 06/10/2021 is negative for AFB so far, which is the first negative sputum, which is reassuring.  Continues to have frequent loose stools.  No BMs today.  Two BMs yesterday.  No issues with hearing  No issues with vision.  07/20 08/03/2021  Patient is always compliant with follow-up and medical advise.  She comes in for a follow-up visit.  She has  less cough. No phlegm.  Need for oxygen has improved to 3 L. We decided not to take amikacin inhalation anymore, because it precipitated a COPD exacerbation.  She is on a higher dose prednisone, 20 which will be tapered down later, by pulmonologist..  I discontinued it in the records.  Patient is taking ethambutol 700.  Zithromax 500. Rifabutin 300. I discussed that eventually we  could eventually go on ethambutol 600.  She has no visual side effects at this time.  I cannot use Zithromax 250 because of presence of cavitation.  Rifabutin is easily tolerated.  She did not tolerate rifampin well.  less o2 ---3l now  11/03/2021.  No fever no chills.  Minimal dry cough intermittently.  Thrush has resolved  Diarrhea has resolved.  In good spirits.  Discuss continuing treatment for at least 6 months after sputum clears.  02/16/2022.  Patient comes in for follow-up for her MAC infection.  The most recent sputum culture, obtained on 02/07/2022 is negative so far, hopefully it stays so.  Patient feels well.  No chest pain no arrhythmias.  No color vision changes.  Appetite is improved she has gained a couple of lb.  Her  is in the room, all is attentive, an excellent caregiver.  Patient is in good spirits.  She feels that she is improving.  I am cautiously happy about her progress.  10/25/2022 Patient has been feeling well. No cough, no phlegm.+ traveling with her . Ct reviewed no change.  Patient had negative sputum for AFB in February 2022.  Positive sputum for AFB was in August 2021.  I can speculate that she became negative somewhere in between.  This November it will be 12 month after conversion(again we do not have 2- sputum back to back this is an educated guess.  Patient and  understand.)      Review of patient's allergies indicates:   Allergen Reactions    Erythromycin Nausea Only     Past Medical History:   Diagnosis Date    Abnormal CT scan 1/8/2015    Asthma     Colitis     COPD (chronic obstructive pulmonary disease)     Diverticulitis of sigmoid colon 11/4/2014    Erosive gastritis 7/17/2013    Fracture of left clavicle     H pylori ulcer 7/17/2013    Hypertension     Peptic ulcer disease 7/17/2013    Pneumonia of right upper lobe due to infectious organism 8/15/2017    Rectal bleed 11/3/2014    Scoliosis     SOB (shortness of breath)     Yeast infection 8/25/2017      Past Surgical History:   Procedure Laterality Date    APPENDECTOMY      BREAST BIOPSY Right     cyst    COLONOSCOPY N/A 7/15/2019    Procedure: COLONOSCOPY;  Surgeon: Sharif Chun MD;  Location: [facility];  Service: Endoscopy;  Laterality: N/A;    HIP ARTHROPLASTY Right 12/18/2019    Procedure: ARTHROPLASTY, HIP;  Surgeon: Bernardino Wilson II, MD;  Location: [facility];  Service: Orthopedics;  Laterality: Right;  [facility] - [employer]    HIP ARTHROPLASTY Left 12/14/2021    Procedure: ARTHROPLASTY, HIP;  Surgeon: Abdulkadir Dasilva MD;  Location: [facility];  Service: Orthopedics;  Laterality: Left;    HYSTERECTOMY      HASMUKH/BSO, DUB    OOPHORECTOMY      TUBAL LIGATION         Review of Systems   Constitutional: weight 41 kg--40.8 kg--46.9 kg. Negative for appetite change, chills, diaphoresis, fatigue and fever. Activity change:  She has 2 L home O2 but sometimes she does not needed.  HENT: Negative for congestion, dental problem, ear pain, hearing loss, mouth sores, nosebleeds, postnasal drip, rhinorrhea, sinus pain, sore throat and trouble swallowing.    Eyes: Negative for photophobia, pain and visual disturbance.   Respiratory: Negative for cough, choking, chest tightness and shortness of breath.     no shortness of  breath, no phlegm.  Cardiovascular: Negative for chest pain, palpitations and leg swelling.   Gastrointestinal: Negative for abdominal pain, anal bleeding, blood in stool, constipation, diarrhea, nausea, rectal pain and vomiting.   Endocrine: Negative for polydipsia and polyuria.   Genitourinary: Negative for difficulty urinating, dysuria, flank pain, frequency, genital sores, hematuria, urgency and vaginal discharge.   Musculoskeletal: Negative for arthralgias, back pain, joint swelling and myalgias.     Skin: Negative for rash.   Allergic/Immunologic: Negative for environmental allergies, food allergies and immunocompromised state.   Neurological: Negative for dizziness, syncope, speech difficulty,  "weakness, numbness and headaches.   Hematological: Negative for adenopathy. Does not bruise/bleed easily.   Psychiatric/Behavioral: Negative for dysphoric mood and sleep disturbance. The patient is not nervous/anxious.         Pertinent medications noted:     Objective:      Blood pressure (!) 140/76, pulse 77, temperature 97.4 °F (36.3 °C), height 5' 4" (1.626 m), weight 55 kg (121 lb 3.2 oz), SpO2 99 %.  Body mass index is 20.8 kg/m².  Physical Exam  Constitutional:       General: She is not in acute distress.     Comments: Thin cachectic female, not wearing oxygen today.  She has a good color on her she looks and feels well.  HENT:      Head: Normocephalic and atraumatic.      Right Ear     Left Ear:      Nose: Nose normal.      Mouth/Throat:       Mouth: Mucous membranes are moist.   Eyes:      General: No scleral icterus.     Conjunctiva/sclera: Conjunctivae normal.      Pupils:   Neck:      Vascular: No JVD.   Cardiovascular:      Rate and Rhythm: Normal rate and regular rhythm.      Pulses: Normal pulses.      Heart sounds: Normal heart sounds. No murmur heard.   No friction rub. No gallop.    Pulmonary:      Effort: Pulmonary effort is normal.      Breath sounds: Normal breath sounds. No wheezing or rales.      Comments:.  Some faint course in mid lung fields.  Good air entry   Chest:      Chest wall: No tenderness. Mild, same kyphosis  Abdominal:      General: Abdomen is flat. Bowel sounds are normal. There is no distension.      Palpations: Abdomen is soft. There is no mass.      Tenderness: There is no abdominal tenderness. There is no guarding or rebound.   Musculoskeletal:         General: Normal range of motion.      Cervical back: Normal range of motion and neck supple.   Lymphadenopathy:      Cervical:   Skin:     General: Skin is warm and dry.      Capillary Refill: Capillary refill takes less than 2 seconds.      Findings: No erythema or rash.   Neurological:      General: No focal deficit present. "      Mental Status: She is alert and oriented to person, place, and time.      Cranial Nerves: No cranial nerve deficit.   Psychiatric:         Mood and Affect: Mood normal.         Behavior: Behavior normal.         Thought Content: Thought content normal.      Comments: Pleasant.  Looks well rested.  In good spirits.  VAD:  none    General Labs reviewed:  Lab Results   Component Value Date    WBC 6.26 02/16/2022    RBC 4.58 02/16/2022    HGB 13.4 02/16/2022    HCT 44.6 02/16/2022    MCV 97 02/16/2022    MCH 29.3 02/16/2022    MCHC 30.0 (L) 02/16/2022    RDW 14.8 (H) 02/16/2022     02/16/2022    MPV 8.9 (L) 02/16/2022    GRAN 3.8 02/16/2022    GRAN 60.6 02/16/2022    LYMPH 1.8 02/16/2022    LYMPH 28.1 02/16/2022    MONO 0.5 02/16/2022    MONO 7.3 02/16/2022    EOS 0.2 02/16/2022    BASO 0.05 02/16/2022    EOSINOPHIL 2.7 02/16/2022    BASOPHIL 0.8 02/16/2022     CMP  Sodium   Date Value Ref Range Status   02/16/2022 136 136 - 145 mmol/L Final     Potassium   Date Value Ref Range Status   02/16/2022 4.5 3.5 - 5.1 mmol/L Final     Chloride   Date Value Ref Range Status   02/16/2022 97 95 - 110 mmol/L Final     CO2   Date Value Ref Range Status   02/16/2022 30 (H) 23 - 29 mmol/L Final     Glucose   Date Value Ref Range Status   02/16/2022 98 70 - 110 mg/dL Final     BUN   Date Value Ref Range Status   02/16/2022 18 8 - 23 mg/dL Final     Creatinine   Date Value Ref Range Status   02/16/2022 0.5 0.5 - 1.4 mg/dL Final     Calcium   Date Value Ref Range Status   02/16/2022 9.8 8.7 - 10.5 mg/dL Final     Total Protein   Date Value Ref Range Status   02/16/2022 7.6 6.0 - 8.4 g/dL Final     Albumin   Date Value Ref Range Status   02/16/2022 3.9 3.5 - 5.2 g/dL Final     Total Bilirubin   Date Value Ref Range Status   02/16/2022 0.9 0.1 - 1.0 mg/dL Final     Comment:     For infants and newborns, interpretation of results should be based  on gestational age, weight and in agreement with  clinical  observations.    Premature Infant recommended reference ranges:  Up to 24 hours.............<8.0 mg/dL  Up to 48 hours............<12.0 mg/dL  3-5 days..................<15.0 mg/dL  6-29 days.................<15.0 mg/dL       Alkaline Phosphatase   Date Value Ref Range Status   02/16/2022 61 55 - 135 U/L Final     AST   Date Value Ref Range Status   02/16/2022 17 10 - 40 U/L Final     ALT   Date Value Ref Range Status   02/16/2022 14 10 - 44 U/L Final     Anion Gap   Date Value Ref Range Status   02/16/2022 9 8 - 16 mmol/L Final     eGFR if    Date Value Ref Range Status   02/16/2022 >60.0 >60 mL/min/1.73 m^2 Final     eGFR if non    Date Value Ref Range Status   02/16/2022 >60.0 >60 mL/min/1.73 m^2 Final     Comment:     Calculation used to obtain the estimated glomerular filtration  rate (eGFR) is the CKD-EPI equation.          Micro:  Microbiology Results (last 7 days)       ** No results found for the last 168 hours. **        01/21/21 09:34 Sputum no AFB    01/01/21 20:49  MYCOBACTERIUM AVIUM INTRACELLULARE COMPLEX  12/27/20 18:01 PSEUDOMONAS AERUGINOSA   12/01/20 11:08 PSEUDOMONAS AERUGINOSA      TEMI ALBICANS   11/05/20 13:04 MYCOBACTERIUM AVIUM INTRACELLULARE COMPLEX  11/04/20 15:29 MYCOBACTERIUM SPECIES         MYCOBACTERIUM ABSCESSUS COMPLEX     11/03/20 14:00 MYCOBACTERIUM AVIUM INTRACELLULARE COMPLEX    Organism     MYCOBACTERIUM ABSCESSUS COMPLEX   Antibiotic    ADÁN (mcg/mL)  Interpretation   ----------------------------------------------------------   Cefoxitin               32       I   Imipenem                 8       I   Ciprofloxacin           >4       R   Moxifloxacin            >8       R   Clarithromycin         >16       R   Amikacin                 8       S   Tobramycin               8       R   Doxycycline            >16       R   Minocycline             >8       R   Tigecycline           0.25   TMP/SMX             >8/152       R   Linezolid                16       I   ----------------------------------------------------------   Organism     MYCOBACTERIUM AVIUM COMPLEX   Antibiotic                   ADÁN (mcg/mL)  Interpretation   ----------------------------------------------------------   Clofazimine                          0.06   Moxifloxacin                          4       R   Clarithromycin                    2       S   Amikacin (IV)                     16       S   Amikacin (liposomal, inhaled)          16       S   Linezolid                              32       R         Respiratory Culture  03/15/2021 Abnormal   PSEUDOMONAS AERUGINOSA   Few   Normal respiratory chelsea also present     Gram Stain (Respiratory) <10 epithelial cells per low power field.    Gram Stain (Respiratory) Rare WBC's    Gram Stain (Respiratory) Rare Gram negative rods    Resulting Agency [facility]   Susceptibility     Pseudomonas aeruginosa    CULTURE, RESPIRATORY    Amikacin <=16 mcg/mL Sensitive    Cefepime <=2 mcg/mL Sensitive    Ciprofloxacin <=1 mcg/mL Sensitive    Gentamicin 8 mcg/mL Intermediate    Meropenem 4 mcg/mL Sensitive    Piperacillin/Tazo <=16 mcg/mL Sensitive    Tobramycin <=4 mcg/mL Sensitive          Linear View          Imaging Reviewed:  10/11/2022  Extensive changes in the lungs with severe emphysematous changes, large bolus or cavitary lesions and scattered nodules and linear and nodular areas of opacification not significantly changed compared to the prior exam 10/13/2021    07/20/2021  Chronic scarring and volume loss of the upper lung fields with cavitations consistent with mycobacterium infection.  This appear stable.  Severe thoracic kyphosis.  Hyperlucency of the rest of the lung fields likely due to emphysema.     Assessment:       1. Mycobacterium avium-intracellulare complex             Plan:     Mycobacterium avium-intracellulare complex  -     AFB Culture & Smear; Standing        Follow up in about 6 months (around 4/25/2023).      This note was  created using  voice recognition software that occasionally misinterpreted phrases or words.        Prior note 10/25/2022 Mycobacterium avium-intracellulare complex lung infection    -- On Rifabutin 300 mg daily, Ethambutol 700 mg daily, Azithromycin 500 mg daily (she received amikacin iv 3 times a week, (then arikayce 06/10/2021--07/20/2021, then discontinued because it precipitated COPD exacerbation.)  ----Continue airway clearance,  Aerobika, nebulized hypertonic saline, breathing techniques  ----- decrease duration of hot showers, avoid soil and water aerosols, use fans in shower area, avoid fine mist shower heads (heavy droplets better), avoid bubbling water (hot tubs, humidifiers), flush water heater frequently to avoid biofilm buildup  -- Prevent gastroesophageal reflux  --- Probiotics: yogurt with live culture or geraldophilis 2 caps TID  --- Check AFB sputum monthly for clearance. Duration 12 months from cultures clearance (most people are on abx about ~18 months)-- Follow AFB q 4 weeks cx   ---- If she doesn't clear her sputum cultures after about 6 months of treatment, would consider adding INH  next (and clofazamine could be considered in future as well)  ----While on ethambutol- self vision screens daily. If blurry vision lasts 2 days in a row, patient known to stop ethambutol and call eye doctor for exam and to let us know that this happened.  --- while on Azithromycin will  Check EKG QTC was 448.   --- Check CT chest Q6 months on therapy?--may be next appointment  ---  if worsening, or recurrence:  Will consider ? Lung resection of predominant cavitary lesion could help increase chance of cure. Not clearif patient is surgical candidate, referal to [facility] for evaluation.  [email or web address]      10/25/202treatment of Mycobacterium avium complex, pulmonary infection, needs be continued until  at least 12 month after clearance of  sputum.  We do not have 2 negative sputums a month apart.  We have 1- sputum in February 2022, but patient was feeling better before that.    I advised patient to complete the antibiotics she has until the midst of November then stop..  Sputum q 6 months  Ct q 6 mo  Follow-up with me q.6 months   She will see pulmonologist Dr. Dr. Mackey.  Will discuss with him and decide

## 2022-10-27 PROBLEM — J96.11 CHRONIC HYPOXEMIC RESPIRATORY FAILURE: Status: ACTIVE | Noted: 2022-01-01

## 2022-10-27 PROBLEM — A31.0 MYCOBACTERIUM AVIUM-INTRACELLULARE COMPLEX: Status: ACTIVE | Noted: 2022-01-01

## 2022-10-27 NOTE — PROGRESS NOTES
10/27/2022    Fabiana Morel  Office Note    Chief Complaint   Patient presents with    Follow-up     18 week f/u - CT       10/27/2022 pt last ct viewed -- thick walled cavities improved to benign appearing emphysema appearance-- active MAC unlikely.    Pt had had prior poor pneumococcal titers -- had had recurrent lung infections over yrs with no acute new lung infections on MAC rx.    Pt was near death in 2020 -- she is not able to produce mucous for monitoring.      Consideration for stopping MAC rx-- expect dz burden very minimal or cured.  Chronic suppressive medication discussed .    Pt uses ox prn now.  Has been able to travel-- seems to have had better recovery than considered possible.  Pt's pft in 2016 were too poor to consider lung surgery- fev 27% at 720 cc- should be worse now as stopped smokes in 2020 .    Dr Hamlin's note 1/2021  reviewed.    No new issues.      6/27/2022 no mucous, wt up to 112, uses ox intermittently.  cxr 3/2022 cavitary dz looked smaller. On azithr 500/d, etham 700/d, rifabutin 150 2 daily.  On trelegy.   Min albuterol use,  Gets about -- taking trips. Had poc--- battery short lived.    Patient Instructions   Cxr in March was improved.  Expect ct chest scheduled for October will just show scarring from  MAC.    May consider taper MAC therapy slowly in future  3/28/2022-- sputum neg from Feb 2022 was neg 6 wks.no prednisone.  Off ox several hours to days -- still uses prn.  Walks about house-- limited by celestin.  Eyes ok and bowels well -- azithro/rifabutin/etham.      Patient Instructions   You have mild yeast roof mouth, may need medication if worsens.    Do ct chest October.    Do chest xray now.     Will try to get portable oxygen concentrator-- need 6 min walk.    Would keep trelegy at 200.    Pseudomonas may relapse.  May use cipro for pseudomonas, best to culture.  If on cipro would skip rifabutin til off cipro/  9/27/2021 seeing Dr Mandel, took another course prednisone  after last visit.  May be considered for trail of clofazimine?? Sputum still pos 8/12.  Uses ox prn, eating better.  No vest. . Copd rx going well with treelgy    Patient Instructions   Do ct chest prior to Dr Jansen visit.     8/2/2021 took several days to stabilize breathing after boost last   Patient Instructions   You cannot use amikacin nebulized as had too severe bronchospasm.     You are having noise today, take 20 mg daily prednisone for 5 days , repeat til chest rattle nearly gone or gone completely.    Would recommend no arikayce-  You looked worse after Arikayce than with initial illness in December with collapsed lung and MAC (with abscessus concerns).      Continue azithromycin, rifabutin, ethambutol.    Could do follow up ct chest in October?      MAC not expected to abruptly give problem, should remain suppressed.  Infection on top of MAC could occur.  Pseudomonas risk for recurrence- should be less with good mucous clearing.  You have standing order for sputum culture--- may use cipro if abrupt bronchial worsening with yellow mucous. Also standing order still for chest xray in chart.    Vest was not tolerated in hospital.      Use nebulizer and aerobilka at least daily. Should work on pulmonary toilet-     Breathing comfortable at rest, could consider non invasive ventilator.          7/22/2021 started amikacin 5 wks ago, needed ox full time few days later.   Ox needed to 2.5 ,and now 4. Pt lost taste.  Found to have thrush -- rxed 3 days diflucan and to start mycostatin tomorrow.  Having diarrhea 8 months-- loose stools.  occ weak standing but no falls.  Needs help standing occasionally - no  Change lately.  No fever -- occ chest pain coughing but same chronically.   Started 20 mgprednisone 3 days ago -- off amikacin since 17th- last 5 days. Pt having cough, and wheezes with amikacin--       Pt cannot bring up mucous. No vest -- did not tolerate vest. Has aeorbilka but not using - not using as not  convince of help.  Uses neb 1-2 times daily -- seems to worsen few minutes.       No use of bipap nor niv, will be sob for up to 5 min after activity.  Patient Instructions   You are having lingering bronchitis with wheezes today despite being off amikacin 5 days and on prednisone.    Increase prednisone to 60 mg daily for 3 days then 40 mg for 3 days, then 20 mg daily til recheck    Would increase trelegy to 200    Nebulized amikacin has  not tolerated with copd -- it induced bronchospasm.  Would avoid if able.      Use aerobilka with nebulizer-- should tolerate nebulizer  Better once prednisone works.      Non invasive ventilator may be of benefit?  6/8/2021 celestin limits along with weakness, mucous minimal. To start neb amikacin.  Having diarrhea last 7 months.  Ct chest March c/w November ct- cavities are diffusely smaller, apparent infiltrative lung disease is also less from November to March.   Patient Instructions   Ct chest March c/w November ct- cavities are diffusely smaller, apparent infiltrative lung disease is also less from November to March. cxr really shows no differences from November to June 8.  Would check chest xrays for acute worsening.  Could check ct for evidence of disease stabilization/improvemnet or failing treatment.  March ct clearly with evidence for xray improvement.      Bedside appearance has improved nicely- severe copd limits improvement.    Ct chest ordered for March 2022 - no new recommendations      Would not treat little yellow mucous- regular culture may help.  4/8/2021 - had cipro x 2 wks for pseudomonas , no falls , still weak, about house with celestin.  Having diarrhea- no worse with cipro.  bm 2-3 times daily . Pt on iv amikacin still iv.  Also on azithro, rifabutin, etham.  No prednisone save 20/d x3 initially.      Pt was on fosamax but stopped but did not tolerate.  Cannot use with current regimen.    2/15/2021- on mainly mac rx rx now, saw Dr Hamlin and Dr Jansen.  Mucous  clear, appetite better.  Wt stable at 92 - max usual would be 115?   No night sweats. Pt's chest tube fell out recently with pneumothorax resolved.  No ox use nor prednisone. azithro daily, myambutol, rifabutin, and amikacin (3 days weekly).  igg level .  cxr today with improved left upper lung and stable right with no pneumo.  No falls, still weak- breathing,weakness, and lack desire ppt slow improvement.  No chest pain.  Patient Instructions   Would consider sputum cultures if change in status.     Sputum for afb would check for DAVIS ---- sputum for regular culture for routine germs including pseudomonas.    Do chest xray if any major concerns for whatever reason.    Abrupt illness more characteristic of regular germs - slow illness more likely mycobacterium.    Fosamax stopped due to intake complications--  May be best to do shots?  Ask Dr Bartlett later.           From uptodate on ethambutol----Visual changes -- Optic neuropathy (usually manifested as a change in visual acuity or red-green color blindness) is the most important ethambutol toxicity. The reported incidence of optic neuropathy when ethambutol is taken for more than two months is 18 percent in subjects receiving more than 35 mg/kg per day, 5 to 6 percent with 25 mg/kg per day, and less than 1 percent with 15 mg/kg per day [15]. Optic neuritis is reversible in most patients.    Check color perception every 2 wks or so.  Note if vision changes.    1/13/2021 still air leak, had recurrent pneumo after chest tube pulled after last visit   Plan:  Clinical impression is apparently straight forward and impression with management as below.    Fabiana was seen today for hospital follow up.    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    Discussed with patient above for education the following:      Discussed with patient above for education the following:      Patient Instructions   Still with pneumothorax but being drained by current chest tube.  Would follow up with infectious disease doctors, would try to see Yoli Jansen and Cherie.      Another chest ray needed.  Consider something else wrt collapsed lung if id requests or follow a month.    Regular bad germ, pseudomonas, complicates bronchiectasis/cavity, might do better with vest device?  Would use aerobilka with nebulizer treatments.    Blood sugar sl low at 66, not too bad.    crp - marker for infection-- was 90 12/29, then 62 on 1/5 ,and was 6.5 yesterday. Expect better yet soon?   12/21/2020 - eating better, mucous near clear, chest tube leak stopped 2 days ago.  Had acute admit with pneumo 11.29,  aren and abscusus found.  On rx merrem,zyvox, ethambutol,aztihro, biaxin- no problems, sees eye doc for ethambutol.  No prednisone.  On amikacin. Off megace and eating ok .  Breathing ok         11/9/2020 no tb exposure, had chest pain, cxr with upper lung cavitary, had afb checks with positive x 2. No id.  Night sweats forever- slight dampness with no need to change clothes.  Pain was 9/10 ppt er visit, no prior pain, no ppt factors- pain better with steroids/abx.  Started levaquin 750 in er- off prednisone and abx..  Was on prednisone/augmentin prior to er visit.      5/27/2020- 1/2ppd, no problem,  Hip recovered.  No falls/cane.  No abx nor prednisone  \  Patient Instructions   I added iron level to blood test for future- not urgent.  Would stay on iron til later in year.    Do chest xray if any problems.      Jan 27, 2020- had hip fx in dec, had repair hip with good recovery.  Still on abx, had prevnar on 10/15/19-  Off smokes with better oxygen, doing well.  occ hip pain. No prednisone,  "  Patient Instructions   jeremy has 3 24/7 breathing medications- daily good    Use prednisone if cough or wheezes or more short breath.    Use augmentin if cough, short breath.    Chest xray now and repeat in 3-4 months - sooner if any problems.    Could check immune in 3 months, you had vaccine in October 2019    Ferrous sulfate/gluconate once daily may be reasonable - iv iron infusion?    Oct 3, 2019 lost 5lbs since aug, cxr with apparent bullae left lung posterior lung. No mucous production.  Still on abx - augmentin.  Has had damp night sweats for years with no change.  Patient Instructions   Pneumonia germ was 8 of 14 protected.      Get vaccine next wk    Stay on augmentin til lung is cleared - optimal.    Do crp, cbc, cxr next week- would follow what is abnormal.  If ongoing improvement found- follow til baseline reasonable - ct and scope test if not clearing or worsens.    Try mirtazapine 7.5mg bedtime, go to 15 mg bedtime if not too sleepy. Will make sleep and should stimulate appetite.    Periactin- 4 mg - try 2nd if mirtazapine not effective, not both, start one bedtime, going to twice daily (could break 1/2), antihisatmine.    Try megace next time?  aug  27, developed left chest pain and short of breath,   occ yellow mucous,  Still smokes. noox testing.  Uses trelegy, no falls.     Patient Instructions   Likely has had left upper lung pnumonia last 2 months.  Bronch in 2017 did not show much.     Chest xray recheck in a month- do monthly til this pneumonia cleared-you have a standing order for cxr  Monthly - get routinely every 3 months once this pneumonia cleared.       Check blood work weekly for "pneumonia" up to next month to assure normalizes.     Sputum culture- resistant  Germ possible.     Check immune response to vaccine.       Do prednisone 20mgdaily for  3 days monthly.     Use augmentin if pain,weakness, short breath, any concern regarding pneumonia.       Use trelegy.       Feb 26, 2019- " took abx with  recent developing resp problems. Still smokes.  resp same.   Has old compression fx seen on  Lateral cxr.  Discussed with patient above for education the following:    Could do f/u ct chest - but chest xray looks stable.     Lung capacity was only 27% in 2014- hard to see lungs going much more into future.  Chronic lung disease usually causes chronic debility/weakness.  Acute illnesses will be hard to recover strength.      Prompt antibiotic and prednisone may be wise.    Chest xray for illness may be wise.     Use xanax as needed for anxiety.    Stop smokes please.    aug 28, 2018- still smokes, some celestin, uses trelegy. cxr stable.  Follow-up in about 6 months (around 2/28/2019).  Discussed with patient above for education the following:     Use prednisone or antibiotic If bronchitis  Check chest xray if ill  Chest xray 6 months.  Likely do ct in a yr?  Stop smokes.  Feb 28, 2018 breathing better, still cough but slight.  Nearly off smokes.  Had exacerbation needing steriods.  Lung transplant discussed if pt stops smokes but pt declines.  nov 16, 2017 - off smokes transiently - on chantix with no problems.  No severe stress.    oct 17, 2017- no fever or night sweats or cough. No new c/o - still smokes but trying chantrix.  sept 20, still smokes same, feels sl better than 6  Months ago, mucous clear, night sweats still with no improvement with abx.    AUg 15, having right shoulder pain last 3 weeks at 10/10 intially  And subsequent 3-4/10,  No fever but night sweats chr, no n/v/d or headache.  No tb exposure.   Had tb eval past negative.  Still smokes.  Nerves ok.  Breathing seems  Better.  Feb 13, 2017, hpi doing rehab, no action plan, still smoking.  Using all meds, nerves better with xanax and toprol.  Aug 12, 2016HPI:initial encounter in [facility], copd and bronchiectasis and lung nodule.  No prednisone use recent.  No recent azithromycin.  breo and incruse regular use, [id number]/d, not using  "nebulizer.  Last ct 12/2015.  fev1 27% in 2014 with dramatic bd response.  Working as filing and typing.  Feels resp comfortable.  Has had lung dz age 56.    Scant mucous with no color chr, no acapella.    The chief compliant  problem is stable  PFSH:  Past Medical History:   Diagnosis Date    Abnormal CT scan 1/8/2015    Asthma     Colitis     COPD (chronic obstructive pulmonary disease)     Diverticulitis of sigmoid colon 11/4/2014    Erosive gastritis 7/17/2013    Fracture of left clavicle     H pylori ulcer 7/17/2013    Hypertension     Peptic ulcer disease 7/17/2013    Pneumonia of right upper lobe due to infectious organism 8/15/2017    Rectal bleed 11/3/2014    Scoliosis     SOB (shortness of breath)     Yeast infection 8/25/2017         Past Surgical History:   Procedure Laterality Date    APPENDECTOMY      BREAST BIOPSY Right     cyst    COLONOSCOPY N/A 7/15/2019    Procedure: COLONOSCOPY;  Surgeon: Sharif Chun MD;  Location: [facility] ENDO;  Service: Endoscopy;  Laterality: N/A;    HIP ARTHROPLASTY Right 12/18/2019    Procedure: ARTHROPLASTY, HIP;  Surgeon: Bernardino Wilson II, MD;  Location: [facility] OR;  Service: Orthopedics;  Laterality: Right;  [facility] - [facility] [employer] [facility]    HIP ARTHROPLASTY Left 12/14/2021    Procedure: ARTHROPLASTY, HIP;  Surgeon: Abdulkadir Dasilva MD;  Location: [facility] OR;  Service: Orthopedics;  Laterality: Left;    HYSTERECTOMY      HASMUKH/BSO, DUB    OOPHORECTOMY      TUBAL LIGATION       Social History     Tobacco Use    Smoking status: Former     Packs/day: 0.50     Years: 30.00     Pack years: 15.00     Types: Cigarettes    Smokeless tobacco: Never    Tobacco comments:     Quit on 11-5-2020   Substance Use Topics    Alcohol use: Not Currently     Alcohol/week: 12.0 standard drinks     Types: 12 Cans of beer per week    Drug use: No     Family History   Problem Relation Age of Onset    Cancer Mother 49        "lymph nodes"    Cancer Sister         lung?     Review of patient's allergies " "indicates:   Allergen Reactions    Erythromycin Nausea Only       Performance Status:The patient's activity level is functions out of house.      Review of Systems:  a review of eleven systems covering constitutional, Eye, HEENT, Psych, Respiratory, Cardiac, GI, , Musculoskeletal, Endocrine, Dermatologic was negative except for pertinent findings as listed ABOVE and below: night sweats chr due to menopause,      Exam:Comprehensive exam done.   Pulse 80   Ht 5' 4" (1.626 m)   Wt 54.9 kg (120 lb 15.8 oz)   SpO2 99% Comment: on room air at rest  BMI 20.77 kg/m²   Exam included Vitals as listed, and patient's appearance and affect and alertness and mood, oral exam for yeast and hygiene and pharynx lesions and Mallapatti (M) score, neck with inspection for jvd and masses and thyroid abnormalities and lymph nodes (supraclavicular and infraclavicular nodes and axillary also examined and noted if abn), chest exam included symmetry and effort and fremitus and percussion and auscultation, cardiac exam included rhythm and gallops and murmur and rubs and jvd and edema, abdominal exam for mass and hepatosplenomegaly and tenderness and hernias and bowel sounds, Musculoskeletal exam with muscle tone and posture and mobility/gait and  strength, and skin for rashes and cyanosis and pallor and turgor, extremity for clubbing.  Findings were normal except for pertinent findings listed below:  Mild kyphosis scoliosis, M2, sl insp  wheezes no edema, no clubbing, no ox      Radiographs reviewed:   Ct chest 3/2021 dis c/w ct chest november  cxr 1/13/2021 loculuated pneumo and chest tube, stable cavitiies.  Ct chest 11/1/2020 cavities sl worse than 10/2019.    Ct chest 10/2019- infected emphysema left upper suggested - right lung just severe copd    cxr 2/25/2019 same as aug 2018.  Scars both upper lungs.  ct 2016  With left lung abn better now, right lung was clear and now with infiltrate with vol loss of emphysema??  cxr may " have smaller cavity rul cavity than ct c/w aug ct with sept cxr.    cxr 10/16 same as sept 2017,  cxr  Feb 26, 2018 improved rul density with smaller hyper inflated lungs.      Labs reviewed  No pos cultures  PFT results [id number]  fev 27%    GA BRONCHOSCOPY,TRANSBRONCH BIOPSY [63578 (CPT®)]   BRONCHOSCOPY - BRONCHOALVEOLAR LAVAGE [PFT43 (Custom)]           []Hide copied text    []Emilver for details  10/20/2017     After review of ct, informed consent, updated history and physical, time out, and anesthesia sedation- pt underwent left nares bronchoscopy (right nares couldn't be cannulated).     Thick secretions encountered and washed free.  Airways were wnl.  Lavage from rul 90 cc in and 20 cc out.  Transbronchial bx x 2 for cultures done (afb, fungal, routine).  Transbronchial bx x 6 for path.  Lavage and wash pooled and submitted for culture, cytology, afb, and fungal evals..     ebl 15 cc post transbronchial bx. Airway suctioned til bleed ceased.  Post floro with no pneumo/complication and post cxr pending.  No complications.      Electronically signed by Bhupendra Mackey MD at 10/20/2017  8:10 AM         Plan:  Clinical impression is apparently straight forward and impression with management as below.    Fabiana was seen today for follow-up.    Diagnoses and all orders for this visit:    Mycobacterium abscessus colonization    DAVIS (mycobacterium avium-intracellulare)  -     Ambulatory referral/consult to Infectious Disease; Future    Mycobacterium avium-intracellulare complex  -     azithromycin (ZITHROMAX) 500 MG tablet; Take 1 tablet (500 mg total) by mouth once daily.  -     ethambutoL (MYAMBUTOL) 100 MG Tab; Take 7 tablets (700 mg total) by mouth once daily.  -     rifabutin (MYCOBUTIN) 150 mg Cap; Take 2 capsules (300 mg total) by mouth once daily.    Bronchiectasis with acute lower respiratory infection    Chronic obstructive pulmonary disease with acute exacerbation    Cavitary lung disease    Chronic  hypoxemic respiratory failure    Immune deficiency disorder        Follow up in about 5 months (around 3/27/2023), or if symptoms worsen or fail to improve.    Discussed with patient above for education the following:      Discussed with patient above for education the following:      Patient Instructions   See Dr Hamlin.     You had selective immune deficiency and chronic abx reasonable.  May be reasonable to taper MAC rx rather than take chance of recurrence.  Recurrance may be delayed in identification with no mucous to monitor.      Would do ct 4 months after stopping DAVIS treatments if stopped..      You had some immune deficiency with no protection to 6 of 14 pneumonia germs in 2020.          Eval took 55 min

## 2022-10-27 NOTE — TELEPHONE ENCOUNTER
----- Message from Bina Fletcher MA sent at 10/27/2022  1:47 PM CDT -----  Contact: pt    ----- Message -----  From: Ana María Billingsley  Sent: 10/27/2022   1:37 PM CDT  To: [facility]ac[facility] Reeder Staff    Pt requesting a callback to get appt scheduled pt was seen in the past and want to continue care           Confirmed contact below:  Contact Name:Fabiana Morel  Phone Number: [phone]

## 2022-10-27 NOTE — PATIENT INSTRUCTIONS
See Dr Hamlin.     You had selective immune deficiency and chronic abx reasonable.  May be reasonable to taper MAC rx rather than take chance of recurrence.  Recurrance may be delayed in identification with no mucous to monitor.      Would do ct 4 months after stopping DAVIS treatments if stopped..      You had some immune deficiency with no protection to 6 of 14 pneumonia germs in 2020.

## 2022-11-11 NOTE — PROGRESS NOTES
"    INFECTIOUS DISEASE CLINIC  11/11/2022 1:24 PM    Subjective:      Chief Complaint:   Chief Complaint   Patient presents with    MAC       History of Present Illness:    Patient Fabiana Morel is a 67 y.o. female with h/o tobacco abuse (quit 11/2020) with COPD and bronchiectasis who presents today for pulmonary NTM f/u (MAC, M.abscessus). She regularly follows with Dr Jansen, but I saw her for one visit 1/20/21. Wondering now if it's ok to stop antibiotics, currently on rifabutin azithro ethambutol daily. Says she was on iv abx for about 9 months. Feeling much better, gained 40 lb, "not too bad cough". Haven't been able to get up sputum (says it comes in the middle of night and doesn't save it because lab closed.   9 months iv   Didn't tolerate inh amikacin- felt wiped out, lost weight  [place], daily (didn't tolerate rifampin GI side effects, tolerating rifabutin, but copay high at beginning of year ~$700/mo rifabutin)  Not doing anything for airway clearance- has aerobika and hypertonic saline, not using  Continues to be tobacco free  Still on O2 NC              Reviewed micro:     Negative sputum AFB x 1 on 2/2022  Positive AFB 8/12/21         Had numerous negative AFB cultures in 2016, 2017     11/3/20-   Organism     MYCOBACTERIUM AVIUM COMPLEX   Antibiotic                   ADÁN (mcg/mL)  Interpretation   ----------------------------------------------------------   Clofazimine                          0.06   Moxifloxacin                            4       R   Clarithromycin                          2       S   Amikacin (IV)                          16       S   Amikacin (liposomal, inhaled)          16       S   Linezolid                              32       R         11/4/20-   Organism     MYCOBACTERIUM ABSCESSUS COMPLEX   Antibiotic    ADÁN (mcg/mL)  Interpretation   ----------------------------------------------------------   Cefoxitin               32       I   Imipenem                 8       I "   Ciprofloxacin           >4       R   Moxifloxacin            >8       R   Clarithromycin         >16       R   Amikacin                 8       S   Tobramycin               8       R   Doxycycline            >16       R   Minocycline             >8       R   Tigecycline           0.25   TMP/SMX             >8/152       R   Linezolid               16       I      11/5/20- 4+ MAC     1/1/20- positive, pending  Spoke with micro lab about this culture and they suspect MAC (but hasn't been probed yet)        CT 12/29:  15-20% residual  right pneumothorax despite the presence of a right chest tube.  Partial atelectasis of the right lower lobe and right middle lobe.  Severe pre-existing emphysema.  Multiple centrally cavitary pulmonary infiltrates bilaterally and too numerous to count nodules of the mid and lower lung fields especially on the left.  Finding is consistent with a super imposed tubercular or fungal infection on severe emphysema.             Ref Range & Units 2wk ago 3wk ago 4wk ago 1mo ago   CRP 0.0 - 8.2 mg/L 62.2High   91.3High   14.62High  R  13.75High  R                Review of Systems   Constitutional: Positive for night sweats. Negative for chills, decreased appetite, fever, malaise/fatigue, weight gain and weight loss.   HENT:  Negative for congestion, ear pain, hearing loss, hoarse voice, sore throat and tinnitus.    Eyes:  Negative for blurred vision, redness and visual disturbance.   Cardiovascular:  Negative for chest pain, leg swelling and palpitations.   Respiratory:  Negative for cough, hemoptysis, shortness of breath, sputum production and wheezing.    Hematologic/Lymphatic: Negative for adenopathy. Does not bruise/bleed easily.   Skin:  Negative for dry skin, itching, rash and suspicious lesions.   Musculoskeletal:  Negative for back pain, joint pain, myalgias and neck pain.   Gastrointestinal:  Negative for abdominal pain, constipation, diarrhea, heartburn, nausea and vomiting.  "  Genitourinary:  Negative for dysuria, flank pain, frequency, hematuria, hesitancy and urgency.   Neurological:  Negative for dizziness, headaches, numbness, paresthesias and weakness.   Psychiatric/Behavioral:  Negative for depression and memory loss. The patient does not have insomnia and is not nervous/anxious.      Past Medical History:   Diagnosis Date    Abnormal CT scan 1/8/2015    Asthma     Colitis     COPD (chronic obstructive pulmonary disease)     Diverticulitis of sigmoid colon 11/4/2014    Erosive gastritis 7/17/2013    Fracture of left clavicle     H pylori ulcer 7/17/2013    Hypertension     Peptic ulcer disease 7/17/2013    Pneumonia of right upper lobe due to infectious organism 8/15/2017    Rectal bleed 11/3/2014    Scoliosis     SOB (shortness of breath)     Yeast infection 8/25/2017       Past Surgical History:   Procedure Laterality Date    APPENDECTOMY      BREAST BIOPSY Right     cyst    COLONOSCOPY N/A 7/15/2019    Procedure: COLONOSCOPY;  Surgeon: Sharif Chun MD;  Location: [facility];  Service: Endoscopy;  Laterality: N/A;    HIP ARTHROPLASTY Right 12/18/2019    Procedure: ARTHROPLASTY, HIP;  Surgeon: Bernardino Wilson II, MD;  Location: [facility] OR;  Service: Orthopedics;  Laterality: Right;  Jose - Morel and Nephew    HIP ARTHROPLASTY Left 12/14/2021    Procedure: ARTHROPLASTY, HIP;  Surgeon: Abdulkadir Dasilva MD;  Location: [facility] OR;  Service: Orthopedics;  Laterality: Left;    HYSTERECTOMY      HASMUKH/BSO, DUB    OOPHORECTOMY      TUBAL LIGATION         Family History   Problem Relation Age of Onset    Cancer Mother 49        "lymph nodes"    Cancer Sister         lung?       Social History     Socioeconomic History    Marital status:    Tobacco Use    Smoking status: Former     Packs/day: 0.50     Years: 30.00     Pack years: 15.00     Types: Cigarettes    Smokeless tobacco: Never    Tobacco comments:     Quit on 11-5-2020   Substance and Sexual Activity    Alcohol use: Not Currently "     Alcohol/week: 12.0 standard drinks     Types: 12 Cans of beer per week    Drug use: No    Sexual activity: Yes     Partners: Male       Review of patient's allergies indicates:   Allergen Reactions    Erythromycin Nausea Only         Objective:   VS (24h):   Vitals:    11/11/22 1319   BP: (!) 136/92   Pulse: 82   Temp: 98 °F (36.7 °C)     [unfilled]  General: Afebrile, alert, comfortable, no acute distress.   HEENT: ANITRA. EOMI, no scleral icterus.   Pulmonary: Non labored,clear to auscultation A/P/L. No wheezing, crackles, or rhonchi.  Cardiac: normal S1 & S2 w/o rubs/murmurs/gallops.   Abdominal: Non-tender, non-distended.  Extremities: Moves all extremities x 4. No peripheral edema.  Skin: No jaundice, rashes, or visible lesions.   Neurological:  Alert and oriented x 4.     Labs:    Glucose   Date Value Ref Range Status   02/16/2022 98 70 - 110 mg/dL Final   12/16/2021 120 (H) 70 - 110 mg/dL Final   12/15/2021 113 (H) 70 - 110 mg/dL Final       Calcium   Date Value Ref Range Status   02/16/2022 9.8 8.7 - 10.5 mg/dL Final   12/16/2021 8.9 8.7 - 10.5 mg/dL Final   12/15/2021 9.1 8.7 - 10.5 mg/dL Final       Albumin   Date Value Ref Range Status   02/16/2022 3.9 3.5 - 5.2 g/dL Final   12/16/2021 2.6 (L) 3.5 - 5.2 g/dL Final   12/15/2021 2.9 (L) 3.5 - 5.2 g/dL Final       Total Protein   Date Value Ref Range Status   02/16/2022 7.6 6.0 - 8.4 g/dL Final   12/16/2021 6.0 6.0 - 8.4 g/dL Final   12/15/2021 6.7 6.0 - 8.4 g/dL Final       Sodium   Date Value Ref Range Status   02/16/2022 136 136 - 145 mmol/L Final   12/16/2021 137 136 - 145 mmol/L Final   12/15/2021 138 136 - 145 mmol/L Final       Potassium   Date Value Ref Range Status   02/16/2022 4.5 3.5 - 5.1 mmol/L Final   12/16/2021 3.9 3.5 - 5.1 mmol/L Final   12/15/2021 4.3 3.5 - 5.1 mmol/L Final       CO2   Date Value Ref Range Status   02/16/2022 30 (H) 23 - 29 mmol/L Final   12/16/2021 29 23 - 29 mmol/L Final   12/15/2021 28 23 - 29 mmol/L Final        Chloride   Date Value Ref Range Status   02/16/2022 97 95 - 110 mmol/L Final   12/16/2021 101 95 - 110 mmol/L Final   12/15/2021 99 95 - 110 mmol/L Final       BUN   Date Value Ref Range Status   02/16/2022 18 8 - 23 mg/dL Final   12/16/2021 10 8 - 23 mg/dL Final   12/15/2021 7 (L) 8 - 23 mg/dL Final       Creatinine   Date Value Ref Range Status   02/16/2022 0.5 0.5 - 1.4 mg/dL Final   12/16/2021 0.6 0.5 - 1.4 mg/dL Final   12/15/2021 0.6 0.5 - 1.4 mg/dL Final       Alkaline Phosphatase   Date Value Ref Range Status   02/16/2022 61 55 - 135 U/L Final   12/16/2021 52 (L) 55 - 135 U/L Final   12/15/2021 61 55 - 135 U/L Final       ALT   Date Value Ref Range Status   02/16/2022 14 10 - 44 U/L Final   12/16/2021 14 10 - 44 U/L Final   12/15/2021 13 10 - 44 U/L Final       AST   Date Value Ref Range Status   02/16/2022 17 10 - 40 U/L Final   12/16/2021 18 10 - 40 U/L Final   12/15/2021 15 10 - 40 U/L Final       Total Bilirubin   Date Value Ref Range Status   02/16/2022 0.9 0.1 - 1.0 mg/dL Final     Comment:     For infants and newborns, interpretation of results should be based  on gestational age, weight and in agreement with clinical  observations.    Premature Infant recommended reference ranges:  Up to 24 hours.............<8.0 mg/dL  Up to 48 hours............<12.0 mg/dL  3-5 days..................<15.0 mg/dL  6-29 days.................<15.0 mg/dL     12/16/2021 0.3 0.1 - 1.0 mg/dL Final     Comment:     For infants and newborns, interpretation of results should be based  on gestational age, weight and in agreement with clinical  observations.    Premature Infant recommended reference ranges:  Up to 24 hours.............<8.0 mg/dL  Up to 48 hours............<12.0 mg/dL  3-5 days..................<15.0 mg/dL  6-29 days.................<15.0 mg/dL     12/15/2021 0.3 0.1 - 1.0 mg/dL Final     Comment:     For infants and newborns, interpretation of results should be based  on gestational age, weight and in  agreement with clinical  observations.    Premature Infant recommended reference ranges:  Up to 24 hours.............<8.0 mg/dL  Up to 48 hours............<12.0 mg/dL  3-5 days..................<15.0 mg/dL  6-29 days.................<15.0 mg/dL         WBC   Date Value Ref Range Status   02/16/2022 6.26 3.90 - 12.70 K/uL Final   12/16/2021 6.89 3.90 - 12.70 K/uL Final   12/15/2021 7.34 3.90 - 12.70 K/uL Final       Hemoglobin   Date Value Ref Range Status   02/16/2022 13.4 12.0 - 16.0 g/dL Final   12/16/2021 10.4 (L) 12.0 - 16.0 g/dL Final   12/15/2021 12.1 12.0 - 16.0 g/dL Final       Hematocrit   Date Value Ref Range Status   02/16/2022 44.6 37.0 - 48.5 % Final   12/16/2021 32.6 (L) 37.0 - 48.5 % Final   12/15/2021 39.1 37.0 - 48.5 % Final       MCV   Date Value Ref Range Status   02/16/2022 97 82 - 98 fL Final   12/16/2021 95 82 - 98 fL Final   12/15/2021 96 82 - 98 fL Final       Platelets   Date Value Ref Range Status   02/16/2022 316 150 - 450 K/uL Final   12/16/2021 218 150 - 450 K/uL Final   12/15/2021 268 150 - 450 K/uL Final       Lab Results   Component Value Date    CHOL 187 08/21/2020    CHOL 182 05/22/2020    CHOL 182 06/25/2019       Lab Results   Component Value Date    HDL 55 08/21/2020    HDL 57 05/22/2020    HDL 65 06/25/2019       Lab Results   Component Value Date    LDLCALC 105.4 08/21/2020    LDLCALC 102.2 05/22/2020    LDLCALC 97.6 06/25/2019       Lab Results   Component Value Date    TRIG 133 08/21/2020    TRIG 114 05/22/2020    TRIG 97 06/25/2019       Lab Results   Component Value Date    CHOLHDL 29.4 08/21/2020    CHOLHDL 31.3 05/22/2020    CHOLHDL 35.7 06/25/2019     No results found for: RPR  No results found for: QUANTIFERON    Medications:  Current Outpatient Medications on File Prior to Visit   Medication Sig Dispense Refill    acetaminophen (TYLENOL) 325 MG tablet Take 650 mg by mouth every 6 (six) hours as needed for Pain or Temperature greater than.      albuterol-ipratropium  (DUO-NEB) 2.5 mg-0.5 mg/3 mL nebulizer solution Take 3 mLs by nebulization every 6 (six) hours as needed for Wheezing. 25 vial 2    aspirin 81 MG Chew Take 1 tablet (81 mg total) by mouth 2 (two) times daily. 60 tablet 1    azithromycin (ZITHROMAX) 500 MG tablet Take 1 tablet (500 mg total) by mouth once daily. 90 tablet 3    ethambutoL (MYAMBUTOL) 100 MG Tab Take 7 tablets (700 mg total) by mouth once daily. 630 tablet 6    ferrous sulfate (FEOSOL) 325 mg (65 mg iron) Tab tablet Take 325 mg by mouth 2 (two) times a day.      fluticasone-umeclidin-vilanter (TRELEGY ELLIPTA) 200-62.5-25 mcg inhaler Inhale 1 puff into the lungs once daily. 60 each 11    HYDROcodone-acetaminophen (NORCO) 5-325 mg per tablet Take 1 tablet by mouth every 6 (six) hours as needed. 18 tablet 0    HYDROcodone-acetaminophen (NORCO) 5-325 mg per tablet Take 1 tablet by mouth every 6 (six) hours as needed for Pain. 28 tablet 0    lactobacillus combination no.4 (PROBIOTIC) 3 billion cell Cap Take 1 capsule by mouth once daily.      mucus clearing device (AEROBIKA OSCILLATING PEP SYSTM) by Misc.(Non-Drug; Combo Route) route 2 (two) times a day. 1 Device 0    rifabutin (MYCOBUTIN) 150 mg Cap Take 2 capsules (300 mg total) by mouth once daily. 60 capsule 5    sodium chloride 3% 3 % nebulizer solution Take 4 mLs by nebulization 2 (two) times a day. 500 mL 11    VENTOLIN HFA 90 mcg/actuation inhaler Inhale 2 puffs into the lungs every 4 (four) hours as needed for Wheezing. 18 g 11     No current facility-administered medications on file prior to visit.       Antibiotics:   Antibiotics (From admission, onward)      None            HIV: No components found for: HIV 1/2 AG/AB  Hepatitis C IgG: No components found for: HEPATITIS C  Syphilis: No results found for: RPR    Hepatitis A IgG: No components found for: HEPATITIS A IGG  Hepatitis Bc IgG: No components found for: HEPATITIS B CORE IGG  Hepatitis Bs IgG:  Quantiferon: No results found for:  QUANTIFERON  VZV IgG: No components found for: VARICELLA IGG    No components found for: SEDIMENTATION RATE  No components found for: C-REACTIVE PROTEIN      Microbiology x 7d:   Microbiology Results (last 7 days)       ** No results found for the last 168 hours. **            Immunization History   Administered Date(s) Administered    COVID-19 Vaccine 02/26/2021, 03/26/2021    COVID-19, MRNA, LN-S, PF (MODERNA FULL 0.5 ML DOSE) 02/26/2021, 03/26/2021    Pneumococcal Conjugate - 13 Valent 11/21/2017, 10/15/2019    Pneumococcal Polysaccharide - 23 Valent 02/25/2014    Tdap 06/25/2019    Zoster 11/21/2017         Reviewed records today as well as relevant labs, cultures, and imaging    Assessment:     Macrolide sensitive fibrocavitary pulmonary MAC  Bronchiectasis   emphysema      Symptoms, radiography improved with 2 years MAC treatment and tobacco cessation.     Duration of abx ~ 1 year from culture clearance. Unfortunately, pt hasn't dropped off sputum cultures since 2/2022    Plan:     Stressed importance of AFB sputum cultures to assess for treatment duration and eval for reoccurrence even after stopping MAC therapy.     Discussed importance of airway clearance. Encouraged  Aerobika. Start nebulized hypertonic saline. Discussed breathing techniques and pt will watch videos at [email or web address].com.au. Recommended decreasing duration of hot showers, avoid soil and water aerosols, use fans in shower area, avoid fine mist shower heads (heavy droplets better), avoid bubbling water (hot tubs, humidifiers), flush water heater frequently to avoid biofilm buildup     Prevent reflux- avoid stomach sleeping. Famotidine/tums. Stop liquids 3hr before bedtime. If gastritis, PPI ok, but the acid environement in stomach helps kill off afb organisms, so would prefer not to use ppi unless absolutely necessary.     Probiotics: yogurt with live culture or geraldophilis 2 caps TID    Continue ethambutol/azithromycin/rifabutin till  at least 2/2022, but if repeat sputum cultures positive, would continue and add another agent (such as clofazamine) to therapy.  Ethambutol more important than rifabutin in preventing macrolide resistance and could consider stopping the rifabutin in January given cost. If repeat cultures are negative and abx are stopped in about February, will need close f/u with AFB and radiographic monitoring and low threshold in resuming therapy if worsening disease. Stressed importance of avoiding macrolide monotherapy in future if the abx is needed for something like URI     While on ethambutol- self vision screens daily. If blurry vision lasts 2 days in a row, patient known to stop ethambutol and call eye doctor for exam and to let us know that this happened.      CT chest at beginning and end of therapy most important (not unreasonable q6mo to f/u lung nodule size)    - Will monitor drug therapy for toxicity    - The following labs were ordered:    I have sent communication to the referring physician and/or primary care provider.     I spent a total of 60 minutes on the day of the visit. This includes face to face time and non-face to face time preparing to see the patient (eg, review of tests), obtaining and/or reviewing separately obtained history, documenting clinical information in the electronic or other health record, independently interpreting results, and communicating results to the patient/family/caregiver, or care coordination.      Lilli Hamlin MD, MPH  Infectious Disease

## 2022-11-14 NOTE — TELEPHONE ENCOUNTER
Sputum Cx of 02/05/2022 were neg   Sputum Cx of 08/12/2022 were positive  I had considered patient had  cleared MAC somewhere in between,  and stopped tx this November2022, but Mycobacterial expert in [facility] is recommending continue till February 2023      Patient saw Lilli Hamlin MD in [facility] :  --continued AFB sputum surveillance   --Continued mucous clearance (hypertonic saline, aerobika). Can increase hypertonic saline to 7% (from 3%) if she can't produce sputum on 3%.   -- Would continue antibiotics until at least February (one year from neg culture). Ok to drop rifabutin and just keep azithro/ethambutol.   --if repeat cultures positive, would continue abx and add clofazamine.

## 2022-11-14 NOTE — ASSESSMENT & PLAN NOTE
----- Message from AMNA Paul sent at 11/14/2022  1:35 PM CST -----  Please notify patient of negative results. Thank you.     Appreciate ID consult  Cont abx per ID  Sputum culture reviewed

## 2022-12-21 ENCOUNTER — TELEPHONE (OUTPATIENT)
Dept: FAMILY MEDICINE | Facility: CLINIC | Age: 67
End: 2022-12-21
Payer: MEDICARE

## 2022-12-21 NOTE — TELEPHONE ENCOUNTER
Called  home and had them release death certificate in [facility]. Forwarded message to Dr. Bartlett.

## 2022-12-21 NOTE — TELEPHONE ENCOUNTER
Received message from [facility] that death certificate needed to be signed for this patient. Last visit 2020. Spoke to her  on phone Mr. Morel.  Has been under care of Pulm Dr. Mackey  and ID Dr. Jansen for copd and chronic mycobacterium infection. Paramedics believe she had heart attack in her sleep.   found her.  She was watching tv in chair night before. Has some mild c/o indigestion and had taken antacid.  She came to bed at some point but   discovered her cold and unresponsive when he awoke to go to the bathroom next morning.        Call [facility] or 's office.  Need them to upload death certificate into [facility] to sign

## 2022-12-21 NOTE — TELEPHONE ENCOUNTER
Called  home and they have no idea of what was cause of death they were just called to get her from home.    ----- Message from Rosa Santos sent at 2022  2:44 PM CST -----  Contact: [facility]  Type: Needs Medical Advice  Who Called: Patient   Best Call Back Number: [phone] (ms huff)   Additional Information: [place] [facility] on the line just wanted to know who they can speak with to see if Dr. Bartlett will be signing pt  death certificate. Thank you plz call today if possible if she can the  home will assign her as the doctor.. Thank you

## 2022-12-28 ENCOUNTER — TELEPHONE (OUTPATIENT)
Dept: FAMILY MEDICINE | Facility: CLINIC | Age: 67
End: 2022-12-28
Payer: MEDICARE

## 2022-12-28 NOTE — TELEPHONE ENCOUNTER
----- Message from Elissa Yanes sent at 2022  7:54 AM CST -----  Regarding: Signature  Contact: Kaci with  home  Type: Needs Medical Advice  Who Called:  Kaci with [place]  Home  Symptoms (please be specific):    How long has patient had these symptoms:    Pharmacy name and phone #:    Best Call Back Number: [phone]    Additional Information: Kaci stated she still has not received the signature for the death certificate. Please call to advise. Thanks!

## 2022-12-29 ENCOUNTER — TELEPHONE (OUTPATIENT)
Dept: FAMILY MEDICINE | Facility: CLINIC | Age: 67
End: 2022-12-29
Payer: MEDICARE

## 2022-12-29 NOTE — TELEPHONE ENCOUNTER
Death certificate is in [place] waiting to be signed.  home called back today questioning why it had not been signed.

## 2022-12-29 NOTE — TELEPHONE ENCOUNTER
----- Message from Elissa Yanes sent at 2022 10:03 AM CST -----  Regarding: (Urgent) Death Cerificate  Contact: Kaci with  home  Kaci with [place] with the  home states the death certificate has been sent back. Please call Kaci to advise on why at [phone]. Thanks!

## 2022-12-29 NOTE — TELEPHONE ENCOUNTER
I was on the [employer] site and it closed the file in the middle of completion. I have put a message in to [employer] but they won't open until the morning, I will try again then

## 2023-01-30 PROBLEM — J96.11 CHRONIC HYPOXEMIC RESPIRATORY FAILURE: Status: RESOLVED | Noted: 2022-01-01 | Resolved: 2023-01-30

## 2023-02-01 NOTE — ASSESSMENT & PLAN NOTE
Called patient to relay the below result.  Patient verbalized understanding of information provided. Recommended orders were placed.    ----- Message from CLARI Zuniga sent at 2/1/2023  8:10 AM CST -----  Vit D low, start 50,000 units once weekly x 8 weeks, then 2,000 units daily thereafter. Repeat Vit D in 3 month  UA positive for UTI. Recommend Bactrim DS BID x 7 days, awaiting urine culture.  CMP normal  CBC show WBC at 12.2 with elevated neutrophil count. Likely due to UTI. However, needs to monitor symptoms closely with past medical hx.  TSHr normal    If he develops abdominal pain, diarrhea, nausea, vomiting, fevers, chest pain, shortness of breath, or other concerning symptoms present to ER.      Noted, stable on 3 L nasal cannula.  Continue supplemental oxygen.  Pt does have oxygen at home.

## 2023-04-19 ENCOUNTER — TELEPHONE (OUTPATIENT)
Dept: INFECTIOUS DISEASES | Facility: CLINIC | Age: 68
End: 2023-04-19

## 2024-10-22 NOTE — PLAN OF CARE
24g IV catheter removed with cath tip intact.    Site without redness or swelling.  Pressure dressing applied.  Pt tolerated well.  Pt discharged to daughter via wheelchair.  Instructed to come back tomorrow @ 1pm.  Verbalized understanding.       Pt. Identified by name & . VS taken and charted. 24g IV cath to Right outer AC x1 attempt per ANIBAL Knox. Pt tolerated well.  IV running 500mls NS without difficulty @ 250cc/hr. Pt. Resting comfortably.     The pt is accepted at [facility] . Discharge destination booked. Skye Baez, [place]     12/09/20 1616   Post-Acute Status   Post-Acute Authorization Home Health   Home Health Status Set-up Complete      Yes

## (undated) DEVICE — SUT STRATAFIX SPIRAL VIOLET

## (undated) DEVICE — SUT MONOCRYL 3-0 PS-1

## (undated) DEVICE — BNDG COFLEX FOAM LF2 ST 6X5YD

## (undated) DEVICE — STRAP OR TABLE 5IN X 72IN

## (undated) DEVICE — SUT CTD VICRYL 0 UND BR

## (undated) DEVICE — GOWN TOGA SYS PEELWY ZIP 2 XL

## (undated) DEVICE — DRAPE PLASTIC U 60X72

## (undated) DEVICE — TOWEL OR DISP STRL BLUE 4/PK

## (undated) DEVICE — APPLICATOR CHLORAPREP ORN 26ML

## (undated) DEVICE — PACK BASIC

## (undated) DEVICE — SUT STRATAFIX PDS PLS 45CM

## (undated) DEVICE — SOL 9P NACL IRR PIC IL

## (undated) DEVICE — CLOSURE SKIN STERI STRIP 1/2X4

## (undated) DEVICE — DRAPE STERI U-SHAPED 47X51IN

## (undated) DEVICE — CUBE COLD THERAPY POLAR CARE

## (undated) DEVICE — SLEEVE SCD EXPRESS KNEE MEDIUM

## (undated) DEVICE — ADHESIVE MASTISOL VIAL 48/BX

## (undated) DEVICE — SEE MEDLINE ITEM 157131

## (undated) DEVICE — SEE MEDLINE ITEM 107746

## (undated) DEVICE — DRAPE STERI-DRAPE 1000 17X11IN

## (undated) DEVICE — DRAPE INCISE IOBAN 2 23X17IN

## (undated) DEVICE — BLADE ELECTRO EXTENDED.

## (undated) DEVICE — TOGA FLYTE PEEL AWAY XLARGE

## (undated) DEVICE — PILLOW ABDUCTION FOAM MED

## (undated) DEVICE — SPONGE BULKEE II ABSRB 6X6.75

## (undated) DEVICE — GLOVE SURG ULTRA TOUCH 8.5

## (undated) DEVICE — PAD OR EGGCRATE BLUE 2X20X72

## (undated) DEVICE — INTERPULSE SET

## (undated) DEVICE — SUT ETHIBOND XTRA 5 V-37 GR

## (undated) DEVICE — SOL IRR NACL .9% 3000ML

## (undated) DEVICE — DRAPE INCISE IOBAN 2 23X33IN

## (undated) DEVICE — ALCOHOL 70% ISOP RUBBING 4OZ

## (undated) DEVICE — UNDERGLOVES BIOGEL PI SIZE 8.5

## (undated) DEVICE — SYS CLSR DERMABOND PRINEO 22CM

## (undated) DEVICE — TUBE SUCTION YANKAUER HI CAP

## (undated) DEVICE — DRESSING AQUACEL AG 3.5X10IN

## (undated) DEVICE — SUT STRATAFIX SPRL PS-2 3-0

## (undated) DEVICE — BLADE SAW SGTTL 4.72X25.4X1.27

## (undated) DEVICE — BLADE SURG CARBON STEEL #10

## (undated) DEVICE — UNDERGLOVES BIOGEL PI SIZE 8

## (undated) DEVICE — SLEEVE SCD EXPRESS CALF MEDIUM

## (undated) DEVICE — MANIFOLD 4 PORT

## (undated) DEVICE — PACK CUSTOM UNIV BASIN SLI

## (undated) DEVICE — SEE MEDLINE ITEM 157166

## (undated) DEVICE — BLADE SYSTEM 6 25MMX90MMX1.27M

## (undated) DEVICE — DRAPE STERI INSTRUMENT 1018

## (undated) DEVICE — GLOVE SURG ULTRA TOUCH 8

## (undated) DEVICE — NDL SURG MAYO CATGUT

## (undated) DEVICE — ELECTRODE BLD EXT 6.50 ST DISP

## (undated) DEVICE — LINER SUCTION 3000CC

## (undated) DEVICE — DRAPE HIP TIBURON 87X115X134

## (undated) DEVICE — SEE MEDLINE ITEM 157216

## (undated) DEVICE — SEALER BIPOLAR TISSUE 6.0

## (undated) DEVICE — SEE MEDLINE ITEM 152530

## (undated) DEVICE — SPONGE SUPER KERLIX 6X6.75IN

## (undated) DEVICE — WRAPON POLAR PAD HIP FOR POLAR

## (undated) DEVICE — ELECTRODE REM PLYHSV RETURN 9

## (undated) DEVICE — SEE MEDLINE ITEM 152622

## (undated) DEVICE — SUT FIBERWIRE 2 38 IN TAPER

## (undated) DEVICE — PAD ABD 8X10 STERILE

## (undated) DEVICE — SUT STRATAFIX PDS 1 CTX 18IN